# Patient Record
Sex: FEMALE | Race: WHITE | Employment: UNEMPLOYED | URBAN - METROPOLITAN AREA
[De-identification: names, ages, dates, MRNs, and addresses within clinical notes are randomized per-mention and may not be internally consistent; named-entity substitution may affect disease eponyms.]

---

## 2017-03-15 LAB
AVERAGE GLUCOSE: NORMAL
HBA1C MFR BLD: 17.5 %

## 2017-03-23 ENCOUNTER — HOSPITAL ENCOUNTER (OUTPATIENT)
Age: 62
Setting detail: SPECIMEN
Discharge: HOME | End: 2017-03-23

## 2017-03-23 LAB
CHOLESTEROL/HDL RATIO: 2.7
CHOLESTEROL: 137 MG/DL
HCT VFR BLD CALC: 34.4 % (ref 36–46)
HDLC SERPL-MCNC: 50 MG/DL
HEMOGLOBIN: 11.6 G/DL (ref 12–16)
LDL CHOLESTEROL: 57 MG/DL (ref 0–130)
MCH RBC QN AUTO: 26.3 PG (ref 26–34)
MCHC RBC AUTO-ENTMCNC: 33.6 G/DL (ref 31–37)
MCV RBC AUTO: 78.2 FL (ref 80–100)
PDW BLD-RTO: 15.6 % (ref 12.5–15.4)
PLATELET # BLD: 124 K/UL (ref 140–450)
PMV BLD AUTO: 8.3 FL (ref 6–12)
RBC # BLD: 4.39 M/UL (ref 4–5.2)
TRIGL SERPL-MCNC: 148 MG/DL
VITAMIN B-12: 475 PG/ML (ref 211–946)
VLDLC SERPL CALC-MCNC: NORMAL MG/DL (ref 1–30)
WBC # BLD: 4.5 K/UL (ref 3.5–11)

## 2017-03-23 PROCEDURE — 86403 PARTICLE AGGLUT ANTBDY SCRN: CPT

## 2017-03-23 PROCEDURE — 85027 COMPLETE CBC AUTOMATED: CPT

## 2017-03-23 PROCEDURE — 87186 SC STD MICRODIL/AGAR DIL: CPT

## 2017-03-23 PROCEDURE — 80061 LIPID PANEL: CPT

## 2017-03-23 PROCEDURE — P9603 ONE-WAY ALLOW PRORATED MILES: HCPCS

## 2017-03-23 PROCEDURE — 87070 CULTURE OTHR SPECIMN AEROBIC: CPT

## 2017-03-23 PROCEDURE — 82607 VITAMIN B-12: CPT

## 2017-03-23 PROCEDURE — 87205 SMEAR GRAM STAIN: CPT

## 2017-03-23 PROCEDURE — 36415 COLL VENOUS BLD VENIPUNCTURE: CPT

## 2017-03-25 LAB
CULTURE: ABNORMAL
CULTURE: ABNORMAL
DIRECT EXAM: ABNORMAL
DIRECT EXAM: ABNORMAL
Lab: ABNORMAL
Lab: ABNORMAL
ORGANISM: ABNORMAL
SPECIMEN DESCRIPTION: ABNORMAL
SPECIMEN DESCRIPTION: ABNORMAL
STATUS: ABNORMAL

## 2017-03-28 ENCOUNTER — HOSPITAL ENCOUNTER (OUTPATIENT)
Age: 62
Setting detail: SPECIMEN
Discharge: HOME | End: 2017-03-28

## 2017-03-28 LAB
ABSOLUTE EOS #: 0.1 K/UL (ref 0–0.4)
ABSOLUTE LYMPH #: 1.2 K/UL (ref 1–4.8)
ABSOLUTE MONO #: 0.3 K/UL (ref 0.2–0.8)
BASOPHILS # BLD: 0 % (ref 0–2)
BASOPHILS ABSOLUTE: 0 K/UL (ref 0–0.2)
DIFFERENTIAL TYPE: ABNORMAL
EOSINOPHILS RELATIVE PERCENT: 1 % (ref 1–4)
HCT VFR BLD CALC: 39.1 % (ref 36–46)
HEMOGLOBIN: 12.3 G/DL (ref 12–16)
LYMPHOCYTES # BLD: 22 % (ref 24–44)
MCH RBC QN AUTO: 25.7 PG (ref 26–34)
MCHC RBC AUTO-ENTMCNC: 31.5 G/DL (ref 31–37)
MCV RBC AUTO: 81.6 FL (ref 80–100)
MONOCYTES # BLD: 5 % (ref 1–7)
PDW BLD-RTO: 15.1 % (ref 11.5–14.5)
PLATELET # BLD: 147 K/UL (ref 130–400)
PLATELET ESTIMATE: ABNORMAL
PMV BLD AUTO: 8.4 FL (ref 6–12)
RBC # BLD: 4.8 M/UL (ref 4–5.2)
RBC # BLD: ABNORMAL 10*6/UL
SEG NEUTROPHILS: 72 % (ref 36–66)
SEGMENTED NEUTROPHILS ABSOLUTE COUNT: 4 K/UL (ref 1.8–7.7)
WBC # BLD: 5.6 K/UL (ref 3.5–11)
WBC # BLD: ABNORMAL 10*3/UL

## 2017-03-28 PROCEDURE — 87040 BLOOD CULTURE FOR BACTERIA: CPT

## 2017-03-28 PROCEDURE — 85025 COMPLETE CBC W/AUTO DIFF WBC: CPT

## 2017-03-28 PROCEDURE — 36415 COLL VENOUS BLD VENIPUNCTURE: CPT

## 2017-04-03 LAB
CULTURE: NORMAL
Lab: NORMAL
SPECIMEN DESCRIPTION: NORMAL
STATUS: NORMAL
STATUS: NORMAL

## 2017-05-10 ENCOUNTER — OFFICE VISIT (OUTPATIENT)
Dept: NEUROLOGY | Age: 62
End: 2017-05-10
Payer: COMMERCIAL

## 2017-05-10 VITALS
BODY MASS INDEX: 36.89 KG/M2 | WEIGHT: 195.4 LBS | DIASTOLIC BLOOD PRESSURE: 68 MMHG | SYSTOLIC BLOOD PRESSURE: 133 MMHG | HEIGHT: 61 IN | HEART RATE: 92 BPM

## 2017-05-10 DIAGNOSIS — G40.909 SEIZURE DISORDER (HCC): Primary | ICD-10-CM

## 2017-05-10 PROCEDURE — 99204 OFFICE O/P NEW MOD 45 MIN: CPT | Performed by: PSYCHIATRY & NEUROLOGY

## 2017-05-10 RX ORDER — PIOGLITAZONEHYDROCHLORIDE 15 MG/1
15 TABLET ORAL EVERY MORNING
COMMUNITY
End: 2018-11-30

## 2017-05-10 RX ORDER — OMEPRAZOLE 40 MG/1
40 CAPSULE, DELAYED RELEASE ORAL DAILY
COMMUNITY

## 2017-05-10 RX ORDER — LAMOTRIGINE 200 MG/1
200 TABLET ORAL 2 TIMES DAILY
COMMUNITY

## 2017-05-10 RX ORDER — LEVETIRACETAM 1000 MG/1
1000 TABLET ORAL 2 TIMES DAILY
Status: ON HOLD | COMMUNITY
End: 2017-10-23 | Stop reason: HOSPADM

## 2017-05-10 RX ORDER — BUSPIRONE HYDROCHLORIDE 10 MG/1
10 TABLET ORAL 3 TIMES DAILY
COMMUNITY

## 2017-05-10 RX ORDER — LORATADINE 10 MG/1
10 TABLET ORAL DAILY
COMMUNITY

## 2017-05-10 RX ORDER — PAROXETINE HYDROCHLORIDE 20 MG/1
20 TABLET, FILM COATED ORAL EVERY MORNING
COMMUNITY

## 2017-05-10 RX ORDER — INSULIN GLARGINE 100 [IU]/ML
60 INJECTION, SOLUTION SUBCUTANEOUS 2 TIMES DAILY
COMMUNITY
End: 2020-01-01 | Stop reason: DRUGHIGH

## 2017-05-10 RX ORDER — SIMVASTATIN 20 MG
20 TABLET ORAL NIGHTLY
Status: ON HOLD | COMMUNITY
End: 2020-01-01 | Stop reason: HOSPADM

## 2017-05-10 RX ORDER — PRAMIPEXOLE DIHYDROCHLORIDE 1 MG/1
1 TABLET ORAL NIGHTLY
COMMUNITY

## 2017-05-10 RX ORDER — LISINOPRIL AND HYDROCHLOROTHIAZIDE 12.5; 1 MG/1; MG/1
1 TABLET ORAL DAILY
Status: ON HOLD | COMMUNITY
End: 2020-01-01 | Stop reason: HOSPADM

## 2017-08-03 ENCOUNTER — OFFICE VISIT (OUTPATIENT)
Dept: ORTHOPEDIC SURGERY | Age: 62
End: 2017-08-03
Payer: MEDICARE

## 2017-08-03 VITALS — BODY MASS INDEX: 36.88 KG/M2 | WEIGHT: 195.33 LBS | HEIGHT: 61 IN

## 2017-08-03 DIAGNOSIS — S80.01XA CONTUSION OF RIGHT KNEE, INITIAL ENCOUNTER: Primary | ICD-10-CM

## 2017-08-03 DIAGNOSIS — S80.02XA CONTUSION OF LEFT KNEE, INITIAL ENCOUNTER: ICD-10-CM

## 2017-08-03 PROCEDURE — 3017F COLORECTAL CA SCREEN DOC REV: CPT | Performed by: ORTHOPAEDIC SURGERY

## 2017-08-03 PROCEDURE — G8427 DOCREV CUR MEDS BY ELIG CLIN: HCPCS | Performed by: ORTHOPAEDIC SURGERY

## 2017-08-03 PROCEDURE — G8417 CALC BMI ABV UP PARAM F/U: HCPCS | Performed by: ORTHOPAEDIC SURGERY

## 2017-08-03 PROCEDURE — 3014F SCREEN MAMMO DOC REV: CPT | Performed by: ORTHOPAEDIC SURGERY

## 2017-08-03 PROCEDURE — 1036F TOBACCO NON-USER: CPT | Performed by: ORTHOPAEDIC SURGERY

## 2017-08-03 PROCEDURE — 99203 OFFICE O/P NEW LOW 30 MIN: CPT | Performed by: ORTHOPAEDIC SURGERY

## 2017-10-21 ENCOUNTER — APPOINTMENT (OUTPATIENT)
Dept: CT IMAGING | Age: 62
DRG: 101 | End: 2017-10-21
Payer: MEDICARE

## 2017-10-21 ENCOUNTER — APPOINTMENT (OUTPATIENT)
Dept: GENERAL RADIOLOGY | Age: 62
DRG: 101 | End: 2017-10-21
Payer: MEDICARE

## 2017-10-21 ENCOUNTER — HOSPITAL ENCOUNTER (INPATIENT)
Age: 62
LOS: 2 days | Discharge: HOME | DRG: 101 | End: 2017-10-23
Attending: EMERGENCY MEDICINE | Admitting: INTERNAL MEDICINE
Payer: MEDICARE

## 2017-10-21 DIAGNOSIS — G40.919 BREAKTHROUGH SEIZURE (HCC): Primary | ICD-10-CM

## 2017-10-21 PROBLEM — R56.9 SEIZURE (HCC): Status: ACTIVE | Noted: 2017-10-21

## 2017-10-21 PROBLEM — E11.42 TYPE 2 DIABETES MELLITUS WITH DIABETIC POLYNEUROPATHY, WITHOUT LONG-TERM CURRENT USE OF INSULIN (HCC): Status: ACTIVE | Noted: 2017-10-21

## 2017-10-21 PROBLEM — Z79.4 TYPE 2 DIABETES MELLITUS WITH DIABETIC POLYNEUROPATHY, WITH LONG-TERM CURRENT USE OF INSULIN (HCC): Status: ACTIVE | Noted: 2017-10-21

## 2017-10-21 LAB
ABSOLUTE EOS #: 0 K/UL (ref 0–0.4)
ABSOLUTE IMMATURE GRANULOCYTE: ABNORMAL K/UL (ref 0–0.3)
ABSOLUTE LYMPH #: 1 K/UL (ref 1–4.8)
ABSOLUTE MONO #: 0.2 K/UL (ref 0.1–1.2)
ANION GAP SERPL CALCULATED.3IONS-SCNC: 15 MMOL/L (ref 9–17)
BASOPHILS # BLD: 0 %
BASOPHILS ABSOLUTE: 0 K/UL (ref 0–0.2)
BUN BLDV-MCNC: 12 MG/DL (ref 8–23)
BUN/CREAT BLD: ABNORMAL (ref 9–20)
CALCIUM SERPL-MCNC: 9.7 MG/DL (ref 8.6–10.4)
CHLORIDE BLD-SCNC: 95 MMOL/L (ref 98–107)
CO2: 26 MMOL/L (ref 20–31)
CREAT SERPL-MCNC: 0.6 MG/DL (ref 0.5–0.9)
DIFFERENTIAL TYPE: ABNORMAL
EOSINOPHILS RELATIVE PERCENT: 1 %
GFR AFRICAN AMERICAN: >60 ML/MIN
GFR NON-AFRICAN AMERICAN: >60 ML/MIN
GFR SERPL CREATININE-BSD FRML MDRD: ABNORMAL ML/MIN/{1.73_M2}
GFR SERPL CREATININE-BSD FRML MDRD: ABNORMAL ML/MIN/{1.73_M2}
GLUCOSE BLD-MCNC: 416 MG/DL (ref 65–105)
GLUCOSE BLD-MCNC: 453 MG/DL (ref 70–99)
HCT VFR BLD CALC: 41.4 % (ref 36–46)
HEMOGLOBIN: 13.8 G/DL (ref 12–16)
IMMATURE GRANULOCYTES: ABNORMAL %
INR BLD: 1
LYMPHOCYTES # BLD: 26 %
MCH RBC QN AUTO: 26.4 PG (ref 26–34)
MCHC RBC AUTO-ENTMCNC: 33.3 G/DL (ref 31–37)
MCV RBC AUTO: 79.5 FL (ref 80–100)
MONOCYTES # BLD: 5 %
PDW BLD-RTO: 15.1 % (ref 12.5–15.4)
PLATELET # BLD: 89 K/UL (ref 140–450)
PLATELET ESTIMATE: ABNORMAL
PMV BLD AUTO: 8.2 FL (ref 6–12)
POC TROPONIN I: 0 NG/ML (ref 0–0.1)
POC TROPONIN I: 0 NG/ML (ref 0–0.1)
POC TROPONIN INTERP: NORMAL
POC TROPONIN INTERP: NORMAL
POTASSIUM SERPL-SCNC: 4 MMOL/L (ref 3.7–5.3)
PROTHROMBIN TIME: 10.7 SEC (ref 9.4–12.6)
RBC # BLD: 5.21 M/UL (ref 4–5.2)
RBC # BLD: ABNORMAL 10*6/UL
SEG NEUTROPHILS: 68 %
SEGMENTED NEUTROPHILS ABSOLUTE COUNT: 2.5 K/UL (ref 1.8–7.7)
SODIUM BLD-SCNC: 136 MMOL/L (ref 135–144)
WBC # BLD: 3.8 K/UL (ref 3.5–11)
WBC # BLD: ABNORMAL 10*3/UL

## 2017-10-21 PROCEDURE — 96365 THER/PROPH/DIAG IV INF INIT: CPT

## 2017-10-21 PROCEDURE — 99285 EMERGENCY DEPT VISIT HI MDM: CPT

## 2017-10-21 PROCEDURE — 82805 BLOOD GASES W/O2 SATURATION: CPT

## 2017-10-21 PROCEDURE — 82550 ASSAY OF CK (CPK): CPT

## 2017-10-21 PROCEDURE — 84100 ASSAY OF PHOSPHORUS: CPT

## 2017-10-21 PROCEDURE — 86780 TREPONEMA PALLIDUM: CPT

## 2017-10-21 PROCEDURE — 80061 LIPID PANEL: CPT

## 2017-10-21 PROCEDURE — 85610 PROTHROMBIN TIME: CPT

## 2017-10-21 PROCEDURE — 83735 ASSAY OF MAGNESIUM: CPT

## 2017-10-21 PROCEDURE — 82607 VITAMIN B-12: CPT

## 2017-10-21 PROCEDURE — 87040 BLOOD CULTURE FOR BACTERIA: CPT

## 2017-10-21 PROCEDURE — 6360000002 HC RX W HCPCS: Performed by: EMERGENCY MEDICINE

## 2017-10-21 PROCEDURE — 80048 BASIC METABOLIC PNL TOTAL CA: CPT

## 2017-10-21 PROCEDURE — 6370000000 HC RX 637 (ALT 250 FOR IP): Performed by: STUDENT IN AN ORGANIZED HEALTH CARE EDUCATION/TRAINING PROGRAM

## 2017-10-21 PROCEDURE — 6360000002 HC RX W HCPCS

## 2017-10-21 PROCEDURE — 2060000000 HC ICU INTERMEDIATE R&B

## 2017-10-21 PROCEDURE — 82947 ASSAY GLUCOSE BLOOD QUANT: CPT

## 2017-10-21 PROCEDURE — 93005 ELECTROCARDIOGRAM TRACING: CPT

## 2017-10-21 PROCEDURE — 36415 COLL VENOUS BLD VENIPUNCTURE: CPT

## 2017-10-21 PROCEDURE — 70450 CT HEAD/BRAIN W/O DYE: CPT

## 2017-10-21 PROCEDURE — 85025 COMPLETE CBC W/AUTO DIFF WBC: CPT

## 2017-10-21 PROCEDURE — 84443 ASSAY THYROID STIM HORMONE: CPT

## 2017-10-21 PROCEDURE — 82010 KETONE BODYS QUAN: CPT

## 2017-10-21 PROCEDURE — 83605 ASSAY OF LACTIC ACID: CPT

## 2017-10-21 PROCEDURE — 84484 ASSAY OF TROPONIN QUANT: CPT

## 2017-10-21 PROCEDURE — 82140 ASSAY OF AMMONIA: CPT

## 2017-10-21 PROCEDURE — 96375 TX/PRO/DX INJ NEW DRUG ADDON: CPT

## 2017-10-21 PROCEDURE — 82746 ASSAY OF FOLIC ACID SERUM: CPT

## 2017-10-21 PROCEDURE — 71010 XR CHEST PORTABLE: CPT

## 2017-10-21 PROCEDURE — 83036 HEMOGLOBIN GLYCOSYLATED A1C: CPT

## 2017-10-21 RX ORDER — LEVETIRACETAM 10 MG/ML
1000 INJECTION INTRAVASCULAR ONCE
Status: COMPLETED | OUTPATIENT
Start: 2017-10-21 | End: 2017-10-21

## 2017-10-21 RX ORDER — SODIUM CHLORIDE 0.9 % (FLUSH) 0.9 %
10 SYRINGE (ML) INJECTION PRN
Status: DISCONTINUED | OUTPATIENT
Start: 2017-10-21 | End: 2017-10-23 | Stop reason: HOSPADM

## 2017-10-21 RX ORDER — BUSPIRONE HYDROCHLORIDE 10 MG/1
10 TABLET ORAL 3 TIMES DAILY
Status: DISCONTINUED | OUTPATIENT
Start: 2017-10-22 | End: 2017-10-23 | Stop reason: HOSPADM

## 2017-10-21 RX ORDER — SODIUM CHLORIDE 0.9 % (FLUSH) 0.9 %
10 SYRINGE (ML) INJECTION EVERY 12 HOURS SCHEDULED
Status: DISCONTINUED | OUTPATIENT
Start: 2017-10-22 | End: 2017-10-23 | Stop reason: HOSPADM

## 2017-10-21 RX ORDER — LAMOTRIGINE 100 MG/1
50 TABLET ORAL DAILY
Status: DISCONTINUED | OUTPATIENT
Start: 2017-10-22 | End: 2017-10-22

## 2017-10-21 RX ORDER — ACETAMINOPHEN 325 MG/1
650 TABLET ORAL EVERY 4 HOURS PRN
Status: DISCONTINUED | OUTPATIENT
Start: 2017-10-21 | End: 2017-10-23 | Stop reason: HOSPADM

## 2017-10-21 RX ORDER — POTASSIUM CHLORIDE 7.45 MG/ML
10 INJECTION INTRAVENOUS PRN
Status: DISCONTINUED | OUTPATIENT
Start: 2017-10-21 | End: 2017-10-23 | Stop reason: HOSPADM

## 2017-10-21 RX ORDER — LORAZEPAM 2 MG/ML
2 INJECTION INTRAMUSCULAR PRN
Status: DISCONTINUED | OUTPATIENT
Start: 2017-10-21 | End: 2017-10-22

## 2017-10-21 RX ORDER — SODIUM CHLORIDE 0.9 % (FLUSH) 0.9 %
10 SYRINGE (ML) INJECTION EVERY 12 HOURS SCHEDULED
Status: DISCONTINUED | OUTPATIENT
Start: 2017-10-22 | End: 2017-10-23

## 2017-10-21 RX ORDER — LORAZEPAM 2 MG/ML
INJECTION INTRAMUSCULAR
Status: COMPLETED
Start: 2017-10-21 | End: 2017-10-21

## 2017-10-21 RX ORDER — POTASSIUM CHLORIDE 20 MEQ/1
40 TABLET, EXTENDED RELEASE ORAL PRN
Status: DISCONTINUED | OUTPATIENT
Start: 2017-10-21 | End: 2017-10-23 | Stop reason: HOSPADM

## 2017-10-21 RX ORDER — ONDANSETRON 2 MG/ML
4 INJECTION INTRAMUSCULAR; INTRAVENOUS EVERY 6 HOURS PRN
Status: DISCONTINUED | OUTPATIENT
Start: 2017-10-21 | End: 2017-10-23 | Stop reason: HOSPADM

## 2017-10-21 RX ORDER — INSULIN GLARGINE 100 [IU]/ML
10 INJECTION, SOLUTION SUBCUTANEOUS NIGHTLY
Status: DISCONTINUED | OUTPATIENT
Start: 2017-10-22 | End: 2017-10-23

## 2017-10-21 RX ORDER — FAMOTIDINE 20 MG/1
20 TABLET, FILM COATED ORAL 2 TIMES DAILY
Status: DISCONTINUED | OUTPATIENT
Start: 2017-10-22 | End: 2017-10-23 | Stop reason: HOSPADM

## 2017-10-21 RX ORDER — POTASSIUM CHLORIDE 20MEQ/15ML
40 LIQUID (ML) ORAL PRN
Status: DISCONTINUED | OUTPATIENT
Start: 2017-10-21 | End: 2017-10-23 | Stop reason: HOSPADM

## 2017-10-21 RX ORDER — LEVETIRACETAM 500 MG/1
500 TABLET ORAL 2 TIMES DAILY
Status: DISCONTINUED | OUTPATIENT
Start: 2017-10-22 | End: 2017-10-22

## 2017-10-21 RX ORDER — ONDANSETRON 2 MG/ML
4 INJECTION INTRAMUSCULAR; INTRAVENOUS EVERY 8 HOURS PRN
Status: DISCONTINUED | OUTPATIENT
Start: 2017-10-21 | End: 2017-10-21 | Stop reason: ALTCHOICE

## 2017-10-21 RX ORDER — ACETAMINOPHEN 325 MG/1
650 TABLET ORAL EVERY 4 HOURS PRN
Status: DISCONTINUED | OUTPATIENT
Start: 2017-10-21 | End: 2017-10-21 | Stop reason: SDUPTHER

## 2017-10-21 RX ADMIN — INSULIN LISPRO 7 UNITS: 100 INJECTION, SOLUTION INTRAVENOUS; SUBCUTANEOUS at 23:51

## 2017-10-21 RX ADMIN — LEVETIRACETAM 1000 MG: 10 INJECTION INTRAVENOUS at 20:44

## 2017-10-21 RX ADMIN — LORAZEPAM 2 MG: 2 INJECTION INTRAMUSCULAR; INTRAVENOUS at 20:06

## 2017-10-21 NOTE — ED TRIAGE NOTES
Pt arrived to ED via EMS with reporting family called due to altered mental status. Pt reports she has a history of seizures and was started on a new medication but does not remember what medication it is. On arrival pt disoriented to date and stated \"I'm confused on what happened, I don't remember anything. \"

## 2017-10-22 LAB
-: NORMAL
ABSOLUTE EOS #: 0 K/UL (ref 0–0.4)
ABSOLUTE IMMATURE GRANULOCYTE: ABNORMAL K/UL (ref 0–0.3)
ABSOLUTE LYMPH #: 1.2 K/UL (ref 1–4.8)
ABSOLUTE MONO #: 0.3 K/UL (ref 0.1–1.2)
ALBUMIN SERPL-MCNC: 3.5 G/DL (ref 3.5–5.2)
ALBUMIN/GLOBULIN RATIO: 0.9 (ref 1–2.5)
ALLEN TEST: ABNORMAL
ALLEN TEST: POSITIVE
ALP BLD-CCNC: 82 U/L (ref 35–104)
ALT SERPL-CCNC: 17 U/L (ref 5–33)
AMMONIA: 44 UMOL/L (ref 11–51)
AMORPHOUS: NORMAL
AMPHETAMINE SCREEN URINE: NEGATIVE
ANION GAP SERPL CALCULATED.3IONS-SCNC: 11 MMOL/L (ref 9–17)
AST SERPL-CCNC: 23 U/L
BACTERIA: NORMAL
BARBITURATE SCREEN URINE: NEGATIVE
BASOPHILS # BLD: 0 %
BASOPHILS ABSOLUTE: 0 K/UL (ref 0–0.2)
BENZODIAZEPINE SCREEN, URINE: NEGATIVE
BETA-HYDROXYBUTYRATE: 0.36 MMOL/L (ref 0.02–0.27)
BILIRUB SERPL-MCNC: 0.42 MG/DL (ref 0.3–1.2)
BILIRUBIN DIRECT: 0.13 MG/DL
BILIRUBIN URINE: NEGATIVE
BILIRUBIN, INDIRECT: 0.29 MG/DL (ref 0–1)
BUN BLDV-MCNC: 13 MG/DL (ref 8–23)
BUN/CREAT BLD: ABNORMAL (ref 9–20)
BUPRENORPHINE URINE: NORMAL
CALCIUM SERPL-MCNC: 9.5 MG/DL (ref 8.6–10.4)
CANNABINOID SCREEN URINE: NEGATIVE
CARBOXYHEMOGLOBIN: 1.1 % (ref 0–5)
CASTS UA: NORMAL /LPF (ref 0–8)
CHLORIDE BLD-SCNC: 101 MMOL/L (ref 98–107)
CHOLESTEROL/HDL RATIO: 3
CHOLESTEROL: 148 MG/DL
CO2: 29 MMOL/L (ref 20–31)
COCAINE METABOLITE, URINE: NEGATIVE
COLOR: YELLOW
COMMENT UA: ABNORMAL
CREAT SERPL-MCNC: 0.59 MG/DL (ref 0.5–0.9)
CRYSTALS, UA: NORMAL /HPF
DIFFERENTIAL TYPE: ABNORMAL
EOSINOPHILS RELATIVE PERCENT: 1 %
EPITHELIAL CELLS UA: NORMAL /HPF (ref 0–5)
FIO2: 3
FIO2: ABNORMAL
FOLATE: 16.9 NG/ML
GFR AFRICAN AMERICAN: >60 ML/MIN
GFR NON-AFRICAN AMERICAN: >60 ML/MIN
GFR SERPL CREATININE-BSD FRML MDRD: ABNORMAL ML/MIN/{1.73_M2}
GFR SERPL CREATININE-BSD FRML MDRD: ABNORMAL ML/MIN/{1.73_M2}
GLOBULIN: ABNORMAL G/DL (ref 1.5–3.8)
GLUCOSE BLD-MCNC: 224 MG/DL (ref 65–105)
GLUCOSE BLD-MCNC: 260 MG/DL (ref 70–99)
GLUCOSE BLD-MCNC: 291 MG/DL (ref 65–105)
GLUCOSE BLD-MCNC: 322 MG/DL (ref 65–105)
GLUCOSE BLD-MCNC: 326 MG/DL (ref 65–105)
GLUCOSE BLD-MCNC: 328 MG/DL (ref 65–105)
GLUCOSE BLD-MCNC: 399 MG/DL (ref 65–105)
GLUCOSE URINE: ABNORMAL
HCO3 VENOUS: 29.6 MMOL/L (ref 24–30)
HCT VFR BLD CALC: 39.6 % (ref 36–46)
HDLC SERPL-MCNC: 49 MG/DL
HEMOGLOBIN: 13 G/DL (ref 12–16)
IMMATURE GRANULOCYTES: ABNORMAL %
INR BLD: 1
KEPPRA: 25 UG/ML
KETONES, URINE: NEGATIVE
LACTIC ACID, WHOLE BLOOD: 1.5 MMOL/L (ref 0.7–2.1)
LACTIC ACID, WHOLE BLOOD: 1.9 MMOL/L (ref 0.7–2.1)
LACTIC ACID: NORMAL MMOL/L
LACTIC ACID: NORMAL MMOL/L
LAMOTRIGINE LEVEL: 3 UG/ML (ref 3–15)
LDL CHOLESTEROL: 75 MG/DL (ref 0–130)
LEUKOCYTE ESTERASE, URINE: NEGATIVE
LYMPHOCYTES # BLD: 23 %
MAGNESIUM: 1.7 MG/DL (ref 1.6–2.6)
MAGNESIUM: 1.7 MG/DL (ref 1.6–2.6)
MCH RBC QN AUTO: 26.3 PG (ref 26–34)
MCHC RBC AUTO-ENTMCNC: 32.8 G/DL (ref 31–37)
MCV RBC AUTO: 80.2 FL (ref 80–100)
MDMA URINE: NORMAL
METHADONE SCREEN, URINE: NEGATIVE
METHAMPHETAMINE, URINE: NORMAL
METHEMOGLOBIN: ABNORMAL % (ref 0–1.5)
MODE: ABNORMAL
MODE: ABNORMAL
MONOCYTES # BLD: 7 %
MUCUS: NORMAL
NEGATIVE BASE EXCESS, ART: ABNORMAL (ref 0–2)
NEGATIVE BASE EXCESS, VEN: ABNORMAL MMOL/L (ref 0–2)
NITRITE, URINE: NEGATIVE
NOTIFICATION TIME: ABNORMAL
NOTIFICATION: ABNORMAL
O2 DEVICE/FLOW/%: ABNORMAL
O2 DEVICE/FLOW/%: ABNORMAL
O2 SAT, VEN: 86.7 % (ref 60–85)
OPIATES, URINE: NEGATIVE
OTHER OBSERVATIONS UA: NORMAL
OXYCODONE SCREEN URINE: NEGATIVE
OXYHEMOGLOBIN: ABNORMAL % (ref 95–98)
PATIENT TEMP: 37
PATIENT TEMP: ABNORMAL
PCO2, VEN, TEMP ADJ: ABNORMAL MMHG (ref 39–55)
PCO2, VEN: 56.7 (ref 39–55)
PDW BLD-RTO: 14.7 % (ref 12.5–15.4)
PEEP/CPAP: ABNORMAL
PH UA: 5 (ref 5–8)
PH VENOUS: 7.34 (ref 7.32–7.42)
PH, VEN, TEMP ADJ: ABNORMAL (ref 7.32–7.42)
PHENCYCLIDINE, URINE: NEGATIVE
PHOSPHORUS: 4 MG/DL (ref 2.6–4.5)
PLATELET # BLD: 96 K/UL (ref 140–450)
PLATELET ESTIMATE: ABNORMAL
PMV BLD AUTO: 7.9 FL (ref 6–12)
PO2, VEN, TEMP ADJ: ABNORMAL MMHG (ref 30–50)
PO2, VEN: 56.7 (ref 30–50)
POC HCO3: 31.1 MMOL/L (ref 21–28)
POC O2 SATURATION: 99 % (ref 94–98)
POC PCO2 TEMP: ABNORMAL MM HG
POC PCO2: 53.3 MM HG (ref 35–48)
POC PH TEMP: ABNORMAL
POC PH: 7.38 (ref 7.35–7.45)
POC PO2 TEMP: ABNORMAL MM HG
POC PO2: 129.9 MM HG (ref 83–108)
POSITIVE BASE EXCESS, ART: 4 (ref 0–3)
POSITIVE BASE EXCESS, VEN: 2.9 MMOL/L (ref 0–2)
POTASSIUM SERPL-SCNC: 4.1 MMOL/L (ref 3.7–5.3)
PROPOXYPHENE, URINE: NORMAL
PROTEIN UA: ABNORMAL
PROTHROMBIN TIME: 11 SEC (ref 9.4–12.6)
PSV: ABNORMAL
PT. POSITION: ABNORMAL
RBC # BLD: 4.94 M/UL (ref 4–5.2)
RBC # BLD: ABNORMAL 10*6/UL
RBC UA: NORMAL /HPF (ref 0–4)
RENAL EPITHELIAL, UA: NORMAL /HPF
RESPIRATORY RATE: ABNORMAL
SAMPLE SITE: ABNORMAL
SAMPLE SITE: ABNORMAL
SEG NEUTROPHILS: 69 %
SEGMENTED NEUTROPHILS ABSOLUTE COUNT: 3.7 K/UL (ref 1.8–7.7)
SET RATE: ABNORMAL
SODIUM BLD-SCNC: 141 MMOL/L (ref 135–144)
SPECIFIC GRAVITY UA: 1.04 (ref 1–1.03)
T. PALLIDUM, IGG: NONREACTIVE
TCO2 (CALC), ART: 33 MMOL/L (ref 22–29)
TEST INFORMATION: NORMAL
TEXT FOR RESPIRATORY: ABNORMAL
TOTAL CK: 51 U/L (ref 26–192)
TOTAL HB: ABNORMAL G/DL (ref 12–16)
TOTAL PROTEIN: 7.2 G/DL (ref 6.4–8.3)
TOTAL RATE: ABNORMAL
TRICHOMONAS: NORMAL
TRICYCLIC ANTIDEPRESSANTS, UR: NORMAL
TRIGL SERPL-MCNC: 118 MG/DL
TSH SERPL DL<=0.05 MIU/L-ACNC: 1.16 MIU/L (ref 0.3–5)
TURBIDITY: CLEAR
URINE HGB: ABNORMAL
UROBILINOGEN, URINE: NORMAL
VITAMIN B-12: 432 PG/ML (ref 211–946)
VLDLC SERPL CALC-MCNC: NORMAL MG/DL (ref 1–30)
VT: ABNORMAL
WBC # BLD: 5.3 K/UL (ref 3.5–11)
WBC # BLD: ABNORMAL 10*3/UL
WBC UA: NORMAL /HPF (ref 0–5)
YEAST: NORMAL

## 2017-10-22 PROCEDURE — 85610 PROTHROMBIN TIME: CPT

## 2017-10-22 PROCEDURE — 97530 THERAPEUTIC ACTIVITIES: CPT

## 2017-10-22 PROCEDURE — 6370000000 HC RX 637 (ALT 250 FOR IP): Performed by: STUDENT IN AN ORGANIZED HEALTH CARE EDUCATION/TRAINING PROGRAM

## 2017-10-22 PROCEDURE — 83605 ASSAY OF LACTIC ACID: CPT

## 2017-10-22 PROCEDURE — 99223 1ST HOSP IP/OBS HIGH 75: CPT | Performed by: INTERNAL MEDICINE

## 2017-10-22 PROCEDURE — 82803 BLOOD GASES ANY COMBINATION: CPT

## 2017-10-22 PROCEDURE — 81001 URINALYSIS AUTO W/SCOPE: CPT

## 2017-10-22 PROCEDURE — 99222 1ST HOSP IP/OBS MODERATE 55: CPT | Performed by: PSYCHIATRY & NEUROLOGY

## 2017-10-22 PROCEDURE — 80177 DRUG SCRN QUAN LEVETIRACETAM: CPT

## 2017-10-22 PROCEDURE — 6370000000 HC RX 637 (ALT 250 FOR IP): Performed by: PSYCHIATRY & NEUROLOGY

## 2017-10-22 PROCEDURE — G8978 MOBILITY CURRENT STATUS: HCPCS

## 2017-10-22 PROCEDURE — 80175 DRUG SCREEN QUAN LAMOTRIGINE: CPT

## 2017-10-22 PROCEDURE — 2580000003 HC RX 258: Performed by: INTERNAL MEDICINE

## 2017-10-22 PROCEDURE — 97162 PT EVAL MOD COMPLEX 30 MIN: CPT

## 2017-10-22 PROCEDURE — 80076 HEPATIC FUNCTION PANEL: CPT

## 2017-10-22 PROCEDURE — 36600 WITHDRAWAL OF ARTERIAL BLOOD: CPT

## 2017-10-22 PROCEDURE — 2580000003 HC RX 258: Performed by: STUDENT IN AN ORGANIZED HEALTH CARE EDUCATION/TRAINING PROGRAM

## 2017-10-22 PROCEDURE — 94762 N-INVAS EAR/PLS OXIMTRY CONT: CPT

## 2017-10-22 PROCEDURE — 36415 COLL VENOUS BLD VENIPUNCTURE: CPT

## 2017-10-22 PROCEDURE — 83735 ASSAY OF MAGNESIUM: CPT

## 2017-10-22 PROCEDURE — G8979 MOBILITY GOAL STATUS: HCPCS

## 2017-10-22 PROCEDURE — 80307 DRUG TEST PRSMV CHEM ANLYZR: CPT

## 2017-10-22 PROCEDURE — 80048 BASIC METABOLIC PNL TOTAL CA: CPT

## 2017-10-22 PROCEDURE — 6360000002 HC RX W HCPCS: Performed by: INTERNAL MEDICINE

## 2017-10-22 PROCEDURE — 82947 ASSAY GLUCOSE BLOOD QUANT: CPT

## 2017-10-22 PROCEDURE — 2060000000 HC ICU INTERMEDIATE R&B

## 2017-10-22 PROCEDURE — 85025 COMPLETE CBC W/AUTO DIFF WBC: CPT

## 2017-10-22 RX ORDER — LORAZEPAM 2 MG/ML
1 INJECTION INTRAMUSCULAR EVERY 30 MIN PRN
Status: DISCONTINUED | OUTPATIENT
Start: 2017-10-22 | End: 2017-10-23 | Stop reason: HOSPADM

## 2017-10-22 RX ORDER — LAMOTRIGINE 100 MG/1
200 TABLET ORAL 2 TIMES DAILY
Status: DISCONTINUED | OUTPATIENT
Start: 2017-10-22 | End: 2017-10-23 | Stop reason: HOSPADM

## 2017-10-22 RX ADMIN — BUSPIRONE HYDROCHLORIDE 10 MG: 10 TABLET ORAL at 09:03

## 2017-10-22 RX ADMIN — INSULIN GLARGINE 10 UNITS: 100 INJECTION, SOLUTION SUBCUTANEOUS at 21:00

## 2017-10-22 RX ADMIN — BUSPIRONE HYDROCHLORIDE 10 MG: 10 TABLET ORAL at 16:33

## 2017-10-22 RX ADMIN — LEVETIRACETAM 500 MG: 500 TABLET, FILM COATED ORAL at 09:03

## 2017-10-22 RX ADMIN — SODIUM CHLORIDE, PRESERVATIVE FREE 10 ML: 5 INJECTION INTRAVENOUS at 22:39

## 2017-10-22 RX ADMIN — FAMOTIDINE 20 MG: 20 TABLET, FILM COATED ORAL at 20:59

## 2017-10-22 RX ADMIN — LAMOTRIGINE 200 MG: 100 TABLET ORAL at 20:59

## 2017-10-22 RX ADMIN — INSULIN GLARGINE 10 UNITS: 100 INJECTION, SOLUTION SUBCUTANEOUS at 01:19

## 2017-10-22 RX ADMIN — LEVETIRACETAM 1250 MG: 500 TABLET, FILM COATED ORAL at 20:59

## 2017-10-22 RX ADMIN — FAMOTIDINE 20 MG: 20 TABLET, FILM COATED ORAL at 01:19

## 2017-10-22 RX ADMIN — BUSPIRONE HYDROCHLORIDE 10 MG: 10 TABLET ORAL at 20:59

## 2017-10-22 RX ADMIN — Medication 10 ML: at 09:14

## 2017-10-22 RX ADMIN — FAMOTIDINE 20 MG: 20 TABLET, FILM COATED ORAL at 09:03

## 2017-10-22 RX ADMIN — LAMOTRIGINE 50 MG: 100 TABLET ORAL at 09:03

## 2017-10-22 RX ADMIN — INSULIN LISPRO 6 UNITS: 100 INJECTION, SOLUTION INTRAVENOUS; SUBCUTANEOUS at 09:10

## 2017-10-22 RX ADMIN — INSULIN LISPRO 9 UNITS: 100 INJECTION, SOLUTION INTRAVENOUS; SUBCUTANEOUS at 18:38

## 2017-10-22 RX ADMIN — LEVETIRACETAM 500 MG: 500 TABLET, FILM COATED ORAL at 01:20

## 2017-10-22 RX ADMIN — BUSPIRONE HYDROCHLORIDE 10 MG: 10 TABLET ORAL at 01:19

## 2017-10-22 RX ADMIN — INSULIN LISPRO 6 UNITS: 100 INJECTION, SOLUTION INTRAVENOUS; SUBCUTANEOUS at 21:04

## 2017-10-22 RX ADMIN — INSULIN LISPRO 12 UNITS: 100 INJECTION, SOLUTION INTRAVENOUS; SUBCUTANEOUS at 12:45

## 2017-10-22 RX ADMIN — ENOXAPARIN SODIUM 40 MG: 40 INJECTION SUBCUTANEOUS at 09:03

## 2017-10-22 ASSESSMENT — PAIN SCALES - GENERAL
PAINLEVEL_OUTOF10: 8
PAINLEVEL_OUTOF10: 5
PAINLEVEL_OUTOF10: 8

## 2017-10-22 ASSESSMENT — PAIN DESCRIPTION - LOCATION
LOCATION: HEAD
LOCATION: LEG
LOCATION: HEAD;LEG

## 2017-10-22 ASSESSMENT — PAIN DESCRIPTION - ORIENTATION
ORIENTATION: RIGHT;LEFT;LOWER
ORIENTATION: RIGHT;LEFT;LOWER

## 2017-10-22 NOTE — PROGRESS NOTES
IM senior note    The patient is a 59year old female with a past medical history of seizure disorder, stroke, HLD and DM with microvascular and macrovascular complications that presented to the ED post ictal. She had an additional seizure in the ED requiring Ativan. The patient appeared to be hypoxic but resolved with benzo administration.  No family or witnesses at bedside    No tongue bite, urinary or bowel incontinence    A/P  Principal Problem:    Seizure ([address]  Active Problems:    Type 2 diabetes mellitus with diabetic polyneuropathy, with long-term current use of insulin ([address]        Keppra IV  Neurology consult  Restart Lamictal  Ativan 2 mg IV q 5 minutes for seizure  Hold oral hypoglycemia  Lantus 10 units  High dose sliding scale   Hemoglobin A1c  TSH/ Ammonia/ phos/ mag/ BMP/ Check LFT  B12/ folate/VDRL       Terry Amaya MD  PGY3  Department of Internal Medicine  [facility]

## 2017-10-22 NOTE — FLOWSHEET NOTE
10/21/17 4661   Swallow Screening   Is the patient able to remain alert for testing? Yes   Was the Patient Eating a Modified Diet Prior to being Admitted? No   Is there an Existing PEG or Abdominal Feeding Tube? No   Does the patient have a Head of Bed [facility]) restriction <30°? No   Is there a Tracheostomy Tube Present?  No   3 oz Water Swallow Screen Pass

## 2017-10-22 NOTE — CONSULTS
NEUROLOGY INPATIENT CONSULT NOTE    10/22/2017         Mick Varela is a  59 y.o. female admitted on 10/21/2017 with  Breakthrough seizure ([address] Kristopher Mckeon      History is obtained mostly from the patient and the medical record and from the caregivers. Chart is reviewed and patient is examined. Briefly, this is a  59 y.o. female admitted on 10/21/2017 with breakthrough seizure. Patient stated \"I'm confused on what happened; I don't remember anything\". Patient stated that last thing she remembered yesterday was going back to bed yesterday after feeling tired. The next thing she remembered was waking up in the hospital.  She could not recall any details and she does not know who called EMS. As per medical record; patient was brought in by EMS as family called due to altered mentation. Vitals on admit: 174/74, 99.1°F, 99/min. Patient had another seizure activity in ED. It was described as generalized and no report of tongue bite or bladder incontinence. She was loaded with Keppra 1gr and continued on 500 bid. As per medical record; there were medication changes in her seizure meds recently. Patient stated that she has not had any medication changes \"for a long time\". Patient also stated that she had a history of seizures since age 10 and initially she was on Tegretol. She was evaluated at [facility] a year ago and Tegretol was discontinued and started on Lamictal as her EEG was demonstrating generalized seizure activity. Later she was added on Keppra 1000 mg twice daily. She stated MRI brain in September 2016 was normal.    Patient also stated that she never had any intolerance/allergic reaction with her AEDs. She had history of diabetic neuropathy affecting bilateral lower extremities and intermittent left upper extremity intentional tremor. Denies family history of seizures. Denied history of stroke/meningitis/encephalitis.          No current facility-administered medications on file prior to encounter. Current Outpatient Prescriptions on File Prior to Encounter   Medication Sig Dispense Refill    lisinopril-hydrochlorothiazide (PRINZIDE;ZESTORETIC) 10-12.5 MG per tablet Take 1 tablet by mouth daily      PARoxetine (PAXIL) 20 MG tablet Take 20 mg by mouth every morning      omeprazole (PRILOSEC) 20 MG delayed release capsule Take 20 mg by mouth daily      loratadine (CLARITIN) 10 MG tablet Take 10 mg by mouth daily      pioglitazone (ACTOS) 15 MG tablet Take 15 mg by mouth every morning      simvastatin (ZOCOR) 40 MG tablet Take 40 mg by mouth nightly      pramipexole (MIRAPEX) 1 MG tablet Take 1 mg by mouth nightly      levETIRAcetam (KEPPRA) 1000 MG tablet Take 1,000 mg by mouth 2 times daily      lamoTRIgine (LAMICTAL) 200 MG tablet Take 200 mg by mouth 2 times daily      busPIRone (BUSPAR) 10 MG tablet Take 10 mg by mouth 3 times daily      insulin glargine (LANTUS) 100 UNIT/ML injection vial Inject into the skin 2 times daily      insulin lispro (HUMALOG) 100 UNIT/ML injection vial at meals four times a day plus sliding scale if needed       Allergies: Lloyd Guallpa is allergic to codeine. Past Medical History:   Diagnosis Date    Anemia     Cataract     GERD (gastroesophageal reflux disease)     Headache     Hyperlipidemia     Neuropathy (HCC)     Osteoarthritis     Seizures (HCC)     [facility]brum[facility])     Type 2 diabetes mellitus without complication ([address]        Past Surgical History:   Procedure Laterality Date    CATARACT REMOVAL      CHOLECYSTECTOMY      TONSILLECTOMY       Social History: Lloyd Guallpa  reports that she has never smoked. She has never used smokeless tobacco. She reports that she does not drink alcohol or use drugs. History reviewed. No pertinent family history.     Current Medications:     [START ON 10/23/2017] influenza virus vaccine  0.5 mL Intramuscular Once    sodium chloride flush  10 mL Intravenous 2 times per sensation  VII    -     Normal facial symmetry  VIII   -     Intact hearing  IX,X -     Symmetrical palate  XI    -     Symmetrical shoulder shrug  XII   -     Midline tongue, no atrophy    MOTOR FUNCTION:  Normal bulk, normal tone, normal power;  no involuntary movements, no tremor   SENSORY FUNCTION:  Normal touch, normal pin, normal vibration, normal proprioception   CEREBELLAR FUNCTION:  Intact fine motor control over upper limbs   REFLEX FUNCTION:  Symmetric, no perverted reflex, no Babinski sign   STATION and GAIT  Not tested         Data:    Lab Results:     Lab Results   Component Value Date    CHOL 148 10/22/2017    LDLCHOLESTEROL 75 10/22/2017    HDL 49 10/22/2017    TRIG 118 10/22/2017    ALT 17 10/22/2017    AST 23 10/22/2017    TSH 1.16 10/22/2017    INR 1.0 10/22/2017    [id number] 432 10/22/2017     Hematology:  Recent Labs      10/21/17   2012  10/22/17   0635   WBC  3.8  5.3   HGB  13.8  13.0   HCT  41.4  39.6   PLT  89*  96*   INR  1.0  1.0     Chemistry:  Recent Labs      10/21/17   2012  10/22/17   0111  10/22/17   0635   NA  136   --   141   K  4.0   --   4.1   CL  95*   --   101   CO2  26   --   29   GLUCOSE  453*   --   260*   BUN  12   --   13   CREATININE  0.60   --   0.59   MG   --   1.7  1.7   CALCIUM  9.7   --   9.5     Recent Labs      10/22/17   0111  10/22/17   0635   PROT   --   7.2   LABALBU   --   3.5   TSH  1.16   --    AST   --   23   ALT   --   17   AMMONIA  44   --    CHOL  148   --    HDL  49   --    LDLCHOLESTEROL  75   --    CHOLHDLRATIO  3.0   --    TRIG  118   --    VLDL  NOT REPORTED   --    CKTOTAL  51   --        No results found for: PHENYTOIN, PHENYTOIN, VALPROATE, CBMZ        Diagnostic data reviewed:  CT Head (10/21/17): no acute intracranial abnormality. EEG (at [address]): Reportedly showed findings suggesting generalized seizure disorder. MRI brain (September 2016) : Reportedly unremarkable.             Impression and Plan: Ms. Lopez Left is a 59 y.o. female with   Breakthrough seizure; increase the dose of Keppra from 1000 mg bid to 1250 mg bid; continue Lamictal 200 mg twice daily  Check Keppra and Lamictal levels today; continue seizure precautions; IV Lorazepam 1mg q 30 min prn prolonged seizure activity, max 4mg/ day    Long-standing history of seizure disorder since age 10  Seizure restrictions and activity restrictions discussed and she is very well aware of those because of long-standing history of seizure disorder    Comorbid conditions include diabetes and diabetic neuropathy. Discussed with patient and Nurse. Will follow with you. Thank you for consultation.

## 2017-10-22 NOTE — ED PROVIDER NOTES
[facility]  ED  eMERGENCY dEPARTMENT eNCOUnter  Resident    Pt Name: Cynthia Girard  MRN: [id number]  [place] 1955  Date of evaluation: 10/21/2017  PCP:  Shereen Lincoln       Chief Complaint   Patient presents with    Altered Mental Status         HISTORY OF PRESENT ILLNESS    Cynthia Girard is a 59 y.o. female who presents Via EMS with chief complaint of altered mental status. Patient has history of seizures and was allegedly just changed on her seizure medications but there has been no documentation that family was able to find. Family did not come with EMS to provide any additional history patient is altered and unable to provide any additional history either. REVIEW OF SYSTEMS       Review of Systems   Unable to perform ROS: Mental status change        PAST MEDICAL HISTORY    has a past medical history of Anemia; Cataract; GERD (gastroesophageal reflux disease); Headache; Hyperlipidemia; Neuropathy ([address] Osteoarthritis; Seizures ([address] Stroke (cerebrum) ([address] and Type 2 diabetes mellitus without complication ([address] SURGICAL HISTORY      has a past surgical history that includes Cholecystectomy;  Tonsillectomy; and Cataract removal.      CURRENT MEDICATIONS       Previous Medications    BUSPIRONE (BUSPAR) 10 MG TABLET    Take 10 mg by mouth 3 times daily    INSULIN GLARGINE (LANTUS) 100 UNIT/ML INJECTION VIAL    Inject into the skin 2 times daily    INSULIN LISPRO (HUMALOG) 100 UNIT/ML INJECTION VIAL    at meals four times a day plus sliding scale if needed    LAMOTRIGINE (LAMICTAL) 200 MG TABLET    Take 200 mg by mouth 2 times daily    LEVETIRACETAM (KEPPRA) 1000 MG TABLET    Take 1,000 mg by mouth 2 times daily    LISINOPRIL-HYDROCHLOROTHIAZIDE (PRINZIDE;ZESTORETIC) 10-12.5 MG PER TABLET    Take 1 tablet by mouth daily    LORATADINE (CLARITIN) 10 MG TABLET    Take 10 mg by mouth daily    OMEPRAZOLE (PRILOSEC) 20 MG DELAYED RELEASE CAPSULE    Take 20 mg by

## 2017-10-22 NOTE — CARE COORDINATION
Case Management Initial Discharge Plan  Vlad Barraza,         Readmission Risk              Readmission Risk:        23.75       Age 72 or Greater:  0    Admitted from SNF or Requires Paid or Family Care:  0    Currently has CHF,COPD,ARF,CRI,or is on dialysis:  4    Takes more than 5 Prescription Medications:  4    Takes Digoxin,Insulin,Anticoagulants,Narcotics or ASA/Plavix:  [facility] in Past 12 Months:  10    On Disability:  0    Patient Considers own Health:  3.75            Met with:patient to discuss discharge plans. Information verified: address, contacts, phone number, , insurance Yes  PCP: Dayana Olivares  Date of last visit:  [employer] Provider: Patient states she has Chasity Maldonado Medicare but no card. Discharge Planning  Current Residence:  Private Residence  Living Arrangements:  Friends, Family Members   Home has 3 stories/4 stairs to climb  Support Systems:  Family Members  Current Services PTA:  Oxygen Therapy Supplier: n/a  Patient able to perform ADL's:Independent  DME used to aid ambulation prior to admission: Thermon Cabot admission unknown    Potential Assistance Needed:  1 Frannie Benjamin, Outpatient PT/OT    Pharmacy: [facility] in [address] [place] [address] Medications:  No  Does patient want to participate in local refill/ meds to beds program?  Yes    Patient agreeable to home care: No  [place] of choice provided:  n/a      Type of Home Care Services:  None  Patient expects to be discharged to:   (home)    Prior SNF/Rehab Placement and Facility: Has been to [address] [place] [address] 59 to SNF/Rehab: No  [place] of choice provided: n/a   Evaluation: n/a    Expected Discharge date:  10/23/17  Follow Up Appointment: Best Day/ Time: Friday PM    Transportation provider: Patient takes the bus  Transportation arrangements needed for discharge: Yes    Discharge Plan: Home independently. Await Neuro/O2 needs.          Electronically signed by Brooks Solorio RN on 10/22/17 at 12:42 PM

## 2017-10-22 NOTE — H&P
Historical Provider, MD   pramipexole (MIRAPEX) 1 MG tablet Take 1 mg by mouth nightly    Historical Provider, MD   levETIRAcetam (KEPPRA) 1000 MG tablet Take 1,000 mg by mouth 2 times daily    Historical Provider, MD   lamoTRIgine (LAMICTAL) 200 MG tablet Take 200 mg by mouth 2 times daily    Historical Provider, MD   busPIRone (BUSPAR) 10 MG tablet Take 10 mg by mouth 3 times daily    Historical Provider, MD   insulin glargine (LANTUS) 100 UNIT/ML injection vial Inject into the skin 2 times daily    Historical Provider, MD   insulin lispro (HUMALOG) 100 UNIT/ML injection vial at meals four times a day plus sliding scale if needed    Historical Provider, MD       ALLERGIES      Codeine    SOCIAL HISTORY       reports that she has never smoked. She has never used smokeless tobacco. She reports that she does not drink alcohol or use drugs. FAMILY HISTORY      family history is not on file. REVIEW OF SYSTEMS      · None, due to AMS    PHYSICAL EXAM      BP (!) 163/69   Pulse 104   Temp 99.1 °F (37.3 °C) (Oral)   Resp 20   Ht 5' 4\" (1.626 m)   Wt 201 lb (91.2 kg)   SpO2 98%   BMI 34.50 kg/m²      · General appearance: Patient is no acute distress  · HEENT: Head: Normocephalic, no lesions, without obvious abnormality. · Lungs: Clear to auscultation bilaterally  · Heart: Regular rate and rhythm, S1, S2 normal, no murmur, click, rub or gallop  · Abdomen: Soft, non-tender; bowel sounds normal; no masses,  no organomegaly  · Extremities: Extremities normal, atraumatic, no cyanosis or edema  · Neurological: Gait normal. Reflexes normal and symmetric. Sensation grossly normal. Motor: strength 5/5 throughout. · Skin: No rash, no lump    · Eye: No icterus no redness  · Lymphatic system: No lymphadenopathy.   no splenomegaly     DIAGNOSTICS      Laboratory Testing:  CBC:   Recent Labs      10/21/17   2012   WBC  3.8   HGB  13.8   PLT  89*     BMP:    Recent Labs      10/21/17   2012   NA  136   K  4.0   CL  95* CO2  26   BUN  12   CREATININE  0.60   GLUCOSE  453*     S. Calcium:  Recent Labs      10/21/17   2012   CALCIUM  9.7     S. Ionized Calcium:No results for input(s): IONCA in the last 72 hours. S. Magnesium:No results for input(s): MG in the last 72 hours. S. Phosphorus:No results for input(s): PHOS in the last 72 hours. S. Glucose:  Recent Labs      10/21/17   1945   POCGLU  416*     Glycosylated hemoglobin A1C: No results for input(s): LABA1C in the last 72 hours. INR:   Recent Labs      10/21/17   2012   INR  1.0     Hepatic functions: No results for input(s): ALKPHOS, ALT, AST, PROT, BILITOT, BILIDIR, LABALBU in the last 72 hours. Pancreatic functions:No results for input(s): LACTA, AMYLASE in the last 72 hours. S. Lactic Acid: No results for input(s): LACTA in the last 72 hours. Cardiac enzymes:  Recent Labs      10/21/17   1948   TROPONINI  0.00     BNP:No results for input(s): BNP in the last 72 hours. Lipid profile: No results for input(s): CHOL, TRIG, HDL, LDLCALC in the last 72 hours. Invalid input(s): LDL  Blood Gases: No results found for: PH, PCO2, PO2, HCO3, O2SAT  Thyroid functions: No results found for: TSH     Imaging/Diagonstics:      CXR: No acute cardiopulmonary findings. ASSESSMENT     Principal Problem:    Seizure ([address]  Active Problems:    Type 2 diabetes mellitus with diabetic polyneuropathy, with long-term current use of insulin ([facility])      PLAN   Seizure  1. F/U lactic acid, CK levels  2. F/U CTH w/o contrast  3. F/U Blood Cx, sputum Cx, UA with reflex Cx  4. F/U LFT, ammonia. 5. F/U serum TSH, Magnesium, Phosphorus, BMP  6. Serum B12, Folate, VDRL. 7. Bedside swallow eval - if cleared, start on low carb diabetic diet. 8. F/U beta hydroxybutyrate levels  9. F/U Echo  10. Keprra and Lamictal started  11. Ativan 2mg IV for seizure  12. Fall/seizure precautions  13. Neurology consulted      DM2  1. F/U Hemoglobin A1C  2. F/U lipid panel  3.  Lantus 10 U with High dose ISS    GI/DVT prophylaxis      Azalea Deluna MD  PGY-1, Internal Medicine  [facility]

## 2017-10-22 NOTE — ED PROVIDER NOTES
[facility] [address] [facility]  Emergency Department  Emergency Medicine Resident Sign-out     Care of Nasir Diss was assumed from Dr. Kvng Reinoso and is being seen for Altered Mental Status  . The patient's initial evaluation and plan have been discussed with the prior provider who initially evaluated the patient. EMERGENCY DEPARTMENT COURSE / MEDICAL DECISION MAKING:       MEDICATIONS GIVEN:  Orders Placed This Encounter   Medications    LORazepam (ATIVAN) 2 MG/ML injection     TIMMY RICO: cabinet override    levetiracetam (KEPPRA) 1000 mg/100 mL IVPB       LABS / RADIOLOGY:     Labs Reviewed   BASIC METABOLIC PANEL - Abnormal; Notable for the following:        Result Value    Glucose 453 (*)     Chloride 95 (*)     All other components within normal limits   CBC WITH AUTO DIFFERENTIAL - Abnormal; Notable for the following:     RBC 5.21 (*)     MCV 79.5 (*)     Platelets 89 (*)     All other components within normal limits   POC GLUCOSE FINGERSTICK - Abnormal; Notable for the following:     POC Glucose 416 (*)     All other components within normal limits   PROTIME-INR   UA W/REFLEX CULTURE   POCT TROPONIN   POCT TROPONIN   POCT TROPONIN   POCT TROPONIN       Ct Head Wo Contrast    Result Date: 10/21/2017  EXAMINATION: CT OF THE HEAD WITHOUT CONTRAST - STROKE ALERT  10/21/2017 9:47 pm TECHNIQUE: CT of the head was performed without the administration of intravenous contrast. Dose modulation, iterative reconstruction, and/or weight based adjustment of the mA/kV was utilized to reduce the radiation dose to as low as reasonably achievable. COMPARISON: None. HISTORY: ORDERING SYSTEM PROVIDED HISTORY: seizure, AMS TECHNOLOGIST PROVIDED HISTORY: Has a \"code stroke\" or \"stroke alert\" been called? ->No Initial evaluation. FINDINGS: BRAIN/VENTRICLES: The ventricles and cisternal spaces are normal in size, shape, and configuration for the age of the patient.  There is atherosclerotic calcification of the cavernous New Prescriptions    No medications on file          Fortino Perales DO  Emergency Medicine Resident  [address], [place]  10/22/17 6639

## 2017-10-22 NOTE — PLAN OF CARE
Problem: Falls - Risk of  Goal: Safety behavior - fall prevention  Patient and caregivers take actions to minimize risk factors that  might lead to falls.      Outcome: Ongoing  Reinforce need to call nurse for assistance

## 2017-10-22 NOTE — PROGRESS NOTES
Physical Therapy    Facility/Department: [facility] NEURO  Initial Assessment    NAME: Jacob Burt  : 1955  MRN: [id number]    Date of Service: 10/22/2017  The patient is a 59 y.o.  female who is admitted to the hospital for stroke, DM2 with complications, seizures, now presented with c/o altered mental status and post-ictal phase. There is no family accompanying the patient and she herself is too altered to appropriately reply  In the ED, she was reported to have a seizure and received 2mg Ativan.     On P/E   There's no tongue bite marks, no change in urinary/bowel movements in post ictal phase. Patient Diagnosis(es): The encounter diagnosis was Breakthrough seizure ([address] has a past medical history of Anemia; Cataract; GERD (gastroesophageal reflux disease); Headache; Hyperlipidemia; Neuropathy ([address] Osteoarthritis; Seizures ([address] Stroke (cerebrum) ([address] and Type 2 diabetes mellitus without complication ([address] has a past surgical history that includes Cholecystectomy; Tonsillectomy; and Cataract removal.    Restrictions  Restrictions/Precautions  Restrictions/Precautions: Fall Risk, Seizure  Required Braces or Orthoses?: No  Vision/Hearing  Vision: Within Functional Limits  Hearing: Within functional limits     Subjective  General  Patient assessed for rehabilitation services?: Yes  Additional Pertinent Hx: Diabetic neuropathy, epilepsy. Pain Screening  Patient Currently in Pain: Yes    Orientation  Orientation  Overall Orientation Status: Within Functional Limits    Social/Functional History  Social/Functional History  Lives With: Alone  Type of Home: Apartment  Home Layout: One level  Home Access: Elevator  Home Equipment: 4 wheeled walker  Receives Help From: Friend(s). Pt has a service dog.   ADL Assistance: Independent  Homemaking Assistance: Independent  Ambulation Assistance: Independent  Transfer Assistance: Independent  Active : No  Mode of Transportation: Cab  Objective  AROM RLE (degrees)  RLE AROM: WFL  AROM LLE (degrees)  LLE AROM : WFL  AROM RUE (degrees)  RUE AROM : WFL  AROM LUE (degrees)  LUE AROM : WFL  Strength RLE  Strength RLE: WFL  Strength LLE  Strength LLE: WFL  Strength RUE  Strength RUE: WFL  Strength LUE  Strength LUE: WFL  Motor Control  Gross Motor?: WFL  Sensation  Overall Sensation Status: Impaired  Pt has peripheral neuropathy in Both hands and feet. Bed mobility  Supine to Sit: Supervision  Scooting: Supervision  Transfers  Sit to Stand: Contact guard assistance  Stand to sit: Contact guard assistance  Ambulation  Ambulation?: Yes  Ambulation 1  Surface: level tile  Device: Rolling Walker  Assistance: Contact guard assistance  Quality of Gait: Steady gait. Distance: 300 ft  Stairs/Curb  Stairs?: No     Balance  Sitting - Static: Good  Sitting - Dynamic: Good  Standing - Static: Good (-) with RW  Standing - Dynamic: Fair (+) with RW        Assessment   Body structures, Functions, Activity limitations: Decreased functional mobility   Prognosis: Good  Decision Making: Medium Complexity  Patient Education: PT POC  REQUIRES PT FOLLOW UP: Yes  Activity Tolerance  Activity Tolerance: Patient Tolerated treatment well     Discharge Recommendations:  Continue to assess pending progress, Home with assist PRN      Plan   Plan  Times per week: 5x/week  Current Treatment Recommendations: Functional Mobility Training, Transfer Training, Gait Training, Home Exercise Program, Endurance Training  Safety Devices  Type of devices: Left in chair, Gait belt, Nurse notified, Call light within reach    G-Code  PT G-Codes  Functional Assessment Tool Used: [place] tool  Score: 22  Functional Limitation: Mobility: Walking and moving around  Mobility: Walking and Moving Around Current Status ():  At least 20 percent but less than 40 percent impaired, limited or restricted  Mobility: Walking and Moving Around Goal Status (): 0 percent impaired, limited or restricted    Goals  Short term goals  Time Frame for Short term goals: 10 visits  Short term goal 1: Independent bed mobility  Short term goal 2: Independent transfers  Short term goal 3: Independent ambulation with rolling walker 300 ft  Short term goal 4: pt to tolerate 45 minutes of PT treament to improve endurance.   Patient Goals   Patient goals : Feel stronger       Therapy Time   Individual Concurrent Group Co-treatment   Time In 1052         Time Out 1118         Minutes 26         Timed Code Treatment Minutes: [facility],

## 2017-10-22 NOTE — ED PROVIDER NOTES
[address] [facility]     Emergency Department     Faculty Attestation    I performed a history and physical examination of the patient and discussed management with the resident. I reviewed the residents note and agree with the documented findings and plan of care. Any areas of disagreement are noted on the chart. I was personally present for the key portions of any procedures. I have documented in the chart those procedures where I was not present during the key portions. I have reviewed the emergency nurses triage note. I agree with the chief complaint, past medical history, past surgical history, allergies, medications, social and family history as documented unless otherwise noted below. For Physician Assistant/ Nurse Practitioner cases/documentation I have personally evaluated this patient and have completed at least one if not all key elements of the E/M (history, physical exam, and MDM). Additional findings are as noted. I have personally seen and evaluated the patient. I find the patient's history and physical exam are consistent with the NP/PA documentation. I agree with the care provided, treatment rendered, disposition and follow-up plan. Regular little is known other than this patient has a seizure disorder and has a seizure here in this time is in a postictal state. EKG Interpretation    Interpreted by emergency department physician    Rhythm: normal sinus   Rate: normal  Axis: normal  Conduction: normal  ST Segments: no acute change  T Waves: no acute change  Q Waves: no acute change    Clinical Impression:  nonspecific EKG. Critical Care     Nabila Samuel M.D.   Attending Emergency  Physician              Cl Umanzor MD  10/21/17 2021

## 2017-10-22 NOTE — PROGRESS NOTES
consciousness. · Respiratory: Negative for cough or wheezing, sputum production, hemoptysis, pleuritic pain. · Gastrointestinal: Negative for nausea/vomiting, change in bowel habits, abdominal pain, dysphagia/appetite loss, hematemesis, blood in stools. · Genitourinary:Negative for change in bladder habits, dysuria, trouble voiding, hematuria. · Musculoskeletal: Negative for gait disturbance, weakness, joint complaints. · Integumentary: Negative for rash, pruritis. · Neurological: Negative for headache, dizziness, change in muscle strength, numbness/tingling, change in gait, balance, coordination,   · Psychiatric: Negative for change in mood, affect, memory, mentation, behavior. · Endocrine: Negative for temperature intolerance, excessive thirst, fluid intake, or urination, tremor. [facility]  BP (!) 123/48   Pulse 88   Temp 97.3 °F (36.3 °C) (Oral)   Resp 20   Ht 5' 1\" (1.549 m)   Wt 193 lb 9.6 oz (87.8 kg)   SpO2 95%   BMI 36.58 kg/m²      · General appearance: Patient is no acute distress  · HEENT: Head: Normocephalic, no lesions, without obvious abnormality. · Lungs: Clear to auscultation bilaterally  · Heart: Regular rate and rhythm, S1, S2 normal, no murmur, click, rub or gallop  · Abdomen: Soft, non-tender; bowel sounds normal; no masses,  no organomegaly  · Extremities: Extremities normal, atraumatic, no cyanosis or edema  · Neurological: Gait normal. Reflexes normal and symmetric. Sensation grossly normal. Motor: strength 5/5 throughout. · Skin: No rash, no lump    · Eye: No icterus no redness  · Lymphatic system: No lymphadenopathy.   no splenomegaly           Laboratory Testing:  CBC:   Recent Labs      10/22/17   0635   WBC  5.3   HGB  13.0   PLT  96*     BMP:    Recent Labs      10/21/17   2012  10/22/17   0635   NA  136  141   K  4.0  4.1   CL  95*  101   CO2  26  29   BUN  12  13   CREATININE  0.60  0.59   GLUCOSE  453*  260*         ASSESSMENT:    Principal Problem:    Seizure [facility])  Active Problems:    Type 2 diabetes mellitus with diabetic polyneuropathy, with long-term current use of insulin (HCC)      PLAN:  Seizure  1. F/U lactic acid, CK levels  2. F/U CTH w/o contrast  3. F/U Blood Cx, sputum Cx, UA with reflex Cx  4. F/U LFT, ammonia. 5. F/U serum TSH, Magnesium, Phosphorus, BMP  6. Serum B12, Folate, VDRL. 7. Bedside swallow eval - if cleared, start on low carb diabetic diet. 8. F/U beta hydroxybutyrate levels  9. F/U Echo  10. Keprra and Lamictal started  11. Ativan 2mg IV for seizure  12. Fall/seizure precautions  13. Neurology consulted        DM2  11. F/U Hemoglobin A1C  12. F/U lipid panel  13. Lantus 10 U with High dose ISS     GI/DVT prophylaxis      Pt discharge if Neurology clears.       Judi Gruber MD  PGY-1, Internal Medicine  [facility]

## 2017-10-23 VITALS
SYSTOLIC BLOOD PRESSURE: 140 MMHG | HEIGHT: 61 IN | DIASTOLIC BLOOD PRESSURE: 51 MMHG | TEMPERATURE: 97.5 F | OXYGEN SATURATION: 95 % | RESPIRATION RATE: 18 BRPM | BODY MASS INDEX: 36.55 KG/M2 | WEIGHT: 193.6 LBS | HEART RATE: 82 BPM

## 2017-10-23 LAB
EKG ATRIAL RATE: 85 BPM
EKG ATRIAL RATE: 94 BPM
EKG P AXIS: 45 DEGREES
EKG P AXIS: 47 DEGREES
EKG P-R INTERVAL: 170 MS
EKG P-R INTERVAL: 178 MS
EKG Q-T INTERVAL: 352 MS
EKG Q-T INTERVAL: 366 MS
EKG QRS DURATION: 66 MS
EKG QRS DURATION: 70 MS
EKG QTC CALCULATION (BAZETT): 435 MS
EKG QTC CALCULATION (BAZETT): 440 MS
EKG R AXIS: 1 DEGREES
EKG R AXIS: 11 DEGREES
EKG T AXIS: 49 DEGREES
EKG T AXIS: 87 DEGREES
EKG VENTRICULAR RATE: 85 BPM
EKG VENTRICULAR RATE: 94 BPM
ESTIMATED AVERAGE GLUCOSE: 398 MG/DL
GLUCOSE BLD-MCNC: 288 MG/DL (ref 65–105)
GLUCOSE BLD-MCNC: 348 MG/DL (ref 65–105)
GLUCOSE BLD-MCNC: 376 MG/DL (ref 65–105)
HBA1C MFR BLD: 15.5 % (ref 4–6)

## 2017-10-23 PROCEDURE — G0008 ADMIN INFLUENZA VIRUS VAC: HCPCS | Performed by: INTERNAL MEDICINE

## 2017-10-23 PROCEDURE — 2580000003 HC RX 258: Performed by: STUDENT IN AN ORGANIZED HEALTH CARE EDUCATION/TRAINING PROGRAM

## 2017-10-23 PROCEDURE — 97110 THERAPEUTIC EXERCISES: CPT

## 2017-10-23 PROCEDURE — 97116 GAIT TRAINING THERAPY: CPT

## 2017-10-23 PROCEDURE — 99233 SBSQ HOSP IP/OBS HIGH 50: CPT | Performed by: PSYCHIATRY & NEUROLOGY

## 2017-10-23 PROCEDURE — 6370000000 HC RX 637 (ALT 250 FOR IP): Performed by: STUDENT IN AN ORGANIZED HEALTH CARE EDUCATION/TRAINING PROGRAM

## 2017-10-23 PROCEDURE — 82947 ASSAY GLUCOSE BLOOD QUANT: CPT

## 2017-10-23 PROCEDURE — 6360000002 HC RX W HCPCS: Performed by: INTERNAL MEDICINE

## 2017-10-23 PROCEDURE — 90686 IIV4 VACC NO PRSV 0.5 ML IM: CPT | Performed by: INTERNAL MEDICINE

## 2017-10-23 PROCEDURE — 94762 N-INVAS EAR/PLS OXIMTRY CONT: CPT

## 2017-10-23 PROCEDURE — 6370000000 HC RX 637 (ALT 250 FOR IP): Performed by: PSYCHIATRY & NEUROLOGY

## 2017-10-23 PROCEDURE — 99238 HOSP IP/OBS DSCHRG MGMT 30/<: CPT | Performed by: INTERNAL MEDICINE

## 2017-10-23 RX ORDER — DEXTROSE MONOHYDRATE 25 G/50ML
12.5 INJECTION, SOLUTION INTRAVENOUS PRN
Status: DISCONTINUED | OUTPATIENT
Start: 2017-10-23 | End: 2017-10-23 | Stop reason: HOSPADM

## 2017-10-23 RX ORDER — NICOTINE POLACRILEX 4 MG
15 LOZENGE BUCCAL PRN
Status: DISCONTINUED | OUTPATIENT
Start: 2017-10-23 | End: 2017-10-23 | Stop reason: HOSPADM

## 2017-10-23 RX ORDER — INSULIN GLARGINE 100 [IU]/ML
25 INJECTION, SOLUTION SUBCUTANEOUS NIGHTLY
Status: DISCONTINUED | OUTPATIENT
Start: 2017-10-23 | End: 2017-10-23 | Stop reason: HOSPADM

## 2017-10-23 RX ORDER — LEVETIRACETAM 1000 MG/1
1000 TABLET ORAL 2 TIMES DAILY
Qty: 60 TABLET | Refills: 0 | Status: SHIPPED | OUTPATIENT
Start: 2017-10-23 | End: 2018-11-30 | Stop reason: DRUGHIGH

## 2017-10-23 RX ORDER — DEXTROSE MONOHYDRATE 50 MG/ML
100 INJECTION, SOLUTION INTRAVENOUS PRN
Status: DISCONTINUED | OUTPATIENT
Start: 2017-10-23 | End: 2017-10-23 | Stop reason: HOSPADM

## 2017-10-23 RX ORDER — LEVETIRACETAM 500 MG/1
1250 TABLET ORAL 2 TIMES DAILY
Qty: 150 TABLET | Refills: 3 | Status: SHIPPED | OUTPATIENT
Start: 2017-10-23 | End: 2017-10-23 | Stop reason: HOSPADM

## 2017-10-23 RX ORDER — LEVETIRACETAM 250 MG/1
250 TABLET ORAL 2 TIMES DAILY
Qty: 60 TABLET | Refills: 3 | Status: SHIPPED | OUTPATIENT
Start: 2017-10-23 | End: 2018-11-30 | Stop reason: DRUGHIGH

## 2017-10-23 RX ADMIN — ENOXAPARIN SODIUM 40 MG: 40 INJECTION SUBCUTANEOUS at 08:42

## 2017-10-23 RX ADMIN — BUSPIRONE HYDROCHLORIDE 10 MG: 10 TABLET ORAL at 08:42

## 2017-10-23 RX ADMIN — Medication 10 ML: at 08:45

## 2017-10-23 RX ADMIN — INSULIN LISPRO 12 UNITS: 100 INJECTION, SOLUTION INTRAVENOUS; SUBCUTANEOUS at 08:46

## 2017-10-23 RX ADMIN — FAMOTIDINE 20 MG: 20 TABLET, FILM COATED ORAL at 08:42

## 2017-10-23 RX ADMIN — BUSPIRONE HYDROCHLORIDE 10 MG: 10 TABLET ORAL at 15:47

## 2017-10-23 RX ADMIN — LAMOTRIGINE 200 MG: 100 TABLET ORAL at 08:42

## 2017-10-23 RX ADMIN — INFLUENZA VIRUS VACCINE 0.5 ML: 15; 15; 15; 15 SUSPENSION INTRAMUSCULAR at 18:09

## 2017-10-23 RX ADMIN — LEVETIRACETAM 1250 MG: 500 TABLET, FILM COATED ORAL at 08:42

## 2017-10-23 RX ADMIN — INSULIN LISPRO 9 UNITS: 100 INJECTION, SOLUTION INTRAVENOUS; SUBCUTANEOUS at 18:11

## 2017-10-23 RX ADMIN — INSULIN LISPRO 15 UNITS: 100 INJECTION, SOLUTION INTRAVENOUS; SUBCUTANEOUS at 12:08

## 2017-10-23 NOTE — PROGRESS NOTES
NEUROLOGY INPATIENT PROGRESS NOTE    10/23/2017         Subjective: Lane Khan is a  59 y.o. female admitted on 10/21/2017 with Breakthrough seizure ([facility] [address] Sada Stallworth      Briefly, this is a  59 y.o. female admitted on 10/21/2017 with  breakthrough seizure. Patient stated \"I'm confused on what happened; I don't remember anything\". Patient stated that last thing she remembered yesterday was going back to bed yesterday after feeling tired. The next thing she remembered was waking up in the hospital.  She could not recall any details and she does not know who called EMS. As per medical record; patient was brought in by EMS as family called due to altered mentation. Vitals on admit: 174/74, 99.1°F, 99/min. Patient had another episode of seizure activity in ED. It was described as generalized and no report of tongue bite or bladder incontinence. She was loaded with Keppra 1gr and continued on 500 bid.      As per medical record; there were medication changes in her seizure meds recently. Patient stated that she has not had any medication changes \"for a long time\". Patient also stated that she had a history of seizures since age 10 and initially she was on Tegretol. She was evaluated at [address] [place] [address] a year ago and Tegretol was discontinued and started on Lamictal as her EEG was demonstrating generalized seizure activity. Later she was also put on Keppra 1000 mg twice daily. She stated MRI brain in September 2016 was normal.     Patient also stated that she never had any intolerance/allergic reaction with her AEDs.     She had history of diabetic neuropathy affecting bilateral lower extremities and intermittent left upper extremity intentional tremor. Denies family history of seizures. Denied history of stroke/meningitis/encephalitis. 10/22 [LTG] 3.0; [LEV] 25                  No current facility-administered medications on file prior to encounter.       Current Outpatient Prescriptions on File Prior to Encounter   Medication Sig Dispense Refill    lisinopril-hydrochlorothiazide (PRINZIDE;ZESTORETIC) 10-12.5 MG per tablet Take 1 tablet by mouth daily      PARoxetine (PAXIL) 20 MG tablet Take 20 mg by mouth every morning      omeprazole (PRILOSEC) 20 MG delayed release capsule Take 20 mg by mouth daily      loratadine (CLARITIN) 10 MG tablet Take 10 mg by mouth daily      pioglitazone (ACTOS) 15 MG tablet Take 15 mg by mouth every morning      simvastatin (ZOCOR) 40 MG tablet Take 40 mg by mouth nightly      pramipexole (MIRAPEX) 1 MG tablet Take 1 mg by mouth nightly      levETIRAcetam (KEPPRA) 1000 MG tablet Take 1,000 mg by mouth 2 times daily      lamoTRIgine (LAMICTAL) 200 MG tablet Take 200 mg by mouth 2 times daily      busPIRone (BUSPAR) 10 MG tablet Take 10 mg by mouth 3 times daily      insulin glargine (LANTUS) 100 UNIT/ML injection vial Inject into the skin 2 times daily      insulin lispro (HUMALOG) 100 UNIT/ML injection vial at meals four times a day plus sliding scale if needed         Allergies: Faiza Boone is allergic to codeine.     Past Medical History:   Diagnosis Date    Anemia     Cataract     GERD (gastroesophageal reflux disease)     Headache     Hyperlipidemia     Neuropathy (HCC)     Osteoarthritis     Seizures (HCC)     [facility]     Type 2 diabetes mellitus without complication ([facility])        Past Surgical History:   Procedure Laterality Date    CATARACT REMOVAL      CHOLECYSTECTOMY      TONSILLECTOMY             Medications:     influenza virus vaccine  0.5 mL Intramuscular Once    lamoTRIgine  200 mg Oral BID    levETIRAcetam  1,250 mg Oral BID    sodium chloride flush  10 mL Intravenous 2 times per day    enoxaparin  40 mg Subcutaneous Daily    busPIRone  10 mg Oral TID    sodium chloride flush  10 mL Intravenous 2 times per day    famotidine  20 mg Oral BID    insulin glargine  10 Units Subcutaneous Nightly    insulin 49 10/22/2017    TRIG 118 10/22/2017    ALT 17 10/22/2017    AST 23 10/22/2017    TSH 1.16 10/22/2017    INR 1.0 10/22/2017    [id number] 432 10/22/2017     Impression and Plan: Ms. Mer Spears is a 59 y.o. female with   Breakthrough seizure; increased the dose of Keppra from 1000 mg bid to 1250 mg bid; continue Lamictal 200 mg twice daily. Checked Keppra and Lamictal levels 10/22. Long-standing history of seizure disorder since age 10  Seizure restrictions and activity restrictions discussed and she is very well aware of those because of long-standing history of seizure disorder. Neurologically clear. The patient just had a change in her insurance plan so is unable to continue with the local neurologist that she has been seeing since she moved away from [place]. We will arrange for her to be seen with [facility] neurology in the next 2-4 weeks.     Comorbid conditions include diabetes and diabetic neuropathy.     Discussed with patient and Nurse. Will follow with you.  Thank you for consultation.

## 2017-10-23 NOTE — PROGRESS NOTES
[facility]    Date:   10/23/2017  Patient name:  Manjit Combs  Date of admission:  10/21/2017  7:30 PM  MRN:   [id number]  YOB: 1955    HPI  The patient is a 59 y.o.  female who is admitted to the hospital for stroke, DM2 with complications, seizures, now presented with c/o altered mental status and post-ictal phase. There is no family accompanying the patient and she herself is too altered to appropriately reply  In the ED, she was reported to have a seizure and received 2mg Ativan.     On P/E   There's no tongue bite marks, no change in urinary/bowel movements in post ictal phase. Subjective  Patient is seen and examined at bedside. No acute events overnight. Hemodynamically stable. Afebrile. BP slightly elevated. Labs WNL except beta hydroxybutyrate. Past Medical History:   Diagnosis Date    Anemia     Cataract     GERD (gastroesophageal reflux disease)     Headache     Hyperlipidemia     Neuropathy (HCC)     Osteoarthritis     Seizures (HCC)     [facility])     Type 2 diabetes mellitus without complication ([facility])        Social History     Social History    Marital status: Single     Spouse name: N/A    Number of children: N/A    Years of education: N/A     Occupational History    Not on file. Social History Main Topics    Smoking status: Never Smoker    Smokeless tobacco: Never Used    Alcohol use No    Drug use: No    Sexual activity: Not on file     Other Topics Concern    Not on file     Social History Narrative    No narrative on file       REVIEW OF SYSTEMS:      · Constitutional: Negative for weight loss  · Eyes: Negative for visual changes, diplopia, scleral icterus. · ENT: Negative for Headaches, hearing loss, vertigo, mouth sores, sore throat.   · Cardiovascular: Negative for lightheadedness/orthostatic symptoms ,chest pain, dyspnea on exertion, palpitations or loss of consciousness. · Respiratory: Negative for cough or wheezing, sputum production, hemoptysis, pleuritic pain. · Gastrointestinal: Negative for nausea/vomiting, change in bowel habits, abdominal pain, dysphagia/appetite loss, hematemesis, blood in stools. · Genitourinary:Negative for change in bladder habits, dysuria, trouble voiding, hematuria. · Musculoskeletal: Negative for gait disturbance, weakness, joint complaints. · Integumentary: Negative for rash, pruritis. · Neurological: Negative for headache, dizziness, change in muscle strength, numbness/tingling, change in gait, balance, coordination,   · Psychiatric: Negative for change in mood, affect, memory, mentation, behavior. · Endocrine: Negative for temperature intolerance, excessive thirst, fluid intake, or urination, tremor. [facility] Lemus EXAM-  BP (!) 140/51   Pulse 82   Temp 97.5 °F (36.4 °C) (Oral)   Resp 18   Ht 5' 1\" (1.549 m)   Wt 193 lb 9.6 oz (87.8 kg)   SpO2 95%   BMI 36.58 kg/m²      · General appearance: Patient is no acute distress  · HEENT: Head: Normocephalic, no lesions, without obvious abnormality. · Lungs: Clear to auscultation bilaterally  · Heart: Regular rate and rhythm, S1, S2 normal, no murmur, click, rub or gallop  · Abdomen: Soft, non-tender; bowel sounds normal; no masses,  no organomegaly  · Extremities: Extremities normal, atraumatic, no cyanosis or edema  · Neurological: Gait normal. Reflexes normal and symmetric. Sensation grossly normal. Motor: strength 5/5 throughout. · Skin: No rash, no lump    · Eye: No icterus no redness  · Lymphatic system: No lymphadenopathy.   no splenomegaly           Laboratory Testing:  CBC:   Recent Labs      10/22/17   0635   WBC  5.3   HGB  13.0   PLT  96*     BMP:    Recent Labs      10/21/17   2012  10/22/17   0635   NA  136  141   K  4.0  4.1   CL  95*  101   CO2  26  29   BUN  12  13   CREATININE  0.60  0.59   GLUCOSE  453*  260*         ASSESSMENT:    Principal Problem:

## 2017-10-23 NOTE — PROGRESS NOTES
Physical Therapy  Facility/Department: [facility]  Daily Treatment Note  NAME: Holly Smith  : 1955  MRN: [id number]    Date of Service: 10/23/2017    Patient Diagnosis(es):   Patient Active Problem List    Diagnosis Date Noted    Breakthrough seizure ([facility] [address]     Seizure ([facility] [address] 10/21/2017    Type 2 diabetes mellitus with diabetic polyneuropathy, with long-term current use of insulin ([facility] [address] 10/21/2017       Past Medical History:   Diagnosis Date    Anemia     Cataract     GERD (gastroesophageal reflux disease)     Headache     Hyperlipidemia     Neuropathy (HCC)     Osteoarthritis     Seizures (HCC)     Stroke (cerebrum) (HCC)     Type 2 diabetes mellitus without complication (HCC)      Past Surgical History:   Procedure Laterality Date    CATARACT REMOVAL      CHOLECYSTECTOMY      TONSILLECTOMY         Restrictions  Restrictions/Precautions  Restrictions/Precautions: Fall Risk, Seizure  Required Braces or Orthoses?: No  Subjective   General  Chart Reviewed: Yes  Response To Previous Treatment: Patient with no complaints from previous session. Family / Caregiver Present: No  Subjective  Subjective: Pt arrived to gym with no c/o pain.    Pain Screening  Patient Currently in Pain: Denies  Vital Signs  Patient Currently in Pain: Denies       Orientation  Orientation  Overall Orientation Status: Within Functional Limits  Objective      Transfers  Sit to Stand: Stand by assistance (completed several times during session, good safety awareness)  Stand to sit: Stand by assistance  Ambulation  Ambulation?: Yes  Ambulation 1  Surface: level tile  Device: Rolling Walker  Assistance: Stand by assistance  Quality of Gait: decreased judy, shortened step length  Distance: 300ft  Stairs/Curb  Stairs?: Yes  Stairs  # Steps : 10  Stairs Height: 6\"  Rails: Bilateral  Device: No Device  Assistance: Contact guard assistance  Comment: reciprocal pattern     Balance  Posture: Fair  Sitting - Static: Good  Sitting

## 2017-10-23 NOTE — PROGRESS NOTES
Smoking Cessation - topics covered   []  Health Risks  []  Benefits of Quitting   []  Smoking Cessation  [x]  Patient has no history of tobacco use  []  Patient is former smoker. Patient quit in   [x]  No need for tobacco cessation education. []  Booklet given  []  Patient verbalizes understanding. []  Patient denies need for tobacco cessation education.   Rigoberto Westbrook  9:38 AM

## 2017-10-28 LAB
CULTURE: NORMAL
Lab: NORMAL
Lab: NORMAL
SPECIMEN DESCRIPTION: NORMAL
SPECIMEN DESCRIPTION: NORMAL
STATUS: NORMAL
STATUS: NORMAL

## 2017-11-09 NOTE — DISCHARGE SUMMARY
[address] [place] [address]     Department of Internal Medicine - Staff Internal Medicine Service    INPATIENT DISCHARGE SUMMARY      PATIENT IDENTIFICATION:  NAME:  Apoorva Penn   :   1955  MRN:    [id number]     Acct:    [de-identified]   Admit Date:  10/21/2017  Discharge date:  10/23/2017  7:45 PM   Attending Provider: No att. providers found                                       DIAGNOSES:  Primary:   Breakthrough seizure ([address] [G40.919]    Secondary: Active Hospital Problems    Diagnosis Date Noted    Breakthrough seizure ([address] [G40.919]     Seizure ([address] [R56.9] 10/21/2017    Type 2 diabetes mellitus with diabetic polyneuropathy, with long-term current use of insulin ([address] [E11.42, Z79.4] 10/21/2017       Hospital Course:  Brief Inpatient Course:  Apoorva Penn is a 59 y.o. female who presented with c/o altered mental status and post-ictal phase. There is no family accompanying the patient and she herself is too altered to appropriately reply. In the ED, she was reported to have a seizure and received 2mg Ativan. Patient was started on Keppra IV, neurology was consulted, Lamictal started, Ativan 2 mg IV for breakthrough. We also managed patient's type 2 diabetes mellitus. 10/23 - vital signs stable. diabetes well controlled. No more seizure-like activity. Neurology increased Keppra to 1250 mg twice a day. Patient wishing to be discharged. patient stable in condition neurology recommended follow up with [place] neurology next 2-4 weeks. Patient discharged to home.     Consults:   neurology    Procedures:  none    Any Hospital Acquired Infections: none    PATIENT'S DISCHARGE CONDITION:      PATIENT/FAMILY INSTRUCTIONS:   Discharge Medication List as of 10/23/2017  4:17 PM      CONTINUE these medications which have CHANGED    Details   !! levETIRAcetam (KEPPRA) 250 MG tablet Take 1 tablet by mouth 2 times daily, Disp-60 tablet, R-3Print      !! levETIRAcetam (KEPPRA) 1000 MG tablet Take 1 tablet by mouth 2 times daily Your new dose is 1250 mg twice daily, Disp-60 tablet, R-0Print       !! - Potential duplicate medications found. Please discuss with provider.       CONTINUE these medications which have NOT CHANGED    Details   lisinopril-hydrochlorothiazide (PRINZIDE;ZESTORETIC) 10-12.5 MG per tablet Take 1 tablet by mouth dailyHistorical Med      PARoxetine (PAXIL) 20 MG tablet Take 20 mg by mouth every morningHistorical Med      omeprazole (PRILOSEC) 20 MG delayed release capsule Take 20 mg by mouth dailyHistorical Med      loratadine (CLARITIN) 10 MG tablet Take 10 mg by mouth dailyHistorical Med      pioglitazone (ACTOS) 15 MG tablet Take 15 mg by mouth every morningHistorical Med      simvastatin (ZOCOR) 40 MG tablet Take 40 mg by mouth nightlyHistorical Med      pramipexole (MIRAPEX) 1 MG tablet Take 1 mg by mouth nightlyHistorical Med      lamoTRIgine (LAMICTAL) 200 MG tablet Take 200 mg by mouth 2 times dailyHistorical Med      busPIRone (BUSPAR) 10 MG tablet Take 10 mg by mouth 3 times dailyHistorical Med      insulin glargine (LANTUS) 100 UNIT/ML injection vial Inject 50 Units into the skin 2 times daily Historical Med      insulin lispro (HUMALOG) 100 UNIT/ML injection vial at meals four times a day plus sliding scale if neededHistorical Med           Activity: activity as tolerated    Diet: regular diet    Disposition: home    Follow-up:  in the next few days with Aimee Winchester MD  PGY-1, Internal Medicine  [address] [facility]

## 2017-11-14 DIAGNOSIS — G40.909 SEIZURE DISORDER (HCC): ICD-10-CM

## 2017-11-21 ENCOUNTER — OFFICE VISIT (OUTPATIENT)
Dept: NEUROLOGY | Age: 62
End: 2017-11-21
Payer: MEDICARE

## 2017-11-21 VITALS
BODY MASS INDEX: 36.85 KG/M2 | DIASTOLIC BLOOD PRESSURE: 66 MMHG | HEART RATE: 101 BPM | SYSTOLIC BLOOD PRESSURE: 145 MMHG | HEIGHT: 61 IN | WEIGHT: 195.2 LBS

## 2017-11-21 DIAGNOSIS — G40.909 SEIZURE DISORDER (HCC): Primary | ICD-10-CM

## 2017-11-21 PROCEDURE — G8427 DOCREV CUR MEDS BY ELIG CLIN: HCPCS | Performed by: PSYCHIATRY & NEUROLOGY

## 2017-11-21 PROCEDURE — 1111F DSCHRG MED/CURRENT MED MERGE: CPT | Performed by: PSYCHIATRY & NEUROLOGY

## 2017-11-21 PROCEDURE — G8417 CALC BMI ABV UP PARAM F/U: HCPCS | Performed by: PSYCHIATRY & NEUROLOGY

## 2017-11-21 PROCEDURE — 3017F COLORECTAL CA SCREEN DOC REV: CPT | Performed by: PSYCHIATRY & NEUROLOGY

## 2017-11-21 PROCEDURE — 3014F SCREEN MAMMO DOC REV: CPT | Performed by: PSYCHIATRY & NEUROLOGY

## 2017-11-21 PROCEDURE — 99215 OFFICE O/P EST HI 40 MIN: CPT | Performed by: PSYCHIATRY & NEUROLOGY

## 2017-11-21 PROCEDURE — 1036F TOBACCO NON-USER: CPT | Performed by: PSYCHIATRY & NEUROLOGY

## 2017-11-21 PROCEDURE — G8484 FLU IMMUNIZE NO ADMIN: HCPCS | Performed by: PSYCHIATRY & NEUROLOGY

## 2017-11-21 NOTE — LETTER
[facility] Neurology Specialist  [place] [address]  Phone: [phone]  Fax: [phone]    Mirtha Rey MD        November 21, 2017     [place] [address] [address]    Patient: John Vásquez  MR Number: [id number]  YOB: 1955  Date of Visit: 11/21/2017    Dear Dr. Watson Congress:        HPI:        Your patient, John Vásquez returns for continuing neurologic care. As you well know, this patient has been previously evaluated and managed by my associate, Dr. Angelina Padilla. Fortunately, I have had the opportunity to review the medical record and the prior neurology notes. In summary, this patient carries a history of seizures since age 10. She was evaluated at the [address] [place] [address] in 2016. There is no family history of seizures. An EEG demonstrated generalized seizure activity. A MRI of the brain in September 2016 was normal.  There is other medical history of diabetic neuropathy affecting the lower extremities and intermittent left upper extremity intention tremor. She also is treated for depression since a stroke in 2010 which left her with imbalance, limited recall and left sided weakness. Her gait is improved with a wheeled walker but she still complains of being imbalanced and cautious when walking. There has been no further stroke or TIA symptoms. She suffered recurrent seizure activity in October 2017 and was seen at [facility] by my other associate, Dr. Jaya Bruce. A CT of the brain was normal and her medications were adjusted. Initially she was treated with Tegretol but switched to Lamictal 200 mg bid in 2016 and later Keppra 1000 mg bid was added. Keppra was increased to 1250 mg bid in October 2017. She has remained seizure free since her hospital care and the medications cause no ill effects but she describes some fatigue. diabetic neuropathy. Meanwhile, I will remain available for any questions or new neurologic problems and will plan on seeing her semiannually. If you have questions, please do not hesitate to call me. I look forward to following Cyn Suarez along with you.     Sincerely,        Mell Bautista MD

## 2017-11-21 NOTE — COMMUNICATION BODY
HPI:        Your patient, Faiza Boone returns for continuing neurologic care. As you well know, this patient has been previously evaluated and managed by my associate, Dr. Alberto Ballesteros. Fortunately, I have had the opportunity to review the medical record and the prior neurology notes. In summary, this patient carries a history of seizures since age 10. She was evaluated at the [address] [place] [address] in 2016. There is no family history of seizures. An EEG demonstrated generalized seizure activity. A MRI of the brain in September 2016 was normal.  There is other medical history of diabetic neuropathy affecting the lower extremities and intermittent left upper extremity intention tremor. She also is treated for depression since a stroke in 2010 which left her with imbalance, limited recall and left sided weakness. Her gait is improved with a wheeled walker but she still complains of being imbalanced and cautious when walking. There has been no further stroke or TIA symptoms. She suffered recurrent seizure activity in October 2017 and was seen at [facility] by my other associate, Dr. Nimo De La Torre. A CT of the brain was normal and her medications were adjusted. Initially she was treated with Tegretol but switched to Lamictal 200 mg bid in 2016 and later Keppra 1000 mg bid was added. Keppra was increased to 1250 mg bid in October 2017. She has remained seizure free since her hospital care and the medications cause no ill effects but she describes some fatigue. There have been no new medical or surgical issues nor any recent trauma, infection/fever or intoxication to complicate matters.                                      REVIEW OF SYSTEMS    CONSTITUTIONAL Weight: absent, Appetite: absent, Fatigue: present      HEENT Ears: normal, Eyes: glasses, Visual disturbance: absent   RESPIRATORY Shortness of breath: absent, Cough: absent   CARDIOVASCULAR Chest pain: absent, Leg swelling :present normal power including feet and legs;  no involuntary movements, no tremor in hands at rest   SENSORY FUNCTION: Normal touch, reduced pin and vibration to ankles, impaired great toe proprioception   CEREBELLAR FUNCTION: Minimally impaired fine motor control over upper limbs   REFLEX FUNCTION: Symmetric but absent at heels, no perverted reflex, no Babinski signs   STATION and GAIT Normal station, antalgic gait on left due to foot pain                   ASSESSMENT and  PLAN:      In summary, your patient, Lloyd Guallpa not only suffers with a long-standing seizure disorder that requires polypharmacy with Lamictal and Keppra but also has prior ischemic cerebrovascular disease and a diabetic neuropathy that produce a multifactorial or mixed gait disorder. Fortunately there have been no further TIA or stroke symptoms and no recurrence of the seizure activity since the boost in her Keppra therapy in October. There is no need to organize any additional neurologic testing or to make a change in therapy. For now, I will have her keep a seizure calendar or an event calendar should there be any seizure or seizure-like activity. She understands if there is any breakthrough then likely her Keppra will be escalated to 1500 mg bid. She will also be sure to use her wheeled walker whenever she is up on her feet to avoid any falls or injuries and follow through on the recommendation to see a podiatrist to monitor her diabetic neuropathy. Meanwhile, I will remain available for any questions or new neurologic problems and will plan on seeing her semiannually.

## 2017-11-21 NOTE — PROGRESS NOTES
GI Constipation: absent, Diarrhea: present, Swallowing change: present       Urinary frequency: absent, Urinary urgency: present, Urinary incontinence: absent   MUSCULOSKELETAL Neck pain: absent, Back pain: absent, Stiffness: absent, Muscle pain: present, Joint pain: absent Restless legs: present   DERMATOLOGIC Hair loss: absent, Skin changes: absent   NEUROLOGIC Memory loss: present, Confusion: present, Seizures: present Trouble walking or imbalance: present, Dizziness: present, Weakness: present, Numbness: present Tremor: present, Spasm: present, Speech difficulty: present, Headache: absent, Light sensitivity: present   PSYCHIATRIC Anxiety: present, Hallucination: absent, Mood disorder: present   HEMATOLOGIC Abnormal bleeding: absent, Anemia: absent, Clotting disorder: absent, Lymph gland changes: absent                     Outpatient Prescriptions Marked as Taking for the 11/21/17 encounter (Office Visit) with Maya Reeves MD   Medication Sig Dispense Refill    levETIRAcetam (KEPPRA) 250 MG tablet Take 1 tablet by mouth 2 times daily 60 tablet 3    levETIRAcetam (KEPPRA) 1000 MG tablet Take 1 tablet by mouth 2 times daily Your new dose is 1250 mg twice daily 60 tablet 0    lisinopril-hydrochlorothiazide (PRINZIDE;ZESTORETIC) 10-12.5 MG per tablet Take 1 tablet by mouth daily      PARoxetine (PAXIL) 20 MG tablet Take 20 mg by mouth every morning      omeprazole (PRILOSEC) 20 MG delayed release capsule Take 20 mg by mouth daily      loratadine (CLARITIN) 10 MG tablet Take 10 mg by mouth daily      pioglitazone (ACTOS) 15 MG tablet Take 15 mg by mouth every morning      simvastatin (ZOCOR) 40 MG tablet Take 40 mg by mouth nightly      pramipexole (MIRAPEX) 1 MG tablet Take 1 mg by mouth nightly      lamoTRIgine (LAMICTAL) 200 MG tablet Take 200 mg by mouth 2 times daily      busPIRone (BUSPAR) 10 MG tablet Take 10 mg by mouth 3 times daily      insulin glargine (LANTUS) 100 UNIT/ML injection vial normal power including feet and legs;  no involuntary movements, no tremor in hands at rest   SENSORY FUNCTION: Normal touch, reduced pin and vibration to ankles, impaired great toe proprioception   CEREBELLAR FUNCTION: Minimally impaired fine motor control over upper limbs   REFLEX FUNCTION: Symmetric but absent at heels, no perverted reflex, no Babinski signs   STATION and GAIT Normal station, antalgic gait on left due to foot pain                   ASSESSMENT and  PLAN:      In summary, your patient, Manjit Combs not only suffers with a long-standing seizure disorder that requires polypharmacy with Lamictal and Keppra but also has prior ischemic cerebrovascular disease and a diabetic neuropathy that produce a multifactorial or mixed gait disorder. Fortunately there have been no further TIA or stroke symptoms and no recurrence of the seizure activity since the boost in her Keppra therapy in October. There is no need to organize any additional neurologic testing or to make a change in therapy. For now, I will have her keep a seizure calendar or an event calendar should there be any seizure or seizure-like activity. She understands if there is any breakthrough then likely her Keppra will be escalated to 1500 mg bid. She will also be sure to use her wheeled walker whenever she is up on her feet to avoid any falls or injuries and follow through on the recommendation to see a podiatrist to monitor her diabetic neuropathy. Meanwhile, I will remain available for any questions or new neurologic problems and will plan on seeing her semiannually.

## 2017-11-30 ENCOUNTER — OFFICE VISIT (OUTPATIENT)
Dept: INTERNAL MEDICINE | Age: 62
End: 2017-11-30
Payer: MEDICARE

## 2017-11-30 ENCOUNTER — CARE COORDINATION (OUTPATIENT)
Dept: CARE COORDINATION | Age: 62
End: 2017-11-30

## 2017-11-30 ENCOUNTER — HOSPITAL ENCOUNTER (OUTPATIENT)
Age: 62
Setting detail: SPECIMEN
Discharge: HOME | End: 2017-11-30
Payer: MEDICARE

## 2017-11-30 VITALS
SYSTOLIC BLOOD PRESSURE: 127 MMHG | HEIGHT: 61 IN | DIASTOLIC BLOOD PRESSURE: 58 MMHG | WEIGHT: 191 LBS | BODY MASS INDEX: 36.06 KG/M2 | HEART RATE: 96 BPM

## 2017-11-30 DIAGNOSIS — Z23 NEED FOR PROPHYLACTIC VACCINATION AND INOCULATION AGAINST VARICELLA: ICD-10-CM

## 2017-11-30 DIAGNOSIS — Z11.4 SCREENING FOR HIV (HUMAN IMMUNODEFICIENCY VIRUS): ICD-10-CM

## 2017-11-30 DIAGNOSIS — Z11.59 NEED FOR HEPATITIS C SCREENING TEST: ICD-10-CM

## 2017-11-30 DIAGNOSIS — R32 URINARY INCONTINENCE, UNSPECIFIED TYPE: ICD-10-CM

## 2017-11-30 DIAGNOSIS — G40.909 SEIZURE DISORDER (HCC): ICD-10-CM

## 2017-11-30 DIAGNOSIS — Z23 NEED FOR PROPHYLACTIC VACCINATION AGAINST DIPHTHERIA-TETANUS-PERTUSSIS (DTP): ICD-10-CM

## 2017-11-30 DIAGNOSIS — I69.359 CVA, OLD, HEMIPARESIS (HCC): ICD-10-CM

## 2017-11-30 DIAGNOSIS — Z12.31 ENCOUNTER FOR SCREENING MAMMOGRAM FOR BREAST CANCER: ICD-10-CM

## 2017-11-30 DIAGNOSIS — E78.5 HYPERLIPIDEMIA, UNSPECIFIED HYPERLIPIDEMIA TYPE: ICD-10-CM

## 2017-11-30 LAB
-: ABNORMAL
AMORPHOUS: ABNORMAL
BACTERIA: ABNORMAL
BILIRUBIN URINE: NEGATIVE
CASTS UA: ABNORMAL /LPF (ref 0–2)
COLOR: YELLOW
CREATININE URINE: 63.1 MG/DL (ref 28–217)
CRYSTALS, UA: ABNORMAL /HPF
EPITHELIAL CELLS UA: ABNORMAL /HPF (ref 0–5)
GLUCOSE BLD-MCNC: 442 MG/DL
GLUCOSE URINE: ABNORMAL
HEPATITIS C ANTIBODY: NONREACTIVE
HIV AG/AB: NONREACTIVE
KETONES, URINE: NEGATIVE
LEUKOCYTE ESTERASE, URINE: NEGATIVE
MICROALBUMIN/CREAT 24H UR: 86 MG/L
MICROALBUMIN/CREAT UR-RTO: 136 MCG/MG CREAT
MUCUS: ABNORMAL
NITRITE, URINE: NEGATIVE
OTHER OBSERVATIONS UA: ABNORMAL
PH UA: 5 (ref 5–8)
PROTEIN UA: NEGATIVE
RBC UA: ABNORMAL /HPF (ref 0–2)
RENAL EPITHELIAL, UA: ABNORMAL /HPF
SPECIFIC GRAVITY UA: 1.03 (ref 1–1.03)
TRICHOMONAS: ABNORMAL
TURBIDITY: CLEAR
URINE HGB: NEGATIVE
UROBILINOGEN, URINE: NORMAL
WBC UA: ABNORMAL /HPF (ref 0–5)
YEAST: ABNORMAL

## 2017-11-30 PROCEDURE — 81001 URINALYSIS AUTO W/SCOPE: CPT

## 2017-11-30 PROCEDURE — 1036F TOBACCO NON-USER: CPT | Performed by: INTERNAL MEDICINE

## 2017-11-30 PROCEDURE — 86803 HEPATITIS C AB TEST: CPT

## 2017-11-30 PROCEDURE — 82570 ASSAY OF URINE CREATININE: CPT

## 2017-11-30 PROCEDURE — G8484 FLU IMMUNIZE NO ADMIN: HCPCS | Performed by: INTERNAL MEDICINE

## 2017-11-30 PROCEDURE — G8427 DOCREV CUR MEDS BY ELIG CLIN: HCPCS | Performed by: INTERNAL MEDICINE

## 2017-11-30 PROCEDURE — 99215 OFFICE O/P EST HI 40 MIN: CPT | Performed by: INTERNAL MEDICINE

## 2017-11-30 PROCEDURE — 3014F SCREEN MAMMO DOC REV: CPT | Performed by: INTERNAL MEDICINE

## 2017-11-30 PROCEDURE — 82962 GLUCOSE BLOOD TEST: CPT | Performed by: INTERNAL MEDICINE

## 2017-11-30 PROCEDURE — 82043 UR ALBUMIN QUANTITATIVE: CPT

## 2017-11-30 PROCEDURE — 3046F HEMOGLOBIN A1C LEVEL >9.0%: CPT | Performed by: INTERNAL MEDICINE

## 2017-11-30 PROCEDURE — 3017F COLORECTAL CA SCREEN DOC REV: CPT | Performed by: INTERNAL MEDICINE

## 2017-11-30 PROCEDURE — 87389 HIV-1 AG W/HIV-1&-2 AB AG IA: CPT

## 2017-11-30 PROCEDURE — G8417 CALC BMI ABV UP PARAM F/U: HCPCS | Performed by: INTERNAL MEDICINE

## 2017-11-30 PROCEDURE — 36415 COLL VENOUS BLD VENIPUNCTURE: CPT

## 2017-11-30 RX ORDER — LANCETS 30 GAUGE
EACH MISCELLANEOUS
Qty: 100 EACH | Refills: 11 | Status: SHIPPED | OUTPATIENT
Start: 2017-11-30 | End: 2020-01-01 | Stop reason: SDUPTHER

## 2017-11-30 RX ORDER — GLUCOSAMINE HCL/CHONDROITIN SU 500-400 MG
CAPSULE ORAL
Qty: 100 STRIP | Refills: 11 | Status: SHIPPED | OUTPATIENT
Start: 2017-11-30 | End: 2020-01-01 | Stop reason: SDUPTHER

## 2017-11-30 ASSESSMENT — PATIENT HEALTH QUESTIONNAIRE - PHQ9
1. LITTLE INTEREST OR PLEASURE IN DOING THINGS: 2
10. IF YOU CHECKED OFF ANY PROBLEMS, HOW DIFFICULT HAVE THESE PROBLEMS MADE IT FOR YOU TO DO YOUR WORK, TAKE CARE OF THINGS AT HOME, OR GET ALONG WITH OTHER PEOPLE: 2
4. FEELING TIRED OR HAVING LITTLE ENERGY: 2
2. FEELING DOWN, DEPRESSED OR HOPELESS: 3
6. FEELING BAD ABOUT YOURSELF - OR THAT YOU ARE A FAILURE OR HAVE LET YOURSELF OR YOUR FAMILY DOWN: 1
SUM OF ALL RESPONSES TO PHQ QUESTIONS 1-9: 12
5. POOR APPETITE OR OVEREATING: 1
9. THOUGHTS THAT YOU WOULD BE BETTER OFF DEAD, OR OF HURTING YOURSELF: 0
8. MOVING OR SPEAKING SO SLOWLY THAT OTHER PEOPLE COULD HAVE NOTICED. OR THE OPPOSITE, BEING SO FIGETY OR RESTLESS THAT YOU HAVE BEEN MOVING AROUND A LOT MORE THAN USUAL: 1
3. TROUBLE FALLING OR STAYING ASLEEP: 2
7. TROUBLE CONCENTRATING ON THINGS, SUCH AS READING THE NEWSPAPER OR WATCHING TELEVISION: 0
SUM OF ALL RESPONSES TO PHQ9 QUESTIONS 1 & 2: 5

## 2017-11-30 ASSESSMENT — ENCOUNTER SYMPTOMS
EYE REDNESS: 0
SHORTNESS OF BREATH: 0
BLURRED VISION: 0
SPUTUM PRODUCTION: 0

## 2017-11-30 NOTE — PROGRESS NOTES
[facility]    New Patient Note/History and Physical    Date of patient's visit: 11/30/2017    Name:  Mick Varela      YOB: 1955    Patient Care Team:  Grover French MD as PCP - General (Internal Medicine)  Ye Leone MD as PCP - S Attributed Provider    REASON FOR VISIT: First Visit, establish care     Chief Complaint   Patient presents with    Follow-Up from Hospital     Pt was seen at [facility] on 10/21/17 for Seizures     Establish Care    Incontinence     Pt staets that she has been having incontinece with both bowels and urine x 4-5 months     Seizures    Diabetes    Memory Loss     Pt states that she has been having a lot of memory loss and forgetfulness    Medication Refill     Pt would like 90 day supply    Forms     Pt has transportation forms that she would like completed     Depression     Depression screen positive        HISTORY OF PRESENTING ILLNESS:    History was obtained from the patient. Mick Varela is a 59 y.o. is here to establish care. She has a history of uncontrolled insulin-dependent diabetes for years. A1c has been above 15. She lives alone. She was seeing a nurse practitioner in the past.  She says she is supposed to be on Lantus and NovoLog and several oral medications. She says she's been having problems with her memory and forgets to take her insulin. She says she has not taken her insulin  for a few weeks now. She is denying polyuria or polydipsia. She says she has a glucometer and checks her blood sugar every 3 days. She has had problems with her writing wrong checks and having financial issues. She is going through [employer] services now and she wants forms completed so [employer] can help her be the Payee for her bills. She sees a neurologist.  She has a history of generalized tonic-clonic seizures since age 10.   She also had a CPE a few years ago which left her with mild depression and hallucinations. The patient is not nervous/anxious. PHYSICAL EXAM:      Vitals:    11/30/17 1035   BP: (!) 127/58   Site: Right Arm   Position: Sitting   Cuff Size: Small Adult   Pulse: 96   Weight: 191 lb (86.6 kg)   Height: 5' 1\" (1.549 m)     Wt Readings from Last 3 Encounters:   11/30/17 191 lb (86.6 kg)   11/21/17 195 lb 3.2 oz (88.5 kg)   10/21/17 193 lb 9.6 oz (87.8 kg)     Body mass index is 36.09 kg/m². Physical Exam   Constitutional: She is oriented to person, place, and time and well-developed, well-nourished, and in no distress. No distress. HENT:   Head: Normocephalic and atraumatic. Mouth/Throat: Oropharynx is clear and moist.   Eyes: Conjunctivae and EOM are normal. Pupils are equal, round, and reactive to light. No scleral icterus. Neck: Normal range of motion. Neck supple. Cardiovascular: Normal rate, regular rhythm and normal heart sounds. Pulmonary/Chest: Effort normal and breath sounds normal. She has no wheezes. Abdominal: Soft. Bowel sounds are normal.   Musculoskeletal: She exhibits no edema or tenderness. Neurological: She is alert and oriented to person, place, and time. She has normal strength and intact cranial nerves. She displays normal speech. Using walker to ambulate. Gait steady with walker   Skin: Skin is warm and dry. She is not diaphoretic. Nursing note and vitals reviewed.         LABORATORY FINDINGS:    CBC:   Lab Results   Component Value Date    WBC 5.3 10/22/2017    HGB 13.0 10/22/2017    PLT 96 10/22/2017     BMP:    Lab Results   Component Value Date     10/22/2017    K 4.1 10/22/2017     10/22/2017    CO2 29 10/22/2017    BUN 13 10/22/2017    CREATININE 0.59 10/22/2017    GLUCOSE 442 11/30/2017     Hemoglobin A1C:   Lab Results   Component Value Date    LABA1C 15.5 10/22/2017     Lipid profile:   Lab Results   Component Value Date    CHOL 148 10/22/2017    TRIG 118 10/22/2017    HDL 49 10/22/2017     Lab Results Component Value Date    LDLCHOLESTEROL 75 10/22/2017       Thyroid functions:   Lab Results   Component Value Date    TSH 1.16 10/22/2017      Hepatic functions:   Lab Results   Component Value Date    ALT 17 10/22/2017    AST 23 10/22/2017    PROT 7.2 10/22/2017    BILITOT 0.42 10/22/2017    BILIDIR 0.13 10/22/2017    LABALBU 3.5 10/22/2017     ASSESSMENT AND PLAN:   1. Uncontrolled type 2 diabetes mellitus with diabetic polyneuropathy, with long-term current use of insulin (HCC)  Continue same insulin  Arrange home care and reminders for meds and insulin  Bring blood sugars to visit  Following up with eye exams    - metFORMIN (GLUCOPHAGE) 1000 MG tablet; Take 1,000 mg by mouth 2 times daily (with meals)  - aspirin 81 MG tablet; Take 81 mg by mouth daily  - Microalbumin, Ur; Future  - POCT Glucose  - [address]  - Blood Glucose Monitoring Suppl FLAVIO; Check blood sugars 3/day  Dispense: 1 Device; Refill: 0  - Glucose Blood (BLOOD GLUCOSE TEST STRIPS) STRP; Test 3  times daily Insulin Dependent mellitus  Dispense: 100 strip; Refill: 11  - Lancets MISC; Check 3/day  Dispense: 100 each; Refill: 11  - Urinalysis with Microscopic; Future    2. Seizure disorder [facility])  Same meds  Follow up with neurology    - [address]    3. CVA, old, hemiparesis ([facility] [address]  Statins    - aspirin 81 MG tablet; Take 81 mg by mouth daily    4. Hyperlipidemia, unspecified hyperlipidemia type  statins    5. Urinary incontinence, unspecified type  No hydrocephalus or NPH on Ct scan    - Urinalysis with Microscopic; Future    6. Encounter for screening mammogram for breast cancer    - [facility] Digital Screen Bilateral [[id number]]; Future    7. Need for prophylactic vaccination against diphtheria-tetanus-pertussis (DTP)    - Tdap (ADACEL) 5-2-15.5 LF-MCG/0.5 injection; Inject 0.5 mLs into the muscle once for 1 dose  Dispense: 0.5 mL; Refill: 0    8.  Need for prophylactic vaccination and inoculation

## 2017-11-30 NOTE — PROGRESS NOTES
Visit Information    Have you changed or started any medications since your last visit including any over-the-counter medicines, vitamins, or herbal medicines? no   Are you having any side effects from any of your medications? -  no  Have you stopped taking any of your medications? Is so, why? -  no    Have you seen any other physician or provider since your last visit? Yes - Records Obtained  Have you had any other diagnostic tests since your last visit? Yes - Records Obtained  Have you been seen in the emergency room and/or had an admission to a hospital since we last saw you? Yes - Records Obtained  Have you had your routine dental cleaning in the past 6 months? no    Have you activated your [employer] account? If not, what are your barriers?  No:      Patient Care Team:  Kyler Hartman MD as PCP - General (Internal Medicine)  Lucía Kaur MD as PCP - [facility] Attributed Provider    Medical History Review  Past Medical, Family, and Social History reviewed and does contribute to the patient presenting condition    Health Maintenance   Topic Date Due    Hepatitis C screen  1955    Diabetic foot exam  12/31/1963    Diabetic retinal exam  12/31/1963    HIV screen  12/31/1968    Diabetic microalbuminuria test  12/31/1971    DTaP/Tdap/Td vaccine (1 - Tdap) 12/31/1972    Cervical cancer screen  12/31/1974    Breast cancer screen  12/31/2003    Colon cancer screen colonoscopy  12/31/2003    Zostavax vaccine  12/31/2013    Diabetic hemoglobin A1C test  01/22/2018    Lipid screen  10/22/2018    Flu vaccine  Completed    Pneumococcal med risk  Completed

## 2017-11-30 NOTE — CARE COORDINATION
CC was asked, by PCP, to follow patient for CC program. She is here today, as a new patient, to establish care at this office. She moved back to [place], from [place], almost 2 years ago. She has a hx of seizures, DM, and a CVA. She has had trouble with her memory and forgetfulness, forgetting to take her medications or to check her blood sugars. Her recent A1c was 15.5 and her blood sugar today in the office is 442. There is some concern about what medications she is taking at home. In reviewing the chart, she was at [facility], in March 2017, after an admission to [facility] for a blood sugar of 759. She stated that she had followed with an endocrinologist in [place], and at one point was on U500 insulin. She has a glucometer and supplies at home but stated that she doesn't always remember to check her blood sugars. She lives alone with her dog. She stated that she does not drive any longer- \"I gave that up myself\". She uses the [employer]. She has brought paperwork today for Transportation through [employer]; and an application for [employer]. She also stated that she is in the process of getting [employer] services. She does have hx of depression/anxiety. She has a brother, Michael Ramirez, who still lives in [place]. She said she has a friend here, that she has known for 50 years, and has kept in contact with, who helps her out. Referral made today for home care to assist with diabetes management, and medication compliance- skilled nursing, PT, , HHA. May need to look into blister packing medications. Will follow up in a couple of days to complete enrollment and check on patient's blood sugars.

## 2017-11-30 NOTE — PATIENT INSTRUCTIONS
Patient states that she will call with info from previous pcp    Your script for tdap, zostavax, lancets,glucometer and test strips has been printed and given to you, you can take it to the pharmacy of your choice. You have been given an order and instructions for a Mammogram. The order was faxed to the scheduling department and they will contact you with an appointment. Please bring order with you to that appointment. The scheduling number is [phone] if you have scheduling problems. Your forms for [facility] program and transportation form was signed, completed and faxed, a copy was scanned to your chart. You have been given a lab order no need to schedule no need to fast     Your referral for home health care was sent to [facility] you will be contacted. Avs was given and reviewed appt card given with next appt.  TAMAR

## 2017-12-07 ENCOUNTER — CARE COORDINATION (OUTPATIENT)
Dept: CARE COORDINATION | Age: 62
End: 2017-12-07

## 2017-12-07 NOTE — CARE COORDINATION
CC called to [address] [place] [address] to see if they had started her home care yet. Was informed that they have not been able to reach patient. Every time they call her number, they receive a recording- \"not accepting calls at this time\". They have tried several times over the last week. The nurse did a drive by and her address is an apartment complex, but we have no apartment number listed. CC attempted to reach patient and also received same message. Tried alternate number without success. Called emergency contact number, Sheri, and left a message for her to call CC back with contact information or to have Addis Varner call [phone]. CC received call back from Addis Varner. She had gotten the message from her friend. Phone numbers and address updated in [facility]. Ambulatory Care Coordination Note  12/7/2017  CM Risk Score: 2  Jean-Pierre Mortality Risk Score:      ACC: Tonny Crump, RN    Summary Note: Spoke with Addis Varner and completed CC enrollment. She apologized for not updating the phone number. She stated that she has an medical alarm system, through her phone, along with a watch and a necklace, for if she would fall or need immediate medical attention. She said she must have accidentally pushed the button on her phone and they couldn't get a hold of her. That's how she found out the phone wasn't working. Informed her that [address] [place] [address] would call the home care agency to update them also; and that they would be calling her to come out to her apartment. CC explained the reason for the home visit- to help with medications and diabetic education, PT, and possibly a HHA. She stated \"just so they know i'm still getting things arranged around here\". She said she is working on one room at a time, putting things away and organizing. She said she was waiting to hear about Passport services, she had filled out paperwork. She also explained why she was trying to get help with the Payee program at [facility].  She said because of her severe depression, she will go out and \"overspend\" and had also made double payments on a couple bills without realizing it. She had a negative balance of $700 because of it and was only able to pay certain bills this past month. CC asked about her blood sugars. She said that she doesn't write them down, because she won't remember, but she got her glucometer and read off the last two days- see below. She stated that her sugars have always been \"all over the place\". She has been on several different meds over the years- Byetta made her sick to her stomach; levemir didn't work for her. She said the one that worked the best was the U500. She said she is going to get a new machine after the first of the year- one that she has been wanting, that downloads her results to the computer. She said it will be covered by insurance. CC asked her to bring her glucometer with her at next visit on 12/12/17. She currently gets her medications bubble packed at the [facility] and will bring the top strip, with all the mediation names, along to the visit as well. She said she wanted to switch her pharmacy to [facility], the mail order for her insurance, because she can get a 3-month supply, instead of a 30-day supply,  for a couple of dollars. CC stated that she can have the doctor prescribe a 90-day supply, instead of a 30, and she should be able to keep at local pharmacy with same co-pay. Will have to check with pharmacy. She stated that she has no problem remembering to take her medications, except her insulin. \"I don't know, maybe because it's in the refrigerator. Maybe because I hate being on insulin after being off of it\". CC inquired as to what she meant. She said after her stroke in 2010, her sugars were low and they had taken her off her insulin for some time. Since she's been back on it, it has been a struggle to remember to take it.   She also wants to ask the doctor, at her visit, for a referral to a podiatrist, an ophthalmologist- \"because of the retinas\", and an outpatient PT referral. Will also discuss with Dr. Patito Melissa about possible referral for behavioral health due to depression and insulin compliance. Blood Glucose Tracker 12/7/2017 12/6/2017   Before breakfast blood glucose 59 82   After breakfast blood glucose 125 -   After lunch blood glucose 375 442   Before bed blood glucose - 255       Care Coordination Interventions    Program Enrollment:  Rising Risk  Referral from Primary Care Provider:  Yes  Suggested Interventions and [address]:  Not Started  Fall Risk Prevention: In Process  Home Care Waiver:  Not Started  Home Health Services: In Process  Meals on Wheels:  Not Started  Medi Set or Pill Pack:  Completed  Pharmacist:  Not Started  Senior Services:  Not Started  Transportation Support:  Not Started  Zone Management Tools:  Not Started         Goals Addressed      Medication Management                  I will take my medication as directed. I will notify my provider of any problems with medications, like adverse effects or side effects. I will notify my provider/Care Coordinator if I am unable to afford my medications. I will notify my provider for advice before I stop taking any of my medication. I will set alarm on phone for reminder to take insulin    Barriers: overwhelmed by complexity of regimen, stress and depression  Plan for overcoming my barriers: set reminders on phone; work with PCP, CC and home care  Confidence: 7/10  Anticipated Goal Completion Date: 3/2018            Prior to Admission medications    Medication Sig Start Date End Date Taking?  Authorizing Provider   metFORMIN (GLUCOPHAGE) 1000 MG tablet Take 1,000 mg by mouth 2 times daily (with meals)   Yes Historical Provider, MD   aspirin 81 MG tablet Take 81 mg by mouth daily   Yes Historical Provider, MD   Blood Glucose Monitoring Suppl FLAVIO Check blood sugars 3/day 11/30/17  Yes Aniya Fink MD   Glucose Blood (BLOOD GLUCOSE TEST STRIPS) STRP Test 3  times daily Insulin Dependent mellitus 11/30/17  Yes Donna Carranza MD   Lancets MISC Check 3/day 11/30/17  Yes Donna Carranza MD   levETIRAcetam (KEPPRA) 250 MG tablet Take 1 tablet by mouth 2 times daily  Patient taking differently: Take 250 mg by mouth 2 times daily Taking 250 mg tablet along with 2- 500 mg tablets for a total of 1250 mg. 10/23/17  Yes Etienne Zelaya MD   levETIRAcetam (KEPPRA) 1000 MG tablet Take 1 tablet by mouth 2 times daily Your new dose is 1250 mg twice daily  Patient taking differently: Take 1,000 mg by mouth 2 times daily Patient is taking 2- 500 mg tablets along with a 250 mg tablet for a total of 1250 mg. 10/23/17  Yes Etienne Zelaya MD   lisinopril-hydrochlorothiazide (PRINZIDE;ZESTORETIC) 10-12.5 MG per tablet Take 1 tablet by mouth daily   Yes Historical Provider, MD   PARoxetine (PAXIL) 20 MG tablet Take 20 mg by mouth every morning   Yes Historical Provider, MD   omeprazole (PRILOSEC) 20 MG delayed release capsule Take 20 mg by mouth daily   Yes Historical Provider, MD   loratadine (CLARITIN) 10 MG tablet Take 10 mg by mouth daily   Yes Historical Provider, MD   pioglitazone (ACTOS) 15 MG tablet Take 15 mg by mouth every morning   Yes Historical Provider, MD   simvastatin (ZOCOR) 40 MG tablet Take 40 mg by mouth nightly   Yes Historical Provider, MD   pramipexole (MIRAPEX) 1 MG tablet Take 1 mg by mouth nightly   Yes Historical Provider, MD   lamoTRIgine (LAMICTAL) 200 MG tablet Take 200 mg by mouth 2 times daily   Yes Historical Provider, MD   busPIRone (BUSPAR) 10 MG tablet Take 10 mg by mouth 3 times daily   Yes Historical Provider, MD   insulin glargine (LANTUS) 100 UNIT/ML injection vial Inject 50 Units into the skin 2 times daily    Yes Historical Provider, MD   insulin lispro (HUMALOG) 100 UNIT/ML injection vial at meals four times a day plus sliding scale if needed   Yes Historical Provider, MD       Future Appointments  Date Time

## 2017-12-10 ENCOUNTER — HOSPITAL ENCOUNTER (EMERGENCY)
Age: 62
Discharge: HOME | End: 2017-12-10
Attending: EMERGENCY MEDICINE
Payer: MEDICARE

## 2017-12-10 ENCOUNTER — APPOINTMENT (OUTPATIENT)
Dept: GENERAL RADIOLOGY | Age: 62
End: 2017-12-10
Payer: MEDICARE

## 2017-12-10 VITALS
TEMPERATURE: 97.2 F | RESPIRATION RATE: 18 BRPM | SYSTOLIC BLOOD PRESSURE: 110 MMHG | HEIGHT: 61 IN | BODY MASS INDEX: 35.68 KG/M2 | OXYGEN SATURATION: 96 % | WEIGHT: 189 LBS | DIASTOLIC BLOOD PRESSURE: 63 MMHG | HEART RATE: 90 BPM

## 2017-12-10 DIAGNOSIS — M25.531 RIGHT WRIST PAIN: ICD-10-CM

## 2017-12-10 DIAGNOSIS — M79.641 RIGHT HAND PAIN: ICD-10-CM

## 2017-12-10 DIAGNOSIS — S92.155A CLOSED NONDISPLACED AVULSION FRACTURE OF LEFT TALUS, INITIAL ENCOUNTER: Primary | ICD-10-CM

## 2017-12-10 PROCEDURE — 73130 X-RAY EXAM OF HAND: CPT

## 2017-12-10 PROCEDURE — 73100 X-RAY EXAM OF WRIST: CPT

## 2017-12-10 PROCEDURE — 73610 X-RAY EXAM OF ANKLE: CPT

## 2017-12-10 PROCEDURE — 96372 THER/PROPH/DIAG INJ SC/IM: CPT

## 2017-12-10 PROCEDURE — 99284 EMERGENCY DEPT VISIT MOD MDM: CPT

## 2017-12-10 PROCEDURE — 6360000002 HC RX W HCPCS: Performed by: EMERGENCY MEDICINE

## 2017-12-10 PROCEDURE — 73630 X-RAY EXAM OF FOOT: CPT

## 2017-12-10 RX ORDER — FENTANYL CITRATE 50 UG/ML
50 INJECTION, SOLUTION INTRAMUSCULAR; INTRAVENOUS ONCE
Status: COMPLETED | OUTPATIENT
Start: 2017-12-10 | End: 2017-12-10

## 2017-12-10 RX ORDER — OXYCODONE HYDROCHLORIDE AND ACETAMINOPHEN 5; 325 MG/1; MG/1
1-2 TABLET ORAL EVERY 6 HOURS PRN
Qty: 15 TABLET | Refills: 0 | Status: SHIPPED | OUTPATIENT
Start: 2017-12-10 | End: 2017-12-10

## 2017-12-10 RX ORDER — OXYCODONE HYDROCHLORIDE AND ACETAMINOPHEN 5; 325 MG/1; MG/1
1-2 TABLET ORAL EVERY 6 HOURS PRN
Qty: 15 TABLET | Refills: 0 | Status: ON HOLD | OUTPATIENT
Start: 2017-12-10 | End: 2019-05-15 | Stop reason: SDUPTHER

## 2017-12-10 RX ADMIN — FENTANYL CITRATE 50 MCG: 50 INJECTION INTRAMUSCULAR; INTRAVENOUS at 09:55

## 2017-12-10 ASSESSMENT — PAIN SCALES - GENERAL
PAINLEVEL_OUTOF10: 8
PAINLEVEL_OUTOF10: 8

## 2017-12-10 ASSESSMENT — ENCOUNTER SYMPTOMS
COUGH: 0
EYE PAIN: 0
SHORTNESS OF BREATH: 0
BACK PAIN: 0
VOMITING: 0
RHINORRHEA: 0
ABDOMINAL PAIN: 0
NAUSEA: 0

## 2017-12-10 ASSESSMENT — PAIN DESCRIPTION - DESCRIPTORS: DESCRIPTORS: DISCOMFORT;ACHING

## 2017-12-10 ASSESSMENT — PAIN DESCRIPTION - PAIN TYPE: TYPE: ACUTE PAIN

## 2017-12-10 ASSESSMENT — PAIN DESCRIPTION - ORIENTATION: ORIENTATION: RIGHT;LEFT

## 2017-12-10 ASSESSMENT — PAIN DESCRIPTION - LOCATION: LOCATION: ANKLE

## 2017-12-10 NOTE — ED PROVIDER NOTES
file.     Social History Main Topics    Smoking status: Never Smoker    Smokeless tobacco: Never Used    Alcohol use No    Drug use: No    Sexual activity: Not on file     Other Topics Concern    Not on file     Social History Narrative    No narrative on file       Family History   Problem Relation Age of Onset    Diabetes Brother        Allergies:  Codeine    Home Medications:  Prior to Admission medications    Medication Sig Start Date End Date Taking? Authorizing Provider   oxyCODONE-acetaminophen (PERCOCET) 5-325 MG per tablet Take 1-2 tablets by mouth every 6 hours as needed for Pain .  12/10/17  Yes Osito Farfan,    metFORMIN (GLUCOPHAGE) 1000 MG tablet Take 1,000 mg by mouth 2 times daily (with meals)    Historical Provider, MD   aspirin 81 MG tablet Take 81 mg by mouth daily    Historical Provider, MD   Blood Glucose Monitoring Suppl FLAVIO Check blood sugars 3/day 11/30/17   Romie Orozco MD   Glucose Blood (BLOOD GLUCOSE TEST STRIPS) STRP Test 3  times daily Insulin Dependent mellitus 11/30/17   Romie Orozco MD   Lancets MISC Check 3/day 11/30/17   Romie Orozco MD   levETIRAcetam (KEPPRA) 250 MG tablet Take 1 tablet by mouth 2 times daily  Patient taking differently: Take 250 mg by mouth 2 times daily Taking 250 mg tablet along with 2- 500 mg tablets for a total of 1250 mg. 10/23/17   Cayla Blackburn MD   levETIRAcetam (KEPPRA) 1000 MG tablet Take 1 tablet by mouth 2 times daily Your new dose is 1250 mg twice daily  Patient taking differently: Take 1,000 mg by mouth 2 times daily Patient is taking 2- 500 mg tablets along with a 250 mg tablet for a total of 1250 mg. 10/23/17   Cayla Blackburn MD   lisinopril-hydrochlorothiazide (PRINZIDE;ZESTORETIC) 10-12.5 MG per tablet Take 1 tablet by mouth daily    Historical Provider, MD   PARoxetine (PAXIL) 20 MG tablet Take 20 mg by mouth every morning    Historical Provider, MD   omeprazole (PRILOSEC) 20 MG delayed release capsule Take 20 mg by uniform. Talar dome and talar walls are intact. Moderate Damian's deformity and small plantar calcaneal spur are noted. Right hand, three-view: Three views of the right hand demonstrate osteopenia and mild degenerative changes involving many of the interphalangeal joints and radial aspect of the wrist.  Soft tissue swelling over the dorsum of the metacarpal heads and wrists is noted. No acute fracture or dislocation is noted. The navicular lunate space is well-preserved. No ulnar minus variance is noted. Right wrist:  Two views of the right wrist demonstrate osteopenia. Positioning is suboptimal to evaluate the navicular bone. Dorsal soft tissue swelling is noted. Mild arthritic changes are present on the radial aspect of the wrist.  No obvious acute fracture is noted. The navicular lunate space is well-preserved. No ulnar minus variance is noted. Right foot: Three views of the right foot demonstrate significant soft tissue swelling over the forefoot, osteopenia and mild degenerative changes in the interphalangeal joints. There is also significant soft tissue swelling over the ankle. Questionable faint lucency through the proximal portion of the proximal 2nd phalanx is noted of concern for a hairline fracture given severe soft tissue swelling. No dislocation is evident. Moderate Damian's deformity and small plantar calcaneal spur are noted. Significant soft tissue swelling over the ankle is noted. Left ankle:  Significant soft tissue swelling. Linear calcification along the dorsum of the talus suggestive of an avulsion fracture. Right hand:  Arthritic changes without evidence of acute fracture or dislocation. Soft tissue swelling over the dorsum of the wrist and metacarpals is noted. Right wrist: Suboptimal positioning for evaluation of the navicular bone is noted. Mild soft tissue swelling over the dorsum of the wrist.  No evidence of acute fracture.  Right foot:  Significant soft tissue swelling over the forefoot. Patient has calcification along the dorsum of the talus suggestive of an avulsion fracture and findings suggest nondisplaced fracture through the proximal 2nd phalanx. RECOMMENDATION: Correlation with clinical examination is advised. If there is tenderness over the navicular bone, I would advise specific navicular view of the wrist.     Xr Hand Right (min 3 Views)    Result Date: 12/10/2017  EXAMINATION: 3 VIEWS OF THE LEFT ANKLE; VIEWS OF THE RIGHT WRIST; 3 VIEWS OF THE RIGHT HAND; 3 VIEWS OF THE LEFT FOOT 12/10/2017 10:19 am COMPARISON: None. HISTORY: ORDERING SYSTEM PROVIDED HISTORY: left ankle pain s/p fall TECHNOLOGIST PROVIDED HISTORY: Reason for exam:->left ankle pain s/p fall FINDINGS: Left ankle:  Diffuse significant soft tissue swelling is noted with small calcification along the dorsum of the talus which may represent an avulsion fracture. No other fractures are evident. Ankle mortise is uniform. Talar dome and talar walls are intact. Moderate Damian's deformity and small plantar calcaneal spur are noted. Right hand, three-view: Three views of the right hand demonstrate osteopenia and mild degenerative changes involving many of the interphalangeal joints and radial aspect of the wrist.  Soft tissue swelling over the dorsum of the metacarpal heads and wrists is noted. No acute fracture or dislocation is noted. The navicular lunate space is well-preserved. No ulnar minus variance is noted. Right wrist:  Two views of the right wrist demonstrate osteopenia. Positioning is suboptimal to evaluate the navicular bone. Dorsal soft tissue swelling is noted. Mild arthritic changes are present on the radial aspect of the wrist.  No obvious acute fracture is noted. The navicular lunate space is well-preserved. No ulnar minus variance is noted. Right foot:   Three views of the right foot demonstrate significant soft tissue swelling over the forefoot, osteopenia and mild degenerative changes in the interphalangeal joints. There is also significant soft tissue swelling over the ankle. Questionable faint lucency through the proximal portion of the proximal 2nd phalanx is noted of concern for a hairline fracture given severe soft tissue swelling. No dislocation is evident. Moderate Damian's deformity and small plantar calcaneal spur are noted. Significant soft tissue swelling over the ankle is noted. Left ankle:  Significant soft tissue swelling. Linear calcification along the dorsum of the talus suggestive of an avulsion fracture. Right hand:  Arthritic changes without evidence of acute fracture or dislocation. Soft tissue swelling over the dorsum of the wrist and metacarpals is noted. Right wrist: Suboptimal positioning for evaluation of the navicular bone is noted. Mild soft tissue swelling over the dorsum of the wrist.  No evidence of acute fracture. Right foot:  Significant soft tissue swelling over the forefoot. Patient has calcification along the dorsum of the talus suggestive of an avulsion fracture and findings suggest nondisplaced fracture through the proximal 2nd phalanx. RECOMMENDATION: Correlation with clinical examination is advised. If there is tenderness over the navicular bone, I would advise specific navicular view of the wrist.     Xr Ankle Left (min 3 Views)    Result Date: 12/10/2017  EXAMINATION: 3 VIEWS OF THE LEFT ANKLE; VIEWS OF THE RIGHT WRIST; 3 VIEWS OF THE RIGHT HAND; 3 VIEWS OF THE LEFT FOOT 12/10/2017 10:19 am COMPARISON: None. HISTORY: ORDERING SYSTEM PROVIDED HISTORY: left ankle pain s/p fall TECHNOLOGIST PROVIDED HISTORY: Reason for exam:->left ankle pain s/p fall FINDINGS: Left ankle:  Diffuse significant soft tissue swelling is noted with small calcification along the dorsum of the talus which may represent an avulsion fracture. No other fractures are evident. Ankle mortise is uniform. Talar dome and talar walls are intact.   Moderate Damian's deformity and small plantar calcaneal spur are noted. Right hand, three-view: Three views of the right hand demonstrate osteopenia and mild degenerative changes involving many of the interphalangeal joints and radial aspect of the wrist.  Soft tissue swelling over the dorsum of the metacarpal heads and wrists is noted. No acute fracture or dislocation is noted. The navicular lunate space is well-preserved. No ulnar minus variance is noted. Right wrist:  Two views of the right wrist demonstrate osteopenia. Positioning is suboptimal to evaluate the navicular bone. Dorsal soft tissue swelling is noted. Mild arthritic changes are present on the radial aspect of the wrist.  No obvious acute fracture is noted. The navicular lunate space is well-preserved. No ulnar minus variance is noted. Right foot: Three views of the right foot demonstrate significant soft tissue swelling over the forefoot, osteopenia and mild degenerative changes in the interphalangeal joints. There is also significant soft tissue swelling over the ankle. Questionable faint lucency through the proximal portion of the proximal 2nd phalanx is noted of concern for a hairline fracture given severe soft tissue swelling. No dislocation is evident. Moderate Damian's deformity and small plantar calcaneal spur are noted. Significant soft tissue swelling over the ankle is noted. Left ankle:  Significant soft tissue swelling. Linear calcification along the dorsum of the talus suggestive of an avulsion fracture. Right hand:  Arthritic changes without evidence of acute fracture or dislocation. Soft tissue swelling over the dorsum of the wrist and metacarpals is noted. Right wrist: Suboptimal positioning for evaluation of the navicular bone is noted. Mild soft tissue swelling over the dorsum of the wrist.  No evidence of acute fracture. Right foot:  Significant soft tissue swelling over the forefoot.   Patient has calcification along the dorsum of the talus suggestive of an avulsion fracture and findings suggest nondisplaced fracture through the proximal 2nd phalanx. RECOMMENDATION: Correlation with clinical examination is advised. If there is tenderness over the navicular bone, I would advise specific navicular view of the wrist.     Xr Foot Left (min 3 Views)    Result Date: 12/10/2017  EXAMINATION: 3 VIEWS OF THE LEFT ANKLE; VIEWS OF THE RIGHT WRIST; 3 VIEWS OF THE RIGHT HAND; 3 VIEWS OF THE LEFT FOOT 12/10/2017 10:19 am COMPARISON: None. HISTORY: ORDERING SYSTEM PROVIDED HISTORY: left ankle pain s/p fall TECHNOLOGIST PROVIDED HISTORY: Reason for exam:->left ankle pain s/p fall FINDINGS: Left ankle:  Diffuse significant soft tissue swelling is noted with small calcification along the dorsum of the talus which may represent an avulsion fracture. No other fractures are evident. Ankle mortise is uniform. Talar dome and talar walls are intact. Moderate Damian's deformity and small plantar calcaneal spur are noted. Right hand, three-view: Three views of the right hand demonstrate osteopenia and mild degenerative changes involving many of the interphalangeal joints and radial aspect of the wrist.  Soft tissue swelling over the dorsum of the metacarpal heads and wrists is noted. No acute fracture or dislocation is noted. The navicular lunate space is well-preserved. No ulnar minus variance is noted. Right wrist:  Two views of the right wrist demonstrate osteopenia. Positioning is suboptimal to evaluate the navicular bone. Dorsal soft tissue swelling is noted. Mild arthritic changes are present on the radial aspect of the wrist.  No obvious acute fracture is noted. The navicular lunate space is well-preserved. No ulnar minus variance is noted. Right foot: Three views of the right foot demonstrate significant soft tissue swelling over the forefoot, osteopenia and mild degenerative changes in the interphalangeal joints.   There is also

## 2017-12-10 NOTE — ED PROVIDER NOTES
[facility]     Emergency Department     Faculty Attestation    I performed a history and physical examination of the patient and discussed management with the resident. I reviewed the residents note and agree with the documented findings and plan of care. Any areas of disagreement are noted on the chart. I was personally present for the key portions of any procedures. I have documented in the chart those procedures where I was not present during the key portions. I have reviewed the emergency nurses triage note. I agree with the chief complaint, past medical history, past surgical history, allergies, medications, social and family history as documented unless otherwise noted below. For Physician Assistant/ Nurse Practitioner cases/documentation I have personally evaluated this patient and have completed at least one if not all key elements of the E/M (history, physical exam, and MDM). Additional findings are as noted. I have personally seen and evaluated the patient. I find the patient's history and physical exam are consistent with the NP/PA documentation. I agree with the care provided, treatment rendered, disposition and follow-up plan. Left ankle and right wrist discomfort from recent fall no headache no injury otherwise no other complaints. Critical Care     Selvin Faustin M.D.   Attending Emergency  Physician              Clemente Morillo MD  12/10/17 [id number]

## 2017-12-12 ENCOUNTER — OFFICE VISIT (OUTPATIENT)
Dept: BEHAVIORAL/MENTAL HEALTH CLINIC | Age: 62
End: 2017-12-12
Payer: MEDICARE

## 2017-12-12 ENCOUNTER — CARE COORDINATION (OUTPATIENT)
Dept: CARE COORDINATION | Age: 62
End: 2017-12-12

## 2017-12-12 ENCOUNTER — OFFICE VISIT (OUTPATIENT)
Dept: INTERNAL MEDICINE | Age: 62
End: 2017-12-12
Payer: MEDICARE

## 2017-12-12 VITALS
HEART RATE: 94 BPM | BODY MASS INDEX: 37.95 KG/M2 | SYSTOLIC BLOOD PRESSURE: 130 MMHG | WEIGHT: 201 LBS | DIASTOLIC BLOOD PRESSURE: 63 MMHG | HEIGHT: 61 IN

## 2017-12-12 DIAGNOSIS — F32.9 MAJOR DEPRESSION, CHRONIC: ICD-10-CM

## 2017-12-12 DIAGNOSIS — E11.29 MICROALBUMINURIA DUE TO TYPE 2 DIABETES MELLITUS (HCC): ICD-10-CM

## 2017-12-12 DIAGNOSIS — F32.A DEPRESSION, UNSPECIFIED DEPRESSION TYPE: Primary | ICD-10-CM

## 2017-12-12 DIAGNOSIS — R41.3 MEMORY DEFICITS: ICD-10-CM

## 2017-12-12 DIAGNOSIS — E78.00 PURE HYPERCHOLESTEROLEMIA: ICD-10-CM

## 2017-12-12 DIAGNOSIS — R80.9 MICROALBUMINURIA DUE TO TYPE 2 DIABETES MELLITUS (HCC): ICD-10-CM

## 2017-12-12 DIAGNOSIS — G40.909 SEIZURE DISORDER (HCC): ICD-10-CM

## 2017-12-12 DIAGNOSIS — I10 ESSENTIAL HYPERTENSION: ICD-10-CM

## 2017-12-12 DIAGNOSIS — I69.359 CVA, OLD, HEMIPARESIS (HCC): ICD-10-CM

## 2017-12-12 DIAGNOSIS — S92.155D CLOSED NONDISPLACED AVULSION FRACTURE OF LEFT TALUS WITH ROUTINE HEALING, SUBSEQUENT ENCOUNTER: ICD-10-CM

## 2017-12-12 LAB — GLUCOSE BLD-MCNC: 137 MG/DL

## 2017-12-12 PROCEDURE — 90834 PSYTX W PT 45 MINUTES: CPT | Performed by: PSYCHOLOGIST

## 2017-12-12 PROCEDURE — 99214 OFFICE O/P EST MOD 30 MIN: CPT | Performed by: INTERNAL MEDICINE

## 2017-12-12 PROCEDURE — G8484 FLU IMMUNIZE NO ADMIN: HCPCS | Performed by: INTERNAL MEDICINE

## 2017-12-12 PROCEDURE — G8427 DOCREV CUR MEDS BY ELIG CLIN: HCPCS | Performed by: INTERNAL MEDICINE

## 2017-12-12 PROCEDURE — 1036F TOBACCO NON-USER: CPT | Performed by: INTERNAL MEDICINE

## 2017-12-12 PROCEDURE — 3046F HEMOGLOBIN A1C LEVEL >9.0%: CPT | Performed by: INTERNAL MEDICINE

## 2017-12-12 PROCEDURE — 82962 GLUCOSE BLOOD TEST: CPT | Performed by: INTERNAL MEDICINE

## 2017-12-12 PROCEDURE — 3017F COLORECTAL CA SCREEN DOC REV: CPT | Performed by: INTERNAL MEDICINE

## 2017-12-12 PROCEDURE — G8417 CALC BMI ABV UP PARAM F/U: HCPCS | Performed by: INTERNAL MEDICINE

## 2017-12-12 PROCEDURE — 3014F SCREEN MAMMO DOC REV: CPT | Performed by: INTERNAL MEDICINE

## 2017-12-12 NOTE — PROGRESS NOTES
Medical History:   Diagnosis Date    Anemia     Cataract     GERD (gastroesophageal reflux disease)     Headache     Hyperlipidemia     Neuropathy (HCC)     Osteoarthritis     Seizures (HCC) since age 10    Stroke (cerebrum) ([address] 2010    Type 2 diabetes mellitus without complication ([facility] 3587       Past Surgical History:   Procedure Laterality Date    CATARACT REMOVAL      CHOLECYSTECTOMY      EYE SURGERY      cataract    TONSILLECTOMY           ALLERGIES      Allergies   Allergen Reactions    Codeine        MEDICATIONS:      Current Outpatient Prescriptions on File Prior to Visit   Medication Sig Dispense Refill    metFORMIN (GLUCOPHAGE) 1000 MG tablet Take 1,000 mg by mouth 2 times daily (with meals)      aspirin 81 MG tablet Take 81 mg by mouth daily      Glucose Blood (BLOOD GLUCOSE TEST STRIPS) STRP Test 3  times daily Insulin Dependent mellitus 100 strip 11    Lancets MISC Check 3/day 100 each 11    levETIRAcetam (KEPPRA) 250 MG tablet Take 1 tablet by mouth 2 times daily (Patient taking differently: Take 250 mg by mouth 2 times daily Taking 250 mg tablet along with 2- 500 mg tablets for a total of 1250 mg.) 60 tablet 3    levETIRAcetam (KEPPRA) 1000 MG tablet Take 1 tablet by mouth 2 times daily Your new dose is 1250 mg twice daily (Patient taking differently: Take 1,000 mg by mouth 2 times daily Patient is taking 2- 500 mg tablets along with a 250 mg tablet for a total of 1250 mg.) 60 tablet 0    lisinopril-hydrochlorothiazide (PRINZIDE;ZESTORETIC) 10-12.5 MG per tablet Take 1 tablet by mouth daily      PARoxetine (PAXIL) 20 MG tablet Take 20 mg by mouth every morning      omeprazole (PRILOSEC) 20 MG delayed release capsule Take 20 mg by mouth daily      loratadine (CLARITIN) 10 MG tablet Take 10 mg by mouth daily      simvastatin (ZOCOR) 40 MG tablet Take 40 mg by mouth nightly      pramipexole (MIRAPEX) 1 MG tablet Take 1 mg by mouth nightly      lamoTRIgine (LAMICTAL) 200 MG Negative for dizziness, focal weakness, loss of consciousness and headaches. Psychiatric/Behavioral: Positive for depression. Negative for substance abuse and suicidal ideas. The patient is nervous/anxious. The patient does not have insomnia. PHYSICAL EXAM:      Vitals:    12/12/17 1248   BP: 130/63   Site: Left Arm   Position: Sitting   Cuff Size: Medium Adult   Pulse: 94   Weight: 201 lb (91.2 kg)   Height: 5' 1\" (1.549 m)     Body mass index is 37.98 kg/m². BP Readings from Last 3 Encounters:   12/12/17 130/63   12/10/17 110/63   11/30/17 (!) 127/58        Wt Readings from Last 3 Encounters:   12/12/17 201 lb (91.2 kg)   12/10/17 189 lb (85.7 kg)   11/30/17 191 lb (86.6 kg)       Physical Exam      HENT:  Normocephalic, Atraumatic, . Neck- No goitre  Eyes:  PERRL, EOMI, Conjunctiva normal, No discharge. Respiratory:  Normal breath sounds, No respiratory distress, No wheezing, No chest tenderness. Cardiovascular:  Normal heart rate, Normal rhythm, No murmurs, No rubs, No gallops. GI:  Bowel sounds normal, Soft, No tenderness, No CVA tenderness. Musculoskeletal:  Intact distal pulses, No edema, walking with walker. Left foot splint. Decreased ROM of right wrist. Mild swelling. Sensation intact. Strength normal.  Integument:  Warm, Dry, No erythema, No rash. Lymphatic:  No lymphadenopathy noted. Neurologic:  Alert & oriented x 3, Normal motor function, decreased sensation on feet b/l, No focal deficits noted.    Psychiatric:  Affect normal    LABORATORY FINDINGS:    CBC:  Lab Results   Component Value Date    WBC 5.3 10/22/2017    HGB 13.0 10/22/2017    PLT 96 10/22/2017     BMP:    Lab Results   Component Value Date     10/22/2017    K 4.1 10/22/2017     10/22/2017    CO2 29 10/22/2017    BUN 13 10/22/2017    CREATININE 0.59 10/22/2017    GLUCOSE 442 11/30/2017     HEMOGLOBIN A1C:   Lab Results   Component Value Date    LABA1C 15.5 10/22/2017     MICROALBUMIN URINE:   Lab Results Component Value Date    MICROALBUR 86 11/30/2017     FASTING LIPID PANEL:  Lab Results   Component Value Date    CHOL 148 10/22/2017    HDL 49 10/22/2017    TRIG 118 10/22/2017     Lab Results   Component Value Date    LDLCHOLESTEROL 75 10/22/2017       LIVER PROFILE:  Lab Results   Component Value Date    ALT 17 10/22/2017    AST 23 10/22/2017    PROT 7.2 10/22/2017    BILITOT 0.42 10/22/2017    BILIDIR 0.13 10/22/2017    LABALBU 3.5 10/22/2017      THYROID FUNCTION:   Lab Results   Component Value Date    TSH 1.16 10/22/2017      URINE ANALYSIS: No results found for: LABURIN  ASSESSMENT AND PLAN:    1. Uncontrolled type 2 diabetes mellitus with diabetic polyneuropathy, with long-term current use of insulin ([facility]  Call blood sugars next week  Home care to help with medication packing      - POCT Glucose    2. Closed nondisplaced avulsion fracture of left talus with routine healing, subsequent encounter  Follow up with orthopedics    3. Major depression, chronic  Follow up with psychiatry  Paxil    4. Seizure disorder (HCC)  On keppra and lamictal    5. Memory deficits  Follow up with neurology    6. Essential hypertension  On lisinopril/HCTZ  Check orthostatic BP      7. CVA, old, hemiparesis ([address]  Asa  statin    8. Pure hypercholesterolemia  statins    9. Microalbuminuria due to type 2 diabetes mellitus ([address]  Needs better sugar control  ACE inhibitors          FOLLOW UP AND INSTRUCTIONS:   Return in about 2 months (around 2/12/2018). 1. Khoa Childs received counseling on the following healthy behaviors: nutrition, exercise and medication adherence    2. Reviewed prior labs and health maintenance. 3. Discussed use, benefit, and side effects of prescribed medications. Barriers to medication compliance addressed. All patient questions answered. Pt voiced understanding.          Toni Rajput  Attending Physician, [facility] [place] [place]   Faculty, Internal Medicine Residency Program  [facility] Physician - Chris  12/12/2017, 12:52 PM

## 2017-12-12 NOTE — PROGRESS NOTES
hemoglobin A1C test  01/22/2018    Lipid screen  10/22/2018    Diabetic microalbuminuria test  11/30/2018    Flu vaccine  Completed    Pneumococcal med risk  Completed    Hepatitis C screen  Completed    HIV screen  Completed

## 2017-12-12 NOTE — PATIENT INSTRUCTIONS
Avs was given and reviewed appt card given with next appt. MM     Your medications for this visit were escribed to your preferred pharmacy.     Patient seen with [facility]

## 2017-12-12 NOTE — PROGRESS NOTES
Behavioral Health Consultation  Paul ONEILL  Licensed Clinical Psychologist #[id number]  2017  1:20 PM- 2:00 PM    Time spent with Patient: 50 minutes  This is patient's first  [facility] consultation. Reason for Consult:  depression and treatment plan compliance  Referring Provider: Denice Cotter MD    Pt provided informed consent for the behavioral health program. Discussed with patient model of service to include the limits of confidentiality (i.e. abuse reporting, suicide intervention, etc.) and short-term intervention focused approach. Pt indicated understanding. Feedback given to PCP. S:   Pt was previously Effexor first, and then Paxil. She reports forgetfulness for approximately the last four months, AEB, reportedly writting the same check twice with no awareness/recall. She then states that she has had problems with memory since her stroke in . Reports financial stress related to shift from making less than half her previous income. She reports her father  in , and her Mother  in 12. After these deaths,  pt reported episodes of depression. Pt describes caring for her parents, and, reciprocally,  her parents had cared for her due to epilepsy. She expresses multiple biopsychosocial needs. Wonders if she should take previous physicians recommendations to move to an assisted living level of care. She states that she has been resistant as [facility] physicians have advised this course of action. She reports exploring assisted living; however, states that the facility would receive all of her income and she would receive $50 per month. She is worried that $50.00 is not enough to buy food or pay her pills. O:  MSE:   Mood was Anxious and Depressed, with Congruent affect. Suicidal ideation was denied. Homicidal ideation was denied. Hygiene was Good.   Dress was Appropriate and Casual.   Behavior was noted for self-reported memory problems; however, she appears to remember signifcant details related to her situation and behavioral choices; overwhelmed with Yes observation or self-report of difficulties standing/ambulating. Attitude was Help-seeking. Eye-contact was Fair. Speech: rate- WNL, rhythm-  WNL, volume- WNL  Verbalizations were  verbose. Thought processes were intact and goal-oriented without evidence of delusions, hallucinations, obsessions, or josee; with significant cognitive distortions. Associations were characterized by racing thoughts and tangential cognitive processes. Pt was orientated oriented to person, place, time, and general circumstances;  recent:  self-reportedly impaired and remote:  self-reported to be impaired. Insight and judgment were estimated to be poor, AEB, a poor understanding of cyclical maladaptive patterns, and the ability to use insight to inform behavior change. A:   Introduced pt to [facility] model of care and began to clarify her treatment needs. Discussed potential testing for memory through neuropsychological testing; however, due to 20% coinsurance cost this is not a viable option for her at this time. Pt also presented basic needs such as food, likely requires daily calls to remind her to test and record glucose- informed Nurse Mitchel Pang of needs. Pt interventions:  Provided handout on  PHQ, Discussed potential barriers to change, Established rapport, Conducted functional assessment and [place]-setting to identify pt's primary goals for [facility] visit / overall health    Pt Behavioral Change Plan:   1. Please complete Patient Health Questionnaire and bring this with you to your next consultation. 2. Dr. Briana Lopez will consult with care coordinators Cisco Whitehead and Andrei) about your multiple needs. 3. Follow up in 1 week. Diagnosis:  The encounter diagnosis was Depression, unspecified depression type.       Diagnosis Date    Anemia     Cataract     GERD (gastroesophageal reflux disease)     Headache     Hyperlipidemia     Neuropathy (HCC)      insulin glargine (LANTUS) 100 UNIT/ML injection vial Inject 50 Units into the skin 2 times daily       insulin lispro (HUMALOG) 100 UNIT/ML injection vial at meals four times a day plus sliding scale if needed       No current facility-administered medications for this visit. Social History:   Social History     Social History    Marital status: Single     Spouse name: N/A    Number of children: N/A    Years of education: N/A     Occupational History    Not on file. Social History Main Topics    Smoking status: Never Smoker    Smokeless tobacco: Never Used    Alcohol use No    Drug use: No    Sexual activity: Not on file     Other Topics Concern    Not on file     Social History Narrative    No narrative on file       TOBACCO:   reports that she has never smoked. She has never used smokeless tobacco.  ETOH:   reports that she does not drink alcohol.     Family History:   Family History   Problem Relation Age of Onset    Diabetes Brother

## 2017-12-13 ENCOUNTER — APPOINTMENT (OUTPATIENT)
Dept: CT IMAGING | Age: 62
End: 2017-12-13
Payer: MEDICARE

## 2017-12-13 ENCOUNTER — APPOINTMENT (OUTPATIENT)
Dept: GENERAL RADIOLOGY | Age: 62
End: 2017-12-13
Payer: MEDICARE

## 2017-12-13 ENCOUNTER — HOSPITAL ENCOUNTER (OUTPATIENT)
Age: 62
Setting detail: OBSERVATION
Discharge: NURSING, REHAB OR LONG-TERM CARE | End: 2017-12-15
Attending: EMERGENCY MEDICINE | Admitting: EMERGENCY MEDICINE
Payer: MEDICARE

## 2017-12-13 ENCOUNTER — CARE COORDINATION (OUTPATIENT)
Dept: CARE COORDINATION | Age: 62
End: 2017-12-13

## 2017-12-13 DIAGNOSIS — R42 VERTIGO: ICD-10-CM
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
## 2017-12-13 DIAGNOSIS — S16.1XXA CERVICAL STRAIN, ACUTE, INITIAL ENCOUNTER: ICD-10-CM

## 2017-12-13 DIAGNOSIS — M54.50 ACUTE MIDLINE LOW BACK PAIN WITHOUT SCIATICA: ICD-10-CM

## 2017-12-13 LAB
ABSOLUTE EOS #: 0.05 K/UL (ref 0–0.44)
ABSOLUTE IMMATURE GRANULOCYTE: <0.03 K/UL (ref 0–0.3)
ABSOLUTE LYMPH #: 1.16 K/UL (ref 1.1–3.7)
ABSOLUTE MONO #: 0.25 K/UL (ref 0.1–1.2)
ANION GAP SERPL CALCULATED.3IONS-SCNC: 12 MMOL/L (ref 9–17)
BASOPHILS # BLD: 0 % (ref 0–2)
BASOPHILS ABSOLUTE: <0.03 K/UL (ref 0–0.2)
BILIRUBIN URINE: NEGATIVE
BUN BLDV-MCNC: 11 MG/DL (ref 8–23)
BUN/CREAT BLD: ABNORMAL (ref 9–20)
CALCIUM SERPL-MCNC: 8.7 MG/DL (ref 8.6–10.4)
CHLORIDE BLD-SCNC: 101 MMOL/L (ref 98–107)
CO2: 27 MMOL/L (ref 20–31)
COLOR: YELLOW
COMMENT UA: ABNORMAL
CREAT SERPL-MCNC: 0.58 MG/DL (ref 0.5–0.9)
DIFFERENTIAL TYPE: ABNORMAL
EKG ATRIAL RATE: 89 BPM
EKG P AXIS: 15 DEGREES
EKG P-R INTERVAL: 146 MS
EKG Q-T INTERVAL: 364 MS
EKG QRS DURATION: 72 MS
EKG QTC CALCULATION (BAZETT): 442 MS
EKG R AXIS: 21 DEGREES
EKG T AXIS: 42 DEGREES
EKG VENTRICULAR RATE: 89 BPM
EOSINOPHILS RELATIVE PERCENT: 1 % (ref 1–4)
GFR AFRICAN AMERICAN: >60 ML/MIN
GFR NON-AFRICAN AMERICAN: >60 ML/MIN
GFR SERPL CREATININE-BSD FRML MDRD: ABNORMAL ML/MIN/{1.73_M2}
GFR SERPL CREATININE-BSD FRML MDRD: ABNORMAL ML/MIN/{1.73_M2}
GLUCOSE BLD-MCNC: 116 MG/DL (ref 70–99)
GLUCOSE BLD-MCNC: 223 MG/DL (ref 65–105)
GLUCOSE BLD-MCNC: 243 MG/DL (ref 65–105)
GLUCOSE URINE: ABNORMAL
HCT VFR BLD CALC: 40.7 % (ref 36.3–47.1)
HEMOGLOBIN: 12.4 G/DL (ref 11.9–15.1)
IMMATURE GRANULOCYTES: 0 %
KETONES, URINE: NEGATIVE
LEUKOCYTE ESTERASE, URINE: NEGATIVE
LYMPHOCYTES # BLD: 31 % (ref 24–43)
MCH RBC QN AUTO: 26.3 PG (ref 25.2–33.5)
MCHC RBC AUTO-ENTMCNC: 30.5 G/DL (ref 28.4–34.8)
MCV RBC AUTO: 86.4 FL (ref 82.6–102.9)
MONOCYTES # BLD: 7 % (ref 3–12)
NITRITE, URINE: NEGATIVE
PDW BLD-RTO: 13.3 % (ref 11.8–14.4)
PH UA: 7.5 (ref 5–8)
PLATELET # BLD: 89 K/UL (ref 138–453)
PLATELET ESTIMATE: ABNORMAL
PMV BLD AUTO: 10 FL (ref 8.1–13.5)
POTASSIUM SERPL-SCNC: 3.6 MMOL/L (ref 3.7–5.3)
PROTEIN UA: NEGATIVE
RBC # BLD: 4.71 M/UL (ref 3.95–5.11)
RBC # BLD: ABNORMAL 10*6/UL
SEG NEUTROPHILS: 61 % (ref 36–65)
SEGMENTED NEUTROPHILS ABSOLUTE COUNT: 2.29 K/UL (ref 1.5–8.1)
SODIUM BLD-SCNC: 140 MMOL/L (ref 135–144)
SPECIFIC GRAVITY UA: 1.03 (ref 1–1.03)
TROPONIN INTERP: NORMAL
TROPONIN T: <0.03 NG/ML
TURBIDITY: CLEAR
URINE HGB: NEGATIVE
UROBILINOGEN, URINE: NORMAL
WBC # BLD: 3.8 K/UL (ref 3.5–11.3)
WBC # BLD: ABNORMAL 10*3/UL

## 2017-12-13 PROCEDURE — 85025 COMPLETE CBC W/AUTO DIFF WBC: CPT

## 2017-12-13 PROCEDURE — 6370000000 HC RX 637 (ALT 250 FOR IP): Performed by: EMERGENCY MEDICINE

## 2017-12-13 PROCEDURE — 81003 URINALYSIS AUTO W/O SCOPE: CPT

## 2017-12-13 PROCEDURE — 82947 ASSAY GLUCOSE BLOOD QUANT: CPT

## 2017-12-13 PROCEDURE — G0378 HOSPITAL OBSERVATION PER HR: HCPCS

## 2017-12-13 PROCEDURE — 2580000003 HC RX 258: Performed by: EMERGENCY MEDICINE

## 2017-12-13 PROCEDURE — 6360000002 HC RX W HCPCS: Performed by: EMERGENCY MEDICINE

## 2017-12-13 PROCEDURE — 72125 CT NECK SPINE W/O DYE: CPT

## 2017-12-13 PROCEDURE — 70498 CT ANGIOGRAPHY NECK: CPT

## 2017-12-13 PROCEDURE — 96372 THER/PROPH/DIAG INJ SC/IM: CPT

## 2017-12-13 PROCEDURE — 80048 BASIC METABOLIC PNL TOTAL CA: CPT

## 2017-12-13 PROCEDURE — 70496 CT ANGIOGRAPHY HEAD: CPT

## 2017-12-13 PROCEDURE — 73502 X-RAY EXAM HIP UNI 2-3 VIEWS: CPT

## 2017-12-13 PROCEDURE — 99285 EMERGENCY DEPT VISIT HI MDM: CPT

## 2017-12-13 PROCEDURE — 6360000004 HC RX CONTRAST MEDICATION: Performed by: EMERGENCY MEDICINE

## 2017-12-13 PROCEDURE — 93005 ELECTROCARDIOGRAM TRACING: CPT

## 2017-12-13 PROCEDURE — 72100 X-RAY EXAM L-S SPINE 2/3 VWS: CPT

## 2017-12-13 PROCEDURE — 72072 X-RAY EXAM THORAC SPINE 3VWS: CPT

## 2017-12-13 PROCEDURE — 71010 XR CHEST PORTABLE: CPT

## 2017-12-13 PROCEDURE — 72131 CT LUMBAR SPINE W/O DYE: CPT

## 2017-12-13 PROCEDURE — 70450 CT HEAD/BRAIN W/O DYE: CPT

## 2017-12-13 PROCEDURE — 84484 ASSAY OF TROPONIN QUANT: CPT

## 2017-12-13 RX ORDER — CETIRIZINE HYDROCHLORIDE 10 MG/1
5 TABLET ORAL DAILY
Status: DISCONTINUED | OUTPATIENT
Start: 2017-12-14 | End: 2017-12-15 | Stop reason: HOSPADM

## 2017-12-13 RX ORDER — SODIUM CHLORIDE 0.9 % (FLUSH) 0.9 %
10 SYRINGE (ML) INJECTION PRN
Status: DISCONTINUED | OUTPATIENT
Start: 2017-12-13 | End: 2017-12-15 | Stop reason: HOSPADM

## 2017-12-13 RX ORDER — LISINOPRIL AND HYDROCHLOROTHIAZIDE 12.5; 1 MG/1; MG/1
1 TABLET ORAL DAILY
Status: DISCONTINUED | OUTPATIENT
Start: 2017-12-14 | End: 2017-12-15 | Stop reason: HOSPADM

## 2017-12-13 RX ORDER — ASPIRIN 81 MG/1
81 TABLET ORAL DAILY
Status: DISCONTINUED | OUTPATIENT
Start: 2017-12-14 | End: 2017-12-15 | Stop reason: HOSPADM

## 2017-12-13 RX ORDER — INSULIN GLARGINE 100 [IU]/ML
50 INJECTION, SOLUTION SUBCUTANEOUS 2 TIMES DAILY
Status: DISCONTINUED | OUTPATIENT
Start: 2017-12-13 | End: 2017-12-15 | Stop reason: HOSPADM

## 2017-12-13 RX ORDER — SODIUM CHLORIDE 0.9 % (FLUSH) 0.9 %
10 SYRINGE (ML) INJECTION EVERY 12 HOURS SCHEDULED
Status: DISCONTINUED | OUTPATIENT
Start: 2017-12-13 | End: 2017-12-15 | Stop reason: HOSPADM

## 2017-12-13 RX ORDER — PAROXETINE HYDROCHLORIDE 20 MG/1
20 TABLET, FILM COATED ORAL EVERY MORNING
Status: DISCONTINUED | OUTPATIENT
Start: 2017-12-14 | End: 2017-12-15 | Stop reason: HOSPADM

## 2017-12-13 RX ORDER — ONDANSETRON 2 MG/ML
4 INJECTION INTRAMUSCULAR; INTRAVENOUS EVERY 8 HOURS PRN
Status: DISCONTINUED | OUTPATIENT
Start: 2017-12-13 | End: 2017-12-15 | Stop reason: HOSPADM

## 2017-12-13 RX ORDER — PIOGLITAZONEHYDROCHLORIDE 15 MG/1
15 TABLET ORAL EVERY MORNING
Status: DISCONTINUED | OUTPATIENT
Start: 2017-12-14 | End: 2017-12-15 | Stop reason: HOSPADM

## 2017-12-13 RX ORDER — DEXTROSE MONOHYDRATE 25 G/50ML
12.5 INJECTION, SOLUTION INTRAVENOUS PRN
Status: DISCONTINUED | OUTPATIENT
Start: 2017-12-13 | End: 2017-12-15 | Stop reason: HOSPADM

## 2017-12-13 RX ORDER — BUSPIRONE HYDROCHLORIDE 10 MG/1
10 TABLET ORAL 3 TIMES DAILY
Status: DISCONTINUED | OUTPATIENT
Start: 2017-12-13 | End: 2017-12-15 | Stop reason: HOSPADM

## 2017-12-13 RX ORDER — SIMVASTATIN 40 MG
40 TABLET ORAL NIGHTLY
Status: DISCONTINUED | OUTPATIENT
Start: 2017-12-13 | End: 2017-12-15 | Stop reason: HOSPADM

## 2017-12-13 RX ORDER — IBUPROFEN 400 MG/1
400 TABLET ORAL EVERY 6 HOURS PRN
Status: DISCONTINUED | OUTPATIENT
Start: 2017-12-13 | End: 2017-12-15 | Stop reason: HOSPADM

## 2017-12-13 RX ORDER — PRAMIPEXOLE DIHYDROCHLORIDE 0.25 MG/1
1 TABLET ORAL NIGHTLY
Status: DISCONTINUED | OUTPATIENT
Start: 2017-12-13 | End: 2017-12-15 | Stop reason: HOSPADM

## 2017-12-13 RX ORDER — NICOTINE POLACRILEX 4 MG
15 LOZENGE BUCCAL PRN
Status: DISCONTINUED | OUTPATIENT
Start: 2017-12-13 | End: 2017-12-15 | Stop reason: HOSPADM

## 2017-12-13 RX ORDER — DEXTROSE MONOHYDRATE 50 MG/ML
100 INJECTION, SOLUTION INTRAVENOUS PRN
Status: DISCONTINUED | OUTPATIENT
Start: 2017-12-13 | End: 2017-12-15 | Stop reason: HOSPADM

## 2017-12-13 RX ORDER — LAMOTRIGINE 100 MG/1
200 TABLET ORAL 2 TIMES DAILY
Status: DISCONTINUED | OUTPATIENT
Start: 2017-12-13 | End: 2017-12-15 | Stop reason: HOSPADM

## 2017-12-13 RX ORDER — PANTOPRAZOLE SODIUM 20 MG/1
20 TABLET, DELAYED RELEASE ORAL
Status: DISCONTINUED | OUTPATIENT
Start: 2017-12-14 | End: 2017-12-15 | Stop reason: HOSPADM

## 2017-12-13 RX ADMIN — LEVETIRACETAM 1250 MG: 500 TABLET, FILM COATED ORAL at 23:26

## 2017-12-13 RX ADMIN — ENOXAPARIN SODIUM 40 MG: 40 INJECTION SUBCUTANEOUS at 23:27

## 2017-12-13 RX ADMIN — IOPAMIDOL 90 ML: 755 INJECTION, SOLUTION INTRAVENOUS at 15:10

## 2017-12-13 RX ADMIN — SIMVASTATIN 40 MG: 40 TABLET, FILM COATED ORAL at 23:26

## 2017-12-13 RX ADMIN — INSULIN LISPRO 15 UNITS: 100 INJECTION, SOLUTION INTRAVENOUS; SUBCUTANEOUS at 23:28

## 2017-12-13 RX ADMIN — LAMOTRIGINE 200 MG: 100 TABLET ORAL at 23:26

## 2017-12-13 RX ADMIN — Medication 10 ML: at 23:27

## 2017-12-13 RX ADMIN — INSULIN GLARGINE 50 UNITS: 100 INJECTION, SOLUTION SUBCUTANEOUS at 23:27

## 2017-12-13 RX ADMIN — PRAMIPEXOLE DIHYDROCHLORIDE 1 MG: 0.25 TABLET ORAL at 23:26

## 2017-12-13 RX ADMIN — BUSPIRONE HYDROCHLORIDE 10 MG: 10 TABLET ORAL at 23:25

## 2017-12-13 RX ADMIN — IBUPROFEN 400 MG: 400 TABLET ORAL at 20:57

## 2017-12-13 ASSESSMENT — PAIN SCALES - GENERAL
PAINLEVEL_OUTOF10: 4
PAINLEVEL_OUTOF10: 5
PAINLEVEL_OUTOF10: 10
PAINLEVEL_OUTOF10: 5

## 2017-12-13 ASSESSMENT — ENCOUNTER SYMPTOMS
NAUSEA: 0
WHEEZING: 0
ABDOMINAL PAIN: 0
COLOR CHANGE: 0
SHORTNESS OF BREATH: 0
TROUBLE SWALLOWING: 0
VOMITING: 0
BACK PAIN: 1

## 2017-12-13 ASSESSMENT — PAIN DESCRIPTION - DESCRIPTORS
DESCRIPTORS: ACHING
DESCRIPTORS: ACHING

## 2017-12-13 ASSESSMENT — PAIN DESCRIPTION - PAIN TYPE
TYPE: ACUTE PAIN
TYPE: ACUTE PAIN

## 2017-12-13 ASSESSMENT — PAIN DESCRIPTION - PROGRESSION
CLINICAL_PROGRESSION: NOT CHANGED

## 2017-12-13 ASSESSMENT — PAIN DESCRIPTION - LOCATION
LOCATION: HEAD
LOCATION: ANKLE;HEAD

## 2017-12-13 ASSESSMENT — PAIN DESCRIPTION - FREQUENCY
FREQUENCY: CONTINUOUS
FREQUENCY: CONTINUOUS

## 2017-12-13 ASSESSMENT — PAIN DESCRIPTION - ORIENTATION: ORIENTATION: LEFT

## 2017-12-13 NOTE — ED NOTES
Pt sts she \"got dizzy and loss my balance and hit my head\", No LOC. Pt sts chronic condition \"I always get dizzy since my stroke\".      Glneis Ramírez RN  12/13/17 [id number]

## 2017-12-13 NOTE — CARE COORDINATION
CC received phone call from ED , Tulio Hogan, stating that the patient had fallen again and was in the ER. She became dizzy, at home, and fell back hitting her head. Too early to know plan yet. CC notified home care agency, [address], as they haven't been able to connect with patient yet.

## 2017-12-13 NOTE — ED NOTES
Spoke with April at podiatry clinic re: appointment scheduled for today's date. Rescheduled appointment for 12/27/17 at [phone] in [facility] [address]. Spoke with ortho scheduling and rescheduled pt appointment for [phone] on 12/27/2017 in [facility].        [place], [place]  12/13/17 4138

## 2017-12-13 NOTE — ED PROVIDER NOTES
[address] [facility]  ED  Emergency Department Encounter  Emergency Medicine Resident     Pt Name: Andre Horn  MRN: [id number]  Juan F 1955  Date of evaluation: 12/13/17  PCP:  Donna Carranza MD    CHIEF COMPLAINT       Chief Complaint   Patient presents with   Sandie Murillo     pt states \"the room started spinning and I and fell pta, pt denies any LOC but states she hit her head on a wooden bed       HISTORY OF PRESENT ILLNESS  (Location/Symptom, Timing/Onset, Context/Setting, Quality, Duration, Modifying Factors, Severity.)      59year old female with PMH of seizures, CVA (2010), and diabetes presents by ambulance after a fall. Patient states she was feeling dizzy and unbalanced then fell back and hit the back of her head on her bed then the frontal part on the floor. Patient remembers falling but is unable to state if she had any LOC during the event. She had a headache for the past few days, but is worse since the fall describing it as diffuse and feels like a \"chainsaw\" is going through the frontal portion. Pateint headache has been on going since her last fall 3 days ago when she ended up with a L fibular fracture. She c/o of midline and paraspinal neck pain, double vision, lower back pain, and vertigo. Vertigo has been on going since 3 days as well. No new weakness reported, patient does have a baseline LUE weakness from her previous stroke. No new urinary or bowel symtoms since the fall. No anticoagulation medications, but is on a daily baby aspirin. Patient denies N/V, CP, SOB, or new numbness/tingling different from her neuropathy. Patient was seen here 3 days ago for a fall where she was found to have a left fibular avulsion. PAST MEDICAL / SURGICAL / SOCIAL / FAMILY HISTORY      has a past medical history of Anemia; Cataract; GERD (gastroesophageal reflux disease); Headache; Hyperlipidemia; Neuropathy ([address] Osteoarthritis; Seizures ([address]  Stroke (cerebrum) ([address] and Type 2 diabetes mellitus without complication ([facility] has a past surgical history that includes Cholecystectomy; Tonsillectomy; Cataract removal; and eye surgery. Social History     Social History    Marital status: Single     Spouse name: N/A    Number of children: N/A    Years of education: N/A     Occupational History    Not on file. Social History Main Topics    Smoking status: Never Smoker    Smokeless tobacco: Never Used    Alcohol use No    Drug use: No    Sexual activity: Not on file     Other Topics Concern    Not on file     Social History Narrative    No narrative on file       Family History   Problem Relation Age of Onset    Diabetes Brother        Allergies:  Codeine    Home Medications:  Prior to Admission medications    Medication Sig Start Date End Date Taking? Authorizing Provider   oxyCODONE-acetaminophen (PERCOCET) 5-325 MG per tablet Take 1-2 tablets by mouth every 6 hours as needed for Pain . 12/10/17   Osito Farfan DO   metFORMIN (GLUCOPHAGE) 1000 MG tablet Take 1,000 mg by mouth 2 times daily (with meals)    Historical Provider, MD   aspirin 81 MG tablet Take 81 mg by mouth daily    Historical Provider, MD   Blood Glucose Monitoring Suppl FLAVIO Check blood sugars 3/day 11/30/17   Fatemeh Lopez MD   Glucose Blood (BLOOD GLUCOSE TEST STRIPS) STRP Test 3  times daily Insulin Dependent mellitus 11/30/17   aFtemeh Lopez MD   Lancets MISC Check 3/day 11/30/17   Fatemeh Lopez MD   levETIRAcetam (KEPPRA) 250 MG tablet Take 1 tablet by mouth 2 times daily  Patient taking differently: Take 250 mg by mouth 2 times daily Taking 250 mg tablet along with 2- 500 mg tablets for a total of 1250 mg. 10/23/17   Jamal Perla MD   levETIRAcetam (KEPPRA) 1000 MG tablet Take 1 tablet by mouth 2 times daily Your new dose is 1250 mg twice daily  Patient taking differently: Take 1,000 mg by mouth 2 times daily Patient is taking 2- 500 mg tablets along with a 250 mg tablet for a total of 1250 mg. 10/23/17   Doyle Berrios MD   lisinopril-hydrochlorothiazide (PRINZIDE;ZESTORETIC) 10-12.5 MG per tablet Take 1 tablet by mouth daily    Historical Provider, MD   PARoxetine (PAXIL) 20 MG tablet Take 20 mg by mouth every morning    Historical Provider, MD   omeprazole (PRILOSEC) 20 MG delayed release capsule Take 20 mg by mouth daily    Historical Provider, MD   loratadine (CLARITIN) 10 MG tablet Take 10 mg by mouth daily    Historical Provider, MD   pioglitazone (ACTOS) 15 MG tablet Take 15 mg by mouth every morning    Historical Provider, MD   simvastatin (ZOCOR) 40 MG tablet Take 40 mg by mouth nightly    Historical Provider, MD   pramipexole (MIRAPEX) 1 MG tablet Take 1 mg by mouth nightly    Historical Provider, MD   lamoTRIgine (LAMICTAL) 200 MG tablet Take 200 mg by mouth 2 times daily    Historical Provider, MD   busPIRone (BUSPAR) 10 MG tablet Take 10 mg by mouth 3 times daily    Historical Provider, MD   insulin glargine (LANTUS) 100 UNIT/ML injection vial Inject 50 Units into the skin 2 times daily Patient states she is using the lantus pen    Historical Provider, MD   insulin lispro (HUMALOG) 100 UNIT/ML injection vial 15 Units at meals four times a day plus sliding scale if needed   Patient states she is using the humalog pen    Historical Provider, MD       REVIEW OF SYSTEMS    (2-9 systems for level 4, 10 or more for level 5)      Review of Systems   Constitutional: Negative for chills and fever. HENT: Negative for congestion and trouble swallowing. Eyes: Positive for visual disturbance. Respiratory: Negative for shortness of breath and wheezing. Cardiovascular: Negative for chest pain. Gastrointestinal: Negative for abdominal pain, nausea and vomiting. Genitourinary: Negative for dysuria. Musculoskeletal: Positive for back pain and neck pain. Skin: Negative for color change. Neurological: Positive for dizziness and headaches.  Negative for facial asymmetry, speech difficulty, weakness and numbness. Psychiatric/Behavioral: Negative for agitation. PHYSICAL EXAM   (up to 7 for level 4, 8 or more for level 5)      INITIAL VITALS:   BP (!) 139/59   Pulse 88   Temp 98.5 °F (36.9 °C) (Oral)   Resp 17   Ht 5' 5\" (1.651 m)   Wt 202 lb (91.6 kg)   SpO2 96%   BMI 33.61 kg/m²     Physical Exam   Constitutional: She appears well-developed and well-nourished. No distress. HENT:   Head: Normocephalic and atraumatic. Eyes: EOM are normal.   Cardiovascular: Normal rate, regular rhythm and normal heart sounds. Exam reveals no gallop and no friction rub. No murmur heard. Pulmonary/Chest: Effort normal and breath sounds normal. No respiratory distress. She has no wheezes. She has no rales. Abdominal: Soft. She exhibits no distension. There is no tenderness. There is no rebound and no guarding. Musculoskeletal: She exhibits tenderness. Positive midline cervical and thoracic and lumbar tenderness on examination. Positive paraspinal and lateral cervical tenderness as well. Tenderness over the left hip, pelvis otherwise stable. Mild tenderness over the left shoulder however normal range of motion and good strength with shoulder flexion and extension as well as abduction, unlikely fracture. Neurological: She is alert. No cranial nerve deficit. AAOx3. Asymmetrical slight weakness RUE compared to left, patient says this is new and isn't sure if it's changed. No drift bilateral upper extremities. No drift bilateral lower extremities with hip flexion. 5/5 strength of plantar flexion and dorsiflexion on the L. Unable to assess plantar flexion and dorsiflexion on the right secondary to the splint in place. Patient's sensation intact bilateral upper and lower extremities. EOMI, no disconjugate gaze, PERRL; facial musculature, tone and sensation intact bilaterally; SCM strength intact bilaterally; no dysmetria; no dysdiadochokinesia     Skin: Skin is warm. No erythema. DIFFERENTIAL  DIAGNOSIS     PLAN (LABS / IMAGING / EKG):  Orders Placed This Encounter   Procedures    CT Head WO Contrast    CT Cervical Spine WO Contrast    XR Chest Portable    XR THORACIC SPINE (3 VIEWS)    XR HIP 2-3 VW W PELVIS LEFT    CTA HEAD W CONTRAST    CTA NECK W CONTRAST    XR LUMBAR SPINE (2-3 VIEWS)    CT LUMBAR SPINE WO CONTRAST    CBC WITH AUTO DIFFERENTIAL    BASIC METABOLIC PANEL    Troponin    UA W/REFLEX CULTURE    Inpatient consult to Neurology    EKG 12 Lead    PATIENT STATUS (FROM ED OR OR/PROCEDURAL) Observation       MEDICATIONS ORDERED:  Orders Placed This Encounter   Medications    iopamidol (ISOVUE-370) 76 % injection 90 mL       DDX: Subdural hemorrhage versus fracture versus vertibrobasilar insufficiency vs stroke vs syncope     DIAGNOSTIC RESULTS / EMERGENCY DEPARTMENT COURSE / MDM     LABS:  Results for orders placed or performed during the hospital encounter of 12/13/17   CBC WITH AUTO DIFFERENTIAL   Result Value Ref Range    WBC 3.8 3.5 - 11.3 k/uL    RBC 4.71 3.95 - 5.11 m/uL    Hemoglobin 12.4 11.9 - 15.1 g/dL    Hematocrit 40.7 36.3 - 47.1 %    MCV 86.4 82.6 - 102.9 fL    MCH 26.3 25.2 - 33.5 pg    MCHC 30.5 28.4 - 34.8 g/dL    RDW 13.3 11.8 - 14.4 %    Platelets 89 (L) 527 - 453 k/uL    MPV 10.0 8.1 - 13.5 fL    Differential Type NOT REPORTED     Seg Neutrophils 61 36 - 65 %    Lymphocytes 31 24 - 43 %    Monocytes 7 3 - 12 %    Eosinophils % 1 1 - 4 %    Basophils 0 0 - 2 %    Immature Granulocytes 0 0 %    Segs Absolute 2.29 1.50 - 8.10 k/uL    Absolute Lymph # 1.16 1.10 - 3.70 k/uL    Absolute Mono # 0.25 0.10 - 1.20 k/uL    Absolute Eos # 0.05 0.00 - 0.44 k/uL    Basophils # <0.03 0.00 - 0.20 k/uL    Absolute Immature Granulocyte <0.03 0.00 - 0.30 k/uL    WBC Morphology NOT REPORTED     RBC Morphology NOT REPORTED     Platelet Estimate NOT REPORTED    BASIC METABOLIC PANEL   Result Value Ref Range    Glucose 116 (H) 70 - 99 mg/dL    BUN 11 8 - 23 Correlation with clinical examination is advised. If there is tenderness over the navicular bone, I would advise specific navicular view of the wrist.     Xr Hand Right (min 3 Views)    Result Date: 12/10/2017  EXAMINATION: 3 VIEWS OF THE LEFT ANKLE; VIEWS OF THE RIGHT WRIST; 3 VIEWS OF THE RIGHT HAND; 3 VIEWS OF THE LEFT FOOT 12/10/2017 10:19 am COMPARISON: None. HISTORY: ORDERING SYSTEM PROVIDED HISTORY: left ankle pain s/p fall TECHNOLOGIST PROVIDED HISTORY: Reason for exam:->left ankle pain s/p fall FINDINGS: Left ankle:  Diffuse significant soft tissue swelling is noted with small calcification along the dorsum of the talus which may represent an avulsion fracture. No other fractures are evident. Ankle mortise is uniform. Talar dome and talar walls are intact. Moderate Damian's deformity and small plantar calcaneal spur are noted. Right hand, three-view: Three views of the right hand demonstrate osteopenia and mild degenerative changes involving many of the interphalangeal joints and radial aspect of the wrist.  Soft tissue swelling over the dorsum of the metacarpal heads and wrists is noted. No acute fracture or dislocation is noted. The navicular lunate space is well-preserved. No ulnar minus variance is noted. Right wrist:  Two views of the right wrist demonstrate osteopenia. Positioning is suboptimal to evaluate the navicular bone. Dorsal soft tissue swelling is noted. Mild arthritic changes are present on the radial aspect of the wrist.  No obvious acute fracture is noted. The navicular lunate space is well-preserved. No ulnar minus variance is noted. Right foot: Three views of the right foot demonstrate significant soft tissue swelling over the forefoot, osteopenia and mild degenerative changes in the interphalangeal joints. There is also significant soft tissue swelling over the ankle.   Questionable faint lucency through the proximal portion of the proximal 2nd phalanx is noted of concern tenderness over the navicular bone, I would advise specific navicular view of the wrist.     Xr Foot Left (min 3 Views)    Result Date: 12/10/2017  EXAMINATION: 3 VIEWS OF THE LEFT ANKLE; VIEWS OF THE RIGHT WRIST; 3 VIEWS OF THE RIGHT HAND; 3 VIEWS OF THE LEFT FOOT 12/10/2017 10:19 am COMPARISON: None. HISTORY: ORDERING SYSTEM PROVIDED HISTORY: left ankle pain s/p fall TECHNOLOGIST PROVIDED HISTORY: Reason for exam:->left ankle pain s/p fall FINDINGS: Left ankle:  Diffuse significant soft tissue swelling is noted with small calcification along the dorsum of the talus which may represent an avulsion fracture. No other fractures are evident. Ankle mortise is uniform. Talar dome and talar walls are intact. Moderate Damian's deformity and small plantar calcaneal spur are noted. Right hand, three-view: Three views of the right hand demonstrate osteopenia and mild degenerative changes involving many of the interphalangeal joints and radial aspect of the wrist.  Soft tissue swelling over the dorsum of the metacarpal heads and wrists is noted. No acute fracture or dislocation is noted. The navicular lunate space is well-preserved. No ulnar minus variance is noted. Right wrist:  Two views of the right wrist demonstrate osteopenia. Positioning is suboptimal to evaluate the navicular bone. Dorsal soft tissue swelling is noted. Mild arthritic changes are present on the radial aspect of the wrist.  No obvious acute fracture is noted. The navicular lunate space is well-preserved. No ulnar minus variance is noted. Right foot: Three views of the right foot demonstrate significant soft tissue swelling over the forefoot, osteopenia and mild degenerative changes in the interphalangeal joints. There is also significant soft tissue swelling over the ankle. Questionable faint lucency through the proximal portion of the proximal 2nd phalanx is noted of concern for a hairline fracture given severe soft tissue swelling.   No dislocation is evident. Moderate Damian's deformity and small plantar calcaneal spur are noted. Significant soft tissue swelling over the ankle is noted. Left ankle:  Significant soft tissue swelling. Linear calcification along the dorsum of the talus suggestive of an avulsion fracture. Right hand:  Arthritic changes without evidence of acute fracture or dislocation. Soft tissue swelling over the dorsum of the wrist and metacarpals is noted. Right wrist: Suboptimal positioning for evaluation of the navicular bone is noted. Mild soft tissue swelling over the dorsum of the wrist.  No evidence of acute fracture. Right foot:  Significant soft tissue swelling over the forefoot. Patient has calcification along the dorsum of the talus suggestive of an avulsion fracture and findings suggest nondisplaced fracture through the proximal 2nd phalanx. RECOMMENDATION: Correlation with clinical examination is advised. If there is tenderness over the navicular bone, I would advise specific navicular view of the wrist.       EKG  EKG Interpretation    Interpreted by me    Rhythm: normal sinus   Rate: normal  Axis: normal  Ectopy: none  Conduction: normal  ST Segments: no acute change  T Waves: no acute change  Q Waves: none    Clinical Impression: no acute changes and normal EKG    All EKG's are interpreted by the Emergency Department Physician who either signs or Co-signs this chart in the absence of a cardiologist.    EMERGENCY DEPARTMENT COURSE:    Patient workup was unremarkable. Negative CT head, C-spine, CTA head and neck. Patient has fallen multiple times in the last 1 week, 3 days ago she ended up with a fracture. Patient has had vertigo and multiple falls, patient lives by herself with no support, we'll go ahead and place the patient in the observation unit for an evaluation by physical therapy as well as social work.   We will also have neurology see the patient for the vertiginous symptoms that she is complaining of. PROCEDURES:  None    CONSULTS:  IP CONSULT TO NEUROLOGY  IP CONSULT TO NEUROLOGY  IP CONSULT TO SOCIAL WORK    CRITICAL CARE:  None    FINAL IMPRESSION      1. Fall, initial encounter    2. Cervical strain, acute, initial encounter    3. Acute midline low back pain without sciatica    4.  Vertigo          DISPOSITION / PLAN     DISPOSITION Admitted    PATIENT REFERRED TO:  [facility] ORTHOPEDIC SPECIALIST  [address] [place] [address] [id number] [facility] [id number]  Go on 12/27/2017  at [address] [facility] [address]  [address] [facility]  [id number]  [phone]  Go on 12/27/2017  at 215pm    Pastora Salvador MD  [facility] [address] [facility] [address] [facility] [address] [phone]            DISCHARGE MEDICATIONS:  Current Discharge Medication List          Ben Paredes MD  Emergency Medicine Resident    (Please note that portions of this note were completed with a voice recognition program.  Efforts were made to edit the dictations but occasionally words are mis-transcribed.)       Ben Paredes MD  12/13/17 [id number]

## 2017-12-13 NOTE — ED PROVIDER NOTES
HPI:   59year old female with PMH of seizures, CVA (2010), and diabetes presents by ambulance after a fall. Patient states she was feeling dizzy and unbalanced then fell back and hit the back of her head on her bed then the frontal part on the floor. Patient remembers falling but is unable to state if she had any LOC during the event. She had a headache for the past few days, but is worse since the fall describing it as diffuse and feels like a \"chainsaw\" is going through the frontal portion. She c/o of midline and paraspinal neck pain, double vision, lower back pain, and dizziness. She also c/o intermittent bowel and bladder incontinence that has been present for the past month. Patient denies N/V, CP, SOB, or new numbness/tingling different from her neuropathy. Patient was seen here 3 days ago for a fall where she was found to have a left fibular avulsion.

## 2017-12-13 NOTE — CARE COORDINATION
Called Jeana Heather this morning to check blood sugars and remind her to take her insulin. She had already taken her lantus 50 units and humalog 15 units. She gave me blood sugar readings for the last 24 hours- entered in flowsheet. She has appts this afternoon with ortho and podiatry for follow up after her fall. CC informed her that she would call tomorrow to check on sugars and see what the specialists had to say.    Blood Glucose Tracker 12/13/2017 12/12/2017 12/7/2017 12/6/2017   Before breakfast blood glucose 93 - 59 82   Before breakfast blood glucose 9:09 am - - -   Insulin dose - Breakfast 50 50 - -   Insulin dose - Breakfast lantus 50; humalog 15 50 units lantus; 15 units humalog - -   After breakfast blood glucose - 261 125 -   After breakfast blood glucose - 8:27 am - -   Insulin dose - Lunch - 15 - -   Insulin dose - Lunch - humalog - -   After lunch blood glucose - 78 375 442   After lunch blood glucose - 4:15 pm - -   Insulin dose - Dinner - 15 - -   Insulin dose - Dinner - humalog - -   After dinner blood glucose - 208 - -   After dinner blood glucose - 8:33 pm - -   Before bed blood glucose - - - 255   Evening insulin dose - 50 - -   Evening insulin dose - lantus 50; humalog 15 - -

## 2017-12-13 NOTE — ED PROVIDER NOTES
[facility]     Emergency Department     Faculty Attestation    I performed a history and physical examination of the patient and discussed management with the resident. I reviewed the residents note and agree with the documented findings and plan of care. Any areas of disagreement are noted on the chart. I was personally present for the key portions of any procedures. I have documented in the chart those procedures where I was not present during the key portions. I have reviewed the emergency nurses triage note. I agree with the chief complaint, past medical history, past surgical history, allergies, medications, social and family history as documented unless otherwise noted below. Documentation of the HPI, Physical Exam and Medical Decision Making performed by medical students or scribes is based on my personal performance of the HPI, PE and MDM. For Midlevel providers: I have personally seen and evaluated the patient. I find the patient's history and physical exam are consistent with the NP/PA documentation. I agree with the care provided, treatment rendered, disposition and follow-up plan      Concern for vertiginous symptoms. Patient had fall. Patient with ongoing bony tenderness. Imaging of areas consistent with trauma. We'll do workup for vertigo.       Critical Care     EKG Interpretation    Interpreted by me    Rhythm: normal sinus   Rate: normal  Axis: normal  Ectopy: none  Conduction: normal  ST Segments: no acute change  T Waves: no acute change  Q Waves: none    Clinical Impression: no acute changes     MD Anahy Gilliland MD  12/13/17 [id number]

## 2017-12-13 NOTE — CARE COORDINATION
Provider, MD   Blood Glucose Monitoring Suppl FLAVIO Check blood sugars 3/day 11/30/17   Lissy Cavanaugh MD   Glucose Blood (BLOOD GLUCOSE TEST STRIPS) STRP Test 3  times daily Insulin Dependent mellitus 11/30/17   Lissy Cavanaugh MD   Lancets MISC Check 3/day 11/30/17   Lissy Cavanaugh MD   levETIRAcetam (KEPPRA) 250 MG tablet Take 1 tablet by mouth 2 times daily  Patient taking differently: Take 250 mg by mouth 2 times daily Taking 250 mg tablet along with 2- 500 mg tablets for a total of 1250 mg. 10/23/17   Shan Sanchez MD   levETIRAcetam (KEPPRA) 1000 MG tablet Take 1 tablet by mouth 2 times daily Your new dose is 1250 mg twice daily  Patient taking differently: Take 1,000 mg by mouth 2 times daily Patient is taking 2- 500 mg tablets along with a 250 mg tablet for a total of 1250 mg. 10/23/17   Shan Sanchez MD   lisinopril-hydrochlorothiazide (PRINZIDE;ZESTORETIC) 10-12.5 MG per tablet Take 1 tablet by mouth daily    Historical Provider, MD   PARoxetine (PAXIL) 20 MG tablet Take 20 mg by mouth every morning    Historical Provider, MD   omeprazole (PRILOSEC) 20 MG delayed release capsule Take 20 mg by mouth daily    Historical Provider, MD   loratadine (CLARITIN) 10 MG tablet Take 10 mg by mouth daily    Historical Provider, MD   pioglitazone (ACTOS) 15 MG tablet Take 15 mg by mouth every morning    Historical Provider, MD   simvastatin (ZOCOR) 40 MG tablet Take 40 mg by mouth nightly    Historical Provider, MD   pramipexole (MIRAPEX) 1 MG tablet Take 1 mg by mouth nightly    Historical Provider, MD   lamoTRIgine (LAMICTAL) 200 MG tablet Take 200 mg by mouth 2 times daily    Historical Provider, MD   busPIRone (BUSPAR) 10 MG tablet Take 10 mg by mouth 3 times daily    Historical Provider, MD   insulin glargine (LANTUS) 100 UNIT/ML injection vial Inject 50 Units into the skin 2 times daily     Historical Provider, MD   insulin lispro (HUMALOG) 100 UNIT/ML injection vial at meals four times a day plus sliding scale if needed    Historical Provider, MD       Future Appointments  Date Time Provider Camilla Viky   12/13/2017 1:50 PM Ashley Fofana [facility] Lucita Blank Brice [facility]   12/13/2017 3:15 PM Joan Jones D[facility]   12/21/2017 11:00 AM Nicole Enamorado PSYD Psych [facility] Nones   2/12/2018 2:00 PM Betsy Brizuela MD [facility] CLIVE Garcia

## 2017-12-14 ENCOUNTER — APPOINTMENT (OUTPATIENT)
Dept: GENERAL RADIOLOGY | Age: 62
End: 2017-12-14
Payer: MEDICARE

## 2017-12-14 ENCOUNTER — APPOINTMENT (OUTPATIENT)
Dept: MRI IMAGING | Age: 62
End: 2017-12-14
Payer: MEDICARE

## 2017-12-14 PROBLEM — S06.0XAA CEREBRAL CONCUSSION: Status: ACTIVE | Noted: 2017-12-14

## 2017-12-14 PROBLEM — M48.02 CERVICAL SPINAL STENOSIS: Status: ACTIVE | Noted: 2017-12-14

## 2017-12-14 PROBLEM — E11.42 DIABETIC POLYNEUROPATHY ASSOCIATED WITH TYPE 2 DIABETES MELLITUS (HCC): Status: ACTIVE | Noted: 2017-12-14

## 2017-12-14 PROBLEM — R29.6 FALLING EPISODES: Status: ACTIVE | Noted: 2017-12-14

## 2017-12-14 PROBLEM — G40.909 SEIZURE DISORDER (HCC): Status: ACTIVE | Noted: 2017-12-14

## 2017-12-14 PROBLEM — Z86.73 HISTORY OF CEREBRAL INFARCTION: Status: ACTIVE | Noted: 2017-12-14

## 2017-12-14 LAB
GLUCOSE BLD-MCNC: 126 MG/DL (ref 65–105)
GLUCOSE BLD-MCNC: 142 MG/DL (ref 65–105)
GLUCOSE BLD-MCNC: 142 MG/DL (ref 65–105)
GLUCOSE BLD-MCNC: 184 MG/DL (ref 65–105)

## 2017-12-14 PROCEDURE — 70551 MRI BRAIN STEM W/O DYE: CPT

## 2017-12-14 PROCEDURE — G8987 SELF CARE CURRENT STATUS: HCPCS

## 2017-12-14 PROCEDURE — 97530 THERAPEUTIC ACTIVITIES: CPT

## 2017-12-14 PROCEDURE — G0378 HOSPITAL OBSERVATION PER HR: HCPCS

## 2017-12-14 PROCEDURE — 2580000003 HC RX 258: Performed by: EMERGENCY MEDICINE

## 2017-12-14 PROCEDURE — 99219 PR INITIAL OBSERVATION CARE/DAY 50 MINUTES: CPT | Performed by: PSYCHIATRY & NEUROLOGY

## 2017-12-14 PROCEDURE — 73630 X-RAY EXAM OF FOOT: CPT

## 2017-12-14 PROCEDURE — 96372 THER/PROPH/DIAG INJ SC/IM: CPT

## 2017-12-14 PROCEDURE — 97535 SELF CARE MNGMENT TRAINING: CPT

## 2017-12-14 PROCEDURE — 6370000000 HC RX 637 (ALT 250 FOR IP): Performed by: EMERGENCY MEDICINE

## 2017-12-14 PROCEDURE — 82947 ASSAY GLUCOSE BLOOD QUANT: CPT

## 2017-12-14 PROCEDURE — G8979 MOBILITY GOAL STATUS: HCPCS

## 2017-12-14 PROCEDURE — G8978 MOBILITY CURRENT STATUS: HCPCS

## 2017-12-14 PROCEDURE — G8988 SELF CARE GOAL STATUS: HCPCS

## 2017-12-14 PROCEDURE — 97162 PT EVAL MOD COMPLEX 30 MIN: CPT

## 2017-12-14 PROCEDURE — 72141 MRI NECK SPINE W/O DYE: CPT

## 2017-12-14 PROCEDURE — 6360000002 HC RX W HCPCS: Performed by: EMERGENCY MEDICINE

## 2017-12-14 PROCEDURE — 72148 MRI LUMBAR SPINE W/O DYE: CPT

## 2017-12-14 PROCEDURE — 97166 OT EVAL MOD COMPLEX 45 MIN: CPT

## 2017-12-14 RX ORDER — BUTALBITAL, ACETAMINOPHEN AND CAFFEINE 50; 325; 40 MG/1; MG/1; MG/1
1 TABLET ORAL EVERY 6 HOURS PRN
Status: DISCONTINUED | OUTPATIENT
Start: 2017-12-14 | End: 2017-12-15 | Stop reason: HOSPADM

## 2017-12-14 RX ADMIN — INSULIN GLARGINE 50 UNITS: 100 INJECTION, SOLUTION SUBCUTANEOUS at 21:17

## 2017-12-14 RX ADMIN — SIMVASTATIN 40 MG: 40 TABLET, FILM COATED ORAL at 21:16

## 2017-12-14 RX ADMIN — LEVETIRACETAM 1250 MG: 500 TABLET, FILM COATED ORAL at 21:15

## 2017-12-14 RX ADMIN — Medication 10 ML: at 09:03

## 2017-12-14 RX ADMIN — Medication 10 ML: at 22:10

## 2017-12-14 RX ADMIN — LAMOTRIGINE 200 MG: 100 TABLET ORAL at 08:57

## 2017-12-14 RX ADMIN — INSULIN LISPRO 15 UNITS: 100 INJECTION, SOLUTION INTRAVENOUS; SUBCUTANEOUS at 09:02

## 2017-12-14 RX ADMIN — BUSPIRONE HYDROCHLORIDE 10 MG: 10 TABLET ORAL at 08:57

## 2017-12-14 RX ADMIN — BUSPIRONE HYDROCHLORIDE 10 MG: 10 TABLET ORAL at 21:16

## 2017-12-14 RX ADMIN — CETIRIZINE HYDROCHLORIDE 5 MG: 10 TABLET ORAL at 08:57

## 2017-12-14 RX ADMIN — PRAMIPEXOLE DIHYDROCHLORIDE 1 MG: 0.25 TABLET ORAL at 21:16

## 2017-12-14 RX ADMIN — PAROXETINE HYDROCHLORIDE HEMIHYDRATE 20 MG: 20 TABLET, FILM COATED ORAL at 08:57

## 2017-12-14 RX ADMIN — ASPIRIN 81 MG: 81 TABLET, COATED ORAL at 08:57

## 2017-12-14 RX ADMIN — INSULIN GLARGINE 50 UNITS: 100 INJECTION, SOLUTION SUBCUTANEOUS at 09:02

## 2017-12-14 RX ADMIN — BUSPIRONE HYDROCHLORIDE 10 MG: 10 TABLET ORAL at 15:30

## 2017-12-14 RX ADMIN — INSULIN LISPRO 15 UNITS: 100 INJECTION, SOLUTION INTRAVENOUS; SUBCUTANEOUS at 15:31

## 2017-12-14 RX ADMIN — INSULIN LISPRO 15 UNITS: 100 INJECTION, SOLUTION INTRAVENOUS; SUBCUTANEOUS at 21:17

## 2017-12-14 RX ADMIN — LISINOPRIL AND HYDROCHLOROTHIAZIDE 1 TABLET: 12.5; 1 TABLET ORAL at 08:56

## 2017-12-14 RX ADMIN — PIOGLITAZONE 15 MG: 15 TABLET ORAL at 08:56

## 2017-12-14 RX ADMIN — LAMOTRIGINE 200 MG: 100 TABLET ORAL at 21:16

## 2017-12-14 RX ADMIN — PANTOPRAZOLE SODIUM 20 MG: 40 TABLET, DELAYED RELEASE ORAL at 08:56

## 2017-12-14 RX ADMIN — ENOXAPARIN SODIUM 40 MG: 40 INJECTION SUBCUTANEOUS at 08:57

## 2017-12-14 RX ADMIN — LEVETIRACETAM 1250 MG: 500 TABLET, FILM COATED ORAL at 08:56

## 2017-12-14 RX ADMIN — IBUPROFEN 400 MG: 400 TABLET ORAL at 21:16

## 2017-12-14 ASSESSMENT — PAIN DESCRIPTION - PAIN TYPE
TYPE: ACUTE PAIN
TYPE: ACUTE PAIN

## 2017-12-14 ASSESSMENT — PAIN DESCRIPTION - DESCRIPTORS: DESCRIPTORS: ACHING;DISCOMFORT

## 2017-12-14 ASSESSMENT — PAIN DESCRIPTION - PROGRESSION
CLINICAL_PROGRESSION: NOT CHANGED

## 2017-12-14 ASSESSMENT — PAIN SCALES - GENERAL
PAINLEVEL_OUTOF10: 0
PAINLEVEL_OUTOF10: 5
PAINLEVEL_OUTOF10: 0
PAINLEVEL_OUTOF10: 0
PAINLEVEL_OUTOF10: 3
PAINLEVEL_OUTOF10: 0
PAINLEVEL_OUTOF10: 6
PAINLEVEL_OUTOF10: 3
PAINLEVEL_OUTOF10: 0
PAINLEVEL_OUTOF10: 5

## 2017-12-14 ASSESSMENT — PAIN DESCRIPTION - ORIENTATION: ORIENTATION: LEFT

## 2017-12-14 ASSESSMENT — PAIN DESCRIPTION - LOCATION
LOCATION: HEAD;BACK
LOCATION: HEAD;LEG

## 2017-12-14 NOTE — ED PROVIDER NOTES
lungs are clear. 1. Multilevel degenerative disc disease 2. No acute thoracic spine abnormality     Xr Lumbar Spine (2-3 Views)    Result Date: 12/13/2017  EXAMINATION: 3 VIEWS OF THE LUMBAR SPINE 12/13/2017 2:08 pm COMPARISON: None. HISTORY: ORDERING SYSTEM PROVIDED HISTORY: Midline tenderness. TECHNOLOGIST PROVIDED HISTORY: Reason for exam:->Midline tenderness. Fall, acute back pain FINDINGS: Mild grade 1 anterolisthesis of L4 on L5 appears degenerative in etiology. Otherwise, anatomic alignment. Multilevel degenerative disc disease and facet joint arthropathy are noted. No fractures or destructive bony abnormalities are identified. 1. Multilevel degenerative disc disease and facet joint arthropathy 2. No acute lumbar spine abnormality 3. Mild grade 1 anterolisthesis of L4 on L5 appears degenerative in etiology     Xr Wrist Right (2 Views)    Result Date: 12/10/2017  EXAMINATION: 3 VIEWS OF THE LEFT ANKLE; VIEWS OF THE RIGHT WRIST; 3 VIEWS OF THE RIGHT HAND; 3 VIEWS OF THE LEFT FOOT 12/10/2017 10:19 am COMPARISON: None. HISTORY: ORDERING SYSTEM PROVIDED HISTORY: left ankle pain s/p fall TECHNOLOGIST PROVIDED HISTORY: Reason for exam:->left ankle pain s/p fall FINDINGS: Left ankle:  Diffuse significant soft tissue swelling is noted with small calcification along the dorsum of the talus which may represent an avulsion fracture. No other fractures are evident. Ankle mortise is uniform. Talar dome and talar walls are intact. Moderate Damian's deformity and small plantar calcaneal spur are noted. Right hand, three-view: Three views of the right hand demonstrate osteopenia and mild degenerative changes involving many of the interphalangeal joints and radial aspect of the wrist.  Soft tissue swelling over the dorsum of the metacarpal heads and wrists is noted. No acute fracture or dislocation is noted. The navicular lunate space is well-preserved. No ulnar minus variance is noted.  Right wrist:  Two FOOT 12/10/2017 10:19 am COMPARISON: None. HISTORY: ORDERING SYSTEM PROVIDED HISTORY: left ankle pain s/p fall TECHNOLOGIST PROVIDED HISTORY: Reason for exam:->left ankle pain s/p fall FINDINGS: Left ankle:  Diffuse significant soft tissue swelling is noted with small calcification along the dorsum of the talus which may represent an avulsion fracture. No other fractures are evident. Ankle mortise is uniform. Talar dome and talar walls are intact. Moderate Damian's deformity and small plantar calcaneal spur are noted. Right hand, three-view: Three views of the right hand demonstrate osteopenia and mild degenerative changes involving many of the interphalangeal joints and radial aspect of the wrist.  Soft tissue swelling over the dorsum of the metacarpal heads and wrists is noted. No acute fracture or dislocation is noted. The navicular lunate space is well-preserved. No ulnar minus variance is noted. Right wrist:  Two views of the right wrist demonstrate osteopenia. Positioning is suboptimal to evaluate the navicular bone. Dorsal soft tissue swelling is noted. Mild arthritic changes are present on the radial aspect of the wrist.  No obvious acute fracture is noted. The navicular lunate space is well-preserved. No ulnar minus variance is noted. Right foot: Three views of the right foot demonstrate significant soft tissue swelling over the forefoot, osteopenia and mild degenerative changes in the interphalangeal joints. There is also significant soft tissue swelling over the ankle. Questionable faint lucency through the proximal portion of the proximal 2nd phalanx is noted of concern for a hairline fracture given severe soft tissue swelling. No dislocation is evident. Moderate Damian's deformity and small plantar calcaneal spur are noted. Significant soft tissue swelling over the ankle is noted. Left ankle:  Significant soft tissue swelling.   Linear calcification along the dorsum of the talus PROVIDED HISTORY: left ankle pain s/p fall TECHNOLOGIST PROVIDED HISTORY: Reason for exam:->left ankle pain s/p fall FINDINGS: Left ankle:  Diffuse significant soft tissue swelling is noted with small calcification along the dorsum of the talus which may represent an avulsion fracture. No other fractures are evident. Ankle mortise is uniform. Talar dome and talar walls are intact. Moderate Damian's deformity and small plantar calcaneal spur are noted. Right hand, three-view: Three views of the right hand demonstrate osteopenia and mild degenerative changes involving many of the interphalangeal joints and radial aspect of the wrist.  Soft tissue swelling over the dorsum of the metacarpal heads and wrists is noted. No acute fracture or dislocation is noted. The navicular lunate space is well-preserved. No ulnar minus variance is noted. Right wrist:  Two views of the right wrist demonstrate osteopenia. Positioning is suboptimal to evaluate the navicular bone. Dorsal soft tissue swelling is noted. Mild arthritic changes are present on the radial aspect of the wrist.  No obvious acute fracture is noted. The navicular lunate space is well-preserved. No ulnar minus variance is noted. Right foot: Three views of the right foot demonstrate significant soft tissue swelling over the forefoot, osteopenia and mild degenerative changes in the interphalangeal joints. There is also significant soft tissue swelling over the ankle. Questionable faint lucency through the proximal portion of the proximal 2nd phalanx is noted of concern for a hairline fracture given severe soft tissue swelling. No dislocation is evident. Moderate Damian's deformity and small plantar calcaneal spur are noted. Significant soft tissue swelling over the ankle is noted. Left ankle:  Significant soft tissue swelling. Linear calcification along the dorsum of the talus suggestive of an avulsion fracture.  Right hand:  Arthritic changes without evidence of acute fracture or dislocation. Soft tissue swelling over the dorsum of the wrist and metacarpals is noted. Right wrist: Suboptimal positioning for evaluation of the navicular bone is noted. Mild soft tissue swelling over the dorsum of the wrist.  No evidence of acute fracture. Right foot:  Significant soft tissue swelling over the forefoot. Patient has calcification along the dorsum of the talus suggestive of an avulsion fracture and findings suggest nondisplaced fracture through the proximal 2nd phalanx. RECOMMENDATION: Correlation with clinical examination is advised. If there is tenderness over the navicular bone, I would advise specific navicular view of the wrist.     Ct Head Wo Contrast    Result Date: 12/13/2017  EXAMINATION: CT OF THE HEAD WITHOUT CONTRAST  12/13/2017 3:09 pm TECHNIQUE: CT of the head was performed without the administration of intravenous contrast. Dose modulation, iterative reconstruction, and/or weight based adjustment of the mA/kV was utilized to reduce the radiation dose to as low as reasonably achievable. COMPARISON: 10/21/2017 HISTORY: ORDERING SYSTEM PROVIDED HISTORY: Fall. FINDINGS: BRAIN/VENTRICLES: There is no acute intracranial hemorrhage, mass effect or midline shift. No abnormal extra-axial fluid collection. The gray-white differentiation is maintained without evidence of an acute infarct. There is no evidence of hydrocephalus. ORBITS: The visualized portion of the orbits demonstrate no acute abnormality. SINUSES: The visualized paranasal sinuses and mastoid air cells demonstrate no acute abnormality. Small polyp or mucous retention cyst noted in the left maxillary sinus. SOFT TISSUES/SKULL:  No acute abnormality of the visualized skull or soft tissues. No acute intracranial abnormality.      Ct Cervical Spine Wo Contrast    Result Date: 12/13/2017  EXAMINATION: CT OF THE CERVICAL SPINE WITHOUT CONTRAST 12/13/2017 3:09 pm TECHNIQUE: CT of the SPINE WITHOUT CONTRAST  12/13/2017 TECHNIQUE: CT of the lumbar spine was performed without the administration of intravenous contrast. Multiplanar reformatted images are provided for review. Dose modulation, iterative reconstruction, and/or weight based adjustment of the mA/kV was utilized to reduce the radiation dose to as low as reasonably achievable. COMPARISON: Radiographs dated 12/13/2017 HISTORY: ORDERING SYSTEM PROVIDED HISTORY: Fall. Anterolisthesis on XR. Midline tenderness. TECHNOLOGIST PROVIDED HISTORY: Reason for exam:->Fall. Anterolisthesis on XR. Midline tenderness. FINDINGS: BONES/ALIGNMENT:  There is minimal anterolisthesis at L4-L5, measuring 3 mm. No spondylolysis. There is otherwise normal alignment of the spine. The vertebral body heights are maintained. No osseous destructive lesion is seen. DEGENERATIVE CHANGES: There is mild multilevel disc space narrowing and endplate spurring. Moderate to severe multilevel facet arthropathy is noted as well, likely accounting for the anterolisthesis at L4-L5. At L2-L3, there is mild broad-based disc bulge, eccentric to the left, resulting in mild narrowing of the left neural foramen. At L3-L4, there is mild broad-based disc bulge and facet arthropathy, resulting in mild narrowing of the spinal canal and bilateral neural foramina. There also appears to be mild spinal canal narrowing and mild left neural foraminal narrowing at L4-L5. Mild left neural foraminal narrowing is also noted at L5-S1. SOFT TISSUES/RETROPERITONEUM: No paraspinal mass is seen. 1.  No acute fracture or malalignment in the lumbar spine. 2.  Minimal anterolisthesis at L4-L5, secondary to facet arthropathy. No spondylolysis. 3.  Mild multilevel degenerative disc disease and moderate to severe facet arthropathy. Degenerative changes result in mild spinal canal narrowing at L3-L4, and L4-L5. There is also mild multilevel neural foraminal narrowing, worse on the left.      Xr Chest Portable    Result Date: 12/13/2017  EXAMINATION: SINGLE VIEW OF THE CHEST 12/13/2017 2:08 pm COMPARISON: 10/21/2017 HISTORY: ORDERING SYSTEM PROVIDED HISTORY: Fall. TECHNOLOGIST PROVIDED HISTORY: Reason for exam:->Fall. FINDINGS: The lungs are without acute focal process. There is no effusion or pneumothorax. The cardiomediastinal silhouette is stable. The osseous structures are stable. No acute process. Cta Neck W Contrast    Result Date: 12/13/2017  EXAMINATION: CTA OF THE NECK; CTA OF THE HEAD WITH CONTRAST 12/13/2017 3:10 pm TECHNIQUE: CTA of the neck was performed with the administration of intravenous contrast. Multiplanar reformatted images are provided for review. MIP images are provided for review. Stenosis of the internal carotid arteries measured using NASCET criteria. Dose modulation, iterative reconstruction, and/or weight based adjustment of the mA/kV was utilized to reduce the radiation dose to as low as reasonably achievable.; CTA of the head/brain was performed with the administration of intravenous contrast. Multiplanar reformatted images are provided for review. MIP images are provided for review. Dose modulation, iterative reconstruction, and/or weight based adjustment of the mA/kV was utilized to reduce the radiation dose to as low as reasonably achievable. COMPARISON: None. HISTORY: ORDERING SYSTEM PROVIDED HISTORY: Concern for vertibrobasilar insufficiency FINDINGS: AORTIC ARCH/GREAT VESSELS: There is a normal branch pattern of the aortic arch. No significant stenosis is seen of the innominate artery or subclavian arteries. CAROTID ARTERIES: The common carotid arteries are normal in appearance without evidence of a flow limiting stenosis. Approximately 50% stenosis of the left internal carotid artery at its origin after the bifurcation of the common carotid artery. Slightly less degree of stenosis on the right side. . No dissection or arterial injury is seen.  VERTEBRAL ARTERIES: Right vertebral artery is slightly diminutive compared to the left, with some calcification visualized at the takeoff of the vessel. No significant ostial stenosis is suspected. SOFT TISSUES:  The lung apices are clear. No cervical or superior mediastinal lymphadenopathy. Thyroid gland is homogeneous in attenuation. BONES: Multifocal degenerative change involving the cervical spine. ANTERIOR CIRCULATION:  MILD ATHEROSCLEROTIC VASCULAR DISEASE INVOLVING THE CAVERNOUS segment of the bilateral internal carotid arteries. The operculum branches of the middle cerebral artery are symmetric and well opacified. No A-comm aneurysm. POSTERIOR CIRCULATION:  Takeoff of the posterior cerebral artery and superior cerebellar arteries appear within normal limits. The P1 branches appear symmetric. No aneurysm at the tip of the basilar artery. 1. Ostial calcification at the takeoff of the right vertebral artery without evidence of stenosis. 2. Heavy atherosclerosis, especially at the left carotid bifurcation however no flow-limiting stenosis. Less than 50% narrowing on the right side. 3. Slightly diminutive right vertebral artery compared to the left however no flow-limiting stenosis or occlusion. 4. Intracranial anterior and posterior circulation appear unremarkable. Cta Head W Contrast    Result Date: 12/13/2017  EXAMINATION: CTA OF THE NECK; CTA OF THE HEAD WITH CONTRAST 12/13/2017 3:10 pm TECHNIQUE: CTA of the neck was performed with the administration of intravenous contrast. Multiplanar reformatted images are provided for review. MIP images are provided for review. Stenosis of the internal carotid arteries measured using NASCET criteria.  Dose modulation, iterative reconstruction, and/or weight based adjustment of the mA/kV was utilized to reduce the radiation dose to as low as reasonably achievable.; CTA of the head/brain was performed with the administration of intravenous contrast. Multiplanar reformatted images are provided for review. MIP images are provided for review. Dose modulation, iterative reconstruction, and/or weight based adjustment of the mA/kV was utilized to reduce the radiation dose to as low as reasonably achievable. COMPARISON: None. HISTORY: ORDERING SYSTEM PROVIDED HISTORY: Concern for vertibrobasilar insufficiency FINDINGS: AORTIC ARCH/GREAT VESSELS: There is a normal branch pattern of the aortic arch. No significant stenosis is seen of the innominate artery or subclavian arteries. CAROTID ARTERIES: The common carotid arteries are normal in appearance without evidence of a flow limiting stenosis. Approximately 50% stenosis of the left internal carotid artery at its origin after the bifurcation of the common carotid artery. Slightly less degree of stenosis on the right side. . No dissection or arterial injury is seen. VERTEBRAL ARTERIES: Right vertebral artery is slightly diminutive compared to the left, with some calcification visualized at the takeoff of the vessel. No significant ostial stenosis is suspected. SOFT TISSUES:  The lung apices are clear. No cervical or superior mediastinal lymphadenopathy. Thyroid gland is homogeneous in attenuation. BONES: Multifocal degenerative change involving the cervical spine. ANTERIOR CIRCULATION:  MILD ATHEROSCLEROTIC VASCULAR DISEASE INVOLVING THE CAVERNOUS segment of the bilateral internal carotid arteries. The operculum branches of the middle cerebral artery are symmetric and well opacified. No A-comm aneurysm. POSTERIOR CIRCULATION:  Takeoff of the posterior cerebral artery and superior cerebellar arteries appear within normal limits. The P1 branches appear symmetric. No aneurysm at the tip of the basilar artery. 1. Ostial calcification at the takeoff of the right vertebral artery without evidence of stenosis. 2. Heavy atherosclerosis, especially at the left carotid bifurcation however no flow-limiting stenosis.   Less than 50% narrowing on

## 2017-12-14 NOTE — PLAN OF CARE
Problem: Pain:  Goal: Pain level will decrease  Pain level will decrease   Outcome: Ongoing    Goal: Control of acute pain  Control of acute pain   Outcome: Ongoing      Problem: Falls - Risk of  Goal: Absence of falls  Outcome: Met This Shift

## 2017-12-14 NOTE — CONSULTS
The condition is 58 yo wf with falling . Patient reports to have had stroke in 2010 with some mild residual left side weakness with secondary imbalance of her gait . She also has diabetes with diabetic neuropathy . Patient has been using walker for gait support although reports frequent falling that maybe at times weekly loosing balance . She also has seizure disorder grand mal in type on lamictal 200 mg po bid , keppra 1250 mg po bid with last seizure being in October . Patient reports on December 10 to have gotten up from sitting position on floor putting Vane lights loosing balance landing on left foot suffering talus ankle fracture along with sprained right wist . She reports that yesterday on getting up for bed she lost balance falling backwards hitting her head with no loss of consciousness having since then positional vertigo , diplopia along with trouble focusing and frontal headache . Head CT normal . CT cervical spine mild to moderate degenerative changes more notably C4-5 with narrowing spinal canal AP dimension 5 mm . CT lumbar spine multi level degenerative changes with mild stenosis L3-4 and L4-5 . CTA Head and neck with < 50 % stenosis right ICA , atherosclerosis left ICA  . She does report lightheadedness on standing with no new focal motor ,sensory , bulbar or visual complaints . Significant medications lamictal 200 mg po bid , keppra 1250 mg po bid, aspirin 81 mg po qd , zocor 40 mg po qd  . Testing Head CT normal . CT cervical spine mild to moderate degenerative changes more notably C4-5 with narrowing spinal canal AP dimension 5 mm . CT lumbar spine multi level degenerative changes with mild stenosis L3-4 and L4-5 .  CTA Head and neck with < 50 % stenosis right ICA , atherosclerosis left ICA      Past Medical History:   Diagnosis Date    Anemia     Cataract     GERD (gastroesophageal reflux disease)     Headache     Hyperlipidemia     Neuropathy (HCC)     Osteoarthritis     Seizures (KEPPRA) tablet 1,250 mg  1,250 mg Oral BID Jonathon Mckeon MD   1,250 mg at 12/14/17 0856    lisinopril-hydrochlorothiazide (PRINZIDE;ZESTORETIC) 10-12.5 MG per tablet 1 tablet  1 tablet Oral Daily Jonathon Mckeon MD   1 tablet at 12/14/17 0856    cetirizine (ZYRTEC) tablet 5 mg  5 mg Oral Daily Jonathon Mckeon MD   5 mg at 12/14/17 0857    pantoprazole (PROTONIX) tablet 20 mg  20 mg Oral QAM AC Jonathon Mckeon MD   20 mg at 12/14/17 9688    PARoxetine (PAXIL) tablet 20 mg  20 mg Oral QAM Jonathon Mckeon MD   20 mg at 12/14/17 0857    pioglitazone (ACTOS) tablet 15 mg  15 mg Oral QAM Jonathon Mckeon MD   15 mg at 12/14/17 0856    pramipexole (MIRAPEX) tablet 1 mg  1 mg Oral Nightly Jonathon Mckeon MD   1 mg at 12/13/17 2326    simvastatin (ZOCOR) tablet 40 mg  40 mg Oral Nightly Jonathon Mckeon MD   40 mg at 12/13/17 2326    glucose (GLUTOSE) 40 % oral gel 15 g  15 g Oral PRN Jonathon Mckeon MD        dextrose 50 % solution 12.5 g  12.5 g Intravenous PRN Jonathon Mckeon MD        glucagon (rDNA) injection 1 mg  1 mg Intramuscular PRN Jonathon Mckeon MD        dextrose 5 % solution  100 mL/hr Intravenous PRN Jonathon Mckeon MD           Allergies   Allergen Reactions    Codeine        ROS:   Constitutional                  Negative for fever and chills   HEENT                            Negative for ear discharge, ear pain, nosebleed  Eyes                                Negative for photophobia, pain and discharge  Respiratory                      Negative for hemoptysis and sputum  Cardiovascular                Negative for orthopnea, claudication and PND  Gastrointestinal               Negative for abdominal pain, diarrhea, blood in stool  Musculoskeletal               Postive for joint pain, neck and low back pain   Skin                                 Negative for rash or itching  Endo/heme/allergies       Negative for polydipsia, environmental allergy  Psychiatric Negative for suicidal ideation. Patient is not anxious    Vitals:    12/14/17 0842   BP: 122/72   Pulse: 89   Resp: 17   Temp: 98.8 °F (37.1 °C)   SpO2: 96%     Admission weight: 202 lb (91.6 kg)    Neurological Examination  Constitutional .General exam well groomed   Head/ Ears /Nose/Throat/external ear . Normal exam  Neck and thyroid . Normal size. No bruits  Cardiovascular: Auscultation of heart with regular rate and rhythm   Musculoskeletal. Muscle bulk and tone normal                                                           Muscle strength 5/5 strength throughout                                                                               No dysmetria or dysdiadokinesis  No tremor   Normal fine motor  Orientation Alert and oriented x 3   Attention and concentration normal  Short term memory normal  Language process and speech normal . No aphasia   Cranial nerve 2 normal acuety and visual fields  Cranial nerve 3, 4 and 6 . Extraocular muscles are intact . Pupils are equal and reactive   Cranial nerve 5 . Intact corneal reflex. Normal facial sensation  Cranial nerve 7 normal exam   Cranial nerve 8. Grossly intact hearing   Cranial nerve 9 and 10. Symmetric palate elevation   Cranial nerve 11 , 5 out of 5 strength   Cranial Nerve 12 midline tongue . No atrophy  Sensation . Decreased pinprick and light touch distal to ankle level bilaterally  Deep Tendon Reflexes absent ankle jerks   Plantar response flexor bilaterally    Assessment :    Falling episodes . Cerebral concussion . Cervical stenosis . Diabetic neuropathy . History cerebral infarction . Seizure disorder     Plan:    MRI of Head . MRI of cervical spine . MRI of lumbar spine . EEG . Cardiac 2D echo . EEG . PT/OT . Orthostatic blood pressures .  Fioricet 1 po q 6 hours PRN

## 2017-12-14 NOTE — H&P
pioglitazone (ACTOS) tablet 15 mg QAM   pramipexole (MIRAPEX) tablet 1 mg Nightly   simvastatin (ZOCOR) tablet 40 mg Nightly   glucose (GLUTOSE) 40 % oral gel 15 g PRN   dextrose 50 % solution 12.5 g PRN   glucagon (rDNA) injection 1 mg PRN   dextrose 5 % solution PRN       All medication charted and reviewed. ALLERGIES     is allergic to codeine. FAMILY HISTORY     indicated that her mother is . She indicated that her father is . She indicated that her brother is alive. family history includes Diabetes in her brother. The patient denies any pertinent family history. I have reviewed and agree with the family history entered. I have reviewed the Family History and it is not significant to the case    SOCIAL HISTORY      reports that she has never smoked. She has never used smokeless tobacco. She reports that she does not drink alcohol or use drugs. I have reviewed and agree with all Social.  There are no concerns for substance abuse/use. PHYSICAL EXAM     INITIAL VITALS:  height is 5' 1\" (1.549 m) and weight is 207 lb 0.2 oz (93.9 kg). Her oral temperature is 98.8 °F (37.1 °C). Her blood pressure is 122/72 and her pulse is 89. Her respiration is 17 and oxygen saturation is 96%.       CONSTITUTIONAL: AOx4, no apparent distress, appears stated age    HEAD: normocephalic, atraumatic, No Shook sign, no raccoon eyes, no CSF otorrhea or rhinorrhea    EYES: PERRLA, EOMI    ENT: moist mucous membranes, uvula midline   NECK: supple, symmetric   BACK: symmetric   LUNGS: clear to auscultation bilaterally   CARDIOVASCULAR: regular rate and rhythm, no murmurs, rubs or gallops   ABDOMEN: soft, non-tender, non-distended with normal active bowel sounds   NEUROLOGIC:  MAEx4, Cranial nerves II through XII grossly intact, no focal sensory or motor deficits   MUSCULOSKELETAL: no clubbing, cyanosis or edema   SKIN: no rash or wounds       DIFFERENTIAL DIAGNOSIS/MDM:   Stroke/ TIA/ Facial Droop:  DDX: Stroke/ TIA, Vascular, Infectious/inflammatory, Neoplastic/neurological, Drug induced, Iatrogenic, Cardiopulmonary, Autoimmune, Endocrine, Degenerative, Psychogenic/psychiatric, MSK  Syncope/ Pre-syncope:  DDX: cardiac arrhythmia/ valvular, low glucose, anemia, SAH, dissection, PE, AAA rupture, ectopic pregnancy rupture, seizure, vasovagal, orthostatic    DIAGNOSTIC RESULTS     EKG: All EKG's are interpreted by the Observation Physician who either signs or Co-signs this chart in the absence of a cardiologist.    EKG Interpretation    Interpreted by observation physician    Rhythm: normal sinus   Rate: normal  Axis: normal  Ectopy: none  Conduction: normal  ST Segments: no acute change  T Waves: no acute change  Q Waves: none    Clinical Impression: non-specific EKG    Isa Dawson MD        RADIOLOGY:   I directly visualized the following  images and reviewed the radiologist interpretations:    Xr Thoracic Spine (3 Views)    Result Date: 12/13/2017  EXAMINATION: 3 VIEWS OF THE THORACIC SPINE 12/13/2017 2:08 pm COMPARISON: None. HISTORY: ORDERING SYSTEM PROVIDED HISTORY: Fall. Midline tenderness. TECHNOLOGIST PROVIDED HISTORY: Reason for exam:->Fall. Midline tenderness. FINDINGS: Thoracic vertebral bodies are normal in height and alignment. Intervertebral disc spaces are maintained. Multilevel degenerative disc disease. No evidence of fracture. Pedicles are symmetric and intact. Visualized lungs are clear. 1. Multilevel degenerative disc disease 2. No acute thoracic spine abnormality     Xr Lumbar Spine (2-3 Views)    Result Date: 12/13/2017  EXAMINATION: 3 VIEWS OF THE LUMBAR SPINE 12/13/2017 2:08 pm COMPARISON: None. HISTORY: ORDERING SYSTEM PROVIDED HISTORY: Midline tenderness. TECHNOLOGIST PROVIDED HISTORY: Reason for exam:->Midline tenderness. Fall, acute back pain FINDINGS: Mild grade 1 anterolisthesis of L4 on L5 appears degenerative in etiology. Otherwise, anatomic alignment.   Multilevel degenerative disc disease and facet joint arthropathy are noted. No fractures or destructive bony abnormalities are identified. 1. Multilevel degenerative disc disease and facet joint arthropathy 2. No acute lumbar spine abnormality 3. Mild grade 1 anterolisthesis of L4 on L5 appears degenerative in etiology     Ct Head Wo Contrast    Result Date: 12/13/2017  EXAMINATION: CT OF THE HEAD WITHOUT CONTRAST  12/13/2017 3:09 pm TECHNIQUE: CT of the head was performed without the administration of intravenous contrast. Dose modulation, iterative reconstruction, and/or weight based adjustment of the mA/kV was utilized to reduce the radiation dose to as low as reasonably achievable. COMPARISON: 10/21/2017 HISTORY: ORDERING SYSTEM PROVIDED HISTORY: Fall. FINDINGS: BRAIN/VENTRICLES: There is no acute intracranial hemorrhage, mass effect or midline shift. No abnormal extra-axial fluid collection. The gray-white differentiation is maintained without evidence of an acute infarct. There is no evidence of hydrocephalus. ORBITS: The visualized portion of the orbits demonstrate no acute abnormality. SINUSES: The visualized paranasal sinuses and mastoid air cells demonstrate no acute abnormality. Small polyp or mucous retention cyst noted in the left maxillary sinus. SOFT TISSUES/SKULL:  No acute abnormality of the visualized skull or soft tissues. No acute intracranial abnormality. Ct Cervical Spine Wo Contrast    Result Date: 12/13/2017  EXAMINATION: CT OF THE CERVICAL SPINE WITHOUT CONTRAST 12/13/2017 3:09 pm TECHNIQUE: CT of the cervical spine was performed without the administration of intravenous contrast. Multiplanar reformatted images are provided for review. Dose modulation, iterative reconstruction, and/or weight based adjustment of the mA/kV was utilized to reduce the radiation dose to as low as reasonably achievable. COMPARISON: None HISTORY: ORDERING SYSTEM PROVIDED HISTORY: Midline tenderness. Fall. 12/13/2017 HISTORY: ORDERING SYSTEM PROVIDED HISTORY: Fall. Anterolisthesis on XR. Midline tenderness. TECHNOLOGIST PROVIDED HISTORY: Reason for exam:->Fall. Anterolisthesis on XR. Midline tenderness. FINDINGS: BONES/ALIGNMENT:  There is minimal anterolisthesis at L4-L5, measuring 3 mm. No spondylolysis. There is otherwise normal alignment of the spine. The vertebral body heights are maintained. No osseous destructive lesion is seen. DEGENERATIVE CHANGES: There is mild multilevel disc space narrowing and endplate spurring. Moderate to severe multilevel facet arthropathy is noted as well, likely accounting for the anterolisthesis at L4-L5. At L2-L3, there is mild broad-based disc bulge, eccentric to the left, resulting in mild narrowing of the left neural foramen. At L3-L4, there is mild broad-based disc bulge and facet arthropathy, resulting in mild narrowing of the spinal canal and bilateral neural foramina. There also appears to be mild spinal canal narrowing and mild left neural foraminal narrowing at L4-L5. Mild left neural foraminal narrowing is also noted at L5-S1. SOFT TISSUES/RETROPERITONEUM: No paraspinal mass is seen. 1.  No acute fracture or malalignment in the lumbar spine. 2.  Minimal anterolisthesis at L4-L5, secondary to facet arthropathy. No spondylolysis. 3.  Mild multilevel degenerative disc disease and moderate to severe facet arthropathy. Degenerative changes result in mild spinal canal narrowing at L3-L4, and L4-L5. There is also mild multilevel neural foraminal narrowing, worse on the left. Xr Chest Portable    Result Date: 12/13/2017  EXAMINATION: SINGLE VIEW OF THE CHEST 12/13/2017 2:08 pm COMPARISON: 10/21/2017 HISTORY: ORDERING SYSTEM PROVIDED HISTORY: Fall. TECHNOLOGIST PROVIDED HISTORY: Reason for exam:->Fall. FINDINGS: The lungs are without acute focal process. There is no effusion or pneumothorax. The cardiomediastinal silhouette is stable.  The osseous structures are stable. No acute process. Cta Neck W Contrast    Result Date: 12/13/2017  EXAMINATION: CTA OF THE NECK; CTA OF THE HEAD WITH CONTRAST 12/13/2017 3:10 pm TECHNIQUE: CTA of the neck was performed with the administration of intravenous contrast. Multiplanar reformatted images are provided for review. MIP images are provided for review. Stenosis of the internal carotid arteries measured using NASCET criteria. Dose modulation, iterative reconstruction, and/or weight based adjustment of the mA/kV was utilized to reduce the radiation dose to as low as reasonably achievable.; CTA of the head/brain was performed with the administration of intravenous contrast. Multiplanar reformatted images are provided for review. MIP images are provided for review. Dose modulation, iterative reconstruction, and/or weight based adjustment of the mA/kV was utilized to reduce the radiation dose to as low as reasonably achievable. COMPARISON: None. HISTORY: ORDERING SYSTEM PROVIDED HISTORY: Concern for vertibrobasilar insufficiency FINDINGS: AORTIC ARCH/GREAT VESSELS: There is a normal branch pattern of the aortic arch. No significant stenosis is seen of the innominate artery or subclavian arteries. CAROTID ARTERIES: The common carotid arteries are normal in appearance without evidence of a flow limiting stenosis. Approximately 50% stenosis of the left internal carotid artery at its origin after the bifurcation of the common carotid artery. Slightly less degree of stenosis on the right side. . No dissection or arterial injury is seen. VERTEBRAL ARTERIES: Right vertebral artery is slightly diminutive compared to the left, with some calcification visualized at the takeoff of the vessel. No significant ostial stenosis is suspected. SOFT TISSUES:  The lung apices are clear. No cervical or superior mediastinal lymphadenopathy. Thyroid gland is homogeneous in attenuation.  BONES: Multifocal degenerative change involving the cervical spine. ANTERIOR CIRCULATION:  MILD ATHEROSCLEROTIC VASCULAR DISEASE INVOLVING THE CAVERNOUS segment of the bilateral internal carotid arteries. The operculum branches of the middle cerebral artery are symmetric and well opacified. No A-comm aneurysm. POSTERIOR CIRCULATION:  Takeoff of the posterior cerebral artery and superior cerebellar arteries appear within normal limits. The P1 branches appear symmetric. No aneurysm at the tip of the basilar artery. 1. Ostial calcification at the takeoff of the right vertebral artery without evidence of stenosis. 2. Heavy atherosclerosis, especially at the left carotid bifurcation however no flow-limiting stenosis. Less than 50% narrowing on the right side. 3. Slightly diminutive right vertebral artery compared to the left however no flow-limiting stenosis or occlusion. 4. Intracranial anterior and posterior circulation appear unremarkable. Cta Head W Contrast    Result Date: 12/13/2017  EXAMINATION: CTA OF THE NECK; CTA OF THE HEAD WITH CONTRAST 12/13/2017 3:10 pm TECHNIQUE: CTA of the neck was performed with the administration of intravenous contrast. Multiplanar reformatted images are provided for review. MIP images are provided for review. Stenosis of the internal carotid arteries measured using NASCET criteria. Dose modulation, iterative reconstruction, and/or weight based adjustment of the mA/kV was utilized to reduce the radiation dose to as low as reasonably achievable.; CTA of the head/brain was performed with the administration of intravenous contrast. Multiplanar reformatted images are provided for review. MIP images are provided for review. Dose modulation, iterative reconstruction, and/or weight based adjustment of the mA/kV was utilized to reduce the radiation dose to as low as reasonably achievable. COMPARISON: None.  HISTORY: ORDERING SYSTEM PROVIDED HISTORY: Concern for vertibrobasilar insufficiency FINDINGS: AORTIC ARCH/GREAT VESSELS: There is a PROVIDED HISTORY: Tenderness over the L hip. TECHNOLOGIST PROVIDED HISTORY: Reason for exam:->Tenderness over the L hip. FINDINGS: Pelvic bones are intact without evidence of acute fracture or displacement. No focal osteolytic lesions are identified. There is no acute fracture or dislocation at the left hip. Mild marginal spurring is noted. There is also enthesopathic spurring at the left greater trochanter. Small ossification is noted superior to the greater trochanter, likely within the distal gluteal tendons. Mild vascular calcification is noted. 1.  No acute osseous abnormality in the pelvis or left hip. 2.  Mild degenerative changes in the left hip and enthesopathic changes at the greater trochanter/distal gluteal tendons. LABS:  I have reviewed and interpreted all available lab results. Labs Reviewed   CBC WITH AUTO DIFFERENTIAL - Abnormal; Notable for the following:        Result Value    Platelets 89 (*)     All other components within normal limits   BASIC METABOLIC PANEL - Abnormal; Notable for the following:     Glucose 116 (*)     Potassium 3.6 (*)     All other components within normal limits   UA W/REFLEX CULTURE - Abnormal; Notable for the following:     Glucose, Ur 1+ (*)     All other components within normal limits   POC GLUCOSE FINGERSTICK - Abnormal; Notable for the following:     POC Glucose 223 (*)     All other components within normal limits   POC GLUCOSE FINGERSTICK - Abnormal; Notable for the following:     POC Glucose 243 (*)     All other components within normal limits   POC GLUCOSE FINGERSTICK - Abnormal; Notable for the following:     POC Glucose 126 (*)     All other components within normal limits   TROPONIN   POCT GLUCOSE   POCT GLUCOSE       SCREENING TOOLS:    CDU IMPRESSION / PLAN      Radha Ferrell is a 59 y.o. female who presents with acute fall which occurred yesterday, in which she hit both frontal and occipital portions of her head, with possible LOC.   She also admits to and illusion of motion and blurred vision, which occurred while walking, and proceeded her fall, resolved spontaneously, not worsened by any intervention, due to unknown etiology. · Neurology consult-awaiting recommendation  · Social Work Consult- awaiting recommendation  · CT head-no acute intracranial abnormality  · CTA head/neck-atherosclerosis of left carotid, diminutive right vertebral artery  · CBC, BMP within normal limits  · Continue home medications and pain control  · Monitor vitals, labs, and imaging  · DISPO: pending consults and clinical improvement    CONSULTS:    IP CONSULT TO NEUROLOGY  IP CONSULT TO NEUROLOGY  IP CONSULT TO NEUROLOGY  IP CONSULT TO SOCIAL WORK  IP CONSULT TO ORTHOPEDIC SURGERY    PROCEDURES:  Not indicated       PATIENT REFERRED TO:    [facility] ORTHOPEDIC SPECIALIST  [address] [place] [address] 372 151 [facility] [id number]  Go on 12/27/2017  at [address]  [address] [place] [address] 10[address] [facility] [id number]  [phone]  Go on 12/27/2017  at 215pm    MD Melody Fletcher [facility] [address] [facility] 400 [facility] -  [address]  [phone]            --  Isa Dawson MD   Emergency Medicine Resident     This dictation was generated by voice recognition computer software. Although all attempts are made to edit the dictation for accuracy, there may be errors in the transcription that are not intended.

## 2017-12-14 NOTE — PROGRESS NOTES
Occupational Therapy   Occupational Therapy Initial Assessment  Date: 2017   Patient Name: Holly Smith  MRN: [id number]     : 1955    Patient Diagnosis(es): The primary encounter diagnosis was Fall, initial encounter. Diagnoses of Cervical strain, acute, initial encounter, Acute midline low back pain without sciatica, and Vertigo were also pertinent to this visit. has a past medical history of Anemia; Cataract; GERD (gastroesophageal reflux disease); Headache; Hyperlipidemia; Neuropathy ([address] Osteoarthritis; Seizures ([address] Stroke (cerebrum) ([address] and Type 2 diabetes mellitus without complication ([address] has a past surgical history that includes Cholecystectomy; Tonsillectomy; Cataract removal; and eye surgery. Restrictions  Restrictions/Precautions  Restrictions/Precautions: Fall Risk, Contact Precautions  Required Braces or Orthoses?: Yes  Required Braces or Orthoses  Left Upper Extremity Brace/Splint:  (thumb spica)  Position Activity Restriction  Other position/activity restrictions: Up with assistance. Pt with prior R LE splinted secondary to prior nondisplaced foot fracture and L UE with removable thumb spica splint secondary to prior injury. Subjective   General  Chart Reviewed: No  Patient assessed for rehabilitation services?: Yes  Family / Caregiver Present: No  General Comment  Comments: RN ok'd for therapy this PM. Pt agreeable to participate in session and pleasant/motivated throughout.   Pain Assessment  Patient Currently in Pain: Yes  Pain Assessment: 0-10  Pain Level: 5  Pain Type: Acute pain  Pain Location: Head, Back  Pain Orientation: Left  Pain Descriptors: Aching, Discomfort  Pain Frequency: Continuous  Pain Intervention(s): Repositioned, Ambulation/Increased activity  Response to Pain Intervention: Patient Satisfied     Social/Functional History  Social/Functional History  Lives With: Alone  Type of Home: Apartment (3rd floor apartment)  Home Layout: One Contact guard assistance  Stand to sit: Contact guard assistance  Transfer Comments: with use of RW. Pt required 2x VCs for safety techniques with use of RW throughout. Cognition  Overall Cognitive Status: WFL     Sensation  Overall Sensation Status: WFL      LUE AROM (degrees)  LUE AROM : WFL  RUE AROM (degrees)  RUE AROM : WFL  LUE Strength  Gross LUE Strength: WFL  RUE Strength  Gross RUE Strength: WFL     Assessment   Performance deficits / Impairments: Decreased functional mobility ; Decreased balance;Decreased endurance;Decreased high-level IADLs;Decreased ADL status  Prognosis: Good  Decision Making: Medium Complexity  Patient Education: OT Services/OT POC, Safety Awareness/Fall Prevention, Safety with Transfer/RW Mngt, ADL Techniques, Pursed Lip Breathing Techniques, good return  REQUIRES OT FOLLOW UP: Yes  Activity Tolerance  Activity Tolerance: Patient Tolerated treatment well         Plan   Plan  Times per week: 3-4x/wk  Times per day: Daily    G-Code  OT G-codes  Functional Assessment Tool Used: BARTHEL INDEX  Score: 13/20  Functional Limitation: Self care  Self Care Current Status (): At least 20 percent but less than 40 percent impaired, limited or restricted  Self Care Goal Status (): At least 1 percent but less than 20 percent impaired, limited or restricted    Goals  Short term goals  Time Frame for Short term goals: Pt will, by discharge:  Short term goal 1: perform functional transfers/functional mobility with SBA using RW  Short term goal 2: independently demo safety awareness with use of RW for increased participation in ADLs  Short term goal 3: perform ADLs with SBA  Short term goal 4: demo 15+ minutes standing tolerance with use of RW for increased participation in ADLs     Therapy Time   Individual Concurrent Group Co-treatment   Time In 6612         Time Out 1334         Minutes 29            Discharge recommendations discussed with patient during initial evaluation.     Denver Villa

## 2017-12-14 NOTE — CONSULTS
Armond Floyd      CC/Reason for consult:  Right wrist pain, left foot pain    HPI:      The patient is a 59 y.o. female who presented to the ED on 12/13 after sustaining a fall from standing height. She states she fell and is unsure of the events surrounding the injury. She was admitted for LOC and vertigo workup. She is also reporting left foot pain and right wrist pain. She was seen in ED on 12/10 after sustaining a fall where she was placed into a removable thumb spica splint on the right and short leg splint on the left side. She returned yesterday due to another fall. She states she fell on her outstretched right hand. She states her pain is in the radial aspect of her right wrist. She states the pain in her foot is over the dorsum of her midfoot. She has baseline neuropathy in her feet and hands and typically cannot feel the bottom of her feet and toes. Denies any other injuries. Denies prior history of left foot pain or right wrist pain. Past Medical History:    Past Medical History:   Diagnosis Date    Anemia     Cataract     GERD (gastroesophageal reflux disease)     Headache     Hyperlipidemia     Neuropathy (HCC)     Osteoarthritis     Seizures (HCC) since age 10    Stroke (cerebrum) ([facility] [address] 2010    Type 2 diabetes mellitus without complication ([facility] [address] 1549     Past Surgical History:    Past Surgical History:   Procedure Laterality Date    CATARACT REMOVAL      CHOLECYSTECTOMY      EYE SURGERY      cataract    TONSILLECTOMY       Medications Prior to Admission:   Prior to Admission medications    Medication Sig Start Date End Date Taking? Authorizing Provider   oxyCODONE-acetaminophen (PERCOCET) 5-325 MG per tablet Take 1-2 tablets by mouth every 6 hours as needed for Pain .  12/10/17  Yes Osito Farfan, DO   metFORMIN (GLUCOPHAGE) 1000 MG tablet Take 1,000 mg by mouth 2 times daily (with meals)   Yes Historical Provider, MD   aspirin 81 MG tablet Take 81 mg by mouth daily   Yes Historical Provider, MD   Blood Glucose Monitoring Suppl FLAVIO Check blood sugars 3/day 11/30/17  Yes Sherel Mcburney, MD   Glucose Blood (BLOOD GLUCOSE TEST STRIPS) STRP Test 3  times daily Insulin Dependent mellitus 11/30/17  Yes Sherel Mcburney, MD   Lancets MISC Check 3/day 11/30/17  Yes Sherel Mcburney, MD   levETIRAcetam (KEPPRA) 250 MG tablet Take 1 tablet by mouth 2 times daily  Patient taking differently: Take 250 mg by mouth 2 times daily Taking 250 mg tablet along with 2- 500 mg tablets for a total of 1250 mg. 10/23/17  Yes Etienne Zelaya MD   levETIRAcetam (KEPPRA) 1000 MG tablet Take 1 tablet by mouth 2 times daily Your new dose is 1250 mg twice daily  Patient taking differently: Take 1,000 mg by mouth 2 times daily Patient is taking 2- 500 mg tablets along with a 250 mg tablet for a total of 1250 mg. 10/23/17  Yes Etienne Zelaya MD   lisinopril-hydrochlorothiazide (PRINZIDE;ZESTORETIC) 10-12.5 MG per tablet Take 1 tablet by mouth daily   Yes Historical Provider, MD   PARoxetine (PAXIL) 20 MG tablet Take 20 mg by mouth every morning   Yes Historical Provider, MD   omeprazole (PRILOSEC) 20 MG delayed release capsule Take 20 mg by mouth daily   Yes Historical Provider, MD   loratadine (CLARITIN) 10 MG tablet Take 10 mg by mouth daily   Yes Historical Provider, MD   pioglitazone (ACTOS) 15 MG tablet Take 15 mg by mouth every morning   Yes Historical Provider, MD   simvastatin (ZOCOR) 40 MG tablet Take 40 mg by mouth nightly   Yes Historical Provider, MD   pramipexole (MIRAPEX) 1 MG tablet Take 1 mg by mouth nightly   Yes Historical Provider, MD   lamoTRIgine (LAMICTAL) 200 MG tablet Take 200 mg by mouth 2 times daily   Yes Historical Provider, MD   busPIRone (BUSPAR) 10 MG tablet Take 10 mg by mouth 3 times daily   Yes Historical Provider, MD   insulin glargine (LANTUS) 100 UNIT/ML injection vial Inject 50 Units into the skin 2 times daily Patient states she is using the lantus pen   Yes tenderness to palpation to plantar surface at midfoot region. Painless ROM of ankle and toes. Compartments soft. EHL/FHL/TA/GS complex motor intact. No sensation present to plantar surface of foot. Diminished sensation to entire dorsum of foot. Dorsalis pedis/posterior tibial pulses 2+ with BCR. LABS:  Recent Labs      12/13/17   1344   WBC  3.8   HGB  12.4   HCT  40.7   PLT  89*   NA  140   K  3.6*   BUN  11   CREATININE  0.58   GLUCOSE  116*        Radiology:   X-rays of right wrist demonstrates possible nondisplaced fracture of scaphoid waist. Otherwise no acute fractures or dislocation. No soft tissue abnormality. No lytic or blastic lesions. X-rays of left foot demonstrates no acute fractures or dislocations. Possible widening of Lisfranc joint but difficult to assess without contralateral comparison. No soft tissue abnormality. No lytic or blastic lesions.     A/P: 59 y.o. female being seen for possible right occult scaphoid fracture and possible left Lisfranc injury  -No acute orthopedic surgical intervention indicated at this time  -Thumb spica splint applied to RUE  -Will obtain bilateral weightbearing foot films to assess for Lisfranc widening  -No lifting, pushing or pulling RUE  -Will determine weight bearing status of left leg based on foot films  -Pain control/medical management per primary  -Ice (20 mins on, 1 hour off) and elevation for edema/pain control  -Will discuss case with Dr. Fadia Haywood  -Please page DO ortho for any orthopedic questions or concerns     Larissa Oropeza DO  4:27 PM 12/14/2017

## 2017-12-14 NOTE — PROGRESS NOTES
unit  Bathroom Toilet: Standard  Home Equipment: 4 wheeled walker  ADL Assistance: Independent  Homemaking Assistance: [address] [facility] Responsibilities: Yes  Ambulation Assistance: Independent  Transfer Assistance: Independent  Active : No  Mode of Transportation: Friends, Bus, Cab  Occupation: On disability  Leisure & Hobbies: Raza, time with her dog  Objective          AROM RLE (degrees)  RLE AROM: WFL  AROM LLE (degrees)  LLE AROM : WFL  AROM RUE (degrees)  RUE AROM : WFL  AROM LUE (degrees)  LUE AROM : WFL  Strength RLE  Strength RLE: WFL  Strength LLE  Strength LLE: WFL  Strength RUE  Strength RUE: WFL  Strength LUE  Strength LUE: WFL     Sensation  Overall Sensation Status: WFL  Bed mobility  Rolling to Left: Minimal assistance  Rolling to Right: Minimal assistance  Supine to Sit: Minimal assistance  Sit to Supine: Minimal assistance  Scooting: Minimal assistance  Transfers  Sit to Stand: Minimal Assistance  Stand to sit: Minimal Assistance  Ambulation  Ambulation?: Yes  Ambulation 1  Surface: level tile  Device: Rolling Walker  Assistance: Minimal assistance  Distance: amb 30 ft with a RW x min assist      Balance  Posture: Good  Sitting - Static: Good  Sitting - Dynamic: Fair  Standing - Static: Fair  Standing - Dynamic: Fair        Assessment   Body structures, Functions, Activity limitations: Decreased functional mobility ; Decreased endurance;Decreased strength  Assessment: Pt c/o instability during ambulation  Prognosis: Good  Decision Making: Medium Complexity  Patient Education: PT POC, Goals  REQUIRES PT FOLLOW UP: Yes  Activity Tolerance  Activity Tolerance: Patient limited by fatigue;Patient limited by pain     Discharge Recommendations:         Plan   Plan  Times per week: 5-6x wk  Current Treatment Recommendations: Strengthening, Transfer Training, Endurance Training, Gait Training, Functional Mobility Training, Stair training, Safety Education & Training  Safety Devices  Type of

## 2017-12-15 VITALS
OXYGEN SATURATION: 95 % | HEIGHT: 61 IN | TEMPERATURE: 98.2 F | DIASTOLIC BLOOD PRESSURE: 61 MMHG | BODY MASS INDEX: 39.08 KG/M2 | WEIGHT: 207.01 LBS | RESPIRATION RATE: 17 BRPM | SYSTOLIC BLOOD PRESSURE: 150 MMHG | HEART RATE: 85 BPM

## 2017-12-15 LAB
GLUCOSE BLD-MCNC: 133 MG/DL (ref 65–105)
GLUCOSE BLD-MCNC: 152 MG/DL (ref 65–105)
LV EF: 55 %
LVEF MODALITY: NORMAL

## 2017-12-15 PROCEDURE — 2580000003 HC RX 258: Performed by: EMERGENCY MEDICINE

## 2017-12-15 PROCEDURE — 99225 PR SBSQ OBSERVATION CARE/DAY 25 MINUTES: CPT | Performed by: PSYCHIATRY & NEUROLOGY

## 2017-12-15 PROCEDURE — 95819 EEG AWAKE AND ASLEEP: CPT | Performed by: PSYCHIATRY & NEUROLOGY

## 2017-12-15 PROCEDURE — 95819 EEG AWAKE AND ASLEEP: CPT

## 2017-12-15 PROCEDURE — G0378 HOSPITAL OBSERVATION PER HR: HCPCS

## 2017-12-15 PROCEDURE — 6370000000 HC RX 637 (ALT 250 FOR IP): Performed by: EMERGENCY MEDICINE

## 2017-12-15 PROCEDURE — 97535 SELF CARE MNGMENT TRAINING: CPT

## 2017-12-15 PROCEDURE — 93306 TTE W/DOPPLER COMPLETE: CPT

## 2017-12-15 PROCEDURE — 82947 ASSAY GLUCOSE BLOOD QUANT: CPT

## 2017-12-15 RX ADMIN — LAMOTRIGINE 200 MG: 100 TABLET ORAL at 09:44

## 2017-12-15 RX ADMIN — CETIRIZINE HYDROCHLORIDE 5 MG: 10 TABLET ORAL at 09:44

## 2017-12-15 RX ADMIN — PANTOPRAZOLE SODIUM 20 MG: 40 TABLET, DELAYED RELEASE ORAL at 09:43

## 2017-12-15 RX ADMIN — PIOGLITAZONE 15 MG: 15 TABLET ORAL at 09:44

## 2017-12-15 RX ADMIN — LEVETIRACETAM 1250 MG: 500 TABLET, FILM COATED ORAL at 09:43

## 2017-12-15 RX ADMIN — Medication 10 ML: at 09:54

## 2017-12-15 RX ADMIN — ASPIRIN 81 MG: 81 TABLET, COATED ORAL at 09:44

## 2017-12-15 RX ADMIN — INSULIN GLARGINE 50 UNITS: 100 INJECTION, SOLUTION SUBCUTANEOUS at 09:44

## 2017-12-15 RX ADMIN — PAROXETINE HYDROCHLORIDE HEMIHYDRATE 20 MG: 20 TABLET, FILM COATED ORAL at 09:44

## 2017-12-15 RX ADMIN — LISINOPRIL AND HYDROCHLOROTHIAZIDE 1 TABLET: 12.5; 1 TABLET ORAL at 09:44

## 2017-12-15 RX ADMIN — BUSPIRONE HYDROCHLORIDE 10 MG: 10 TABLET ORAL at 09:44

## 2017-12-15 RX ADMIN — INSULIN LISPRO 15 UNITS: 100 INJECTION, SOLUTION INTRAVENOUS; SUBCUTANEOUS at 07:07

## 2017-12-15 ASSESSMENT — PAIN SCALES - GENERAL: PAINLEVEL_OUTOF10: 0

## 2017-12-15 NOTE — PROGRESS NOTES
Co-treatment   Time In 0913         Time Out 1007         Minutes 47                 YEIMI DUMONT, OTR/L

## 2017-12-15 NOTE — PROGRESS NOTES
Spoke with Dr. Adelia Flannery Neuro. Patient clear for discharge from his standpoint, EEG shows no seizure activity. Patient updated. Patient is to follow up with Dr. Nic Barraza in clinic in 4 weeks.

## 2017-12-15 NOTE — PROGRESS NOTES
CDU Daily Progress Note  Attending Physician       Pt Name: Eduardo Hess  MRN: [id number]  Biancagfcorinne 1955  Date of evaluation: 12/15/17    I performed a history and physical examination of the patient and discussed management with the resident. I reviewed the residents note and agree with the documented findings and plan of care. Any areas of disagreement are noted on the chart. I was personally present for the key portions of any procedures. I have documented in the chart those procedures where I was not present during the key portions. I have reviewed the emergency nurses triage note. I agree with the chief complaint, past medical history, past surgical history, allergies, medications, social and family history as documented unless otherwise noted below. Documentation of the HPI, Physical Exam and Medical Decision Making performed by medical students or scribes is based on my personal performance of the HPI, PE and MDM. For Physician Assistant/ Nurse Practitioner cases/documentation I have personally evaluated this patient and have completed at least one if not all key elements of the E/M (history, physical exam, and MDM). Additional findings are as noted. The Family History, Social History and Review of Systems are unchanged from the previous day. No significant events overnight.     For ECF placement    Liss Chung MD  Attending Physician  Critical Decision Unit

## 2017-12-15 NOTE — PROGRESS NOTES
NEUROLOGY INPATIENT PROGRESS NOTE    12/15/2017         Subjective: Eduardo Hess is a  59 y.o. female admitted on 12/13/2017 with Fall [W19. XXXA]  Fall [W19. XXXA]   Chart reviewed. Discussed with caregivers. No acute issues overnight. Patient stated that she has been feeling well and she wants to go home. Briefly, this is a  59 y.o. female admitted on 12/13/2017 with diabetic neuropathy and falling episodes with cerebral concussion. She also had history of cerebral infarction and seizure disorder. She had EEG today and it did not reveal any ongoing seizure activity. No current facility-administered medications on file prior to encounter. Current Outpatient Prescriptions on File Prior to Encounter   Medication Sig Dispense Refill    oxyCODONE-acetaminophen (PERCOCET) 5-325 MG per tablet Take 1-2 tablets by mouth every 6 hours as needed for Pain .  15 tablet 0    metFORMIN (GLUCOPHAGE) 1000 MG tablet Take 1,000 mg by mouth 2 times daily (with meals)      aspirin 81 MG tablet Take 81 mg by mouth daily      Blood Glucose Monitoring Suppl FLAVIO Check blood sugars 3/day 1 Device 0    Glucose Blood (BLOOD GLUCOSE TEST STRIPS) STRP Test 3  times daily Insulin Dependent mellitus 100 strip 11    Lancets MISC Check 3/day 100 each 11    levETIRAcetam (KEPPRA) 250 MG tablet Take 1 tablet by mouth 2 times daily (Patient taking differently: Take 250 mg by mouth 2 times daily Taking 250 mg tablet along with 2- 500 mg tablets for a total of 1250 mg.) 60 tablet 3    levETIRAcetam (KEPPRA) 1000 MG tablet Take 1 tablet by mouth 2 times daily Your new dose is 1250 mg twice daily (Patient taking differently: Take 1,000 mg by mouth 2 times daily Patient is taking 2- 500 mg tablets along with a 250 mg tablet for a total of 1250 mg.) 60 tablet 0    lisinopril-hydrochlorothiazide (PRINZIDE;ZESTORETIC) 10-12.5 MG per tablet Take 1 tablet by mouth daily      PARoxetine (PAXIL) 20 MG tablet Take 20 mg by mouth every Results   Component Value Date    CHOL 148 10/22/2017    LDLCHOLESTEROL 75 10/22/2017    HDL 49 10/22/2017    TRIG 118 10/22/2017    ALT 17 10/22/2017    AST 23 10/22/2017    TSH 1.16 10/22/2017    INR 1.0 10/22/2017    LABA1C 15.5 (H) 10/22/2017    LABMICR 136 (H) 11/30/2017    PPCGZTSG21 432 10/22/2017       No results found for: PHENYTOIN, PHENYTOIN, VALPROATE, CBMZ    MRI brain (12/14/17): No acute intracranial abnormality. MRI cervical spine (12/14/17): Motion degraded exam.  No fracture or traumatic subluxation detected. Degenerative changes noted. Severe right-sided neural foraminal stenosis at C4-C5. MRI lumbar spine (12/14/17): No acute posttraumatic abnormality detected. 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 Impression and Plan: Ms. Mick Varela is a 59 y.o. female with   Diabetic neuropathy with gait difficulties; recurrent falls; continue PT/OT and fall precautions. Cervical spinal stenosis: Recommend outpatient neurosurgery consultation for ongoing neck pain and cervical spinal stenosis. Cerebral concussion: Continue Fioricet as needed for headache. History of cerebral infarction: Continue aspirin and Zocor for stroke prevention. Seizure disorder: Follow-up EEG negative for ongoing seizure activity. Continue Lamictal and Keppra at present dose. Ok to discharge home with follow up appt in neurology clinic with Dr. Efrain Virk in 4-6 weeks.

## 2017-12-15 NOTE — CARE COORDINATION
Echo results back and discussed with Dr Maurilio Hutchinson. OK'd for discharge in respect to echo- still awaiting EEG results.
seen her yet. Pt states she is independent at home. Plan is home with HC.          Electronically signed by Paula Glasgow RN on 12/13/17 at 5:03 PM

## 2017-12-15 NOTE — PROGRESS NOTES
Radiographs of the bilateral feet, while weight bearing, do not demonstrate any widening of the Lisfranc complex of the left foot. [address] [address] Tabares for patient to weight bear on the left foot without need for immobilization. Pain in foot likely due to exacerbation of osteoarthritis. Cont to apply ice and elevation to the affected extremity for pain and swelling relief. Cont pain control per primary team.    If there are any questions or concerns, please contact ortho at any time.     Rock Joi DO  Orthopedic Surgery Resident, PGY-1  [facility]

## 2017-12-15 NOTE — PROGRESS NOTES
OBS/CDU   RESIDENT NOTE      Patients PCP is:  Fatemeh Lopez MD        SUBJECTIVE    She has no new complaints at this time. No acute events overnight. Has been able to tolerate a full diet without nausea or vomiting. The patient is urinating on her own and is passing flatus. Denies fever, chills, nausea, vomiting, chest pain, shortness of breath, abdominal pain, focal weakness, numbness, tingling, urinary/bowel symptoms, vision changes, visual hallucinations, or headache. PHYSICAL EXAM      General: NAD, AO X 3  Heent: EMOI, PERRL  Neck: SUPPLE, NO JVD  Cardiovascular: RRR, S1S2  Pulmonary: CTAB, NO SOB  Abdomen: SOFT, NTTP, ND, +BS  Extremities: +2/4 PULSES DISTAL, NO SWELLING  Neuro / Psych: NO NUMBNESS OR TINGLING, MENTATION AT BASELINE    PERTINENT TEST /EXAMS      I have reviewed all available laboratory results.     MEDICATIONS CURRENT     butalbital-acetaminophen-caffeine (FIORICET, ESGIC) per tablet 1 tablet Q6H PRN   sodium chloride flush 0.9 % injection 10 mL 2 times per day   sodium chloride flush 0.9 % injection 10 mL PRN   enoxaparin (LOVENOX) injection 40 mg Daily   ibuprofen (ADVIL;MOTRIN) tablet 400 mg Q6H PRN   ondansetron (ZOFRAN) injection 4 mg Q8H PRN   aspirin EC tablet 81 mg Daily   busPIRone (BUSPAR) tablet 10 mg TID   insulin glargine (LANTUS) injection vial 50 Units BID   insulin lispro (HUMALOG) injection vial 15 Units 4x Daily AC & HS   lamoTRIgine (LAMICTAL) tablet 200 mg BID   levETIRAcetam (KEPPRA) tablet 1,250 mg BID   lisinopril-hydrochlorothiazide (PRINZIDE;ZESTORETIC) 10-12.5 MG per tablet 1 tablet Daily   cetirizine (ZYRTEC) tablet 5 mg Daily   pantoprazole (PROTONIX) tablet 20 mg QAM AC   PARoxetine (PAXIL) tablet 20 mg QAM   pioglitazone (ACTOS) tablet 15 mg QAM   pramipexole (MIRAPEX) tablet 1 mg Nightly   simvastatin (ZOCOR) tablet 40 mg Nightly   glucose (GLUTOSE) 40 % oral gel 15 g PRN   dextrose 50 % solution 12.5 g PRN   glucagon (rDNA) injection 1 mg PRN dextrose 5 % solution PRN       All medication charted and reviewed. CONSULTS      IP CONSULT TO NEUROLOGY  IP CONSULT TO NEUROLOGY  IP CONSULT TO NEUROLOGY  IP CONSULT TO SOCIAL WORK  IP CONSULT TO ORTHOPEDIC SURGERY    ASSESSMENT/PLAN       Holly Smith is a 59 y.o. female who presents with  acute fall which occurred yesterday, in which she hit both frontal and occipital portions of her head, with possible LOC. She also admits to and illusion of motion and blurred vision, which occurred while walking, and proceeded her fall, resolved spontaneously, not worsened by any intervention, due to unknown etiology    · Orthopedic surgery consult for possible left Lisfranc injury, and possible right occult scaphoid fracture. Weightbearing as tolerated on left foot, without need for mobilization, thumb spica applied to the right upper extremity, no lifting, pushing, pulling right upper extremity  · Neurology consult-MRI brain, cervical spine, lumbar spine, EEG, echocardiogram.  Awaiting further recommendations  · Continue home medications and pain control  · Monitor vitals, labs, and imaging  · DISPO: pending consults and clinical improvement    --  Haley Canas  Emergency Medicine Resident Physician     This dictation was generated by voice recognition computer software. Although all attempts are made to edit the dictation for accuracy, there may be errors in the transcription that are not intended.

## 2017-12-16 NOTE — PROCEDURES
[address] [address]                            ELECTROENCEPHALOGRAM REPORT    PATIENT NAME: Nestor Pruett                     :        1955  MED REC NO:   [id number]                             ROOM:       2914  ACCOUNT NO:   [de-identified]                           ADMIT DATE: 2017  PROVIDER:     Silver Ayers    DATE OF EE/15/2017    HISTORY:  This is a 49-year-old female with breakthrough seizure. MEDICATIONS:  Include Keppra. DESCRIPTION OF THE PROCEDURE:  Electrodes were applied using paste in  positions dictated by the International 10-20 System of placement. Reviewing montages included both referential and bipolar derivations. In  addition to EEG data, EKG and eye movements were recorded. This is a  routine recording. This test was performed on 12/15/2017. DESCRIPTION OF ACTIVITIES:  At the onset of the study, the patient is  awake, and during wakefulness, there are continuous runs of 8-9 Hz at 40-60  microvolts symmetric posterior alpha rhythm which attenuated symmetrically  with eye opening. High-frequency, low-voltage beta activity noted  occurring in bilateral anterior head regions. This study did not  demonstrate any ongoing electrographic seizure activity. No epileptiform  discharges noted. No electrographic seizures noted. Hyperventilation and  photic stimulation were not performed. ELECTRODIAGNOSTIC INTERPRETATION:  This EEG performed during drowsiness and  sleep is essentially unremarkable. No epileptiform discharges noted. No  electrographic seizures noted.         Tabitha Pena    D: 12/15/2017 18:07:07       T: 12/15/2017 19:34:46     [email or web address]  Job#: [id number]     Doc#: [id number]    CC:

## 2017-12-17 ASSESSMENT — ENCOUNTER SYMPTOMS
SPUTUM PRODUCTION: 0
BACK PAIN: 0
SHORTNESS OF BREATH: 0
SINUS PAIN: 0
COUGH: 0
BLURRED VISION: 0
EYE REDNESS: 0

## 2017-12-17 NOTE — DISCHARGE SUMMARY
CDU Discharge Summary        Patient:  Mer Spears  YOB: 1955    MRN: [id number]   Acct: [de-identified]    Primary Care Physician: Sami Robles MD    Admit date:  12/13/2017 12:56 PM  Discharge date: 12/15/2017  7:32 PM     Discharge Diagnoses:     1.) acute fall which occurred yesterday, in which she hit both frontal and occipital portions of her head, with possible LOC, She also admits to and illusion of motion and blurred vision, which occurred while walking, and proceeded her fall, resolved spontaneously, not worsened by any intervention, possibly due to transient ischemic attack or orthostatic hypotension, evaluated by neurology consult and treated with outpatient follow up  2.) Acute suspected occult right scaphoid fracture, evaluated by orthopedic surgery, treated with thumb spica splint and outpatient follow-up      Follow-up:  Call today/tomorrow for a follow up appointment with Sami Robles MD , or return to the Emergency Room with worsening symptoms    Stressed to patient the importance of following up with primary care doctor for further workup/management of symptoms. Pt verbalizes understanding and agrees with plan. Discharge Medication Changes:       Medication List      CHANGE how you take these medications    * levETIRAcetam 250 MG tablet  Commonly known as:  KEPPRA  Take 1 tablet by mouth 2 times daily  What changed:  additional instructions     * levETIRAcetam 1000 MG tablet  Commonly known as:  KEPPRA  Take 1 tablet by mouth 2 times daily Your new dose is 1250 mg twice daily  What changed:  additional instructions        * This list has 2 medication(s) that are the same as other medications prescribed for you. Read the directions carefully, and ask your doctor or other care provider to review them with you.             CONTINUE taking these medications    aspirin 81 MG tablet     Blood Glucose Monitoring Suppl Monica  Check blood sugars 3/day     BLOOD GLUCOSE TEST STRIPS Strp  Test 3  times daily Insulin Dependent mellitus     busPIRone 10 MG tablet  Commonly known as:  BUSPAR     HUMALOG 100 UNIT/ML injection vial  Generic drug:  insulin lispro     insulin glargine 100 UNIT/ML injection vial  Commonly known as:  LANTUS     lamoTRIgine 200 MG tablet  Commonly known as:  LAMICTAL     Lancets Misc  Check 3/day     lisinopril-hydrochlorothiazide 10-12.5 MG per tablet  Commonly known as:  PRINZIDE;ZESTORETIC     loratadine 10 MG tablet  Commonly known as:  CLARITIN     metFORMIN 1000 MG tablet  Commonly known as:  GLUCOPHAGE     omeprazole 20 MG delayed release capsule  Commonly known as:  PRILOSEC     oxyCODONE-acetaminophen 5-325 MG per tablet  Commonly known as:  PERCOCET  Take 1-2 tablets by mouth every 6 hours as needed for Pain .      PARoxetine 20 MG tablet  Commonly known as:  PAXIL     pioglitazone 15 MG tablet  Commonly known as:  ACTOS     pramipexole 1 MG tablet  Commonly known as:  MIRAPEX     simvastatin 40 MG tablet  Commonly known as:  ZOCOR            Diet:   , advance as tolerated     Activity:  As tolerated    Consultants: IP CONSULT TO NEUROLOGY  IP CONSULT TO NEUROLOGY  IP CONSULT TO NEUROLOGY  IP CONSULT TO SOCIAL WORK  IP CONSULT TO ORTHOPEDIC SURGERY    Procedures:  Not indicated    Diagnostic Test:   Results for orders placed or performed during the hospital encounter of 12/13/17   CBC WITH AUTO DIFFERENTIAL   Result Value Ref Range    WBC 3.8 3.5 - 11.3 k/uL    RBC 4.71 3.95 - 5.11 m/uL    Hemoglobin 12.4 11.9 - 15.1 g/dL    Hematocrit 40.7 36.3 - 47.1 %    MCV 86.4 82.6 - 102.9 fL    MCH 26.3 25.2 - 33.5 pg    MCHC 30.5 28.4 - 34.8 g/dL    RDW 13.3 11.8 - 14.4 %    Platelets 89 (L) 959 - 453 k/uL    MPV 10.0 8.1 - 13.5 fL    Differential Type NOT REPORTED     Seg Neutrophils 61 36 - 65 %    Lymphocytes 31 24 - 43 %    Monocytes 7 3 - 12 %    Eosinophils % 1 1 - 4 %    Basophils 0 0 - 2 %    Immature Granulocytes 0 0 %    Segs Absolute 2.29 1.50 - 8.10 k/uL 65 - 105 mg/dL   POC Glucose Fingerstick   Result Value Ref Range    POC Glucose 152 (H) 65 - 105 mg/dL   POC Glucose Fingerstick   Result Value Ref Range    POC Glucose 133 (H) 65 - 105 mg/dL   EKG 12 Lead   Result Value Ref Range    Ventricular Rate 89 BPM    Atrial Rate 89 BPM    P-R Interval 146 ms    QRS Duration 72 ms    Q-T Interval 364 ms    QTc Calculation (Bazett) 442 ms    P Axis 15 degrees    R Axis 21 degrees    T Axis 42 degrees     Xr Thoracic Spine (3 Views)    Result Date: 12/13/2017  EXAMINATION: 3 VIEWS OF THE THORACIC SPINE 12/13/2017 2:08 pm COMPARISON: None. HISTORY: ORDERING SYSTEM PROVIDED HISTORY: Fall. Midline tenderness. TECHNOLOGIST PROVIDED HISTORY: Reason for exam:->Fall. Midline tenderness. FINDINGS: Thoracic vertebral bodies are normal in height and alignment. Intervertebral disc spaces are maintained. Multilevel degenerative disc disease. No evidence of fracture. Pedicles are symmetric and intact. Visualized lungs are clear. 1. Multilevel degenerative disc disease 2. No acute thoracic spine abnormality     Xr Lumbar Spine (2-3 Views)    Result Date: 12/13/2017  EXAMINATION: 3 VIEWS OF THE LUMBAR SPINE 12/13/2017 2:08 pm COMPARISON: None. HISTORY: ORDERING SYSTEM PROVIDED HISTORY: Midline tenderness. TECHNOLOGIST PROVIDED HISTORY: Reason for exam:->Midline tenderness. Fall, acute back pain FINDINGS: Mild grade 1 anterolisthesis of L4 on L5 appears degenerative in etiology. Otherwise, anatomic alignment. Multilevel degenerative disc disease and facet joint arthropathy are noted. No fractures or destructive bony abnormalities are identified. 1. Multilevel degenerative disc disease and facet joint arthropathy 2. No acute lumbar spine abnormality 3.  Mild grade 1 anterolisthesis of L4 on L5 appears degenerative in etiology     Xr Foot Left (min 3 Views)    Result Date: 12/14/2017  EXAMINATION: 3 VIEWS OF THE LEFT FOOT 12/14/2017 7:48 pm COMPARISON: Right foot films of the same without the administration of intravenous contrast. Dose modulation, iterative reconstruction, and/or weight based adjustment of the mA/kV was utilized to reduce the radiation dose to as low as reasonably achievable. COMPARISON: 10/21/2017 HISTORY: ORDERING SYSTEM PROVIDED HISTORY: Fall. FINDINGS: BRAIN/VENTRICLES: There is no acute intracranial hemorrhage, mass effect or midline shift. No abnormal extra-axial fluid collection. The gray-white differentiation is maintained without evidence of an acute infarct. There is no evidence of hydrocephalus. ORBITS: The visualized portion of the orbits demonstrate no acute abnormality. SINUSES: The visualized paranasal sinuses and mastoid air cells demonstrate no acute abnormality. Small polyp or mucous retention cyst noted in the left maxillary sinus. SOFT TISSUES/SKULL:  No acute abnormality of the visualized skull or soft tissues. No acute intracranial abnormality. Ct Cervical Spine Wo Contrast    Result Date: 12/13/2017  EXAMINATION: CT OF THE CERVICAL SPINE WITHOUT CONTRAST 12/13/2017 3:09 pm TECHNIQUE: CT of the cervical spine was performed without the administration of intravenous contrast. Multiplanar reformatted images are provided for review. Dose modulation, iterative reconstruction, and/or weight based adjustment of the mA/kV was utilized to reduce the radiation dose to as low as reasonably achievable. COMPARISON: None HISTORY: ORDERING SYSTEM PROVIDED HISTORY: Midline tenderness. Fall. 66-year-old female with midline neck tenderness after a fall FINDINGS: BONES/ALIGNMENT: Cervical spine is imaged from the skull base to the mid T3 vertebral body level. Gross preservation of the vertebral body heights. Alignment well maintained. Odontoid appears grossly intact. Lateral masses symmetric in appearance. Occipital condyles articulate properly with the lateral masses.   Axial images demonstrate no clear evidence for acute fracture within the cervical mild multilevel disc space narrowing and endplate spurring. Moderate to severe multilevel facet arthropathy is noted as well, likely accounting for the anterolisthesis at L4-L5. At L2-L3, there is mild broad-based disc bulge, eccentric to the left, resulting in mild narrowing of the left neural foramen. At L3-L4, there is mild broad-based disc bulge and facet arthropathy, resulting in mild narrowing of the spinal canal and bilateral neural foramina. There also appears to be mild spinal canal narrowing and mild left neural foraminal narrowing at L4-L5. Mild left neural foraminal narrowing is also noted at L5-S1. SOFT TISSUES/RETROPERITONEUM: No paraspinal mass is seen. 1.  No acute fracture or malalignment in the lumbar spine. 2.  Minimal anterolisthesis at L4-L5, secondary to facet arthropathy. No spondylolysis. 3.  Mild multilevel degenerative disc disease and moderate to severe facet arthropathy. Degenerative changes result in mild spinal canal narrowing at L3-L4, and L4-L5. There is also mild multilevel neural foraminal narrowing, worse on the left. Mri Cervical Spine Wo Contrast    Result Date: 12/14/2017  EXAMINATION: MRI OF THE CERVICAL SPINE WITHOUT CONTRAST 12/14/2017 5:06 pm TECHNIQUE: Multiplanar multisequence MRI of the cervical spine was performed without the administration of intravenous contrast. COMPARISON: None. HISTORY: ORDERING SYSTEM PROVIDED HISTORY: fall, neck pain FINDINGS: The exam is significantly degraded by motion artifact. No acute fracture or traumatic subluxation is identified. There is disc desiccation throughout the cervical spine. Normal expected signal voids are present within the vertebral arteries. The cervical cord appears grossly normal in size and signal intensity. Disc osteophyte complexes in combination with ligamentum flavum thickening result in moderate narrowing of the thecal sac from C2-C3 to C4-C5.  There also appears to be moderate right-sided neural defect within the right L3-4 neural foramen resulting in mild impingement of the exiting L3 nerve root. This may reflect redundant synovium from the ipsilateral facet versus, less likely, a focal disc protrusion. Xr Chest Portable    Result Date: 12/13/2017  EXAMINATION: SINGLE VIEW OF THE CHEST 12/13/2017 2:08 pm COMPARISON: 10/21/2017 HISTORY: ORDERING SYSTEM PROVIDED HISTORY: Fall. TECHNOLOGIST PROVIDED HISTORY: Reason for exam:->Fall. FINDINGS: The lungs are without acute focal process. There is no effusion or pneumothorax. The cardiomediastinal silhouette is stable. The osseous structures are stable. No acute process. Cta Neck W Contrast    Result Date: 12/13/2017  EXAMINATION: CTA OF THE NECK; CTA OF THE HEAD WITH CONTRAST 12/13/2017 3:10 pm TECHNIQUE: CTA of the neck was performed with the administration of intravenous contrast. Multiplanar reformatted images are provided for review. MIP images are provided for review. Stenosis of the internal carotid arteries measured using NASCET criteria. Dose modulation, iterative reconstruction, and/or weight based adjustment of the mA/kV was utilized to reduce the radiation dose to as low as reasonably achievable.; CTA of the head/brain was performed with the administration of intravenous contrast. Multiplanar reformatted images are provided for review. MIP images are provided for review. Dose modulation, iterative reconstruction, and/or weight based adjustment of the mA/kV was utilized to reduce the radiation dose to as low as reasonably achievable. COMPARISON: None. HISTORY: ORDERING SYSTEM PROVIDED HISTORY: Concern for vertibrobasilar insufficiency FINDINGS: AORTIC ARCH/GREAT VESSELS: There is a normal branch pattern of the aortic arch. No significant stenosis is seen of the innominate artery or subclavian arteries. CAROTID ARTERIES: The common carotid arteries are normal in appearance without evidence of a flow limiting stenosis.   Approximately 50% Ostial calcification at the takeoff of the right vertebral artery without evidence of stenosis. 2. Heavy atherosclerosis, especially at the left carotid bifurcation however no flow-limiting stenosis. Less than 50% narrowing on the right side. 3. Slightly diminutive right vertebral artery compared to the left however no flow-limiting stenosis or occlusion. 4. Intracranial anterior and posterior circulation appear unremarkable. Mri Brain Wo Contrast    Result Date: 12/14/2017  EXAMINATION: MRI OF THE BRAIN WITHOUT CONTRAST  12/14/2017 5:06 pm TECHNIQUE: Multiplanar multisequence MRI of the brain was performed without the administration of intravenous contrast. COMPARISON: None. HISTORY: ORDERING SYSTEM PROVIDED HISTORY: fall FINDINGS: INTRACRANIAL STRUCTURES/VENTRICLES: There is no acute infarct. There are minimal chronic white matter microvascular ischemic changes characterized by foci of subcortical and periventricular white matter signal abnormality. No mass, shift, or bleed is identified. ORBITS: The visualized portion of the orbits demonstrate no acute abnormality. SINUSES: The visualized paranasal sinuses and mastoid air cells are well aerated. BONES/SOFT TISSUES: The bone marrow signal intensity appears normal. The craniocervical junction is normal in appearance. No acute intracranial abnormality detected. Xr Hip 2-3 Vw W Pelvis Left    Result Date: 12/13/2017  EXAMINATION: SINGLE VIEW OF THE PELVIS AND 2 VIEWS LEFT HIP 12/13/2017 2:08 pm COMPARISON: None HISTORY: ORDERING SYSTEM PROVIDED HISTORY: Tenderness over the L hip. TECHNOLOGIST PROVIDED HISTORY: Reason for exam:->Tenderness over the L hip. FINDINGS: Pelvic bones are intact without evidence of acute fracture or displacement. No focal osteolytic lesions are identified. There is no acute fracture or dislocation at the left hip. Mild marginal spurring is noted. There is also enthesopathic spurring at the left greater trochanter.   Small

## 2017-12-20 ENCOUNTER — TELEPHONE (OUTPATIENT)
Dept: CARE COORDINATION | Age: 62
End: 2017-12-20

## 2017-12-20 NOTE — TELEPHONE ENCOUNTER
Oh okay, I knew that she had been in the hospital, probably didn't read far enough into her chart, about SNF, thanks.

## 2017-12-22 ENCOUNTER — CARE COORDINATION (OUTPATIENT)
Dept: CASE MANAGEMENT | Age: 62
End: 2017-12-22

## 2017-12-22 NOTE — CARE COORDINATION
[facility] Update Call    2017    Patient: Jam Quintana Patient : 1955   MRN: [id number]  Reason for Admission: There are no discharge diagnoses documented for the most recent discharge. Discharge Date: 12/15/17 RARS: [facility] Risk Score: 19.75       Care Transitions Post Acute Facility Update    Care Transitions Interventions  Post Acute Facility:  [address] Update     Secure e-mail sent to the Case Management Team @ SNF. Discharge Planning, Clinical and Functional status report requested.

## 2017-12-26 ENCOUNTER — CARE COORDINATION (OUTPATIENT)
Dept: CARE COORDINATION | Age: 62
End: 2017-12-26

## 2017-12-28 ENCOUNTER — CARE COORDINATION (OUTPATIENT)
Dept: CASE MANAGEMENT | Age: 62
End: 2017-12-28

## 2018-01-03 ENCOUNTER — OFFICE VISIT (OUTPATIENT)
Dept: PODIATRY | Age: 63
End: 2018-01-03
Payer: MEDICARE

## 2018-01-03 ENCOUNTER — OFFICE VISIT (OUTPATIENT)
Dept: ORTHOPEDIC SURGERY | Age: 63
End: 2018-01-03
Payer: MEDICARE

## 2018-01-03 VITALS
WEIGHT: 207.01 LBS | HEIGHT: 61 IN | HEART RATE: 89 BPM | BODY MASS INDEX: 39.08 KG/M2 | SYSTOLIC BLOOD PRESSURE: 130 MMHG | DIASTOLIC BLOOD PRESSURE: 60 MMHG

## 2018-01-03 VITALS — HEIGHT: 59 IN | WEIGHT: 206.35 LBS | BODY MASS INDEX: 41.6 KG/M2

## 2018-01-03 DIAGNOSIS — Z91.81 AT HIGH RISK FOR FALLS: ICD-10-CM

## 2018-01-03 DIAGNOSIS — S93.622A SPRAIN OF TARSOMETATARSAL LIGAMENT OF LEFT FOOT, INITIAL ENCOUNTER: Primary | ICD-10-CM

## 2018-01-03 DIAGNOSIS — M25.531 RIGHT WRIST PAIN: Primary | ICD-10-CM

## 2018-01-03 PROBLEM — G25.81 RLS (RESTLESS LEGS SYNDROME): Status: ACTIVE | Noted: 2017-01-09

## 2018-01-03 PROBLEM — E87.1 HYPONATREMIA: Status: ACTIVE | Noted: 2017-03-15

## 2018-01-03 PROBLEM — F41.8 DEPRESSION WITH ANXIETY: Status: ACTIVE | Noted: 2017-01-09

## 2018-01-03 PROBLEM — R53.1 GENERALIZED WEAKNESS: Status: ACTIVE | Noted: 2017-03-15

## 2018-01-03 PROBLEM — R73.9 HYPERGLYCEMIA: Status: ACTIVE | Noted: 2017-03-15

## 2018-01-03 PROBLEM — E78.00 PURE HYPERCHOLESTEROLEMIA: Status: ACTIVE | Noted: 2017-01-09

## 2018-01-03 PROBLEM — R42 DIZZINESS: Status: ACTIVE | Noted: 2017-04-20

## 2018-01-03 PROBLEM — E11.9 TYPE 2 DIABETES MELLITUS (HCC): Status: ACTIVE | Noted: 2017-10-21

## 2018-01-03 PROCEDURE — 3017F COLORECTAL CA SCREEN DOC REV: CPT | Performed by: STUDENT IN AN ORGANIZED HEALTH CARE EDUCATION/TRAINING PROGRAM

## 2018-01-03 PROCEDURE — G8484 FLU IMMUNIZE NO ADMIN: HCPCS | Performed by: STUDENT IN AN ORGANIZED HEALTH CARE EDUCATION/TRAINING PROGRAM

## 2018-01-03 PROCEDURE — 1036F TOBACCO NON-USER: CPT | Performed by: STUDENT IN AN ORGANIZED HEALTH CARE EDUCATION/TRAINING PROGRAM

## 2018-01-03 PROCEDURE — G8417 CALC BMI ABV UP PARAM F/U: HCPCS | Performed by: STUDENT IN AN ORGANIZED HEALTH CARE EDUCATION/TRAINING PROGRAM

## 2018-01-03 PROCEDURE — 3014F SCREEN MAMMO DOC REV: CPT | Performed by: STUDENT IN AN ORGANIZED HEALTH CARE EDUCATION/TRAINING PROGRAM

## 2018-01-03 PROCEDURE — G8427 DOCREV CUR MEDS BY ELIG CLIN: HCPCS | Performed by: STUDENT IN AN ORGANIZED HEALTH CARE EDUCATION/TRAINING PROGRAM

## 2018-01-03 PROCEDURE — 99203 OFFICE O/P NEW LOW 30 MIN: CPT | Performed by: PODIATRIST

## 2018-01-03 PROCEDURE — 99213 OFFICE O/P EST LOW 20 MIN: CPT | Performed by: STUDENT IN AN ORGANIZED HEALTH CARE EDUCATION/TRAINING PROGRAM

## 2018-01-03 NOTE — PROGRESS NOTES
reviewed the CC, HPI, ROS, PMH, FHX, Social History. I agree with the documentation provided by other staff, residents, and/or medical students and have reviewed their documentation prior to providing my signature indicating agreement. Objective :   General: AAOx3, NAD, appears stated age  Ortho Exam  MS: Right wrist: Range of motion limited due to pain. Pain over radial aspect of distal radius. Dorsal aspects of radiocarpal junction, positive snuffbox tenderness. Unable to perform Rodrick Gross shift test due to tolerance patient. CV: no obvious JVD, no dependent edema, distal pulses 2+  Respiratory: chest rise symmetric, unlabored respirations, no audible wheezing  Skin: warm, well perfused, no obvious rashes or lesions  Psych: Patient displays understanding of exam, diagnosis, and plan. Radiology:   History:   Right wrist pain after fall from standing height    Comparison:   Right wrist films performed on 12/10. Findings:   AP, lateral, oblique, scaphoid view, clenched fist view of the right wrist demonstrate no gross osseous abnormalities. No ligamentous instability noted. Proper joint alignment. Mild osteoarthritis of radiocarpal joint. Impression:  No gross osseous abnormality or ligamentous instability noted on imaging. Essentially normal wrist films with mild osteoarthritis. Assessment:      1. Right wrist pain         Plan:   Spica splint was replaced in clinic today. Patient is to have a removable right wrist thumb spica brace placed at her outside facility today. Order has been placed in patient's chart. We will have the patient's begin performing range of motion exercises of her wrist and fingers. No active pushing, pulling, lifting. Follow up in clinic in 4-6 weeks. For her left foot pain, we will have her continue to follow up with podiatry clinic for management. Follow up:No Follow-up on file. No orders of the defined types were placed in this encounter.          Orders Placed This

## 2018-01-03 NOTE — PROGRESS NOTES
Pressure  BP Readings from Last 3 Encounters:   01/03/18 130/60   12/15/17 (!) 150/61   12/12/17 130/63      []  If SBP >140 mmhg - refer to PCP for follow up   []  If DBP > 90 mmhg - refer to PCP for follow up   [] BP is controlled <140/90     Order labs as PCP ordered.   (ie: Lipids, A1C, CMP)

## 2018-01-05 ENCOUNTER — CARE COORDINATION (OUTPATIENT)
Dept: CASE MANAGEMENT | Age: 63
End: 2018-01-05

## 2018-01-15 ENCOUNTER — CARE COORDINATION (OUTPATIENT)
Dept: CARE COORDINATION | Age: 63
End: 2018-01-15

## 2018-01-16 ENCOUNTER — TELEPHONE (OUTPATIENT)
Dept: INTERNAL MEDICINE | Age: 63
End: 2018-01-16

## 2018-01-16 NOTE — TELEPHONE ENCOUNTER
To Whom it may concern:  Ogla Guadarrama has been under my care since 11/30/2017. She has a history of CVA, with mild residual left sided weakness. She also has a history of diabetes, with diabetic neuropathy causing gait imbalance and difficulties with ambulation. She has had recurrent falls recently- 3 falls within a week which ended up in hospitalization. She sustained a concussion with most recent fall; and had fractured left foot and a right wrist sprain from previous falls. In reviewing the podiatry and orthopedic notes from 1/3/2018, she is not able to push, pull, or lift any weight with her right hand, which is her dominant hand. She is also wearing a CAM boot for ambulation. The patient has been using a wheelchair at the facility to get around. It is my opinion that discharging Neisha Mortensen to her home environment, where she lives alone, would not be safe at this time. She is very unsteady and not able to manage a walker with her left hand weakness and limited use of right hand. She is at a very high risk for falls. I feel she would benefit from further PT and OT until she is able to ambulate safely with a walker and manage at home.

## 2018-01-17 ENCOUNTER — CARE COORDINATION (OUTPATIENT)
Dept: CARE COORDINATION | Age: 63
End: 2018-01-17

## 2018-01-17 ENCOUNTER — CARE COORDINATION (OUTPATIENT)
Dept: CASE MANAGEMENT | Age: 63
End: 2018-01-17

## 2018-01-18 ENCOUNTER — CARE COORDINATION (OUTPATIENT)
Dept: CASE MANAGEMENT | Age: 63
End: 2018-01-18

## 2018-01-22 ENCOUNTER — CARE COORDINATION (OUTPATIENT)
Dept: CARE COORDINATION | Age: 63
End: 2018-01-22

## 2018-01-31 ENCOUNTER — OFFICE VISIT (OUTPATIENT)
Dept: PODIATRY | Age: 63
End: 2018-01-31
Payer: MEDICARE

## 2018-01-31 ENCOUNTER — OFFICE VISIT (OUTPATIENT)
Dept: ORTHOPEDIC SURGERY | Age: 63
End: 2018-01-31
Payer: MEDICARE

## 2018-01-31 VITALS
WEIGHT: 207 LBS | TEMPERATURE: 97.9 F | DIASTOLIC BLOOD PRESSURE: 65 MMHG | SYSTOLIC BLOOD PRESSURE: 128 MMHG | HEART RATE: 93 BPM | HEIGHT: 61 IN | BODY MASS INDEX: 39.08 KG/M2

## 2018-01-31 VITALS — WEIGHT: 207 LBS | HEIGHT: 61 IN | BODY MASS INDEX: 39.08 KG/M2

## 2018-01-31 DIAGNOSIS — R60.0 EDEMA OF LOWER EXTREMITY: ICD-10-CM

## 2018-01-31 DIAGNOSIS — S93.602A SPRAIN OF LEFT FOOT, INITIAL ENCOUNTER: ICD-10-CM

## 2018-01-31 DIAGNOSIS — S93.601A SPRAIN OF RIGHT FOOT, INITIAL ENCOUNTER: ICD-10-CM

## 2018-01-31 DIAGNOSIS — M65.4 DE QUERVAIN'S TENOSYNOVITIS, RIGHT: Primary | ICD-10-CM

## 2018-01-31 DIAGNOSIS — E08.42 DIABETIC POLYNEUROPATHY ASSOCIATED WITH DIABETES MELLITUS DUE TO UNDERLYING CONDITION (HCC): ICD-10-CM

## 2018-01-31 DIAGNOSIS — M79.671 RIGHT FOOT PAIN: ICD-10-CM

## 2018-01-31 DIAGNOSIS — M20.41 HAMMER TOES OF BOTH FEET: ICD-10-CM

## 2018-01-31 DIAGNOSIS — M79.672 LEFT FOOT PAIN: Primary | ICD-10-CM

## 2018-01-31 DIAGNOSIS — M20.42 HAMMER TOES OF BOTH FEET: ICD-10-CM

## 2018-01-31 PROCEDURE — 3017F COLORECTAL CA SCREEN DOC REV: CPT | Performed by: ORTHOPAEDIC SURGERY

## 2018-01-31 PROCEDURE — G8427 DOCREV CUR MEDS BY ELIG CLIN: HCPCS | Performed by: PODIATRIST

## 2018-01-31 PROCEDURE — G8484 FLU IMMUNIZE NO ADMIN: HCPCS | Performed by: PODIATRIST

## 2018-01-31 PROCEDURE — 1036F TOBACCO NON-USER: CPT | Performed by: ORTHOPAEDIC SURGERY

## 2018-01-31 PROCEDURE — G8427 DOCREV CUR MEDS BY ELIG CLIN: HCPCS | Performed by: ORTHOPAEDIC SURGERY

## 2018-01-31 PROCEDURE — 99213 OFFICE O/P EST LOW 20 MIN: CPT | Performed by: ORTHOPAEDIC SURGERY

## 2018-01-31 PROCEDURE — 1036F TOBACCO NON-USER: CPT | Performed by: PODIATRIST

## 2018-01-31 PROCEDURE — G8484 FLU IMMUNIZE NO ADMIN: HCPCS | Performed by: ORTHOPAEDIC SURGERY

## 2018-01-31 PROCEDURE — 3014F SCREEN MAMMO DOC REV: CPT | Performed by: PODIATRIST

## 2018-01-31 PROCEDURE — 99213 OFFICE O/P EST LOW 20 MIN: CPT | Performed by: PODIATRIST

## 2018-01-31 PROCEDURE — 3017F COLORECTAL CA SCREEN DOC REV: CPT | Performed by: PODIATRIST

## 2018-01-31 PROCEDURE — 20550 NJX 1 TENDON SHEATH/LIGAMENT: CPT | Performed by: ORTHOPAEDIC SURGERY

## 2018-01-31 PROCEDURE — G8417 CALC BMI ABV UP PARAM F/U: HCPCS | Performed by: PODIATRIST

## 2018-01-31 PROCEDURE — 3014F SCREEN MAMMO DOC REV: CPT | Performed by: ORTHOPAEDIC SURGERY

## 2018-01-31 PROCEDURE — G8417 CALC BMI ABV UP PARAM F/U: HCPCS | Performed by: ORTHOPAEDIC SURGERY

## 2018-01-31 NOTE — PROGRESS NOTES
mild pain in the first dorsal compartment. Soft, compressible compartments. 2+ radial pulse. AIN/PIN/rad/u/med motor intact. C5-T1 SILT. Radiology:   None today    Assessment:      1. De Quervain's tenosynovitis, right       Plan:      Cortisone injection was performed in the [facility] first dorsal compartment of the right wrist.  Patient is to discontinue use of her thumb spica splint. We want her to continue working with occupational therapy for range of motion and strengthening. She is to follow up with podiatry today with her left foot. If she continues to have wrist pain we'll see her back in about 4 weeks. Otherwise she may follow-up when necessary. Procedure: With the patient's permission, and under sterile condition, Right 1st dorsal compartment was prepared with betadine and was injected with a mixture of 1 ml DepoMedrol 40mg and 1 ml of 1% lidocaine. The patient tolerated the procedure well. A band-aid was applied. Reported improvement immediatly after the injection. Follow up:Return in about 4 weeks (around 2/28/2018). No orders of the defined types were placed in this encounter. No orders of the defined types were placed in this encounter. Electronically signed by Gilles Mendez DO on 1/31/2018 at 3:09 PM     Attending Physician Statement  I have discussed the case, including pertinent history and exam findings with the resident. I have seen and examined the patient on 1.31.18 and the key elements of the encounter have been performed by me. I agree with the assessment, plan and orders as documented by the resident.

## 2018-01-31 NOTE — PROGRESS NOTES
[facility]    Subjective     HPI:  Nan Ray is a 57y.o. year old female who presents to clinic today to follow up Left foot pain related to fall from standing at home mid-December at which point she sprained her Right wrist and her Left foot. XR have not demonstrated acute fractures however there is concern for subtle Lisfranc injury. She has been wearing a CAM boot to LLE and has been in a wheelchair moving herself with RLE and LUE. She states she does not feel her Left foot pain has improved since being immobilized in the boot. She also states she has noticed about 10lb of \"water weight\" accumulated over past month. Relates edema RLE > LLE and states also that her Right foot hurts. Currently on lisinopril-HCTZ and says she needs to follow up with her PCP next few weeks. Patient is diabetic and relates peripheral polyneuropathy. She is requesting diabetic shoes. H/o frequent falls. S/p stroke circa 2010 which increased LE weakness beginning circa 2015. She is participating with PT which she states is helpful to her. Primary care physician is Jose Antonio rBiscoe MD .    ROS: Negative except as noted in the HPI. Denies N/V/F/C/SOB. PMH:  Past Medical History:   Diagnosis Date    Anemia     Cataract     GERD (gastroesophageal reflux disease)     Headache     Hyperlipidemia     Neuropathy (HCC)     Osteoarthritis     Seizures (HCC) since age 10    Stroke (cerebrum) ([facility] 2010    Type 2 diabetes mellitus without complication ([facility] [address] 5404       Surgical History:   Past Surgical History:   Procedure Laterality Date    CATARACT REMOVAL      CHOLECYSTECTOMY      EYE SURGERY      cataract    TONSILLECTOMY         Social History:  Social History   Substance Use Topics    Smoking status: Never Smoker    Smokeless tobacco: Never Used    Alcohol use No       Medications:  Prior to Admission medications    Medication Sig Start Date End Date Taking?  Authorizing Provider oxyCODONE-acetaminophen (PERCOCET) 5-325 MG per tablet Take 1-2 tablets by mouth every 6 hours as needed for Pain .  12/10/17  Yes Osito Farfan DO   metFORMIN (GLUCOPHAGE) 1000 MG tablet Take 1,000 mg by mouth 2 times daily (with meals)   Yes Historical Provider, MD   aspirin 81 MG tablet Take 81 mg by mouth daily   Yes Historical Provider, MD   Blood Glucose Monitoring Suppl FLAVIO Check blood sugars 3/day 11/30/17  Yes Ashvin Tran MD   Glucose Blood (BLOOD GLUCOSE TEST STRIPS) STRP Test 3  times daily Insulin Dependent mellitus 11/30/17  Yes Ashvin Tran MD   Lancets MISC Check 3/day 11/30/17  Yes Ashvin Tran MD   levETIRAcetam (KEPPRA) 250 MG tablet Take 1 tablet by mouth 2 times daily  Patient taking differently: Take 250 mg by mouth 2 times daily Taking 250 mg tablet along with 2- 500 mg tablets for a total of 1250 mg. 10/23/17  Yes Etienne Zelaya MD   levETIRAcetam (KEPPRA) 1000 MG tablet Take 1 tablet by mouth 2 times daily Your new dose is 1250 mg twice daily  Patient taking differently: Take 1,000 mg by mouth 2 times daily Patient is taking 2- 500 mg tablets along with a 250 mg tablet for a total of 1250 mg. 10/23/17  Yes Etienne Zelaya MD   lisinopril-hydrochlorothiazide (PRINZIDE;ZESTORETIC) 10-12.5 MG per tablet Take 1 tablet by mouth daily   Yes Historical Provider, MD   PARoxetine (PAXIL) 20 MG tablet Take 20 mg by mouth every morning   Yes Historical Provider, MD   omeprazole (PRILOSEC) 20 MG delayed release capsule Take 20 mg by mouth daily   Yes Historical Provider, MD   loratadine (CLARITIN) 10 MG tablet Take 10 mg by mouth daily   Yes Historical Provider, MD   pioglitazone (ACTOS) 15 MG tablet Take 15 mg by mouth every morning   Yes Historical Provider, MD   simvastatin (ZOCOR) 40 MG tablet Take 40 mg by mouth nightly   Yes Historical Provider, MD   pramipexole (MIRAPEX) 1 MG tablet Take 1 mg by mouth nightly   Yes Historical Provider, MD   lamoTRIgine (LAMICTAL) 200 MG tablet Take initial encounter    6. Hammer toes of both feet    7. Diabetic polyneuropathy associated with diabetes mellitus due to underlying condition ([facility] [address]         Plan      - Patient examined and evaluated  - Diagnosis and treatment options discussed in detail  - XR reviewed of bilateral foot - discussed absence of fractures visualized  - Discussed CT scan as affordable imaging to confirm no fractures not seen on XR - patient agreeable and requested scan on Right foot in addition to Left as she also has pain on Right.  She states her Right foot pain did not require another CAM boot  - Continue WBAT in CAM boot, wheelchair PRN  - Cont PT as tolerated  - Ordered CT bilateral  - Rx diabetic shoes provided  - Patient to RTC in 2 week  - Please, call the office with any questions or concerns   - Discussed with Dr. Janki Aguirre    Electronically signed by Zenon Drew DPM on 1/31/2018 at 3:57 PM

## 2018-02-01 RX ORDER — LIDOCAINE HYDROCHLORIDE 10 MG/ML
1 INJECTION, SOLUTION INFILTRATION; PERINEURAL ONCE
Status: COMPLETED | OUTPATIENT
Start: 2018-02-01 | End: 2018-02-01

## 2018-02-01 RX ORDER — METHYLPREDNISOLONE ACETATE 40 MG/ML
40 INJECTION, SUSPENSION INTRA-ARTICULAR; INTRALESIONAL; INTRAMUSCULAR; SOFT TISSUE ONCE
Status: COMPLETED | OUTPATIENT
Start: 2018-02-01 | End: 2018-02-01

## 2018-02-01 RX ADMIN — METHYLPREDNISOLONE ACETATE 40 MG: 40 INJECTION, SUSPENSION INTRA-ARTICULAR; INTRALESIONAL; INTRAMUSCULAR; SOFT TISSUE at 17:10

## 2018-02-01 RX ADMIN — LIDOCAINE HYDROCHLORIDE 1 ML: 10 INJECTION, SOLUTION INFILTRATION; PERINEURAL at 17:11

## 2018-02-13 ENCOUNTER — HOSPITAL ENCOUNTER (OUTPATIENT)
Dept: CT IMAGING | Facility: CLINIC | Age: 63
Discharge: HOME | End: 2018-02-15
Payer: MEDICARE

## 2018-02-13 DIAGNOSIS — R60.0 EDEMA OF LOWER EXTREMITY: ICD-10-CM

## 2018-02-13 DIAGNOSIS — S93.601A SPRAIN OF RIGHT FOOT, INITIAL ENCOUNTER: ICD-10-CM

## 2018-02-13 DIAGNOSIS — M79.671 RIGHT FOOT PAIN: ICD-10-CM

## 2018-02-13 DIAGNOSIS — M79.672 LEFT FOOT PAIN: ICD-10-CM

## 2018-02-13 DIAGNOSIS — S93.602A SPRAIN OF LEFT FOOT, INITIAL ENCOUNTER: ICD-10-CM

## 2018-02-13 PROCEDURE — 73700 CT LOWER EXTREMITY W/O DYE: CPT

## 2018-02-14 ENCOUNTER — CARE COORDINATION (OUTPATIENT)
Dept: CARE COORDINATION | Age: 63
End: 2018-02-14

## 2018-02-14 NOTE — CARE COORDINATION
Called to [facility] to follow up on patient. Inquired on patient's progress. Was informed that the patient was discharged on 2/12/18. Called back to [facility] to request discharge summary and medication list. Spoke with Camelia Dior, discharge planner. She stated that the patient had been [facility] [place] She will fax med list over. She said the patient was going to call and make a follow up appt once she was settled in. She has a follow up here at the office next week, 2/21/18. Will call in a couple of days for follow up prior to appt.

## 2018-02-21 ENCOUNTER — CARE COORDINATION (OUTPATIENT)
Dept: CARE COORDINATION | Age: 63
End: 2018-02-21

## 2018-02-21 ENCOUNTER — OFFICE VISIT (OUTPATIENT)
Dept: ORTHOPEDIC SURGERY | Age: 63
End: 2018-02-21
Payer: MEDICARE

## 2018-02-21 VITALS
BODY MASS INDEX: 39.08 KG/M2 | WEIGHT: 207.01 LBS | DIASTOLIC BLOOD PRESSURE: 63 MMHG | HEIGHT: 61 IN | SYSTOLIC BLOOD PRESSURE: 137 MMHG

## 2018-02-21 DIAGNOSIS — M25.531 WRIST PAIN, RIGHT: Primary | ICD-10-CM

## 2018-02-21 PROCEDURE — 3014F SCREEN MAMMO DOC REV: CPT | Performed by: ORTHOPAEDIC SURGERY

## 2018-02-21 PROCEDURE — 3017F COLORECTAL CA SCREEN DOC REV: CPT | Performed by: ORTHOPAEDIC SURGERY

## 2018-02-21 PROCEDURE — G8417 CALC BMI ABV UP PARAM F/U: HCPCS | Performed by: ORTHOPAEDIC SURGERY

## 2018-02-21 PROCEDURE — G8484 FLU IMMUNIZE NO ADMIN: HCPCS | Performed by: ORTHOPAEDIC SURGERY

## 2018-02-21 PROCEDURE — 99213 OFFICE O/P EST LOW 20 MIN: CPT | Performed by: ORTHOPAEDIC SURGERY

## 2018-02-21 PROCEDURE — G8427 DOCREV CUR MEDS BY ELIG CLIN: HCPCS | Performed by: ORTHOPAEDIC SURGERY

## 2018-02-21 PROCEDURE — 1036F TOBACCO NON-USER: CPT | Performed by: ORTHOPAEDIC SURGERY

## 2018-02-23 ENCOUNTER — TELEPHONE (OUTPATIENT)
Dept: PODIATRY | Age: 63
End: 2018-02-23

## 2018-02-23 NOTE — TELEPHONE ENCOUNTER
Called patient, let her know I rescheduled appt, she told me she couldn't keep doing this and needed to see someone else, but did not explicitly say she wanted to cancel the appt

## 2018-02-26 ENCOUNTER — CARE COORDINATION (OUTPATIENT)
Dept: CARE COORDINATION | Age: 63
End: 2018-02-26

## 2018-02-28 ENCOUNTER — HOSPITAL ENCOUNTER (OUTPATIENT)
Dept: MRI IMAGING | Facility: CLINIC | Age: 63
Discharge: HOME | End: 2018-03-02
Payer: MEDICARE

## 2018-02-28 ENCOUNTER — OFFICE VISIT (OUTPATIENT)
Dept: ORTHOPEDIC SURGERY | Age: 63
End: 2018-02-28
Payer: MEDICARE

## 2018-02-28 VITALS — HEIGHT: 61 IN | WEIGHT: 205.03 LBS | BODY MASS INDEX: 38.71 KG/M2

## 2018-02-28 DIAGNOSIS — M77.8 TENDINITIS OF RIGHT HAND: Primary | ICD-10-CM

## 2018-02-28 DIAGNOSIS — M25.531 WRIST PAIN, RIGHT: ICD-10-CM

## 2018-02-28 PROCEDURE — 73221 MRI JOINT UPR EXTREM W/O DYE: CPT

## 2018-02-28 PROCEDURE — G8484 FLU IMMUNIZE NO ADMIN: HCPCS | Performed by: STUDENT IN AN ORGANIZED HEALTH CARE EDUCATION/TRAINING PROGRAM

## 2018-02-28 PROCEDURE — 3017F COLORECTAL CA SCREEN DOC REV: CPT | Performed by: STUDENT IN AN ORGANIZED HEALTH CARE EDUCATION/TRAINING PROGRAM

## 2018-02-28 PROCEDURE — G8427 DOCREV CUR MEDS BY ELIG CLIN: HCPCS | Performed by: STUDENT IN AN ORGANIZED HEALTH CARE EDUCATION/TRAINING PROGRAM

## 2018-02-28 PROCEDURE — 3014F SCREEN MAMMO DOC REV: CPT | Performed by: STUDENT IN AN ORGANIZED HEALTH CARE EDUCATION/TRAINING PROGRAM

## 2018-02-28 PROCEDURE — G8417 CALC BMI ABV UP PARAM F/U: HCPCS | Performed by: STUDENT IN AN ORGANIZED HEALTH CARE EDUCATION/TRAINING PROGRAM

## 2018-02-28 PROCEDURE — 1036F TOBACCO NON-USER: CPT | Performed by: STUDENT IN AN ORGANIZED HEALTH CARE EDUCATION/TRAINING PROGRAM

## 2018-02-28 PROCEDURE — 99213 OFFICE O/P EST LOW 20 MIN: CPT | Performed by: STUDENT IN AN ORGANIZED HEALTH CARE EDUCATION/TRAINING PROGRAM

## 2018-03-05 ENCOUNTER — TELEPHONE (OUTPATIENT)
Dept: ORTHOPEDIC SURGERY | Age: 63
End: 2018-03-05

## 2018-03-05 NOTE — TELEPHONE ENCOUNTER
OK for weight as patient can tolerate.      Yolanda Addison, DO  PGY-1 Orthopedic Surgery  10:11 AM 03/05/18

## 2018-03-12 ENCOUNTER — CARE COORDINATION (OUTPATIENT)
Dept: CARE COORDINATION | Age: 63
End: 2018-03-12

## 2018-03-14 ENCOUNTER — OFFICE VISIT (OUTPATIENT)
Dept: ORTHOPEDIC SURGERY | Age: 63
End: 2018-03-14
Payer: COMMERCIAL

## 2018-03-14 ENCOUNTER — OFFICE VISIT (OUTPATIENT)
Dept: PODIATRY | Age: 63
End: 2018-03-14
Payer: COMMERCIAL

## 2018-03-14 VITALS
BODY MASS INDEX: 44.29 KG/M2 | HEIGHT: 61 IN | HEART RATE: 91 BPM | WEIGHT: 234.6 LBS | DIASTOLIC BLOOD PRESSURE: 65 MMHG | SYSTOLIC BLOOD PRESSURE: 133 MMHG

## 2018-03-14 VITALS — WEIGHT: 205.03 LBS | BODY MASS INDEX: 38.71 KG/M2 | HEIGHT: 61 IN

## 2018-03-14 DIAGNOSIS — M79.672 LEFT FOOT PAIN: Primary | ICD-10-CM

## 2018-03-14 DIAGNOSIS — S93.602A SPRAIN OF LEFT FOOT, INITIAL ENCOUNTER: ICD-10-CM

## 2018-03-14 DIAGNOSIS — M77.8 TENDINITIS OF RIGHT HAND: Primary | ICD-10-CM

## 2018-03-14 DIAGNOSIS — R60.0 EDEMA OF LOWER EXTREMITY: ICD-10-CM

## 2018-03-14 PROCEDURE — G8482 FLU IMMUNIZE ORDER/ADMIN: HCPCS | Performed by: STUDENT IN AN ORGANIZED HEALTH CARE EDUCATION/TRAINING PROGRAM

## 2018-03-14 PROCEDURE — 3014F SCREEN MAMMO DOC REV: CPT | Performed by: STUDENT IN AN ORGANIZED HEALTH CARE EDUCATION/TRAINING PROGRAM

## 2018-03-14 PROCEDURE — G8417 CALC BMI ABV UP PARAM F/U: HCPCS | Performed by: STUDENT IN AN ORGANIZED HEALTH CARE EDUCATION/TRAINING PROGRAM

## 2018-03-14 PROCEDURE — 99213 OFFICE O/P EST LOW 20 MIN: CPT | Performed by: ORTHOPAEDIC SURGERY

## 2018-03-14 PROCEDURE — G8428 CUR MEDS NOT DOCUMENT: HCPCS | Performed by: STUDENT IN AN ORGANIZED HEALTH CARE EDUCATION/TRAINING PROGRAM

## 2018-03-14 PROCEDURE — 99213 OFFICE O/P EST LOW 20 MIN: CPT | Performed by: STUDENT IN AN ORGANIZED HEALTH CARE EDUCATION/TRAINING PROGRAM

## 2018-03-14 PROCEDURE — 1036F TOBACCO NON-USER: CPT | Performed by: STUDENT IN AN ORGANIZED HEALTH CARE EDUCATION/TRAINING PROGRAM

## 2018-03-14 PROCEDURE — 3017F COLORECTAL CA SCREEN DOC REV: CPT | Performed by: STUDENT IN AN ORGANIZED HEALTH CARE EDUCATION/TRAINING PROGRAM

## 2018-03-15 ENCOUNTER — CARE COORDINATION (OUTPATIENT)
Dept: CARE COORDINATION | Age: 63
End: 2018-03-15

## 2018-03-15 NOTE — CARE COORDINATION
Called to [facility] and spoke to Nikita Warren regarding paperwork that had been faxed to office for patient. It was a request for the physician to discontinue daily blood pressure and pulses. CC stated that the physician never gave an order to check BP and pulse daily; that maybe it was part of their protocol. Also asked if patient was going to schedule an appt to be seen here as she hasn't been seen in over 3 months. Was informed that the patient was not going to make and appt and was establishing care with their physician. Will remove from Care Coordination.

## 2018-04-10 ENCOUNTER — CARE COORDINATION (OUTPATIENT)
Dept: CARE COORDINATION | Age: 63
End: 2018-04-10
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
## 2018-04-18 ENCOUNTER — OFFICE VISIT (OUTPATIENT)
Dept: PODIATRY | Age: 63
End: 2018-04-18
Payer: COMMERCIAL

## 2018-04-18 VITALS — SYSTOLIC BLOOD PRESSURE: 133 MMHG | DIASTOLIC BLOOD PRESSURE: 69 MMHG | HEART RATE: 88 BPM

## 2018-04-18 DIAGNOSIS — R60.0 EDEMA OF LOWER EXTREMITY: ICD-10-CM

## 2018-04-18 DIAGNOSIS — M79.672 LEFT FOOT PAIN: ICD-10-CM

## 2018-04-18 DIAGNOSIS — Z79.4 DIABETES MELLITUS TYPE 2, INSULIN DEPENDENT (HCC): ICD-10-CM

## 2018-04-18 DIAGNOSIS — S93.622D SPRAIN OF TARSOMETATARSAL LIGAMENT OF LEFT FOOT, SUBSEQUENT ENCOUNTER: Primary | ICD-10-CM

## 2018-04-18 DIAGNOSIS — E11.9 DIABETES MELLITUS TYPE 2, INSULIN DEPENDENT (HCC): ICD-10-CM

## 2018-04-18 PROCEDURE — 99213 OFFICE O/P EST LOW 20 MIN: CPT

## 2018-04-18 PROCEDURE — 99213 OFFICE O/P EST LOW 20 MIN: CPT | Performed by: STUDENT IN AN ORGANIZED HEALTH CARE EDUCATION/TRAINING PROGRAM

## 2018-07-02 ENCOUNTER — TELEPHONE (OUTPATIENT)
Dept: INTERNAL MEDICINE | Age: 63
End: 2018-07-02

## 2018-11-25 ENCOUNTER — APPOINTMENT (OUTPATIENT)
Dept: GENERAL RADIOLOGY | Age: 63
End: 2018-11-25
Payer: COMMERCIAL

## 2018-11-25 ENCOUNTER — HOSPITAL ENCOUNTER (EMERGENCY)
Age: 63
Discharge: HOME | End: 2018-11-26
Attending: EMERGENCY MEDICINE
Payer: COMMERCIAL

## 2018-11-25 DIAGNOSIS — M25.562 CHRONIC PAIN OF BOTH KNEES: Primary | ICD-10-CM

## 2018-11-25 DIAGNOSIS — M25.561 CHRONIC PAIN OF BOTH KNEES: Primary | ICD-10-CM

## 2018-11-25 DIAGNOSIS — G89.29 CHRONIC PAIN OF BOTH KNEES: Primary | ICD-10-CM

## 2018-11-25 PROCEDURE — 73562 X-RAY EXAM OF KNEE 3: CPT

## 2018-11-25 PROCEDURE — 6370000000 HC RX 637 (ALT 250 FOR IP): Performed by: EMERGENCY MEDICINE

## 2018-11-25 PROCEDURE — 99284 EMERGENCY DEPT VISIT MOD MDM: CPT

## 2018-11-25 RX ORDER — ACETAMINOPHEN 500 MG
1000 TABLET ORAL ONCE
Status: COMPLETED | OUTPATIENT
Start: 2018-11-25 | End: 2018-11-25

## 2018-11-25 RX ADMIN — ACETAMINOPHEN 1000 MG: 500 TABLET, FILM COATED ORAL at 23:41

## 2018-11-25 ASSESSMENT — PAIN SCALES - GENERAL
PAINLEVEL_OUTOF10: 8
PAINLEVEL_OUTOF10: 8

## 2018-11-25 ASSESSMENT — PAIN DESCRIPTION - ORIENTATION: ORIENTATION: RIGHT;LEFT

## 2018-11-25 ASSESSMENT — PAIN DESCRIPTION - PAIN TYPE: TYPE: ACUTE PAIN

## 2018-11-25 ASSESSMENT — PAIN DESCRIPTION - LOCATION: LOCATION: KNEE

## 2018-11-26 VITALS
HEART RATE: 95 BPM | RESPIRATION RATE: 18 BRPM | SYSTOLIC BLOOD PRESSURE: 145 MMHG | BODY MASS INDEX: 47.2 KG/M2 | HEIGHT: 61 IN | TEMPERATURE: 97.8 F | OXYGEN SATURATION: 92 % | DIASTOLIC BLOOD PRESSURE: 52 MMHG | WEIGHT: 250 LBS

## 2018-11-26 ASSESSMENT — ENCOUNTER SYMPTOMS
BACK PAIN: 0
BLOOD IN STOOL: 0
ABDOMINAL PAIN: 0
NAUSEA: 0
COLOR CHANGE: 0
DIARRHEA: 0
COUGH: 0
CONSTIPATION: 0
VOMITING: 0
SHORTNESS OF BREATH: 0
TROUBLE SWALLOWING: 0
SORE THROAT: 0

## 2018-11-26 NOTE — ED PROVIDER NOTES
Neuropathy     Osteoarthritis     Seizures ([facility] [address] since age 10    Stroke (cerebrum) ([facility] [address] 2010    Type 2 diabetes mellitus without complication ([facility] 9408         SURGICAL HISTORY      has a past surgical history that includes Cholecystectomy; Tonsillectomy; Cataract removal; and eye surgery.       CURRENT MEDICATIONS       Discharge Medication List as of 11/26/2018 12:26 AM      CONTINUE these medications which have NOT CHANGED    Details   !! insulin lispro (HUMALOG) 100 UNIT/ML injection vial Inject into the skin 4 times daily (before meals and nightly) Inject as per sliding scale:  = 0 units; call physician if less than 70                                           151-200 = 2 units                                           201-250 = 4  units                                           251-300 = 6 units                                          301-350 = 8 units                                          351-400 = 10 units; call physician if greater than 400Historical Med      oxyCODONE-acetaminophen (PERCOCET) 5-325 MG per tablet Take 1-2 tablets by mouth every 6 hours as needed for Pain ., Disp-15 tablet, R-0Print      metFORMIN (GLUCOPHAGE) 1000 MG tablet Take 1,000 mg by mouth 2 times daily (with meals)Historical Med      aspirin 81 MG tablet Take 81 mg by mouth dailyHistorical Med      Blood Glucose Monitoring Suppl FLAVIO Disp-1 Device, R-0, PrintCheck blood sugars 3/day      Glucose Blood (BLOOD GLUCOSE TEST STRIPS) STRP Test 3  times daily Insulin Dependent mellitus, Disp-100 strip, R-11Print      Lancets MISC Disp-100 each, R-11, PrintCheck 3/day      !! levETIRAcetam (KEPPRA) 250 MG tablet Take 1 tablet by mouth 2 times daily, Disp-60 tablet, R-3Print      !! levETIRAcetam (KEPPRA) 1000 MG tablet Take 1 tablet by mouth 2 times daily Your new dose is 1250 mg twice daily, Disp-60 tablet, R-0Print      lisinopril-hydrochlorothiazide (PRINZIDE;ZESTORETIC) 10-12.5 MG per tablet Take 1 tablet by mouth dailyHistorical Med      PARoxetine (PAXIL) 20 MG tablet Take 20 mg by mouth every morningHistorical Med      omeprazole (PRILOSEC) 20 MG delayed release capsule Take 20 mg by mouth dailyHistorical Med      loratadine (CLARITIN) 10 MG tablet Take 10 mg by mouth dailyHistorical Med      pioglitazone (ACTOS) 15 MG tablet Take 15 mg by mouth every morningHistorical Med      simvastatin (ZOCOR) 40 MG tablet Take 40 mg by mouth nightlyHistorical Med      pramipexole (MIRAPEX) 1 MG tablet Take 1 mg by mouth nightlyHistorical Med      lamoTRIgine (LAMICTAL) 200 MG tablet Take 200 mg by mouth 2 times dailyHistorical Med      busPIRone (BUSPAR) 10 MG tablet Take 10 mg by mouth 3 times dailyHistorical Med      insulin glargine (LANTUS) 100 UNIT/ML injection vial Inject 50 Units into the skin 2 times daily Patient states she is using the lantus penHistorical Med      !! insulin lispro (HUMALOG) 100 UNIT/ML injection vial 15 Units at meals four times a day plus sliding scale if needed   Patient states she is using the humalog penHistorical Med       !! - Potential duplicate medications found. Please discuss with provider. ALLERGIES     is allergic to codeine. FAMILY HISTORY     indicated that her mother is . She indicated that her father is . She indicated that her brother is alive. family history includes Diabetes in her brother. SOCIAL HISTORY      reports that she has never smoked. She has never used smokeless tobacco. She reports that she does not drink alcohol or use drugs. PHYSICAL EXAM     INITIAL VITALS:  height is 5' 1\" (1.549 m) and weight is 250 lb (113.4 kg). Her oral temperature is 97.8 °F (36.6 °C). Her blood pressure is 145/52 (abnormal) and her pulse is 95. Her respiration is 18 and oxygen saturation is 92%. Physical Exam   Constitutional: She is oriented to person, place, and time. No distress. HENT:   Head: Normocephalic and atraumatic.    Eyes: Pupils are equal,

## 2018-11-30 ENCOUNTER — HOSPITAL ENCOUNTER (OUTPATIENT)
Age: 63
Setting detail: OBSERVATION
Discharge: HOME | End: 2018-12-02
Attending: EMERGENCY MEDICINE | Admitting: INTERNAL MEDICINE
Payer: COMMERCIAL

## 2018-11-30 ENCOUNTER — APPOINTMENT (OUTPATIENT)
Dept: CT IMAGING | Age: 63
End: 2018-11-30
Payer: COMMERCIAL

## 2018-11-30 ENCOUNTER — APPOINTMENT (OUTPATIENT)
Dept: GENERAL RADIOLOGY | Age: 63
End: 2018-11-30
Payer: COMMERCIAL

## 2018-11-30 DIAGNOSIS — R41.82 ALTERED MENTAL STATUS, UNSPECIFIED ALTERED MENTAL STATUS TYPE: Primary | ICD-10-CM

## 2018-11-30 LAB
-: ABNORMAL
ABSOLUTE EOS #: 0.11 K/UL (ref 0–0.4)
ABSOLUTE IMMATURE GRANULOCYTE: ABNORMAL K/UL (ref 0–0.3)
ABSOLUTE LYMPH #: 0.8 K/UL (ref 1–4.8)
ABSOLUTE MONO #: 0.27 K/UL (ref 0.1–1.3)
ALBUMIN SERPL-MCNC: 3.3 G/DL (ref 3.5–5.2)
ALBUMIN/GLOBULIN RATIO: ABNORMAL (ref 1–2.5)
ALP BLD-CCNC: 91 U/L (ref 35–104)
ALT SERPL-CCNC: 27 U/L (ref 5–33)
AMORPHOUS: ABNORMAL
ANION GAP SERPL CALCULATED.3IONS-SCNC: 12 MMOL/L (ref 9–17)
AST SERPL-CCNC: 47 U/L
BACTERIA: ABNORMAL
BASOPHILS # BLD: 0 % (ref 0–2)
BASOPHILS ABSOLUTE: 0 K/UL (ref 0–0.2)
BILIRUB SERPL-MCNC: 0.27 MG/DL (ref 0.3–1.2)
BILIRUBIN URINE: NEGATIVE
BUN BLDV-MCNC: 16 MG/DL (ref 8–23)
BUN/CREAT BLD: ABNORMAL (ref 9–20)
CALCIUM SERPL-MCNC: 9.2 MG/DL (ref 8.6–10.4)
CASTS UA: ABNORMAL /LPF
CHLORIDE BLD-SCNC: 101 MMOL/L (ref 98–107)
CO2: 27 MMOL/L (ref 20–31)
COLOR: YELLOW
COMMENT UA: ABNORMAL
CREAT SERPL-MCNC: 0.86 MG/DL (ref 0.5–0.9)
CRYSTALS, UA: ABNORMAL /HPF
DIFFERENTIAL TYPE: ABNORMAL
EOSINOPHILS RELATIVE PERCENT: 3 % (ref 0–4)
EPITHELIAL CELLS UA: ABNORMAL /HPF
GFR AFRICAN AMERICAN: >60 ML/MIN
GFR NON-AFRICAN AMERICAN: >60 ML/MIN
GFR SERPL CREATININE-BSD FRML MDRD: ABNORMAL ML/MIN/{1.73_M2}
GFR SERPL CREATININE-BSD FRML MDRD: ABNORMAL ML/MIN/{1.73_M2}
GLUCOSE BLD-MCNC: 189 MG/DL (ref 65–105)
GLUCOSE BLD-MCNC: 223 MG/DL (ref 65–105)
GLUCOSE BLD-MCNC: 234 MG/DL (ref 70–99)
GLUCOSE URINE: ABNORMAL
HCT VFR BLD CALC: 33.3 % (ref 36–46)
HEMOGLOBIN: 10.5 G/DL (ref 12–16)
IMMATURE GRANULOCYTES: ABNORMAL %
KEPPRA: 41 UG/ML
KETONES, URINE: NEGATIVE
LEUKOCYTE ESTERASE, URINE: NEGATIVE
LYMPHOCYTES # BLD: 21 % (ref 24–44)
MCH RBC QN AUTO: 24.9 PG (ref 26–34)
MCHC RBC AUTO-ENTMCNC: 31.7 G/DL (ref 31–37)
MCV RBC AUTO: 78.6 FL (ref 80–100)
MONOCYTES # BLD: 7 % (ref 1–7)
MORPHOLOGY: ABNORMAL
MUCUS: ABNORMAL
NITRITE, URINE: NEGATIVE
NRBC AUTOMATED: ABNORMAL PER 100 WBC
OTHER OBSERVATIONS UA: ABNORMAL
PDW BLD-RTO: 16.4 % (ref 11.5–14.9)
PH UA: 6 (ref 5–8)
PLATELET # BLD: 91 K/UL (ref 150–450)
PLATELET ESTIMATE: ABNORMAL
PMV BLD AUTO: 7.7 FL (ref 6–12)
POTASSIUM SERPL-SCNC: 4.3 MMOL/L (ref 3.7–5.3)
PROTEIN UA: ABNORMAL
RBC # BLD: 4.23 M/UL (ref 4–5.2)
RBC # BLD: ABNORMAL 10*6/UL
RBC UA: ABNORMAL /HPF
RENAL EPITHELIAL, UA: ABNORMAL /HPF
SEG NEUTROPHILS: 69 % (ref 36–66)
SEGMENTED NEUTROPHILS ABSOLUTE COUNT: 2.62 K/UL (ref 1.3–9.1)
SODIUM BLD-SCNC: 140 MMOL/L (ref 135–144)
SPECIFIC GRAVITY UA: 1.01 (ref 1–1.03)
TOTAL PROTEIN: 7.7 G/DL (ref 6.4–8.3)
TRICHOMONAS: ABNORMAL
TURBIDITY: CLEAR
URINE HGB: ABNORMAL
UROBILINOGEN, URINE: NORMAL
WBC # BLD: 3.8 K/UL (ref 3.5–11)
WBC # BLD: ABNORMAL 10*3/UL
WBC UA: ABNORMAL /HPF
YEAST: ABNORMAL

## 2018-11-30 PROCEDURE — G0378 HOSPITAL OBSERVATION PER HR: HCPCS

## 2018-11-30 PROCEDURE — P9612 CATHETERIZE FOR URINE SPEC: HCPCS

## 2018-11-30 PROCEDURE — 80177 DRUG SCRN QUAN LEVETIRACETAM: CPT

## 2018-11-30 PROCEDURE — 81001 URINALYSIS AUTO W/SCOPE: CPT

## 2018-11-30 PROCEDURE — 99285 EMERGENCY DEPT VISIT HI MDM: CPT

## 2018-11-30 PROCEDURE — 70450 CT HEAD/BRAIN W/O DYE: CPT

## 2018-11-30 PROCEDURE — 71046 X-RAY EXAM CHEST 2 VIEWS: CPT

## 2018-11-30 PROCEDURE — 85025 COMPLETE CBC W/AUTO DIFF WBC: CPT

## 2018-11-30 PROCEDURE — 80053 COMPREHEN METABOLIC PANEL: CPT

## 2018-11-30 PROCEDURE — 87086 URINE CULTURE/COLONY COUNT: CPT

## 2018-11-30 PROCEDURE — 36415 COLL VENOUS BLD VENIPUNCTURE: CPT

## 2018-11-30 PROCEDURE — 82947 ASSAY GLUCOSE BLOOD QUANT: CPT

## 2018-11-30 PROCEDURE — 2580000003 HC RX 258: Performed by: INTERNAL MEDICINE

## 2018-11-30 RX ORDER — FUROSEMIDE 20 MG/1
20 TABLET ORAL DAILY
COMMUNITY
End: 2020-01-01

## 2018-11-30 RX ORDER — SODIUM CHLORIDE 0.9 % (FLUSH) 0.9 %
10 SYRINGE (ML) INJECTION EVERY 12 HOURS SCHEDULED
Status: DISCONTINUED | OUTPATIENT
Start: 2018-11-30 | End: 2018-12-02 | Stop reason: HOSPADM

## 2018-11-30 RX ORDER — POTASSIUM CHLORIDE 750 MG/1
30 TABLET, EXTENDED RELEASE ORAL DAILY
COMMUNITY

## 2018-11-30 RX ORDER — ACETAMINOPHEN 325 MG/1
650 TABLET ORAL EVERY 4 HOURS PRN
Status: DISCONTINUED | OUTPATIENT
Start: 2018-11-30 | End: 2018-12-02 | Stop reason: HOSPADM

## 2018-11-30 RX ORDER — GABAPENTIN 100 MG/1
100 CAPSULE ORAL 2 TIMES DAILY
COMMUNITY

## 2018-11-30 RX ORDER — CHOLECALCIFEROL (VITAMIN D3) 125 MCG
500 CAPSULE ORAL DAILY
COMMUNITY

## 2018-11-30 RX ORDER — SODIUM CHLORIDE 0.9 % (FLUSH) 0.9 %
10 SYRINGE (ML) INJECTION PRN
Status: DISCONTINUED | OUTPATIENT
Start: 2018-11-30 | End: 2018-12-02 | Stop reason: HOSPADM

## 2018-11-30 RX ORDER — ACETAMINOPHEN 325 MG/1
650 TABLET ORAL 3 TIMES DAILY PRN
COMMUNITY

## 2018-11-30 RX ORDER — BENZONATATE 200 MG/1
200 CAPSULE ORAL 2 TIMES DAILY PRN
Status: ON HOLD | COMMUNITY
End: 2020-01-01

## 2018-11-30 RX ORDER — LOPERAMIDE HYDROCHLORIDE 2 MG/1
4 CAPSULE ORAL DAILY PRN
COMMUNITY
End: 2020-01-01 | Stop reason: SDUPTHER

## 2018-11-30 RX ORDER — LOPERAMIDE HYDROCHLORIDE 2 MG/1
2 CAPSULE ORAL 4 TIMES DAILY PRN
COMMUNITY
End: 2020-01-01 | Stop reason: SDUPTHER

## 2018-11-30 RX ORDER — LEVETIRACETAM 500 MG/1
500 TABLET ORAL 2 TIMES DAILY
Status: ON HOLD | COMMUNITY
End: 2020-01-01 | Stop reason: HOSPADM

## 2018-11-30 RX ADMIN — Medication 10 ML: at 22:42

## 2018-11-30 ASSESSMENT — ENCOUNTER SYMPTOMS
SHORTNESS OF BREATH: 0
BLOOD IN STOOL: 0
VOMITING: 0
CONSTIPATION: 0
TROUBLE SWALLOWING: 0
DIARRHEA: 0
BACK PAIN: 0
COUGH: 0
WHEEZING: 0
EYE PAIN: 0
VOICE CHANGE: 0
ABDOMINAL DISTENTION: 0
ABDOMINAL PAIN: 0
SORE THROAT: 0
NAUSEA: 0
STRIDOR: 0

## 2018-11-30 NOTE — Clinical Note
Patient Class: Observation [104]   REQUIRED: Diagnosis: Altered mental status [780.97. ICD-9-CM]   Estimated Length of Stay: Estimated stay of less than 2 midnights   Future Attending Provider: Ry Rene [[id number]]   Admitting Provider: Ry Rene [[id number]]   Telemetry Bed Required?: Yes

## 2018-11-30 NOTE — ED PROVIDER NOTES
Brother        Allergies:  Codeine    Home Medications:  Prior to Admission medications    Medication Sig Start Date End Date Taking? Authorizing Provider   furosemide (LASIX) 20 MG tablet Take 20 mg by mouth daily   Yes Historical Provider, MD   gabapentin (NEURONTIN) 100 MG capsule Take 100 mg by mouth 2 times daily. .   Yes Historical Provider, MD   levETIRAcetam (KEPPRA) 500 MG tablet Take 1,250 mg by mouth 2 times daily 2.5 tablets = 1250 mg   Yes Historical Provider, MD   insulin aspart (NOVOLOG) 100 UNIT/ML injection vial Inject into the skin Inject as per sliding scale before meals and at bedtime:    = 0 units; call physician if less than 70  151-200 = 2 units  201-250 = 4 units  251-300 = 6 units  301-350 = 8 units  351-400 = 10 units; call physician if greater than 400   Yes Historical Provider, MD   insulin aspart (NOVOLOG) 100 UNIT/ML injection vial Inject 15 Units into the skin 4 times daily (before meals and nightly)   Yes Historical Provider, MD   potassium chloride (KLOR-CON M) 10 MEQ extended release tablet Take 10 mEq by mouth daily   Yes Historical Provider, MD   vitamin B-12 (CYANOCOBALAMIN) 500 MCG tablet Take 500 mcg by mouth daily   Yes Historical Provider, MD   benzonatate (TESSALON) 200 MG capsule Take 200 mg by mouth 2 times daily as needed for Cough   Yes Historical Provider, MD   loperamide (IMODIUM) 2 MG capsule Take 4 mg by mouth daily as needed for Diarrhea (after first loose stool)   Yes Historical Provider, MD   loperamide (IMODIUM) 2 MG capsule Take 2 mg by mouth 4 times daily as needed for Diarrhea (after initial 4 mg dose)   Yes Historical Provider, MD   acetaminophen (TYLENOL) 325 MG tablet Take 650 mg by mouth 3 times daily as needed for Pain (mild)   Yes Historical Provider, MD   oxyCODONE-acetaminophen (PERCOCET) 5-325 MG per tablet Take 1-2 tablets by mouth every 6 hours as needed for Pain .  12/10/17  Yes Osito Farfan,    metFORMIN (GLUCOPHAGE) 1000 MG tablet Total Protein 7.7 6.4 - 8.3 g/dL    Alb 3.3 (L) 3.5 - 5.2 g/dL    Albumin/Globulin Ratio NOT REPORTED 1.0 - 2.5    GFR Non-African American >60 >60 mL/min    GFR African American >60 >60 mL/min    GFR Comment          GFR Staging NOT REPORTED    Urinalysis   Result Value Ref Range    Color, UA YELLOW YEL    Turbidity UA CLEAR CLEAR    Glucose, Ur TRACE (A) NEG    Bilirubin Urine NEGATIVE NEG    Ketones, Urine NEGATIVE NEG    Specific [place], UA 1.015 1.000 - 1.030    Urine Hgb MOD (A) NEG    pH, UA 6.0 5.0 - 8.0    Protein, UA 4+ (A) NEG    Urobilinogen, Urine Normal NORM    Nitrite, Urine NEGATIVE NEG    Leukocyte Esterase, Urine NEGATIVE NEG    Urinalysis Comments NOT REPORTED    Microscopic Urinalysis   Result Value Ref Range    -          WBC, UA 0 TO 2 /HPF    RBC, UA 2 TO 5 /HPF    Casts UA NOT REPORTED /LPF    Crystals UA NOT REPORTED NONE /HPF    Epithelial Cells UA 0 TO 2 /HPF    Renal Epithelial, Urine NOT REPORTED 0 /HPF    Bacteria, UA MODERATE (A) NONE    Mucus, UA 1+ (A) NONE    Trichomonas, UA NOT REPORTED NONE    Amorphous, UA 3+ (A) NONE    Other Observations UA NOT REPORTED NREQ    Yeast, UA NOT REPORTED NONE   POC Glucose Fingerstick   Result Value Ref Range    POC Glucose 223 (H) 65 - 105 mg/dL       IMPRESSION: Patient is a 49-year-old female presents from nursing home for altered mental status last 5 days. The patient answers all questions appropriately and has unremarkable exam as noted above. We'll obtain CBC CMP urinalysis, CT head this patient and reported fall 5 days ago and reevaluate. RADIOLOGY:  Xr Chest Standard (2 Vw)    Result Date: 11/30/2018  EXAMINATION: TWO VIEWS OF THE CHEST 11/30/2018 4:22 pm COMPARISON: 12/13/2017 HISTORY: ORDERING SYSTEM PROVIDED HISTORY: ams TECHNOLOGIST PROVIDED HISTORY: ams FINDINGS: Lungs: Clear Mediastinum: Unremarkable Pleura: No pleural effusion or pneumothorax Other: Unremarkable. No acute pulmonary process.      Ct Head Wo Contrast    Result Date: 11/30/2018  EXAMINATION: CT OF THE HEAD WITHOUT CONTRAST,  11/30/2018 5:36 pm TECHNIQUE: CT of the head was performed without the administration of intravenous contrast. Dose modulation, iterative reconstruction, and/or weight based adjustment of the mA/kV was utilized to reduce the radiation dose to as low as reasonably achievable. COMPARISON: December 13, 2017 HISTORY: ORDERING SYSTEM PROVIDED HISTORY: Altered mental status TECHNOLOGIST PROVIDED HISTORY: Ordering Physician Provided Reason for Exam: AMS FINDINGS: BRAIN/VENTRICLES: No acute intracranial hemorrhage. No mass effect. No midline shift. Mild prominence of the ventricles and sulci is consistent with atrophy. Small remote lacunar infarct within the left frontal lobe is unchanged. ORBITS: The visualized portion of the orbits demonstrate no acute abnormality. SINUSES: The visualized paranasal sinuses and mastoid air cells demonstrate no acute abnormality. SOFT TISSUES/SKULL:  No acute abnormality of the visualized skull or soft tissues. No acute intracranial abnormality. EKG  None    All EKG's are interpreted by the Emergency Department Physician who either signs or Co-signsthis chart in the absence of a cardiologist.    EMERGENCY DEPARTMENT COURSE:  ED Course as of Nov 30 2015 Fri Nov 30, 2018   1825 Patient reevaluated resting in bed in no acute distress. Patient stated she thinks she had a seizure which was not previously reported will add on keppra level     [BL]   2014 Patient admitted to Dr. Hakeem Quintero call placed to neurology and his recommendation. Neurology unable to be reached prior to patient going to the floor.  [BL]      ED Course User Index  [BL] Lucho May DO        PROCEDURES:  None    CONSULTS:  IP CONSULT TO INTERNAL MEDICINE  IP CONSULT TO NEUROLOGY      FINAL IMPRESSION      1.  Altered mental status, unspecified altered mental status type          DISPOSITION / PLAN     DISPOSITION Admitted    PATIENT REFERRED

## 2018-12-01 PROBLEM — R26.9 GAIT DISTURBANCE: Status: ACTIVE | Noted: 2018-12-01

## 2018-12-01 PROBLEM — F44.89 CONFUSION STATE: Status: ACTIVE | Noted: 2018-12-01

## 2018-12-01 LAB
ABSOLUTE EOS #: 0.08 K/UL (ref 0–0.4)
ABSOLUTE IMMATURE GRANULOCYTE: ABNORMAL K/UL (ref 0–0.3)
ABSOLUTE LYMPH #: 1.11 K/UL (ref 1–4.8)
ABSOLUTE MONO #: 0.33 K/UL (ref 0.1–1.3)
ANION GAP SERPL CALCULATED.3IONS-SCNC: 8 MMOL/L (ref 9–17)
BASOPHILS # BLD: 1 % (ref 0–2)
BASOPHILS ABSOLUTE: 0.04 K/UL (ref 0–0.2)
BUN BLDV-MCNC: 16 MG/DL (ref 8–23)
BUN/CREAT BLD: ABNORMAL (ref 9–20)
CALCIUM SERPL-MCNC: 8.9 MG/DL (ref 8.6–10.4)
CHLORIDE BLD-SCNC: 103 MMOL/L (ref 98–107)
CO2: 31 MMOL/L (ref 20–31)
CREAT SERPL-MCNC: 0.76 MG/DL (ref 0.5–0.9)
CULTURE: NO GROWTH
DIFFERENTIAL TYPE: ABNORMAL
EOSINOPHILS RELATIVE PERCENT: 2 % (ref 0–4)
GFR AFRICAN AMERICAN: >60 ML/MIN
GFR NON-AFRICAN AMERICAN: >60 ML/MIN
GFR SERPL CREATININE-BSD FRML MDRD: ABNORMAL ML/MIN/{1.73_M2}
GFR SERPL CREATININE-BSD FRML MDRD: ABNORMAL ML/MIN/{1.73_M2}
GLUCOSE BLD-MCNC: 137 MG/DL (ref 65–105)
GLUCOSE BLD-MCNC: 157 MG/DL (ref 70–99)
GLUCOSE BLD-MCNC: 212 MG/DL (ref 65–105)
GLUCOSE BLD-MCNC: 234 MG/DL (ref 65–105)
GLUCOSE BLD-MCNC: 268 MG/DL (ref 65–105)
HCT VFR BLD CALC: 31.6 % (ref 36–46)
HEMOGLOBIN: 10 G/DL (ref 12–16)
IMMATURE GRANULOCYTES: ABNORMAL %
LYMPHOCYTES # BLD: 27 % (ref 24–44)
Lab: NORMAL
MCH RBC QN AUTO: 25.1 PG (ref 26–34)
MCHC RBC AUTO-ENTMCNC: 31.6 G/DL (ref 31–37)
MCV RBC AUTO: 79.3 FL (ref 80–100)
MONOCYTES # BLD: 8 % (ref 1–7)
MORPHOLOGY: ABNORMAL
NRBC AUTOMATED: ABNORMAL PER 100 WBC
PDW BLD-RTO: 16.3 % (ref 11.5–14.9)
PLATELET # BLD: 95 K/UL (ref 150–450)
PLATELET ESTIMATE: ABNORMAL
PMV BLD AUTO: 7.4 FL (ref 6–12)
POTASSIUM SERPL-SCNC: 4.1 MMOL/L (ref 3.7–5.3)
RBC # BLD: 3.99 M/UL (ref 4–5.2)
RBC # BLD: ABNORMAL 10*6/UL
SEG NEUTROPHILS: 62 % (ref 36–66)
SEGMENTED NEUTROPHILS ABSOLUTE COUNT: 2.54 K/UL (ref 1.3–9.1)
SODIUM BLD-SCNC: 142 MMOL/L (ref 135–144)
SPECIMEN DESCRIPTION: NORMAL
STATUS: NORMAL
WBC # BLD: 4.1 K/UL (ref 3.5–11)
WBC # BLD: ABNORMAL 10*3/UL

## 2018-12-01 PROCEDURE — 6370000000 HC RX 637 (ALT 250 FOR IP): Performed by: INTERNAL MEDICINE

## 2018-12-01 PROCEDURE — 2580000003 HC RX 258: Performed by: INTERNAL MEDICINE

## 2018-12-01 PROCEDURE — 6370000000 HC RX 637 (ALT 250 FOR IP): Performed by: PSYCHIATRY & NEUROLOGY

## 2018-12-01 PROCEDURE — G0378 HOSPITAL OBSERVATION PER HR: HCPCS

## 2018-12-01 PROCEDURE — 80048 BASIC METABOLIC PNL TOTAL CA: CPT

## 2018-12-01 PROCEDURE — 99223 1ST HOSP IP/OBS HIGH 75: CPT | Performed by: INTERNAL MEDICINE

## 2018-12-01 PROCEDURE — 36415 COLL VENOUS BLD VENIPUNCTURE: CPT

## 2018-12-01 PROCEDURE — 85025 COMPLETE CBC W/AUTO DIFF WBC: CPT

## 2018-12-01 PROCEDURE — 82947 ASSAY GLUCOSE BLOOD QUANT: CPT

## 2018-12-01 PROCEDURE — 99219 PR INITIAL OBSERVATION CARE/DAY 50 MINUTES: CPT | Performed by: PSYCHIATRY & NEUROLOGY

## 2018-12-01 RX ORDER — GABAPENTIN 100 MG/1
100 CAPSULE ORAL 2 TIMES DAILY
Status: DISCONTINUED | OUTPATIENT
Start: 2018-12-01 | End: 2018-12-02 | Stop reason: HOSPADM

## 2018-12-01 RX ORDER — PANTOPRAZOLE SODIUM 40 MG/1
40 TABLET, DELAYED RELEASE ORAL
Status: DISCONTINUED | OUTPATIENT
Start: 2018-12-01 | End: 2018-12-02 | Stop reason: HOSPADM

## 2018-12-01 RX ORDER — LEVETIRACETAM 750 MG/1
1500 TABLET ORAL 2 TIMES DAILY
Status: DISCONTINUED | OUTPATIENT
Start: 2018-12-01 | End: 2018-12-02 | Stop reason: HOSPADM

## 2018-12-01 RX ORDER — SODIUM CHLORIDE 9 MG/ML
INJECTION, SOLUTION INTRAVENOUS CONTINUOUS
Status: DISCONTINUED | OUTPATIENT
Start: 2018-12-01 | End: 2018-12-02 | Stop reason: ALTCHOICE

## 2018-12-01 RX ORDER — INSULIN GLARGINE 100 [IU]/ML
60 INJECTION, SOLUTION SUBCUTANEOUS 2 TIMES DAILY
Status: DISCONTINUED | OUTPATIENT
Start: 2018-12-01 | End: 2018-12-02 | Stop reason: HOSPADM

## 2018-12-01 RX ORDER — PAROXETINE HYDROCHLORIDE 20 MG/1
20 TABLET, FILM COATED ORAL EVERY MORNING
Status: DISCONTINUED | OUTPATIENT
Start: 2018-12-01 | End: 2018-12-02 | Stop reason: HOSPADM

## 2018-12-01 RX ORDER — BUSPIRONE HYDROCHLORIDE 5 MG/1
10 TABLET ORAL 3 TIMES DAILY
Status: DISCONTINUED | OUTPATIENT
Start: 2018-12-01 | End: 2018-12-02 | Stop reason: HOSPADM

## 2018-12-01 RX ORDER — HYDROCHLOROTHIAZIDE 12.5 MG/1
12.5 CAPSULE, GELATIN COATED ORAL DAILY
Status: DISCONTINUED | OUTPATIENT
Start: 2018-12-01 | End: 2018-12-01

## 2018-12-01 RX ORDER — LAMOTRIGINE 100 MG/1
200 TABLET ORAL ONCE
Status: COMPLETED | OUTPATIENT
Start: 2018-12-01 | End: 2018-12-01

## 2018-12-01 RX ORDER — CHOLECALCIFEROL (VITAMIN D3) 125 MCG
500 CAPSULE ORAL DAILY
Status: DISCONTINUED | OUTPATIENT
Start: 2018-12-01 | End: 2018-12-02 | Stop reason: HOSPADM

## 2018-12-01 RX ORDER — LISINOPRIL 10 MG/1
10 TABLET ORAL DAILY
Status: DISCONTINUED | OUTPATIENT
Start: 2018-12-01 | End: 2018-12-01

## 2018-12-01 RX ORDER — SIMVASTATIN 40 MG
40 TABLET ORAL NIGHTLY
Status: DISCONTINUED | OUTPATIENT
Start: 2018-12-01 | End: 2018-12-02 | Stop reason: HOSPADM

## 2018-12-01 RX ORDER — LAMOTRIGINE 100 MG/1
200 TABLET ORAL 2 TIMES DAILY
Status: DISCONTINUED | OUTPATIENT
Start: 2018-12-01 | End: 2018-12-02 | Stop reason: HOSPADM

## 2018-12-01 RX ORDER — OXYCODONE HYDROCHLORIDE AND ACETAMINOPHEN 5; 325 MG/1; MG/1
1 TABLET ORAL ONCE
Status: COMPLETED | OUTPATIENT
Start: 2018-12-01 | End: 2018-12-01

## 2018-12-01 RX ORDER — METRONIDAZOLE 500 MG/1
500 TABLET ORAL EVERY 8 HOURS SCHEDULED
Status: DISCONTINUED | OUTPATIENT
Start: 2018-12-01 | End: 2018-12-02 | Stop reason: HOSPADM

## 2018-12-01 RX ORDER — OXYCODONE HYDROCHLORIDE AND ACETAMINOPHEN 5; 325 MG/1; MG/1
1 TABLET ORAL EVERY 6 HOURS PRN
Status: DISCONTINUED | OUTPATIENT
Start: 2018-12-01 | End: 2018-12-02 | Stop reason: HOSPADM

## 2018-12-01 RX ORDER — CETIRIZINE HYDROCHLORIDE 10 MG/1
10 TABLET ORAL DAILY
Status: DISCONTINUED | OUTPATIENT
Start: 2018-12-01 | End: 2018-12-02 | Stop reason: HOSPADM

## 2018-12-01 RX ORDER — DEXTROSE MONOHYDRATE 25 G/50ML
12.5 INJECTION, SOLUTION INTRAVENOUS PRN
Status: DISCONTINUED | OUTPATIENT
Start: 2018-12-01 | End: 2018-12-02 | Stop reason: HOSPADM

## 2018-12-01 RX ORDER — LISINOPRIL AND HYDROCHLOROTHIAZIDE 12.5; 1 MG/1; MG/1
1 TABLET ORAL DAILY
Status: DISCONTINUED | OUTPATIENT
Start: 2018-12-01 | End: 2018-12-01

## 2018-12-01 RX ORDER — NICOTINE POLACRILEX 4 MG
15 LOZENGE BUCCAL PRN
Status: DISCONTINUED | OUTPATIENT
Start: 2018-12-01 | End: 2018-12-02 | Stop reason: HOSPADM

## 2018-12-01 RX ORDER — PRAMIPEXOLE DIHYDROCHLORIDE 1 MG/1
1 TABLET ORAL NIGHTLY
Status: DISCONTINUED | OUTPATIENT
Start: 2018-12-01 | End: 2018-12-02 | Stop reason: HOSPADM

## 2018-12-01 RX ORDER — BUSPIRONE HYDROCHLORIDE 5 MG/1
10 TABLET ORAL ONCE
Status: COMPLETED | OUTPATIENT
Start: 2018-12-01 | End: 2018-12-01

## 2018-12-01 RX ORDER — GABAPENTIN 100 MG/1
100 CAPSULE ORAL ONCE
Status: COMPLETED | OUTPATIENT
Start: 2018-12-01 | End: 2018-12-01

## 2018-12-01 RX ORDER — DEXTROSE MONOHYDRATE 50 MG/ML
100 INJECTION, SOLUTION INTRAVENOUS PRN
Status: DISCONTINUED | OUTPATIENT
Start: 2018-12-01 | End: 2018-12-02 | Stop reason: HOSPADM

## 2018-12-01 RX ADMIN — GABAPENTIN 100 MG: 100 CAPSULE ORAL at 02:09

## 2018-12-01 RX ADMIN — METRONIDAZOLE 500 MG: 500 TABLET ORAL at 22:00

## 2018-12-01 RX ADMIN — LAMOTRIGINE 200 MG: 100 TABLET ORAL at 02:10

## 2018-12-01 RX ADMIN — CETIRIZINE HYDROCHLORIDE 10 MG: 10 TABLET, FILM COATED ORAL at 12:53

## 2018-12-01 RX ADMIN — LEVETIRACETAM 1500 MG: 750 TABLET ORAL at 22:00

## 2018-12-01 RX ADMIN — LEVETIRACETAM 1250 MG: 750 TABLET ORAL at 02:09

## 2018-12-01 RX ADMIN — BUSPIRONE HYDROCHLORIDE 10 MG: 5 TABLET ORAL at 22:00

## 2018-12-01 RX ADMIN — BUSPIRONE HYDROCHLORIDE 10 MG: 5 TABLET ORAL at 12:41

## 2018-12-01 RX ADMIN — OXYCODONE HYDROCHLORIDE AND ACETAMINOPHEN 1 TABLET: 5; 325 TABLET ORAL at 02:09

## 2018-12-01 RX ADMIN — CYANOCOBALAMIN TAB 500 MCG 500 MCG: 500 TAB at 12:52

## 2018-12-01 RX ADMIN — INSULIN GLARGINE 60 UNITS: 100 INJECTION, SOLUTION SUBCUTANEOUS at 12:52

## 2018-12-01 RX ADMIN — BUSPIRONE HYDROCHLORIDE 10 MG: 5 TABLET ORAL at 02:09

## 2018-12-01 RX ADMIN — INSULIN LISPRO 2 UNITS: 100 INJECTION, SOLUTION INTRAVENOUS; SUBCUTANEOUS at 17:41

## 2018-12-01 RX ADMIN — INSULIN LISPRO 2 UNITS: 100 INJECTION, SOLUTION INTRAVENOUS; SUBCUTANEOUS at 22:00

## 2018-12-01 RX ADMIN — METRONIDAZOLE 500 MG: 500 TABLET ORAL at 12:40

## 2018-12-01 RX ADMIN — PAROXETINE HYDROCHLORIDE 20 MG: 20 TABLET, FILM COATED ORAL at 12:53

## 2018-12-01 RX ADMIN — PANTOPRAZOLE SODIUM 40 MG: 40 TABLET, DELAYED RELEASE ORAL at 12:53

## 2018-12-01 RX ADMIN — SODIUM CHLORIDE: 9 INJECTION, SOLUTION INTRAVENOUS at 12:40

## 2018-12-01 RX ADMIN — SIMVASTATIN 40 MG: 40 TABLET, FILM COATED ORAL at 22:00

## 2018-12-01 RX ADMIN — OXYCODONE HYDROCHLORIDE AND ACETAMINOPHEN 1 TABLET: 5; 325 TABLET ORAL at 12:41

## 2018-12-01 RX ADMIN — SODIUM CHLORIDE: 9 INJECTION, SOLUTION INTRAVENOUS at 22:20

## 2018-12-01 RX ADMIN — PRAMIPEXOLE DIHYDROCHLORIDE 1 MG: 1 TABLET ORAL at 22:00

## 2018-12-01 RX ADMIN — LAMOTRIGINE 200 MG: 100 TABLET ORAL at 12:41

## 2018-12-01 RX ADMIN — INSULIN LISPRO 2 UNITS: 100 INJECTION, SOLUTION INTRAVENOUS; SUBCUTANEOUS at 12:52

## 2018-12-01 RX ADMIN — LAMOTRIGINE 200 MG: 100 TABLET ORAL at 22:00

## 2018-12-01 RX ADMIN — GABAPENTIN 100 MG: 100 CAPSULE ORAL at 21:59

## 2018-12-01 RX ADMIN — GABAPENTIN 100 MG: 100 CAPSULE ORAL at 12:53

## 2018-12-01 RX ADMIN — INSULIN GLARGINE 60 UNITS: 100 INJECTION, SOLUTION SUBCUTANEOUS at 22:01

## 2018-12-01 ASSESSMENT — PAIN SCALES - GENERAL
PAINLEVEL_OUTOF10: 8
PAINLEVEL_OUTOF10: 0
PAINLEVEL_OUTOF10: 6

## 2018-12-01 NOTE — H&P
REPORTED 0.00 - 0.30 k/uL    WBC Morphology NOT REPORTED     RBC Morphology NOT REPORTED     Platelet Estimate NOT REPORTED     Seg Neutrophils 62 36 - 66 %    Lymphocytes 27 24 - 44 %    Monocytes 8 (H) 1 - 7 %    Eosinophils % 2 0 - 4 %    Basophils 1 0 - 2 %    Segs Absolute 2.54 1.3 - 9.1 k/uL    Absolute Lymph # 1.11 1.0 - 4.8 k/uL    Absolute Mono # 0.33 0.1 - 1.3 k/uL    Absolute Eos # 0.08 0.0 - 0.4 k/uL    Basophils # 0.04 0.0 - 0.2 k/uL    Morphology MICROCYTOSIS PRESENT    Basic Metabolic Prof    Collection Time: 12/01/18  6:32 AM   Result Value Ref Range    Glucose 157 (H) 70 - 99 mg/dL    BUN 16 8 - 23 mg/dL    CREATININE 0.76 0.50 - 0.90 mg/dL    Bun/Cre Ratio NOT REPORTED 9 - 20    Calcium 8.9 8.6 - 10.4 mg/dL    Sodium 142 135 - 144 mmol/L    Potassium 4.1 3.7 - 5.3 mmol/L    Chloride 103 98 - 107 mmol/L    CO2 31 20 - 31 mmol/L    Anion Gap 8 (L) 9 - 17 mmol/L    GFR Non-African American >60 >60 mL/min    GFR African American >60 >60 mL/min    GFR Comment          GFR Staging NOT REPORTED    POC Glucose Fingerstick    Collection Time: 12/01/18 10:48 AM   Result Value Ref Range    POC Glucose 212 (H) 65 - 105 mg/dL       Recent Labs      12/01/18   0632  11/30/18   1625   HGB  10.0*  10.5*   HCT  31.6*  33.3*   WBC  4.1  3.8   MCV  79.3*  78.6*   NA  142  140   K  4.1  4.3   CL  103  101   CO2  31  27   BUN  16  16   CREATININE  0.76  0.86   GLUCOSE  157*  234*   AST   --   47*   ALT   --   27   LABALBU   --   3.3*       Hematology:  Recent Labs      11/30/18   1625  12/01/18   0632   WBC  3.8  4.1   RBC  4.23  3.99*   HGB  10.5*  10.0*   HCT  33.3*  31.6*   MCV  78.6*  79.3*   MCH  24.9*  25.1*   MCHC  31.7  31.6   RDW  16.4*  16.3*   PLT  91*  95*   MPV  7.7  7.4     Chemistry:  Recent Labs      11/30/18   1625  12/01/18   0632   NA  140  142   K  4.3  4.1   CL  101  103   CO2  27  31   GLUCOSE  234*  157*   BUN  16  16   CREATININE  0.86  0.76   ANIONGAP  12  8*   LABGLOM  >60  >60   GFRAA  >60 >60   CALCIUM  9.2  8.9     Recent Labs      11/30/18   1601  11/30/18   1625  11/30/18   2027  12/01/18   0155  12/01/18   1048   PROT   --   7.7   --    --    --    LABALBU   --   3.3*   --    --    --    AST   --   47*   --    --    --    ALT   --   27   --    --    --    ALKPHOS   --   91   --    --    --    BILITOT   --   0.27*   --    --    --    POCGLU  223*   --   189*  137*  212*       Imaging/Diagnostics:       Xr Chest Standard (2 Vw)    Result Date: 11/30/2018  EXAMINATION: TWO VIEWS OF THE CHEST 11/30/2018 4:22 pm COMPARISON: 12/13/2017 HISTORY: ORDERING SYSTEM PROVIDED HISTORY: ams TECHNOLOGIST PROVIDED HISTORY: ams FINDINGS: Lungs: Clear Mediastinum: Unremarkable Pleura: No pleural effusion or pneumothorax Other: Unremarkable. No acute pulmonary process. Xr Knee Left (3 Views)    Result Date: 11/26/2018  EXAMINATION: 3 XRAY VIEWS OF THE LEFT KNEE 11/25/2018 11:14 pm COMPARISON: None. HISTORY: ORDERING SYSTEM PROVIDED HISTORY: fall TECHNOLOGIST PROVIDED HISTORY: fall FINDINGS: There is no evidence of acute fracture. There is normal alignment. No acute joint abnormality. No focal osseous lesion. No focal soft tissue abnormality. No acute osseous abnormality. Xr Knee Right (3 Views)    Result Date: 11/26/2018  EXAMINATION: 3 XRAY VIEWS OF THE RIGHT KNEE 11/25/2018 11:14 pm COMPARISON: None. HISTORY: ORDERING SYSTEM PROVIDED HISTORY: fall TECHNOLOGIST PROVIDED HISTORY: fall FINDINGS: There is no evidence of acute fracture. There is normal alignment. No acute joint abnormality. No focal osseous lesion. No focal soft tissue abnormality. No acute osseous abnormality.      Ct Head Wo Contrast    Result Date: 11/30/2018  EXAMINATION: CT OF THE HEAD WITHOUT CONTRAST,  11/30/2018 5:36 pm TECHNIQUE: CT of the head was performed without the administration of intravenous contrast. Dose modulation, iterative reconstruction, and/or weight based adjustment of the mA/kV was utilized to reduce glucagon (rDNA) injection 1 mg  1 mg Intramuscular PRN Severo Horton MD        dextrose 5 % solution  100 mL/hr Intravenous PRN Severo Horton MD        gabapentin (NEURONTIN) capsule 100 mg  100 mg Oral BID Amadeo Wylie MD        lamoTRIgine (LAMICTAL) tablet 200 mg  200 mg Oral BID Amadeo Wylie MD        levETIRAcetam (KEPPRA) tablet 1,250 mg  1,250 mg Oral BID Amadeo Wylie MD        cetirizine (ZYRTEC) tablet 10 mg  10 mg Oral Daily Amadeo Wylie MD        PARoxetine (PAXIL) tablet 20 mg  20 mg Oral QAM Amadeo Wylie MD        pramipexole (MIRAPEX) tablet 1 mg  1 mg Oral Nightly Amadeo Wylie MD        simvastatin (ZOCOR) tablet 40 mg  40 mg Oral Nightly Amadeo Wylie MD        vitamin B-12 (CYANOCOBALAMIN) tablet 500 mcg  500 mcg Oral Daily Amadeo Wylie MD        pantoprazole (PROTONIX) tablet 40 mg  40 mg Oral QAM AC Amadeo Wylie MD        oxyCODONE-acetaminophen (PERCOCET) 5-325 MG per tablet 1 tablet  1 tablet Oral Q6H PRN Amadeo Wylie MD        insulin glargine (LANTUS) injection vial 60 Units  60 Units Subcutaneous BID Amadeo Wylie MD        lisinopril (PRINIVIL;ZESTRIL) tablet 10 mg  10 mg Oral Daily Amadeo Wylie MD        And    hydrochlorothiazide (MICROZIDE) capsule 12.5 mg  12.5 mg Oral Daily Amadeo Wylie MD        sodium chloride flush 0.9 % injection 10 mL  10 mL Intravenous 2 times per day Severo Horton MD   10 mL at 11/30/18 5296    sodium chloride flush 0.9 % injection 10 mL  10 mL Intravenous PRN Severo Horton MD        acetaminophen (TYLENOL) tablet 650 mg  650 mg Oral Q4H PRN Severo Horton MD        enoxaparin (LOVENOX) injection 30 mg  30 mg Subcutaneous BID MD Amadeo Chaves MD  12/1/2018  11:23 AM

## 2018-12-01 NOTE — CONSULTS
The condition is 58 yo wf with confusion . She has history of stroke in 2010 with some mild residual left side weakness with secondary imbalance of her gait with multiple falling episodes resident of assisted living facility ambulating with walker . She has arthritic knees having been in hospital for knee pain one week ago . She reports on average to fall once per week do to giveway and dragging of left leg  . She also has diabetes with diabetic neuropathy . She has seizure disorder grand mal and confusional episodes on lamictal 200 mg po bid and keppra 1250 mg po bid with last seizure being  2 years ago . She has chronic diarrhea reporting to have had 5 loose stools yesterday . She was reported to be confused and incoherent brought to [facility] . Head CT with mild left frontal small vessel ischemia . Today she is alert and oriented conversive with her suspecting whether she may have had seizure . She had two lose stools today . UA and chest xary are negative . Significant medications lamictal 200 mg po bid , keppra 1250 mg po bid , zocor 40 mg po qd , neurontin 100 mg po bid , mirapex 1 mg po qhs  . Testing MRI of Head with mild chronic small vessel ischemia, December 2017 . MRI cervical spine C2-3, C4-5 disc osteophyte complex with moderate narrowing of thecal sac , December 2017 . MRI lumbar spine congenital canal stenosis, December 2017  . CTA Head and neck with < 50 % stenosis right ICA , atherosclerosis left ICA. , December 2017 . EEG normal , December 2017 .  Head CT with mild left frontal small vessel ischemia      Past Medical History:   Diagnosis Date    Anemia     Cataract     GERD (gastroesophageal reflux disease)     Headache     Hyperlipidemia     Neuropathy     Osteoarthritis     Seizures ([address] since age 10    Stroke (cerebrum) ([address] 2010    Type 2 diabetes mellitus without complication ([address] 5143       Past Surgical History:   Procedure Laterality Date    CATARACT REMOVAL      CHOLECYSTECTOMY

## 2018-12-02 VITALS
BODY MASS INDEX: 47.39 KG/M2 | RESPIRATION RATE: 18 BRPM | OXYGEN SATURATION: 95 % | DIASTOLIC BLOOD PRESSURE: 38 MMHG | HEIGHT: 61 IN | WEIGHT: 251 LBS | TEMPERATURE: 98.1 F | SYSTOLIC BLOOD PRESSURE: 140 MMHG | HEART RATE: 81 BPM

## 2018-12-02 LAB
ANION GAP SERPL CALCULATED.3IONS-SCNC: 9 MMOL/L (ref 9–17)
BUN BLDV-MCNC: 18 MG/DL (ref 8–23)
BUN/CREAT BLD: ABNORMAL (ref 9–20)
CALCIUM SERPL-MCNC: 8.8 MG/DL (ref 8.6–10.4)
CHLORIDE BLD-SCNC: 104 MMOL/L (ref 98–107)
CO2: 28 MMOL/L (ref 20–31)
CREAT SERPL-MCNC: 0.8 MG/DL (ref 0.5–0.9)
GFR AFRICAN AMERICAN: >60 ML/MIN
GFR NON-AFRICAN AMERICAN: >60 ML/MIN
GFR SERPL CREATININE-BSD FRML MDRD: ABNORMAL ML/MIN/{1.73_M2}
GFR SERPL CREATININE-BSD FRML MDRD: ABNORMAL ML/MIN/{1.73_M2}
GLUCOSE BLD-MCNC: 173 MG/DL (ref 65–105)
GLUCOSE BLD-MCNC: 193 MG/DL (ref 70–99)
GLUCOSE BLD-MCNC: 290 MG/DL (ref 65–105)
HCT VFR BLD CALC: 31.5 % (ref 36–46)
HEMOGLOBIN: 9.7 G/DL (ref 12–16)
MCH RBC QN AUTO: 24.4 PG (ref 26–34)
MCHC RBC AUTO-ENTMCNC: 30.8 G/DL (ref 31–37)
MCV RBC AUTO: 79.1 FL (ref 80–100)
NRBC AUTOMATED: ABNORMAL PER 100 WBC
PDW BLD-RTO: 16.4 % (ref 11.5–14.9)
PLATELET # BLD: 94 K/UL (ref 150–450)
PMV BLD AUTO: 7.5 FL (ref 6–12)
POTASSIUM SERPL-SCNC: 4.1 MMOL/L (ref 3.7–5.3)
RBC # BLD: 3.98 M/UL (ref 4–5.2)
SODIUM BLD-SCNC: 141 MMOL/L (ref 135–144)
WBC # BLD: 3.3 K/UL (ref 3.5–11)

## 2018-12-02 PROCEDURE — 99225 PR SBSQ OBSERVATION CARE/DAY 25 MINUTES: CPT | Performed by: PSYCHIATRY & NEUROLOGY

## 2018-12-02 PROCEDURE — 85027 COMPLETE CBC AUTOMATED: CPT

## 2018-12-02 PROCEDURE — 6370000000 HC RX 637 (ALT 250 FOR IP): Performed by: INTERNAL MEDICINE

## 2018-12-02 PROCEDURE — 80048 BASIC METABOLIC PNL TOTAL CA: CPT

## 2018-12-02 PROCEDURE — 99239 HOSP IP/OBS DSCHRG MGMT >30: CPT | Performed by: INTERNAL MEDICINE

## 2018-12-02 PROCEDURE — G0378 HOSPITAL OBSERVATION PER HR: HCPCS

## 2018-12-02 PROCEDURE — 6370000000 HC RX 637 (ALT 250 FOR IP): Performed by: PSYCHIATRY & NEUROLOGY

## 2018-12-02 PROCEDURE — 36415 COLL VENOUS BLD VENIPUNCTURE: CPT

## 2018-12-02 PROCEDURE — 82947 ASSAY GLUCOSE BLOOD QUANT: CPT

## 2018-12-02 RX ADMIN — INSULIN LISPRO 3 UNITS: 100 INJECTION, SOLUTION INTRAVENOUS; SUBCUTANEOUS at 11:40

## 2018-12-02 RX ADMIN — LEVETIRACETAM 1500 MG: 750 TABLET ORAL at 08:29

## 2018-12-02 RX ADMIN — PAROXETINE HYDROCHLORIDE 20 MG: 20 TABLET, FILM COATED ORAL at 08:29

## 2018-12-02 RX ADMIN — CETIRIZINE HYDROCHLORIDE 10 MG: 10 TABLET, FILM COATED ORAL at 08:29

## 2018-12-02 RX ADMIN — INSULIN LISPRO 1 UNITS: 100 INJECTION, SOLUTION INTRAVENOUS; SUBCUTANEOUS at 08:28

## 2018-12-02 RX ADMIN — OXYCODONE HYDROCHLORIDE AND ACETAMINOPHEN 1 TABLET: 5; 325 TABLET ORAL at 11:40

## 2018-12-02 RX ADMIN — BUSPIRONE HYDROCHLORIDE 10 MG: 5 TABLET ORAL at 08:28

## 2018-12-02 RX ADMIN — INSULIN GLARGINE 60 UNITS: 100 INJECTION, SOLUTION SUBCUTANEOUS at 08:28

## 2018-12-02 RX ADMIN — LAMOTRIGINE 200 MG: 100 TABLET ORAL at 08:20

## 2018-12-02 RX ADMIN — BUSPIRONE HYDROCHLORIDE 10 MG: 5 TABLET ORAL at 14:35

## 2018-12-02 RX ADMIN — PANTOPRAZOLE SODIUM 40 MG: 40 TABLET, DELAYED RELEASE ORAL at 05:10

## 2018-12-02 RX ADMIN — OXYCODONE HYDROCHLORIDE AND ACETAMINOPHEN 1 TABLET: 5; 325 TABLET ORAL at 05:10

## 2018-12-02 RX ADMIN — GABAPENTIN 100 MG: 100 CAPSULE ORAL at 08:28

## 2018-12-02 RX ADMIN — METRONIDAZOLE 500 MG: 500 TABLET ORAL at 14:35

## 2018-12-02 RX ADMIN — CYANOCOBALAMIN TAB 500 MCG 500 MCG: 500 TAB at 08:29

## 2018-12-02 RX ADMIN — METRONIDAZOLE 500 MG: 500 TABLET ORAL at 05:10

## 2018-12-02 ASSESSMENT — PAIN SCALES - GENERAL
PAINLEVEL_OUTOF10: 3
PAINLEVEL_OUTOF10: 7
PAINLEVEL_OUTOF10: 4

## 2018-12-02 NOTE — PLAN OF CARE
Report called to Shalonda Holbrook to Juancarlos Agosto at [phone]. Updated on ETA according to  from [facility]. Patient's discharge instructions reviewed with patient. Questions answered. Patient up in chair awaiting transportation.

## 2018-12-02 NOTE — CARE COORDINATION
Writer advised patient can discharge to [address] [address] assisted living [address]  . Certification Of necessity and transportation form  [phone]. Waiting on a call back for  time. Writer called Estella at [address] [address] at [phone] and advised patient was being discharged back to assisted living. Patient can come back anytime. Transferred call to jazlyn Choudhury to give report on patient.

## 2019-01-01 ENCOUNTER — OFFICE VISIT (OUTPATIENT)
Dept: GASTROENTEROLOGY | Age: 64
End: 2019-01-01
Payer: COMMERCIAL

## 2019-01-01 ENCOUNTER — HOSPITAL ENCOUNTER (EMERGENCY)
Age: 64
Discharge: HOME | End: 2019-12-06
Attending: EMERGENCY MEDICINE
Payer: COMMERCIAL

## 2019-01-01 ENCOUNTER — TELEPHONE (OUTPATIENT)
Dept: GASTROENTEROLOGY | Age: 64
End: 2019-01-01

## 2019-01-01 VITALS
DIASTOLIC BLOOD PRESSURE: 69 MMHG | HEIGHT: 60 IN | RESPIRATION RATE: 18 BRPM | OXYGEN SATURATION: 94 % | TEMPERATURE: 98.2 F | HEART RATE: 85 BPM | BODY MASS INDEX: 51.04 KG/M2 | WEIGHT: 260 LBS | SYSTOLIC BLOOD PRESSURE: 162 MMHG

## 2019-01-01 VITALS
WEIGHT: 262.2 LBS | BODY MASS INDEX: 49.54 KG/M2 | HEART RATE: 96 BPM | DIASTOLIC BLOOD PRESSURE: 69 MMHG | SYSTOLIC BLOOD PRESSURE: 128 MMHG

## 2019-01-01 DIAGNOSIS — D50.8 OTHER IRON DEFICIENCY ANEMIA: Primary | ICD-10-CM

## 2019-01-01 DIAGNOSIS — K21.9 GASTROESOPHAGEAL REFLUX DISEASE, ESOPHAGITIS PRESENCE NOT SPECIFIED: ICD-10-CM

## 2019-01-01 DIAGNOSIS — S91.312A FOOT LACERATION, LEFT, INITIAL ENCOUNTER: Primary | ICD-10-CM

## 2019-01-01 DIAGNOSIS — D50.8 OTHER IRON DEFICIENCY ANEMIA: ICD-10-CM

## 2019-01-01 DIAGNOSIS — R19.5 OCCULT BLOOD IN STOOLS: ICD-10-CM

## 2019-01-01 DIAGNOSIS — K29.71 GASTROINTESTINAL HEMORRHAGE ASSOCIATED WITH GASTRITIS, UNSPECIFIED GASTRITIS TYPE: ICD-10-CM

## 2019-01-01 DIAGNOSIS — K63.5 POLYP OF COLON, UNSPECIFIED PART OF COLON, UNSPECIFIED TYPE: ICD-10-CM

## 2019-01-01 PROCEDURE — 99283 EMERGENCY DEPT VISIT LOW MDM: CPT

## 2019-01-01 PROCEDURE — 12002 RPR S/N/AX/GEN/TRNK2.6-7.5CM: CPT

## 2019-01-01 PROCEDURE — 2500000003 HC RX 250 WO HCPCS: Performed by: EMERGENCY MEDICINE

## 2019-01-01 PROCEDURE — 99214 OFFICE O/P EST MOD 30 MIN: CPT | Performed by: INTERNAL MEDICINE

## 2019-01-01 RX ORDER — LIDOCAINE HYDROCHLORIDE AND EPINEPHRINE 10; 10 MG/ML; UG/ML
10 INJECTION, SOLUTION INFILTRATION; PERINEURAL ONCE
Status: COMPLETED | OUTPATIENT
Start: 2019-01-01 | End: 2019-01-01

## 2019-01-01 RX ADMIN — LIDOCAINE HYDROCHLORIDE,EPINEPHRINE BITARTRATE 10 ML: 10; .01 INJECTION, SOLUTION INFILTRATION; PERINEURAL at 09:48

## 2019-01-01 ASSESSMENT — ENCOUNTER SYMPTOMS
WHEEZING: 0
SHORTNESS OF BREATH: 0
SORE THROAT: 0
BACK PAIN: 1
ABDOMINAL PAIN: 1
DIARRHEA: 1
DIARRHEA: 0
SINUS PRESSURE: 0
ABDOMINAL DISTENTION: 1
VOMITING: 0
NAUSEA: 1
RECTAL PAIN: 0
BACK PAIN: 0
VOICE CHANGE: 0
VOMITING: 1
COUGH: 0
COUGH: 1
ANAL BLEEDING: 0
CONSTIPATION: 0
BLOOD IN STOOL: 0
TROUBLE SWALLOWING: 0
ABDOMINAL PAIN: 0
CHOKING: 0

## 2019-01-01 ASSESSMENT — PAIN SCALES - GENERAL
PAINLEVEL_OUTOF10: 9
PAINLEVEL_OUTOF10: 9

## 2019-01-01 ASSESSMENT — PAIN DESCRIPTION - LOCATION: LOCATION: FOOT

## 2019-01-01 ASSESSMENT — PAIN DESCRIPTION - PAIN TYPE: TYPE: ACUTE PAIN

## 2019-01-01 ASSESSMENT — PAIN DESCRIPTION - ORIENTATION: ORIENTATION: LEFT

## 2019-04-29 ENCOUNTER — TELEPHONE (OUTPATIENT)
Dept: GASTROENTEROLOGY | Age: 64
End: 2019-04-29

## 2019-04-29 NOTE — TELEPHONE ENCOUNTER
Patient called to schedule colonoscopy informed that schedulers were in clinic and that I could transfer her to leave a message and they would call her back to do this. Patient then thanked writer and call ended.

## 2019-05-07 ENCOUNTER — APPOINTMENT (OUTPATIENT)
Dept: GENERAL RADIOLOGY | Age: 64
End: 2019-05-07
Payer: COMMERCIAL

## 2019-05-07 ENCOUNTER — APPOINTMENT (OUTPATIENT)
Dept: CT IMAGING | Age: 64
End: 2019-05-07
Payer: COMMERCIAL

## 2019-05-07 ENCOUNTER — HOSPITAL ENCOUNTER (EMERGENCY)
Age: 64
Discharge: HOME | End: 2019-05-07
Attending: EMERGENCY MEDICINE
Payer: COMMERCIAL

## 2019-05-07 VITALS
TEMPERATURE: 98.4 F | BODY MASS INDEX: 49.09 KG/M2 | HEART RATE: 89 BPM | HEIGHT: 61 IN | DIASTOLIC BLOOD PRESSURE: 66 MMHG | OXYGEN SATURATION: 94 % | WEIGHT: 260 LBS | SYSTOLIC BLOOD PRESSURE: 125 MMHG | RESPIRATION RATE: 14 BRPM
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
## 2019-05-07 DIAGNOSIS — S30.0XXA CONTUSION OF LOWER BACK, INITIAL ENCOUNTER: Primary | ICD-10-CM

## 2019-05-07 PROCEDURE — 6360000002 HC RX W HCPCS: Performed by: EMERGENCY MEDICINE

## 2019-05-07 PROCEDURE — 72128 CT CHEST SPINE W/O DYE: CPT

## 2019-05-07 PROCEDURE — 73552 X-RAY EXAM OF FEMUR 2/>: CPT

## 2019-05-07 PROCEDURE — 96372 THER/PROPH/DIAG INJ SC/IM: CPT

## 2019-05-07 PROCEDURE — 72131 CT LUMBAR SPINE W/O DYE: CPT

## 2019-05-07 PROCEDURE — 99284 EMERGENCY DEPT VISIT MOD MDM: CPT

## 2019-05-07 RX ORDER — MORPHINE SULFATE 4 MG/ML
4 INJECTION, SOLUTION INTRAMUSCULAR; INTRAVENOUS ONCE
Status: COMPLETED | OUTPATIENT
Start: 2019-05-07 | End: 2019-05-07

## 2019-05-07 RX ORDER — ORPHENADRINE CITRATE 30 MG/ML
60 INJECTION INTRAMUSCULAR; INTRAVENOUS ONCE
Status: COMPLETED | OUTPATIENT
Start: 2019-05-07 | End: 2019-05-07

## 2019-05-07 RX ORDER — CEPHALEXIN 500 MG/1
500 CAPSULE ORAL 2 TIMES DAILY
COMMUNITY
End: 2020-01-01

## 2019-05-07 RX ADMIN — ORPHENADRINE CITRATE 60 MG: 30 INJECTION INTRAMUSCULAR; INTRAVENOUS at 12:33

## 2019-05-07 RX ADMIN — MORPHINE SULFATE 4 MG: 4 INJECTION INTRAVENOUS at 12:35

## 2019-05-07 ASSESSMENT — PAIN DESCRIPTION - ORIENTATION: ORIENTATION: LOWER;UPPER

## 2019-05-07 ASSESSMENT — PAIN SCALES - GENERAL
PAINLEVEL_OUTOF10: 10
PAINLEVEL_OUTOF10: 3

## 2019-05-07 ASSESSMENT — ENCOUNTER SYMPTOMS
BACK PAIN: 1
DIARRHEA: 0
NAUSEA: 0
COUGH: 0
SHORTNESS OF BREATH: 0
COLOR CHANGE: 0
TROUBLE SWALLOWING: 0
CONSTIPATION: 0
SORE THROAT: 0
ABDOMINAL PAIN: 0
VOMITING: 0
BLOOD IN STOOL: 0

## 2019-05-07 ASSESSMENT — PAIN DESCRIPTION - LOCATION: LOCATION: BACK

## 2019-05-07 NOTE — ED PROVIDER NOTES
(gastroesophageal reflux disease)     Headache     Hyperlipidemia     Neuropathy     Osteoarthritis     Restless leg syndrome     Seizures ([facility] since age 10   [place] Short-term memory loss     Stroke (cerebrum) ([facility] 2010    Type 2 diabetes mellitus without complication ([facility] 5212         SURGICAL HISTORY      has a past surgical history that includes Cholecystectomy; Tonsillectomy; Cataract removal; eye surgery; and Cardiac catheterization. CURRENT MEDICATIONS       Current Discharge Medication List      CONTINUE these medications which have NOT CHANGED    Details   cephALEXin (KEFLEX) 500 MG capsule Take 500 mg by mouth 2 times daily Indications: started 1st dose 5-7-19 in am, taking for 7 days      vitamin B-12 (CYANOCOBALAMIN) 500 MCG tablet Take 500 mcg by mouth daily      furosemide (LASIX) 20 MG tablet Take 20 mg by mouth daily      gabapentin (NEURONTIN) 100 MG capsule Take 100 mg by mouth 2 times daily. [employer] levETIRAcetam (KEPPRA) 500 MG tablet Take 1,250 mg by mouth 2 times daily 2.5 tablets = 1250 mg      !! insulin aspart (NOVOLOG) 100 UNIT/ML injection vial Inject into the skin Inject as per sliding scale before meals and at bedtime:    = 0 units; call physician if less than 70  151-200 = 2 units  201-250 = 4 units  251-300 = 6 units  301-350 = 8 units  351-400 = 10 units; call physician if greater than 400      !! insulin aspart (NOVOLOG) 100 UNIT/ML injection vial Inject 15 Units into the skin 4 times daily (before meals and nightly)      potassium chloride (KLOR-CON M) 10 MEQ extended release tablet Take 10 mEq by mouth daily      benzonatate (TESSALON) 200 MG capsule Take 200 mg by mouth 2 times daily as needed for Cough      !! loperamide (IMODIUM) 2 MG capsule Take 4 mg by mouth daily as needed for Diarrhea (after first loose stool)      ! ! loperamide (IMODIUM) 2 MG capsule Take 2 mg by mouth 4 times daily as needed for Diarrhea (after initial 4 mg dose)      acetaminophen (TYLENOL) 325 MG tablet Take 650 mg by mouth 3 times daily as needed for Pain (mild)      oxyCODONE-acetaminophen (PERCOCET) 5-325 MG per tablet Take 1-2 tablets by mouth every 6 hours as needed for Pain .   Qty: 15 tablet, Refills: 0      metFORMIN (GLUCOPHAGE) 1000 MG tablet Take 1,000 mg by mouth 2 times daily (with meals)    Associated Diagnoses: Uncontrolled type 2 diabetes mellitus with diabetic polyneuropathy, with long-term current use of insulin ([facility])      Blood Glucose Monitoring Suppl FLAVIO Check blood sugars 3/day  Qty: 1 Device, Refills: 0    Associated Diagnoses: Uncontrolled type 2 diabetes mellitus with diabetic polyneuropathy, with long-term current use of insulin ([facility])      Glucose Blood (BLOOD GLUCOSE TEST STRIPS) STRP Test 3  times daily Insulin Dependent mellitus  Qty: 100 strip, Refills: 11    Associated Diagnoses: Uncontrolled type 2 diabetes mellitus with diabetic polyneuropathy, with long-term current use of insulin ([facility])      Lancets MISC Check 3/day  Qty: 100 each, Refills: 11    Associated Diagnoses: Uncontrolled type 2 diabetes mellitus with diabetic polyneuropathy, with long-term current use of insulin ([facility])      lisinopril-hydrochlorothiazide (PRINZIDE;ZESTORETIC) 10-12.5 MG per tablet Take 1 tablet by mouth daily      PARoxetine (PAXIL) 20 MG tablet Take 20 mg by mouth every morning      omeprazole (PRILOSEC) 40 MG delayed release capsule Take 40 mg by mouth Daily       loratadine (CLARITIN) 10 MG tablet Take 10 mg by mouth daily      simvastatin (ZOCOR) 40 MG tablet Take 40 mg by mouth nightly      pramipexole (MIRAPEX) 1 MG tablet Take 1 mg by mouth nightly      lamoTRIgine (LAMICTAL) 200 MG tablet Take 200 mg by mouth 2 times daily      busPIRone (BUSPAR) 5 MG tablet Take 10 mg by mouth 3 times daily      insulin glargine (LANTUS) 100 UNIT/ML injection vial Inject 60 Units into the skin 2 times daily Patient states she is using the lantus pen       !! - Potential duplicate medications found. Please discuss with provider. ALLERGIES     is allergic to codeine. FAMILY HISTORY     indicated that her mother is . She indicated that her father is . She indicated that her brother is alive. family history includes Diabetes in her brother. SOCIAL HISTORY      reports that she has never smoked. She has never used smokeless tobacco. She reports that she does not drink alcohol or use drugs. PHYSICAL EXAM     INITIAL VITALS:  height is 5' 1\" (1.549 m) and weight is 260 lb (117.9 kg). Her temperature is 98.4 °F (36.9 °C). Her blood pressure is 125/66 and her pulse is 89. Her respiration is 14 and oxygen saturation is 93%. Physical Exam   Constitutional: She is oriented to person, place, and time. No distress. HENT:   Head: Normocephalic and atraumatic. Eyes: Pupils are equal, round, and reactive to light. Conjunctivae are normal.   Neck: Neck supple. Cardiovascular: Normal rate, regular rhythm, normal heart sounds and intact distal pulses. No murmur heard. Pulmonary/Chest: Effort normal and breath sounds normal. No respiratory distress. Abdominal: Soft. Bowel sounds are normal. She exhibits no distension. There is no tenderness. Musculoskeletal: She exhibits edema (Right lower extremity). She exhibits no tenderness. Cervical back: She exhibits no bony tenderness. Thoracic back: She exhibits bony tenderness. Lumbar back: She exhibits bony tenderness. Lymphadenopathy:     She has no cervical adenopathy. Neurological: She is alert and oriented to person, place, and time. Skin: Skin is warm and dry. No rash noted. There is erythema (Right lower extremity). Psychiatric: Judgment normal.   Nursing note and vitals reviewed. DIFFERENTIAL DIAGNOSIS/MDM:   24-year-old female presents after mechanical fall. She is afebrile and nontoxic. Vital signs are normal.  Not in acute distress.   She does have midline thoracic and lumbar spine tenderness. No obvious step-offs or deformities. No cervical spine tenderness. No neurologic deficits on my exam.    She does have some swelling and redness to her right foot and ankle. States that this is been present for several months. She initially just had a DVT scan of the leg yesterday which was normal.    We'll treat pain. We'll get CT scan lumbar and thoracic. DIAGNOSTIC RESULTS     EKG: All EKG's are interpreted by the Emergency Department Physician who either signs or Co-signs this chart in the absence of a cardiologist.        RADIOLOGY:   I directly visualized the following  images and reviewed the radiologist interpretations:  XR FEMUR LEFT (MIN 2 VIEWS)   Final Result   No radiographic abnormality of the left femur. CT THORACIC SPINE WO CONTRAST   Final Result   No acute osseous abnormality identified. CT Lumbar Spine WO Contrast   Final Result   No acute fracture or malalignment of the lumbar spine. Multilevel degenerative disc disease and degenerative facet hypertrophy with   bony foraminal narrowing bilaterally as described above. ED BEDSIDE ULTRASOUND:      LABS:  Labs Reviewed   URINE RT REFLEX TO CULTURE         EMERGENCY DEPARTMENT COURSE:   Vitals:    Vitals:    05/07/19 1203   BP: 125/66   Pulse: 89   Resp: 14   Temp: 98.4 °F (36.9 °C)   SpO2: 93%   Weight: 260 lb (117.9 kg)   Height: 5' 1\" (1.549 m)     3:24 PM  Imaging is unremarkable. Patient feels a lot better after pain medications. She was able to stand under her own power and without pain. She does usually use a walker at her nursing home. We'll discharge back to care of nursing home. Transfer has been arranged. CRITICALCARE:      CONSULTS:  None      PROCEDURES:      FINAL IMPRESSION      1. Contusion of lower back, initial encounter    2.  Fall from standing, initial encounter            DISPOSITION/PLAN   DISPOSITION Decision To Discharge 05/07/2019 03:06:44 PM          PATIENT REFERRED TO:  Martin Freeman MD  [address] [place] [id number]    Schedule an appointment as soon as possible for a visit       [facility] ED  [address]  [address] [facility] [address]  [phone]    As needed, If symptoms worsen      DISCHARGE MEDICATIONS:  Current Discharge Medication List          (Please note that portions ofthis note were completed with a voice recognition program.  Efforts were made to edit the dictations but occasionally words are mis-transcribed.)    Fernanda Prajapati DO  Attending Emergency Physician         Fernanda Prajapati DO  05/07/19 1522

## 2019-05-07 NOTE — ED NOTES
Bed: 03  Expected date:   Expected time:   Means of arrival:   Comments:   [address] [facility]  05/07/19 0914

## 2019-05-07 NOTE — ED NOTES
Pt assisted to stand at bedside, pt tolerated well but was unable to ambulate as she has no walker and pt is \"woozy\" from medications she was given.      Particgavin Zaragoza RN  05/07/19 9492

## 2019-05-10 ENCOUNTER — APPOINTMENT (OUTPATIENT)
Dept: GENERAL RADIOLOGY | Age: 64
DRG: 378 | End: 2019-05-10
Payer: COMMERCIAL

## 2019-05-10 ENCOUNTER — APPOINTMENT (OUTPATIENT)
Dept: CT IMAGING | Age: 64
DRG: 378 | End: 2019-05-10
Payer: COMMERCIAL

## 2019-05-10 ENCOUNTER — HOSPITAL ENCOUNTER (INPATIENT)
Age: 64
LOS: 5 days | Discharge: NURSING, REHAB OR LONG-TERM CARE | DRG: 378 | End: 2019-05-15
Attending: EMERGENCY MEDICINE | Admitting: INTERNAL MEDICINE
Payer: COMMERCIAL

## 2019-05-10 PROBLEM — K92.2 GI BLEED: Status: ACTIVE | Noted: 2019-05-10

## 2019-05-10 LAB
ABSOLUTE EOS #: 0.1 K/UL (ref 0–0.4)
ABSOLUTE IMMATURE GRANULOCYTE: ABNORMAL K/UL (ref 0–0.3)
ABSOLUTE LYMPH #: 0.94 K/UL (ref 1–4.8)
ABSOLUTE MONO #: 0.36 K/UL (ref 0.1–1.3)
ALBUMIN SERPL-MCNC: 3 G/DL (ref 3.5–5.2)
ALBUMIN/GLOBULIN RATIO: ABNORMAL (ref 1–2.5)
ALP BLD-CCNC: 109 U/L (ref 35–104)
ALT SERPL-CCNC: 22 U/L (ref 5–33)
ANION GAP SERPL CALCULATED.3IONS-SCNC: 11 MMOL/L (ref 9–17)
AST SERPL-CCNC: 35 U/L
BASOPHILS # BLD: 1 % (ref 0–2)
BASOPHILS ABSOLUTE: 0.05 K/UL (ref 0–0.2)
BILIRUB SERPL-MCNC: 0.19 MG/DL (ref 0.3–1.2)
BUN BLDV-MCNC: 42 MG/DL (ref 8–23)
BUN/CREAT BLD: ABNORMAL (ref 9–20)
CALCIUM SERPL-MCNC: 9.1 MG/DL (ref 8.6–10.4)
CHLORIDE BLD-SCNC: 103 MMOL/L (ref 98–107)
CO2: 26 MMOL/L (ref 20–31)
CREAT SERPL-MCNC: 1.32 MG/DL (ref 0.5–0.9)
DIFFERENTIAL TYPE: ABNORMAL
EOSINOPHILS RELATIVE PERCENT: 2 % (ref 0–4)
GFR AFRICAN AMERICAN: 49 ML/MIN
GFR NON-AFRICAN AMERICAN: 40 ML/MIN
GFR SERPL CREATININE-BSD FRML MDRD: ABNORMAL ML/MIN/{1.73_M2}
GFR SERPL CREATININE-BSD FRML MDRD: ABNORMAL ML/MIN/{1.73_M2}
GLUCOSE BLD-MCNC: 222 MG/DL (ref 65–105)
GLUCOSE BLD-MCNC: 242 MG/DL (ref 70–99)
HCT VFR BLD CALC: 23.4 % (ref 36–46)
HEMOGLOBIN: 7.1 G/DL (ref 12–16)
IMMATURE GRANULOCYTES: ABNORMAL %
INR BLD: 1.1
LYMPHOCYTES # BLD: 18 % (ref 24–44)
MAGNESIUM: 1.9 MG/DL (ref 1.6–2.6)
MCH RBC QN AUTO: 21.7 PG (ref 26–34)
MCHC RBC AUTO-ENTMCNC: 30.1 G/DL (ref 31–37)
MCV RBC AUTO: 72 FL (ref 80–100)
MONOCYTES # BLD: 7 % (ref 1–7)
MORPHOLOGY: ABNORMAL
NRBC AUTOMATED: ABNORMAL PER 100 WBC
PDW BLD-RTO: 18.2 % (ref 11.5–14.9)
PLATELET # BLD: 129 K/UL (ref 150–450)
PLATELET ESTIMATE: ABNORMAL
PMV BLD AUTO: 6.8 FL (ref 6–12)
POTASSIUM SERPL-SCNC: 5.3 MMOL/L (ref 3.7–5.3)
PROTHROMBIN TIME: 14.6 SEC (ref 11.8–14.6)
RBC # BLD: 3.25 M/UL (ref 4–5.2)
RBC # BLD: ABNORMAL 10*6/UL
SEG NEUTROPHILS: 72 % (ref 36–66)
SEGMENTED NEUTROPHILS ABSOLUTE COUNT: 3.75 K/UL (ref 1.3–9.1)
SODIUM BLD-SCNC: 140 MMOL/L (ref 135–144)
TOTAL PROTEIN: 7.3 G/DL (ref 6.4–8.3)
TROPONIN INTERP: ABNORMAL
TROPONIN INTERP: ABNORMAL
TROPONIN T: ABNORMAL NG/ML
TROPONIN T: ABNORMAL NG/ML
TROPONIN, HIGH SENSITIVITY: 32 NG/L (ref 0–14)
TROPONIN, HIGH SENSITIVITY: 32 NG/L (ref 0–14)
WBC # BLD: 5.2 K/UL (ref 3.5–11)
WBC # BLD: ABNORMAL 10*3/UL

## 2019-05-10 PROCEDURE — 86901 BLOOD TYPING SEROLOGIC RH(D): CPT

## 2019-05-10 PROCEDURE — 99285 EMERGENCY DEPT VISIT HI MDM: CPT

## 2019-05-10 PROCEDURE — 72131 CT LUMBAR SPINE W/O DYE: CPT

## 2019-05-10 PROCEDURE — 72125 CT NECK SPINE W/O DYE: CPT

## 2019-05-10 PROCEDURE — 96375 TX/PRO/DX INJ NEW DRUG ADDON: CPT

## 2019-05-10 PROCEDURE — 86850 RBC ANTIBODY SCREEN: CPT

## 2019-05-10 PROCEDURE — 82947 ASSAY GLUCOSE BLOOD QUANT: CPT

## 2019-05-10 PROCEDURE — 6360000002 HC RX W HCPCS: Performed by: EMERGENCY MEDICINE

## 2019-05-10 PROCEDURE — 71045 X-RAY EXAM CHEST 1 VIEW: CPT

## 2019-05-10 PROCEDURE — 85610 PROTHROMBIN TIME: CPT

## 2019-05-10 PROCEDURE — 70450 CT HEAD/BRAIN W/O DYE: CPT

## 2019-05-10 PROCEDURE — 86900 BLOOD TYPING SEROLOGIC ABO: CPT

## 2019-05-10 PROCEDURE — 80053 COMPREHEN METABOLIC PANEL: CPT

## 2019-05-10 PROCEDURE — 85025 COMPLETE CBC W/AUTO DIFF WBC: CPT

## 2019-05-10 PROCEDURE — 72128 CT CHEST SPINE W/O DYE: CPT

## 2019-05-10 PROCEDURE — 2580000003 HC RX 258: Performed by: EMERGENCY MEDICINE

## 2019-05-10 PROCEDURE — 86920 COMPATIBILITY TEST SPIN: CPT

## 2019-05-10 PROCEDURE — 36415 COLL VENOUS BLD VENIPUNCTURE: CPT

## 2019-05-10 PROCEDURE — 96367 TX/PROPH/DG ADDL SEQ IV INF: CPT

## 2019-05-10 PROCEDURE — 96365 THER/PROPH/DIAG IV INF INIT: CPT

## 2019-05-10 PROCEDURE — 83735 ASSAY OF MAGNESIUM: CPT

## 2019-05-10 PROCEDURE — C9113 INJ PANTOPRAZOLE SODIUM, VIA: HCPCS | Performed by: EMERGENCY MEDICINE

## 2019-05-10 PROCEDURE — 2060000000 HC ICU INTERMEDIATE R&B

## 2019-05-10 PROCEDURE — 72170 X-RAY EXAM OF PELVIS: CPT

## 2019-05-10 PROCEDURE — 84484 ASSAY OF TROPONIN QUANT: CPT

## 2019-05-10 PROCEDURE — 93005 ELECTROCARDIOGRAM TRACING: CPT

## 2019-05-10 RX ORDER — DEXTROSE, SODIUM CHLORIDE, AND POTASSIUM CHLORIDE 5; .45; .15 G/100ML; G/100ML; G/100ML
INJECTION INTRAVENOUS CONTINUOUS
Status: CANCELLED | OUTPATIENT
Start: 2019-05-10

## 2019-05-10 RX ORDER — DEXTROSE AND SODIUM CHLORIDE 5; .45 G/100ML; G/100ML
INJECTION, SOLUTION INTRAVENOUS CONTINUOUS
Status: DISCONTINUED | OUTPATIENT
Start: 2019-05-10 | End: 2019-05-11

## 2019-05-10 RX ADMIN — DEXTROSE AND SODIUM CHLORIDE: 5; 450 INJECTION, SOLUTION INTRAVENOUS at 23:49

## 2019-05-10 RX ADMIN — SODIUM CHLORIDE 80 MG: 9 INJECTION, SOLUTION INTRAVENOUS at 23:13

## 2019-05-10 ASSESSMENT — PAIN DESCRIPTION - ORIENTATION: ORIENTATION: MID

## 2019-05-10 ASSESSMENT — ENCOUNTER SYMPTOMS
SHORTNESS OF BREATH: 0
BACK PAIN: 1
COUGH: 0

## 2019-05-10 ASSESSMENT — PAIN DESCRIPTION - PAIN TYPE: TYPE: CHRONIC PAIN

## 2019-05-10 ASSESSMENT — PAIN SCALES - GENERAL: PAINLEVEL_OUTOF10: 10

## 2019-05-10 NOTE — ED PROVIDER NOTES
thoracic or lumbar spine. Moderate diffuse degenerative changes in the spine manifested mainly by   multilevel bridging vertebral body osteophytes and scattered areas of   degenerative disc disease. CT Cervical Spine WO Contrast   Final Result   No acute intracranial abnormality. No acute traumatic injury of the cervical, thoracic or lumbar spine. Moderate diffuse degenerative changes in the spine manifested mainly by   multilevel bridging vertebral body osteophytes and scattered areas of   degenerative disc disease. CT Head WO Contrast   Final Result   No acute intracranial abnormality. No acute traumatic injury of the cervical, thoracic or lumbar spine. Moderate diffuse degenerative changes in the spine manifested mainly by   multilevel bridging vertebral body osteophytes and scattered areas of   degenerative disc disease. LABS: All lab results were reviewed by myself, and all abnormals are listed below.   Labs Reviewed   CBC WITH AUTO DIFFERENTIAL - Abnormal; Notable for the following components:       Result Value    RBC 3.25 (*)     Hemoglobin 7.1 (*)     Hematocrit 23.4 (*)     MCV 72.0 (*)     MCH 21.7 (*)     MCHC 30.1 (*)     RDW 18.2 (*)     Platelets 695 (*)     Seg Neutrophils 72 (*)     Lymphocytes 18 (*)     Absolute Lymph # 0.94 (*)     All other components within normal limits   COMPREHENSIVE METABOLIC PANEL - Abnormal; Notable for the following components:    Glucose 242 (*)     BUN 42 (*)     CREATININE 1.32 (*)     Alkaline Phosphatase 109 (*)     AST 35 (*)     Total Bilirubin 0.19 (*)     Alb 3.0 (*)     GFR Non- 40 (*)     GFR  49 (*)     All other components within normal limits   TROPONIN - Abnormal; Notable for the following components:    Troponin, High Sensitivity 32 (*)     All other components within normal limits   POC GLUCOSE FINGERSTICK - Abnormal; Notable for the following components:    POC Glucose 222

## 2019-05-11 LAB
-: NORMAL
GLUCOSE BLD-MCNC: 115 MG/DL (ref 65–105)
GLUCOSE BLD-MCNC: 143 MG/DL (ref 65–105)
GLUCOSE BLD-MCNC: 195 MG/DL (ref 65–105)
GLUCOSE BLD-MCNC: 224 MG/DL (ref 65–105)
HCT VFR BLD CALC: 23 % (ref 36–46)
HCT VFR BLD CALC: 26.3 % (ref 36–46)
HEMOGLOBIN: 7 G/DL (ref 12–16)
HEMOGLOBIN: 8 G/DL (ref 12–16)
MCH RBC QN AUTO: 21.8 PG (ref 26–34)
MCHC RBC AUTO-ENTMCNC: 30.5 G/DL (ref 31–37)
MCV RBC AUTO: 71.6 FL (ref 80–100)
NRBC AUTOMATED: ABNORMAL PER 100 WBC
PDW BLD-RTO: 17.8 % (ref 11.5–14.9)
PLATELET # BLD: 127 K/UL (ref 150–450)
PMV BLD AUTO: 6.5 FL (ref 6–12)
RBC # BLD: 3.21 M/UL (ref 4–5.2)
REASON FOR REJECTION: NORMAL
WBC # BLD: 5.2 K/UL (ref 3.5–11)
ZZ NTE CLEAN UP: ORDERED TEST: NORMAL
ZZ NTE WITH NAME CLEAN UP: SPECIMEN SOURCE: NORMAL

## 2019-05-11 PROCEDURE — 36430 TRANSFUSION BLD/BLD COMPNT: CPT

## 2019-05-11 PROCEDURE — 2060000000 HC ICU INTERMEDIATE R&B

## 2019-05-11 PROCEDURE — 99223 1ST HOSP IP/OBS HIGH 75: CPT | Performed by: INTERNAL MEDICINE

## 2019-05-11 PROCEDURE — P9016 RBC LEUKOCYTES REDUCED: HCPCS

## 2019-05-11 PROCEDURE — 86900 BLOOD TYPING SEROLOGIC ABO: CPT

## 2019-05-11 PROCEDURE — 96361 HYDRATE IV INFUSION ADD-ON: CPT

## 2019-05-11 PROCEDURE — 85018 HEMOGLOBIN: CPT

## 2019-05-11 PROCEDURE — 2580000003 HC RX 258: Performed by: INTERNAL MEDICINE

## 2019-05-11 PROCEDURE — 6370000000 HC RX 637 (ALT 250 FOR IP): Performed by: INTERNAL MEDICINE

## 2019-05-11 PROCEDURE — 36415 COLL VENOUS BLD VENIPUNCTURE: CPT

## 2019-05-11 PROCEDURE — 85027 COMPLETE CBC AUTOMATED: CPT

## 2019-05-11 PROCEDURE — 82947 ASSAY GLUCOSE BLOOD QUANT: CPT

## 2019-05-11 PROCEDURE — 85014 HEMATOCRIT: CPT

## 2019-05-11 RX ORDER — BUSPIRONE HYDROCHLORIDE 10 MG/1
10 TABLET ORAL 3 TIMES DAILY
Status: DISCONTINUED | OUTPATIENT
Start: 2019-05-11 | End: 2019-05-15 | Stop reason: HOSPADM

## 2019-05-11 RX ORDER — LISINOPRIL AND HYDROCHLOROTHIAZIDE 12.5; 1 MG/1; MG/1
1 TABLET ORAL DAILY
Status: DISCONTINUED | OUTPATIENT
Start: 2019-05-11 | End: 2019-05-11 | Stop reason: CLARIF

## 2019-05-11 RX ORDER — LISINOPRIL 10 MG/1
10 TABLET ORAL DAILY
Status: DISCONTINUED | OUTPATIENT
Start: 2019-05-11 | End: 2019-05-15 | Stop reason: HOSPADM

## 2019-05-11 RX ORDER — 0.9 % SODIUM CHLORIDE 0.9 %
250 INTRAVENOUS SOLUTION INTRAVENOUS ONCE
Status: DISCONTINUED | OUTPATIENT
Start: 2019-05-11 | End: 2019-05-14

## 2019-05-11 RX ORDER — OXYCODONE HYDROCHLORIDE AND ACETAMINOPHEN 5; 325 MG/1; MG/1
2 TABLET ORAL EVERY 6 HOURS PRN
Status: DISCONTINUED | OUTPATIENT
Start: 2019-05-11 | End: 2019-05-15 | Stop reason: HOSPADM

## 2019-05-11 RX ORDER — INSULIN GLARGINE 100 [IU]/ML
30 INJECTION, SOLUTION SUBCUTANEOUS 2 TIMES DAILY
Status: DISCONTINUED | OUTPATIENT
Start: 2019-05-11 | End: 2019-05-15 | Stop reason: HOSPADM

## 2019-05-11 RX ORDER — SIMVASTATIN 40 MG
40 TABLET ORAL NIGHTLY
Status: DISCONTINUED | OUTPATIENT
Start: 2019-05-11 | End: 2019-05-15 | Stop reason: HOSPADM

## 2019-05-11 RX ORDER — HYDROCHLOROTHIAZIDE 12.5 MG/1
12.5 CAPSULE, GELATIN COATED ORAL DAILY
Status: DISCONTINUED | OUTPATIENT
Start: 2019-05-11 | End: 2019-05-15 | Stop reason: HOSPADM

## 2019-05-11 RX ORDER — CHOLECALCIFEROL (VITAMIN D3) 125 MCG
500 CAPSULE ORAL DAILY
Status: DISCONTINUED | OUTPATIENT
Start: 2019-05-11 | End: 2019-05-15 | Stop reason: HOSPADM

## 2019-05-11 RX ORDER — FUROSEMIDE 20 MG/1
20 TABLET ORAL DAILY
Status: DISCONTINUED | OUTPATIENT
Start: 2019-05-11 | End: 2019-05-15 | Stop reason: HOSPADM

## 2019-05-11 RX ORDER — GABAPENTIN 100 MG/1
100 CAPSULE ORAL 2 TIMES DAILY
Status: DISCONTINUED | OUTPATIENT
Start: 2019-05-11 | End: 2019-05-15 | Stop reason: HOSPADM

## 2019-05-11 RX ORDER — ACETAMINOPHEN 325 MG/1
650 TABLET ORAL EVERY 4 HOURS PRN
Status: DISCONTINUED | OUTPATIENT
Start: 2019-05-11 | End: 2019-05-15 | Stop reason: HOSPADM

## 2019-05-11 RX ORDER — LOPERAMIDE HYDROCHLORIDE 2 MG/1
4 CAPSULE ORAL DAILY PRN
Status: DISCONTINUED | OUTPATIENT
Start: 2019-05-11 | End: 2019-05-15 | Stop reason: HOSPADM

## 2019-05-11 RX ORDER — DEXTROSE MONOHYDRATE 50 MG/ML
100 INJECTION, SOLUTION INTRAVENOUS PRN
Status: DISCONTINUED | OUTPATIENT
Start: 2019-05-11 | End: 2019-05-15 | Stop reason: HOSPADM

## 2019-05-11 RX ORDER — SODIUM CHLORIDE 0.9 % (FLUSH) 0.9 %
10 SYRINGE (ML) INJECTION EVERY 12 HOURS SCHEDULED
Status: DISCONTINUED | OUTPATIENT
Start: 2019-05-11 | End: 2019-05-15 | Stop reason: HOSPADM

## 2019-05-11 RX ORDER — SODIUM CHLORIDE 9 MG/ML
INJECTION, SOLUTION INTRAVENOUS CONTINUOUS
Status: DISCONTINUED | OUTPATIENT
Start: 2019-05-11 | End: 2019-05-14

## 2019-05-11 RX ORDER — LAMOTRIGINE 100 MG/1
200 TABLET ORAL 2 TIMES DAILY
Status: DISCONTINUED | OUTPATIENT
Start: 2019-05-11 | End: 2019-05-15 | Stop reason: HOSPADM

## 2019-05-11 RX ORDER — LOPERAMIDE HYDROCHLORIDE 2 MG/1
2 CAPSULE ORAL 4 TIMES DAILY PRN
Status: DISCONTINUED | OUTPATIENT
Start: 2019-05-11 | End: 2019-05-15 | Stop reason: HOSPADM

## 2019-05-11 RX ORDER — NICOTINE POLACRILEX 4 MG
15 LOZENGE BUCCAL PRN
Status: DISCONTINUED | OUTPATIENT
Start: 2019-05-11 | End: 2019-05-15 | Stop reason: HOSPADM

## 2019-05-11 RX ORDER — SODIUM CHLORIDE 0.9 % (FLUSH) 0.9 %
10 SYRINGE (ML) INJECTION PRN
Status: DISCONTINUED | OUTPATIENT
Start: 2019-05-11 | End: 2019-05-15 | Stop reason: HOSPADM

## 2019-05-11 RX ORDER — PRAMIPEXOLE DIHYDROCHLORIDE 0.25 MG/1
1 TABLET ORAL NIGHTLY
Status: DISCONTINUED | OUTPATIENT
Start: 2019-05-11 | End: 2019-05-15 | Stop reason: HOSPADM

## 2019-05-11 RX ORDER — ACETAMINOPHEN 325 MG/1
650 TABLET ORAL 3 TIMES DAILY PRN
Status: DISCONTINUED | OUTPATIENT
Start: 2019-05-11 | End: 2019-05-15 | Stop reason: HOSPADM

## 2019-05-11 RX ORDER — PAROXETINE HYDROCHLORIDE 20 MG/1
20 TABLET, FILM COATED ORAL EVERY MORNING
Status: DISCONTINUED | OUTPATIENT
Start: 2019-05-11 | End: 2019-05-15 | Stop reason: HOSPADM

## 2019-05-11 RX ORDER — DEXTROSE MONOHYDRATE 25 G/50ML
12.5 INJECTION, SOLUTION INTRAVENOUS PRN
Status: DISCONTINUED | OUTPATIENT
Start: 2019-05-11 | End: 2019-05-15 | Stop reason: HOSPADM

## 2019-05-11 RX ADMIN — BUSPIRONE HYDROCHLORIDE 10 MG: 10 TABLET ORAL at 13:41

## 2019-05-11 RX ADMIN — PRAMIPEXOLE DIHYDROCHLORIDE 1 MG: 1 TABLET ORAL at 20:58

## 2019-05-11 RX ADMIN — SODIUM CHLORIDE: 9 INJECTION, SOLUTION INTRAVENOUS at 08:35

## 2019-05-11 RX ADMIN — LEVETIRACETAM 1250 MG: 750 TABLET ORAL at 20:56

## 2019-05-11 RX ADMIN — FUROSEMIDE 20 MG: 20 TABLET ORAL at 11:40

## 2019-05-11 RX ADMIN — OXYCODONE HYDROCHLORIDE AND ACETAMINOPHEN 1 TABLET: 5; 325 TABLET ORAL at 19:24

## 2019-05-11 RX ADMIN — LEVETIRACETAM 1250 MG: 750 TABLET ORAL at 11:39

## 2019-05-11 RX ADMIN — BUSPIRONE HYDROCHLORIDE 10 MG: 10 TABLET ORAL at 20:57

## 2019-05-11 RX ADMIN — PAROXETINE HYDROCHLORIDE 20 MG: 20 TABLET, FILM COATED ORAL at 11:40

## 2019-05-11 RX ADMIN — LISINOPRIL 10 MG: 10 TABLET ORAL at 11:40

## 2019-05-11 RX ADMIN — SIMVASTATIN 40 MG: 40 TABLET, FILM COATED ORAL at 20:57

## 2019-05-11 RX ADMIN — LAMOTRIGINE 200 MG: 100 TABLET ORAL at 20:58

## 2019-05-11 RX ADMIN — METFORMIN HYDROCHLORIDE 1000 MG: 1000 TABLET ORAL at 11:40

## 2019-05-11 RX ADMIN — CYANOCOBALAMIN TAB 500 MCG 500 MCG: 500 TAB at 11:40

## 2019-05-11 RX ADMIN — GABAPENTIN 100 MG: 100 CAPSULE ORAL at 20:56

## 2019-05-11 RX ADMIN — OXYCODONE HYDROCHLORIDE AND ACETAMINOPHEN 2 TABLET: 5; 325 TABLET ORAL at 11:39

## 2019-05-11 RX ADMIN — HYDROCHLOROTHIAZIDE 12.5 MG: 12.5 CAPSULE ORAL at 11:39

## 2019-05-11 RX ADMIN — GABAPENTIN 100 MG: 100 CAPSULE ORAL at 11:40

## 2019-05-11 RX ADMIN — SODIUM CHLORIDE: 9 INJECTION, SOLUTION INTRAVENOUS at 17:49

## 2019-05-11 RX ADMIN — LAMOTRIGINE 200 MG: 100 TABLET ORAL at 11:52

## 2019-05-11 ASSESSMENT — PAIN SCALES - GENERAL
PAINLEVEL_OUTOF10: 7
PAINLEVEL_OUTOF10: 4
PAINLEVEL_OUTOF10: 8

## 2019-05-11 ASSESSMENT — PAIN DESCRIPTION - LOCATION
LOCATION: BACK

## 2019-05-11 ASSESSMENT — PAIN DESCRIPTION - PAIN TYPE
TYPE: CHRONIC PAIN
TYPE: CHRONIC PAIN;ACUTE PAIN
TYPE: CHRONIC PAIN

## 2019-05-11 ASSESSMENT — PAIN DESCRIPTION - ORIENTATION: ORIENTATION: LOWER;MID

## 2019-05-11 ASSESSMENT — PAIN DESCRIPTION - FREQUENCY: FREQUENCY: CONTINUOUS

## 2019-05-11 NOTE — FLOWSHEET NOTE
Writer explained the advance directive packet. Patient indicated she definitely does not want to be kept alive indefinitely \"hooked up to machines. \" She will have her brother as HPOA and his sons as secondary. She spoke of being able to take care of both her mom and dad at the end of their lives, which helped her make decisions for herself when that time comes. Patient appreciated listening presence and the information. 05/11/19 1234   Encounter Summary   Services provided to: Patient   Referral/Consult From: Nurse   Support System Family members   Continue Visiting   (5-11-19)   Complexity of Encounter Moderate   Length of Encounter 30 minutes   Spiritual Assessment Completed Yes   Routine   Type Initial   Assessment Calm; Approachable   Intervention Active listening;Explored feelings, thoughts, concerns;Explored coping resources;Prayer;Provided reading materials/devotional materials;Sustaining presence/ Ministry of presence; Discussed illness/injury and it's impact   Outcome Expressed gratitude;Engaged in conversation;Expressed feelings/needs/concerns;Coping   Advance Directives (For Healthcare)   Healthcare Directive No, patient does not have an advance directive for healthcare treatment   Advance Directives Documents given;Documents explained

## 2019-05-11 NOTE — H&P
times daily Indications: started 1st dose 5-7-19 in am, taking for 7 days    Historical Provider, MD   furosemide (LASIX) 20 MG tablet Take 20 mg by mouth daily    Historical Provider, MD   gabapentin (NEURONTIN) 100 MG capsule Take 100 mg by mouth 2 times daily. Gaye Margarito Historical Provider, MD   levETIRAcetam (KEPPRA) 500 MG tablet Take 1,250 mg by mouth 2 times daily 2.5 tablets = 1250 mg    Historical Provider, MD   insulin aspart (NOVOLOG) 100 UNIT/ML injection vial Inject into the skin Inject as per sliding scale before meals and at bedtime:    = 0 units; call physician if less than 70  151-200 = 2 units  201-250 = 4 units  251-300 = 6 units  301-350 = 8 units  351-400 = 10 units; call physician if greater than 400    Historical Provider, MD   insulin aspart (NOVOLOG) 100 UNIT/ML injection vial Inject 15 Units into the skin 4 times daily (before meals and nightly)    Historical Provider, MD   potassium chloride (KLOR-CON M) 10 MEQ extended release tablet Take 10 mEq by mouth daily    Historical Provider, MD   vitamin B-12 (CYANOCOBALAMIN) 500 MCG tablet Take 500 mcg by mouth daily    Historical Provider, MD   benzonatate (TESSALON) 200 MG capsule Take 200 mg by mouth 2 times daily as needed for Cough    Historical Provider, MD   loperamide (IMODIUM) 2 MG capsule Take 4 mg by mouth daily as needed for Diarrhea (after first loose stool)    Historical Provider, MD   loperamide (IMODIUM) 2 MG capsule Take 2 mg by mouth 4 times daily as needed for Diarrhea (after initial 4 mg dose)    Historical Provider, MD   acetaminophen (TYLENOL) 325 MG tablet Take 650 mg by mouth 3 times daily as needed for Pain (mild)    Historical Provider, MD   oxyCODONE-acetaminophen (PERCOCET) 5-325 MG per tablet Take 1-2 tablets by mouth every 6 hours as needed for Pain .  12/10/17   Osito Farfan DO   metFORMIN (GLUCOPHAGE) 1000 MG tablet Take 1,000 mg by mouth 2 times daily (with meals)    Historical Provider, MD   Blood Glucose reformatted images are provided for review. Dose modulation, iterative reconstruction, and/or weight based adjustment of the mA/kV was utilized to reduce the radiation dose to as low as reasonably achievable. COMPARISON: CT head 11/30/2018, CT thoracic and lumbar spine 05/07/2019 HISTORY: ORDERING SYSTEM PROVIDED HISTORY: trauma TECHNOLOGIST PROVIDED HISTORY: Ordering Physician Provided Reason for Exam: fall, pt states she thinks she hit the back of her head, low and upper back pain Acuity: Acute Type of Exam: Initial; ORDERING SYSTEM PROVIDED HISTORY: C-SPINE TRAUMA, NEXUS/CCR POSITIVE TECHNOLOGIST PROVIDED HISTORY: Ordering Physician Provided Reason for Exam: fall, pt states she thinks she hit the back of her head, low and upper back pain Acuity: Acute Type of Exam: Initial; ORDERING SYSTEM PROVIDED HISTORY: pain TECHNOLOGIST PROVIDED HISTORY: Ordering Physician Provided Reason for Exam: fall, pt states she thinks she hit the back of her head, low and upper back pain Acuity: Acute Type of Exam: Initial; ORDERING SYSTEM PROVIDED HISTORY: pain TECHNOLOGIST PROVIDED HISTORY: pain Ordering Physician Provided Reason for Exam: fall, pt states she thinks she hit the back of her head, low and upper back pain Acuity: Acute Type of Exam: Initial FINDINGS: CT HEAD: BRAIN/VENTRICLES: There is no acute intracranial hemorrhage, mass effect or midline shift. No abnormal extra-axial fluid collection. The gray-white differentiation is maintained without evidence of an acute infarct. There is no evidence of hydrocephalus. ORBITS: The visualized portion of the orbits demonstrate no acute abnormality. SINUSES: The visualized paranasal sinuses and mastoid air cells demonstrate no acute abnormality. SOFT TISSUES/SKULL:  No acute abnormality of the visualized skull or soft tissues. CT CERVICAL SPINE: BONES/ALIGNMENT: There is no evidence of an acute cervical spine fracture. There is normal alignment of the cervical spine.  DEGENERATIVE was performed without the administration of intravenous contrast. Multiplanar reformatted images are provided for review. Dose modulation, iterative reconstruction, and/or weight based adjustment of the mA/kV was utilized to reduce the radiation dose to as low as reasonably achievable.; CT of the lumbar spine was performed without the administration of intravenous contrast. Multiplanar reformatted images are provided for review. Dose modulation, iterative reconstruction, and/or weight based adjustment of the mA/kV was utilized to reduce the radiation dose to as low as reasonably achievable. COMPARISON: CT head 11/30/2018, CT thoracic and lumbar spine 05/07/2019 HISTORY: ORDERING SYSTEM PROVIDED HISTORY: trauma TECHNOLOGIST PROVIDED HISTORY: Ordering Physician Provided Reason for Exam: fall, pt states she thinks she hit the back of her head, low and upper back pain Acuity: Acute Type of Exam: Initial; ORDERING SYSTEM PROVIDED HISTORY: C-SPINE TRAUMA, NEXUS/CCR POSITIVE TECHNOLOGIST PROVIDED HISTORY: Ordering Physician Provided Reason for Exam: fall, pt states she thinks she hit the back of her head, low and upper back pain Acuity: Acute Type of Exam: Initial; ORDERING SYSTEM PROVIDED HISTORY: pain TECHNOLOGIST PROVIDED HISTORY: Ordering Physician Provided Reason for Exam: fall, pt states she thinks she hit the back of her head, low and upper back pain Acuity: Acute Type of Exam: Initial; ORDERING SYSTEM PROVIDED HISTORY: pain TECHNOLOGIST PROVIDED HISTORY: pain Ordering Physician Provided Reason for Exam: fall, pt states she thinks she hit the back of her head, low and upper back pain Acuity: Acute Type of Exam: Initial FINDINGS: CT HEAD: BRAIN/VENTRICLES: There is no acute intracranial hemorrhage, mass effect or midline shift. No abnormal extra-axial fluid collection. The gray-white differentiation is maintained without evidence of an acute infarct. There is no evidence of hydrocephalus.  ORBITS: The visualized achievable.; CT of the lumbar spine was performed without the administration of intravenous contrast. Multiplanar reformatted images are provided for review. Dose modulation, iterative reconstruction, and/or weight based adjustment of the mA/kV was utilized to reduce the radiation dose to as low as reasonably achievable. COMPARISON: CT head 11/30/2018, CT thoracic and lumbar spine 05/07/2019 HISTORY: ORDERING SYSTEM PROVIDED HISTORY: trauma TECHNOLOGIST PROVIDED HISTORY: Ordering Physician Provided Reason for Exam: fall, pt states she thinks she hit the back of her head, low and upper back pain Acuity: Acute Type of Exam: Initial; ORDERING SYSTEM PROVIDED HISTORY: C-SPINE TRAUMA, NEXUS/CCR POSITIVE TECHNOLOGIST PROVIDED HISTORY: Ordering Physician Provided Reason for Exam: fall, pt states she thinks she hit the back of her head, low and upper back pain Acuity: Acute Type of Exam: Initial; ORDERING SYSTEM PROVIDED HISTORY: pain TECHNOLOGIST PROVIDED HISTORY: Ordering Physician Provided Reason for Exam: fall, pt states she thinks she hit the back of her head, low and upper back pain Acuity: Acute Type of Exam: Initial; ORDERING SYSTEM PROVIDED HISTORY: pain TECHNOLOGIST PROVIDED HISTORY: pain Ordering Physician Provided Reason for Exam: fall, pt states she thinks she hit the back of her head, low and upper back pain Acuity: Acute Type of Exam: Initial FINDINGS: CT HEAD: BRAIN/VENTRICLES: There is no acute intracranial hemorrhage, mass effect or midline shift. No abnormal extra-axial fluid collection. The gray-white differentiation is maintained without evidence of an acute infarct. There is no evidence of hydrocephalus. ORBITS: The visualized portion of the orbits demonstrate no acute abnormality. SINUSES: The visualized paranasal sinuses and mastoid air cells demonstrate no acute abnormality. SOFT TISSUES/SKULL:  No acute abnormality of the visualized skull or soft tissues.  CT CERVICAL SPINE: BONES/ALIGNMENT: There

## 2019-05-12 ENCOUNTER — ANESTHESIA (OUTPATIENT)
Dept: OPERATING ROOM | Age: 64
DRG: 378 | End: 2019-05-12
Payer: COMMERCIAL

## 2019-05-12 ENCOUNTER — ANESTHESIA EVENT (OUTPATIENT)
Dept: OPERATING ROOM | Age: 64
DRG: 378 | End: 2019-05-12
Payer: COMMERCIAL

## 2019-05-12 VITALS
OXYGEN SATURATION: 93 % | SYSTOLIC BLOOD PRESSURE: 139 MMHG | RESPIRATION RATE: 19 BRPM | DIASTOLIC BLOOD PRESSURE: 64 MMHG

## 2019-05-12 LAB
ABO/RH: NORMAL
ABSOLUTE RETIC #: 0.1 M/UL (ref 0.02–0.1)
ANION GAP SERPL CALCULATED.3IONS-SCNC: 10 MMOL/L (ref 9–17)
ANTIBODY SCREEN: NEGATIVE
ARM BAND NUMBER: NORMAL
BLD PROD TYP BPU: NORMAL
BLOOD BANK COMMENT: NORMAL
BUN BLDV-MCNC: 28 MG/DL (ref 8–23)
BUN/CREAT BLD: ABNORMAL (ref 9–20)
CALCIUM SERPL-MCNC: 8.4 MG/DL (ref 8.6–10.4)
CHLORIDE BLD-SCNC: 106 MMOL/L (ref 98–107)
CO2: 26 MMOL/L (ref 20–31)
CREAT SERPL-MCNC: 0.95 MG/DL (ref 0.5–0.9)
CROSSMATCH RESULT: NORMAL
DISPENSE STATUS BLOOD BANK: NORMAL
EXPIRATION DATE: NORMAL
FOLATE: >20 NG/ML
GFR AFRICAN AMERICAN: >60 ML/MIN
GFR NON-AFRICAN AMERICAN: 59 ML/MIN
GFR SERPL CREATININE-BSD FRML MDRD: ABNORMAL ML/MIN/{1.73_M2}
GFR SERPL CREATININE-BSD FRML MDRD: ABNORMAL ML/MIN/{1.73_M2}
GLUCOSE BLD-MCNC: 124 MG/DL (ref 70–99)
GLUCOSE BLD-MCNC: 125 MG/DL (ref 65–105)
GLUCOSE BLD-MCNC: 132 MG/DL (ref 65–105)
GLUCOSE BLD-MCNC: 135 MG/DL (ref 65–105)
GLUCOSE BLD-MCNC: 230 MG/DL (ref 65–105)
GLUCOSE BLD-MCNC: 231 MG/DL (ref 65–105)
HCT VFR BLD CALC: 28.5 % (ref 36–46)
HEMOGLOBIN: 8.8 G/DL (ref 12–16)
IMMATURE RETIC FRACT: ABNORMAL %
IRON SATURATION: 13 % (ref 20–55)
IRON: 31 UG/DL (ref 37–145)
MCH RBC QN AUTO: 22.5 PG (ref 26–34)
MCHC RBC AUTO-ENTMCNC: 30.8 G/DL (ref 31–37)
MCV RBC AUTO: 73 FL (ref 80–100)
NRBC AUTOMATED: ABNORMAL PER 100 WBC
PDW BLD-RTO: 19.2 % (ref 11.5–14.9)
PLATELET # BLD: 160 K/UL (ref 150–450)
PMV BLD AUTO: 7.2 FL (ref 6–12)
POTASSIUM SERPL-SCNC: 4.1 MMOL/L (ref 3.7–5.3)
RBC # BLD: 3.9 M/UL (ref 4–5.2)
RETIC %: 2.4 % (ref 0.5–2)
RETIC HEMOGLOBIN: ABNORMAL PG (ref 28.2–35.7)
SODIUM BLD-SCNC: 142 MMOL/L (ref 135–144)
TOTAL IRON BINDING CAPACITY: 236 UG/DL (ref 250–450)
TRANSFUSION STATUS: NORMAL
UNIT DIVISION: 0
UNIT NUMBER: NORMAL
UNSATURATED IRON BINDING CAPACITY: 205 UG/DL (ref 112–347)
VITAMIN B-12: 1189 PG/ML (ref 232–1245)
WBC # BLD: 5.6 K/UL (ref 3.5–11)

## 2019-05-12 PROCEDURE — 82607 VITAMIN B-12: CPT

## 2019-05-12 PROCEDURE — 6370000000 HC RX 637 (ALT 250 FOR IP): Performed by: INTERNAL MEDICINE

## 2019-05-12 PROCEDURE — 99232 SBSQ HOSP IP/OBS MODERATE 35: CPT | Performed by: INTERNAL MEDICINE

## 2019-05-12 PROCEDURE — 83540 ASSAY OF IRON: CPT

## 2019-05-12 PROCEDURE — 36415 COLL VENOUS BLD VENIPUNCTURE: CPT

## 2019-05-12 PROCEDURE — 83516 IMMUNOASSAY NONANTIBODY: CPT

## 2019-05-12 PROCEDURE — 7100000001 HC PACU RECOVERY - ADDTL 15 MIN: Performed by: INTERNAL MEDICINE

## 2019-05-12 PROCEDURE — 1200000000 HC SEMI PRIVATE

## 2019-05-12 PROCEDURE — 82947 ASSAY GLUCOSE BLOOD QUANT: CPT

## 2019-05-12 PROCEDURE — 3700000000 HC ANESTHESIA ATTENDED CARE: Performed by: INTERNAL MEDICINE

## 2019-05-12 PROCEDURE — 85045 AUTOMATED RETICULOCYTE COUNT: CPT

## 2019-05-12 PROCEDURE — 82746 ASSAY OF FOLIC ACID SERUM: CPT

## 2019-05-12 PROCEDURE — 2709999900 HC NON-CHARGEABLE SUPPLY: Performed by: INTERNAL MEDICINE

## 2019-05-12 PROCEDURE — 0DJ08ZZ INSPECTION OF UPPER INTESTINAL TRACT, VIA NATURAL OR ARTIFICIAL OPENING ENDOSCOPIC: ICD-10-PCS | Performed by: INTERNAL MEDICINE

## 2019-05-12 PROCEDURE — 43235 EGD DIAGNOSTIC BRUSH WASH: CPT | Performed by: INTERNAL MEDICINE

## 2019-05-12 PROCEDURE — 85027 COMPLETE CBC AUTOMATED: CPT

## 2019-05-12 PROCEDURE — 3609017100 HC EGD: Performed by: INTERNAL MEDICINE

## 2019-05-12 PROCEDURE — 83550 IRON BINDING TEST: CPT

## 2019-05-12 PROCEDURE — 99222 1ST HOSP IP/OBS MODERATE 55: CPT | Performed by: INTERNAL MEDICINE

## 2019-05-12 PROCEDURE — 80048 BASIC METABOLIC PNL TOTAL CA: CPT

## 2019-05-12 PROCEDURE — 6360000002 HC RX W HCPCS: Performed by: ANESTHESIOLOGY

## 2019-05-12 PROCEDURE — 7100000000 HC PACU RECOVERY - FIRST 15 MIN: Performed by: INTERNAL MEDICINE

## 2019-05-12 PROCEDURE — 2580000003 HC RX 258: Performed by: INTERNAL MEDICINE

## 2019-05-12 PROCEDURE — 2580000003 HC RX 258: Performed by: ANESTHESIOLOGY

## 2019-05-12 PROCEDURE — 2500000003 HC RX 250 WO HCPCS: Performed by: ANESTHESIOLOGY

## 2019-05-12 RX ORDER — PROPOFOL 10 MG/ML
INJECTION, EMULSION INTRAVENOUS PRN
Status: DISCONTINUED | OUTPATIENT
Start: 2019-05-12 | End: 2019-05-12 | Stop reason: SDUPTHER

## 2019-05-12 RX ORDER — SODIUM CHLORIDE 9 MG/ML
INJECTION, SOLUTION INTRAVENOUS CONTINUOUS PRN
Status: DISCONTINUED | OUTPATIENT
Start: 2019-05-12 | End: 2019-05-12 | Stop reason: SDUPTHER

## 2019-05-12 RX ORDER — LIDOCAINE HYDROCHLORIDE 10 MG/ML
INJECTION, SOLUTION EPIDURAL; INFILTRATION; INTRACAUDAL; PERINEURAL PRN
Status: DISCONTINUED | OUTPATIENT
Start: 2019-05-12 | End: 2019-05-12 | Stop reason: SDUPTHER

## 2019-05-12 RX ADMIN — BUSPIRONE HYDROCHLORIDE 10 MG: 10 TABLET ORAL at 20:37

## 2019-05-12 RX ADMIN — LEVETIRACETAM 1250 MG: 750 TABLET ORAL at 09:27

## 2019-05-12 RX ADMIN — INSULIN LISPRO 1 UNITS: 100 INJECTION, SOLUTION INTRAVENOUS; SUBCUTANEOUS at 21:31

## 2019-05-12 RX ADMIN — INSULIN GLARGINE 30 UNITS: 100 INJECTION, SOLUTION SUBCUTANEOUS at 21:31

## 2019-05-12 RX ADMIN — OXYCODONE HYDROCHLORIDE AND ACETAMINOPHEN 2 TABLET: 5; 325 TABLET ORAL at 09:28

## 2019-05-12 RX ADMIN — SODIUM CHLORIDE: 9 INJECTION, SOLUTION INTRAVENOUS at 07:01

## 2019-05-12 RX ADMIN — SODIUM CHLORIDE: 9 INJECTION, SOLUTION INTRAVENOUS at 00:26

## 2019-05-12 RX ADMIN — INSULIN LISPRO 1 UNITS: 100 INJECTION, SOLUTION INTRAVENOUS; SUBCUTANEOUS at 16:25

## 2019-05-12 RX ADMIN — GABAPENTIN 100 MG: 100 CAPSULE ORAL at 21:31

## 2019-05-12 RX ADMIN — CYANOCOBALAMIN TAB 500 MCG 500 MCG: 500 TAB at 09:27

## 2019-05-12 RX ADMIN — BUSPIRONE HYDROCHLORIDE 10 MG: 10 TABLET ORAL at 15:15

## 2019-05-12 RX ADMIN — METFORMIN HYDROCHLORIDE 1000 MG: 1000 TABLET ORAL at 16:25

## 2019-05-12 RX ADMIN — SIMVASTATIN 40 MG: 40 TABLET, FILM COATED ORAL at 20:37

## 2019-05-12 RX ADMIN — PAROXETINE HYDROCHLORIDE 20 MG: 20 TABLET, FILM COATED ORAL at 09:28

## 2019-05-12 RX ADMIN — SODIUM CHLORIDE: 9 INJECTION, SOLUTION INTRAVENOUS at 21:15

## 2019-05-12 RX ADMIN — LEVETIRACETAM 1250 MG: 750 TABLET ORAL at 20:36

## 2019-05-12 RX ADMIN — LISINOPRIL 10 MG: 10 TABLET ORAL at 09:27

## 2019-05-12 RX ADMIN — BUSPIRONE HYDROCHLORIDE 10 MG: 10 TABLET ORAL at 09:30

## 2019-05-12 RX ADMIN — LIDOCAINE HYDROCHLORIDE 25 MG: 10 INJECTION, SOLUTION EPIDURAL; INFILTRATION; INTRACAUDAL; PERINEURAL at 10:30

## 2019-05-12 RX ADMIN — OXYCODONE HYDROCHLORIDE AND ACETAMINOPHEN 2 TABLET: 5; 325 TABLET ORAL at 23:49

## 2019-05-12 RX ADMIN — FUROSEMIDE 20 MG: 20 TABLET ORAL at 09:28

## 2019-05-12 RX ADMIN — PROPOFOL 25 MG: 10 INJECTION, EMULSION INTRAVENOUS at 10:34

## 2019-05-12 RX ADMIN — LAMOTRIGINE 200 MG: 100 TABLET ORAL at 09:30

## 2019-05-12 RX ADMIN — PRAMIPEXOLE DIHYDROCHLORIDE 1 MG: 1 TABLET ORAL at 20:38

## 2019-05-12 RX ADMIN — POLYETHYLENE GLYCOL-3350 AND ELECTROLYTES 2000 ML: 236; 6.74; 5.86; 2.97; 22.74 POWDER, FOR SOLUTION ORAL at 15:15

## 2019-05-12 RX ADMIN — HYDROCHLOROTHIAZIDE 12.5 MG: 12.5 CAPSULE ORAL at 09:27

## 2019-05-12 RX ADMIN — GABAPENTIN 100 MG: 100 CAPSULE ORAL at 09:28

## 2019-05-12 RX ADMIN — PROPOFOL 50 MG: 10 INJECTION, EMULSION INTRAVENOUS at 10:31

## 2019-05-12 RX ADMIN — SODIUM CHLORIDE: 9 INJECTION, SOLUTION INTRAVENOUS at 10:26

## 2019-05-12 RX ADMIN — LAMOTRIGINE 200 MG: 100 TABLET ORAL at 20:38

## 2019-05-12 ASSESSMENT — PAIN DESCRIPTION - LOCATION
LOCATION: BACK
LOCATION: BACK

## 2019-05-12 ASSESSMENT — ENCOUNTER SYMPTOMS
VOICE CHANGE: 0
COLOR CHANGE: 0
SORE THROAT: 0
CONSTIPATION: 1
DIARRHEA: 0
COUGH: 0
WHEEZING: 0
BLOOD IN STOOL: 0
SHORTNESS OF BREATH: 0
BACK PAIN: 1
CHOKING: 0
EYES NEGATIVE: 1
NAUSEA: 0
ABDOMINAL DISTENTION: 1
TROUBLE SWALLOWING: 0
RECTAL PAIN: 0
VOMITING: 0

## 2019-05-12 ASSESSMENT — PAIN DESCRIPTION - FREQUENCY
FREQUENCY: CONTINUOUS
FREQUENCY: CONTINUOUS

## 2019-05-12 ASSESSMENT — PAIN DESCRIPTION - PAIN TYPE
TYPE: CHRONIC PAIN
TYPE: CHRONIC PAIN

## 2019-05-12 ASSESSMENT — PULMONARY FUNCTION TESTS
PIF_VALUE: 0

## 2019-05-12 ASSESSMENT — PAIN SCALES - GENERAL
PAINLEVEL_OUTOF10: 5
PAINLEVEL_OUTOF10: 8
PAINLEVEL_OUTOF10: 0
PAINLEVEL_OUTOF10: 8

## 2019-05-12 NOTE — PROGRESS NOTES
by mouth 2 times daily Indications: started 1st dose 5-7-19 in am, taking for 7 days    Historical Provider, MD   furosemide (LASIX) 20 MG tablet Take 20 mg by mouth daily    Historical Provider, MD   gabapentin (NEURONTIN) 100 MG capsule Take 100 mg by mouth 2 times daily. Julio C Benton Historical Provider, MD   levETIRAcetam (KEPPRA) 500 MG tablet Take 1,250 mg by mouth 2 times daily 2.5 tablets = 1250 mg    Historical Provider, MD   insulin aspart (NOVOLOG) 100 UNIT/ML injection vial Inject into the skin Inject as per sliding scale before meals and at bedtime:    = 0 units; call physician if less than 70  151-200 = 2 units  201-250 = 4 units  251-300 = 6 units  301-350 = 8 units  351-400 = 10 units; call physician if greater than 400    Historical Provider, MD   insulin aspart (NOVOLOG) 100 UNIT/ML injection vial Inject 15 Units into the skin 4 times daily (before meals and nightly)    Historical Provider, MD   potassium chloride (KLOR-CON M) 10 MEQ extended release tablet Take 10 mEq by mouth daily    Historical Provider, MD   vitamin B-12 (CYANOCOBALAMIN) 500 MCG tablet Take 500 mcg by mouth daily    Historical Provider, MD   benzonatate (TESSALON) 200 MG capsule Take 200 mg by mouth 2 times daily as needed for Cough    Historical Provider, MD   loperamide (IMODIUM) 2 MG capsule Take 4 mg by mouth daily as needed for Diarrhea (after first loose stool)    Historical Provider, MD   loperamide (IMODIUM) 2 MG capsule Take 2 mg by mouth 4 times daily as needed for Diarrhea (after initial 4 mg dose)    Historical Provider, MD   acetaminophen (TYLENOL) 325 MG tablet Take 650 mg by mouth 3 times daily as needed for Pain (mild)    Historical Provider, MD   oxyCODONE-acetaminophen (PERCOCET) 5-325 MG per tablet Take 1-2 tablets by mouth every 6 hours as needed for Pain .  12/10/17   Osito Farfan DO   metFORMIN (GLUCOPHAGE) 1000 MG tablet Take 1,000 mg by mouth 2 times daily (with meals)    Historical Provider, MD palpitations. GASTROINTESTINAL:  negative for nausea, vomiting, diarrhea, constipation, change in bowel habits, abdominal pain   GENITOURINARY:  negative for difficulty of urination, burning with urination, frequency   INTEGUMENT:  negative for rash, skin lesions, easy bruising   HEMATOLOGIC/LYMPHATIC:  negative for swelling/edema   ALLERGIC/IMMUNOLOGIC:  negative for urticaria , itching  ENDOCRINE:  negative increase in drinking, increase in urination, hot or cold intolerance  MUSCULOSKELETAL:  negative joint pains, muscle aches, swelling of joints pos back pain  NEUROLOGICAL:  negative for headaches,pos   dizziness, lightheadedness, numbness, pain, tingling extremities  BEHAVIOR/PSYCH:  negative for depression, anxiety    Physical Exam:   BP (!) 145/50   Pulse 80   Temp 98.3 °F (36.8 °C) (Oral)   Resp 19   Ht 5' 1\" (1.549 m)   Wt 274 lb 11.1 oz (124.6 kg)   SpO2 96%   BMI 51.90 kg/m²   Recent Labs     05/11/19 2010 05/12/19  0758 05/12/19  1057 05/12/19  1119   POCGLU 115* 125* 132* 135*       General Appearance:  alert, well appearing, and in no acute distress, pos pallor  Mental status: oriented to person, place, and time with normal affect  Head:  normocephalic, atraumatic. Eye: no icterus, redness, pupils equal and reactive, extraocular eye movements intact, conjunctiva clear  Mouth: mucous membranes moist  Neck: supple, no carotid bruits, thyroid not palpable  Lungs: Bilateral equal air entry, clear to ausculation, no wheezing, rales or rhonchi, normal effort  Cardiovascular: normal rate, regular rhythm, no murmur, gallop, rub.   Abdomen: Soft,epigastric tendernessnondistended, normal bowel sounds, no hepatomegaly or splenomegaly  Neurologic: There are no new focal motor or sensory deficits, normal muscle tone and bulk, no abnormal sensation, normal speech, cranial nerves II through XII grossly intact tender back      Skin: No gross lesions, rashes, bruising or bleeding on exposed skin Fingerstick    Collection Time: 05/12/19 10:57 AM   Result Value Ref Range    POC Glucose 132 (H) 65 - 105 mg/dL   POC Glucose Fingerstick    Collection Time: 05/12/19 11:19 AM   Result Value Ref Range    POC Glucose 135 (H) 65 - 105 mg/dL       Recent Labs     05/12/19  0440  05/10/19  1955   HGB 8.8*   < > 7.1*   HCT 28.5*   < > 23.4*   WBC 5.6   < > 5.2   MCV 73.0*   < > 72.0*     --  140   K 4.1  --  5.3     --  103   CO2 26  --  26   BUN 28*  --  42*   CREATININE 0.95*  --  1.32*   GLUCOSE 124*  --  242*   INR  --   --  1.1   PROTIME  --   --  14.6   AST  --   --  35*   ALT  --   --  22   LABALBU  --   --  3.0*    < > = values in this interval not displayed.        Hematology:  Recent Labs     05/10/19  1955 05/11/19  1008 05/11/19 1656 05/12/19 0440   WBC 5.2 5.2  --  5.6   RBC 3.25* 3.21*  --  3.90*   HGB 7.1* 7.0* 8.0* 8.8*   HCT 23.4* 23.0* 26.3* 28.5*   MCV 72.0* 71.6*  --  73.0*   MCH 21.7* 21.8*  --  22.5*   MCHC 30.1* 30.5*  --  30.8*   RDW 18.2* 17.8*  --  19.2*   * 127*  --  160   MPV 6.8 6.5  --  7.2   INR 1.1  --   --   --      Chemistry:  Recent Labs     05/10/19  1955 05/10/19  2200 05/12/19  0440     --  142   K 5.3  --  4.1     --  106   CO2 26  --  26   GLUCOSE 242*  --  124*   BUN 42*  --  28*   CREATININE 1.32*  --  0.95*   MG 1.9  --   --    ANIONGAP 11  --  10   LABGLOM 40*  --  59*   GFRAA 49*  --  >60   CALCIUM 9.1  --  8.4*   TROPONINT NOT REPORTED NOT REPORTED  --      Recent Labs     05/10/19  1955  05/11/19  1120 05/11/19  1612 05/11/19 2010 05/12/19  0758 05/12/19  1057 05/12/19  1119   PROT 7.3  --   --   --   --   --   --   --    LABALBU 3.0*  --   --   --   --   --   --   --    AST 35*  --   --   --   --   --   --   --    ALT 22  --   --   --   --   --   --   --    ALKPHOS 109*  --   --   --   --   --   --   --    BILITOT 0.19*  --   --   --   --   --   --   --    POCGLU  --    < > 195* 143* 115* 125* 132* 135*    < > = values in this interval not low as reasonably achievable.; CT of the thoracic spine was performed without the administration of intravenous contrast. Multiplanar reformatted images are provided for review. Dose modulation, iterative reconstruction, and/or weight based adjustment of the mA/kV was utilized to reduce the radiation dose to as low as reasonably achievable.; CT of the lumbar spine was performed without the administration of intravenous contrast. Multiplanar reformatted images are provided for review. Dose modulation, iterative reconstruction, and/or weight based adjustment of the mA/kV was utilized to reduce the radiation dose to as low as reasonably achievable. COMPARISON: CT head 11/30/2018, CT thoracic and lumbar spine 05/07/2019 HISTORY: ORDERING SYSTEM PROVIDED HISTORY: trauma TECHNOLOGIST PROVIDED HISTORY: Ordering Physician Provided Reason for Exam: fall, pt states she thinks she hit the back of her head, low and upper back pain Acuity: Acute Type of Exam: Initial; ORDERING SYSTEM PROVIDED HISTORY: C-SPINE TRAUMA, NEXUS/CCR POSITIVE TECHNOLOGIST PROVIDED HISTORY: Ordering Physician Provided Reason for Exam: fall, pt states she thinks she hit the back of her head, low and upper back pain Acuity: Acute Type of Exam: Initial; ORDERING SYSTEM PROVIDED HISTORY: pain TECHNOLOGIST PROVIDED HISTORY: Ordering Physician Provided Reason for Exam: fall, pt states she thinks she hit the back of her head, low and upper back pain Acuity: Acute Type of Exam: Initial; ORDERING SYSTEM PROVIDED HISTORY: pain TECHNOLOGIST PROVIDED HISTORY: pain Ordering Physician Provided Reason for Exam: fall, pt states she thinks she hit the back of her head, low and upper back pain Acuity: Acute Type of Exam: Initial FINDINGS: CT HEAD: BRAIN/VENTRICLES: There is no acute intracranial hemorrhage, mass effect or midline shift. No abnormal extra-axial fluid collection. The gray-white differentiation is maintained without evidence of an acute infarct.   There is Physician Provided Reason for Exam: fall, pt states she thinks she hit the back of her head, low and upper back pain Acuity: Acute Type of Exam: Initial FINDINGS: CT HEAD: BRAIN/VENTRICLES: There is no acute intracranial hemorrhage, mass effect or midline shift. No abnormal extra-axial fluid collection. The gray-white differentiation is maintained without evidence of an acute infarct. There is no evidence of hydrocephalus. ORBITS: The visualized portion of the orbits demonstrate no acute abnormality. SINUSES: The visualized paranasal sinuses and mastoid air cells demonstrate no acute abnormality. SOFT TISSUES/SKULL:  No acute abnormality of the visualized skull or soft tissues. CT CERVICAL SPINE: BONES/ALIGNMENT: There is no evidence of an acute cervical spine fracture. There is normal alignment of the cervical spine. DEGENERATIVE CHANGES: Multilevel vertebral body osteophytes. SOFT TISSUES: There is no prevertebral soft tissue swelling. CT THORACIC SPINE: BONES/ALIGNMENT: There is no evidence of an acute thoracic spine fracture. There is normal alignment of the thoracic spine. DEGENERATIVE CHANGES: Diffuse bridging thoracic osteophytosis. Moderate diffuse disc spaces narrowing with vacuum disc degenerative change in the midthoracic spine. SOFT TISSUES: There is no prevertebral soft tissue swelling. CT LUMBAR SPINE: BONES/ALIGNMENT: There is no evidence of an acute lumbar spine fracture. There is normal alignment of the lumbar spine. DEGENERATIVE CHANGES: Multilevel facet arthropathy. L2-L3 and L3-L4 vacuum disc degenerative change. Multilevel small vertebral body osteophytes. SOFT TISSUES: There is no prevertebral soft tissue swelling. No acute intracranial abnormality. No acute traumatic injury of the cervical, thoracic or lumbar spine. Moderate diffuse degenerative changes in the spine manifested mainly by multilevel bridging vertebral body osteophytes and scattered areas of degenerative disc disease. degenerative change. Multilevel small vertebral body osteophytes. SOFT TISSUES: There is no prevertebral soft tissue swelling. No acute intracranial abnormality. No acute traumatic injury of the cervical, thoracic or lumbar spine. Moderate diffuse degenerative changes in the spine manifested mainly by multilevel bridging vertebral body osteophytes and scattered areas of degenerative disc disease. Ct Thoracic Spine Wo Contrast    Result Date: 5/7/2019  EXAMINATION: CT OF THE THORACIC SPINE WITHOUT CONTRAST  5/7/2019 12:47 pm: TECHNIQUE: CT of the thoracic spine was performed without the administration of intravenous contrast. Multiplanar reformatted images are provided for review. Dose modulation, iterative reconstruction, and/or weight based adjustment of the mA/kV was utilized to reduce the radiation dose to as low as reasonably achievable. COMPARISON: Thoracic radiographs December 13, 2017 HISTORY: ORDERING SYSTEM PROVIDED HISTORY: fall TECHNOLOGIST PROVIDED HISTORY: Ordering Physician Provided Reason for Exam: FALL Acuity: Acute Type of Exam: Initial Mechanism of Injury: PT STATES SHE FELL THIS MORNING, C/O BACK PAIN FINDINGS: BONES/ALIGNMENT: The vertebral body heights are maintained. No discrete fracture identified. Slight thoracic curvature to the right is again noted. DEGENERATIVE CHANGES: Multilevel anterior hypertrophic changes are noted in the cervical and thoracic spine. No osseous central canal encroachment. SOFT TISSUES: No soft tissue hematoma. The visualized lung fields reveal no acute findings. The visualized upper abdominal soft tissue reveals no discrete abnormality. No acute osseous abnormality identified.      Ct Lumbar Spine Wo Contrast    Result Date: 5/10/2019  EXAMINATION: CT OF THE HEAD WITHOUT CONTRAST; CT OF THE CERVICAL SPINE WITHOUT CONTRAST; CT OF THE THORACIC SPINE WITHOUT CONTRAST; CT OF THE LUMBAR SPINE WITHOUT CONTRAST  5/10/2019 8:09 pm TECHNIQUE: CT of the head manifested mainly by multilevel bridging vertebral body osteophytes and scattered areas of degenerative disc disease. Ct Lumbar Spine Wo Contrast    Result Date: 5/7/2019  EXAMINATION: CT OF THE LUMBAR SPINE WITHOUT CONTRAST  5/7/2019 TECHNIQUE: CT of the lumbar spine was performed without the administration of intravenous contrast. Multiplanar reformatted images are provided for review. Dose modulation, iterative reconstruction, and/or weight based adjustment of the mA/kV was utilized to reduce the radiation dose to as low as reasonably achievable. COMPARISON: Lumbar spine MRI performed 12/14/2017. HISTORY: ORDERING SYSTEM PROVIDED HISTORY: fall TECHNOLOGIST PROVIDED HISTORY: fall Ordering Physician Provided Reason for Exam: fall; fall Acuity: Acute Type of Exam: Initial Mechanism of Injury: PT STATES SHE FELL THIS MORNING, C/O BACK PAIN FINDINGS: BONES/ALIGNMENT: The vertebral body heights are maintained. There is a hemangioma in the L5 vertebral body. The facet joints are aligned. There is no spondylolisthesis. There is no acute fracture. DEGENERATIVE CHANGES: There is mild multilevel degenerative disc disease. There is multilevel degenerative facet hypertrophy. There is no significant bony canal stenosis. There is right-sided foraminal narrowing at the L3-4 level. There is left-sided foraminal narrowing at L2-3, L3-4, L4-5, and L5-S1. SOFT TISSUES/RETROPERITONEUM: No paraspinal mass is seen. No acute fracture or malalignment of the lumbar spine. Multilevel degenerative disc disease and degenerative facet hypertrophy with bony foraminal narrowing bilaterally as described above. Xr Chest Portable    Result Date: 5/10/2019  EXAMINATION: ONE XRAY VIEW OF THE CHEST 5/10/2019 8:50 pm COMPARISON: November 2018 HISTORY: ORDERING SYSTEM PROVIDED HISTORY: fall TECHNOLOGIST PROVIDED HISTORY: fall Acuity: Acute Type of Exam: Initial Mechanism of Injury: Fall FINDINGS: Heart size is enlarged.   There is no

## 2019-05-12 NOTE — PLAN OF CARE
Problem: Falls - Risk of:  Goal: Will remain free from falls  Description  Will remain free from falls  5/12/2019 0320 by Leela Tomas RN  Outcome: Ongoing  Note:   No falls noted this shift. Patient ambulates with x1 staff assistance without difficulty. Bed kept in low position. Safe environment maintained. Bedside table & call light in reach. Uses call light appropriately when needing assistance. Problem: Pain:  Goal: Pain level will decrease  Description  Pain level will decrease  5/12/2019 0320 by Leela Tomas RN  Outcome: Ongoing  Note:   Pt medicated with pain medication prn. Assessed all pain characteristics including level, type, location, frequency, and onset. Non-pharmacologic interventions offered to pt as well. Pt states pain is tolerable at this time. Will continue to monitor. Problem: Risk for Impaired Skin Integrity  Goal: Tissue integrity - skin and mucous membranes  Description  Structural intactness and normal physiological function of skin and  mucous membranes. 5/12/2019 0320 by Leela Tomas RN  Outcome: Ongoing  Note:   Skin assessment complete. Turned and repositioned every two hours per self. Area kept free from moisture. Proper nourishment and fluids encouraged, as appropriate. Will continue to monitor for additional needs and changes in skin breakdown. Problem: Nausea/Vomiting:  Goal: Absence of nausea/vomiting  Description  Absence of nausea/vomiting  5/12/2019 0320 by Leela Tomas RN  Outcome: Ongoing  Note:   Pt denies nausea and no vomiting during this shift; will continue to monitor nausea/vomiting.

## 2019-05-12 NOTE — PROGRESS NOTES
Rn spoke with Dr. Nilo Agarwal in regards to patient not bring discharged. At this time  received order [facility] colonoscopy tomorrow.

## 2019-05-12 NOTE — ANESTHESIA PRE PROCEDURE
Department of Anesthesiology  Preprocedure Note       Name:  Santos Crawford   Age:  70 y.o.  :  1955                                          MRN:  [id number]         Date:  2019      Surgeon: Rakan Wise):  Michelle Martinez MD    Procedure: EGD ESOPHAGOGASTRODUODENOSCOPY (N/A Esophagus)    Medications prior to admission:   Prior to Admission medications    Medication Sig Start Date End Date Taking? Authorizing Provider   cephALEXin (KEFLEX) 500 MG capsule Take 500 mg by mouth 2 times daily Indications: started 1st dose 19 in am, taking for 7 days    Historical Provider, MD   furosemide (LASIX) 20 MG tablet Take 20 mg by mouth daily    Historical Provider, MD   gabapentin (NEURONTIN) 100 MG capsule Take 100 mg by mouth 2 times daily. Taz Evans     Historical Provider, MD   levETIRAcetam (KEPPRA) 500 MG tablet Take 1,250 mg by mouth 2 times daily 2.5 tablets = 1250 mg    Historical Provider, MD   insulin aspart (NOVOLOG) 100 UNIT/ML injection vial Inject into the skin Inject as per sliding scale before meals and at bedtime:    = 0 units; call physician if less than 70  151-200 = 2 units  201-250 = 4 units  251-300 = 6 units  301-350 = 8 units  351-400 = 10 units; call physician if greater than 400    Historical Provider, MD   insulin aspart (NOVOLOG) 100 UNIT/ML injection vial Inject 15 Units into the skin 4 times daily (before meals and nightly)    Historical Provider, MD   potassium chloride (KLOR-CON M) 10 MEQ extended release tablet Take 10 mEq by mouth daily    Historical Provider, MD   vitamin B-12 (CYANOCOBALAMIN) 500 MCG tablet Take 500 mcg by mouth daily    Historical Provider, MD   benzonatate (TESSALON) 200 MG capsule Take 200 mg by mouth 2 times daily as needed for Cough    Historical Provider, MD   loperamide (IMODIUM) 2 MG capsule Take 4 mg by mouth daily as needed for Diarrhea (after first loose stool)    Historical Provider, MD   loperamide (IMODIUM) 2 MG capsule Take 2 mg by mouth 4 times daily as needed for Diarrhea (after initial 4 mg dose)    Historical Provider, MD   acetaminophen (TYLENOL) 325 MG tablet Take 650 mg by mouth 3 times daily as needed for Pain (mild)    Historical Provider, MD   oxyCODONE-acetaminophen (PERCOCET) 5-325 MG per tablet Take 1-2 tablets by mouth every 6 hours as needed for Pain .  12/10/17   Osito Farfan DO   metFORMIN (GLUCOPHAGE) 1000 MG tablet Take 1,000 mg by mouth 2 times daily (with meals)    Historical Provider, MD   Blood Glucose Monitoring Suppl FLAVIO Check blood sugars 3/day 11/30/17   Amy Richard MD   Glucose Blood (BLOOD GLUCOSE TEST STRIPS) STRP Test 3  times daily Insulin Dependent mellitus 11/30/17   Amy Richard MD   Lancets MISC Check 3/day 11/30/17   Amy Richard MD   lisinopril-hydrochlorothiazide (PRINZIDE;ZESTORETIC) 10-12.5 MG per tablet Take 1 tablet by mouth daily    Historical Provider, MD   PARoxetine (PAXIL) 20 MG tablet Take 20 mg by mouth every morning    Historical Provider, MD   omeprazole (PRILOSEC) 40 MG delayed release capsule Take 40 mg by mouth Daily     Historical Provider, MD   loratadine (CLARITIN) 10 MG tablet Take 10 mg by mouth daily    Historical Provider, MD   simvastatin (ZOCOR) 40 MG tablet Take 40 mg by mouth nightly    Historical Provider, MD   pramipexole (MIRAPEX) 1 MG tablet Take 1 mg by mouth nightly    Historical Provider, MD   lamoTRIgine (LAMICTAL) 200 MG tablet Take 200 mg by mouth 2 times daily    Historical Provider, MD   busPIRone (BUSPAR) 5 MG tablet Take 10 mg by mouth 3 times daily    Historical Provider, MD   insulin glargine (LANTUS) 100 UNIT/ML injection vial Inject 60 Units into the skin 2 times daily Patient states she is using the lantus pen    Historical Provider, MD       Current medications:    Current Facility-Administered Medications   Medication Dose Route Frequency Provider Last Rate Last Dose    sodium chloride flush 0.9 % injection 10 mL  10 mL Intravenous 2 times per day 40 mg  40 mg Oral Nightly Valente Lopez MD   40 mg at 05/11/19 2057    vitamin B-12 (CYANOCOBALAMIN) tablet 500 mcg  500 mcg Oral Daily Valente Lopez MD   500 mcg at 05/12/19 0927    lisinopril (PRINIVIL;ZESTRIL) tablet 10 mg  10 mg Oral Daily Valente Lopez MD   10 mg at 05/12/19 8472    hydrochlorothiazide (MICROZIDE) capsule 12.5 mg  12.5 mg Oral Daily Shruthi Mcgarry MD   12.5 mg at 05/12/19 0927    glucose (GLUTOSE) 40 % oral gel 15 g  15 g Oral PRN Valente Lopez MD        dextrose 50 % solution 12.5 g  12.5 g Intravenous PRN Valente Lopez MD        glucagon (rDNA) injection 1 mg  1 mg Intramuscular PRN Valente Lopez MD        dextrose 5 % solution  100 mL/hr Intravenous PRN Valente Lopez MD           Allergies: Allergies   Allergen Reactions    Codeine        Problem List:    Patient Active Problem List   Diagnosis Code    Seizure disorder ([facility] [address] G40.909    Type 2 diabetes mellitus ([facility] [address] E11.9    Breakthrough seizure ([facility] [address] G40.919    CVA, old, hemiparesis ([facility] [address] I69.359    Falling episodes R29.6    Cerebral concussion S06. 4D0H    Cervical spinal stenosis M48.02    Diabetic polyneuropathy associated with type 2 diabetes mellitus ([facility]) E11.42    History of cerebral infarction Z86.73    Seizure disorder ([facility]) G40.909    Depression with anxiety F41.8    Dizziness R42    Generalized weakness R53.1    GERD (gastroesophageal reflux disease) K21.9    H/O falling Z91.81    Hyperglycemia R73.9    Hyponatremia E87.1    Morbid obesity with BMI of 40.0-44.9, adult ([facility]) E66.01, Z68.41    Pure hypercholesterolemia E78.00    RLS (restless legs syndrome) G25.81    Thrombocytopenia ([facility]) D69.6    Altered mental status R41.82    Confusion state F44.89    Gait disturbance R26.9    GI bleed K92.2       Past Medical History:        Diagnosis Date    Anemia     Cataract     GERD (gastroesophageal reflux disease)     Headache     Hyperlipidemia     Neuropathy     Osteoarthritis 05/10/2019       POC Tests:   Recent Labs     05/12/19  0758   POCGLU 125*       Coags:   Lab Results   Component Value Date    PROTIME 14.6 05/10/2019    INR 1.1 05/10/2019       HCG (If Applicable): No results found for: PREGTESTUR, PREGSERUM, HCG, HCGQUANT     ABGs: No results found for: PHART, PO2ART, UIV9CJR, WEV0REU, BEART, U5DYPRNU     Type & Screen (If Applicable):  No results found for: LABABO, [address]    Anesthesia Evaluation  Patient summary reviewed and Nursing notes reviewed  Airway: Mallampati: II  TM distance: >3 FB   Neck ROM: full  Mouth opening: > = 3 FB Dental: normal exam         Pulmonary:normal exam                               Cardiovascular:            Rhythm: regular  Rate: normal                    Neuro/Psych:   (+) CVA:, neuromuscular disease:, headaches:, psychiatric history:            GI/Hepatic/Renal:   (+) GERD:,           Endo/Other:    (+) DiabetesType II DM, , .                 Abdominal:   (+) obese,         Vascular:                                        Anesthesia Plan      MAC     ASA 3       Induction: intravenous. Anesthetic plan and risks discussed with patient.                       Helena Jim MD   5/12/2019

## 2019-05-12 NOTE — CARE COORDINATION
ONGOING DISCHARGE PLAN:    Discharge plan for the patient is home to [facility] where she has stated she is provided all the assistance that she needs. Active orders for NPO, IVF and GI consulted. H&H this morning was 8.8 and 28.5. Will continue to follow for additional discharge needs.     Electronically signed by Terence Tom RN on 5/12/2019 at 9:27 AM

## 2019-05-12 NOTE — ANESTHESIA POSTPROCEDURE EVALUATION
Department of Anesthesiology  Postprocedure Note    Patient: Yesi Raya  MRN: [id number]  YOB: 1955  Date of evaluation: 5/12/2019  Time:  11:09 AM     Procedure Summary     Date:  05/12/19 Room / Location:  [address] / [address]    Anesthesia Start:  1026 Anesthesia Stop:  1700    Procedure:  EGD ESOPHAGOGASTRODUODENOSCOPY (N/A Esophagus) Diagnosis:  (GI bleed)    Surgeon:  Blaze Tai MD Responsible Provider:  Della Baltazar MD    Anesthesia Type:  MAC ASA Status:  3          Anesthesia Type: MAC    Jean Phase I: Jean Score: 10    Jean Phase II:      Last vitals: Reviewed and per EMR flowsheets.        Anesthesia Post Evaluation    Comments: POST- ANESTHESIA EVALUATION       Pt Name: Yesi Raya  MRN: [id number]  YOB: 1955  Date of evaluation: 5/12/2019  Time:  11:09 AM      BP (!) 128/44   Pulse 78   Temp 97.9 °F (36.6 °C) (Infrared)   Resp 18   Ht 5' 1\" (1.549 m)   Wt 274 lb 11.1 oz (124.6 kg)   SpO2 96%   BMI 51.90 kg/m²      Consciousness Level  Awake  Cardiopulmonary Status  Stable  Pain Adequately Treated YES  Nausea / Vomiting  NO  Adequate Hydration  YES  Anesthesia Related Complications NONE      Electronically signed by Della Baltazar MD on 5/12/2019 at 11:09 AM

## 2019-05-12 NOTE — CONSULTS
109 05/10/2019    AST 35 05/10/2019    ALT 22 05/10/2019     BMP:  Lab Results   Component Value Date     05/12/2019    K 4.1 05/12/2019     05/12/2019    CO2 26 05/12/2019    BUN 28 05/12/2019    LABALBU 3.0 05/10/2019    CREATININE 0.95 05/12/2019    CALCIUM 8.4 05/12/2019    GFRAA >60 05/12/2019    LABGLOM 59 05/12/2019    GLUCOSE 124 05/12/2019     PT/INR:    Lab Results   Component Value Date    PROTIME 14.6 05/10/2019    INR 1.1 05/10/2019     PTT:  No results found for: APTT, PTT[APTT}    Assesment:     Primary Problem  <principal problem not specified>    Active Hospital Problems    Diagnosis Date Noted    GI bleed [K92.2] 05/10/2019       Plan:     1. Patient has anemia. 2. Given her symptoms may need EGD to evaluate upper GI issues. 3. Also need anemia workup. 4. If EGD is negative she may need colonoscopy. 5. After discussion, patient understood and verbalized consent. Thank you for allowing me to participate in the care of your patient. Please feel free to contact me with anyquestions or concerns. Electronically signed by Vashti Rivera MD on 5/12/2019 at 11:07 AM     Copy sent to Dr. Ghanshyam Solano MD    Note is dictated utilizing voice recognition software. Unfortunately this leads to occasional typographical errors. Please contact our office if you have any questions.

## 2019-05-12 NOTE — OP NOTE
ESOPHAGOGASTRODUODENOSCOPY   ( EGD )  DATE OF PROCEDURE: 5/12/2019     SURGEON: Konrad Soriano MD    ASSISTANT: None    PREOPERATIVE DIAGNOSIS: GI bleeding. Nausea and vomiting. POSTOPERATIVE DIAGNOSIS: Mild gastritis. OPERATION: Upper GI endoscopy     ANESTHESIA: MAC    ESTIMATED BLOOD LOSS: None    COMPLICATIONS: None. SPECIMENS:  Was Not Obtained    HISTORY: The patient is a 70y.o. year old female with history of above preop diagnosis. I recommended esophagogastroduodenoscopy with possible biopsy and I explained the risk, benefits, expected outcome, and alternatives to the procedure. Risks included but are not limited to bleeding, infection, respiratory distress, hypotension, and perforation of the esophagus, stomach, or duodenum. Patient understands and is in agreement. PROCEDURE: The patient was given IV conscious sedation. The patient's SPO2 remained above 90% throughout the procedure. Cetacaine spray given. Patient placed in left lateral position. Olympus  videogastroscope was inserted orally under vision into the esophagus without difficulty and advanced into the stomach then through the pylorus up to the second part of duodenum. Findings:    Retropharyngeal area was grossly normal appearing    Esophagus: normal    Stomach:    Fundus and Cardia Examined in Retroflexed View: normal    Body: normal, patient has minimal inflammation in the stomach. No erosions, ulcer crater seen. Antrum: normal    Duodenum:     Descending: normal    Bulb: normal    While withdrawing the scope the above findings were verified and the scope was removed. The patient has tolerated the procedure and conscious sedation without unusual events. Recommendations/Plan:   1.   2. F/U In Office as instructed  3.  Discussed with the family                   Electronically signed by Konrad Soriano MD  on 5/12/2019 at 10:47 AM

## 2019-05-13 ENCOUNTER — ANESTHESIA (OUTPATIENT)
Dept: OPERATING ROOM | Age: 64
DRG: 378 | End: 2019-05-13
Payer: COMMERCIAL

## 2019-05-13 ENCOUNTER — ANESTHESIA EVENT (OUTPATIENT)
Dept: OPERATING ROOM | Age: 64
DRG: 378 | End: 2019-05-13
Payer: COMMERCIAL

## 2019-05-13 VITALS
DIASTOLIC BLOOD PRESSURE: 42 MMHG | OXYGEN SATURATION: 100 % | SYSTOLIC BLOOD PRESSURE: 104 MMHG | RESPIRATION RATE: 1 BRPM

## 2019-05-13 LAB
ABSOLUTE EOS #: 0.11 K/UL (ref 0–0.4)
ABSOLUTE IMMATURE GRANULOCYTE: ABNORMAL K/UL (ref 0–0.3)
ABSOLUTE LYMPH #: 0.8 K/UL (ref 1–4.8)
ABSOLUTE MONO #: 0.32 K/UL (ref 0.1–1.3)
ANION GAP SERPL CALCULATED.3IONS-SCNC: 14 MMOL/L (ref 9–17)
BASOPHILS # BLD: 0 % (ref 0–2)
BASOPHILS ABSOLUTE: 0 K/UL (ref 0–0.2)
BUN BLDV-MCNC: 19 MG/DL (ref 8–23)
BUN/CREAT BLD: ABNORMAL (ref 9–20)
CALCIUM SERPL-MCNC: 8.6 MG/DL (ref 8.6–10.4)
CHLORIDE BLD-SCNC: 104 MMOL/L (ref 98–107)
CO2: 24 MMOL/L (ref 20–31)
CREAT SERPL-MCNC: 0.98 MG/DL (ref 0.5–0.9)
DIFFERENTIAL TYPE: ABNORMAL
EOSINOPHILS RELATIVE PERCENT: 2 % (ref 0–4)
GFR AFRICAN AMERICAN: >60 ML/MIN
GFR NON-AFRICAN AMERICAN: 57 ML/MIN
GFR SERPL CREATININE-BSD FRML MDRD: ABNORMAL ML/MIN/{1.73_M2}
GFR SERPL CREATININE-BSD FRML MDRD: ABNORMAL ML/MIN/{1.73_M2}
GLUCOSE BLD-MCNC: 129 MG/DL (ref 65–105)
GLUCOSE BLD-MCNC: 145 MG/DL (ref 70–99)
GLUCOSE BLD-MCNC: 156 MG/DL (ref 65–105)
GLUCOSE BLD-MCNC: 233 MG/DL (ref 65–105)
HCT VFR BLD CALC: 28 % (ref 36–46)
HEMOGLOBIN: 8.4 G/DL (ref 12–16)
IMMATURE GRANULOCYTES: ABNORMAL %
LYMPHOCYTES # BLD: 15 % (ref 24–44)
MCH RBC QN AUTO: 22 PG (ref 26–34)
MCHC RBC AUTO-ENTMCNC: 30.1 G/DL (ref 31–37)
MCV RBC AUTO: 73.1 FL (ref 80–100)
MONOCYTES # BLD: 6 % (ref 1–7)
MORPHOLOGY: ABNORMAL
NRBC AUTOMATED: ABNORMAL PER 100 WBC
PDW BLD-RTO: 19.5 % (ref 11.5–14.9)
PLATELET # BLD: 162 K/UL (ref 150–450)
PLATELET ESTIMATE: ABNORMAL
PMV BLD AUTO: 6.9 FL (ref 6–12)
POTASSIUM SERPL-SCNC: 3.8 MMOL/L (ref 3.7–5.3)
RBC # BLD: 3.83 M/UL (ref 4–5.2)
RBC # BLD: ABNORMAL 10*6/UL
SEG NEUTROPHILS: 77 % (ref 36–66)
SEGMENTED NEUTROPHILS ABSOLUTE COUNT: 4.07 K/UL (ref 1.3–9.1)
SODIUM BLD-SCNC: 142 MMOL/L (ref 135–144)
TISSUE TRANSGLUTAMINASE IGA: 3.4 U/ML
WBC # BLD: 5.3 K/UL (ref 3.5–11)
WBC # BLD: ABNORMAL 10*3/UL

## 2019-05-13 PROCEDURE — 1200000000 HC SEMI PRIVATE

## 2019-05-13 PROCEDURE — 2709999900 HC NON-CHARGEABLE SUPPLY: Performed by: INTERNAL MEDICINE

## 2019-05-13 PROCEDURE — 7100000001 HC PACU RECOVERY - ADDTL 15 MIN: Performed by: INTERNAL MEDICINE

## 2019-05-13 PROCEDURE — 82947 ASSAY GLUCOSE BLOOD QUANT: CPT

## 2019-05-13 PROCEDURE — 80048 BASIC METABOLIC PNL TOTAL CA: CPT

## 2019-05-13 PROCEDURE — 6360000002 HC RX W HCPCS: Performed by: NURSE ANESTHETIST, CERTIFIED REGISTERED

## 2019-05-13 PROCEDURE — 6370000000 HC RX 637 (ALT 250 FOR IP): Performed by: INTERNAL MEDICINE

## 2019-05-13 PROCEDURE — 36415 COLL VENOUS BLD VENIPUNCTURE: CPT

## 2019-05-13 PROCEDURE — 2580000003 HC RX 258: Performed by: INTERNAL MEDICINE

## 2019-05-13 PROCEDURE — 45385 COLONOSCOPY W/LESION REMOVAL: CPT | Performed by: INTERNAL MEDICINE

## 2019-05-13 PROCEDURE — 99231 SBSQ HOSP IP/OBS SF/LOW 25: CPT | Performed by: INTERNAL MEDICINE

## 2019-05-13 PROCEDURE — 0DBN8ZX EXCISION OF SIGMOID COLON, VIA NATURAL OR ARTIFICIAL OPENING ENDOSCOPIC, DIAGNOSTIC: ICD-10-PCS | Performed by: INTERNAL MEDICINE

## 2019-05-13 PROCEDURE — 88305 TISSUE EXAM BY PATHOLOGIST: CPT

## 2019-05-13 PROCEDURE — 7100000000 HC PACU RECOVERY - FIRST 15 MIN: Performed by: INTERNAL MEDICINE

## 2019-05-13 PROCEDURE — 3700000000 HC ANESTHESIA ATTENDED CARE: Performed by: INTERNAL MEDICINE

## 2019-05-13 PROCEDURE — 85025 COMPLETE CBC W/AUTO DIFF WBC: CPT

## 2019-05-13 PROCEDURE — 0DBL8ZZ EXCISION OF TRANSVERSE COLON, VIA NATURAL OR ARTIFICIAL OPENING ENDOSCOPIC: ICD-10-PCS | Performed by: INTERNAL MEDICINE

## 2019-05-13 PROCEDURE — 0DBM8ZX EXCISION OF DESCENDING COLON, VIA NATURAL OR ARTIFICIAL OPENING ENDOSCOPIC, DIAGNOSTIC: ICD-10-PCS | Performed by: INTERNAL MEDICINE

## 2019-05-13 PROCEDURE — 3609009500 HC COLONOSCOPY DIAGNOSTIC OR SCREENING: Performed by: INTERNAL MEDICINE

## 2019-05-13 PROCEDURE — 3700000001 HC ADD 15 MINUTES (ANESTHESIA): Performed by: INTERNAL MEDICINE

## 2019-05-13 RX ORDER — LABETALOL 20 MG/4 ML (5 MG/ML) INTRAVENOUS SYRINGE
5 EVERY 10 MIN PRN
Status: DISCONTINUED | OUTPATIENT
Start: 2019-05-13 | End: 2019-05-13

## 2019-05-13 RX ORDER — MORPHINE SULFATE 2 MG/ML
2 INJECTION, SOLUTION INTRAMUSCULAR; INTRAVENOUS EVERY 5 MIN PRN
Status: DISCONTINUED | OUTPATIENT
Start: 2019-05-13 | End: 2019-05-13

## 2019-05-13 RX ORDER — PROPOFOL 10 MG/ML
INJECTION, EMULSION INTRAVENOUS PRN
Status: DISCONTINUED | OUTPATIENT
Start: 2019-05-13 | End: 2019-05-13 | Stop reason: SDUPTHER

## 2019-05-13 RX ORDER — ONDANSETRON 2 MG/ML
4 INJECTION INTRAMUSCULAR; INTRAVENOUS
Status: DISCONTINUED | OUTPATIENT
Start: 2019-05-13 | End: 2019-05-13

## 2019-05-13 RX ORDER — MEPERIDINE HYDROCHLORIDE 50 MG/ML
12.5 INJECTION INTRAMUSCULAR; INTRAVENOUS; SUBCUTANEOUS EVERY 5 MIN PRN
Status: DISCONTINUED | OUTPATIENT
Start: 2019-05-13 | End: 2019-05-13

## 2019-05-13 RX ORDER — DIPHENHYDRAMINE HYDROCHLORIDE 50 MG/ML
12.5 INJECTION INTRAMUSCULAR; INTRAVENOUS
Status: DISCONTINUED | OUTPATIENT
Start: 2019-05-13 | End: 2019-05-13

## 2019-05-13 RX ADMIN — INSULIN LISPRO 1 UNITS: 100 INJECTION, SOLUTION INTRAVENOUS; SUBCUTANEOUS at 23:36

## 2019-05-13 RX ADMIN — BUSPIRONE HYDROCHLORIDE 10 MG: 10 TABLET ORAL at 16:05

## 2019-05-13 RX ADMIN — PROPOFOL 20 MG: 10 INJECTION, EMULSION INTRAVENOUS at 13:34

## 2019-05-13 RX ADMIN — PROPOFOL 20 MG: 10 INJECTION, EMULSION INTRAVENOUS at 13:30

## 2019-05-13 RX ADMIN — LISINOPRIL 10 MG: 10 TABLET ORAL at 16:05

## 2019-05-13 RX ADMIN — FUROSEMIDE 20 MG: 20 TABLET ORAL at 16:05

## 2019-05-13 RX ADMIN — PROPOFOL 20 MG: 10 INJECTION, EMULSION INTRAVENOUS at 13:32

## 2019-05-13 RX ADMIN — PROPOFOL 20 MG: 10 INJECTION, EMULSION INTRAVENOUS at 13:37

## 2019-05-13 RX ADMIN — PROPOFOL 80 MG: 10 INJECTION, EMULSION INTRAVENOUS at 13:29

## 2019-05-13 RX ADMIN — LEVETIRACETAM 1250 MG: 750 TABLET ORAL at 23:33

## 2019-05-13 RX ADMIN — SIMVASTATIN 40 MG: 40 TABLET, FILM COATED ORAL at 23:33

## 2019-05-13 RX ADMIN — PROPOFOL 20 MG: 10 INJECTION, EMULSION INTRAVENOUS at 13:33

## 2019-05-13 RX ADMIN — INSULIN GLARGINE 30 UNITS: 100 INJECTION, SOLUTION SUBCUTANEOUS at 23:36

## 2019-05-13 RX ADMIN — SODIUM CHLORIDE: 9 INJECTION, SOLUTION INTRAVENOUS at 04:11

## 2019-05-13 RX ADMIN — BUSPIRONE HYDROCHLORIDE 10 MG: 10 TABLET ORAL at 23:34

## 2019-05-13 RX ADMIN — SODIUM CHLORIDE: 9 INJECTION, SOLUTION INTRAVENOUS at 15:53

## 2019-05-13 RX ADMIN — PROPOFOL 20 MG: 10 INJECTION, EMULSION INTRAVENOUS at 13:44

## 2019-05-13 RX ADMIN — METFORMIN HYDROCHLORIDE 1000 MG: 1000 TABLET ORAL at 17:15

## 2019-05-13 RX ADMIN — PROPOFOL 20 MG: 10 INJECTION, EMULSION INTRAVENOUS at 13:36

## 2019-05-13 RX ADMIN — CYANOCOBALAMIN TAB 500 MCG 500 MCG: 500 TAB at 16:06

## 2019-05-13 RX ADMIN — GABAPENTIN 100 MG: 100 CAPSULE ORAL at 23:33

## 2019-05-13 RX ADMIN — SODIUM CHLORIDE: 9 INJECTION, SOLUTION INTRAVENOUS at 10:54

## 2019-05-13 RX ADMIN — HYDROCHLOROTHIAZIDE 12.5 MG: 12.5 CAPSULE ORAL at 16:05

## 2019-05-13 RX ADMIN — PRAMIPEXOLE DIHYDROCHLORIDE 1 MG: 1 TABLET ORAL at 23:35

## 2019-05-13 RX ADMIN — SODIUM CHLORIDE: 9 INJECTION, SOLUTION INTRAVENOUS at 22:33

## 2019-05-13 RX ADMIN — OXYCODONE HYDROCHLORIDE AND ACETAMINOPHEN 2 TABLET: 5; 325 TABLET ORAL at 06:18

## 2019-05-13 RX ADMIN — PROPOFOL 20 MG: 10 INJECTION, EMULSION INTRAVENOUS at 13:31

## 2019-05-13 RX ADMIN — PROPOFOL 20 MG: 10 INJECTION, EMULSION INTRAVENOUS at 13:41

## 2019-05-13 RX ADMIN — LAMOTRIGINE 200 MG: 100 TABLET ORAL at 23:35

## 2019-05-13 RX ADMIN — OXYCODONE HYDROCHLORIDE AND ACETAMINOPHEN 2 TABLET: 5; 325 TABLET ORAL at 17:14

## 2019-05-13 RX ADMIN — PROPOFOL 20 MG: 10 INJECTION, EMULSION INTRAVENOUS at 13:39

## 2019-05-13 RX ADMIN — PROPOFOL 20 MG: 10 INJECTION, EMULSION INTRAVENOUS at 13:35

## 2019-05-13 ASSESSMENT — PULMONARY FUNCTION TESTS
PIF_VALUE: 0
PIF_VALUE: 1
PIF_VALUE: 0
PIF_VALUE: 1
PIF_VALUE: 0
PIF_VALUE: 1
PIF_VALUE: 0
PIF_VALUE: 1
PIF_VALUE: 0

## 2019-05-13 ASSESSMENT — PAIN SCALES - GENERAL
PAINLEVEL_OUTOF10: 0
PAINLEVEL_OUTOF10: 8
PAINLEVEL_OUTOF10: 0
PAINLEVEL_OUTOF10: 8
PAINLEVEL_OUTOF10: 0
PAINLEVEL_OUTOF10: 5

## 2019-05-13 ASSESSMENT — ENCOUNTER SYMPTOMS
SHORTNESS OF BREATH: 0
STRIDOR: 0

## 2019-05-13 ASSESSMENT — LIFESTYLE VARIABLES: SMOKING_STATUS: 0

## 2019-05-13 NOTE — ANESTHESIA PRE PROCEDURE
Department of Anesthesiology  Preprocedure Note       Name:  Chelsie Pichardo   Age:  70 y.o.  :  1955                                          MRN:  [id number]         Date:  2019      Surgeon: Keily Jason):  Ayana Jones MD    Procedure: COLONOSCOPY DIAGNOSTIC (N/A )    Medications prior to admission:   Prior to Admission medications    Medication Sig Start Date End Date Taking? Authorizing Provider   cephALEXin (KEFLEX) 500 MG capsule Take 500 mg by mouth 2 times daily Indications: started 1st dose 19 in am, taking for 7 days    Historical Provider, MD   furosemide (LASIX) 20 MG tablet Take 20 mg by mouth daily    Historical Provider, MD   gabapentin (NEURONTIN) 100 MG capsule Take 100 mg by mouth 2 times daily. Brandie Wells     Historical Provider, MD   levETIRAcetam (KEPPRA) 500 MG tablet Take 1,250 mg by mouth 2 times daily 2.5 tablets = 1250 mg    Historical Provider, MD   insulin aspart (NOVOLOG) 100 UNIT/ML injection vial Inject into the skin Inject as per sliding scale before meals and at bedtime:    = 0 units; call physician if less than 70  151-200 = 2 units  201-250 = 4 units  251-300 = 6 units  301-350 = 8 units  351-400 = 10 units; call physician if greater than 400    Historical Provider, MD   insulin aspart (NOVOLOG) 100 UNIT/ML injection vial Inject 15 Units into the skin 4 times daily (before meals and nightly)    Historical Provider, MD   potassium chloride (KLOR-CON M) 10 MEQ extended release tablet Take 10 mEq by mouth daily    Historical Provider, MD   vitamin B-12 (CYANOCOBALAMIN) 500 MCG tablet Take 500 mcg by mouth daily    Historical Provider, MD   benzonatate (TESSALON) 200 MG capsule Take 200 mg by mouth 2 times daily as needed for Cough    Historical Provider, MD   loperamide (IMODIUM) 2 MG capsule Take 4 mg by mouth daily as needed for Diarrhea (after first loose stool)    Historical Provider, MD   loperamide (IMODIUM) 2 MG capsule Take 2 mg by mouth 4 times daily as needed for Diarrhea (after initial 4 mg dose)    Historical Provider, MD   acetaminophen (TYLENOL) 325 MG tablet Take 650 mg by mouth 3 times daily as needed for Pain (mild)    Historical Provider, MD   oxyCODONE-acetaminophen (PERCOCET) 5-325 MG per tablet Take 1-2 tablets by mouth every 6 hours as needed for Pain .  12/10/17   Osito Farfan DO   metFORMIN (GLUCOPHAGE) 1000 MG tablet Take 1,000 mg by mouth 2 times daily (with meals)    Historical Provider, MD   Blood Glucose Monitoring Suppl FLAVIO Check blood sugars 3/day 11/30/17   Katie Liz MD   Glucose Blood (BLOOD GLUCOSE TEST STRIPS) STRP Test 3  times daily Insulin Dependent mellitus 11/30/17   Katie Liz MD   Lancets MISC Check 3/day 11/30/17   Katie Liz MD   lisinopril-hydrochlorothiazide (PRINZIDE;ZESTORETIC) 10-12.5 MG per tablet Take 1 tablet by mouth daily    Historical Provider, MD   PARoxetine (PAXIL) 20 MG tablet Take 20 mg by mouth every morning    Historical Provider, MD   omeprazole (PRILOSEC) 40 MG delayed release capsule Take 40 mg by mouth Daily     Historical Provider, MD   loratadine (CLARITIN) 10 MG tablet Take 10 mg by mouth daily    Historical Provider, MD   simvastatin (ZOCOR) 40 MG tablet Take 40 mg by mouth nightly    Historical Provider, MD   pramipexole (MIRAPEX) 1 MG tablet Take 1 mg by mouth nightly    Historical Provider, MD   lamoTRIgine (LAMICTAL) 200 MG tablet Take 200 mg by mouth 2 times daily    Historical Provider, MD   busPIRone (BUSPAR) 5 MG tablet Take 10 mg by mouth 3 times daily    Historical Provider, MD   insulin glargine (LANTUS) 100 UNIT/ML injection vial Inject 60 Units into the skin 2 times daily Patient states she is using the lantus pen    Historical Provider, MD       Current medications:    Current Facility-Administered Medications   Medication Dose Route Frequency Provider Last Rate Last Dose    [MAR Hold] sodium chloride flush 0.9 % injection 10 mL  10 mL Intravenous 2 times per day Yuri Leanne Gauthier MD        [facility] Hold] sodium chloride flush 0.9 % injection 10 mL  10 mL Intravenous PRN Andra Penn MD        [facility] Hold] acetaminophen (TYLENOL) tablet 650 mg  650 mg Oral Q4H PRN Yuri Gauthier MD        [facility] Hold] 0.9 % sodium chloride infusion   Intravenous Continuous Yuri Gauthier  mL/hr at 05/13/19 1054      [MAR Hold] 0.9 % sodium chloride bolus  250 mL Intravenous Once Andra Penn MD        [facility] Hold] insulin lispro (HUMALOG) injection vial 0-6 Units  0-6 Units Subcutaneous TID WC Andra Penn MD   1 Units at 05/12/19 1625    [MAR Hold] insulin lispro (HUMALOG) injection vial 0-3 Units  0-3 Units Subcutaneous Nightly Andra Penn MD   1 Units at 05/12/19 2131    [MAR Hold] acetaminophen (TYLENOL) tablet 650 mg  650 mg Oral TID PRN Andra Penn MD        [facility] Hold] busPIRone (BUSPAR) tablet 10 mg  10 mg Oral TID Andra Penn MD   10 mg at 05/12/19 2037    [MAR Hold] furosemide (LASIX) tablet 20 mg  20 mg Oral Daily Yuri Nieves MD   20 mg at 05/12/19 0928    [MAR Hold] gabapentin (NEURONTIN) capsule 100 mg  100 mg Oral BID Andra Penn MD   100 mg at 05/12/19 2131    [MAR Hold] lamoTRIgine (LAMICTAL) tablet 200 mg  200 mg Oral BID Andra Penn MD   200 mg at 05/12/19 2038    [MAR Hold] insulin glargine (LANTUS) injection vial 30 Units  30 Units Subcutaneous BID Andra Penn MD   30 Units at 05/12/19 2131    [MAR Hold] levETIRAcetam (KEPPRA) tablet 1,250 mg  1,250 mg Oral BID Andra Penn MD   1,250 mg at 05/12/19 2036    [MAR Hold] loperamide (IMODIUM) capsule 4 mg  4 mg Oral Daily PRN Andra Penn MD        [facility] Hold] loperamide (IMODIUM) capsule 2 mg  2 mg Oral 4x Daily PRN Andra Penn MD        [facility] Hold] metFORMIN (GLUCOPHAGE) tablet 1,000 mg  1,000 mg Oral BID LEANNE Rinaldi MD   1,000 mg at 05/12/19 1625    [MAR Hold] oxyCODONE-acetaminophen (PERCOCET) 5-325 MG per tablet 2 tablet  2 tablet Oral Q6H PRN Jonatan Tran MD   2 tablet at 05/13/19 0618    [MAR Hold] PARoxetine (PAXIL) tablet 20 mg  20 mg Oral QAM Yuri Nieves MD   20 mg at 05/12/19 0928    [MAR Hold] pramipexole (MIRAPEX) tablet 1 mg  1 mg Oral Nightly Yuri Nieves MD   1 mg at 05/12/19 2038    [MAR Hold] simvastatin (ZOCOR) tablet 40 mg  40 mg Oral Nightly Jonatan Tran MD   40 mg at 05/12/19 2037    [MAR Hold] vitamin B-12 (CYANOCOBALAMIN) tablet 500 mcg  500 mcg Oral Daily Jonatan Tran MD   500 mcg at 05/12/19 0927    [MAR Hold] lisinopril (PRINIVIL;ZESTRIL) tablet 10 mg  10 mg Oral Daily Jonatan Tran MD   10 mg at 05/12/19 0927    [MAR Hold] hydrochlorothiazide (MICROZIDE) capsule 12.5 mg  12.5 mg Oral Daily Jonatan Tran MD   12.5 mg at 05/12/19 0927    [MAR Hold] glucose (GLUTOSE) 40 % oral gel 15 g  15 g Oral PRN Jonatan Tran MD        [facility] Hold] dextrose 50 % solution 12.5 g  12.5 g Intravenous PRN Jonatan Tran MD        [MAR Hold] glucagon (rDNA) injection 1 mg  1 mg Intramuscular PRN Jonatan Tran MD        [facility] Hold] dextrose 5 % solution  100 mL/hr Intravenous PRANA Tran MD           Allergies: Allergies   Allergen Reactions    Codeine        Problem List:    Patient Active Problem List   Diagnosis Code    Seizure disorder ([facility] [address] G40.909    Type 2 diabetes mellitus ([facility] [address] E11.9    Breakthrough seizure ([facility] [address] G40.919    CVA, old, hemiparesis ([facility] [address] I69.359    Falling episodes R29.6    Cerebral concussion S06. 5B6I    Cervical spinal stenosis M48.02    Diabetic polyneuropathy associated with type 2 diabetes mellitus (HCC) E11.42    History of cerebral infarction Z86.73    Seizure disorder (HCC) G40.909    Depression with anxiety F41.8    Dizziness R42    Generalized weakness R53.1    GERD (gastroesophageal reflux disease) K21.9    H/O falling Z91.81    Hyperglycemia consumption: 05/10/19    BMI:   Wt Readings from Last 3 Encounters:   05/12/19 274 lb 11.1 oz (124.6 kg)   05/07/19 260 lb (117.9 kg)   11/30/18 251 lb (113.9 kg)     Body mass index is 51.9 kg/m².     CBC:   Lab Results   Component Value Date    WBC 5.3 05/13/2019    RBC 3.83 05/13/2019    HGB 8.4 05/13/2019    HCT 28.0 05/13/2019    MCV 73.1 05/13/2019    RDW 19.5 05/13/2019     05/13/2019     HB 8.4    CMP:   Lab Results   Component Value Date     05/13/2019    K 3.8 05/13/2019     05/13/2019    CO2 24 05/13/2019    BUN 19 05/13/2019    CREATININE 0.98 05/13/2019    GFRAA >60 05/13/2019    LABGLOM 57 05/13/2019    GLUCOSE 145 05/13/2019    PROT 7.3 05/10/2019    CALCIUM 8.6 05/13/2019    BILITOT 0.19 05/10/2019    ALKPHOS 109 05/10/2019    AST 35 05/10/2019    ALT 22 05/10/2019       POC Tests:   Recent Labs     05/13/19  1136   POCGLU 129*       Coags:   Lab Results   Component Value Date    PROTIME 14.6 05/10/2019    INR 1.1 05/10/2019       HCG (If Applicable): No results found for: PREGTESTUR, PREGSERUM, HCG, HCGQUANT     ABGs: No results found for: PHART, PO2ART, WWC3NMP, PKY2FSK, BEART, N9XHOGMY     Type & Screen (If Applicable):  No results found for: LABABO, [address]    Anesthesia Evaluation  Patient summary reviewed no history of anesthetic complications:   Airway: Mallampati: II  TM distance: >3 FB   Neck ROM: full  Mouth opening: > = 3 FB Dental: normal exam         Pulmonary:Negative Pulmonary ROS and normal exam  breath sounds clear to auscultation      (-) pneumonia, COPD, asthma, shortness of breath, recent URI, sleep apnea, rhonchi, wheezes, rales, stridor and not a current smoker                           Cardiovascular:Negative CV ROS  Exercise tolerance: good (>4 METS),       (-) pacemaker, hypertension, valvular problems/murmurs, past MI, CAD, CABG/stent, dysrhythmias,  angina,  CHF, orthopnea, PND,  JIMENEZ, murmur, weak pulses,  friction rub, systolic click, carotid bruit,  JVD

## 2019-05-13 NOTE — ANESTHESIA POSTPROCEDURE EVALUATION
POST- ANESTHESIA EVALUATION       Pt Name: Yesi Raya  MRN: [id number]  YOB: 1955  Date of evaluation: 5/13/2019  Time:  4:21 PM      BP (!) 164/61   Pulse 65   Temp 98.3 °F (36.8 °C) (Oral)   Resp 17   Ht 5' 1\" (1.549 m)   Wt 274 lb 11.1 oz (124.6 kg)   SpO2 91%   BMI 51.90 kg/m²      Consciousness Level  Awake  Cardiopulmonary Status  Stable  Pain Adequately Treated YES  Nausea / Vomiting  NO  Adequate Hydration  YES  Anesthesia Related Complications NONE      Electronically signed by Eliz Li MD on 5/13/2019 at 4:21 PM       POST- ANESTHESIA EVALUATION       Pt Name: Yesi Raya  MRN: [id number]  YOB: 1955  Date of evaluation: 5/13/2019  Time:  4:21 PM      BP (!) 164/61   Pulse 65   Temp 98.3 °F (36.8 °C) (Oral)   Resp 17   Ht 5' 1\" (1.549 m)   Wt 274 lb 11.1 oz (124.6 kg)   SpO2 91%   BMI 51.90 kg/m²      Consciousness Level  Awake  Cardiopulmonary Status  Stable  Pain Adequately Treated YES  Nausea / Vomiting  NO  Adequate Hydration  YES  Anesthesia Related Complications NONE      Electronically signed by Eliz Li MD on 5/13/2019 at 4:21 PM       Department of Anesthesiology  Postprocedure Note    Patient: Yesi Raya  MRN: [id number]  YOB: 1955  Date of evaluation: 5/13/2019  Time:  4:20 PM     Procedure Summary     Date:  05/13/19 Room / Location:  [address] [place] [address] [address] / [address]    Anesthesia Start:  1320 Anesthesia Stop:  0436    Procedure:  COLONOSCOPY DIAGNOSTIC, POLYPECTOMIES (N/A ) Diagnosis:  (ANEMIA GI BLEEDING)    Surgeon:  Blaze Tai MD Responsible Provider:  Eliz Li MD    Anesthesia Type:  MAC, general ASA Status:  3          Anesthesia Type: MAC, general    Jean Phase I: Jean Score: 10    Jean Phase II:      Last vitals: Reviewed and per EMR flowsheets.        Anesthesia Post Evaluation

## 2019-05-13 NOTE — PROGRESS NOTES
Antrum: normal     Duodenum:     Descending: normal    Bulb: normal     While withdrawing the scope the above findings were verified and the scope was removed. The patient has tolerated the procedure and conscious sedation without unusual events.            Recommendations/Plan:   1.    2. F/U In Office as instructed  3. Discussed with the family            The patient is a 70 y.o. Non-/non  female who presents    Quality:  Lightheadedness (has this frequently, every other day or so, [facility] walker at [facility] living center)  Severity:  Moderate  Onset quality:  Gradual  Timing:  Constant  Progression:  Improving  Chronicity:  Recurrent  Context: not with loss of consciousness    Context comment:  While walking, felt off balance, it comes and goes for her  Relieved by:  Nothing  Worsened by:  Nothing  Ineffective treatments:  None tried  Associated symptoms: headaches    Associated symptoms: no chest pain, no palpitations and no shortness of breath    fell today trying to sit in chair, hit her head and back       5/12   egd gastritis   hb 8.4   no abd pain        Past Medical History:     Past Medical History:   Diagnosis Date    Anemia     Cataract     GERD (gastroesophageal reflux disease)     Headache     Hyperlipidemia     Neuropathy     Osteoarthritis     Restless leg syndrome     Seizures ([address] since age 10   [facility] Short-term memory loss     Stroke (cerebrum) ([facility] [address] 2010    Type 2 diabetes mellitus without complication ([facility] [address] 1983        Past Surgical History:     Past Surgical History:   Procedure Laterality Date    CARDIAC CATHETERIZATION      CATARACT REMOVAL      CHOLECYSTECTOMY      EYE SURGERY      cataract    TONSILLECTOMY      UPPER GASTROINTESTINAL ENDOSCOPY N/A 5/12/2019    EGD ESOPHAGOGASTRODUODENOSCOPY performed by Tamara Johsnon MD at [address]        Medications Prior to Admission:     Prior to Admission medications    Medication Sig Start Date End Date Taking? 05/13/19  0803     --  142 142   K 5.3  --  4.1 3.8     --  106 104   CO2 26  --  26 24   GLUCOSE 242*  --  124* 145*   BUN 42*  --  28* 19   CREATININE 1.32*  --  0.95* 0.98*   MG 1.9  --   --   --    ANIONGAP 11  --  10 14   LABGLOM 40*  --  59* 57*   GFRAA 49*  --  >60 >60   CALCIUM 9.1  --  8.4* 8.6   TROPONINT NOT REPORTED NOT REPORTED  --   --      Recent Labs     05/10/19  1955  05/11/19  2010 05/12/19  0758 05/12/19  1057 05/12/19  1119 05/12/19  1614 05/12/19  2046   PROT 7.3  --   --   --   --   --   --   --    LABALBU 3.0*  --   --   --   --   --   --   --    AST 35*  --   --   --   --   --   --   --    ALT 22  --   --   --   --   --   --   --    ALKPHOS 109*  --   --   --   --   --   --   --    BILITOT 0.19*  --   --   --   --   --   --   --    POCGLU  --    < > 115* 125* 132* 135* 230* 231*    < > = values in this interval not displayed. Imaging/Diagnostics:       Xr Pelvis (1-2 Views)    Result Date: 5/10/2019  EXAMINATION: ONE XRAY VIEW OF THE PELVIS 5/10/2019 8:50 pm COMPARISON: None. HISTORY: ORDERING SYSTEM PROVIDED HISTORY: fall TECHNOLOGIST PROVIDED HISTORY: fall Ordering Physician Provided Reason for Exam: Hip pain Acuity: Acute Type of Exam: Initial Mechanism of Injury: Fall FINDINGS: Single view of the pelvis demonstrates limited detail due to patient's body habitus. There is no gross malalignment. Hip degenerative changes are noted bilaterally. There is no acute fracture     No acute abnormality. Xr Femur Left (min 2 Views)    Result Date: 5/7/2019  EXAMINATION: 2 XRAY VIEWS OF THE LEFT FEMUR 5/7/2019 12:58 pm COMPARISON: None. HISTORY: ORDERING SYSTEM PROVIDED HISTORY: fall TECHNOLOGIST PROVIDED HISTORY: fall FINDINGS: Two views of the left femur are submitted for review. No acute fracture or dislocation identified. Overlying soft tissues are unremarkable. Atherosclerotic calcifications are present within the overlying soft tissues.  Multiple small calcifications are present overlying the pelvis. No radiographic abnormality of the left femur. Ct Head Wo Contrast    Result Date: 5/10/2019  EXAMINATION: CT OF THE HEAD WITHOUT CONTRAST; CT OF THE CERVICAL SPINE WITHOUT CONTRAST; CT OF THE THORACIC SPINE WITHOUT CONTRAST; CT OF THE LUMBAR SPINE WITHOUT CONTRAST  5/10/2019 8:09 pm TECHNIQUE: CT of the head was performed without the administration of intravenous contrast. Dose modulation, iterative reconstruction, and/or weight based adjustment of the mA/kV was utilized to reduce the radiation dose to as low as reasonably achievable.; CT of the cervical spine was performed without the administration of intravenous contrast. Multiplanar reformatted images are provided for review. Dose modulation, iterative reconstruction, and/or weight based adjustment of the mA/kV was utilized to reduce the radiation dose to as low as reasonably achievable.; CT of the thoracic spine was performed without the administration of intravenous contrast. Multiplanar reformatted images are provided for review. Dose modulation, iterative reconstruction, and/or weight based adjustment of the mA/kV was utilized to reduce the radiation dose to as low as reasonably achievable.; CT of the lumbar spine was performed without the administration of intravenous contrast. Multiplanar reformatted images are provided for review. Dose modulation, iterative reconstruction, and/or weight based adjustment of the mA/kV was utilized to reduce the radiation dose to as low as reasonably achievable.  COMPARISON: CT head 11/30/2018, CT thoracic and lumbar spine 05/07/2019 HISTORY: ORDERING SYSTEM PROVIDED HISTORY: trauma TECHNOLOGIST PROVIDED HISTORY: Ordering Physician Provided Reason for Exam: fall, pt states she thinks she hit the back of her head, low and upper back pain Acuity: Acute Type of Exam: Initial; ORDERING SYSTEM PROVIDED HISTORY: C-SPINE TRAUMA, NEXUS/CCR POSITIVE TECHNOLOGIST PROVIDED HISTORY: Ordering MD Lizbeth   2 tablet at 05/13/19 0618    PARoxetine (PAXIL) tablet 20 mg  20 mg Oral QAANNABELLA Arthur MD   20 mg at 05/12/19 0928    pramipexole (MIRAPEX) tablet 1 mg  1 mg Oral Nightly Yuri Nieves MD   1 mg at 05/12/19 2038    simvastatin (ZOCOR) tablet 40 mg  40 mg Oral Nightly Moshe Arthur MD   40 mg at 05/12/19 2037    vitamin B-12 (CYANOCOBALAMIN) tablet 500 mcg  500 mcg Oral Daily Moshe Arthur MD   500 mcg at 05/12/19 0927    lisinopril (PRINIVIL;ZESTRIL) tablet 10 mg  10 mg Oral Daily Moshe Arthur MD   10 mg at 05/12/19 0927    hydrochlorothiazide (MICROZIDE) capsule 12.5 mg  12.5 mg Oral Daily Yuri Nieves MD   12.5 mg at 05/12/19 0927    glucose (GLUTOSE) 40 % oral gel 15 g  15 g Oral AVERY Arthur MD        dextrose 50 % solution 12.5 g  12.5 g Intravenous AVERY Arthur MD        glucagon (rDNA) injection 1 mg  1 mg Intramuscular AVERY Arthur MD        dextrose 5 % solution  100 mL/hr Intravenous MD Brayden Mancini MD  5/13/2019  10:57 AM

## 2019-05-13 NOTE — PROGRESS NOTES
Patient found on sitting on floor. Patient states she got up to go to bathroom and fell. Denies hitting head and states no new pain. VSS. No signs of distress noted. Patient helped back to bed with assistance of staff.

## 2019-05-14 LAB
GLUCOSE BLD-MCNC: 163 MG/DL (ref 65–105)
GLUCOSE BLD-MCNC: 213 MG/DL (ref 65–105)
GLUCOSE BLD-MCNC: 216 MG/DL (ref 65–105)
GLUCOSE BLD-MCNC: 252 MG/DL (ref 65–105)
HCT VFR BLD CALC: 24.8 % (ref 36–46)
HEMOGLOBIN: 7.5 G/DL (ref 12–16)
MCH RBC QN AUTO: 22.2 PG (ref 26–34)
MCHC RBC AUTO-ENTMCNC: 30.3 G/DL (ref 31–37)
MCV RBC AUTO: 73.3 FL (ref 80–100)
NRBC AUTOMATED: ABNORMAL PER 100 WBC
PDW BLD-RTO: 19 % (ref 11.5–14.9)
PLATELET # BLD: 135 K/UL (ref 150–450)
PMV BLD AUTO: 6.9 FL (ref 6–12)
RBC # BLD: 3.38 M/UL (ref 4–5.2)
SURGICAL PATHOLOGY REPORT: NORMAL
WBC # BLD: 4.1 K/UL (ref 3.5–11)

## 2019-05-14 PROCEDURE — 99232 SBSQ HOSP IP/OBS MODERATE 35: CPT | Performed by: INTERNAL MEDICINE

## 2019-05-14 PROCEDURE — 2580000003 HC RX 258: Performed by: INTERNAL MEDICINE

## 2019-05-14 PROCEDURE — 97162 PT EVAL MOD COMPLEX 30 MIN: CPT

## 2019-05-14 PROCEDURE — 6370000000 HC RX 637 (ALT 250 FOR IP): Performed by: INTERNAL MEDICINE

## 2019-05-14 PROCEDURE — 99239 HOSP IP/OBS DSCHRG MGMT >30: CPT | Performed by: INTERNAL MEDICINE

## 2019-05-14 PROCEDURE — 36415 COLL VENOUS BLD VENIPUNCTURE: CPT

## 2019-05-14 PROCEDURE — 97166 OT EVAL MOD COMPLEX 45 MIN: CPT

## 2019-05-14 PROCEDURE — 85027 COMPLETE CBC AUTOMATED: CPT

## 2019-05-14 PROCEDURE — 1200000000 HC SEMI PRIVATE

## 2019-05-14 PROCEDURE — 6360000002 HC RX W HCPCS: Performed by: INTERNAL MEDICINE

## 2019-05-14 PROCEDURE — 82947 ASSAY GLUCOSE BLOOD QUANT: CPT

## 2019-05-14 RX ADMIN — HYDROCHLOROTHIAZIDE 12.5 MG: 12.5 CAPSULE ORAL at 08:42

## 2019-05-14 RX ADMIN — METFORMIN HYDROCHLORIDE 1000 MG: 1000 TABLET ORAL at 17:30

## 2019-05-14 RX ADMIN — PAROXETINE HYDROCHLORIDE 20 MG: 20 TABLET, FILM COATED ORAL at 08:36

## 2019-05-14 RX ADMIN — LEVETIRACETAM 1250 MG: 750 TABLET ORAL at 08:33

## 2019-05-14 RX ADMIN — GABAPENTIN 100 MG: 100 CAPSULE ORAL at 22:09

## 2019-05-14 RX ADMIN — INSULIN LISPRO 1 UNITS: 100 INJECTION, SOLUTION INTRAVENOUS; SUBCUTANEOUS at 17:30

## 2019-05-14 RX ADMIN — BUSPIRONE HYDROCHLORIDE 10 MG: 10 TABLET ORAL at 08:34

## 2019-05-14 RX ADMIN — SODIUM CHLORIDE: 9 INJECTION, SOLUTION INTRAVENOUS at 11:55

## 2019-05-14 RX ADMIN — SIMVASTATIN 40 MG: 40 TABLET, FILM COATED ORAL at 22:09

## 2019-05-14 RX ADMIN — FUROSEMIDE 20 MG: 20 TABLET ORAL at 08:34

## 2019-05-14 RX ADMIN — INSULIN LISPRO 1 UNITS: 100 INJECTION, SOLUTION INTRAVENOUS; SUBCUTANEOUS at 22:08

## 2019-05-14 RX ADMIN — IRON SUCROSE 200 MG: 20 INJECTION, SOLUTION INTRAVENOUS at 13:09

## 2019-05-14 RX ADMIN — LAMOTRIGINE 200 MG: 100 TABLET ORAL at 08:36

## 2019-05-14 RX ADMIN — INSULIN GLARGINE 30 UNITS: 100 INJECTION, SOLUTION SUBCUTANEOUS at 22:08

## 2019-05-14 RX ADMIN — INSULIN GLARGINE 30 UNITS: 100 INJECTION, SOLUTION SUBCUTANEOUS at 08:30

## 2019-05-14 RX ADMIN — LEVETIRACETAM 1250 MG: 750 TABLET ORAL at 22:09

## 2019-05-14 RX ADMIN — GABAPENTIN 100 MG: 100 CAPSULE ORAL at 08:33

## 2019-05-14 RX ADMIN — LISINOPRIL 10 MG: 10 TABLET ORAL at 08:34

## 2019-05-14 RX ADMIN — BUSPIRONE HYDROCHLORIDE 10 MG: 10 TABLET ORAL at 22:09

## 2019-05-14 RX ADMIN — OXYCODONE HYDROCHLORIDE AND ACETAMINOPHEN 2 TABLET: 5; 325 TABLET ORAL at 05:31

## 2019-05-14 RX ADMIN — LAMOTRIGINE 200 MG: 100 TABLET ORAL at 22:09

## 2019-05-14 RX ADMIN — CYANOCOBALAMIN TAB 500 MCG 500 MCG: 500 TAB at 08:34

## 2019-05-14 RX ADMIN — METFORMIN HYDROCHLORIDE 1000 MG: 1000 TABLET ORAL at 08:34

## 2019-05-14 RX ADMIN — SODIUM CHLORIDE: 9 INJECTION, SOLUTION INTRAVENOUS at 05:32

## 2019-05-14 RX ADMIN — INSULIN LISPRO 2 UNITS: 100 INJECTION, SOLUTION INTRAVENOUS; SUBCUTANEOUS at 11:55

## 2019-05-14 RX ADMIN — BUSPIRONE HYDROCHLORIDE 10 MG: 10 TABLET ORAL at 14:26

## 2019-05-14 ASSESSMENT — PAIN SCALES - GENERAL
PAINLEVEL_OUTOF10: 0
PAINLEVEL_OUTOF10: 5
PAINLEVEL_OUTOF10: 9

## 2019-05-14 ASSESSMENT — PAIN - FUNCTIONAL ASSESSMENT: PAIN_FUNCTIONAL_ASSESSMENT: ACTIVITIES ARE NOT PREVENTED

## 2019-05-14 NOTE — DISCHARGE INSTR - COC
Continuity of Care Form    Patient Name: Candelaria iHnes   :  1955  MRN:  [id number]    Admit date:  5/10/2019  Discharge date:  05/15/2019    Code Status Order: Full Code   Advance Directives:   Advance Care Flowsheet Documentation     Date/Time Healthcare Directive Type of Healthcare Directive Copy in [address] Agent's Name Healthcare Agent's Phone Number    19 1234  No, patient does not have an advance directive for healthcare treatment -- -- -- -- --    19 0924  No, patient does not have an advance directive for healthcare treatment -- -- -- -- --          Admitting Physician:  Dora Monteiro MD  PCP: Aníbal Barillsa MD    Discharging Nurse: Leopoldo Reichert.   [address] Unit/Room#: 2046/2046-01  Discharging Unit Phone Number: [phone]    Emergency Contact:   Extended Emergency Contact Information  Primary Emergency Contact: [address] Phone: [phone]  Mobile Phone: [phone]  Relation: Other    Past Surgical History:  Past Surgical History:   Procedure Laterality Date    CARDIAC CATHETERIZATION      CATARACT REMOVAL      CHOLECYSTECTOMY      COLONOSCOPY N/A 2019    COLONOSCOPY DIAGNOSTIC, POLYPECTOMIES performed by Sylvie Weber MD at [address]      cataract    TONSILLECTOMY      UPPER GASTROINTESTINAL ENDOSCOPY N/A 2019    EGD ESOPHAGOGASTRODUODENOSCOPY performed by Sylvie Weber MD at [address]       Immunization History:   Immunization History   Administered Date(s) Administered    Influenza Virus Vaccine 2016    Influenza, Cherylynn Justice, 3 yrs and older, IM, PF (Fluzone 3 yrs and older or Afluria 5 yrs and older) 10/23/2017    Pneumococcal Polysaccharide ([id number]) 2015       Active Problems:  Patient Active Problem List   Diagnosis Code    Seizure disorder ([facility] [address] G40.909    Type 2 diabetes mellitus ([address] E11.9    Breakthrough seizure ([facility] [address]

## 2019-05-14 NOTE — PROGRESS NOTES
times  Hearing  Hearing: Within functional limits  Social/Functional History  Lives With: Alone  Type of Home: Assisted living  Home Layout: One level  Home Access: Level entry  Bathroom Shower/Tub: Walk-in shower, Curtain, Shower chair with back  [employer]: Handicap height  Bathroom Equipment: Grab bars in shower, Built-in shower seat, Hand-held shower, Shower chair  Bathroom Accessibility: Walker accessible  Home Equipment: 4 wheeled walker, Lift chair, Alert [employer] Help From: Personal care attendant  ADL Assistance: Needs assistance  Homemaking Assistance: Needs assistance(Assist for cleaning and meal prep )  Homemaking Responsibilities: No  Ambulation Assistance: Independent  Transfer Assistance: Independent(Using a lift [place] )  Active : No  Patient's  Info: facility provides transportation  Mode of Transportation: [facility]  Occupation: Retired  Leisure & Hobbies: wii Xt[employer]  Additional Comments: Pt has STM loss and struggles to anwer some questions       Objective      Cognition  Overall Cognitive Status: Exceptions  Following Commands: Follows one step commands with increased time, Follows one step commands with repetition  Attention Span: Difficulty dividing attention  Memory: Decreased recall of precautions  Safety Judgement: Decreased awareness of need for assistance, Decreased awareness of need for safety  Problem Solving: Assistance required to generate solutions, Assistance required to implement solutions  Insights: Decreased awareness of deficits  Initiation: Requires cues for some   Sensation  Overall Sensation Status: Impaired  Light Touch: Partial deficits in the RUE, Partial deficits in the LUE, Partial deficits in the RLE, Partial deficits in the LLE(Glove & Stocking numbness )   ADL  Grooming: Minimal assistance  UE Bathing: Minimal assistance  LE Bathing: Maximum assistance  UE Dressing:  Moderate assistance, Minimal assistance  LE Dressing: Maximum assistance,

## 2019-05-14 NOTE — DISCHARGE SUMMARY
[facility]    Discharge Summary     Patient ID: Pietro Mcgee  :  1955   MRN: [id number]     ACCOUNT:  [de-identified]   Patient's PCP: Андрей Collins MD  Admit Date: 5/10/2019   Discharge Date:  19    Length of Stay: 4  Code Status:  Full Code  Admitting Physician: Chrissy Rucker MD  Discharge Physician: Rosalie Gaona MD     Active Discharge Diagnoses:     Primary Problem  <principal problem not specified>      [place] Problems    Diagnosis Date Noted    GI bleed [K92.2] 05/10/2019       Admission Condition:  fair     Discharged Condition: good    Hospital Stay:     Hospital Course:  Pietro Mcgee is a 70 y.o. female who was admitted for the management of   .     , presented with Back Pain and Fall      ,                         <principal problem not specified>;                               Active Problems:    GI bleed  Resolved Problems:    * No resolved hospital problems. *       Significant therapeutic interventions:        19     · Had colonoscopy yesterday . DATE OF PROCEDURE: 2019     Beck Lopez MD     ASSISTANT: None     PREOPERATIVE Brigitte Newport, stool positive for occult blood.  Procedure performed to evaluate colonic lesions.       POSTOPERATIVE DIAGNOSIS: Multiple polyps.     OPERATION: Total colonoscopy and snare polypectomy ×3.     ANESTHESIA: MAC     ESTIMATED BLOOD LOSS: None     COMPLICATIONS: None      SPECIMENS:  Was Obtained: Polyp sigmoid colon, polyp left colon, polyp transverse colon.     HISTORY: The patient is a 72 y. o. year old female with history of above preop diagnosis.  I recommended colonoscopy with possible biopsy or polypectomy and I explained the risk, benefits, expected outcome, and alternatives to the procedure.  Risks included but are not limited to bleeding, infection, respiratory distress, hypotension, and perforation of the colon and day or so, uses walker at senior living [place])  Severity:  Moderate  Onset quality:  Gradual  Timing:  Constant  Progression:  Improving  Chronicity:  Recurrent  Context: not with loss of consciousness    Context comment:  While walking, felt off balance, it comes and goes for her  Relieved by:  Nothing  Worsened by:  Nothing  Ineffective treatments:  None tried  Associated symptoms: headaches    Associated symptoms: no chest pain, no palpitations and no shortness of breath    fell today trying to sit in chair, hit her head and back        5/12   egd gastritis   hb 8.4   no abd pain                   Significant Diagnostic Studies:   Labs / Micro:       Results for orders placed or performed during the hospital encounter of 05/10/19   CBC Auto Differential   Result Value Ref Range    WBC 5.2 3.5 - 11.0 k/uL    RBC 3.25 (L) 4.0 - 5.2 m/uL    Hemoglobin 7.1 (L) 12.0 - 16.0 g/dL    Hematocrit 23.4 (L) 36 - 46 %    MCV 72.0 (L) 80 - 100 fL    MCH 21.7 (L) 26 - 34 pg    MCHC 30.1 (L) 31 - 37 g/dL    RDW 18.2 (H) 11.5 - 14.9 %    Platelets 005 (L) 214 - 450 k/uL    MPV 6.8 6.0 - 12.0 fL    NRBC Automated NOT REPORTED per 100 WBC    Differential Type NOT REPORTED     Immature Granulocytes NOT REPORTED 0 %    Absolute Immature Granulocyte NOT REPORTED 0.00 - 0.30 k/uL    WBC Morphology NOT REPORTED     RBC Morphology NOT REPORTED     Platelet Estimate NOT REPORTED     Seg Neutrophils 72 (H) 36 - 66 %    Lymphocytes 18 (L) 24 - 44 %    Monocytes 7 1 - 7 %    Eosinophils % 2 0 - 4 %    Basophils 1 0 - 2 %    Segs Absolute 3.75 1.3 - 9.1 k/uL    Absolute Lymph # 0.94 (L) 1.0 - 4.8 k/uL    Absolute Mono # 0.36 0.1 - 1.3 k/uL    Absolute Eos # 0.10 0.0 - 0.4 k/uL    Basophils # 0.05 0.0 - 0.2 k/uL    Morphology BASOPHILIC STIPPLING PRESENT     Morphology HYPOCHROMIA PRESENT     Morphology ANISOCYTOSIS PRESENT     Morphology MICROCYTOSIS PRESENT    Comprehensive Metabolic Panel   Result Value Ref Range    Glucose 242 (H) 70 - 99 is no gross malalignment. Hip degenerative changes are noted bilaterally. There is no acute fracture     No acute abnormality. Xr Femur Left (min 2 Views)    Result Date: 5/7/2019  EXAMINATION: 2 XRAY VIEWS OF THE LEFT FEMUR 5/7/2019 12:58 pm COMPARISON: None. HISTORY: ORDERING SYSTEM PROVIDED HISTORY: fall TECHNOLOGIST PROVIDED HISTORY: fall FINDINGS: Two views of the left femur are submitted for review. No acute fracture or dislocation identified. Overlying soft tissues are unremarkable. Atherosclerotic calcifications are present within the overlying soft tissues. Multiple small calcifications are present overlying the pelvis. No radiographic abnormality of the left femur. Ct Head Wo Contrast    Result Date: 5/10/2019  EXAMINATION: CT OF THE HEAD WITHOUT CONTRAST; CT OF THE CERVICAL SPINE WITHOUT CONTRAST; CT OF THE THORACIC SPINE WITHOUT CONTRAST; CT OF THE LUMBAR SPINE WITHOUT CONTRAST  5/10/2019 8:09 pm TECHNIQUE: CT of the head was performed without the administration of intravenous contrast. Dose modulation, iterative reconstruction, and/or weight based adjustment of the mA/kV was utilized to reduce the radiation dose to as low as reasonably achievable.; CT of the cervical spine was performed without the administration of intravenous contrast. Multiplanar reformatted images are provided for review. Dose modulation, iterative reconstruction, and/or weight based adjustment of the mA/kV was utilized to reduce the radiation dose to as low as reasonably achievable.; CT of the thoracic spine was performed without the administration of intravenous contrast. Multiplanar reformatted images are provided for review.  Dose modulation, iterative reconstruction, and/or weight based adjustment of the mA/kV was utilized to reduce the radiation dose to as low as reasonably achievable.; CT of the lumbar spine was performed without the administration of intravenous contrast. Multiplanar reformatted images are vertebral body osteophytes. SOFT TISSUES: There is no prevertebral soft tissue swelling. CT THORACIC SPINE: BONES/ALIGNMENT: There is no evidence of an acute thoracic spine fracture. There is normal alignment of the thoracic spine. DEGENERATIVE CHANGES: Diffuse bridging thoracic osteophytosis. Moderate diffuse disc spaces narrowing with vacuum disc degenerative change in the midthoracic spine. SOFT TISSUES: There is no prevertebral soft tissue swelling. CT LUMBAR SPINE: BONES/ALIGNMENT: There is no evidence of an acute lumbar spine fracture. There is normal alignment of the lumbar spine. DEGENERATIVE CHANGES: Multilevel facet arthropathy. L2-L3 and L3-L4 vacuum disc degenerative change. Multilevel small vertebral body osteophytes. SOFT TISSUES: There is no prevertebral soft tissue swelling. No acute intracranial abnormality. No acute traumatic injury of the cervical, thoracic or lumbar spine. Moderate diffuse degenerative changes in the spine manifested mainly by multilevel bridging vertebral body osteophytes and scattered areas of degenerative disc disease. Ct Cervical Spine Wo Contrast    Result Date: 5/10/2019  EXAMINATION: CT OF THE HEAD WITHOUT CONTRAST; CT OF THE CERVICAL SPINE WITHOUT CONTRAST; CT OF THE THORACIC SPINE WITHOUT CONTRAST; CT OF THE LUMBAR SPINE WITHOUT CONTRAST  5/10/2019 8:09 pm TECHNIQUE: CT of the head was performed without the administration of intravenous contrast. Dose modulation, iterative reconstruction, and/or weight based adjustment of the mA/kV was utilized to reduce the radiation dose to as low as reasonably achievable.; CT of the cervical spine was performed without the administration of intravenous contrast. Multiplanar reformatted images are provided for review.  Dose modulation, iterative reconstruction, and/or weight based adjustment of the mA/kV was utilized to reduce the radiation dose to as low as reasonably achievable.; CT of the thoracic spine was performed without the administration of intravenous contrast. Multiplanar reformatted images are provided for review. Dose modulation, iterative reconstruction, and/or weight based adjustment of the mA/kV was utilized to reduce the radiation dose to as low as reasonably achievable.; CT of the lumbar spine was performed without the administration of intravenous contrast. Multiplanar reformatted images are provided for review. Dose modulation, iterative reconstruction, and/or weight based adjustment of the mA/kV was utilized to reduce the radiation dose to as low as reasonably achievable. COMPARISON: CT head 11/30/2018, CT thoracic and lumbar spine 05/07/2019 HISTORY: ORDERING SYSTEM PROVIDED HISTORY: trauma TECHNOLOGIST PROVIDED HISTORY: Ordering Physician Provided Reason for Exam: fall, pt states she thinks she hit the back of her head, low and upper back pain Acuity: Acute Type of Exam: Initial; ORDERING SYSTEM PROVIDED HISTORY: C-SPINE TRAUMA, NEXUS/CCR POSITIVE TECHNOLOGIST PROVIDED HISTORY: Ordering Physician Provided Reason for Exam: fall, pt states she thinks she hit the back of her head, low and upper back pain Acuity: Acute Type of Exam: Initial; ORDERING SYSTEM PROVIDED HISTORY: pain TECHNOLOGIST PROVIDED HISTORY: Ordering Physician Provided Reason for Exam: fall, pt states she thinks she hit the back of her head, low and upper back pain Acuity: Acute Type of Exam: Initial; ORDERING SYSTEM PROVIDED HISTORY: pain TECHNOLOGIST PROVIDED HISTORY: pain Ordering Physician Provided Reason for Exam: fall, pt states she thinks she hit the back of her head, low and upper back pain Acuity: Acute Type of Exam: Initial FINDINGS: CT HEAD: BRAIN/VENTRICLES: There is no acute intracranial hemorrhage, mass effect or midline shift. No abnormal extra-axial fluid collection. The gray-white differentiation is maintained without evidence of an acute infarct. There is no evidence of hydrocephalus.  ORBITS: The visualized portion of the reduce the radiation dose to as low as reasonably achievable.; CT of the thoracic spine was performed without the administration of intravenous contrast. Multiplanar reformatted images are provided for review. Dose modulation, iterative reconstruction, and/or weight based adjustment of the mA/kV was utilized to reduce the radiation dose to as low as reasonably achievable.; CT of the lumbar spine was performed without the administration of intravenous contrast. Multiplanar reformatted images are provided for review. Dose modulation, iterative reconstruction, and/or weight based adjustment of the mA/kV was utilized to reduce the radiation dose to as low as reasonably achievable. COMPARISON: CT head 11/30/2018, CT thoracic and lumbar spine 05/07/2019 HISTORY: ORDERING SYSTEM PROVIDED HISTORY: trauma TECHNOLOGIST PROVIDED HISTORY: Ordering Physician Provided Reason for Exam: fall, pt states she thinks she hit the back of her head, low and upper back pain Acuity: Acute Type of Exam: Initial; ORDERING SYSTEM PROVIDED HISTORY: C-SPINE TRAUMA, NEXUS/CCR POSITIVE TECHNOLOGIST PROVIDED HISTORY: Ordering Physician Provided Reason for Exam: fall, pt states she thinks she hit the back of her head, low and upper back pain Acuity: Acute Type of Exam: Initial; ORDERING SYSTEM PROVIDED HISTORY: pain TECHNOLOGIST PROVIDED HISTORY: Ordering Physician Provided Reason for Exam: fall, pt states she thinks she hit the back of her head, low and upper back pain Acuity: Acute Type of Exam: Initial; ORDERING SYSTEM PROVIDED HISTORY: pain TECHNOLOGIST PROVIDED HISTORY: pain Ordering Physician Provided Reason for Exam: fall, pt states she thinks she hit the back of her head, low and upper back pain Acuity: Acute Type of Exam: Initial FINDINGS: CT HEAD: BRAIN/VENTRICLES: There is no acute intracranial hemorrhage, mass effect or midline shift. No abnormal extra-axial fluid collection.   The gray-white differentiation is maintained without evidence of an acute infarct. There is no evidence of hydrocephalus. ORBITS: The visualized portion of the orbits demonstrate no acute abnormality. SINUSES: The visualized paranasal sinuses and mastoid air cells demonstrate no acute abnormality. SOFT TISSUES/SKULL:  No acute abnormality of the visualized skull or soft tissues. CT CERVICAL SPINE: BONES/ALIGNMENT: There is no evidence of an acute cervical spine fracture. There is normal alignment of the cervical spine. DEGENERATIVE CHANGES: Multilevel vertebral body osteophytes. SOFT TISSUES: There is no prevertebral soft tissue swelling. CT THORACIC SPINE: BONES/ALIGNMENT: There is no evidence of an acute thoracic spine fracture. There is normal alignment of the thoracic spine. DEGENERATIVE CHANGES: Diffuse bridging thoracic osteophytosis. Moderate diffuse disc spaces narrowing with vacuum disc degenerative change in the midthoracic spine. SOFT TISSUES: There is no prevertebral soft tissue swelling. CT LUMBAR SPINE: BONES/ALIGNMENT: There is no evidence of an acute lumbar spine fracture. There is normal alignment of the lumbar spine. DEGENERATIVE CHANGES: Multilevel facet arthropathy. L2-L3 and L3-L4 vacuum disc degenerative change. Multilevel small vertebral body osteophytes. SOFT TISSUES: There is no prevertebral soft tissue swelling. No acute intracranial abnormality. No acute traumatic injury of the cervical, thoracic or lumbar spine. Moderate diffuse degenerative changes in the spine manifested mainly by multilevel bridging vertebral body osteophytes and scattered areas of degenerative disc disease. Ct Lumbar Spine Wo Contrast    Result Date: 5/7/2019  EXAMINATION: CT OF THE LUMBAR SPINE WITHOUT CONTRAST  5/7/2019 TECHNIQUE: CT of the lumbar spine was performed without the administration of intravenous contrast. Multiplanar reformatted images are provided for review.  Dose modulation, iterative reconstruction, and/or weight based adjustment of the mA/kV was

## 2019-05-14 NOTE — CARE COORDINATION
ONGOING DISCHARGE PLAN:    Spoke with patient regarding discharge plan and patient confirms that plan is still to return to [facility] assisted living. Waiting on PT/OT evaluation to determine if patient has skilled needs. Venofer ordered for today. Will continue to follow for additional discharge needs.     Electronically signed by Santos Mai RN on 5/14/2019 at 12:05 PM

## 2019-05-14 NOTE — PROGRESS NOTES
(LANTUS) 100 UNIT/ML injection vial Inject 60 Units into the skin 2 times daily Patient states she is using the lantus pen    Historical Provider, MD        Allergies:     Codeine    Social History:     Tobacco:    reports that she has never smoked. She has never used smokeless tobacco.  Alcohol:      reports that she does not drink alcohol. Drug Use:  reports that she does not use drugs. Family History:     Family History   Problem Relation Age of Onset    Diabetes Brother        Review of Systems:     Positive and Negative as described in HPI. CONSTITUTIONAL:  negative for fevers, chills, sweats, fatigue, weight loss  HEENT:  negative for vision, hearing changes, runny nose, throat pain  RESPIRATORY:  negative for shortness of breath, cough, congestion, wheezing. CARDIOVASCULAR:  negative for chest pain, palpitations.   GASTROINTESTINAL:  negative for nausea, vomiting, diarrhea, constipation, change in bowel habits, abdominal pain   GENITOURINARY:  negative for difficulty of urination, burning with urination, frequency   INTEGUMENT:  negative for rash, skin lesions, easy bruising   HEMATOLOGIC/LYMPHATIC:  negative for swelling/edema   ALLERGIC/IMMUNOLOGIC:  negative for urticaria , itching  ENDOCRINE:  negative increase in drinking, increase in urination, hot or cold intolerance  MUSCULOSKELETAL:  negative joint pains, muscle aches, swelling of joints pos back pain  NEUROLOGICAL:  negative for headaches,pos   dizziness, lightheadedness, numbness, pain, tingling extremities  BEHAVIOR/PSYCH:  negative for depression, anxiety    Physical Exam:   BP (!) 124/40   Pulse 74   Temp 97.5 °F (36.4 °C) (Oral)   Resp 16   Ht 5' 1\" (1.549 m)   Wt 274 lb 11.1 oz (124.6 kg)   SpO2 90%   BMI 51.90 kg/m²   Recent Labs     05/13/19  1620 05/13/19  2236 05/14/19  0738 05/14/19  1124   POCGLU 156* 233* 163* 252*       General Appearance:  alert, well appearing, and in no acute distress, pos pallor  Mental status: HISTORY: ORDERING SYSTEM PROVIDED HISTORY: fall TECHNOLOGIST PROVIDED HISTORY: fall FINDINGS: Two views of the left femur are submitted for review. No acute fracture or dislocation identified. Overlying soft tissues are unremarkable. Atherosclerotic calcifications are present within the overlying soft tissues. Multiple small calcifications are present overlying the pelvis. No radiographic abnormality of the left femur. Ct Head Wo Contrast    Result Date: 5/10/2019  EXAMINATION: CT OF THE HEAD WITHOUT CONTRAST; CT OF THE CERVICAL SPINE WITHOUT CONTRAST; CT OF THE THORACIC SPINE WITHOUT CONTRAST; CT OF THE LUMBAR SPINE WITHOUT CONTRAST  5/10/2019 8:09 pm TECHNIQUE: CT of the head was performed without the administration of intravenous contrast. Dose modulation, iterative reconstruction, and/or weight based adjustment of the mA/kV was utilized to reduce the radiation dose to as low as reasonably achievable.; CT of the cervical spine was performed without the administration of intravenous contrast. Multiplanar reformatted images are provided for review. Dose modulation, iterative reconstruction, and/or weight based adjustment of the mA/kV was utilized to reduce the radiation dose to as low as reasonably achievable.; CT of the thoracic spine was performed without the administration of intravenous contrast. Multiplanar reformatted images are provided for review. Dose modulation, iterative reconstruction, and/or weight based adjustment of the mA/kV was utilized to reduce the radiation dose to as low as reasonably achievable.; CT of the lumbar spine was performed without the administration of intravenous contrast. Multiplanar reformatted images are provided for review. Dose modulation, iterative reconstruction, and/or weight based adjustment of the mA/kV was utilized to reduce the radiation dose to as low as reasonably achievable.  COMPARISON: CT head 11/30/2018, CT thoracic and lumbar spine 05/07/2019 HISTORY: achievable.; CT of the lumbar spine was performed without the administration of intravenous contrast. Multiplanar reformatted images are provided for review. Dose modulation, iterative reconstruction, and/or weight based adjustment of the mA/kV was utilized to reduce the radiation dose to as low as reasonably achievable. COMPARISON: CT head 11/30/2018, CT thoracic and lumbar spine 05/07/2019 HISTORY: ORDERING SYSTEM PROVIDED HISTORY: trauma TECHNOLOGIST PROVIDED HISTORY: Ordering Physician Provided Reason for Exam: fall, pt states she thinks she hit the back of her head, low and upper back pain Acuity: Acute Type of Exam: Initial; ORDERING SYSTEM PROVIDED HISTORY: C-SPINE TRAUMA, NEXUS/CCR POSITIVE TECHNOLOGIST PROVIDED HISTORY: Ordering Physician Provided Reason for Exam: fall, pt states she thinks she hit the back of her head, low and upper back pain Acuity: Acute Type of Exam: Initial; ORDERING SYSTEM PROVIDED HISTORY: pain TECHNOLOGIST PROVIDED HISTORY: Ordering Physician Provided Reason for Exam: fall, pt states she thinks she hit the back of her head, low and upper back pain Acuity: Acute Type of Exam: Initial; ORDERING SYSTEM PROVIDED HISTORY: pain TECHNOLOGIST PROVIDED HISTORY: pain Ordering Physician Provided Reason for Exam: fall, pt states she thinks she hit the back of her head, low and upper back pain Acuity: Acute Type of Exam: Initial FINDINGS: CT HEAD: BRAIN/VENTRICLES: There is no acute intracranial hemorrhage, mass effect or midline shift. No abnormal extra-axial fluid collection. The gray-white differentiation is maintained without evidence of an acute infarct. There is no evidence of hydrocephalus. ORBITS: The visualized portion of the orbits demonstrate no acute abnormality. SINUSES: The visualized paranasal sinuses and mastoid air cells demonstrate no acute abnormality. SOFT TISSUES/SKULL:  No acute abnormality of the visualized skull or soft tissues.  CT CERVICAL SPINE: BONES/ALIGNMENT: There weight based adjustment of the mA/kV was utilized to reduce the radiation dose to as low as reasonably achievable. COMPARISON: Thoracic radiographs December 13, 2017 HISTORY: ORDERING SYSTEM PROVIDED HISTORY: fall TECHNOLOGIST PROVIDED HISTORY: Ordering Physician Provided Reason for Exam: FALL Acuity: Acute Type of Exam: Initial Mechanism of Injury: PT STATES SHE FELL THIS MORNING, C/O BACK PAIN FINDINGS: BONES/ALIGNMENT: The vertebral body heights are maintained. No discrete fracture identified. Slight thoracic curvature to the right is again noted. DEGENERATIVE CHANGES: Multilevel anterior hypertrophic changes are noted in the cervical and thoracic spine. No osseous central canal encroachment. SOFT TISSUES: No soft tissue hematoma. The visualized lung fields reveal no acute findings. The visualized upper abdominal soft tissue reveals no discrete abnormality. No acute osseous abnormality identified. Ct Lumbar Spine Wo Contrast    Result Date: 5/10/2019  EXAMINATION: CT OF THE HEAD WITHOUT CONTRAST; CT OF THE CERVICAL SPINE WITHOUT CONTRAST; CT OF THE THORACIC SPINE WITHOUT CONTRAST; CT OF THE LUMBAR SPINE WITHOUT CONTRAST  5/10/2019 8:09 pm TECHNIQUE: CT of the head was performed without the administration of intravenous contrast. Dose modulation, iterative reconstruction, and/or weight based adjustment of the mA/kV was utilized to reduce the radiation dose to as low as reasonably achievable.; CT of the cervical spine was performed without the administration of intravenous contrast. Multiplanar reformatted images are provided for review. Dose modulation, iterative reconstruction, and/or weight based adjustment of the mA/kV was utilized to reduce the radiation dose to as low as reasonably achievable.; CT of the thoracic spine was performed without the administration of intravenous contrast. Multiplanar reformatted images are provided for review.  Dose modulation, iterative reconstruction, and/or weight based adjustment of the mA/kV was utilized to reduce the radiation dose to as low as reasonably achievable.; CT of the lumbar spine was performed without the administration of intravenous contrast. Multiplanar reformatted images are provided for review. Dose modulation, iterative reconstruction, and/or weight based adjustment of the mA/kV was utilized to reduce the radiation dose to as low as reasonably achievable. COMPARISON: CT head 11/30/2018, CT thoracic and lumbar spine 05/07/2019 HISTORY: ORDERING SYSTEM PROVIDED HISTORY: trauma TECHNOLOGIST PROVIDED HISTORY: Ordering Physician Provided Reason for Exam: fall, pt states she thinks she hit the back of her head, low and upper back pain Acuity: Acute Type of Exam: Initial; ORDERING SYSTEM PROVIDED HISTORY: C-SPINE TRAUMA, NEXUS/CCR POSITIVE TECHNOLOGIST PROVIDED HISTORY: Ordering Physician Provided Reason for Exam: fall, pt states she thinks she hit the back of her head, low and upper back pain Acuity: Acute Type of Exam: Initial; ORDERING SYSTEM PROVIDED HISTORY: pain TECHNOLOGIST PROVIDED HISTORY: Ordering Physician Provided Reason for Exam: fall, pt states she thinks she hit the back of her head, low and upper back pain Acuity: Acute Type of Exam: Initial; ORDERING SYSTEM PROVIDED HISTORY: pain TECHNOLOGIST PROVIDED HISTORY: pain Ordering Physician Provided Reason for Exam: fall, pt states she thinks she hit the back of her head, low and upper back pain Acuity: Acute Type of Exam: Initial FINDINGS: CT HEAD: BRAIN/VENTRICLES: There is no acute intracranial hemorrhage, mass effect or midline shift. No abnormal extra-axial fluid collection. The gray-white differentiation is maintained without evidence of an acute infarct. There is no evidence of hydrocephalus. ORBITS: The visualized portion of the orbits demonstrate no acute abnormality. SINUSES: The visualized paranasal sinuses and mastoid air cells demonstrate no acute abnormality.  SOFT TISSUES/SKULL:  No Acuity: Acute Type of Exam: Initial Mechanism of Injury: PT STATES SHE FELL THIS MORNING, C/O BACK PAIN FINDINGS: BONES/ALIGNMENT: The vertebral body heights are maintained. There is a hemangioma in the L5 vertebral body. The facet joints are aligned. There is no spondylolisthesis. There is no acute fracture. DEGENERATIVE CHANGES: There is mild multilevel degenerative disc disease. There is multilevel degenerative facet hypertrophy. There is no significant bony canal stenosis. There is right-sided foraminal narrowing at the L3-4 level. There is left-sided foraminal narrowing at L2-3, L3-4, L4-5, and L5-S1. SOFT TISSUES/RETROPERITONEUM: No paraspinal mass is seen. No acute fracture or malalignment of the lumbar spine. Multilevel degenerative disc disease and degenerative facet hypertrophy with bony foraminal narrowing bilaterally as described above. Xr Chest Portable    Result Date: 5/10/2019  EXAMINATION: ONE XRAY VIEW OF THE CHEST 5/10/2019 8:50 pm COMPARISON: November 2018 HISTORY: ORDERING SYSTEM PROVIDED HISTORY: fall TECHNOLOGIST PROVIDED HISTORY: fall Acuity: Acute Type of Exam: Initial Mechanism of Injury: Fall FINDINGS: Heart size is enlarged. There is no lung consolidation. Elevation of the right hemidiaphragm is noted. There is no effusion or pneumothorax     No acute abnormality            Impressions :      1. Active Problems:    GI bleed  Resolved Problems:    * No resolved hospital problems. *        2.  has a past medical history of Anemia, Cataract, GERD (gastroesophageal reflux disease), Headache, Hyperlipidemia, Neuropathy, Osteoarthritis, Restless leg syndrome, Seizures ([address] (since age 10), Short-term memory loss, Stroke (cerebrum) ([address] (2010), and Type 2 diabetes mellitus without complication ([address] (0946).            dizzyness from anemia    From upper gi bleed   hb 7   will transfuse     ppi gtt     prbc x 1     h/o colon ca father     will need colonoscopy also      back pain   xtays noted     no nsaid use      bp stable   Plans:     1.  ppi gtt   egd/ colo   prbc   home meds    Cbc daily       5/12   hb 8.4   egd gastritis   no active bleeding     Colonoscopy in am   on ppi     5/13/19  colonocsopy today .      Current Facility-Administered Medications   Medication Dose Route Frequency Provider Last Rate Last Dose    iron sucrose (VENOFER) 200 mg in sodium chloride 0.9 % 100 mL IVPB  200 mg Intravenous Once Patricia De La Fuente MD        sodium chloride flush 0.9 % injection 10 mL  10 mL Intravenous 2 times per day Patricia De La Fuente MD        sodium chloride flush 0.9 % injection 10 mL  10 mL Intravenous PRN Patricia De La Fuente MD        acetaminophen (TYLENOL) tablet 650 mg  650 mg Oral Q4H PRN Patricia De La Fuente MD        0.9 % sodium chloride infusion   Intravenous Continuous Patricia De La Fuente  mL/hr at 05/14/19 1155      0.9 % sodium chloride bolus  250 mL Intravenous Once Patricia De La Fuente MD        insulin lispro (HUMALOG) injection vial 0-6 Units  0-6 Units Subcutaneous TID WC Patricia De La Fuente MD   2 Units at 05/14/19 1155    insulin lispro (HUMALOG) injection vial 0-3 Units  0-3 Units Subcutaneous Nightly Patricia De La Fuente MD   1 Units at 05/13/19 2336    acetaminophen (TYLENOL) tablet 650 mg  650 mg Oral TID PRN Patricia De La Fuente MD        busPIRone (BUSPAR) tablet 10 mg  10 mg Oral TID Patricia De La Fuente MD   10 mg at 05/14/19 0834    furosemide (LASIX) tablet 20 mg  20 mg Oral Daily Yuri Nieves MD   20 mg at 05/14/19 0834    gabapentin (NEURONTIN) capsule 100 mg  100 mg Oral BID Patricia De La Fuente MD   100 mg at 05/14/19 0833    lamoTRIgine (LAMICTAL) tablet 200 mg  200 mg Oral BID Patricia De La Fuente MD   200 mg at 05/14/19 0836    insulin glargine (LANTUS) injection vial 30 Units  30 Units Subcutaneous BID Patricia De La Fuente MD   30 Units at 05/14/19 0830    levETIRAcetam (KEPPRA) tablet 1,250 mg  1,250 mg Oral BID Ilda Lara MD   1,250 mg at 05/14/19 2927    loperamide (IMODIUM) capsule 4 mg  4 mg Oral Daily PRN Ilda Lara MD        loperamide (IMODIUM) capsule 2 mg  2 mg Oral 4x Daily PRN Ilda Lara MD        metFORMIN (GLUCOPHAGE) tablet 1,000 mg  1,000 mg Oral BID WC Ilda Lara MD   1,000 mg at 05/14/19 0834    oxyCODONE-acetaminophen (PERCOCET) 5-325 MG per tablet 2 tablet  2 tablet Oral Q6H PRN Ilda Lara MD   2 tablet at 05/14/19 0531    PARoxetine (PAXIL) tablet 20 mg  20 mg Oral QAM Yuri Nieves MD   20 mg at 05/14/19 0836    pramipexole (MIRAPEX) tablet 1 mg  1 mg Oral Nightly Ilda Lara MD   1 mg at 05/13/19 2335    simvastatin (ZOCOR) tablet 40 mg  40 mg Oral Nightly Ilda Lara MD   40 mg at 05/13/19 2333    vitamin B-12 (CYANOCOBALAMIN) tablet 500 mcg  500 mcg Oral Daily Ilda Lara MD   500 mcg at 05/14/19 0834    lisinopril (PRINIVIL;ZESTRIL) tablet 10 mg  10 mg Oral Daily Ilda Lara MD   10 mg at 05/14/19 0834    hydrochlorothiazide (MICROZIDE) capsule 12.5 mg  12.5 mg Oral Daily Yuri Nieves MD   12.5 mg at 05/14/19 0842    glucose (GLUTOSE) 40 % oral gel 15 g  15 g Oral PRN Ilda Lara MD        dextrose 50 % solution 12.5 g  12.5 g Intravenous PRN Ilda Lara MD        glucagon (rDNA) injection 1 mg  1 mg Intramuscular PRN Ilda Lara MD        dextrose 5 % solution  100 mL/hr Intravenous PRN Ilda Lara MD            Home today .      Lanny Charles MD  5/14/2019  12:06 PM

## 2019-05-15 VITALS
OXYGEN SATURATION: 98 % | HEIGHT: 61 IN | SYSTOLIC BLOOD PRESSURE: 180 MMHG | BODY MASS INDEX: 51.86 KG/M2 | DIASTOLIC BLOOD PRESSURE: 68 MMHG | WEIGHT: 274.69 LBS | HEART RATE: 95 BPM | TEMPERATURE: 98.5 F | RESPIRATION RATE: 14 BRPM

## 2019-05-15 LAB
ABSOLUTE BANDS #: 0.12 K/UL (ref 0–1)
ABSOLUTE EOS #: 0 K/UL (ref 0–0.4)
ABSOLUTE IMMATURE GRANULOCYTE: ABNORMAL K/UL (ref 0–0.3)
ABSOLUTE LYMPH #: 0.58 K/UL (ref 1–4.8)
ABSOLUTE MONO #: 0.29 K/UL (ref 0.1–1.3)
BANDS: 2 % (ref 0–10)
BASOPHILS # BLD: 0 % (ref 0–2)
BASOPHILS ABSOLUTE: 0 K/UL (ref 0–0.2)
DIFFERENTIAL TYPE: ABNORMAL
EOSINOPHILS RELATIVE PERCENT: 0 % (ref 0–4)
GLUCOSE BLD-MCNC: 142 MG/DL (ref 65–105)
GLUCOSE BLD-MCNC: 198 MG/DL (ref 65–105)
HCT VFR BLD CALC: 27.1 % (ref 36–46)
HEMOGLOBIN: 8.3 G/DL (ref 12–16)
IMMATURE GRANULOCYTES: ABNORMAL %
LYMPHOCYTES # BLD: 10 % (ref 24–44)
MCH RBC QN AUTO: 22.4 PG (ref 26–34)
MCHC RBC AUTO-ENTMCNC: 30.5 G/DL (ref 31–37)
MCV RBC AUTO: 73.4 FL (ref 80–100)
MONOCYTES # BLD: 5 % (ref 1–7)
MORPHOLOGY: ABNORMAL
NRBC AUTOMATED: ABNORMAL PER 100 WBC
NUCLEATED RED BLOOD CELLS: 1 PER 100 WBC
PDW BLD-RTO: 19.4 % (ref 11.5–14.9)
PLATELET # BLD: 164 K/UL (ref 150–450)
PLATELET ESTIMATE: ABNORMAL
PMV BLD AUTO: 7 FL (ref 6–12)
RBC # BLD: 3.69 M/UL (ref 4–5.2)
RBC # BLD: ABNORMAL 10*6/UL
SEG NEUTROPHILS: 83 % (ref 36–66)
SEGMENTED NEUTROPHILS ABSOLUTE COUNT: 4.85 K/UL (ref 1.3–9.1)
WBC # BLD: 5.8 K/UL (ref 3.5–11)
WBC # BLD: ABNORMAL 10*3/UL

## 2019-05-15 PROCEDURE — 99232 SBSQ HOSP IP/OBS MODERATE 35: CPT | Performed by: INTERNAL MEDICINE

## 2019-05-15 PROCEDURE — 2580000003 HC RX 258: Performed by: INTERNAL MEDICINE

## 2019-05-15 PROCEDURE — 6370000000 HC RX 637 (ALT 250 FOR IP): Performed by: INTERNAL MEDICINE

## 2019-05-15 PROCEDURE — 82947 ASSAY GLUCOSE BLOOD QUANT: CPT

## 2019-05-15 PROCEDURE — 36415 COLL VENOUS BLD VENIPUNCTURE: CPT

## 2019-05-15 PROCEDURE — 85025 COMPLETE CBC W/AUTO DIFF WBC: CPT

## 2019-05-15 RX ORDER — OXYCODONE HYDROCHLORIDE AND ACETAMINOPHEN 5; 325 MG/1; MG/1
1 TABLET ORAL EVERY 6 HOURS PRN
Qty: 8 TABLET | Refills: 0 | Status: SHIPPED | OUTPATIENT
Start: 2019-05-15 | End: 2019-05-17

## 2019-05-15 RX ADMIN — Medication 10 ML: at 09:19

## 2019-05-15 RX ADMIN — METFORMIN HYDROCHLORIDE 1000 MG: 1000 TABLET ORAL at 09:18

## 2019-05-15 RX ADMIN — LEVETIRACETAM 1250 MG: 750 TABLET ORAL at 09:19

## 2019-05-15 RX ADMIN — LAMOTRIGINE 200 MG: 100 TABLET ORAL at 09:20

## 2019-05-15 RX ADMIN — GABAPENTIN 100 MG: 100 CAPSULE ORAL at 09:19

## 2019-05-15 RX ADMIN — BUSPIRONE HYDROCHLORIDE 10 MG: 10 TABLET ORAL at 09:20

## 2019-05-15 RX ADMIN — INSULIN GLARGINE 30 UNITS: 100 INJECTION, SOLUTION SUBCUTANEOUS at 09:18

## 2019-05-15 RX ADMIN — FUROSEMIDE 20 MG: 20 TABLET ORAL at 09:19

## 2019-05-15 RX ADMIN — HYDROCHLOROTHIAZIDE 12.5 MG: 12.5 CAPSULE ORAL at 09:19

## 2019-05-15 RX ADMIN — BUSPIRONE HYDROCHLORIDE 10 MG: 10 TABLET ORAL at 13:58

## 2019-05-15 RX ADMIN — PAROXETINE HYDROCHLORIDE 20 MG: 20 TABLET, FILM COATED ORAL at 09:19

## 2019-05-15 RX ADMIN — CYANOCOBALAMIN TAB 500 MCG 500 MCG: 500 TAB at 09:19

## 2019-05-15 RX ADMIN — LISINOPRIL 10 MG: 10 TABLET ORAL at 09:19

## 2019-05-15 ASSESSMENT — PAIN SCALES - GENERAL: PAINLEVEL_OUTOF10: 7

## 2019-05-15 NOTE — PROGRESS NOTES
History and Physical Service  [facility]              Date:   5/15/2019  Patient name:  Man Gallardo  MRN:   [id number]  Account:  [de-identified]  YOB: 1955  PCP:    Ghanshyam Solano MD  Code Status:    Full Code    Chief Complaint:     Chief Complaint   Patient presents with    Back Pain    Fall         History Obtained From:     patient    History of Present Illness:      5/15/19      Patient examined and chart reviewed . Nursing input obtained . Case management PT , OT input noted  [x]       IVA sent referral to [facility] this morning. Pre-cert was started this afternoon with therapy note. IVA spoke to pt who at first said she will return to Assisted Living but then said she will go to the skilled unit. IVA did speak to Starr Patel at the Assisted Living unit. She said there is not a nurse there after seven and if pt returns it would have to be before 700 pm. IVA asked Sera Rapp if pt could come under her medicaid benefit as there was a discharge order. Sera Rapp did call [employer] and was told this can only happen if insurance denies skilled care first. IVA started 7000 in [address]. Patient is progressing well . Her hemoglobin has improved to 8.3   She received 400 mg of Venofer yesterday            Over nite [] yes     [x] NO   ssues   with         appetite , bowels , sleep and / or pain control                                           Active Problems:    GI bleed    Polyp of colon  Resolved Problems:    * No resolved hospital problems. *             Interval  Status:                Symtoms   ;  [] persist , []  somewhat better ,   [x]  improved,  [x]  resolved ,   [] worse ---      As compared to yesterday ;    Clinical status ; clinical course ailmentcourse ;                                   are improving over time. Other inputs noted - in brief ;                  5/14/19    · Had colonoscopy yesterday .    DATE OF PROCEDURE: 5/13/2019 pylorus up to the second part of duodenum.       Findings:     Retropharyngeal area was grossly normal appearing     Esophagus: normal     Stomach:    Fundus and Cardia Examined in Retroflexed View: normal    Body: normal, patient has minimal inflammation in the stomach. No erosions, ulcer crater seen.          Antrum: normal     Duodenum:     Descending: normal    Bulb: normal     While withdrawing the scope the above findings were verified and the scope was removed. The patient has tolerated the procedure and conscious sedation without unusual events.            Recommendations/Plan:   1.    2. F/U In Office as instructed  3. Discussed with the family            The patient is a 70 y.o.   Non-/non  female who presents    Quality:  Lightheadedness (has this frequently, every other day or so, uses walker at senior living [place])  Severity:  Moderate  Onset quality:  Gradual  Timing:  Constant  Progression:  Improving  Chronicity:  Recurrent  Context: not with loss of consciousness    Context comment:  While walking, felt off balance, it comes and goes for her  Relieved by:  Nothing  Worsened by:  Nothing  Ineffective treatments:  None tried  Associated symptoms: headaches    Associated symptoms: no chest pain, no palpitations and no shortness of breath    fell today trying to sit in chair, hit her head and back       5/12   egd gastritis   hb 8.4   no abd pain        Past Medical History:     Past Medical History:   Diagnosis Date    Anemia     Cataract     GERD (gastroesophageal reflux disease)     Headache     Hyperlipidemia     Neuropathy     Osteoarthritis     Restless leg syndrome     Seizures ([address] since age 10   [employer] Short-term memory loss     Stroke (cerebrum) ([address] 2010    Type 2 diabetes mellitus without complication ([address] 5209        Past Surgical History:     Past Surgical History:   Procedure Laterality Date    CARDIAC CATHETERIZATION      CATARACT REMOVAL      CHOLECYSTECTOMY Take 2 mg by mouth 4 times daily as needed for Diarrhea (after initial 4 mg dose)    Historical Provider, MD   acetaminophen (TYLENOL) 325 MG tablet Take 650 mg by mouth 3 times daily as needed for Pain (mild)    Historical Provider, MD   metFORMIN (GLUCOPHAGE) 1000 MG tablet Take 1,000 mg by mouth 2 times daily (with meals)    Historical Provider, MD   Blood Glucose Monitoring Suppl FLAVIO Check blood sugars 3/day 11/30/17   Hazel Perla MD   Glucose Blood (BLOOD GLUCOSE TEST STRIPS) STRP Test 3  times daily Insulin Dependent mellitus 11/30/17   Hazel Perla MD   Lancets MISC Check 3/day 11/30/17   Hazel Perla MD   lisinopril-hydrochlorothiazide (PRINZIDE;ZESTORETIC) 10-12.5 MG per tablet Take 1 tablet by mouth daily    Historical Provider, MD   PARoxetine (PAXIL) 20 MG tablet Take 20 mg by mouth every morning    Historical Provider, MD   omeprazole (PRILOSEC) 40 MG delayed release capsule Take 40 mg by mouth Daily     Historical Provider, MD   loratadine (CLARITIN) 10 MG tablet Take 10 mg by mouth daily    Historical Provider, MD   simvastatin (ZOCOR) 40 MG tablet Take 40 mg by mouth nightly    Historical Provider, MD   pramipexole (MIRAPEX) 1 MG tablet Take 1 mg by mouth nightly    Historical Provider, MD   lamoTRIgine (LAMICTAL) 200 MG tablet Take 200 mg by mouth 2 times daily    Historical Provider, MD   busPIRone (BUSPAR) 5 MG tablet Take 10 mg by mouth 3 times daily    Historical Provider, MD   insulin glargine (LANTUS) 100 UNIT/ML injection vial Inject 60 Units into the skin 2 times daily Patient states she is using the lantus pen    Historical Provider, MD        Allergies:     Codeine    Social History:     Tobacco:    reports that she has never smoked. She has never used smokeless tobacco.  Alcohol:      reports that she does not drink alcohol. Drug Use:  reports that she does not use drugs.     Family History:     Family History   Problem Relation Age of Onset    Diabetes Brother Review of Systems:     Positive and Negative as described in HPI. CONSTITUTIONAL:  negative for fevers, chills, sweats, fatigue, weight loss  HEENT:  negative for vision, hearing changes, runny nose, throat pain  RESPIRATORY:  negative for shortness of breath, cough, congestion, wheezing. CARDIOVASCULAR:  negative for chest pain, palpitations. GASTROINTESTINAL:  negative for nausea, vomiting, diarrhea, constipation, change in bowel habits, abdominal pain   GENITOURINARY:  negative for difficulty of urination, burning with urination, frequency   INTEGUMENT:  negative for rash, skin lesions, easy bruising   HEMATOLOGIC/LYMPHATIC:  negative for swelling/edema   ALLERGIC/IMMUNOLOGIC:  negative for urticaria , itching  ENDOCRINE:  negative increase in drinking, increase in urination, hot or cold intolerance  MUSCULOSKELETAL:  negative joint pains, muscle aches, swelling of joints pos back pain  NEUROLOGICAL:  negative for headaches,pos   dizziness, lightheadedness, numbness, pain, tingling extremities  BEHAVIOR/PSYCH:  negative for depression, anxiety    Physical Exam:   BP (!) 171/55   Pulse 97   Temp 98.1 °F (36.7 °C) (Oral)   Resp 14   Ht 5' 1\" (1.549 m)   Wt 274 lb 11.1 oz (124.6 kg)   SpO2 97%   BMI 51.90 kg/m²   Recent Labs     05/14/19  1548 05/14/19  2040 05/15/19  0632 05/15/19  1106   POCGLU 213* 216* 142* 198*       General Appearance:  alert, well appearing, and in no acute distress, pos pallor  Mental status: oriented to person, place, and time with normal affect  Head:  normocephalic, atraumatic. Eye: no icterus, redness, pupils equal and reactive, extraocular eye movements intact, conjunctiva clear  Mouth: mucous membranes moist  Neck: supple, no carotid bruits, thyroid not palpable  Lungs: Bilateral equal air entry, clear to ausculation, no wheezing, rales or rhonchi, normal effort  Cardiovascular: normal rate, regular rhythm, no murmur, gallop, rub.   Abdomen: Soft,epigastric Absolute Bands # 0.12 0.0 - 1.0 k/uL    Morphology ANISOCYTOSIS PRESENT     Morphology HYPOCHROMIA PRESENT     Morphology MICROCYTOSIS PRESENT    POC Glucose Fingerstick    Collection Time: 05/15/19 11:06 AM   Result Value Ref Range    POC Glucose 198 (H) 65 - 105 mg/dL       Recent Labs     05/15/19  0758  05/13/19  0803  05/10/19  1955   HGB 8.3*   < > 8.4*   < > 7.1*   HCT 27.1*   < > 28.0*   < > 23.4*   WBC 5.8   < > 5.3   < > 5.2   MCV 73.4*   < > 73.1*   < > 72.0*   NA  --   --  142   < > 140   K  --   --  3.8   < > 5.3   CL  --   --  104   < > 103   CO2  --   --  24   < > 26   BUN  --   --  19   < > 42*   CREATININE  --   --  0.98*   < > 1.32*   GLUCOSE  --   --  145*   < > 242*   INR  --   --   --   --  1.1   PROTIME  --   --   --   --  14.6   AST  --   --   --   --  35*   ALT  --   --   --   --  22   LABALBU  --   --   --   --  3.0*    < > = values in this interval not displayed. Hematology:  Recent Labs     05/13/19  0803 05/14/19  0644 05/15/19  0758   WBC 5.3 4.1 5.8   RBC 3.83* 3.38* 3.69*   HGB 8.4* 7.5* 8.3*   HCT 28.0* 24.8* 27.1*   MCV 73.1* 73.3* 73.4*   MCH 22.0* 22.2* 22.4*   MCHC 30.1* 30.3* 30.5*   RDW 19.5* 19.0* 19.4*    135* 164   MPV 6.9 6.9 7.0     Chemistry:  Recent Labs     05/13/19  0803      K 3.8      CO2 24   GLUCOSE 145*   BUN 19   CREATININE 0.98*   ANIONGAP 14   LABGLOM 57*   GFRAA >60   CALCIUM 8.6     Recent Labs     05/14/19  0738 05/14/19  1124 05/14/19  1548 05/14/19  2040 05/15/19  0632 05/15/19  1106   POCGLU 163* 252* 213* 216* 142* 198*       Imaging/Diagnostics:       Xr Pelvis (1-2 Views)    Result Date: 5/10/2019  EXAMINATION: ONE XRAY VIEW OF THE PELVIS 5/10/2019 8:50 pm COMPARISON: None.  HISTORY: ORDERING SYSTEM PROVIDED HISTORY: fall TECHNOLOGIST PROVIDED HISTORY: fall Ordering Physician Provided Reason for Exam: Hip pain Acuity: Acute Type of Exam: Initial Mechanism of Injury: Fall FINDINGS: Single view of the pelvis demonstrates limited cervical spine. DEGENERATIVE CHANGES: Multilevel vertebral body osteophytes. SOFT TISSUES: There is no prevertebral soft tissue swelling. CT THORACIC SPINE: BONES/ALIGNMENT: There is no evidence of an acute thoracic spine fracture. There is normal alignment of the thoracic spine. DEGENERATIVE CHANGES: Diffuse bridging thoracic osteophytosis. Moderate diffuse disc spaces narrowing with vacuum disc degenerative change in the midthoracic spine. SOFT TISSUES: There is no prevertebral soft tissue swelling. CT LUMBAR SPINE: BONES/ALIGNMENT: There is no evidence of an acute lumbar spine fracture. There is normal alignment of the lumbar spine. DEGENERATIVE CHANGES: Multilevel facet arthropathy. L2-L3 and L3-L4 vacuum disc degenerative change. Multilevel small vertebral body osteophytes. SOFT TISSUES: There is no prevertebral soft tissue swelling. No acute intracranial abnormality. No acute traumatic injury of the cervical, thoracic or lumbar spine. Moderate diffuse degenerative changes in the spine manifested mainly by multilevel bridging vertebral body osteophytes and scattered areas of degenerative disc disease. Ct Cervical Spine Wo Contrast    Result Date: 5/10/2019  EXAMINATION: CT OF THE HEAD WITHOUT CONTRAST; CT OF THE CERVICAL SPINE WITHOUT CONTRAST; CT OF THE THORACIC SPINE WITHOUT CONTRAST; CT OF THE LUMBAR SPINE WITHOUT CONTRAST  5/10/2019 8:09 pm TECHNIQUE: CT of the head was performed without the administration of intravenous contrast. Dose modulation, iterative reconstruction, and/or weight based adjustment of the mA/kV was utilized to reduce the radiation dose to as low as reasonably achievable.; CT of the cervical spine was performed without the administration of intravenous contrast. Multiplanar reformatted images are provided for review.  Dose modulation, iterative reconstruction, and/or weight based adjustment of the mA/kV was utilized to reduce the radiation dose to as low as reasonably achievable.; CT of the thoracic spine was performed without the administration of intravenous contrast. Multiplanar reformatted images are provided for review. Dose modulation, iterative reconstruction, and/or weight based adjustment of the mA/kV was utilized to reduce the radiation dose to as low as reasonably achievable.; CT of the lumbar spine was performed without the administration of intravenous contrast. Multiplanar reformatted images are provided for review. Dose modulation, iterative reconstruction, and/or weight based adjustment of the mA/kV was utilized to reduce the radiation dose to as low as reasonably achievable. COMPARISON: CT head 11/30/2018, CT thoracic and lumbar spine 05/07/2019 HISTORY: ORDERING SYSTEM PROVIDED HISTORY: trauma TECHNOLOGIST PROVIDED HISTORY: Ordering Physician Provided Reason for Exam: fall, pt states she thinks she hit the back of her head, low and upper back pain Acuity: Acute Type of Exam: Initial; ORDERING SYSTEM PROVIDED HISTORY: C-SPINE TRAUMA, NEXUS/CCR POSITIVE TECHNOLOGIST PROVIDED HISTORY: Ordering Physician Provided Reason for Exam: fall, pt states she thinks she hit the back of her head, low and upper back pain Acuity: Acute Type of Exam: Initial; ORDERING SYSTEM PROVIDED HISTORY: pain TECHNOLOGIST PROVIDED HISTORY: Ordering Physician Provided Reason for Exam: fall, pt states she thinks she hit the back of her head, low and upper back pain Acuity: Acute Type of Exam: Initial; ORDERING SYSTEM PROVIDED HISTORY: pain TECHNOLOGIST PROVIDED HISTORY: pain Ordering Physician Provided Reason for Exam: fall, pt states she thinks she hit the back of her head, low and upper back pain Acuity: Acute Type of Exam: Initial FINDINGS: CT HEAD: BRAIN/VENTRICLES: There is no acute intracranial hemorrhage, mass effect or midline shift. No abnormal extra-axial fluid collection. The gray-white differentiation is maintained without evidence of an acute infarct.   There is no evidence of hydrocephalus. ORBITS: The visualized portion of the orbits demonstrate no acute abnormality. SINUSES: The visualized paranasal sinuses and mastoid air cells demonstrate no acute abnormality. SOFT TISSUES/SKULL:  No acute abnormality of the visualized skull or soft tissues. CT CERVICAL SPINE: BONES/ALIGNMENT: There is no evidence of an acute cervical spine fracture. There is normal alignment of the cervical spine. DEGENERATIVE CHANGES: Multilevel vertebral body osteophytes. SOFT TISSUES: There is no prevertebral soft tissue swelling. CT THORACIC SPINE: BONES/ALIGNMENT: There is no evidence of an acute thoracic spine fracture. There is normal alignment of the thoracic spine. DEGENERATIVE CHANGES: Diffuse bridging thoracic osteophytosis. Moderate diffuse disc spaces narrowing with vacuum disc degenerative change in the midthoracic spine. SOFT TISSUES: There is no prevertebral soft tissue swelling. CT LUMBAR SPINE: BONES/ALIGNMENT: There is no evidence of an acute lumbar spine fracture. There is normal alignment of the lumbar spine. DEGENERATIVE CHANGES: Multilevel facet arthropathy. L2-L3 and L3-L4 vacuum disc degenerative change. Multilevel small vertebral body osteophytes. SOFT TISSUES: There is no prevertebral soft tissue swelling. No acute intracranial abnormality. No acute traumatic injury of the cervical, thoracic or lumbar spine. Moderate diffuse degenerative changes in the spine manifested mainly by multilevel bridging vertebral body osteophytes and scattered areas of degenerative disc disease.      Ct Thoracic Spine Wo Contrast    Result Date: 5/10/2019  EXAMINATION: CT OF THE HEAD WITHOUT CONTRAST; CT OF THE CERVICAL SPINE WITHOUT CONTRAST; CT OF THE THORACIC SPINE WITHOUT CONTRAST; CT OF THE LUMBAR SPINE WITHOUT CONTRAST  5/10/2019 8:09 pm TECHNIQUE: CT of the head was performed without the administration of intravenous contrast. Dose modulation, iterative reconstruction, and/or weight based reconstruction, and/or weight based adjustment of the mA/kV was utilized to reduce the radiation dose to as low as reasonably achievable.; CT of the thoracic spine was performed without the administration of intravenous contrast. Multiplanar reformatted images are provided for review. Dose modulation, iterative reconstruction, and/or weight based adjustment of the mA/kV was utilized to reduce the radiation dose to as low as reasonably achievable.; CT of the lumbar spine was performed without the administration of intravenous contrast. Multiplanar reformatted images are provided for review. Dose modulation, iterative reconstruction, and/or weight based adjustment of the mA/kV was utilized to reduce the radiation dose to as low as reasonably achievable. COMPARISON: CT head 11/30/2018, CT thoracic and lumbar spine 05/07/2019 HISTORY: ORDERING SYSTEM PROVIDED HISTORY: trauma TECHNOLOGIST PROVIDED HISTORY: Ordering Physician Provided Reason for Exam: fall, pt states she thinks she hit the back of her head, low and upper back pain Acuity: Acute Type of Exam: Initial; ORDERING SYSTEM PROVIDED HISTORY: C-SPINE TRAUMA, NEXUS/CCR POSITIVE TECHNOLOGIST PROVIDED HISTORY: Ordering Physician Provided Reason for Exam: fall, pt states she thinks she hit the back of her head, low and upper back pain Acuity: Acute Type of Exam: Initial; ORDERING SYSTEM PROVIDED HISTORY: pain TECHNOLOGIST PROVIDED HISTORY: Ordering Physician Provided Reason for Exam: fall, pt states she thinks she hit the back of her head, low and upper back pain Acuity: Acute Type of Exam: Initial; ORDERING SYSTEM PROVIDED HISTORY: pain TECHNOLOGIST PROVIDED HISTORY: pain Ordering Physician Provided Reason for Exam: fall, pt states she thinks she hit the back of her head, low and upper back pain Acuity: Acute Type of Exam: Initial FINDINGS: CT HEAD: BRAIN/VENTRICLES: There is no acute intracranial hemorrhage, mass effect or midline shift.  No abnormal extra-axial modulation, iterative reconstruction, and/or weight based adjustment of the mA/kV was utilized to reduce the radiation dose to as low as reasonably achievable. COMPARISON: Lumbar spine MRI performed 12/14/2017. HISTORY: ORDERING SYSTEM PROVIDED HISTORY: fall TECHNOLOGIST PROVIDED HISTORY: fall Ordering Physician Provided Reason for Exam: fall; fall Acuity: Acute Type of Exam: Initial Mechanism of Injury: PT STATES SHE FELL THIS MORNING, C/O BACK PAIN FINDINGS: BONES/ALIGNMENT: The vertebral body heights are maintained. There is a hemangioma in the L5 vertebral body. The facet joints are aligned. There is no spondylolisthesis. There is no acute fracture. DEGENERATIVE CHANGES: There is mild multilevel degenerative disc disease. There is multilevel degenerative facet hypertrophy. There is no significant bony canal stenosis. There is right-sided foraminal narrowing at the L3-4 level. There is left-sided foraminal narrowing at L2-3, L3-4, L4-5, and L5-S1. SOFT TISSUES/RETROPERITONEUM: No paraspinal mass is seen. No acute fracture or malalignment of the lumbar spine. Multilevel degenerative disc disease and degenerative facet hypertrophy with bony foraminal narrowing bilaterally as described above. Xr Chest Portable    Result Date: 5/10/2019  EXAMINATION: ONE XRAY VIEW OF THE CHEST 5/10/2019 8:50 pm COMPARISON: November 2018 HISTORY: ORDERING SYSTEM PROVIDED HISTORY: fall TECHNOLOGIST PROVIDED HISTORY: fall Acuity: Acute Type of Exam: Initial Mechanism of Injury: Fall FINDINGS: Heart size is enlarged. There is no lung consolidation. Elevation of the right hemidiaphragm is noted. There is no effusion or pneumothorax     No acute abnormality            Impressions :      1. Active Problems:    GI bleed    Polyp of colon  Resolved Problems:    * No resolved hospital problems.  *        2.  has a past medical history of Anemia, Cataract, GERD (gastroesophageal reflux disease), Headache, Hyperlipidemia, Neuropathy, Osteoarthritis, Restless leg syndrome, Seizures ([facility] [address] (since age 10), Short-term memory loss, Stroke (cerebrum) ([facility] [address] (2010), and Type 2 diabetes mellitus without complication ([facility] [address] (4695). dizzyness from anemia    From upper gi bleed   hb 7   will transfuse     ppi gtt     prbc x 1     h/o colon ca father     will need colonoscopy also      back pain   xtays noted     no nsaid use      bp stable   Plans:     1.  ppi gtt   egd/ colo   prbc   home meds    Cbc daily       5/12   hb 8.4   egd gastritis   no active bleeding     Colonoscopy in am   on ppi     5/13/19  colonocsopy today .      Current Facility-Administered Medications   Medication Dose Route Frequency Provider Last Rate Last Dose    sodium chloride flush 0.9 % injection 10 mL  10 mL Intravenous 2 times per day Dagoberto Bennett MD   10 mL at 05/15/19 0919    sodium chloride flush 0.9 % injection 10 mL  10 mL Intravenous PRN Dagoberto Bennett MD        acetaminophen (TYLENOL) tablet 650 mg  650 mg Oral Q4H PRN Dagoberto Bennett MD        insulin lispro (HUMALOG) injection vial 0-6 Units  0-6 Units Subcutaneous TID WC Dagoberto Bennett MD   1 Units at 05/14/19 1730    insulin lispro (HUMALOG) injection vial 0-3 Units  0-3 Units Subcutaneous Nightly Dagoberto Bennett MD   1 Units at 05/14/19 2208    acetaminophen (TYLENOL) tablet 650 mg  650 mg Oral TID PRN Dagoberto Bennett MD        busPIRone (BUSPAR) tablet 10 mg  10 mg Oral TID Dagoberto Bennett MD   10 mg at 05/15/19 0920    furosemide (LASIX) tablet 20 mg  20 mg Oral Daily Yuri Nieves MD   20 mg at 05/15/19 0919    gabapentin (NEURONTIN) capsule 100 mg  100 mg Oral BID Dagoberto Bennett MD   100 mg at 05/15/19 0919    lamoTRIgine (LAMICTAL) tablet 200 mg  200 mg Oral BID Dagoberto Bennett MD   200 mg at 05/15/19 0920    insulin glargine (LANTUS) injection vial 30 Units  30 Units Subcutaneous BID Dagoberto Bennett MD   30 Units at

## 2019-05-15 NOTE — PROGRESS NOTES
IVA informed that insurance denied skilled unit request. Pt is approved to go to unit under her medicaid benefit. Await completed orders. IVA completed 7000. Transport set for 1415. Orders sent to facility.

## 2019-05-21 ENCOUNTER — OFFICE VISIT (OUTPATIENT)
Dept: GASTROENTEROLOGY | Age: 64
End: 2019-05-21
Payer: COMMERCIAL

## 2019-05-21 VITALS
WEIGHT: 278.4 LBS | SYSTOLIC BLOOD PRESSURE: 141 MMHG | DIASTOLIC BLOOD PRESSURE: 67 MMHG | BODY MASS INDEX: 52.6 KG/M2 | HEART RATE: 88 BPM

## 2019-05-21 DIAGNOSIS — K29.71 GASTROINTESTINAL HEMORRHAGE ASSOCIATED WITH GASTRITIS, UNSPECIFIED GASTRITIS TYPE: ICD-10-CM

## 2019-05-21 DIAGNOSIS — K21.9 GASTROESOPHAGEAL REFLUX DISEASE, ESOPHAGITIS PRESENCE NOT SPECIFIED: ICD-10-CM

## 2019-05-21 DIAGNOSIS — D50.8 OTHER IRON DEFICIENCY ANEMIA: ICD-10-CM

## 2019-05-21 DIAGNOSIS — K63.5 POLYP OF COLON, UNSPECIFIED PART OF COLON, UNSPECIFIED TYPE: Primary | ICD-10-CM

## 2019-05-21 PROCEDURE — 99214 OFFICE O/P EST MOD 30 MIN: CPT | Performed by: INTERNAL MEDICINE

## 2019-05-21 RX ORDER — FERROUS SULFATE 325(65) MG
325 TABLET ORAL
Qty: 30 TABLET | Refills: 5 | Status: SHIPPED | OUTPATIENT
Start: 2019-05-21 | End: 2019-06-21 | Stop reason: SDUPTHER

## 2019-05-21 ASSESSMENT — ENCOUNTER SYMPTOMS
WHEEZING: 0
CHOKING: 0
SORE THROAT: 0
BACK PAIN: 1
DIARRHEA: 1
CONSTIPATION: 0
RECTAL PAIN: 0
COUGH: 1
SINUS PRESSURE: 0
VOICE CHANGE: 0
ANAL BLEEDING: 0
ABDOMINAL PAIN: 0
VOMITING: 0
TROUBLE SWALLOWING: 0
BLOOD IN STOOL: 0
ABDOMINAL DISTENTION: 0
NAUSEA: 0

## 2019-05-21 NOTE — PROGRESS NOTES
Subjective:      Patient ID: Sylvie Bauer is a 70 y.o. female. HPI  Patient was seen at [address] [place] [address] recently with anemia. At that time workup revealed that she had multiple polyps in the colon removed. Histology revealed tubular adenoma. Patient received blood transfusion and also intravenous iron transfusion. At present she is doing well. Denies weakness tiredness. Tolerating diet. Still has couple of bowel movements a day, but significantly better than before. EGD revealed antral erosions. Review of Systems   Constitutional: Positive for unexpected weight change. Negative for appetite change and fatigue. HENT: Negative. Negative for dental problem, postnasal drip, sinus pressure, sore throat, trouble swallowing and voice change. Eyes: Positive for visual disturbance (glasses). Respiratory: Positive for cough. Negative for choking and wheezing. Cardiovascular: Positive for leg swelling. Negative for chest pain and palpitations. Gastrointestinal: Positive for diarrhea. Negative for abdominal distention, abdominal pain, anal bleeding, blood in stool, constipation, nausea, rectal pain and vomiting. Genitourinary: Negative. Negative for difficulty urinating. Musculoskeletal: Positive for arthralgias, back pain and gait problem. Negative for myalgias. Allergic/Immunologic: Positive for environmental allergies (seasonal). Negative for food allergies. Neurological: Positive for dizziness, weakness, light-headedness, numbness (neuropathy) and headaches. Hematological: Bruises/bleeds easily (bruises easily). Psychiatric/Behavioral: Negative for sleep disturbance. The patient is nervous/anxious. Objective:   Physical Exam   Constitutional: She is oriented to person, place, and time. She appears well-developed and well-nourished. Patient is moderately overweight. HENT:   Head: Normocephalic and atraumatic.    No oral lesions   Eyes: Pupils are equal, round,

## 2019-05-30 LAB
EKG ATRIAL RATE: 89 BPM
EKG P AXIS: 3 DEGREES
EKG P-R INTERVAL: 160 MS
EKG Q-T INTERVAL: 360 MS
EKG QRS DURATION: 82 MS
EKG QTC CALCULATION (BAZETT): 438 MS
EKG R AXIS: 8 DEGREES
EKG T AXIS: 23 DEGREES
EKG VENTRICULAR RATE: 89 BPM

## 2019-06-21 ENCOUNTER — OFFICE VISIT (OUTPATIENT)
Dept: GASTROENTEROLOGY | Age: 64
End: 2019-06-21
Payer: COMMERCIAL

## 2019-06-21 VITALS
WEIGHT: 266.4 LBS | BODY MASS INDEX: 50.34 KG/M2 | HEART RATE: 94 BPM | DIASTOLIC BLOOD PRESSURE: 78 MMHG | SYSTOLIC BLOOD PRESSURE: 156 MMHG

## 2019-06-21 DIAGNOSIS — D50.8 OTHER IRON DEFICIENCY ANEMIA: ICD-10-CM

## 2019-06-21 DIAGNOSIS — K29.71 GASTROINTESTINAL HEMORRHAGE ASSOCIATED WITH GASTRITIS, UNSPECIFIED GASTRITIS TYPE: ICD-10-CM

## 2019-06-21 DIAGNOSIS — K21.9 GASTROESOPHAGEAL REFLUX DISEASE, ESOPHAGITIS PRESENCE NOT SPECIFIED: ICD-10-CM

## 2019-06-21 DIAGNOSIS — K63.5 POLYP OF COLON, UNSPECIFIED PART OF COLON, UNSPECIFIED TYPE: Primary | ICD-10-CM

## 2019-06-21 PROCEDURE — 99214 OFFICE O/P EST MOD 30 MIN: CPT | Performed by: INTERNAL MEDICINE

## 2019-06-21 RX ORDER — FERROUS SULFATE 325(65) MG
325 TABLET ORAL EVERY 12 HOURS
Qty: 30 TABLET | Refills: 5 | Status: ON HOLD | OUTPATIENT
Start: 2019-06-21 | End: 2020-01-01

## 2019-06-21 ASSESSMENT — ENCOUNTER SYMPTOMS
DIARRHEA: 1
BACK PAIN: 1
SINUS PRESSURE: 0
ABDOMINAL DISTENTION: 1
NAUSEA: 0
VOMITING: 0
RESPIRATORY NEGATIVE: 1
WHEEZING: 0
CHOKING: 0
ANAL BLEEDING: 0
VOICE CHANGE: 0
RECTAL PAIN: 0
TROUBLE SWALLOWING: 0
SORE THROAT: 0
CONSTIPATION: 0
BLOOD IN STOOL: 0
ABDOMINAL PAIN: 1
COUGH: 0

## 2019-06-21 NOTE — PROGRESS NOTES
Subjective:      Patient ID: Patrick Camara is a 70 y.o. female. Dr. Meet Gentile MD our mutual patient Patrick Camara was seen  for   1. Polyp of colon, unspecified part of colon, unspecified type    2. Gastroesophageal reflux disease, esophagitis presence not specified    3. Gastrointestinal hemorrhage associated with gastritis, unspecified gastritis type     . Patient seen for follow-up of anemia. Recently patient was admitted at [facility], and at that time she was given blood transfusion. EGD was nonspecific with the exception of antral erosions. Colonoscopy revealed polyps, with benign histology. Following discharge she is doing reasonably well. At present her hemoglobin is about 7.8, appears to be stable. Iron levels low. Patient denies symptoms of weakness tiredness etc.  GERD symptoms are resolved. Denies constipation. She is eating balanced with food. No dyspeptic symptoms. Denies taking NSAIDs. .  Past Medical, Family, and Social History reviewed and does contribute to the patient presenting condition. patient\"s PMH/PSH,SH,PSYCH hx, MEDs, ALLERGIES, and ROS was all reviewed and updated ion the appropriate sections      HPI    Review of Systems   Constitutional: Positive for fatigue. Negative for appetite change and unexpected weight change. HENT: Negative. Negative for dental problem, postnasal drip, sinus pressure, sore throat, trouble swallowing and voice change. Eyes: Positive for visual disturbance (glasses). Respiratory: Negative. Negative for cough, choking and wheezing. Cardiovascular: Positive for leg swelling. Negative for chest pain and palpitations. Gastrointestinal: Positive for abdominal distention, abdominal pain and diarrhea. Negative for anal bleeding, blood in stool, constipation, nausea, rectal pain and vomiting. Genitourinary: Negative. Negative for difficulty urinating.    Musculoskeletal: Positive for arthralgias, back pain iron deficiency anemia  Iron    CBC     Denies constipation. No diarrhea. She is taking balance and food. No dysphagia. Plan:      Patient is advised ferrous sulfate 325 mg twice a day for a couple of months. We will repeat her CBC iron levels in 8 weeks. Also during next visit, she may need stool for occult blood done. Basing on the results of this investigation will decide further management. I have reviewed and agree with the ROS entered by the MA.

## 2019-06-22 LAB
BASOPHILS ABSOLUTE: NORMAL /ΜL
BASOPHILS RELATIVE PERCENT: NORMAL %
EOSINOPHILS ABSOLUTE: NORMAL /ΜL
EOSINOPHILS RELATIVE PERCENT: NORMAL %
HCT VFR BLD CALC: NORMAL % (ref 36–46)
HEMOGLOBIN: NORMAL G/DL (ref 12–16)
LYMPHOCYTES ABSOLUTE: NORMAL /ΜL
LYMPHOCYTES RELATIVE PERCENT: NORMAL %
MCH RBC QN AUTO: NORMAL PG
MCHC RBC AUTO-ENTMCNC: NORMAL G/DL
MCV RBC AUTO: NORMAL FL
MONOCYTES ABSOLUTE: NORMAL /ΜL
MONOCYTES RELATIVE PERCENT: NORMAL %
NEUTROPHILS ABSOLUTE: NORMAL /ΜL
NEUTROPHILS RELATIVE PERCENT: NORMAL %
PLATELET # BLD: NORMAL K/ΜL
PMV BLD AUTO: NORMAL FL
RBC # BLD: NORMAL 10^6/ΜL
WBC # BLD: NORMAL 10^3/ML

## 2019-06-25 DIAGNOSIS — D50.8 OTHER IRON DEFICIENCY ANEMIA: ICD-10-CM

## 2019-08-14 LAB — IRON: NORMAL

## 2020-01-01 ENCOUNTER — HOSPITAL ENCOUNTER (OUTPATIENT)
Age: 65
Setting detail: SPECIMEN
Discharge: HOME | End: 2020-08-04
Payer: COMMERCIAL

## 2020-01-01 ENCOUNTER — TELEPHONE (OUTPATIENT)
Dept: ONCOLOGY | Age: 65
End: 2020-01-01

## 2020-01-01 ENCOUNTER — NURSE ONLY (OUTPATIENT)
Dept: GASTROENTEROLOGY | Age: 65
End: 2020-01-01
Payer: COMMERCIAL

## 2020-01-01 ENCOUNTER — TELEPHONE (OUTPATIENT)
Dept: OBGYN CLINIC | Age: 65
End: 2020-01-01

## 2020-01-01 ENCOUNTER — APPOINTMENT (OUTPATIENT)
Dept: CT IMAGING | Age: 65
DRG: 604 | End: 2020-01-01
Payer: COMMERCIAL

## 2020-01-01 ENCOUNTER — APPOINTMENT (OUTPATIENT)
Dept: NUCLEAR MEDICINE | Age: 65
DRG: 378 | End: 2020-01-01
Payer: COMMERCIAL

## 2020-01-01 ENCOUNTER — HOSPITAL ENCOUNTER (INPATIENT)
Age: 65
LOS: 5 days | Discharge: HOME HEALTH | DRG: 811 | End: 2020-03-21
Attending: EMERGENCY MEDICINE | Admitting: INTERNAL MEDICINE
Payer: COMMERCIAL

## 2020-01-01 ENCOUNTER — TELEPHONE (OUTPATIENT)
Dept: INTERNAL MEDICINE CLINIC | Age: 65
End: 2020-01-01

## 2020-01-01 ENCOUNTER — OFFICE VISIT (OUTPATIENT)
Dept: INTERNAL MEDICINE CLINIC | Age: 65
End: 2020-01-01
Payer: COMMERCIAL

## 2020-01-01 ENCOUNTER — APPOINTMENT (OUTPATIENT)
Dept: GENERAL RADIOLOGY | Age: 65
DRG: 604 | End: 2020-01-01
Payer: COMMERCIAL

## 2020-01-01 ENCOUNTER — HOSPITAL ENCOUNTER (OUTPATIENT)
Age: 65
Setting detail: SPECIMEN
Discharge: HOME | End: 2020-05-12
Payer: COMMERCIAL

## 2020-01-01 ENCOUNTER — OFFICE VISIT (OUTPATIENT)
Dept: OBGYN CLINIC | Age: 65
End: 2020-01-01
Payer: COMMERCIAL

## 2020-01-01 ENCOUNTER — APPOINTMENT (OUTPATIENT)
Dept: CT IMAGING | Age: 65
DRG: 069 | End: 2020-01-01
Payer: COMMERCIAL

## 2020-01-01 ENCOUNTER — APPOINTMENT (OUTPATIENT)
Dept: ULTRASOUND IMAGING | Age: 65
DRG: 069 | End: 2020-01-01
Payer: COMMERCIAL

## 2020-01-01 ENCOUNTER — HOSPITAL ENCOUNTER (OUTPATIENT)
Age: 65
Setting detail: SPECIMEN
Discharge: HOME | End: 2020-06-04
Payer: COMMERCIAL

## 2020-01-01 ENCOUNTER — APPOINTMENT (OUTPATIENT)
Dept: GENERAL RADIOLOGY | Age: 65
DRG: 378 | End: 2020-01-01
Payer: COMMERCIAL

## 2020-01-01 ENCOUNTER — HOSPITAL ENCOUNTER (INPATIENT)
Age: 65
LOS: 1 days | Discharge: ANOTHER HOSPITAL | DRG: 604 | End: 2020-08-18
Attending: EMERGENCY MEDICINE | Admitting: INTERNAL MEDICINE
Payer: COMMERCIAL

## 2020-01-01 ENCOUNTER — OFFICE VISIT (OUTPATIENT)
Dept: ONCOLOGY | Age: 65
End: 2020-01-01
Payer: COMMERCIAL

## 2020-01-01 ENCOUNTER — APPOINTMENT (OUTPATIENT)
Dept: MRI IMAGING | Age: 65
DRG: 069 | End: 2020-01-01
Payer: COMMERCIAL

## 2020-01-01 ENCOUNTER — TELEPHONE (OUTPATIENT)
Dept: GASTROENTEROLOGY | Age: 65
End: 2020-01-01

## 2020-01-01 ENCOUNTER — APPOINTMENT (OUTPATIENT)
Dept: ULTRASOUND IMAGING | Age: 65
DRG: 811 | End: 2020-01-01
Payer: COMMERCIAL

## 2020-01-01 ENCOUNTER — HOSPITAL ENCOUNTER (OUTPATIENT)
Facility: MEDICAL CENTER | Age: 65
End: 2020-01-01
Payer: COMMERCIAL

## 2020-01-01 ENCOUNTER — ANESTHESIA EVENT (OUTPATIENT)
Dept: ENDOSCOPY | Age: 65
DRG: 811 | End: 2020-01-01
Payer: COMMERCIAL

## 2020-01-01 ENCOUNTER — HOSPITAL ENCOUNTER (OUTPATIENT)
Facility: MEDICAL CENTER | Age: 65
Discharge: HOME | End: 2020-05-20
Payer: COMMERCIAL

## 2020-01-01 ENCOUNTER — HOSPITAL ENCOUNTER (EMERGENCY)
Age: 65
End: 2020-08-29
Attending: EMERGENCY MEDICINE
Payer: COMMERCIAL

## 2020-01-01 ENCOUNTER — HOSPITAL ENCOUNTER (OUTPATIENT)
Facility: MEDICAL CENTER | Age: 65
Discharge: HOME | End: 2020-04-08
Payer: COMMERCIAL

## 2020-01-01 ENCOUNTER — HOSPITAL ENCOUNTER (EMERGENCY)
Age: 65
Discharge: HOME | End: 2020-01-11
Attending: EMERGENCY MEDICINE
Payer: COMMERCIAL

## 2020-01-01 ENCOUNTER — HOSPITAL ENCOUNTER (OUTPATIENT)
Dept: ULTRASOUND IMAGING | Age: 65
Discharge: HOME | End: 2020-01-20
Payer: COMMERCIAL

## 2020-01-01 ENCOUNTER — HOSPITAL ENCOUNTER (INPATIENT)
Age: 65
LOS: 7 days | Discharge: NURSING, REHAB OR LONG-TERM CARE | DRG: 069 | End: 2020-08-25
Attending: EMERGENCY MEDICINE | Admitting: INTERNAL MEDICINE
Payer: COMMERCIAL

## 2020-01-01 ENCOUNTER — APPOINTMENT (OUTPATIENT)
Dept: CT IMAGING | Age: 65
DRG: 811 | End: 2020-01-01
Payer: COMMERCIAL

## 2020-01-01 ENCOUNTER — ANESTHESIA (OUTPATIENT)
Dept: ENDOSCOPY | Age: 65
DRG: 811 | End: 2020-01-01
Payer: COMMERCIAL

## 2020-01-01 ENCOUNTER — ANESTHESIA (OUTPATIENT)
Dept: ENDOSCOPY | Age: 65
DRG: 378 | End: 2020-01-01
Payer: COMMERCIAL

## 2020-01-01 ENCOUNTER — HOSPITAL ENCOUNTER (INPATIENT)
Age: 65
LOS: 5 days | Discharge: NURSING, REHAB OR LONG-TERM CARE | DRG: 378 | End: 2020-08-10
Attending: EMERGENCY MEDICINE | Admitting: INTERNAL MEDICINE
Payer: COMMERCIAL

## 2020-01-01 ENCOUNTER — PROCEDURE VISIT (OUTPATIENT)
Dept: OBGYN CLINIC | Age: 65
End: 2020-01-01
Payer: COMMERCIAL

## 2020-01-01 ENCOUNTER — ANESTHESIA EVENT (OUTPATIENT)
Dept: ENDOSCOPY | Age: 65
DRG: 378 | End: 2020-01-01
Payer: COMMERCIAL

## 2020-01-01 VITALS
RESPIRATION RATE: 16 BRPM | SYSTOLIC BLOOD PRESSURE: 148 MMHG | WEIGHT: 260 LBS | HEIGHT: 61 IN | HEART RATE: 62 BPM | TEMPERATURE: 98.2 F | DIASTOLIC BLOOD PRESSURE: 50 MMHG | BODY MASS INDEX: 49.09 KG/M2 | OXYGEN SATURATION: 96 %

## 2020-01-01 VITALS
WEIGHT: 283 LBS | HEIGHT: 60 IN | TEMPERATURE: 97.6 F | HEART RATE: 71 BPM | DIASTOLIC BLOOD PRESSURE: 68 MMHG | BODY MASS INDEX: 55.56 KG/M2 | SYSTOLIC BLOOD PRESSURE: 130 MMHG | OXYGEN SATURATION: 95 %

## 2020-01-01 VITALS
BODY MASS INDEX: 55.56 KG/M2 | RESPIRATION RATE: 15 BRPM | SYSTOLIC BLOOD PRESSURE: 196 MMHG | WEIGHT: 283 LBS | HEIGHT: 60 IN | DIASTOLIC BLOOD PRESSURE: 62 MMHG | OXYGEN SATURATION: 93 % | TEMPERATURE: 96.6 F | HEART RATE: 66 BPM

## 2020-01-01 VITALS
SYSTOLIC BLOOD PRESSURE: 149 MMHG | TEMPERATURE: 97.3 F | RESPIRATION RATE: 18 BRPM | WEIGHT: 284.39 LBS | BODY MASS INDEX: 55.83 KG/M2 | DIASTOLIC BLOOD PRESSURE: 51 MMHG | HEIGHT: 60 IN | OXYGEN SATURATION: 97 % | HEART RATE: 88 BPM

## 2020-01-01 VITALS
BODY MASS INDEX: 49.09 KG/M2 | HEART RATE: 98 BPM | SYSTOLIC BLOOD PRESSURE: 120 MMHG | WEIGHT: 260 LBS | HEIGHT: 61 IN | TEMPERATURE: 97.4 F | DIASTOLIC BLOOD PRESSURE: 80 MMHG

## 2020-01-01 VITALS — HEART RATE: 78 BPM | SYSTOLIC BLOOD PRESSURE: 138 MMHG | TEMPERATURE: 97.5 F | DIASTOLIC BLOOD PRESSURE: 65 MMHG

## 2020-01-01 VITALS
BODY MASS INDEX: 50.22 KG/M2 | WEIGHT: 266 LBS | SYSTOLIC BLOOD PRESSURE: 124 MMHG | RESPIRATION RATE: 16 BRPM | HEART RATE: 90 BPM | TEMPERATURE: 98.5 F | DIASTOLIC BLOOD PRESSURE: 70 MMHG | OXYGEN SATURATION: 94 % | HEIGHT: 61 IN

## 2020-01-01 VITALS
BODY MASS INDEX: 50.41 KG/M2 | SYSTOLIC BLOOD PRESSURE: 136 MMHG | WEIGHT: 267 LBS | DIASTOLIC BLOOD PRESSURE: 64 MMHG | HEIGHT: 61 IN | HEART RATE: 98 BPM | TEMPERATURE: 96.4 F | OXYGEN SATURATION: 96 %

## 2020-01-01 VITALS
HEIGHT: 61 IN | SYSTOLIC BLOOD PRESSURE: 154 MMHG | RESPIRATION RATE: 18 BRPM | BODY MASS INDEX: 47.2 KG/M2 | HEART RATE: 90 BPM | DIASTOLIC BLOOD PRESSURE: 78 MMHG | TEMPERATURE: 98.6 F | OXYGEN SATURATION: 100 % | WEIGHT: 250 LBS

## 2020-01-01 VITALS
BODY MASS INDEX: 47.58 KG/M2 | HEART RATE: 68 BPM | SYSTOLIC BLOOD PRESSURE: 137 MMHG | DIASTOLIC BLOOD PRESSURE: 74 MMHG | TEMPERATURE: 97.6 F | WEIGHT: 252 LBS | HEIGHT: 61 IN | OXYGEN SATURATION: 97 %

## 2020-01-01 VITALS
SYSTOLIC BLOOD PRESSURE: 134 MMHG | WEIGHT: 270.1 LBS | HEART RATE: 78 BPM | BODY MASS INDEX: 51.06 KG/M2 | RESPIRATION RATE: 16 BRPM | DIASTOLIC BLOOD PRESSURE: 86 MMHG | TEMPERATURE: 98.2 F

## 2020-01-01 VITALS
WEIGHT: 283 LBS | HEIGHT: 60 IN | RESPIRATION RATE: 16 BRPM | TEMPERATURE: 97.7 F | BODY MASS INDEX: 55.56 KG/M2 | HEART RATE: 59 BPM | SYSTOLIC BLOOD PRESSURE: 132 MMHG | OXYGEN SATURATION: 98 % | DIASTOLIC BLOOD PRESSURE: 53 MMHG

## 2020-01-01 VITALS
HEIGHT: 61 IN | SYSTOLIC BLOOD PRESSURE: 124 MMHG | RESPIRATION RATE: 16 BRPM | WEIGHT: 256 LBS | OXYGEN SATURATION: 96 % | BODY MASS INDEX: 48.33 KG/M2 | DIASTOLIC BLOOD PRESSURE: 78 MMHG | HEART RATE: 92 BPM

## 2020-01-01 VITALS
DIASTOLIC BLOOD PRESSURE: 18 MMHG | OXYGEN SATURATION: 77 % | HEART RATE: 105 BPM | SYSTOLIC BLOOD PRESSURE: 253 MMHG | RESPIRATION RATE: 26 BRPM

## 2020-01-01 VITALS
HEIGHT: 61 IN | BODY MASS INDEX: 50.79 KG/M2 | SYSTOLIC BLOOD PRESSURE: 130 MMHG | HEART RATE: 93 BPM | DIASTOLIC BLOOD PRESSURE: 68 MMHG | WEIGHT: 269 LBS

## 2020-01-01 VITALS
RESPIRATION RATE: 16 BRPM | BODY MASS INDEX: 49.34 KG/M2 | WEIGHT: 261 LBS | DIASTOLIC BLOOD PRESSURE: 68 MMHG | SYSTOLIC BLOOD PRESSURE: 156 MMHG | HEART RATE: 101 BPM | TEMPERATURE: 98.5 F

## 2020-01-01 VITALS
BODY MASS INDEX: 51.16 KG/M2 | WEIGHT: 271 LBS | HEART RATE: 99 BPM | TEMPERATURE: 97.1 F | DIASTOLIC BLOOD PRESSURE: 60 MMHG | OXYGEN SATURATION: 95 % | HEIGHT: 61 IN | SYSTOLIC BLOOD PRESSURE: 114 MMHG

## 2020-01-01 VITALS — DIASTOLIC BLOOD PRESSURE: 65 MMHG | OXYGEN SATURATION: 100 % | SYSTOLIC BLOOD PRESSURE: 158 MMHG

## 2020-01-01 VITALS
BODY MASS INDEX: 49.28 KG/M2 | HEIGHT: 61 IN | DIASTOLIC BLOOD PRESSURE: 70 MMHG | SYSTOLIC BLOOD PRESSURE: 120 MMHG | WEIGHT: 261 LBS | TEMPERATURE: 98.3 F | HEART RATE: 88 BPM

## 2020-01-01 VITALS — SYSTOLIC BLOOD PRESSURE: 108 MMHG | DIASTOLIC BLOOD PRESSURE: 46 MMHG | OXYGEN SATURATION: 99 % | TEMPERATURE: 97.7 F

## 2020-01-01 DIAGNOSIS — D50.0 IRON DEFICIENCY ANEMIA DUE TO CHRONIC BLOOD LOSS: ICD-10-CM

## 2020-01-01 LAB
% CKMB: 3.8 % (ref 0–3)
% CKMB: 3.8 % (ref 0–3)
-: ABNORMAL
-: NORMAL
ABO/RH: NORMAL
ABO/RH: NORMAL
ABSOLUTE EOS #: 0 K/UL (ref 0–0.4)
ABSOLUTE EOS #: 0.04 K/UL (ref 0–0.44)
ABSOLUTE EOS #: 0.04 K/UL (ref 0–0.44)
ABSOLUTE EOS #: 0.05 K/UL (ref 0–0.4)
ABSOLUTE EOS #: 0.07 K/UL (ref 0–0.44)
ABSOLUTE EOS #: 0.08 K/UL (ref 0–0.4)
ABSOLUTE EOS #: 0.08 K/UL (ref 0–0.44)
ABSOLUTE EOS #: 0.09 K/UL (ref 0–0.4)
ABSOLUTE EOS #: 0.09 K/UL (ref 0–0.44)
ABSOLUTE EOS #: 0.1 K/UL (ref 0–0.4)
ABSOLUTE EOS #: 0.1 K/UL (ref 0–0.44)
ABSOLUTE EOS #: 0.11 K/UL (ref 0–0.4)
ABSOLUTE EOS #: 0.13 K/UL (ref 0–0.44)
ABSOLUTE EOS #: 0.13 K/UL (ref 0–0.44)
ABSOLUTE EOS #: 0.14 K/UL (ref 0–0.44)
ABSOLUTE EOS #: 0.14 K/UL (ref 0–0.44)
ABSOLUTE EOS #: 0.17 K/UL (ref 0–0.44)
ABSOLUTE IMMATURE GRANULOCYTE: 0 K/UL (ref 0–0.3)
ABSOLUTE IMMATURE GRANULOCYTE: 0.01 K/UL (ref 0–0.3)
ABSOLUTE IMMATURE GRANULOCYTE: 0.02 K/UL (ref 0–0.3)
ABSOLUTE IMMATURE GRANULOCYTE: 0.04 K/UL (ref 0–0.3)
ABSOLUTE IMMATURE GRANULOCYTE: 0.05 K/UL (ref 0–0.3)
ABSOLUTE IMMATURE GRANULOCYTE: <0.03 K/UL (ref 0–0.3)
ABSOLUTE IMMATURE GRANULOCYTE: <0.03 K/UL (ref 0–0.3)
ABSOLUTE IMMATURE GRANULOCYTE: ABNORMAL K/UL (ref 0–0.3)
ABSOLUTE LYMPH #: 0.65 K/UL (ref 1–4.8)
ABSOLUTE LYMPH #: 0.73 K/UL (ref 1.1–3.7)
ABSOLUTE LYMPH #: 0.75 K/UL (ref 1.1–3.7)
ABSOLUTE LYMPH #: 0.76 K/UL (ref 1.1–3.7)
ABSOLUTE LYMPH #: 0.8 K/UL (ref 1.1–3.7)
ABSOLUTE LYMPH #: 0.8 K/UL (ref 1–4.8)
ABSOLUTE LYMPH #: 0.8 K/UL (ref 1–4.8)
ABSOLUTE LYMPH #: 0.81 K/UL (ref 1.1–3.7)
ABSOLUTE LYMPH #: 0.85 K/UL (ref 1.1–3.7)
ABSOLUTE LYMPH #: 0.85 K/UL (ref 1–4.8)
ABSOLUTE LYMPH #: 0.86 K/UL (ref 1.1–3.7)
ABSOLUTE LYMPH #: 0.89 K/UL (ref 1–4.8)
ABSOLUTE LYMPH #: 0.9 K/UL (ref 1–4.8)
ABSOLUTE LYMPH #: 0.92 K/UL (ref 1–4.8)
ABSOLUTE LYMPH #: 0.99 K/UL (ref 1.1–3.7)
ABSOLUTE LYMPH #: 0.99 K/UL (ref 1.1–3.7)
ABSOLUTE LYMPH #: 1 K/UL (ref 1–4.8)
ABSOLUTE LYMPH #: 1.01 K/UL (ref 1–4.8)
ABSOLUTE LYMPH #: 1.05 K/UL (ref 1.1–3.7)
ABSOLUTE LYMPH #: 1.05 K/UL (ref 1.1–3.7)
ABSOLUTE LYMPH #: 1.1 K/UL (ref 1–4.8)
ABSOLUTE LYMPH #: 1.2 K/UL (ref 1–4.8)
ABSOLUTE LYMPH #: 1.43 K/UL (ref 1–4.8)
ABSOLUTE MONO #: 0.14 K/UL (ref 0.1–1.3)
ABSOLUTE MONO #: 0.18 K/UL (ref 0.1–1.2)
ABSOLUTE MONO #: 0.23 K/UL (ref 0.1–0.8)
ABSOLUTE MONO #: 0.23 K/UL (ref 0.1–1.3)
ABSOLUTE MONO #: 0.23 K/UL (ref 0.1–1.3)
ABSOLUTE MONO #: 0.24 K/UL (ref 0.1–1.3)
ABSOLUTE MONO #: 0.24 K/UL (ref 0.1–1.3)
ABSOLUTE MONO #: 0.25 K/UL (ref 0.1–1.2)
ABSOLUTE MONO #: 0.3 K/UL (ref 0.1–1.3)
ABSOLUTE MONO #: 0.32 K/UL (ref 0.1–1.2)
ABSOLUTE MONO #: 0.34 K/UL (ref 0.1–1.2)
ABSOLUTE MONO #: 0.34 K/UL (ref 0.1–1.3)
ABSOLUTE MONO #: 0.35 K/UL (ref 0.1–1.2)
ABSOLUTE MONO #: 0.36 K/UL (ref 0.1–1.2)
ABSOLUTE MONO #: 0.36 K/UL (ref 0.1–1.2)
ABSOLUTE MONO #: 0.39 K/UL (ref 0.1–1.2)
ABSOLUTE MONO #: 0.4 K/UL (ref 0.1–1.3)
ABSOLUTE RETIC #: 0.16 M/UL (ref 0.02–0.1)
AFP: 2 UG/L
ALBUMIN (CALCULATED): 2.8 G/DL (ref 3.2–5.2)
ALBUMIN (CALCULATED): 3 G/DL (ref 3.2–5.2)
ALBUMIN PERCENT: 48 % (ref 45–65)
ALBUMIN PERCENT: 48 % (ref 45–65)
ALBUMIN SERPL-MCNC: 2.6 G/DL (ref 3.5–5.2)
ALBUMIN SERPL-MCNC: 3 G/DL (ref 3.5–5.2)
ALBUMIN SERPL-MCNC: 3.1 G/DL (ref 3.5–5.2)
ALBUMIN/GLOBULIN RATIO: 0.7 (ref 1–2.5)
ALBUMIN/GLOBULIN RATIO: ABNORMAL (ref 1–2.5)
ALBUMIN/GLOBULIN RATIO: ABNORMAL (ref 1–2.5)
ALK PHOS BONE SPECIFIC: 36 U/L (ref 0–55)
ALK PHOS OTHER CALC: 0 U/L
ALK PHOSPHATASE: 92 U/L (ref 40–120)
ALKALINE PHOSPHATASE LIVER FRACTION: 56 U/L (ref 0–94)
ALLEN TEST: ABNORMAL
ALLEN TEST: ABNORMAL
ALP BLD-CCNC: 117 U/L (ref 35–104)
ALP BLD-CCNC: 84 U/L (ref 35–104)
ALP BLD-CCNC: 85 U/L (ref 35–104)
ALPHA 1 PERCENT: 4 % (ref 3–6)
ALPHA 1 PERCENT: 4 % (ref 3–6)
ALPHA 2 PERCENT: 14 % (ref 6–13)
ALPHA 2 PERCENT: 14 % (ref 6–13)
ALPHA-1 ANTITRYPSIN: 162 MG/DL (ref 90–200)
ALPHA-1-GLOBULIN: 0.2 G/DL (ref 0.1–0.4)
ALPHA-1-GLOBULIN: 0.2 G/DL (ref 0.1–0.4)
ALPHA-2-GLOBULIN: 0.8 G/DL (ref 0.5–0.9)
ALPHA-2-GLOBULIN: 0.9 G/DL (ref 0.5–0.9)
ALT SERPL-CCNC: 13 U/L (ref 5–33)
ALT SERPL-CCNC: 13 U/L (ref 5–33)
ALT SERPL-CCNC: 17 U/L (ref 5–33)
AMMONIA: 54 UMOL/L (ref 11–51)
AMORPHOUS: ABNORMAL
AMORPHOUS: NORMAL
ANION GAP SERPL CALCULATED.3IONS-SCNC: 10 MMOL/L (ref 9–17)
ANION GAP SERPL CALCULATED.3IONS-SCNC: 11 MMOL/L (ref 9–17)
ANION GAP SERPL CALCULATED.3IONS-SCNC: 12 MMOL/L (ref 9–17)
ANION GAP SERPL CALCULATED.3IONS-SCNC: 13 MMOL/L (ref 9–17)
ANION GAP SERPL CALCULATED.3IONS-SCNC: 14 MMOL/L (ref 9–17)
ANION GAP SERPL CALCULATED.3IONS-SCNC: 15 MMOL/L (ref 9–17)
ANION GAP SERPL CALCULATED.3IONS-SCNC: 15 MMOL/L (ref 9–17)
ANION GAP SERPL CALCULATED.3IONS-SCNC: 18 MMOL/L (ref 9–17)
ANION GAP SERPL CALCULATED.3IONS-SCNC: 7 MMOL/L (ref 9–17)
ANION GAP SERPL CALCULATED.3IONS-SCNC: 7 MMOL/L (ref 9–17)
ANION GAP SERPL CALCULATED.3IONS-SCNC: 8 MMOL/L (ref 9–17)
ANION GAP SERPL CALCULATED.3IONS-SCNC: 8 MMOL/L (ref 9–17)
ANION GAP SERPL CALCULATED.3IONS-SCNC: 9 MMOL/L (ref 9–17)
ANION GAP: 8 MMOL/L (ref 7–16)
ANION GAP: 8 MMOL/L (ref 7–16)
ANTI-NUCLEAR ANTIBODY (ANA): NEGATIVE
ANTIBODY IDENTIFICATION: NORMAL
ANTIBODY SCREEN: NEGATIVE
ANTIBODY SCREEN: POSITIVE
ANTIGEN TYPE, PATIENT: NORMAL
ARM BAND NUMBER: NORMAL
ARM BAND NUMBER: NORMAL
AST SERPL-CCNC: 24 U/L
AST SERPL-CCNC: 28 U/L
AST SERPL-CCNC: 30 U/L
BACTERIA: ABNORMAL
BACTERIA: NORMAL
BASOPHILS # BLD: 0 % (ref 0–2)
BASOPHILS # BLD: 1 % (ref 0–2)
BASOPHILS ABSOLUTE: 0 K/UL (ref 0–0.2)
BASOPHILS ABSOLUTE: 0.03 K/UL (ref 0–0.2)
BASOPHILS ABSOLUTE: 0.04 K/UL (ref 0–0.2)
BASOPHILS ABSOLUTE: 0.05 K/UL (ref 0–0.2)
BASOPHILS ABSOLUTE: 0.06 K/UL (ref 0–0.2)
BASOPHILS ABSOLUTE: <0.03 K/UL (ref 0–0.2)
BETA GLOBULIN: 1 G/DL (ref 0.5–1.1)
BETA GLOBULIN: 1 G/DL (ref 0.5–1.1)
BETA PERCENT: 15 % (ref 11–19)
BETA PERCENT: 18 % (ref 11–19)
BILIRUB SERPL-MCNC: 0.16 MG/DL (ref 0.3–1.2)
BILIRUB SERPL-MCNC: 0.18 MG/DL (ref 0.3–1.2)
BILIRUB SERPL-MCNC: 0.3 MG/DL (ref 0.3–1.2)
BILIRUBIN DIRECT: 0.12 MG/DL
BILIRUBIN URINE: NEGATIVE
BILIRUBIN, INDIRECT: 0.18 MG/DL (ref 0–1)
BLD PROD TYP BPU: NORMAL
BNP INTERPRETATION: ABNORMAL
BNP INTERPRETATION: NORMAL
BONE MARROW REPORT: NORMAL
BUN BLDV-MCNC: 37 MG/DL (ref 8–23)
BUN BLDV-MCNC: 44 MG/DL (ref 8–23)
BUN BLDV-MCNC: 45 MG/DL (ref 8–23)
BUN BLDV-MCNC: 46 MG/DL (ref 8–23)
BUN BLDV-MCNC: 47 MG/DL (ref 8–23)
BUN BLDV-MCNC: 47 MG/DL (ref 8–23)
BUN BLDV-MCNC: 48 MG/DL (ref 8–23)
BUN BLDV-MCNC: 51 MG/DL (ref 8–23)
BUN BLDV-MCNC: 53 MG/DL (ref 8–23)
BUN BLDV-MCNC: 53 MG/DL (ref 8–23)
BUN BLDV-MCNC: 54 MG/DL (ref 8–23)
BUN BLDV-MCNC: 55 MG/DL (ref 8–23)
BUN BLDV-MCNC: 57 MG/DL (ref 8–23)
BUN BLDV-MCNC: 59 MG/DL (ref 8–23)
BUN BLDV-MCNC: 59 MG/DL (ref 8–23)
BUN BLDV-MCNC: 61 MG/DL (ref 8–23)
BUN BLDV-MCNC: 63 MG/DL (ref 8–23)
BUN BLDV-MCNC: 65 MG/DL (ref 8–23)
BUN BLDV-MCNC: 65 MG/DL (ref 8–23)
BUN BLDV-MCNC: 66 MG/DL (ref 8–23)
BUN BLDV-MCNC: 68 MG/DL (ref 8–23)
BUN BLDV-MCNC: 69 MG/DL (ref 8–23)
BUN BLDV-MCNC: 74 MG/DL (ref 8–23)
BUN BLDV-MCNC: 80 MG/DL (ref 8–23)
BUN/CREAT BLD: ABNORMAL (ref 9–20)
C TRACH DNA GENITAL QL NAA+PROBE: NEGATIVE
C TRACH DNA GENITAL QL NAA+PROBE: NEGATIVE
C-REACTIVE PROTEIN: 1.8 MG/L (ref 0–5)
C-REACTIVE PROTEIN: 3.6 MG/L (ref 0–5)
CALCIUM SERPL-MCNC: 8.3 MG/DL (ref 8.6–10.4)
CALCIUM SERPL-MCNC: 8.3 MG/DL (ref 8.6–10.4)
CALCIUM SERPL-MCNC: 8.5 MG/DL (ref 8.6–10.4)
CALCIUM SERPL-MCNC: 8.5 MG/DL (ref 8.6–10.4)
CALCIUM SERPL-MCNC: 8.6 MG/DL (ref 8.6–10.4)
CALCIUM SERPL-MCNC: 8.7 MG/DL (ref 8.6–10.4)
CALCIUM SERPL-MCNC: 8.8 MG/DL (ref 8.6–10.4)
CALCIUM SERPL-MCNC: 8.9 MG/DL (ref 8.6–10.4)
CALCIUM SERPL-MCNC: 8.9 MG/DL (ref 8.6–10.4)
CALCIUM SERPL-MCNC: 9 MG/DL (ref 8.6–10.4)
CALCIUM SERPL-MCNC: 9.1 MG/DL (ref 8.6–10.4)
CALCIUM SERPL-MCNC: 9.2 MG/DL (ref 8.6–10.4)
CALCIUM SERPL-MCNC: 9.3 MG/DL (ref 8.6–10.4)
CALCIUM SERPL-MCNC: 9.4 MG/DL (ref 8.6–10.4)
CALCIUM SERPL-MCNC: 9.4 MG/DL (ref 8.6–10.4)
CALCIUM SERPL-MCNC: 9.5 MG/DL (ref 8.6–10.4)
CASTS UA: ABNORMAL /LPF
CASTS UA: ABNORMAL /LPF (ref 0–8)
CASTS UA: NORMAL /LPF (ref 0–8)
CERULOPLASMIN: 19 MG/DL (ref 16–45)
CHLORIDE BLD-SCNC: 101 MMOL/L (ref 98–107)
CHLORIDE BLD-SCNC: 102 MMOL/L (ref 98–107)
CHLORIDE BLD-SCNC: 105 MMOL/L (ref 98–107)
CHLORIDE BLD-SCNC: 105 MMOL/L (ref 98–107)
CHLORIDE BLD-SCNC: 106 MMOL/L (ref 98–107)
CHLORIDE BLD-SCNC: 107 MMOL/L (ref 98–107)
CHLORIDE BLD-SCNC: 108 MMOL/L (ref 98–107)
CHLORIDE BLD-SCNC: 109 MMOL/L (ref 98–107)
CHLORIDE BLD-SCNC: 110 MMOL/L (ref 98–107)
CHLORIDE, UR: 64 MMOL/L
CHOLESTEROL/HDL RATIO: 2.8
CHOLESTEROL/HDL RATIO: 3
CHOLESTEROL: 119 MG/DL
CHOLESTEROL: 124 MG/DL
CK MB: 2.3 NG/ML
CK MB: 2.4 NG/ML
CKMB INTERPRETATION: ABNORMAL
CKMB INTERPRETATION: ABNORMAL
CMV IGM: 0.2
CO2: 22 MMOL/L (ref 20–31)
CO2: 23 MMOL/L (ref 20–31)
CO2: 24 MMOL/L (ref 20–31)
CO2: 25 MMOL/L (ref 20–31)
CO2: 26 MMOL/L (ref 20–31)
CO2: 27 MMOL/L (ref 20–31)
CO2: 28 MMOL/L (ref 20–31)
CO2: 29 MMOL/L (ref 20–31)
COLOR: YELLOW
COMMENT UA: ABNORMAL
CREAT SERPL-MCNC: 1.34 MG/DL (ref 0.5–0.9)
CREAT SERPL-MCNC: 1.38 MG/DL (ref 0.5–0.9)
CREAT SERPL-MCNC: 1.44 MG/DL (ref 0.5–0.9)
CREAT SERPL-MCNC: 1.52 MG/DL (ref 0.5–0.9)
CREAT SERPL-MCNC: 1.54 MG/DL (ref 0.5–0.9)
CREAT SERPL-MCNC: 1.6 MG/DL (ref 0.5–0.9)
CREAT SERPL-MCNC: 1.74 MG/DL (ref 0.5–0.9)
CREAT SERPL-MCNC: 1.75 MG/DL (ref 0.5–0.9)
CREAT SERPL-MCNC: 1.75 MG/DL (ref 0.5–0.9)
CREAT SERPL-MCNC: 1.81 MG/DL (ref 0.5–0.9)
CREAT SERPL-MCNC: 1.82 MG/DL (ref 0.5–0.9)
CREAT SERPL-MCNC: 1.85 MG/DL (ref 0.5–0.9)
CREAT SERPL-MCNC: 1.86 MG/DL (ref 0.5–0.9)
CREAT SERPL-MCNC: 1.88 MG/DL (ref 0.5–0.9)
CREAT SERPL-MCNC: 1.92 MG/DL (ref 0.5–0.9)
CREAT SERPL-MCNC: 1.92 MG/DL (ref 0.5–0.9)
CREAT SERPL-MCNC: 1.97 MG/DL (ref 0.5–0.9)
CREAT SERPL-MCNC: 2.03 MG/DL (ref 0.5–0.9)
CREAT SERPL-MCNC: 2.05 MG/DL (ref 0.5–0.9)
CREAT SERPL-MCNC: 2.06 MG/DL (ref 0.5–0.9)
CREAT SERPL-MCNC: 2.07 MG/DL (ref 0.5–0.9)
CREAT SERPL-MCNC: 2.16 MG/DL (ref 0.5–0.9)
CREAT SERPL-MCNC: 2.35 MG/DL (ref 0.5–0.9)
CREAT SERPL-MCNC: 2.5 MG/DL (ref 0.5–0.9)
CREAT SERPL-MCNC: 2.52 MG/DL (ref 0.5–0.9)
CREAT SERPL-MCNC: 2.61 MG/DL (ref 0.5–0.9)
CREATININE URINE: 119 MG/DL (ref 28–217)
CREATININE URINE: 59.7 MG/DL (ref 28–217)
CREATININE URINE: 91.9 MG/DL (ref 28–217)
CREATININE URINE: 96.6 MG/DL (ref 28–217)
CROSSMATCH RESULT: NORMAL
CRYSTALS, UA: ABNORMAL /HPF
CRYSTALS, UA: NORMAL /HPF
CULTURE: NO GROWTH
CULTURE: NO GROWTH
CULTURE: NORMAL
CYTOLOGY REPORT: NORMAL
CYTOMEGALOVIRUS IGG ANTIBODY: 0.6
DAT IGG: POSITIVE
DAT, POLYSPECIFIC: POSITIVE
DATE, STOOL #1: ABNORMAL
DATE, STOOL #1: NORMAL
DATE, STOOL #2: ABNORMAL
DATE, STOOL #2: NORMAL
DATE, STOOL #3: ABNORMAL
DATE, STOOL #3: NORMAL
DIFFERENTIAL TYPE: ABNORMAL
DIRECT EXAM: NORMAL
DISPENSE STATUS BLOOD BANK: NORMAL
EBV EARLY ANTIGEN IGG: 120 U/ML
EBV INTERPRETATION: ABNORMAL
EBV NUCLEAR AG AB: 822 U/ML
EKG ATRIAL RATE: 100 BPM
EKG ATRIAL RATE: 66 BPM
EKG ATRIAL RATE: 72 BPM
EKG P AXIS: 24 DEGREES
EKG P AXIS: 33 DEGREES
EKG P AXIS: 48 DEGREES
EKG P-R INTERVAL: 166 MS
EKG P-R INTERVAL: 170 MS
EKG P-R INTERVAL: 182 MS
EKG Q-T INTERVAL: 354 MS
EKG Q-T INTERVAL: 402 MS
EKG Q-T INTERVAL: 434 MS
EKG QRS DURATION: 74 MS
EKG QRS DURATION: 74 MS
EKG QRS DURATION: 82 MS
EKG QTC CALCULATION (BAZETT): 440 MS
EKG QTC CALCULATION (BAZETT): 454 MS
EKG QTC CALCULATION (BAZETT): 456 MS
EKG R AXIS: 17 DEGREES
EKG R AXIS: 4 DEGREES
EKG T AXIS: 34 DEGREES
EKG T AXIS: 66 DEGREES
EKG T AXIS: 8 DEGREES
EKG VENTRICULAR RATE: 100 BPM
EKG VENTRICULAR RATE: 66 BPM
EKG VENTRICULAR RATE: 72 BPM
EOSINOPHILS RELATIVE PERCENT: 0 % (ref 0–4)
EOSINOPHILS RELATIVE PERCENT: 1 % (ref 0–4)
EOSINOPHILS RELATIVE PERCENT: 1 % (ref 1–4)
EOSINOPHILS RELATIVE PERCENT: 1 % (ref 1–4)
EOSINOPHILS RELATIVE PERCENT: 2 % (ref 0–4)
EOSINOPHILS RELATIVE PERCENT: 2 % (ref 1–4)
EOSINOPHILS RELATIVE PERCENT: 3 % (ref 0–4)
EOSINOPHILS RELATIVE PERCENT: 3 % (ref 0–4)
EOSINOPHILS RELATIVE PERCENT: 3 % (ref 1–4)
EOSINOPHILS RELATIVE PERCENT: 4 % (ref 1–4)
EOSINOPHILS RELATIVE PERCENT: 4 % (ref 1–4)
EPITHELIAL CELLS UA: ABNORMAL /HPF
EPITHELIAL CELLS UA: ABNORMAL /HPF (ref 0–5)
EPITHELIAL CELLS UA: NORMAL /HPF (ref 0–5)
EPSTEIN-BARR VCA IGG: 1112 U/ML
EPSTEIN-BARR VCA IGM: 38 U/ML
ERYTHROPOIETIN: 72 MU/ML (ref 4–27)
ESTIMATED AVERAGE GLUCOSE: 151 MG/DL
ESTIMATED AVERAGE GLUCOSE: 157 MG/DL
ESTIMATED AVERAGE GLUCOSE: 189 MG/DL
EXPIRATION DATE: NORMAL
EXPIRATION DATE: NORMAL
FACTOR VIII ACTIVITY: 229 % (ref 50–150)
FERRITIN: 11 UG/L (ref 13–150)
FERRITIN: 118 UG/L (ref 13–150)
FERRITIN: 20 UG/L (ref 13–150)
FIO2: ABNORMAL
FIO2: ABNORMAL
FOLATE: >20 NG/ML
FOLATE: >20 NG/ML
FREE KAPPA/LAMBDA RATIO: 1.45 (ref 0.26–1.65)
FREE KAPPA/LAMBDA RATIO: 1.49 (ref 0.26–1.65)
GAMMA GLOBULIN %: 16 % (ref 9–20)
GAMMA GLOBULIN %: 19 % (ref 9–20)
GAMMA GLOBULIN: 0.9 G/DL (ref 0.5–1.5)
GAMMA GLOBULIN: 1.2 G/DL (ref 0.5–1.5)
GFR AFRICAN AMERICAN: 22 ML/MIN
GFR AFRICAN AMERICAN: 23 ML/MIN
GFR AFRICAN AMERICAN: 23 ML/MIN
GFR AFRICAN AMERICAN: 25 ML/MIN
GFR AFRICAN AMERICAN: 28 ML/MIN
GFR AFRICAN AMERICAN: 29 ML/MIN
GFR AFRICAN AMERICAN: 30 ML/MIN
GFR AFRICAN AMERICAN: 31 ML/MIN
GFR AFRICAN AMERICAN: 32 ML/MIN
GFR AFRICAN AMERICAN: 33 ML/MIN
GFR AFRICAN AMERICAN: 33 ML/MIN
GFR AFRICAN AMERICAN: 34 ML/MIN
GFR AFRICAN AMERICAN: 34 ML/MIN
GFR AFRICAN AMERICAN: 35 ML/MIN
GFR AFRICAN AMERICAN: 39 ML/MIN
GFR AFRICAN AMERICAN: 41 ML/MIN
GFR AFRICAN AMERICAN: 41 ML/MIN
GFR AFRICAN AMERICAN: 44 ML/MIN
GFR AFRICAN AMERICAN: 46 ML/MIN
GFR AFRICAN AMERICAN: 48 ML/MIN
GFR NON-AFRICAN AMERICAN: 15 ML/MIN
GFR NON-AFRICAN AMERICAN: 18 ML/MIN
GFR NON-AFRICAN AMERICAN: 19 ML/MIN
GFR NON-AFRICAN AMERICAN: 19 ML/MIN
GFR NON-AFRICAN AMERICAN: 21 ML/MIN
GFR NON-AFRICAN AMERICAN: 23 ML/MIN
GFR NON-AFRICAN AMERICAN: 24 ML/MIN
GFR NON-AFRICAN AMERICAN: 25 ML/MIN
GFR NON-AFRICAN AMERICAN: 26 ML/MIN
GFR NON-AFRICAN AMERICAN: 26 ML/MIN
GFR NON-AFRICAN AMERICAN: 27 ML/MIN
GFR NON-AFRICAN AMERICAN: 28 ML/MIN
GFR NON-AFRICAN AMERICAN: 28 ML/MIN
GFR NON-AFRICAN AMERICAN: 29 ML/MIN
GFR NON-AFRICAN AMERICAN: 32 ML/MIN
GFR NON-AFRICAN AMERICAN: 34 ML/MIN
GFR NON-AFRICAN AMERICAN: 34 ML/MIN
GFR NON-AFRICAN AMERICAN: 36 ML/MIN
GFR NON-AFRICAN AMERICAN: 38 ML/MIN
GFR NON-AFRICAN AMERICAN: 40 ML/MIN
GFR SERPL CREATININE-BSD FRML MDRD: 19 ML/MIN
GFR SERPL CREATININE-BSD FRML MDRD: 31 ML/MIN
GFR SERPL CREATININE-BSD FRML MDRD: ABNORMAL ML/MIN/{1.73_M2}
GLOBULIN: ABNORMAL G/DL (ref 1.5–3.8)
GLUCOSE BLD-MCNC: 100 MG/DL (ref 65–105)
GLUCOSE BLD-MCNC: 100 MG/DL (ref 65–105)
GLUCOSE BLD-MCNC: 102 MG/DL (ref 70–99)
GLUCOSE BLD-MCNC: 104 MG/DL (ref 65–105)
GLUCOSE BLD-MCNC: 104 MG/DL (ref 70–99)
GLUCOSE BLD-MCNC: 105 MG/DL (ref 65–105)
GLUCOSE BLD-MCNC: 110 MG/DL (ref 65–105)
GLUCOSE BLD-MCNC: 112 MG/DL (ref 65–105)
GLUCOSE BLD-MCNC: 118 MG/DL (ref 70–99)
GLUCOSE BLD-MCNC: 119 MG/DL (ref 65–105)
GLUCOSE BLD-MCNC: 119 MG/DL (ref 65–105)
GLUCOSE BLD-MCNC: 121 MG/DL (ref 70–99)
GLUCOSE BLD-MCNC: 127 MG/DL (ref 65–105)
GLUCOSE BLD-MCNC: 129 MG/DL (ref 70–99)
GLUCOSE BLD-MCNC: 130 MG/DL (ref 65–105)
GLUCOSE BLD-MCNC: 131 MG/DL (ref 65–105)
GLUCOSE BLD-MCNC: 135 MG/DL (ref 65–105)
GLUCOSE BLD-MCNC: 137 MG/DL (ref 65–105)
GLUCOSE BLD-MCNC: 140 MG/DL (ref 70–99)
GLUCOSE BLD-MCNC: 141 MG/DL (ref 65–105)
GLUCOSE BLD-MCNC: 141 MG/DL (ref 65–105)
GLUCOSE BLD-MCNC: 141 MG/DL (ref 70–99)
GLUCOSE BLD-MCNC: 142 MG/DL (ref 65–105)
GLUCOSE BLD-MCNC: 144 MG/DL (ref 65–105)
GLUCOSE BLD-MCNC: 144 MG/DL (ref 65–105)
GLUCOSE BLD-MCNC: 145 MG/DL (ref 65–105)
GLUCOSE BLD-MCNC: 146 MG/DL (ref 65–105)
GLUCOSE BLD-MCNC: 146 MG/DL (ref 65–105)
GLUCOSE BLD-MCNC: 147 MG/DL (ref 65–105)
GLUCOSE BLD-MCNC: 148 MG/DL (ref 65–105)
GLUCOSE BLD-MCNC: 149 MG/DL (ref 74–100)
GLUCOSE BLD-MCNC: 151 MG/DL (ref 65–105)
GLUCOSE BLD-MCNC: 151 MG/DL (ref 65–105)
GLUCOSE BLD-MCNC: 156 MG/DL (ref 65–105)
GLUCOSE BLD-MCNC: 158 MG/DL (ref 65–105)
GLUCOSE BLD-MCNC: 158 MG/DL (ref 65–105)
GLUCOSE BLD-MCNC: 160 MG/DL (ref 65–105)
GLUCOSE BLD-MCNC: 165 MG/DL (ref 65–105)
GLUCOSE BLD-MCNC: 166 MG/DL (ref 65–105)
GLUCOSE BLD-MCNC: 168 MG/DL (ref 70–99)
GLUCOSE BLD-MCNC: 173 MG/DL (ref 65–105)
GLUCOSE BLD-MCNC: 174 MG/DL (ref 65–105)
GLUCOSE BLD-MCNC: 175 MG/DL (ref 70–99)
GLUCOSE BLD-MCNC: 178 MG/DL (ref 65–105)
GLUCOSE BLD-MCNC: 182 MG/DL (ref 65–105)
GLUCOSE BLD-MCNC: 184 MG/DL (ref 65–105)
GLUCOSE BLD-MCNC: 186 MG/DL (ref 65–105)
GLUCOSE BLD-MCNC: 189 MG/DL (ref 65–105)
GLUCOSE BLD-MCNC: 189 MG/DL (ref 70–99)
GLUCOSE BLD-MCNC: 191 MG/DL (ref 65–105)
GLUCOSE BLD-MCNC: 195 MG/DL (ref 65–105)
GLUCOSE BLD-MCNC: 197 MG/DL (ref 70–99)
GLUCOSE BLD-MCNC: 197 MG/DL (ref 70–99)
GLUCOSE BLD-MCNC: 200 MG/DL (ref 65–105)
GLUCOSE BLD-MCNC: 202 MG/DL (ref 65–105)
GLUCOSE BLD-MCNC: 203 MG/DL (ref 65–105)
GLUCOSE BLD-MCNC: 203 MG/DL (ref 70–99)
GLUCOSE BLD-MCNC: 211 MG/DL (ref 70–99)
GLUCOSE BLD-MCNC: 212 MG/DL (ref 70–99)
GLUCOSE BLD-MCNC: 214 MG/DL (ref 65–105)
GLUCOSE BLD-MCNC: 219 MG/DL (ref 65–105)
GLUCOSE BLD-MCNC: 219 MG/DL (ref 65–105)
GLUCOSE BLD-MCNC: 219 MG/DL (ref 70–99)
GLUCOSE BLD-MCNC: 223 MG/DL (ref 70–99)
GLUCOSE BLD-MCNC: 224 MG/DL (ref 65–105)
GLUCOSE BLD-MCNC: 224 MG/DL (ref 74–100)
GLUCOSE BLD-MCNC: 225 MG/DL (ref 65–105)
GLUCOSE BLD-MCNC: 228 MG/DL (ref 65–105)
GLUCOSE BLD-MCNC: 229 MG/DL (ref 65–105)
GLUCOSE BLD-MCNC: 235 MG/DL (ref 65–105)
GLUCOSE BLD-MCNC: 235 MG/DL (ref 70–99)
GLUCOSE BLD-MCNC: 236 MG/DL (ref 70–99)
GLUCOSE BLD-MCNC: 237 MG/DL (ref 65–105)
GLUCOSE BLD-MCNC: 238 MG/DL (ref 65–105)
GLUCOSE BLD-MCNC: 238 MG/DL (ref 65–105)
GLUCOSE BLD-MCNC: 243 MG/DL (ref 65–105)
GLUCOSE BLD-MCNC: 243 MG/DL (ref 65–105)
GLUCOSE BLD-MCNC: 244 MG/DL (ref 70–99)
GLUCOSE BLD-MCNC: 246 MG/DL (ref 65–105)
GLUCOSE BLD-MCNC: 247 MG/DL (ref 70–99)
GLUCOSE BLD-MCNC: 250 MG/DL (ref 65–105)
GLUCOSE BLD-MCNC: 254 MG/DL (ref 65–105)
GLUCOSE BLD-MCNC: 257 MG/DL (ref 65–105)
GLUCOSE BLD-MCNC: 258 MG/DL (ref 65–105)
GLUCOSE BLD-MCNC: 259 MG/DL (ref 65–105)
GLUCOSE BLD-MCNC: 264 MG/DL (ref 65–105)
GLUCOSE BLD-MCNC: 266 MG/DL (ref 65–105)
GLUCOSE BLD-MCNC: 290 MG/DL (ref 70–99)
GLUCOSE BLD-MCNC: 306 MG/DL (ref 65–105)
GLUCOSE BLD-MCNC: 59 MG/DL (ref 65–105)
GLUCOSE BLD-MCNC: 59 MG/DL (ref 70–99)
GLUCOSE BLD-MCNC: 62 MG/DL (ref 65–105)
GLUCOSE BLD-MCNC: 69 MG/DL (ref 65–105)
GLUCOSE BLD-MCNC: 74 MG/DL (ref 65–105)
GLUCOSE BLD-MCNC: 75 MG/DL (ref 65–105)
GLUCOSE BLD-MCNC: 77 MG/DL (ref 65–105)
GLUCOSE BLD-MCNC: 77 MG/DL (ref 65–105)
GLUCOSE BLD-MCNC: 83 MG/DL (ref 65–105)
GLUCOSE BLD-MCNC: 84 MG/DL (ref 70–99)
GLUCOSE BLD-MCNC: 86 MG/DL (ref 65–105)
GLUCOSE BLD-MCNC: 89 MG/DL (ref 65–105)
GLUCOSE BLD-MCNC: 91 MG/DL (ref 65–105)
GLUCOSE BLD-MCNC: 95 MG/DL (ref 65–105)
GLUCOSE BLD-MCNC: 99 MG/DL (ref 65–105)
GLUCOSE URINE: ABNORMAL
GLUCOSE URINE: NEGATIVE
GLUCOSE URINE: NEGATIVE
HAPTOGLOBIN: 153 MG/DL (ref 30–200)
HAV IGM SER IA-ACNC: NONREACTIVE
HBA1C MFR BLD: 6.9 % (ref 4–6)
HBA1C MFR BLD: 7.1 %
HBA1C MFR BLD: 7.1 % (ref 4–6)
HBA1C MFR BLD: 8.2 % (ref 4–6)
HCO3 VENOUS: 18.9 MMOL/L (ref 22–29)
HCO3 VENOUS: 33 MMOL/L (ref 22–29)
HCT VFR BLD CALC: 19.4 % (ref 36–46)
HCT VFR BLD CALC: 19.4 % (ref 36–46)
HCT VFR BLD CALC: 20 % (ref 36–46)
HCT VFR BLD CALC: 21.1 % (ref 36–46)
HCT VFR BLD CALC: 21.6 % (ref 36–46)
HCT VFR BLD CALC: 22.8 % (ref 36–46)
HCT VFR BLD CALC: 23.2 % (ref 36–46)
HCT VFR BLD CALC: 23.2 % (ref 36–46)
HCT VFR BLD CALC: 23.4 % (ref 36–46)
HCT VFR BLD CALC: 23.4 % (ref 36–46)
HCT VFR BLD CALC: 23.5 % (ref 36–46)
HCT VFR BLD CALC: 24 % (ref 36.3–47.1)
HCT VFR BLD CALC: 24 % (ref 36.3–47.1)
HCT VFR BLD CALC: 24.3 % (ref 36–46)
HCT VFR BLD CALC: 24.3 % (ref 36–46)
HCT VFR BLD CALC: 24.6 % (ref 36–46)
HCT VFR BLD CALC: 24.6 % (ref 36–46)
HCT VFR BLD CALC: 24.8 % (ref 36–46)
HCT VFR BLD CALC: 24.9 % (ref 36–46)
HCT VFR BLD CALC: 25.1 % (ref 36–46)
HCT VFR BLD CALC: 25.2 % (ref 36–46)
HCT VFR BLD CALC: 25.7 % (ref 36–46)
HCT VFR BLD CALC: 25.7 % (ref 36–46)
HCT VFR BLD CALC: 26.1 % (ref 36.3–47.1)
HCT VFR BLD CALC: 26.2 % (ref 36–46)
HCT VFR BLD CALC: 26.4 % (ref 36–46)
HCT VFR BLD CALC: 26.5 % (ref 36–46)
HCT VFR BLD CALC: 26.6 % (ref 36–46)
HCT VFR BLD CALC: 26.7 % (ref 36–46)
HCT VFR BLD CALC: 26.8 % (ref 36–46)
HCT VFR BLD CALC: 26.9 % (ref 36–46)
HCT VFR BLD CALC: 27 % (ref 36–46)
HCT VFR BLD CALC: 27.4 % (ref 36–46)
HCT VFR BLD CALC: 27.4 % (ref 36–46)
HCT VFR BLD CALC: 27.7 % (ref 36.3–47.1)
HCT VFR BLD CALC: 27.8 % (ref 36–46)
HCT VFR BLD CALC: 28.2 % (ref 36.3–47.1)
HCT VFR BLD CALC: 28.4 % (ref 36–46)
HCT VFR BLD CALC: 28.5 % (ref 36.3–47.1)
HCT VFR BLD CALC: 28.8 % (ref 36.3–47.1)
HCT VFR BLD CALC: 29.9 % (ref 36.3–47.1)
HCT VFR BLD CALC: 30.1 % (ref 36.3–47.1)
HCT VFR BLD CALC: 30.5 % (ref 36.3–47.1)
HCT VFR BLD CALC: 30.9 % (ref 36.3–47.1)
HCT VFR BLD CALC: 31.6 % (ref 36.3–47.1)
HDLC SERPL-MCNC: 42 MG/DL
HDLC SERPL-MCNC: 42 MG/DL
HEMOCCULT SP1 STL QL: NEGATIVE
HEMOCCULT SP1 STL QL: POSITIVE
HEMOCCULT SP2 STL QL: ABNORMAL
HEMOCCULT SP2 STL QL: NORMAL
HEMOCCULT SP3 STL QL: ABNORMAL
HEMOCCULT SP3 STL QL: NORMAL
HEMOGLOBIN: 5.6 G/DL (ref 12–16)
HEMOGLOBIN: 5.8 G/DL (ref 12–16)
HEMOGLOBIN: 6 G/DL (ref 12–16)
HEMOGLOBIN: 6.1 G/DL (ref 12–16)
HEMOGLOBIN: 6.5 G/DL (ref 11.9–15.1)
HEMOGLOBIN: 6.5 G/DL (ref 11.9–15.1)
HEMOGLOBIN: 6.6 G/DL (ref 12–16)
HEMOGLOBIN: 7.1 G/DL (ref 12–16)
HEMOGLOBIN: 7.1 G/DL (ref 12–16)
HEMOGLOBIN: 7.2 G/DL (ref 12–16)
HEMOGLOBIN: 7.3 G/DL (ref 12–16)
HEMOGLOBIN: 7.4 G/DL (ref 12–16)
HEMOGLOBIN: 7.5 G/DL (ref 11.9–15.1)
HEMOGLOBIN: 7.6 G/DL (ref 11.9–15.1)
HEMOGLOBIN: 7.6 G/DL (ref 12–16)
HEMOGLOBIN: 7.7 G/DL (ref 11.9–15.1)
HEMOGLOBIN: 7.7 G/DL (ref 12–16)
HEMOGLOBIN: 7.7 G/DL (ref 12–16)
HEMOGLOBIN: 7.8 G/DL (ref 11.9–15.1)
HEMOGLOBIN: 7.8 G/DL (ref 12–16)
HEMOGLOBIN: 7.8 G/DL (ref 12–16)
HEMOGLOBIN: 7.9 G/DL (ref 12–16)
HEMOGLOBIN: 8 G/DL (ref 11.9–15.1)
HEMOGLOBIN: 8 G/DL (ref 12–16)
HEMOGLOBIN: 8.1 G/DL (ref 11.9–15.1)
HEMOGLOBIN: 8.1 G/DL (ref 11.9–15.1)
HEMOGLOBIN: 8.1 G/DL (ref 12–16)
HEMOGLOBIN: 8.2 G/DL (ref 12–16)
HEMOGLOBIN: 8.4 G/DL (ref 12–16)
HEMOGLOBIN: 8.5 G/DL (ref 11.9–15.1)
HEMOGLOBIN: 8.5 G/DL (ref 12–16)
HEMOGLOBIN: 8.7 G/DL (ref 12–16)
HEMOGLOBIN: 9.2 G/DL (ref 11.9–15.1)
HEMOGLOBIN: 9.6 G/DL (ref 11.9–15.1)
HEPATITIS B CORE IGM ANTIBODY: NONREACTIVE
HEPATITIS B SURFACE ANTIGEN: NONREACTIVE
HEPATITIS C ANTIBODY: NONREACTIVE
HPV SAMPLE: NORMAL
HPV, GENOTYPE 16: NOT DETECTED
HPV, GENOTYPE 18: NOT DETECTED
HPV, HIGH RISK OTHER: NOT DETECTED
HPV, INTERPRETATION: NORMAL
IGA: 635 MG/DL (ref 70–400)
IGA: 728 MG/DL (ref 70–400)
IGG: 981 MG/DL (ref 700–1600)
IGM: 208 MG/DL (ref 40–230)
IMMATURE GRANULOCYTES: 0 %
IMMATURE GRANULOCYTES: 1 %
IMMATURE GRANULOCYTES: ABNORMAL %
IMMATURE RETIC FRACT: ABNORMAL %
INR BLD: 1
INR BLD: 1.1
IRON SATURATION: 10 % (ref 20–55)
IRON SATURATION: 38 % (ref 20–55)
IRON SATURATION: 40 % (ref 20–55)
IRON SATURATION: 8 % (ref 20–55)
IRON: 22 UG/DL (ref 37–145)
IRON: 27 UG/DL (ref 37–145)
IRON: 82 UG/DL (ref 37–145)
IRON: 91 UG/DL (ref 37–145)
KAPPA FREE LIGHT CHAINS QNT: 17.62 MG/DL (ref 0.37–1.94)
KAPPA FREE LIGHT CHAINS QNT: 19.78 MG/DL (ref 0.37–1.94)
KEPPRA: 41 UG/ML
KEPPRA: 53 UG/ML
KEPPRA: 76 UG/ML
KEPPRA: 87 UG/ML
KEPPRA: 99 UG/ML
KETONES, URINE: NEGATIVE
LACTATE DEHYDROGENASE: 182 U/L (ref 135–214)
LACTIC ACID, WHOLE BLOOD: 0.8 MMOL/L (ref 0.7–2.1)
LACTIC ACID: NORMAL MMOL/L
LAMBDA FREE LIGHT CHAINS QNT: 11.85 MG/DL (ref 0.57–2.63)
LAMBDA FREE LIGHT CHAINS QNT: 13.6 MG/DL (ref 0.57–2.63)
LAMOTRIGINE LEVEL: 7.1 UG/ML (ref 3–15)
LDL CHOLESTEROL: 39 MG/DL (ref 0–130)
LDL CHOLESTEROL: 49 MG/DL (ref 0–130)
LEUKOCYTE ESTERASE, URINE: ABNORMAL
LEUKOCYTE ESTERASE, URINE: ABNORMAL
LEUKOCYTE ESTERASE, URINE: NEGATIVE
LIPASE: 64 U/L (ref 13–60)
LIVER-KIDNEY MICROSOMAL AB: NORMAL
LV EF: 52 %
LV EF: 60 %
LV EF: 63 %
LVEF MODALITY: NORMAL
LYMPHOCYTES # BLD: 14 % (ref 24–44)
LYMPHOCYTES # BLD: 17 % (ref 24–43)
LYMPHOCYTES # BLD: 18 % (ref 24–43)
LYMPHOCYTES # BLD: 18 % (ref 24–44)
LYMPHOCYTES # BLD: 19 % (ref 24–43)
LYMPHOCYTES # BLD: 19 % (ref 24–43)
LYMPHOCYTES # BLD: 19 % (ref 24–44)
LYMPHOCYTES # BLD: 19 % (ref 24–44)
LYMPHOCYTES # BLD: 20 % (ref 24–43)
LYMPHOCYTES # BLD: 20 % (ref 24–44)
LYMPHOCYTES # BLD: 21 % (ref 24–43)
LYMPHOCYTES # BLD: 21 % (ref 24–44)
LYMPHOCYTES # BLD: 22 % (ref 24–44)
LYMPHOCYTES # BLD: 23 % (ref 24–43)
LYMPHOCYTES # BLD: 24 % (ref 24–44)
LYMPHOCYTES # BLD: 24 % (ref 24–44)
LYMPHOCYTES # BLD: 25 % (ref 24–43)
LYMPHOCYTES # BLD: 25 % (ref 24–43)
LYMPHOCYTES # BLD: 25 % (ref 24–44)
LYMPHOCYTES # BLD: 26 % (ref 24–44)
LYMPHOCYTES # BLD: 31 % (ref 24–44)
Lab: NORMAL
MAGNESIUM: 1.9 MG/DL (ref 1.6–2.6)
MAGNESIUM: 1.9 MG/DL (ref 1.6–2.6)
MAGNESIUM: 2 MG/DL (ref 1.6–2.6)
MAGNESIUM: 2.1 MG/DL (ref 1.6–2.6)
MCH RBC QN AUTO: 22.9 PG (ref 26–34)
MCH RBC QN AUTO: 23.3 PG (ref 26–34)
MCH RBC QN AUTO: 23.7 PG (ref 25.2–33.5)
MCH RBC QN AUTO: 23.7 PG (ref 25.2–33.5)
MCH RBC QN AUTO: 24.1 PG (ref 26–34)
MCH RBC QN AUTO: 24.3 PG (ref 26–34)
MCH RBC QN AUTO: 25.2 PG (ref 26–34)
MCH RBC QN AUTO: 25.3 PG (ref 25.2–33.5)
MCH RBC QN AUTO: 25.4 PG (ref 25.2–33.5)
MCH RBC QN AUTO: 25.4 PG (ref 26–34)
MCH RBC QN AUTO: 25.7 PG (ref 25.2–33.5)
MCH RBC QN AUTO: 25.8 PG (ref 25.2–33.5)
MCH RBC QN AUTO: 25.8 PG (ref 25.2–33.5)
MCH RBC QN AUTO: 25.8 PG (ref 26–34)
MCH RBC QN AUTO: 25.9 PG (ref 25.2–33.5)
MCH RBC QN AUTO: 26.1 PG (ref 25.2–33.5)
MCH RBC QN AUTO: 26.3 PG (ref 26–34)
MCH RBC QN AUTO: 26.4 PG (ref 25.2–33.5)
MCH RBC QN AUTO: 26.4 PG (ref 26–34)
MCH RBC QN AUTO: 26.4 PG (ref 26–34)
MCH RBC QN AUTO: 26.5 PG (ref 26–34)
MCH RBC QN AUTO: 26.5 PG (ref 26–34)
MCH RBC QN AUTO: 26.7 PG (ref 26–34)
MCH RBC QN AUTO: 27 PG (ref 25.2–33.5)
MCH RBC QN AUTO: 27.1 PG (ref 26–34)
MCH RBC QN AUTO: 27.1 PG (ref 26–34)
MCH RBC QN AUTO: 27.3 PG (ref 25.2–33.5)
MCH RBC QN AUTO: 27.5 PG (ref 26–34)
MCHC RBC AUTO-ENTMCNC: 24.9 G/DL (ref 28.4–34.8)
MCHC RBC AUTO-ENTMCNC: 27.1 G/DL (ref 28.4–34.8)
MCHC RBC AUTO-ENTMCNC: 27.7 G/DL (ref 28.4–34.8)
MCHC RBC AUTO-ENTMCNC: 27.8 G/DL (ref 28.4–34.8)
MCHC RBC AUTO-ENTMCNC: 28.2 G/DL (ref 28.4–34.8)
MCHC RBC AUTO-ENTMCNC: 28.4 G/DL (ref 28.4–34.8)
MCHC RBC AUTO-ENTMCNC: 28.9 G/DL (ref 31–37)
MCHC RBC AUTO-ENTMCNC: 29 G/DL (ref 31–37)
MCHC RBC AUTO-ENTMCNC: 29.5 G/DL (ref 28.4–34.8)
MCHC RBC AUTO-ENTMCNC: 29.5 G/DL (ref 31–37)
MCHC RBC AUTO-ENTMCNC: 29.7 G/DL (ref 31–37)
MCHC RBC AUTO-ENTMCNC: 29.8 G/DL (ref 28.4–34.8)
MCHC RBC AUTO-ENTMCNC: 29.9 G/DL (ref 31–37)
MCHC RBC AUTO-ENTMCNC: 30 G/DL (ref 31–37)
MCHC RBC AUTO-ENTMCNC: 30 G/DL (ref 31–37)
MCHC RBC AUTO-ENTMCNC: 30.2 G/DL (ref 31–37)
MCHC RBC AUTO-ENTMCNC: 30.4 G/DL (ref 28.4–34.8)
MCHC RBC AUTO-ENTMCNC: 30.4 G/DL (ref 31–37)
MCHC RBC AUTO-ENTMCNC: 30.5 G/DL (ref 31–37)
MCHC RBC AUTO-ENTMCNC: 31.1 G/DL (ref 31–37)
MCHC RBC AUTO-ENTMCNC: 31.1 G/DL (ref 31–37)
MCHC RBC AUTO-ENTMCNC: 31.2 G/DL (ref 31–37)
MCHC RBC AUTO-ENTMCNC: 31.4 G/DL (ref 31–37)
MCHC RBC AUTO-ENTMCNC: 31.4 G/DL (ref 31–37)
MCHC RBC AUTO-ENTMCNC: 31.5 G/DL (ref 31–37)
MCV RBC AUTO: 102 FL (ref 82.6–102.9)
MCV RBC AUTO: 78.4 FL (ref 80–100)
MCV RBC AUTO: 79 FL (ref 80–100)
MCV RBC AUTO: 80.4 FL (ref 80–100)
MCV RBC AUTO: 81 FL (ref 80–100)
MCV RBC AUTO: 81.1 FL (ref 80–100)
MCV RBC AUTO: 84.5 FL (ref 80–100)
MCV RBC AUTO: 84.8 FL (ref 80–100)
MCV RBC AUTO: 85.4 FL (ref 80–100)
MCV RBC AUTO: 86.1 FL (ref 80–100)
MCV RBC AUTO: 86.3 FL (ref 80–100)
MCV RBC AUTO: 86.5 FL (ref 80–100)
MCV RBC AUTO: 87 FL (ref 80–100)
MCV RBC AUTO: 87.1 FL (ref 80–100)
MCV RBC AUTO: 87.1 FL (ref 80–100)
MCV RBC AUTO: 87.6 FL (ref 82.6–102.9)
MCV RBC AUTO: 87.6 FL (ref 82.6–102.9)
MCV RBC AUTO: 87.9 FL (ref 80–100)
MCV RBC AUTO: 88.5 FL (ref 82.6–102.9)
MCV RBC AUTO: 89 FL (ref 82.6–102.9)
MCV RBC AUTO: 90.8 FL (ref 82.6–102.9)
MCV RBC AUTO: 91.5 FL (ref 82.6–102.9)
MCV RBC AUTO: 91.7 FL (ref 82.6–102.9)
MCV RBC AUTO: 92.4 FL (ref 80–100)
MCV RBC AUTO: 93.2 FL (ref 82.6–102.9)
MCV RBC AUTO: 93.3 FL (ref 82.6–102.9)
MCV RBC AUTO: 94.9 FL (ref 82.6–102.9)
MCV RBC AUTO: 95.3 FL (ref 82.6–102.9)
MITOCHONDRIAL ANTIBODY: 19.9 UNITS (ref 0–20)
MODE: ABNORMAL
MODE: ABNORMAL
MONOCYTES # BLD: 4 % (ref 1–7)
MONOCYTES # BLD: 5 % (ref 1–7)
MONOCYTES # BLD: 5 % (ref 3–12)
MONOCYTES # BLD: 6 % (ref 1–7)
MONOCYTES # BLD: 6 % (ref 3–12)
MONOCYTES # BLD: 7 % (ref 1–7)
MONOCYTES # BLD: 7 % (ref 3–12)
MONOCYTES # BLD: 8 % (ref 3–12)
MONOCYTES # BLD: 9 % (ref 3–12)
MONOCYTES # BLD: 9 % (ref 3–12)
MORPHOLOGY: ABNORMAL
MUCUS: ABNORMAL
MUCUS: NORMAL
MYOGLOBIN: 64 NG/ML (ref 25–58)
MYOGLOBIN: 66 NG/ML (ref 25–58)
MYOGLOBIN: 71 NG/ML (ref 25–58)
MYOGLOBIN: 80 NG/ML (ref 25–58)
N. GONORRHOEAE DNA: NEGATIVE
N. GONORRHOEAE DNA: NEGATIVE
NEGATIVE BASE EXCESS, VEN: 14 (ref 0–2)
NEGATIVE BASE EXCESS, VEN: ABNORMAL (ref 0–2)
NITRITE, URINE: NEGATIVE
NRBC AUTOMATED: 0 PER 100 WBC
NRBC AUTOMATED: ABNORMAL PER 100 WBC
O2 DEVICE/FLOW/%: ABNORMAL
O2 DEVICE/FLOW/%: ABNORMAL
O2 SAT, VEN: 33 % (ref 60–85)
O2 SAT, VEN: 56 % (ref 60–85)
OTHER OBSERVATIONS UA: ABNORMAL
OTHER OBSERVATIONS UA: NORMAL
PARTIAL THROMBOPLASTIN TIME: 24.3 SEC (ref 20.5–30.5)
PARTIAL THROMBOPLASTIN TIME: 31.5 SEC (ref 24–36)
PARTIAL THROMBOPLASTIN TIME: 32 SEC (ref 24–36)
PATHOLOGIST REVIEW: NORMAL
PATHOLOGIST: ABNORMAL
PATHOLOGIST: ABNORMAL
PATHOLOGIST: NORMAL
PATHOLOGIST: NORMAL
PATIENT TEMP: ABNORMAL
PATIENT TEMP: ABNORMAL
PCO2, VEN: 60.4 MM HG (ref 41–51)
PCO2, VEN: 96.1 MM HG (ref 41–51)
PDW BLD-RTO: 14.3 % (ref 11.8–14.4)
PDW BLD-RTO: 14.4 % (ref 11.5–14.9)
PDW BLD-RTO: 15 % (ref 11.8–14.4)
PDW BLD-RTO: 15.5 % (ref 11.8–14.4)
PDW BLD-RTO: 15.5 % (ref 11.8–14.4)
PDW BLD-RTO: 17.2 % (ref 11.5–14.9)
PDW BLD-RTO: 17.3 % (ref 11.5–14.9)
PDW BLD-RTO: 17.5 % (ref 11.5–14.9)
PDW BLD-RTO: 17.6 % (ref 11.5–14.9)
PDW BLD-RTO: 17.8 % (ref 11.5–14.9)
PDW BLD-RTO: 17.9 % (ref 11.5–14.9)
PDW BLD-RTO: 18.1 % (ref 11.5–14.9)
PDW BLD-RTO: 18.2 % (ref 11.5–14.9)
PDW BLD-RTO: 18.3 % (ref 11.5–14.9)
PDW BLD-RTO: 18.5 % (ref 11.5–14.9)
PDW BLD-RTO: 18.5 % (ref 11.8–14.4)
PDW BLD-RTO: 18.6 % (ref 11.5–14.9)
PDW BLD-RTO: 18.6 % (ref 11.8–14.4)
PDW BLD-RTO: 18.6 % (ref 11.8–14.4)
PDW BLD-RTO: 18.7 % (ref 11.5–14.9)
PDW BLD-RTO: 18.7 % (ref 11.5–14.9)
PDW BLD-RTO: 18.8 % (ref 11.8–14.4)
PDW BLD-RTO: 18.9 % (ref 11.8–14.4)
PDW BLD-RTO: 19.2 % (ref 11.8–14.4)
PDW BLD-RTO: 21.7 % (ref 11.5–14.9)
PDW BLD-RTO: 21.9 % (ref 11.5–14.9)
PH UA: 5 (ref 5–8)
PH UA: 5.5 (ref 5–8)
PH UA: 5.5 (ref 5–8)
PH UA: 6 (ref 5–8)
PH VENOUS: 6.9 (ref 7.32–7.43)
PH VENOUS: 7.35 (ref 7.32–7.43)
PLATELET # BLD: 100 K/UL (ref 150–450)
PLATELET # BLD: 103 K/UL (ref 150–450)
PLATELET # BLD: 104 K/UL (ref 150–450)
PLATELET # BLD: 105 K/UL (ref 150–450)
PLATELET # BLD: 105 K/UL (ref 150–450)
PLATELET # BLD: 106 K/UL (ref 150–450)
PLATELET # BLD: 109 K/UL (ref 150–450)
PLATELET # BLD: 111 K/UL (ref 138–453)
PLATELET # BLD: 111 K/UL (ref 138–453)
PLATELET # BLD: 67 K/UL (ref 138–453)
PLATELET # BLD: 70 K/UL (ref 138–453)
PLATELET # BLD: 82 K/UL (ref 138–453)
PLATELET # BLD: 84 K/UL (ref 138–453)
PLATELET # BLD: 84 K/UL (ref 138–453)
PLATELET # BLD: 86 K/UL (ref 138–453)
PLATELET # BLD: 89 K/UL (ref 138–453)
PLATELET # BLD: 90 K/UL (ref 138–453)
PLATELET # BLD: 92 K/UL (ref 150–450)
PLATELET # BLD: 93 K/UL (ref 150–450)
PLATELET # BLD: 93 K/UL (ref 150–450)
PLATELET # BLD: 97 K/UL (ref 138–453)
PLATELET # BLD: 97 K/UL (ref 150–450)
PLATELET # BLD: 99 K/UL (ref 150–450)
PLATELET # BLD: ABNORMAL K/UL (ref 138–453)
PLATELET ESTIMATE: ABNORMAL
PLATELET, FLUORESCENCE: 46 K/UL (ref 138–453)
PMV BLD AUTO: 10.2 FL (ref 8.1–13.5)
PMV BLD AUTO: 10.4 FL (ref 8.1–13.5)
PMV BLD AUTO: 10.4 FL (ref 8.1–13.5)
PMV BLD AUTO: 10.5 FL (ref 8.1–13.5)
PMV BLD AUTO: 10.5 FL (ref 8.1–13.5)
PMV BLD AUTO: 10.9 FL (ref 8.1–13.5)
PMV BLD AUTO: 11.2 FL (ref 8.1–13.5)
PMV BLD AUTO: 11.2 FL (ref 8.1–13.5)
PMV BLD AUTO: 7.4 FL (ref 6–12)
PMV BLD AUTO: 7.5 FL (ref 6–12)
PMV BLD AUTO: 7.6 FL (ref 6–12)
PMV BLD AUTO: 7.6 FL (ref 6–12)
PMV BLD AUTO: 7.7 FL (ref 6–12)
PMV BLD AUTO: 7.8 FL (ref 6–12)
PMV BLD AUTO: 7.9 FL (ref 6–12)
PMV BLD AUTO: 8.2 FL (ref 6–12)
PMV BLD AUTO: 8.6 FL (ref 6–12)
PMV BLD AUTO: ABNORMAL FL (ref 8.1–13.5)
PO2, VEN: 22.1 MM HG (ref 30–50)
PO2, VEN: 50.3 MM HG (ref 30–50)
POC CHLORIDE: 107 MMOL/L (ref 98–107)
POC CHLORIDE: 112 MMOL/L (ref 98–107)
POC CREATININE: 1.98 MG/DL (ref 0.51–1.19)
POC CREATININE: 3.05 MG/DL (ref 0.51–1.19)
POC HEMATOCRIT: 28 % (ref 36–46)
POC HEMATOCRIT: 28 % (ref 36–46)
POC HEMOGLOBIN: 9.4 G/DL (ref 12–16)
POC HEMOGLOBIN: 9.4 G/DL (ref 12–16)
POC IONIZED CALCIUM: 1.24 MMOL/L (ref 1.15–1.33)
POC IONIZED CALCIUM: 1.91 MMOL/L (ref 1.15–1.33)
POC LACTIC ACID: 1 MMOL/L (ref 0.56–1.39)
POC LACTIC ACID: 8.08 MMOL/L (ref 0.56–1.39)
POC PCO2 TEMP: ABNORMAL MM HG
POC PCO2 TEMP: ABNORMAL MM HG
POC PH TEMP: ABNORMAL
POC PH TEMP: ABNORMAL
POC PO2 TEMP: ABNORMAL MM HG
POC PO2 TEMP: ABNORMAL MM HG
POC POTASSIUM: 4.2 MMOL/L (ref 3.5–4.5)
POC POTASSIUM: 9 MMOL/L (ref 3.5–4.5)
POC SODIUM: 139 MMOL/L (ref 138–146)
POC SODIUM: 148 MMOL/L (ref 138–146)
POSITIVE BASE EXCESS, VEN: 6 (ref 0–3)
POSITIVE BASE EXCESS, VEN: ABNORMAL (ref 0–3)
POTASSIUM SERPL-SCNC: 3.5 MMOL/L (ref 3.7–5.3)
POTASSIUM SERPL-SCNC: 3.7 MMOL/L (ref 3.7–5.3)
POTASSIUM SERPL-SCNC: 3.9 MMOL/L (ref 3.7–5.3)
POTASSIUM SERPL-SCNC: 4 MMOL/L (ref 3.7–5.3)
POTASSIUM SERPL-SCNC: 4.1 MMOL/L (ref 3.7–5.3)
POTASSIUM SERPL-SCNC: 4.2 MMOL/L (ref 3.7–5.3)
POTASSIUM SERPL-SCNC: 4.3 MMOL/L (ref 3.7–5.3)
POTASSIUM SERPL-SCNC: 4.4 MMOL/L (ref 3.7–5.3)
POTASSIUM SERPL-SCNC: 4.5 MMOL/L (ref 3.7–5.3)
POTASSIUM SERPL-SCNC: 4.7 MMOL/L (ref 3.7–5.3)
POTASSIUM SERPL-SCNC: 4.8 MMOL/L (ref 3.7–5.3)
POTASSIUM SERPL-SCNC: 4.8 MMOL/L (ref 3.7–5.3)
POTASSIUM SERPL-SCNC: 5 MMOL/L (ref 3.7–5.3)
POTASSIUM SERPL-SCNC: 5 MMOL/L (ref 3.7–5.3)
POTASSIUM SERPL-SCNC: 5.3 MMOL/L (ref 3.7–5.3)
PRO-BNP: 2349 PG/ML
PRO-BNP: 258 PG/ML
PROCALCITONIN: 0.09 NG/ML
PROTEIN ELECTROPHORESIS, SERUM: ABNORMAL
PROTEIN ELECTROPHORESIS, SERUM: ABNORMAL
PROTEIN UA: ABNORMAL
PROTHROMBIN TIME: 10.1 SEC (ref 9–12)
PROTHROMBIN TIME: 13.6 SEC (ref 11.8–14.6)
PROTHROMBIN TIME: 14 SEC (ref 11.8–14.6)
PROTHROMBIN TIME: 14.1 SEC (ref 11.8–14.6)
RBC # BLD: 2.48 M/UL (ref 4–5.2)
RBC # BLD: 2.55 M/UL (ref 4–5.2)
RBC # BLD: 2.66 M/UL (ref 4–5.2)
RBC # BLD: 2.67 M/UL (ref 4–5.2)
RBC # BLD: 2.7 M/UL (ref 4–5.2)
RBC # BLD: 2.74 M/UL (ref 3.95–5.11)
RBC # BLD: 2.74 M/UL (ref 3.95–5.11)
RBC # BLD: 2.81 M/UL (ref 4–5.2)
RBC # BLD: 2.88 M/UL (ref 4–5.2)
RBC # BLD: 2.92 M/UL (ref 4–5.2)
RBC # BLD: 2.92 M/UL (ref 4–5.2)
RBC # BLD: 2.95 M/UL (ref 3.95–5.11)
RBC # BLD: 2.96 M/UL (ref 3.95–5.11)
RBC # BLD: 2.97 M/UL (ref 3.95–5.11)
RBC # BLD: 2.99 M/UL (ref 3.95–5.11)
RBC # BLD: 3.06 M/UL (ref 4–5.2)
RBC # BLD: 3.09 M/UL (ref 3.95–5.11)
RBC # BLD: 3.1 M/UL (ref 4–5.2)
RBC # BLD: 3.14 M/UL (ref 3.95–5.11)
RBC # BLD: 3.15 M/UL (ref 3.95–5.11)
RBC # BLD: 3.18 M/UL (ref 4–5.2)
RBC # BLD: 3.24 M/UL (ref 4–5.2)
RBC # BLD: 3.26 M/UL (ref 4–5.2)
RBC # BLD: 3.28 M/UL (ref 4–5.2)
RBC # BLD: 3.29 M/UL (ref 3.95–5.11)
RBC # BLD: 3.3 M/UL (ref 4–5.2)
RBC # BLD: 3.37 M/UL (ref 3.95–5.11)
RBC # BLD: 3.55 M/UL (ref 3.95–5.11)
RBC # BLD: ABNORMAL 10*6/UL
RBC UA: ABNORMAL /HPF
RBC UA: ABNORMAL /HPF (ref 0–4)
RBC UA: NORMAL /HPF (ref 0–4)
REASON FOR REJECTION: NORMAL
REASON FOR REJECTION: NORMAL
RENAL EPITHELIAL, UA: ABNORMAL /HPF
RENAL EPITHELIAL, UA: NORMAL /HPF
RETIC %: 5.5 % (ref 0.5–2)
RETIC HEMOGLOBIN: ABNORMAL PG (ref 28.2–35.7)
SAMPLE SITE: ABNORMAL
SAMPLE SITE: ABNORMAL
SARS-COV-2, PCR: NORMAL
SARS-COV-2, RAPID: NORMAL
SARS-COV-2: NOT DETECTED
SEDIMENTATION RATE, ERYTHROCYTE: 114 MM (ref 0–20)
SEDIMENTATION RATE, ERYTHROCYTE: 47 MM (ref 0–30)
SEG NEUTROPHILS: 62 % (ref 36–66)
SEG NEUTROPHILS: 63 % (ref 36–65)
SEG NEUTROPHILS: 64 % (ref 36–65)
SEG NEUTROPHILS: 64 % (ref 36–65)
SEG NEUTROPHILS: 64 % (ref 36–66)
SEG NEUTROPHILS: 65 % (ref 36–66)
SEG NEUTROPHILS: 66 % (ref 36–66)
SEG NEUTROPHILS: 67 % (ref 36–65)
SEG NEUTROPHILS: 67 % (ref 36–65)
SEG NEUTROPHILS: 67 % (ref 36–66)
SEG NEUTROPHILS: 67 % (ref 36–66)
SEG NEUTROPHILS: 68 % (ref 36–65)
SEG NEUTROPHILS: 69 % (ref 36–65)
SEG NEUTROPHILS: 70 % (ref 36–65)
SEG NEUTROPHILS: 70 % (ref 36–65)
SEG NEUTROPHILS: 70 % (ref 36–66)
SEG NEUTROPHILS: 71 % (ref 36–65)
SEG NEUTROPHILS: 72 % (ref 36–65)
SEG NEUTROPHILS: 72 % (ref 36–66)
SEG NEUTROPHILS: 74 % (ref 36–66)
SEG NEUTROPHILS: 74 % (ref 36–66)
SEG NEUTROPHILS: 76 % (ref 36–66)
SEG NEUTROPHILS: 77 % (ref 36–66)
SEGMENTED NEUTROPHILS ABSOLUTE COUNT: 2.2 K/UL (ref 1.3–9.1)
SEGMENTED NEUTROPHILS ABSOLUTE COUNT: 2.2 K/UL (ref 1.5–8.1)
SEGMENTED NEUTROPHILS ABSOLUTE COUNT: 2.41 K/UL (ref 1.5–8.1)
SEGMENTED NEUTROPHILS ABSOLUTE COUNT: 2.54 K/UL (ref 1.3–9.1)
SEGMENTED NEUTROPHILS ABSOLUTE COUNT: 2.6 K/UL (ref 1.3–9.1)
SEGMENTED NEUTROPHILS ABSOLUTE COUNT: 2.61 K/UL (ref 1.3–9.1)
SEGMENTED NEUTROPHILS ABSOLUTE COUNT: 2.68 K/UL (ref 1.5–8.1)
SEGMENTED NEUTROPHILS ABSOLUTE COUNT: 2.71 K/UL (ref 1.5–8.1)
SEGMENTED NEUTROPHILS ABSOLUTE COUNT: 2.8 K/UL (ref 1.3–9.1)
SEGMENTED NEUTROPHILS ABSOLUTE COUNT: 2.82 K/UL (ref 1.5–8.1)
SEGMENTED NEUTROPHILS ABSOLUTE COUNT: 2.82 K/UL (ref 1.5–8.1)
SEGMENTED NEUTROPHILS ABSOLUTE COUNT: 2.83 K/UL (ref 1.5–8.1)
SEGMENTED NEUTROPHILS ABSOLUTE COUNT: 2.85 K/UL (ref 1.8–7.7)
SEGMENTED NEUTROPHILS ABSOLUTE COUNT: 2.87 K/UL (ref 1.5–8.1)
SEGMENTED NEUTROPHILS ABSOLUTE COUNT: 3 K/UL (ref 1.3–9.1)
SEGMENTED NEUTROPHILS ABSOLUTE COUNT: 3.02 K/UL (ref 1.5–8.1)
SEGMENTED NEUTROPHILS ABSOLUTE COUNT: 3.14 K/UL (ref 1.5–8.1)
SEGMENTED NEUTROPHILS ABSOLUTE COUNT: 3.31 K/UL (ref 1.3–9.1)
SEGMENTED NEUTROPHILS ABSOLUTE COUNT: 3.47 K/UL (ref 1.3–9.1)
SEGMENTED NEUTROPHILS ABSOLUTE COUNT: 3.47 K/UL (ref 1.3–9.1)
SEGMENTED NEUTROPHILS ABSOLUTE COUNT: 3.7 K/UL (ref 1.5–8.1)
SEGMENTED NEUTROPHILS ABSOLUTE COUNT: 3.99 K/UL (ref 1.3–9.1)
SEGMENTED NEUTROPHILS ABSOLUTE COUNT: 4.5 K/UL (ref 1.3–9.1)
SEND OUT REPORT: NORMAL
SERUM IFX INTERP: NORMAL
SERUM IFX INTERP: NORMAL
SMOOTH MUSCLE ANTIBODY: 9 UNITS (ref 0–19)
SODIUM BLD-SCNC: 139 MMOL/L (ref 135–144)
SODIUM BLD-SCNC: 140 MMOL/L (ref 135–144)
SODIUM BLD-SCNC: 142 MMOL/L (ref 135–144)
SODIUM BLD-SCNC: 143 MMOL/L (ref 135–144)
SODIUM BLD-SCNC: 143 MMOL/L (ref 135–144)
SODIUM BLD-SCNC: 144 MMOL/L (ref 135–144)
SODIUM BLD-SCNC: 145 MMOL/L (ref 135–144)
SODIUM BLD-SCNC: 146 MMOL/L (ref 135–144)
SODIUM BLD-SCNC: 147 MMOL/L (ref 135–144)
SODIUM BLD-SCNC: 148 MMOL/L (ref 135–144)
SODIUM BLD-SCNC: 148 MMOL/L (ref 135–144)
SODIUM,UR: 37 MMOL/L
SODIUM,UR: 49 MMOL/L
SODIUM,UR: 81 MMOL/L
SOURCE: NORMAL
SPECIFIC GRAVITY UA: 1.01 (ref 1–1.03)
SPECIFIC GRAVITY UA: 1.02 (ref 1–1.03)
SPECIFIC GRAVITY UA: 1.02 (ref 1–1.03)
SPECIMEN DESCRIPTION: NORMAL
SURGICAL PATHOLOGY REPORT: NORMAL
TEST NAME: NORMAL
TIME, STOOL #1: ABNORMAL
TIME, STOOL #1: NORMAL
TIME, STOOL #2: ABNORMAL
TIME, STOOL #2: NORMAL
TIME, STOOL #3: ABNORMAL
TIME, STOOL #3: NORMAL
TOTAL CK: 61 U/L (ref 26–192)
TOTAL CK: 64 U/L (ref 26–192)
TOTAL CO2, VENOUS: 22 MMOL/L (ref 23–30)
TOTAL CO2, VENOUS: 35 MMOL/L (ref 23–30)
TOTAL IRON BINDING CAPACITY: 217 UG/DL (ref 250–450)
TOTAL IRON BINDING CAPACITY: 230 UG/DL (ref 250–450)
TOTAL IRON BINDING CAPACITY: 260 UG/DL (ref 250–450)
TOTAL IRON BINDING CAPACITY: 284 UG/DL (ref 250–450)
TOTAL PROT. SUM,%: 100 % (ref 98–102)
TOTAL PROT. SUM,%: 100 % (ref 98–102)
TOTAL PROT. SUM: 5.7 G/DL (ref 6.3–8.2)
TOTAL PROT. SUM: 6.3 G/DL (ref 6.3–8.2)
TOTAL PROTEIN, URINE: 320 MG/DL
TOTAL PROTEIN, URINE: 620 MG/DL
TOTAL PROTEIN: 5.8 G/DL (ref 6.4–8.3)
TOTAL PROTEIN: 6.1 G/DL (ref 6.4–8.3)
TOTAL PROTEIN: 6.2 G/DL (ref 6.4–8.3)
TOTAL PROTEIN: 6.7 G/DL (ref 6.4–8.3)
TOTAL PROTEIN: 6.9 G/DL (ref 6.4–8.3)
TRANSFUSION STATUS: NORMAL
TRICHOMONAS: ABNORMAL
TRICHOMONAS: NORMAL
TRIGL SERPL-MCNC: 167 MG/DL
TRIGL SERPL-MCNC: 190 MG/DL
TROPONIN INTERP: ABNORMAL
TROPONIN T: ABNORMAL NG/ML
TROPONIN, HIGH SENSITIVITY: 30 NG/L (ref 0–14)
TROPONIN, HIGH SENSITIVITY: 31 NG/L (ref 0–14)
TROPONIN, HIGH SENSITIVITY: 32 NG/L (ref 0–14)
TROPONIN, HIGH SENSITIVITY: 35 NG/L (ref 0–14)
TROPONIN, HIGH SENSITIVITY: 37 NG/L (ref 0–14)
TROPONIN, HIGH SENSITIVITY: 39 NG/L (ref 0–14)
TSH SERPL DL<=0.05 MIU/L-ACNC: 2.19 MIU/L (ref 0.3–5)
TURBIDITY: ABNORMAL
TURBIDITY: CLEAR
UNIT DIVISION: 0
UNIT NUMBER: NORMAL
UNIT TAG COMMENT: NORMAL
UNIT TAG COMMENT: NORMAL
UNSATURATED IRON BINDING CAPACITY: 135 UG/DL (ref 112–347)
UNSATURATED IRON BINDING CAPACITY: 139 UG/DL (ref 112–347)
UNSATURATED IRON BINDING CAPACITY: 238 UG/DL (ref 112–347)
UNSATURATED IRON BINDING CAPACITY: 257 UG/DL (ref 112–347)
URINE HGB: ABNORMAL
URINE TOTAL PROTEIN CREATININE RATIO: 5.21 (ref 0–0.2)
UROBILINOGEN, URINE: NORMAL
VITAMIN B-12: 492 PG/ML (ref 232–1245)
VITAMIN B-12: 635 PG/ML (ref 232–1245)
VITAMIN B-12: 660 PG/ML (ref 232–1245)
VLDLC SERPL CALC-MCNC: ABNORMAL MG/DL (ref 1–30)
VLDLC SERPL CALC-MCNC: ABNORMAL MG/DL (ref 1–30)
WBC # BLD: 3 K/UL (ref 3.5–11)
WBC # BLD: 3.3 K/UL (ref 3.5–11.3)
WBC # BLD: 3.4 K/UL (ref 3.5–11)
WBC # BLD: 3.5 K/UL (ref 3.5–11)
WBC # BLD: 3.5 K/UL (ref 3.5–11)
WBC # BLD: 3.7 K/UL (ref 3.5–11.3)
WBC # BLD: 3.8 K/UL (ref 3.5–11.3)
WBC # BLD: 3.9 K/UL (ref 3.5–11)
WBC # BLD: 3.9 K/UL (ref 3.5–11)
WBC # BLD: 4.1 K/UL (ref 3.5–11)
WBC # BLD: 4.1 K/UL (ref 3.5–11.3)
WBC # BLD: 4.2 K/UL (ref 3.5–11)
WBC # BLD: 4.2 K/UL (ref 3.5–11.3)
WBC # BLD: 4.3 K/UL (ref 3.5–11.3)
WBC # BLD: 4.3 K/UL (ref 3.5–11.3)
WBC # BLD: 4.5 K/UL (ref 3.5–11.3)
WBC # BLD: 4.6 K/UL (ref 3.5–11)
WBC # BLD: 4.6 K/UL (ref 3.5–11)
WBC # BLD: 4.6 K/UL (ref 3.5–11.3)
WBC # BLD: 4.7 K/UL (ref 3.5–11)
WBC # BLD: 4.7 K/UL (ref 3.5–11)
WBC # BLD: 5 K/UL (ref 3.5–11)
WBC # BLD: 5.2 K/UL (ref 3.5–11.3)
WBC # BLD: 5.3 K/UL (ref 3.5–11)
WBC # BLD: 5.7 K/UL (ref 3.5–11)
WBC # BLD: 5.9 K/UL (ref 3.5–11)
WBC # BLD: ABNORMAL 10*3/UL
WBC UA: ABNORMAL /HPF
WBC UA: ABNORMAL /HPF (ref 0–5)
WBC UA: NORMAL /HPF (ref 0–5)
YEAST: ABNORMAL
YEAST: NORMAL
ZZ NTE CLEAN UP: ORDERED TEST: NORMAL
ZZ NTE CLEAN UP: ORDERED TEST: NORMAL
ZZ NTE WITH NAME CLEAN UP: SPECIMEN SOURCE: NORMAL
ZZ NTE WITH NAME CLEAN UP: SPECIMEN SOURCE: NORMAL

## 2020-01-01 PROCEDURE — 83516 IMMUNOASSAY NONANTIBODY: CPT

## 2020-01-01 PROCEDURE — 36415 COLL VENOUS BLD VENIPUNCTURE: CPT

## 2020-01-01 PROCEDURE — 73130 X-RAY EXAM OF HAND: CPT

## 2020-01-01 PROCEDURE — 6360000002 HC RX W HCPCS: Performed by: NURSE PRACTITIONER

## 2020-01-01 PROCEDURE — 97161 PT EVAL LOW COMPLEX 20 MIN: CPT

## 2020-01-01 PROCEDURE — 6360000002 HC RX W HCPCS: Performed by: INTERNAL MEDICINE

## 2020-01-01 PROCEDURE — 87624 HPV HI-RISK TYP POOLED RSLT: CPT

## 2020-01-01 PROCEDURE — 87491 CHLMYD TRACH DNA AMP PROBE: CPT

## 2020-01-01 PROCEDURE — 99232 SBSQ HOSP IP/OBS MODERATE 35: CPT | Performed by: INTERNAL MEDICINE

## 2020-01-01 PROCEDURE — 83540 ASSAY OF IRON: CPT

## 2020-01-01 PROCEDURE — 85610 PROTHROMBIN TIME: CPT

## 2020-01-01 PROCEDURE — 80048 BASIC METABOLIC PNL TOTAL CA: CPT

## 2020-01-01 PROCEDURE — 87510 GARDNER VAG DNA DIR PROBE: CPT

## 2020-01-01 PROCEDURE — 85246 CLOT FACTOR VIII VW ANTIGEN: CPT

## 2020-01-01 PROCEDURE — 88104 CYTOPATH FL NONGYN SMEARS: CPT

## 2020-01-01 PROCEDURE — 6370000000 HC RX 637 (ALT 250 FOR IP): Performed by: STUDENT IN AN ORGANIZED HEALTH CARE EDUCATION/TRAINING PROGRAM

## 2020-01-01 PROCEDURE — 92950 HEART/LUNG RESUSCITATION CPR: CPT

## 2020-01-01 PROCEDURE — P9016 RBC LEUKOCYTES REDUCED: HCPCS

## 2020-01-01 PROCEDURE — 82565 ASSAY OF CREATININE: CPT

## 2020-01-01 PROCEDURE — 93005 ELECTROCARDIOGRAM TRACING: CPT | Performed by: EMERGENCY MEDICINE

## 2020-01-01 PROCEDURE — 83874 ASSAY OF MYOGLOBIN: CPT

## 2020-01-01 PROCEDURE — 99232 SBSQ HOSP IP/OBS MODERATE 35: CPT | Performed by: STUDENT IN AN ORGANIZED HEALTH CARE EDUCATION/TRAINING PROGRAM

## 2020-01-01 PROCEDURE — 85025 COMPLETE CBC W/AUTO DIFF WBC: CPT

## 2020-01-01 PROCEDURE — 70551 MRI BRAIN STEM W/O DYE: CPT

## 2020-01-01 PROCEDURE — 76770 US EXAM ABDO BACK WALL COMP: CPT

## 2020-01-01 PROCEDURE — 86850 RBC ANTIBODY SCREEN: CPT

## 2020-01-01 PROCEDURE — 99222 1ST HOSP IP/OBS MODERATE 55: CPT | Performed by: INTERNAL MEDICINE

## 2020-01-01 PROCEDURE — 2580000003 HC RX 258: Performed by: STUDENT IN AN ORGANIZED HEALTH CARE EDUCATION/TRAINING PROGRAM

## 2020-01-01 PROCEDURE — 86901 BLOOD TYPING SEROLOGIC RH(D): CPT

## 2020-01-01 PROCEDURE — 6370000000 HC RX 637 (ALT 250 FOR IP): Performed by: NURSE PRACTITIONER

## 2020-01-01 PROCEDURE — 85014 HEMATOCRIT: CPT

## 2020-01-01 PROCEDURE — 85018 HEMOGLOBIN: CPT

## 2020-01-01 PROCEDURE — 0W3P8ZZ CONTROL BLEEDING IN GASTROINTESTINAL TRACT, VIA NATURAL OR ARTIFICIAL OPENING ENDOSCOPIC: ICD-10-PCS | Performed by: INTERNAL MEDICINE

## 2020-01-01 PROCEDURE — 99223 1ST HOSP IP/OBS HIGH 75: CPT | Performed by: INTERNAL MEDICINE

## 2020-01-01 PROCEDURE — 99214 OFFICE O/P EST MOD 30 MIN: CPT | Performed by: OBSTETRICS & GYNECOLOGY

## 2020-01-01 PROCEDURE — 80175 DRUG SCREEN QUAN LAMOTRIGINE: CPT

## 2020-01-01 PROCEDURE — 82746 ASSAY OF FOLIC ACID SERUM: CPT

## 2020-01-01 PROCEDURE — 83690 ASSAY OF LIPASE: CPT

## 2020-01-01 PROCEDURE — 85027 COMPLETE CBC AUTOMATED: CPT

## 2020-01-01 PROCEDURE — 99211 OFF/OP EST MAY X REQ PHY/QHP: CPT | Performed by: INTERNAL MEDICINE

## 2020-01-01 PROCEDURE — 6370000000 HC RX 637 (ALT 250 FOR IP): Performed by: INTERNAL MEDICINE

## 2020-01-01 PROCEDURE — 84295 ASSAY OF SERUM SODIUM: CPT

## 2020-01-01 PROCEDURE — 94770 HC ETCO2 MONITOR DAILY: CPT

## 2020-01-01 PROCEDURE — 82728 ASSAY OF FERRITIN: CPT

## 2020-01-01 PROCEDURE — 86665 EPSTEIN-BARR CAPSID VCA: CPT

## 2020-01-01 PROCEDURE — 83883 ASSAY NEPHELOMETRY NOT SPEC: CPT

## 2020-01-01 PROCEDURE — 84300 ASSAY OF URINE SODIUM: CPT

## 2020-01-01 PROCEDURE — 6360000002 HC RX W HCPCS: Performed by: EMERGENCY MEDICINE

## 2020-01-01 PROCEDURE — 2580000003 HC RX 258: Performed by: EMERGENCY MEDICINE

## 2020-01-01 PROCEDURE — 82947 ASSAY GLUCOSE BLOOD QUANT: CPT

## 2020-01-01 PROCEDURE — 99239 HOSP IP/OBS DSCHRG MGMT >30: CPT | Performed by: INTERNAL MEDICINE

## 2020-01-01 PROCEDURE — 6360000002 HC RX W HCPCS: Performed by: NURSE ANESTHETIST, CERTIFIED REGISTERED

## 2020-01-01 PROCEDURE — 97110 THERAPEUTIC EXERCISES: CPT

## 2020-01-01 PROCEDURE — 86334 IMMUNOFIX E-PHORESIS SERUM: CPT

## 2020-01-01 PROCEDURE — C9113 INJ PANTOPRAZOLE SODIUM, VIA: HCPCS | Performed by: INTERNAL MEDICINE

## 2020-01-01 PROCEDURE — 2580000003 HC RX 258: Performed by: INTERNAL MEDICINE

## 2020-01-01 PROCEDURE — 82550 ASSAY OF CK (CPK): CPT

## 2020-01-01 PROCEDURE — 3700000000 HC ANESTHESIA ATTENDED CARE: Performed by: INTERNAL MEDICINE

## 2020-01-01 PROCEDURE — 99233 SBSQ HOSP IP/OBS HIGH 50: CPT | Performed by: INTERNAL MEDICINE

## 2020-01-01 PROCEDURE — 80061 LIPID PANEL: CPT

## 2020-01-01 PROCEDURE — 84484 ASSAY OF TROPONIN QUANT: CPT

## 2020-01-01 PROCEDURE — 6360000002 HC RX W HCPCS: Performed by: RADIOLOGY

## 2020-01-01 PROCEDURE — 86880 COOMBS TEST DIRECT: CPT

## 2020-01-01 PROCEDURE — 99284 EMERGENCY DEPT VISIT MOD MDM: CPT

## 2020-01-01 PROCEDURE — 6360000002 HC RX W HCPCS: Performed by: STUDENT IN AN ORGANIZED HEALTH CARE EDUCATION/TRAINING PROGRAM

## 2020-01-01 PROCEDURE — 43255 EGD CONTROL BLEEDING ANY: CPT | Performed by: INTERNAL MEDICINE

## 2020-01-01 PROCEDURE — 86920 COMPATIBILITY TEST SPIN: CPT

## 2020-01-01 PROCEDURE — 82803 BLOOD GASES ANY COMBINATION: CPT

## 2020-01-01 PROCEDURE — 76856 US EXAM PELVIC COMPLETE: CPT

## 2020-01-01 PROCEDURE — 3051F HG A1C>EQUAL 7.0%<8.0%: CPT | Performed by: PHYSICIAN ASSISTANT

## 2020-01-01 PROCEDURE — 2060000000 HC ICU INTERMEDIATE R&B

## 2020-01-01 PROCEDURE — 87480 CANDIDA DNA DIR PROBE: CPT

## 2020-01-01 PROCEDURE — 86140 C-REACTIVE PROTEIN: CPT

## 2020-01-01 PROCEDURE — 7100000000 HC PACU RECOVERY - FIRST 15 MIN: Performed by: INTERNAL MEDICINE

## 2020-01-01 PROCEDURE — 2700000000 HC OXYGEN THERAPY PER DAY

## 2020-01-01 PROCEDURE — 87086 URINE CULTURE/COLONY COUNT: CPT

## 2020-01-01 PROCEDURE — 85651 RBC SED RATE NONAUTOMATED: CPT

## 2020-01-01 PROCEDURE — 88311 DECALCIFY TISSUE: CPT

## 2020-01-01 PROCEDURE — 3051F HG A1C>EQUAL 7.0%<8.0%: CPT | Performed by: OBSTETRICS & GYNECOLOGY

## 2020-01-01 PROCEDURE — 80053 COMPREHEN METABOLIC PANEL: CPT

## 2020-01-01 PROCEDURE — 76830 TRANSVAGINAL US NON-OB: CPT

## 2020-01-01 PROCEDURE — 96365 THER/PROPH/DIAG IV INF INIT: CPT

## 2020-01-01 PROCEDURE — 88264 CHROMOSOME ANALYSIS 20-25: CPT

## 2020-01-01 PROCEDURE — 99285 EMERGENCY DEPT VISIT HI MDM: CPT

## 2020-01-01 PROCEDURE — A9500 TC99M SESTAMIBI: HCPCS | Performed by: INTERNAL MEDICINE

## 2020-01-01 PROCEDURE — C9113 INJ PANTOPRAZOLE SODIUM, VIA: HCPCS | Performed by: STUDENT IN AN ORGANIZED HEALTH CARE EDUCATION/TRAINING PROGRAM

## 2020-01-01 PROCEDURE — 83735 ASSAY OF MAGNESIUM: CPT

## 2020-01-01 PROCEDURE — 99222 1ST HOSP IP/OBS MODERATE 55: CPT | Performed by: PSYCHIATRY & NEUROLOGY

## 2020-01-01 PROCEDURE — 93325 DOPPLER ECHO COLOR FLOW MAPG: CPT

## 2020-01-01 PROCEDURE — 82272 OCCULT BLD FECES 1-3 TESTS: CPT

## 2020-01-01 PROCEDURE — 83605 ASSAY OF LACTIC ACID: CPT

## 2020-01-01 PROCEDURE — 97530 THERAPEUTIC ACTIVITIES: CPT

## 2020-01-01 PROCEDURE — 84132 ASSAY OF SERUM POTASSIUM: CPT

## 2020-01-01 PROCEDURE — 3609012300 HC EGD BAND LIGATION ESOPHGEAL/GASTRIC VARICES: Performed by: INTERNAL MEDICINE

## 2020-01-01 PROCEDURE — 85055 RETICULATED PLATELET ASSAY: CPT

## 2020-01-01 PROCEDURE — 88305 TISSUE EXAM BY PATHOLOGIST: CPT

## 2020-01-01 PROCEDURE — 6360000004 HC RX CONTRAST MEDICATION: Performed by: STUDENT IN AN ORGANIZED HEALTH CARE EDUCATION/TRAINING PROGRAM

## 2020-01-01 PROCEDURE — 82570 ASSAY OF URINE CREATININE: CPT

## 2020-01-01 PROCEDURE — 86900 BLOOD TYPING SEROLOGIC ABO: CPT

## 2020-01-01 PROCEDURE — 1111F DSCHRG MED/CURRENT MED MERGE: CPT | Performed by: PHYSICIAN ASSISTANT

## 2020-01-01 PROCEDURE — 92523 SPEECH SOUND LANG COMPREHEN: CPT

## 2020-01-01 PROCEDURE — 7100000001 HC PACU RECOVERY - ADDTL 15 MIN: Performed by: INTERNAL MEDICINE

## 2020-01-01 PROCEDURE — 84165 PROTEIN E-PHORESIS SERUM: CPT

## 2020-01-01 PROCEDURE — U0003 INFECTIOUS AGENT DETECTION BY NUCLEIC ACID (DNA OR RNA); SEVERE ACUTE RESPIRATORY SYNDROME CORONAVIRUS 2 (SARS-COV-2) (CORONAVIRUS DISEASE [COVID-19]), AMPLIFIED PROBE TECHNIQUE, MAKING USE OF HIGH THROUGHPUT TECHNOLOGIES AS DESCRIBED BY CMS-2020-01-R: HCPCS

## 2020-01-01 PROCEDURE — 82436 ASSAY OF URINE CHLORIDE: CPT

## 2020-01-01 PROCEDURE — APPNB30 APP NON BILLABLE TIME 0-30 MINS: Performed by: NURSE PRACTITIONER

## 2020-01-01 PROCEDURE — 2500000003 HC RX 250 WO HCPCS: Performed by: NURSE ANESTHETIST, CERTIFIED REGISTERED

## 2020-01-01 PROCEDURE — 80074 ACUTE HEPATITIS PANEL: CPT

## 2020-01-01 PROCEDURE — 3430000000 HC RX DIAGNOSTIC RADIOPHARMACEUTICAL: Performed by: INTERNAL MEDICINE

## 2020-01-01 PROCEDURE — 70496 CT ANGIOGRAPHY HEAD: CPT

## 2020-01-01 PROCEDURE — 84075 ASSAY ALKALINE PHOSPHATASE: CPT

## 2020-01-01 PROCEDURE — 99213 OFFICE O/P EST LOW 20 MIN: CPT

## 2020-01-01 PROCEDURE — 86644 CMV ANTIBODY: CPT

## 2020-01-01 PROCEDURE — 85730 THROMBOPLASTIN TIME PARTIAL: CPT

## 2020-01-01 PROCEDURE — 99214 OFFICE O/P EST MOD 30 MIN: CPT | Performed by: PHYSICIAN ASSISTANT

## 2020-01-01 PROCEDURE — 97165 OT EVAL LOW COMPLEX 30 MIN: CPT

## 2020-01-01 PROCEDURE — 3700000001 HC ADD 15 MINUTES (ANESTHESIA): Performed by: INTERNAL MEDICINE

## 2020-01-01 PROCEDURE — 93010 ELECTROCARDIOGRAM REPORT: CPT | Performed by: INTERNAL MEDICINE

## 2020-01-01 PROCEDURE — 82668 ASSAY OF ERYTHROPOIETIN: CPT

## 2020-01-01 PROCEDURE — 99233 SBSQ HOSP IP/OBS HIGH 50: CPT | Performed by: OBSTETRICS & GYNECOLOGY

## 2020-01-01 PROCEDURE — 6370000000 HC RX 637 (ALT 250 FOR IP): Performed by: EMERGENCY MEDICINE

## 2020-01-01 PROCEDURE — 99214 OFFICE O/P EST MOD 30 MIN: CPT | Performed by: INTERNAL MEDICINE

## 2020-01-01 PROCEDURE — 36430 TRANSFUSION BLD/BLD COMPNT: CPT

## 2020-01-01 PROCEDURE — 97535 SELF CARE MNGMENT TRAINING: CPT

## 2020-01-01 PROCEDURE — 80177 DRUG SCRN QUAN LEVETIRACETAM: CPT

## 2020-01-01 PROCEDURE — 97129 THER IVNTJ 1ST 15 MIN: CPT

## 2020-01-01 PROCEDURE — 99204 OFFICE O/P NEW MOD 45 MIN: CPT | Performed by: OBSTETRICS & GYNECOLOGY

## 2020-01-01 PROCEDURE — 82435 ASSAY OF BLOOD CHLORIDE: CPT

## 2020-01-01 PROCEDURE — 99495 TRANSJ CARE MGMT MOD F2F 14D: CPT | Performed by: PHYSICIAN ASSISTANT

## 2020-01-01 PROCEDURE — 2580000003 HC RX 258: Performed by: NURSE PRACTITIONER

## 2020-01-01 PROCEDURE — 99223 1ST HOSP IP/OBS HIGH 75: CPT | Performed by: STUDENT IN AN ORGANIZED HEALTH CARE EDUCATION/TRAINING PROGRAM

## 2020-01-01 PROCEDURE — 93970 EXTREMITY STUDY: CPT

## 2020-01-01 PROCEDURE — 81001 URINALYSIS AUTO W/SCOPE: CPT

## 2020-01-01 PROCEDURE — 94761 N-INVAS EAR/PLS OXIMETRY MLT: CPT

## 2020-01-01 PROCEDURE — 78452 HT MUSCLE IMAGE SPECT MULT: CPT

## 2020-01-01 PROCEDURE — 72125 CT NECK SPINE W/O DYE: CPT

## 2020-01-01 PROCEDURE — 86905 BLOOD TYPING RBC ANTIGENS: CPT

## 2020-01-01 PROCEDURE — 84156 ASSAY OF PROTEIN URINE: CPT

## 2020-01-01 PROCEDURE — 86978 RBC PRETREATMENT SERUM: CPT

## 2020-01-01 PROCEDURE — 6370000000 HC RX 637 (ALT 250 FOR IP): Performed by: FAMILY MEDICINE

## 2020-01-01 PROCEDURE — 88175 CYTOPATH C/V AUTO FLUID REDO: CPT

## 2020-01-01 PROCEDURE — 82390 ASSAY OF CERULOPLASMIN: CPT

## 2020-01-01 PROCEDURE — 93017 CV STRESS TEST TRACING ONLY: CPT

## 2020-01-01 PROCEDURE — 76857 US EXAM PELVIC LIMITED: CPT

## 2020-01-01 PROCEDURE — 07DR3ZX EXTRACTION OF ILIAC BONE MARROW, PERCUTANEOUS APPROACH, DIAGNOSTIC: ICD-10-PCS | Performed by: RADIOLOGY

## 2020-01-01 PROCEDURE — 87591 N.GONORRHOEAE DNA AMP PROB: CPT

## 2020-01-01 PROCEDURE — 2000000000 HC ICU R&B

## 2020-01-01 PROCEDURE — 86870 RBC ANTIBODY IDENTIFICATION: CPT

## 2020-01-01 PROCEDURE — 97116 GAIT TRAINING THERAPY: CPT

## 2020-01-01 PROCEDURE — 84443 ASSAY THYROID STIM HORMONE: CPT

## 2020-01-01 PROCEDURE — 91110 GI TRC IMG INTRAL ESOPH-ILE: CPT | Performed by: INTERNAL MEDICINE

## 2020-01-01 PROCEDURE — 97130 THER IVNTJ EA ADDL 15 MIN: CPT

## 2020-01-01 PROCEDURE — 82784 ASSAY IGA/IGD/IGG/IGM EACH: CPT

## 2020-01-01 PROCEDURE — 83036 HEMOGLOBIN GLYCOSYLATED A1C: CPT

## 2020-01-01 PROCEDURE — 88108 CYTOPATH CONCENTRATE TECH: CPT

## 2020-01-01 PROCEDURE — 93880 EXTRACRANIAL BILAT STUDY: CPT

## 2020-01-01 PROCEDURE — 93308 TTE F-UP OR LMTD: CPT

## 2020-01-01 PROCEDURE — 72128 CT CHEST SPINE W/O DYE: CPT

## 2020-01-01 PROCEDURE — 85045 AUTOMATED RETICULOCYTE COUNT: CPT

## 2020-01-01 PROCEDURE — 2500000003 HC RX 250 WO HCPCS: Performed by: RADIOLOGY

## 2020-01-01 PROCEDURE — 2500000003 HC RX 250 WO HCPCS

## 2020-01-01 PROCEDURE — 97166 OT EVAL MOD COMPLEX 45 MIN: CPT

## 2020-01-01 PROCEDURE — 2709999900 CT BIOPSY BONE MARROW

## 2020-01-01 PROCEDURE — G0328 FECAL BLOOD SCRN IMMUNOASSAY: HCPCS

## 2020-01-01 PROCEDURE — 86645 CMV ANTIBODY IGM: CPT

## 2020-01-01 PROCEDURE — 87660 TRICHOMONAS VAGIN DIR PROBE: CPT

## 2020-01-01 PROCEDURE — 99215 OFFICE O/P EST HI 40 MIN: CPT | Performed by: PHYSICIAN ASSISTANT

## 2020-01-01 PROCEDURE — 2709999900 HC NON-CHARGEABLE SUPPLY: Performed by: INTERNAL MEDICINE

## 2020-01-01 PROCEDURE — 83036 HEMOGLOBIN GLYCOSYLATED A1C: CPT | Performed by: PHYSICIAN ASSISTANT

## 2020-01-01 PROCEDURE — 99291 CRITICAL CARE FIRST HOUR: CPT | Performed by: PSYCHIATRY & NEUROLOGY

## 2020-01-01 PROCEDURE — G0438 PPPS, INITIAL VISIT: HCPCS | Performed by: PHYSICIAN ASSISTANT

## 2020-01-01 PROCEDURE — 74176 CT ABD & PELVIS W/O CONTRAST: CPT

## 2020-01-01 PROCEDURE — 76705 ECHO EXAM OF ABDOMEN: CPT

## 2020-01-01 PROCEDURE — 82105 ALPHA-FETOPROTEIN SERUM: CPT

## 2020-01-01 PROCEDURE — 83010 ASSAY OF HAPTOGLOBIN QUANT: CPT

## 2020-01-01 PROCEDURE — 82103 ALPHA-1-ANTITRYPSIN TOTAL: CPT

## 2020-01-01 PROCEDURE — 84155 ASSAY OF PROTEIN SERUM: CPT

## 2020-01-01 PROCEDURE — 86902 BLOOD TYPE ANTIGEN DONOR EA: CPT

## 2020-01-01 PROCEDURE — 83880 ASSAY OF NATRIURETIC PEPTIDE: CPT

## 2020-01-01 PROCEDURE — 2720000010 HC SURG SUPPLY STERILE: Performed by: INTERNAL MEDICINE

## 2020-01-01 PROCEDURE — 82607 VITAMIN B-12: CPT

## 2020-01-01 PROCEDURE — 06L38CZ OCCLUSION OF ESOPHAGEAL VEIN WITH EXTRALUMINAL DEVICE, VIA NATURAL OR ARTIFICIAL OPENING ENDOSCOPIC: ICD-10-PCS | Performed by: INTERNAL MEDICINE

## 2020-01-01 PROCEDURE — 86664 EPSTEIN-BARR NUCLEAR ANTIGEN: CPT

## 2020-01-01 PROCEDURE — 86663 EPSTEIN-BARR ANTIBODY: CPT

## 2020-01-01 PROCEDURE — 82330 ASSAY OF CALCIUM: CPT

## 2020-01-01 PROCEDURE — 85240 CLOT FACTOR VIII AHG 1 STAGE: CPT

## 2020-01-01 PROCEDURE — 85652 RBC SED RATE AUTOMATED: CPT

## 2020-01-01 PROCEDURE — 83550 IRON BINDING TEST: CPT

## 2020-01-01 PROCEDURE — 82553 CREATINE MB FRACTION: CPT

## 2020-01-01 PROCEDURE — 83615 LACTATE (LD) (LDH) ENZYME: CPT

## 2020-01-01 PROCEDURE — 99233 SBSQ HOSP IP/OBS HIGH 50: CPT | Performed by: STUDENT IN AN ORGANIZED HEALTH CARE EDUCATION/TRAINING PROGRAM

## 2020-01-01 PROCEDURE — 84080 ASSAY ALKALINE PHOSPHATASES: CPT

## 2020-01-01 PROCEDURE — 70450 CT HEAD/BRAIN W/O DYE: CPT

## 2020-01-01 PROCEDURE — 70544 MR ANGIOGRAPHY HEAD W/O DYE: CPT

## 2020-01-01 PROCEDURE — 71046 X-RAY EXAM CHEST 2 VIEWS: CPT

## 2020-01-01 PROCEDURE — 84145 PROCALCITONIN (PCT): CPT

## 2020-01-01 PROCEDURE — 70547 MR ANGIOGRAPHY NECK W/O DYE: CPT

## 2020-01-01 PROCEDURE — 3051F HG A1C>EQUAL 7.0%<8.0%: CPT | Performed by: INTERNAL MEDICINE

## 2020-01-01 PROCEDURE — 86376 MICROSOMAL ANTIBODY EACH: CPT

## 2020-01-01 PROCEDURE — 88185 FLOWCYTOMETRY/TC ADD-ON: CPT

## 2020-01-01 PROCEDURE — 97162 PT EVAL MOD COMPLEX 30 MIN: CPT

## 2020-01-01 PROCEDURE — C9113 INJ PANTOPRAZOLE SODIUM, VIA: HCPCS | Performed by: EMERGENCY MEDICINE

## 2020-01-01 PROCEDURE — 3609012400 HC EGD TRANSORAL BIOPSY SINGLE/MULTIPLE: Performed by: INTERNAL MEDICINE

## 2020-01-01 PROCEDURE — 2580000003 HC RX 258

## 2020-01-01 PROCEDURE — 88184 FLOWCYTOMETRY/ TC 1 MARKER: CPT

## 2020-01-01 PROCEDURE — 82140 ASSAY OF AMMONIA: CPT

## 2020-01-01 PROCEDURE — 86038 ANTINUCLEAR ANTIBODIES: CPT

## 2020-01-01 PROCEDURE — 93320 DOPPLER ECHO COMPLETE: CPT

## 2020-01-01 PROCEDURE — 71045 X-RAY EXAM CHEST 1 VIEW: CPT

## 2020-01-01 PROCEDURE — 6360000002 HC RX W HCPCS

## 2020-01-01 PROCEDURE — 93306 TTE W/DOPPLER COMPLETE: CPT

## 2020-01-01 PROCEDURE — 80076 HEPATIC FUNCTION PANEL: CPT

## 2020-01-01 PROCEDURE — 2500000003 HC RX 250 WO HCPCS: Performed by: STUDENT IN AN ORGANIZED HEALTH CARE EDUCATION/TRAINING PROGRAM

## 2020-01-01 RX ORDER — LISINOPRIL AND HYDROCHLOROTHIAZIDE 12.5; 1 MG/1; MG/1
1 TABLET ORAL DAILY
Status: DISCONTINUED | OUTPATIENT
Start: 2020-01-01 | End: 2020-01-01

## 2020-01-01 RX ORDER — ATROPINE SULFATE 0.1 MG/ML
0.5 INJECTION INTRAVENOUS EVERY 5 MIN PRN
Status: ACTIVE | OUTPATIENT
Start: 2020-01-01 | End: 2020-01-01

## 2020-01-01 RX ORDER — SODIUM CHLORIDE 0.9 % (FLUSH) 0.9 %
10 SYRINGE (ML) INJECTION PRN
Status: DISCONTINUED | OUTPATIENT
Start: 2020-01-01 | End: 2020-01-01 | Stop reason: HOSPADM

## 2020-01-01 RX ORDER — HEPARIN SODIUM 5000 [USP'U]/ML
5000 INJECTION, SOLUTION INTRAVENOUS; SUBCUTANEOUS EVERY 8 HOURS SCHEDULED
Status: DISCONTINUED | OUTPATIENT
Start: 2020-01-01 | End: 2020-01-01 | Stop reason: HOSPADM

## 2020-01-01 RX ORDER — CERAMIDES 1,3,6-II
1 LOTION (ML) TOPICAL 2 TIMES DAILY
Status: DISCONTINUED | OUTPATIENT
Start: 2020-01-01 | End: 2020-01-01 | Stop reason: HOSPADM

## 2020-01-01 RX ORDER — LISINOPRIL AND HYDROCHLOROTHIAZIDE 12.5; 1 MG/1; MG/1
1 TABLET ORAL DAILY
Status: DISCONTINUED | OUTPATIENT
Start: 2020-01-01 | End: 2020-01-01 | Stop reason: CLARIF

## 2020-01-01 RX ORDER — HYDRALAZINE HYDROCHLORIDE 25 MG/1
25 TABLET, FILM COATED ORAL EVERY 8 HOURS SCHEDULED
Qty: 90 TABLET | Refills: 3 | Status: SHIPPED | OUTPATIENT
Start: 2020-01-01 | End: 2020-01-01 | Stop reason: HOSPADM

## 2020-01-01 RX ORDER — HYDROCHLOROTHIAZIDE 25 MG/1
12.5 TABLET ORAL DAILY
Status: DISCONTINUED | OUTPATIENT
Start: 2020-01-01 | End: 2020-01-01 | Stop reason: HOSPADM

## 2020-01-01 RX ORDER — GABAPENTIN 100 MG/1
100 CAPSULE ORAL 2 TIMES DAILY
Status: DISCONTINUED | OUTPATIENT
Start: 2020-01-01 | End: 2020-01-01 | Stop reason: HOSPADM

## 2020-01-01 RX ORDER — HYDRALAZINE HYDROCHLORIDE 10 MG/1
10 TABLET, FILM COATED ORAL EVERY 8 HOURS SCHEDULED
Status: DISCONTINUED | OUTPATIENT
Start: 2020-01-01 | End: 2020-01-01

## 2020-01-01 RX ORDER — LAMOTRIGINE 100 MG/1
200 TABLET ORAL 2 TIMES DAILY
Status: DISCONTINUED | OUTPATIENT
Start: 2020-01-01 | End: 2020-01-01 | Stop reason: HOSPADM

## 2020-01-01 RX ORDER — LEVETIRACETAM 500 MG/1
500 TABLET ORAL 2 TIMES DAILY
Status: DISCONTINUED | OUTPATIENT
Start: 2020-01-01 | End: 2020-01-01

## 2020-01-01 RX ORDER — NADOLOL 20 MG/1
20 TABLET ORAL DAILY
Status: DISCONTINUED | OUTPATIENT
Start: 2020-01-01 | End: 2020-01-01 | Stop reason: HOSPADM

## 2020-01-01 RX ORDER — CETIRIZINE HYDROCHLORIDE 5 MG/1
5 TABLET ORAL DAILY
Status: DISCONTINUED | OUTPATIENT
Start: 2020-01-01 | End: 2020-01-01 | Stop reason: HOSPADM

## 2020-01-01 RX ORDER — DEXTROSE MONOHYDRATE 25 G/50ML
12.5 INJECTION, SOLUTION INTRAVENOUS PRN
Status: DISCONTINUED | OUTPATIENT
Start: 2020-01-01 | End: 2020-01-01 | Stop reason: HOSPADM

## 2020-01-01 RX ORDER — SUCRALFATE 1 G/1
1 TABLET ORAL EVERY 6 HOURS SCHEDULED
Status: DISCONTINUED | OUTPATIENT
Start: 2020-01-01 | End: 2020-01-01 | Stop reason: HOSPADM

## 2020-01-01 RX ORDER — PROPOFOL 10 MG/ML
INJECTION, EMULSION INTRAVENOUS PRN
Status: DISCONTINUED | OUTPATIENT
Start: 2020-01-01 | End: 2020-01-01 | Stop reason: SDUPTHER

## 2020-01-01 RX ORDER — SODIUM CHLORIDE 0.9 % (FLUSH) 0.9 %
10 SYRINGE (ML) INJECTION EVERY 12 HOURS SCHEDULED
Status: DISCONTINUED | OUTPATIENT
Start: 2020-01-01 | End: 2020-01-01 | Stop reason: HOSPADM

## 2020-01-01 RX ORDER — SODIUM CHLORIDE 450 MG/100ML
INJECTION, SOLUTION INTRAVENOUS CONTINUOUS
Status: DISCONTINUED | OUTPATIENT
Start: 2020-01-01 | End: 2020-01-01

## 2020-01-01 RX ORDER — HYDROCHLOROTHIAZIDE 12.5 MG/1
12.5 CAPSULE, GELATIN COATED ORAL DAILY
Status: DISCONTINUED | OUTPATIENT
Start: 2020-01-01 | End: 2020-01-01

## 2020-01-01 RX ORDER — PROMETHAZINE HYDROCHLORIDE 25 MG/1
12.5 TABLET ORAL EVERY 6 HOURS PRN
Status: DISCONTINUED | OUTPATIENT
Start: 2020-01-01 | End: 2020-01-01 | Stop reason: HOSPADM

## 2020-01-01 RX ORDER — PAROXETINE HYDROCHLORIDE 20 MG/1
20 TABLET, FILM COATED ORAL EVERY MORNING
Status: DISCONTINUED | OUTPATIENT
Start: 2020-01-01 | End: 2020-01-01 | Stop reason: HOSPADM

## 2020-01-01 RX ORDER — CLOTRIMAZOLE AND BETAMETHASONE DIPROPIONATE 10; .64 MG/G; MG/G
CREAM TOPICAL
Qty: 15 G | Refills: 0 | Status: SHIPPED | OUTPATIENT
Start: 2020-01-01

## 2020-01-01 RX ORDER — ATORVASTATIN CALCIUM 40 MG/1
40 TABLET, FILM COATED ORAL DAILY
Status: DISCONTINUED | OUTPATIENT
Start: 2020-01-01 | End: 2020-01-01

## 2020-01-01 RX ORDER — DIPHENHYDRAMINE HYDROCHLORIDE 50 MG/ML
25 INJECTION INTRAMUSCULAR; INTRAVENOUS EVERY 6 HOURS PRN
Status: DISCONTINUED | OUTPATIENT
Start: 2020-01-01 | End: 2020-01-01 | Stop reason: HOSPADM

## 2020-01-01 RX ORDER — SODIUM CHLORIDE 0.9 % (FLUSH) 0.9 %
10 SYRINGE (ML) INJECTION PRN
Status: ACTIVE | OUTPATIENT
Start: 2020-01-01 | End: 2020-01-01

## 2020-01-01 RX ORDER — ATORVASTATIN CALCIUM 20 MG/1
20 TABLET, FILM COATED ORAL DAILY
Status: DISCONTINUED | OUTPATIENT
Start: 2020-01-01 | End: 2020-01-01 | Stop reason: HOSPADM

## 2020-01-01 RX ORDER — LEVETIRACETAM 750 MG/1
750 TABLET ORAL 2 TIMES DAILY
Status: ON HOLD | COMMUNITY
End: 2020-01-01 | Stop reason: HOSPADM

## 2020-01-01 RX ORDER — DEXTROSE MONOHYDRATE 50 MG/ML
100 INJECTION, SOLUTION INTRAVENOUS PRN
Status: DISCONTINUED | OUTPATIENT
Start: 2020-01-01 | End: 2020-01-01 | Stop reason: HOSPADM

## 2020-01-01 RX ORDER — BUMETANIDE 1 MG/1
1 TABLET ORAL 3 TIMES DAILY
Status: ON HOLD | COMMUNITY
End: 2020-01-01 | Stop reason: HOSPADM

## 2020-01-01 RX ORDER — ATORVASTATIN CALCIUM 10 MG/1
10 TABLET, FILM COATED ORAL DAILY
Status: DISCONTINUED | OUTPATIENT
Start: 2020-01-01 | End: 2020-01-01

## 2020-01-01 RX ORDER — ATORVASTATIN CALCIUM 10 MG/1
10 TABLET, FILM COATED ORAL DAILY
Status: DISCONTINUED | OUTPATIENT
Start: 2020-01-01 | End: 2020-01-01 | Stop reason: HOSPADM

## 2020-01-01 RX ORDER — GLUCOSAMINE HCL/CHONDROITIN SU 500-400 MG
CAPSULE ORAL
Qty: 100 STRIP | Refills: 0 | Status: SHIPPED | OUTPATIENT
Start: 2020-01-01

## 2020-01-01 RX ORDER — ACETAMINOPHEN 325 MG/1
650 TABLET ORAL EVERY 6 HOURS PRN
Status: DISCONTINUED | OUTPATIENT
Start: 2020-01-01 | End: 2020-01-01 | Stop reason: HOSPADM

## 2020-01-01 RX ORDER — ONDANSETRON 2 MG/ML
4 INJECTION INTRAMUSCULAR; INTRAVENOUS EVERY 6 HOURS PRN
Status: DISCONTINUED | OUTPATIENT
Start: 2020-01-01 | End: 2020-01-01 | Stop reason: HOSPADM

## 2020-01-01 RX ORDER — PANTOPRAZOLE SODIUM 40 MG/10ML
40 INJECTION, POWDER, LYOPHILIZED, FOR SOLUTION INTRAVENOUS 2 TIMES DAILY
Status: DISCONTINUED | OUTPATIENT
Start: 2020-01-01 | End: 2020-01-01

## 2020-01-01 RX ORDER — ACETAMINOPHEN 650 MG
TABLET, EXTENDED RELEASE ORAL DAILY
Status: ON HOLD | COMMUNITY
End: 2020-01-01

## 2020-01-01 RX ORDER — PRAMIPEXOLE DIHYDROCHLORIDE 1 MG/1
1 TABLET ORAL NIGHTLY
Status: DISCONTINUED | OUTPATIENT
Start: 2020-01-01 | End: 2020-01-01 | Stop reason: HOSPADM

## 2020-01-01 RX ORDER — BUMETANIDE 1 MG/1
1 TABLET ORAL 3 TIMES DAILY
Status: DISCONTINUED | OUTPATIENT
Start: 2020-01-01 | End: 2020-01-01 | Stop reason: HOSPADM

## 2020-01-01 RX ORDER — METOPROLOL TARTRATE 5 MG/5ML
INJECTION INTRAVENOUS PRN
Status: DISCONTINUED | OUTPATIENT
Start: 2020-01-01 | End: 2020-01-01 | Stop reason: SDUPTHER

## 2020-01-01 RX ORDER — NADOLOL 20 MG/1
20 TABLET ORAL DAILY
Qty: 30 TABLET | Refills: 3 | Status: SHIPPED | OUTPATIENT
Start: 2020-01-01

## 2020-01-01 RX ORDER — METOPROLOL TARTRATE 5 MG/5ML
5 INJECTION INTRAVENOUS EVERY 5 MIN PRN
Status: ACTIVE | OUTPATIENT
Start: 2020-01-01 | End: 2020-01-01

## 2020-01-01 RX ORDER — FENTANYL CITRATE 50 UG/ML
INJECTION, SOLUTION INTRAMUSCULAR; INTRAVENOUS
Status: COMPLETED | OUTPATIENT
Start: 2020-01-01 | End: 2020-01-01

## 2020-01-01 RX ORDER — LANCETS 30 GAUGE
1 EACH MISCELLANEOUS 4 TIMES DAILY
Qty: 200 EACH | Refills: 0 | Status: SHIPPED | OUTPATIENT
Start: 2020-01-01

## 2020-01-01 RX ORDER — ACETAMINOPHEN 650 MG/1
650 SUPPOSITORY RECTAL EVERY 6 HOURS PRN
Status: DISCONTINUED | OUTPATIENT
Start: 2020-01-01 | End: 2020-01-01 | Stop reason: HOSPADM

## 2020-01-01 RX ORDER — OXYCODONE HYDROCHLORIDE AND ACETAMINOPHEN 5; 325 MG/1; MG/1
1 TABLET ORAL EVERY 6 HOURS PRN
COMMUNITY
End: 2020-01-01

## 2020-01-01 RX ORDER — SODIUM CHLORIDE 9 MG/ML
INJECTION, SOLUTION INTRAVENOUS CONTINUOUS
Status: CANCELLED | OUTPATIENT
Start: 2020-01-01

## 2020-01-01 RX ORDER — SODIUM CHLORIDE, SODIUM LACTATE, POTASSIUM CHLORIDE, CALCIUM CHLORIDE 600; 310; 30; 20 MG/100ML; MG/100ML; MG/100ML; MG/100ML
INJECTION, SOLUTION INTRAVENOUS CONTINUOUS
Status: DISCONTINUED | OUTPATIENT
Start: 2020-01-01 | End: 2020-01-01

## 2020-01-01 RX ORDER — ATORVASTATIN CALCIUM 80 MG/1
80 TABLET, FILM COATED ORAL NIGHTLY
Status: DISCONTINUED | OUTPATIENT
Start: 2020-01-01 | End: 2020-01-01 | Stop reason: HOSPADM

## 2020-01-01 RX ORDER — INSULIN GLARGINE 100 [IU]/ML
40 INJECTION, SOLUTION SUBCUTANEOUS 2 TIMES DAILY
Status: DISCONTINUED | OUTPATIENT
Start: 2020-01-01 | End: 2020-01-01 | Stop reason: HOSPADM

## 2020-01-01 RX ORDER — SODIUM CHLORIDE 9 MG/ML
INJECTION, SOLUTION INTRAVENOUS CONTINUOUS
Status: DISCONTINUED | OUTPATIENT
Start: 2020-01-01 | End: 2020-01-01 | Stop reason: HOSPADM

## 2020-01-01 RX ORDER — NICOTINE POLACRILEX 4 MG
15 LOZENGE BUCCAL PRN
Status: DISCONTINUED | OUTPATIENT
Start: 2020-01-01 | End: 2020-01-01 | Stop reason: HOSPADM

## 2020-01-01 RX ORDER — POTASSIUM CHLORIDE 7.45 MG/ML
10 INJECTION INTRAVENOUS PRN
Status: DISCONTINUED | OUTPATIENT
Start: 2020-01-01 | End: 2020-01-01 | Stop reason: HOSPADM

## 2020-01-01 RX ORDER — INSULIN GLARGINE 100 [IU]/ML
20 INJECTION, SOLUTION SUBCUTANEOUS 2 TIMES DAILY
Status: DISCONTINUED | OUTPATIENT
Start: 2020-01-01 | End: 2020-01-01

## 2020-01-01 RX ORDER — SODIUM CHLORIDE 0.9 % (FLUSH) 0.9 %
10 SYRINGE (ML) INJECTION PRN
Status: DISCONTINUED | OUTPATIENT
Start: 2020-01-01 | End: 2020-01-01

## 2020-01-01 RX ORDER — INSULIN GLARGINE 100 [IU]/ML
60 INJECTION, SOLUTION SUBCUTANEOUS 2 TIMES DAILY
COMMUNITY

## 2020-01-01 RX ORDER — GLUCOSAMINE HCL/CHONDROITIN SU 500-400 MG
CAPSULE ORAL
Qty: 100 STRIP | Refills: 11 | Status: SHIPPED | OUTPATIENT
Start: 2020-01-01 | End: 2020-01-01 | Stop reason: SDUPTHER

## 2020-01-01 RX ORDER — POTASSIUM CHLORIDE 20 MEQ/1
40 TABLET, EXTENDED RELEASE ORAL PRN
Status: DISCONTINUED | OUTPATIENT
Start: 2020-01-01 | End: 2020-01-01 | Stop reason: HOSPADM

## 2020-01-01 RX ORDER — LOPERAMIDE HYDROCHLORIDE 2 MG/1
2 CAPSULE ORAL 4 TIMES DAILY PRN
Qty: 60 CAPSULE | Refills: 2 | Status: ON HOLD | OUTPATIENT
Start: 2020-01-01 | End: 2020-01-01

## 2020-01-01 RX ORDER — CEPHALEXIN 500 MG/1
500 CAPSULE ORAL 3 TIMES DAILY
Qty: 21 CAPSULE | Refills: 0 | Status: ON HOLD | OUTPATIENT
Start: 2020-01-01 | End: 2020-01-01 | Stop reason: HOSPADM

## 2020-01-01 RX ORDER — LISINOPRIL 10 MG/1
10 TABLET ORAL DAILY
Status: DISCONTINUED | OUTPATIENT
Start: 2020-01-01 | End: 2020-01-01 | Stop reason: HOSPADM

## 2020-01-01 RX ORDER — POLYETHYLENE GLYCOL 3350 17 G/17G
17 POWDER, FOR SOLUTION ORAL DAILY PRN
Status: DISCONTINUED | OUTPATIENT
Start: 2020-01-01 | End: 2020-01-01 | Stop reason: HOSPADM

## 2020-01-01 RX ORDER — LEVETIRACETAM 500 MG/1
500 TABLET ORAL 2 TIMES DAILY
Status: DISCONTINUED | OUTPATIENT
Start: 2020-01-01 | End: 2020-01-01 | Stop reason: HOSPADM

## 2020-01-01 RX ORDER — BUMETANIDE 1 MG/1
1 TABLET ORAL 3 TIMES DAILY
Qty: 90 TABLET | Refills: 2 | Status: SHIPPED | OUTPATIENT
Start: 2020-01-01 | End: 2020-01-01 | Stop reason: DRUGHIGH

## 2020-01-01 RX ORDER — INSULIN GLARGINE 100 [IU]/ML
20 INJECTION, SOLUTION SUBCUTANEOUS 2 TIMES DAILY
Status: DISCONTINUED | OUTPATIENT
Start: 2020-01-01 | End: 2020-01-01 | Stop reason: HOSPADM

## 2020-01-01 RX ORDER — LABETALOL 20 MG/4 ML (5 MG/ML) INTRAVENOUS SYRINGE
10 EVERY 4 HOURS PRN
Status: DISCONTINUED | OUTPATIENT
Start: 2020-01-01 | End: 2020-01-01 | Stop reason: HOSPADM

## 2020-01-01 RX ORDER — BUSPIRONE HYDROCHLORIDE 10 MG/1
10 TABLET ORAL 3 TIMES DAILY
Status: DISCONTINUED | OUTPATIENT
Start: 2020-01-01 | End: 2020-01-01 | Stop reason: HOSPADM

## 2020-01-01 RX ORDER — SODIUM CHLORIDE 9 MG/ML
10 INJECTION INTRAVENOUS DAILY
Status: DISCONTINUED | OUTPATIENT
Start: 2020-01-01 | End: 2020-01-01

## 2020-01-01 RX ORDER — BUMETANIDE 2 MG/1
2 TABLET ORAL 2 TIMES DAILY
Qty: 30 TABLET | Refills: 3 | Status: SHIPPED | OUTPATIENT
Start: 2020-01-01

## 2020-01-01 RX ORDER — PANTOPRAZOLE SODIUM 40 MG/10ML
40 INJECTION, POWDER, LYOPHILIZED, FOR SOLUTION INTRAVENOUS DAILY
Status: DISCONTINUED | OUTPATIENT
Start: 2020-01-01 | End: 2020-01-01

## 2020-01-01 RX ORDER — BLOOD-GLUCOSE METER
1 KIT MISCELLANEOUS DAILY
Qty: 1 KIT | Refills: 0 | Status: SHIPPED | OUTPATIENT
Start: 2020-01-01

## 2020-01-01 RX ORDER — SODIUM CHLORIDE 0.9 % (FLUSH) 0.9 %
10 SYRINGE (ML) INJECTION PRN
Status: CANCELLED | OUTPATIENT
Start: 2020-01-01

## 2020-01-01 RX ORDER — HYDRALAZINE HYDROCHLORIDE 50 MG/1
50 TABLET, FILM COATED ORAL EVERY 8 HOURS SCHEDULED
Status: DISCONTINUED | OUTPATIENT
Start: 2020-01-01 | End: 2020-01-01 | Stop reason: HOSPADM

## 2020-01-01 RX ORDER — ATORVASTATIN CALCIUM 80 MG/1
80 TABLET, FILM COATED ORAL NIGHTLY
Qty: 30 TABLET | Refills: 3 | Status: SHIPPED | OUTPATIENT
Start: 2020-01-01

## 2020-01-01 RX ORDER — ERGOCALCIFEROL 1.25 MG/1
50000 CAPSULE ORAL WEEKLY
Status: DISCONTINUED | OUTPATIENT
Start: 2020-01-01 | End: 2020-01-01 | Stop reason: HOSPADM

## 2020-01-01 RX ORDER — AMLODIPINE BESYLATE 10 MG/1
10 TABLET ORAL DAILY
Qty: 30 TABLET | Refills: 3 | Status: SHIPPED | OUTPATIENT
Start: 2020-01-01

## 2020-01-01 RX ORDER — SODIUM CHLORIDE 9 MG/ML
10 INJECTION INTRAVENOUS DAILY
Status: DISCONTINUED | OUTPATIENT
Start: 2020-01-01 | End: 2020-01-01 | Stop reason: SDUPTHER

## 2020-01-01 RX ORDER — FUROSEMIDE 20 MG/1
20 TABLET ORAL DAILY
Status: CANCELLED | OUTPATIENT
Start: 2020-01-01

## 2020-01-01 RX ORDER — ASPIRIN 81 MG/1
81 TABLET, CHEWABLE ORAL DAILY
Qty: 30 TABLET | Refills: 3 | Status: SHIPPED | OUTPATIENT
Start: 2020-01-01

## 2020-01-01 RX ORDER — CHOLECALCIFEROL (VITAMIN D3) 125 MCG
500 CAPSULE ORAL DAILY
Status: DISCONTINUED | OUTPATIENT
Start: 2020-01-01 | End: 2020-01-01 | Stop reason: HOSPADM

## 2020-01-01 RX ORDER — LEVETIRACETAM 750 MG/1
750 TABLET ORAL 2 TIMES DAILY
Status: DISCONTINUED | OUTPATIENT
Start: 2020-01-01 | End: 2020-01-01 | Stop reason: HOSPADM

## 2020-01-01 RX ORDER — SODIUM CHLORIDE 9 MG/ML
INJECTION, SOLUTION INTRAVENOUS CONTINUOUS
Status: DISCONTINUED | OUTPATIENT
Start: 2020-01-01 | End: 2020-01-01

## 2020-01-01 RX ORDER — SODIUM CHLORIDE, SODIUM LACTATE, POTASSIUM CHLORIDE, AND CALCIUM CHLORIDE .6; .31; .03; .02 G/100ML; G/100ML; G/100ML; G/100ML
500 INJECTION, SOLUTION INTRAVENOUS ONCE
Status: COMPLETED | OUTPATIENT
Start: 2020-01-01 | End: 2020-01-01

## 2020-01-01 RX ORDER — 0.9 % SODIUM CHLORIDE 0.9 %
250 INTRAVENOUS SOLUTION INTRAVENOUS ONCE
Status: COMPLETED | OUTPATIENT
Start: 2020-01-01 | End: 2020-01-01

## 2020-01-01 RX ORDER — INSULIN GLARGINE 100 [IU]/ML
60 INJECTION, SOLUTION SUBCUTANEOUS 2 TIMES DAILY
Status: DISCONTINUED | OUTPATIENT
Start: 2020-01-01 | End: 2020-01-01 | Stop reason: HOSPADM

## 2020-01-01 RX ORDER — LIDOCAINE HYDROCHLORIDE 10 MG/ML
INJECTION, SOLUTION INFILTRATION; PERINEURAL
Status: COMPLETED | OUTPATIENT
Start: 2020-01-01 | End: 2020-01-01

## 2020-01-01 RX ORDER — BUMETANIDE 1 MG/1
2 TABLET ORAL 2 TIMES DAILY
Status: DISCONTINUED | OUTPATIENT
Start: 2020-01-01 | End: 2020-01-01 | Stop reason: HOSPADM

## 2020-01-01 RX ORDER — BUMETANIDE 1 MG/1
1 TABLET ORAL 2 TIMES DAILY
Qty: 90 TABLET | Refills: 2 | Status: ON HOLD | OUTPATIENT
Start: 2020-01-01 | End: 2020-01-01 | Stop reason: DRUGHIGH

## 2020-01-01 RX ORDER — FERROUS SULFATE 325(65) MG
325 TABLET ORAL EVERY 12 HOURS
Qty: 30 TABLET | Refills: 5 | Status: SHIPPED | OUTPATIENT
Start: 2020-01-01

## 2020-01-01 RX ORDER — AMLODIPINE BESYLATE 10 MG/1
10 TABLET ORAL DAILY
Status: DISCONTINUED | OUTPATIENT
Start: 2020-01-01 | End: 2020-01-01 | Stop reason: HOSPADM

## 2020-01-01 RX ORDER — ACETAMINOPHEN 325 MG/1
650 TABLET ORAL ONCE
Status: COMPLETED | OUTPATIENT
Start: 2020-01-01 | End: 2020-01-01

## 2020-01-01 RX ORDER — HYDRALAZINE HYDROCHLORIDE 25 MG/1
25 TABLET, FILM COATED ORAL EVERY 8 HOURS SCHEDULED
Status: DISCONTINUED | OUTPATIENT
Start: 2020-01-01 | End: 2020-01-01

## 2020-01-01 RX ORDER — PANTOPRAZOLE SODIUM 40 MG/10ML
40 INJECTION, POWDER, LYOPHILIZED, FOR SOLUTION INTRAVENOUS 2 TIMES DAILY
Status: DISCONTINUED | OUTPATIENT
Start: 2020-01-01 | End: 2020-01-01 | Stop reason: HOSPADM

## 2020-01-01 RX ORDER — 0.9 % SODIUM CHLORIDE 0.9 %
20 INTRAVENOUS SOLUTION INTRAVENOUS ONCE
Status: DISCONTINUED | OUTPATIENT
Start: 2020-01-01 | End: 2020-01-01 | Stop reason: HOSPADM

## 2020-01-01 RX ORDER — HEPARIN SODIUM 5000 [USP'U]/ML
5000 INJECTION, SOLUTION INTRAVENOUS; SUBCUTANEOUS EVERY 8 HOURS SCHEDULED
Status: DISCONTINUED | OUTPATIENT
Start: 2020-01-01 | End: 2020-01-01

## 2020-01-01 RX ORDER — PANTOPRAZOLE SODIUM 40 MG/1
40 TABLET, DELAYED RELEASE ORAL
Status: DISCONTINUED | OUTPATIENT
Start: 2020-01-01 | End: 2020-01-01 | Stop reason: HOSPADM

## 2020-01-01 RX ORDER — CLOTRIMAZOLE AND BETAMETHASONE DIPROPIONATE 10; .64 MG/G; MG/G
CREAM TOPICAL 2 TIMES DAILY
Status: DISCONTINUED | OUTPATIENT
Start: 2020-01-01 | End: 2020-01-01 | Stop reason: HOSPADM

## 2020-01-01 RX ORDER — PANTOPRAZOLE SODIUM 40 MG/10ML
40 INJECTION, POWDER, LYOPHILIZED, FOR SOLUTION INTRAVENOUS DAILY
Status: DISCONTINUED | OUTPATIENT
Start: 2020-01-01 | End: 2020-01-01 | Stop reason: SDUPTHER

## 2020-01-01 RX ORDER — POTASSIUM CHLORIDE 20 MEQ/1
30 TABLET, EXTENDED RELEASE ORAL DAILY
Status: DISCONTINUED | OUTPATIENT
Start: 2020-01-01 | End: 2020-01-01 | Stop reason: HOSPADM

## 2020-01-01 RX ORDER — AMINOPHYLLINE DIHYDRATE 25 MG/ML
50 INJECTION, SOLUTION INTRAVENOUS PRN
Status: ACTIVE | OUTPATIENT
Start: 2020-01-01 | End: 2020-01-01

## 2020-01-01 RX ORDER — BUMETANIDE 1 MG/1
1 TABLET ORAL 2 TIMES DAILY
COMMUNITY
End: 2020-01-01 | Stop reason: SDUPTHER

## 2020-01-01 RX ORDER — CERAMIDES 1,3,6-II
LOTION (ML) TOPICAL 2 TIMES DAILY
COMMUNITY

## 2020-01-01 RX ORDER — ASPIRIN 81 MG/1
81 TABLET, CHEWABLE ORAL DAILY
Status: DISCONTINUED | OUTPATIENT
Start: 2020-01-01 | End: 2020-01-01 | Stop reason: HOSPADM

## 2020-01-01 RX ORDER — ACETAMINOPHEN 325 MG/1
650 TABLET ORAL EVERY 4 HOURS PRN
Status: CANCELLED | OUTPATIENT
Start: 2020-01-01

## 2020-01-01 RX ORDER — MIDAZOLAM HYDROCHLORIDE 1 MG/ML
INJECTION INTRAMUSCULAR; INTRAVENOUS PRN
Status: DISCONTINUED | OUTPATIENT
Start: 2020-01-01 | End: 2020-01-01 | Stop reason: SDUPTHER

## 2020-01-01 RX ORDER — SODIUM CHLORIDE 9 MG/ML
500 INJECTION, SOLUTION INTRAVENOUS CONTINUOUS PRN
Status: ACTIVE | OUTPATIENT
Start: 2020-01-01 | End: 2020-01-01

## 2020-01-01 RX ORDER — LIDOCAINE HYDROCHLORIDE 10 MG/ML
INJECTION, SOLUTION EPIDURAL; INFILTRATION; INTRACAUDAL; PERINEURAL PRN
Status: DISCONTINUED | OUTPATIENT
Start: 2020-01-01 | End: 2020-01-01 | Stop reason: SDUPTHER

## 2020-01-01 RX ORDER — SODIUM CHLORIDE 0.9 % (FLUSH) 0.9 %
10 SYRINGE (ML) INJECTION EVERY 12 HOURS SCHEDULED
Status: CANCELLED | OUTPATIENT
Start: 2020-01-01

## 2020-01-01 RX ORDER — LEVETIRACETAM 500 MG/1
500 TABLET ORAL 2 TIMES DAILY
Qty: 60 TABLET | Refills: 3 | Status: SHIPPED | OUTPATIENT
Start: 2020-01-01

## 2020-01-01 RX ORDER — FUROSEMIDE 20 MG/1
20 TABLET ORAL ONCE
Status: DISCONTINUED | OUTPATIENT
Start: 2020-01-01 | End: 2020-01-01

## 2020-01-01 RX ORDER — SODIUM CHLORIDE 9 MG/ML
10 INJECTION INTRAVENOUS 2 TIMES DAILY
Status: DISCONTINUED | OUTPATIENT
Start: 2020-01-01 | End: 2020-01-01 | Stop reason: HOSPADM

## 2020-01-01 RX ORDER — HYDRALAZINE HYDROCHLORIDE 50 MG/1
50 TABLET, FILM COATED ORAL EVERY 8 HOURS SCHEDULED
Qty: 90 TABLET | Refills: 3 | Status: SHIPPED | OUTPATIENT
Start: 2020-01-01

## 2020-01-01 RX ORDER — LISINOPRIL 10 MG/1
10 TABLET ORAL DAILY
Status: DISCONTINUED | OUTPATIENT
Start: 2020-01-01 | End: 2020-01-01

## 2020-01-01 RX ORDER — NITROGLYCERIN 0.4 MG/1
0.4 TABLET SUBLINGUAL EVERY 5 MIN PRN
Status: ACTIVE | OUTPATIENT
Start: 2020-01-01 | End: 2020-01-01

## 2020-01-01 RX ORDER — IPRATROPIUM BROMIDE AND ALBUTEROL SULFATE 2.5; .5 MG/3ML; MG/3ML
1 SOLUTION RESPIRATORY (INHALATION)
Status: DISCONTINUED | OUTPATIENT
Start: 2020-01-01 | End: 2020-01-01

## 2020-01-01 RX ORDER — FUROSEMIDE 40 MG/1
40 TABLET ORAL ONCE
Status: COMPLETED | OUTPATIENT
Start: 2020-01-01 | End: 2020-01-01

## 2020-01-01 RX ORDER — HYDRALAZINE HYDROCHLORIDE 20 MG/ML
10 INJECTION INTRAMUSCULAR; INTRAVENOUS EVERY 4 HOURS PRN
Status: DISCONTINUED | OUTPATIENT
Start: 2020-01-01 | End: 2020-01-01

## 2020-01-01 RX ORDER — LEVETIRACETAM 750 MG/1
750 TABLET ORAL 2 TIMES DAILY
Status: DISCONTINUED | OUTPATIENT
Start: 2020-01-01 | End: 2020-01-01

## 2020-01-01 RX ORDER — SODIUM CHLORIDE 0.9 % (FLUSH) 0.9 %
10 SYRINGE (ML) INJECTION EVERY 12 HOURS SCHEDULED
Status: DISCONTINUED | OUTPATIENT
Start: 2020-01-01 | End: 2020-01-01

## 2020-01-01 RX ORDER — INSULIN GLARGINE 100 [IU]/ML
40 INJECTION, SOLUTION SUBCUTANEOUS 2 TIMES DAILY
Status: DISCONTINUED | OUTPATIENT
Start: 2020-01-01 | End: 2020-01-01

## 2020-01-01 RX ORDER — FUROSEMIDE 10 MG/ML
40 INJECTION INTRAMUSCULAR; INTRAVENOUS 2 TIMES DAILY
Status: DISCONTINUED | OUTPATIENT
Start: 2020-01-01 | End: 2020-01-01

## 2020-01-01 RX ORDER — HYDRALAZINE HYDROCHLORIDE 25 MG/1
25 TABLET, FILM COATED ORAL ONCE
Status: COMPLETED | OUTPATIENT
Start: 2020-01-01 | End: 2020-01-01

## 2020-01-01 RX ORDER — HYDROCHLOROTHIAZIDE 12.5 MG/1
12.5 CAPSULE, GELATIN COATED ORAL DAILY
Status: DISCONTINUED | OUTPATIENT
Start: 2020-01-01 | End: 2020-01-01 | Stop reason: HOSPADM

## 2020-01-01 RX ORDER — ALBUTEROL SULFATE 2.5 MG/3ML
2.5 SOLUTION RESPIRATORY (INHALATION)
Status: DISCONTINUED | OUTPATIENT
Start: 2020-01-01 | End: 2020-01-01 | Stop reason: HOSPADM

## 2020-01-01 RX ORDER — FERROUS SULFATE 325(65) MG
325 TABLET ORAL EVERY 12 HOURS
Status: DISCONTINUED | OUTPATIENT
Start: 2020-01-01 | End: 2020-01-01 | Stop reason: HOSPADM

## 2020-01-01 RX ORDER — KETAMINE HYDROCHLORIDE 50 MG/ML
INJECTION, SOLUTION, CONCENTRATE INTRAMUSCULAR; INTRAVENOUS PRN
Status: DISCONTINUED | OUTPATIENT
Start: 2020-01-01 | End: 2020-01-01 | Stop reason: SDUPTHER

## 2020-01-01 RX ADMIN — SODIUM CHLORIDE: 9 INJECTION, SOLUTION INTRAVENOUS at 03:50

## 2020-01-01 RX ADMIN — SODIUM CHLORIDE: 9 INJECTION, SOLUTION INTRAVENOUS at 15:15

## 2020-01-01 RX ADMIN — INSULIN LISPRO 2 UNITS: 100 INJECTION, SOLUTION INTRAVENOUS; SUBCUTANEOUS at 17:08

## 2020-01-01 RX ADMIN — SODIUM CHLORIDE, PRESERVATIVE FREE 10 ML: 5 INJECTION INTRAVENOUS at 21:00

## 2020-01-01 RX ADMIN — ATORVASTATIN CALCIUM 20 MG: 20 TABLET, FILM COATED ORAL at 12:21

## 2020-01-01 RX ADMIN — BUMETANIDE 1 MG: 1 TABLET ORAL at 09:37

## 2020-01-01 RX ADMIN — Medication 10 ML: at 22:56

## 2020-01-01 RX ADMIN — SODIUM CHLORIDE: 9 INJECTION, SOLUTION INTRAVENOUS at 17:50

## 2020-01-01 RX ADMIN — INSULIN LISPRO 2 UNITS: 100 INJECTION, SOLUTION INTRAVENOUS; SUBCUTANEOUS at 11:56

## 2020-01-01 RX ADMIN — ACETAMINOPHEN 650 MG: 325 TABLET, FILM COATED ORAL at 01:07

## 2020-01-01 RX ADMIN — BUSPIRONE HYDROCHLORIDE 10 MG: 10 TABLET ORAL at 12:41

## 2020-01-01 RX ADMIN — BUMETANIDE 1 MG: 1 TABLET ORAL at 15:56

## 2020-01-01 RX ADMIN — IRON SUCROSE 200 MG: 20 INJECTION, SOLUTION INTRAVENOUS at 12:50

## 2020-01-01 RX ADMIN — SODIUM CHLORIDE 8 MG/HR: 9 INJECTION, SOLUTION INTRAVENOUS at 12:20

## 2020-01-01 RX ADMIN — CEFTRIAXONE SODIUM 1 G: 1 INJECTION, POWDER, FOR SOLUTION INTRAMUSCULAR; INTRAVENOUS at 10:26

## 2020-01-01 RX ADMIN — LAMOTRIGINE 200 MG: 100 TABLET ORAL at 21:47

## 2020-01-01 RX ADMIN — SUCRALFATE 1 G: 1 TABLET ORAL at 06:35

## 2020-01-01 RX ADMIN — HYDROCHLOROTHIAZIDE 12.5 MG: 12.5 CAPSULE ORAL at 10:18

## 2020-01-01 RX ADMIN — CEFTRIAXONE SODIUM 1 G: 1 INJECTION, POWDER, FOR SOLUTION INTRAMUSCULAR; INTRAVENOUS at 08:42

## 2020-01-01 RX ADMIN — CETIRIZINE HYDROCHLORIDE 5 MG: 5 TABLET, FILM COATED ORAL at 11:02

## 2020-01-01 RX ADMIN — ANTI-FUNGAL POWDER MICONAZOLE NITRATE TALC FREE: 1.42 POWDER TOPICAL at 13:11

## 2020-01-01 RX ADMIN — LAMOTRIGINE 200 MG: 100 TABLET ORAL at 21:30

## 2020-01-01 RX ADMIN — INSULIN LISPRO 2 UNITS: 100 INJECTION, SOLUTION INTRAVENOUS; SUBCUTANEOUS at 09:54

## 2020-01-01 RX ADMIN — INSULIN GLARGINE 20 UNITS: 100 INJECTION, SOLUTION SUBCUTANEOUS at 21:16

## 2020-01-01 RX ADMIN — PANTOPRAZOLE SODIUM 8 MG/HR: 40 INJECTION, POWDER, FOR SOLUTION INTRAVENOUS at 19:13

## 2020-01-01 RX ADMIN — INSULIN GLARGINE 20 UNITS: 100 INJECTION, SOLUTION SUBCUTANEOUS at 08:43

## 2020-01-01 RX ADMIN — LAMOTRIGINE 200 MG: 100 TABLET ORAL at 08:53

## 2020-01-01 RX ADMIN — LAMOTRIGINE 200 MG: 100 TABLET ORAL at 10:03

## 2020-01-01 RX ADMIN — HEPARIN SODIUM 5000 UNITS: 5000 INJECTION INTRAVENOUS; SUBCUTANEOUS at 05:41

## 2020-01-01 RX ADMIN — BUMETANIDE 1 MG: 1 TABLET ORAL at 12:14

## 2020-01-01 RX ADMIN — DEXTROSE MONOHYDRATE 12.5 G: 25 INJECTION, SOLUTION INTRAVENOUS at 06:37

## 2020-01-01 RX ADMIN — SODIUM CHLORIDE: 9 INJECTION, SOLUTION INTRAVENOUS at 15:26

## 2020-01-01 RX ADMIN — SODIUM CHLORIDE, PRESERVATIVE FREE 10 ML: 5 INJECTION INTRAVENOUS at 08:41

## 2020-01-01 RX ADMIN — LEVETIRACETAM 500 MG: 500 TABLET ORAL at 09:56

## 2020-01-01 RX ADMIN — AMLODIPINE BESYLATE 10 MG: 10 TABLET ORAL at 08:42

## 2020-01-01 RX ADMIN — BUSPIRONE HYDROCHLORIDE 10 MG: 10 TABLET ORAL at 23:19

## 2020-01-01 RX ADMIN — PROPOFOL 20 MG: 10 INJECTION, EMULSION INTRAVENOUS at 09:39

## 2020-01-01 RX ADMIN — LAMOTRIGINE 200 MG: 100 TABLET ORAL at 21:28

## 2020-01-01 RX ADMIN — SUCRALFATE 1 G: 1 TABLET ORAL at 12:49

## 2020-01-01 RX ADMIN — INSULIN GLARGINE 20 UNITS: 100 INJECTION, SOLUTION SUBCUTANEOUS at 23:02

## 2020-01-01 RX ADMIN — CEFTRIAXONE SODIUM 1 G: 1 INJECTION, POWDER, FOR SOLUTION INTRAMUSCULAR; INTRAVENOUS at 08:56

## 2020-01-01 RX ADMIN — LAMOTRIGINE 200 MG: 100 TABLET ORAL at 12:44

## 2020-01-01 RX ADMIN — LEVETIRACETAM 1250 MG: 100 INJECTION, SOLUTION INTRAVENOUS at 21:56

## 2020-01-01 RX ADMIN — PROPOFOL 20 MG: 10 INJECTION, EMULSION INTRAVENOUS at 10:34

## 2020-01-01 RX ADMIN — SUCRALFATE 1 G: 1 TABLET ORAL at 17:49

## 2020-01-01 RX ADMIN — HYDRALAZINE HYDROCHLORIDE 25 MG: 25 TABLET, FILM COATED ORAL at 08:41

## 2020-01-01 RX ADMIN — HEPARIN SODIUM 5000 UNITS: 5000 INJECTION INTRAVENOUS; SUBCUTANEOUS at 21:55

## 2020-01-01 RX ADMIN — CEFEPIME HYDROCHLORIDE 2 G: 2 INJECTION, POWDER, FOR SOLUTION INTRAVENOUS at 05:33

## 2020-01-01 RX ADMIN — BUSPIRONE HYDROCHLORIDE 10 MG: 10 TABLET ORAL at 08:08

## 2020-01-01 RX ADMIN — LAMOTRIGINE 200 MG: 100 TABLET ORAL at 07:50

## 2020-01-01 RX ADMIN — BUSPIRONE HYDROCHLORIDE 10 MG: 10 TABLET ORAL at 21:46

## 2020-01-01 RX ADMIN — BUMETANIDE 1 MG: 1 TABLET ORAL at 09:55

## 2020-01-01 RX ADMIN — SUCRALFATE 1 G: 1 TABLET ORAL at 13:40

## 2020-01-01 RX ADMIN — INSULIN LISPRO 6 UNITS: 100 INJECTION, SOLUTION INTRAVENOUS; SUBCUTANEOUS at 14:19

## 2020-01-01 RX ADMIN — Medication 10 ML: at 15:15

## 2020-01-01 RX ADMIN — LAMOTRIGINE 200 MG: 100 TABLET ORAL at 08:41

## 2020-01-01 RX ADMIN — HEPARIN SODIUM 5000 UNITS: 5000 INJECTION INTRAVENOUS; SUBCUTANEOUS at 15:19

## 2020-01-01 RX ADMIN — SODIUM CHLORIDE, PRESERVATIVE FREE 10 ML: 5 INJECTION INTRAVENOUS at 08:09

## 2020-01-01 RX ADMIN — BUMETANIDE 1 MG: 1 TABLET ORAL at 10:18

## 2020-01-01 RX ADMIN — INSULIN LISPRO 2 UNITS: 100 INJECTION, SOLUTION INTRAVENOUS; SUBCUTANEOUS at 08:10

## 2020-01-01 RX ADMIN — FUROSEMIDE 40 MG: 10 INJECTION, SOLUTION INTRAMUSCULAR; INTRAVENOUS at 08:27

## 2020-01-01 RX ADMIN — SODIUM CHLORIDE 200 MG: 9 INJECTION, SOLUTION INTRAVENOUS at 18:27

## 2020-01-01 RX ADMIN — SODIUM CHLORIDE 80 MG: 9 INJECTION, SOLUTION INTRAVENOUS at 15:44

## 2020-01-01 RX ADMIN — SUCRALFATE 1 G: 1 TABLET ORAL at 19:00

## 2020-01-01 RX ADMIN — SODIUM CHLORIDE 10 ML: 9 INJECTION, SOLUTION INTRAMUSCULAR; INTRAVENOUS; SUBCUTANEOUS at 21:15

## 2020-01-01 RX ADMIN — BUMETANIDE 1 MG: 1 TABLET ORAL at 21:13

## 2020-01-01 RX ADMIN — FUROSEMIDE 40 MG: 10 INJECTION, SOLUTION INTRAMUSCULAR; INTRAVENOUS at 08:49

## 2020-01-01 RX ADMIN — LIDOCAINE HYDROCHLORIDE 50 MG: 10 INJECTION, SOLUTION EPIDURAL; INFILTRATION; INTRACAUDAL; PERINEURAL at 10:17

## 2020-01-01 RX ADMIN — ATORVASTATIN CALCIUM 20 MG: 20 TABLET, FILM COATED ORAL at 09:54

## 2020-01-01 RX ADMIN — HYDRALAZINE HYDROCHLORIDE 25 MG: 25 TABLET, FILM COATED ORAL at 14:50

## 2020-01-01 RX ADMIN — LEVETIRACETAM 1250 MG: 100 INJECTION, SOLUTION INTRAVENOUS at 21:30

## 2020-01-01 RX ADMIN — LAMOTRIGINE 200 MG: 100 TABLET ORAL at 08:16

## 2020-01-01 RX ADMIN — IRON SUCROSE 200 MG: 20 INJECTION, SOLUTION INTRAVENOUS at 13:30

## 2020-01-01 RX ADMIN — INSULIN LISPRO 4 UNITS: 100 INJECTION, SOLUTION INTRAVENOUS; SUBCUTANEOUS at 17:49

## 2020-01-01 RX ADMIN — HEPARIN SODIUM 5000 UNITS: 5000 INJECTION INTRAVENOUS; SUBCUTANEOUS at 12:14

## 2020-01-01 RX ADMIN — INSULIN LISPRO 6 UNITS: 100 INJECTION, SOLUTION INTRAVENOUS; SUBCUTANEOUS at 18:21

## 2020-01-01 RX ADMIN — LEVETIRACETAM 1250 MG: 100 INJECTION, SOLUTION INTRAVENOUS at 23:22

## 2020-01-01 RX ADMIN — DESMOPRESSIN ACETATE 40 MG: 0.2 TABLET ORAL at 08:46

## 2020-01-01 RX ADMIN — ACETAMINOPHEN 650 MG: 325 TABLET, FILM COATED ORAL at 04:27

## 2020-01-01 RX ADMIN — INSULIN LISPRO 2 UNITS: 100 INJECTION, SOLUTION INTRAVENOUS; SUBCUTANEOUS at 21:45

## 2020-01-01 RX ADMIN — SODIUM CHLORIDE 10 ML: 9 INJECTION, SOLUTION INTRAMUSCULAR; INTRAVENOUS; SUBCUTANEOUS at 09:56

## 2020-01-01 RX ADMIN — INSULIN GLARGINE 20 UNITS: 100 INJECTION, SOLUTION SUBCUTANEOUS at 21:23

## 2020-01-01 RX ADMIN — HEPARIN SODIUM 5000 UNITS: 5000 INJECTION INTRAVENOUS; SUBCUTANEOUS at 05:31

## 2020-01-01 RX ADMIN — VANCOMYCIN HYDROCHLORIDE 2500 MG: 1 INJECTION, POWDER, LYOPHILIZED, FOR SOLUTION INTRAVENOUS at 07:59

## 2020-01-01 RX ADMIN — LAMOTRIGINE 200 MG: 100 TABLET ORAL at 21:23

## 2020-01-01 RX ADMIN — HYDRALAZINE HYDROCHLORIDE 25 MG: 25 TABLET, FILM COATED ORAL at 12:28

## 2020-01-01 RX ADMIN — INSULIN LISPRO 3 UNITS: 100 INJECTION, SOLUTION INTRAVENOUS; SUBCUTANEOUS at 21:14

## 2020-01-01 RX ADMIN — SODIUM CHLORIDE: 4.5 INJECTION, SOLUTION INTRAVENOUS at 09:06

## 2020-01-01 RX ADMIN — INSULIN LISPRO 2 UNITS: 100 INJECTION, SOLUTION INTRAVENOUS; SUBCUTANEOUS at 12:41

## 2020-01-01 RX ADMIN — GABAPENTIN 100 MG: 100 CAPSULE ORAL at 08:08

## 2020-01-01 RX ADMIN — SUCRALFATE 1 G: 1 TABLET ORAL at 05:11

## 2020-01-01 RX ADMIN — HEPARIN SODIUM 5000 UNITS: 5000 INJECTION INTRAVENOUS; SUBCUTANEOUS at 17:07

## 2020-01-01 RX ADMIN — PRAMIPEXOLE DIHYDROCHLORIDE 1 MG: 1 TABLET ORAL at 23:19

## 2020-01-01 RX ADMIN — INSULIN LISPRO 2 UNITS: 100 INJECTION, SOLUTION INTRAVENOUS; SUBCUTANEOUS at 21:15

## 2020-01-01 RX ADMIN — PROPOFOL 20 MG: 10 INJECTION, EMULSION INTRAVENOUS at 09:35

## 2020-01-01 RX ADMIN — LEVETIRACETAM 500 MG: 500 TABLET, FILM COATED ORAL at 20:46

## 2020-01-01 RX ADMIN — IOHEXOL 90 ML: 350 INJECTION, SOLUTION INTRAVENOUS at 14:26

## 2020-01-01 RX ADMIN — BUMETANIDE 1 MG: 1 TABLET ORAL at 21:44

## 2020-01-01 RX ADMIN — ENOXAPARIN SODIUM 30 MG: 30 INJECTION SUBCUTANEOUS at 09:59

## 2020-01-01 RX ADMIN — FUROSEMIDE 40 MG: 10 INJECTION, SOLUTION INTRAMUSCULAR; INTRAVENOUS at 23:03

## 2020-01-01 RX ADMIN — GABAPENTIN 100 MG: 100 CAPSULE ORAL at 21:46

## 2020-01-01 RX ADMIN — ATORVASTATIN CALCIUM 80 MG: 80 TABLET, FILM COATED ORAL at 20:46

## 2020-01-01 RX ADMIN — SODIUM CHLORIDE 8 MG/HR: 9 INJECTION, SOLUTION INTRAVENOUS at 23:49

## 2020-01-01 RX ADMIN — BUMETANIDE 2 MG: 1 TABLET ORAL at 20:45

## 2020-01-01 RX ADMIN — HYDRALAZINE HYDROCHLORIDE 25 MG: 25 TABLET, FILM COATED ORAL at 08:50

## 2020-01-01 RX ADMIN — HEPARIN SODIUM 5000 UNITS: 5000 INJECTION INTRAVENOUS; SUBCUTANEOUS at 21:53

## 2020-01-01 RX ADMIN — LAMOTRIGINE 200 MG: 100 TABLET ORAL at 18:14

## 2020-01-01 RX ADMIN — PANTOPRAZOLE SODIUM 40 MG: 40 INJECTION, POWDER, FOR SOLUTION INTRAVENOUS at 09:56

## 2020-01-01 RX ADMIN — HYDRALAZINE HYDROCHLORIDE 25 MG: 25 TABLET, FILM COATED ORAL at 15:19

## 2020-01-01 RX ADMIN — CLOTRIMAZOLE AND BETAMETHASONE DIPROPIONATE: 10; .5 CREAM TOPICAL at 10:18

## 2020-01-01 RX ADMIN — HEPARIN SODIUM 5000 UNITS: 5000 INJECTION INTRAVENOUS; SUBCUTANEOUS at 12:43

## 2020-01-01 RX ADMIN — AMLODIPINE BESYLATE 10 MG: 10 TABLET ORAL at 15:43

## 2020-01-01 RX ADMIN — HEPARIN SODIUM 5000 UNITS: 5000 INJECTION INTRAVENOUS; SUBCUTANEOUS at 20:46

## 2020-01-01 RX ADMIN — INSULIN GLARGINE 20 UNITS: 100 INJECTION, SOLUTION SUBCUTANEOUS at 08:56

## 2020-01-01 RX ADMIN — GABAPENTIN 100 MG: 100 CAPSULE ORAL at 12:45

## 2020-01-01 RX ADMIN — LISINOPRIL 10 MG: 10 TABLET ORAL at 11:42

## 2020-01-01 RX ADMIN — SODIUM CHLORIDE, PRESERVATIVE FREE 10 ML: 5 INJECTION INTRAVENOUS at 09:25

## 2020-01-01 RX ADMIN — LEVETIRACETAM 1250 MG: 500 TABLET, FILM COATED ORAL at 07:50

## 2020-01-01 RX ADMIN — SODIUM CHLORIDE: 9 INJECTION, SOLUTION INTRAVENOUS at 13:47

## 2020-01-01 RX ADMIN — Medication 10 ML: at 20:09

## 2020-01-01 RX ADMIN — INSULIN GLARGINE 40 UNITS: 100 INJECTION, SOLUTION SUBCUTANEOUS at 21:45

## 2020-01-01 RX ADMIN — ATORVASTATIN CALCIUM 20 MG: 20 TABLET, FILM COATED ORAL at 12:14

## 2020-01-01 RX ADMIN — LEVETIRACETAM 500 MG: 500 TABLET, FILM COATED ORAL at 10:56

## 2020-01-01 RX ADMIN — HEPARIN SODIUM 5000 UNITS: 5000 INJECTION INTRAVENOUS; SUBCUTANEOUS at 08:49

## 2020-01-01 RX ADMIN — Medication 10 ML: at 17:53

## 2020-01-01 RX ADMIN — ASPIRIN 81 MG: 81 TABLET ORAL at 08:53

## 2020-01-01 RX ADMIN — INSULIN LISPRO 4 UNITS: 100 INJECTION, SOLUTION INTRAVENOUS; SUBCUTANEOUS at 13:14

## 2020-01-01 RX ADMIN — ATORVASTATIN CALCIUM 20 MG: 20 TABLET, FILM COATED ORAL at 08:08

## 2020-01-01 RX ADMIN — INSULIN LISPRO 4 UNITS: 100 INJECTION, SOLUTION INTRAVENOUS; SUBCUTANEOUS at 17:52

## 2020-01-01 RX ADMIN — INSULIN LISPRO 2 UNITS: 100 INJECTION, SOLUTION INTRAVENOUS; SUBCUTANEOUS at 08:55

## 2020-01-01 RX ADMIN — LAMOTRIGINE 200 MG: 100 TABLET ORAL at 08:45

## 2020-01-01 RX ADMIN — SODIUM CHLORIDE: 9 INJECTION, SOLUTION INTRAVENOUS at 08:41

## 2020-01-01 RX ADMIN — GABAPENTIN 100 MG: 100 CAPSULE ORAL at 23:19

## 2020-01-01 RX ADMIN — LEVETIRACETAM 500 MG: 500 TABLET, FILM COATED ORAL at 21:29

## 2020-01-01 RX ADMIN — AMLODIPINE BESYLATE 10 MG: 10 TABLET ORAL at 08:08

## 2020-01-01 RX ADMIN — INSULIN LISPRO 2 UNITS: 100 INJECTION, SOLUTION INTRAVENOUS; SUBCUTANEOUS at 16:44

## 2020-01-01 RX ADMIN — LEVETIRACETAM 500 MG: 500 TABLET, FILM COATED ORAL at 08:41

## 2020-01-01 RX ADMIN — SODIUM CHLORIDE, PRESERVATIVE FREE 10 ML: 5 INJECTION INTRAVENOUS at 08:43

## 2020-01-01 RX ADMIN — Medication 10 ML: at 22:02

## 2020-01-01 RX ADMIN — METOROPROLOL TARTRATE 5 MG: 5 INJECTION, SOLUTION INTRAVENOUS at 10:26

## 2020-01-01 RX ADMIN — BUSPIRONE HYDROCHLORIDE 10 MG: 10 TABLET ORAL at 10:16

## 2020-01-01 RX ADMIN — ATORVASTATIN CALCIUM 10 MG: 10 TABLET, FILM COATED ORAL at 10:56

## 2020-01-01 RX ADMIN — INSULIN GLARGINE 20 UNITS: 100 INJECTION, SOLUTION SUBCUTANEOUS at 22:05

## 2020-01-01 RX ADMIN — PANTOPRAZOLE SODIUM 8 MG/HR: 40 INJECTION, POWDER, FOR SOLUTION INTRAVENOUS at 03:14

## 2020-01-01 RX ADMIN — NADOLOL 20 MG: 20 TABLET ORAL at 12:47

## 2020-01-01 RX ADMIN — INSULIN GLARGINE 20 UNITS: 100 INJECTION, SOLUTION SUBCUTANEOUS at 23:20

## 2020-01-01 RX ADMIN — AMLODIPINE BESYLATE 10 MG: 10 TABLET ORAL at 08:50

## 2020-01-01 RX ADMIN — SODIUM CHLORIDE: 9 INJECTION, SOLUTION INTRAVENOUS at 05:15

## 2020-01-01 RX ADMIN — PAROXETINE HYDROCHLORIDE 20 MG: 20 TABLET, FILM COATED ORAL at 13:54

## 2020-01-01 RX ADMIN — HYDRALAZINE HYDROCHLORIDE 25 MG: 25 TABLET, FILM COATED ORAL at 23:01

## 2020-01-01 RX ADMIN — INSULIN LISPRO 2 UNITS: 100 INJECTION, SOLUTION INTRAVENOUS; SUBCUTANEOUS at 08:44

## 2020-01-01 RX ADMIN — BUMETANIDE 1 MG: 1 TABLET ORAL at 21:16

## 2020-01-01 RX ADMIN — INSULIN LISPRO 4 UNITS: 100 INJECTION, SOLUTION INTRAVENOUS; SUBCUTANEOUS at 17:41

## 2020-01-01 RX ADMIN — LAMOTRIGINE 200 MG: 100 TABLET ORAL at 11:41

## 2020-01-01 RX ADMIN — LEVETIRACETAM 1250 MG: 100 INJECTION, SOLUTION INTRAVENOUS at 10:53

## 2020-01-01 RX ADMIN — HEPARIN SODIUM 5000 UNITS: 5000 INJECTION INTRAVENOUS; SUBCUTANEOUS at 22:05

## 2020-01-01 RX ADMIN — BUMETANIDE 1 MG: 1 TABLET ORAL at 12:47

## 2020-01-01 RX ADMIN — BUSPIRONE HYDROCHLORIDE 10 MG: 10 TABLET ORAL at 14:36

## 2020-01-01 RX ADMIN — HEPARIN SODIUM 5000 UNITS: 5000 INJECTION INTRAVENOUS; SUBCUTANEOUS at 06:17

## 2020-01-01 RX ADMIN — INSULIN LISPRO 4 UNITS: 100 INJECTION, SOLUTION INTRAVENOUS; SUBCUTANEOUS at 12:28

## 2020-01-01 RX ADMIN — HYDROCHLOROTHIAZIDE 12.5 MG: 25 TABLET ORAL at 12:21

## 2020-01-01 RX ADMIN — CEFTRIAXONE SODIUM 1 G: 1 INJECTION, POWDER, FOR SOLUTION INTRAMUSCULAR; INTRAVENOUS at 06:52

## 2020-01-01 RX ADMIN — PAROXETINE HYDROCHLORIDE 20 MG: 20 TABLET, FILM COATED ORAL at 09:56

## 2020-01-01 RX ADMIN — INSULIN LISPRO 6 UNITS: 100 INJECTION, SOLUTION INTRAVENOUS; SUBCUTANEOUS at 17:32

## 2020-01-01 RX ADMIN — BUSPIRONE HYDROCHLORIDE 10 MG: 10 TABLET ORAL at 09:43

## 2020-01-01 RX ADMIN — PAROXETINE HYDROCHLORIDE 20 MG: 20 TABLET, FILM COATED ORAL at 10:16

## 2020-01-01 RX ADMIN — POTASSIUM BICARBONATE 40 MEQ: 782 TABLET, EFFERVESCENT ORAL at 13:16

## 2020-01-01 RX ADMIN — KETAMINE HYDROCHLORIDE 15 MG: 50 INJECTION, SOLUTION INTRAMUSCULAR; INTRAVENOUS at 10:22

## 2020-01-01 RX ADMIN — BUMETANIDE 2 MG: 1 TABLET ORAL at 08:41

## 2020-01-01 RX ADMIN — SODIUM CHLORIDE 10 ML: 9 INJECTION, SOLUTION INTRAMUSCULAR; INTRAVENOUS; SUBCUTANEOUS at 21:45

## 2020-01-01 RX ADMIN — PANTOPRAZOLE SODIUM 8 MG/HR: 40 INJECTION, POWDER, FOR SOLUTION INTRAVENOUS at 22:03

## 2020-01-01 RX ADMIN — INSULIN LISPRO 2 UNITS: 100 INJECTION, SOLUTION INTRAVENOUS; SUBCUTANEOUS at 17:45

## 2020-01-01 RX ADMIN — HEPARIN SODIUM 5000 UNITS: 5000 INJECTION INTRAVENOUS; SUBCUTANEOUS at 23:03

## 2020-01-01 RX ADMIN — INSULIN LISPRO 2 UNITS: 100 INJECTION, SOLUTION INTRAVENOUS; SUBCUTANEOUS at 21:53

## 2020-01-01 RX ADMIN — SUCRALFATE 1 G: 1 TABLET ORAL at 12:41

## 2020-01-01 RX ADMIN — HEPARIN SODIUM 5000 UNITS: 5000 INJECTION INTRAVENOUS; SUBCUTANEOUS at 08:08

## 2020-01-01 RX ADMIN — SODIUM CHLORIDE: 9 INJECTION, SOLUTION INTRAVENOUS at 09:59

## 2020-01-01 RX ADMIN — LEVETIRACETAM 500 MG: 500 TABLET, FILM COATED ORAL at 21:55

## 2020-01-01 RX ADMIN — ACETAMINOPHEN 650 MG: 325 TABLET ORAL at 02:10

## 2020-01-01 RX ADMIN — SODIUM CHLORIDE, PRESERVATIVE FREE 10 ML: 5 INJECTION INTRAVENOUS at 20:46

## 2020-01-01 RX ADMIN — INSULIN LISPRO 1 UNITS: 100 INJECTION, SOLUTION INTRAVENOUS; SUBCUTANEOUS at 23:03

## 2020-01-01 RX ADMIN — Medication 10 ML: at 08:58

## 2020-01-01 RX ADMIN — LAMOTRIGINE 200 MG: 100 TABLET ORAL at 20:46

## 2020-01-01 RX ADMIN — INSULIN GLARGINE 20 UNITS: 100 INJECTION, SOLUTION SUBCUTANEOUS at 08:29

## 2020-01-01 RX ADMIN — GABAPENTIN 100 MG: 100 CAPSULE ORAL at 20:53

## 2020-01-01 RX ADMIN — LAMOTRIGINE 200 MG: 100 TABLET ORAL at 23:01

## 2020-01-01 RX ADMIN — Medication 5 ML: at 10:09

## 2020-01-01 RX ADMIN — POTASSIUM CHLORIDE 30 MEQ: 1500 TABLET, EXTENDED RELEASE ORAL at 10:17

## 2020-01-01 RX ADMIN — DEXTROSE 15 G: 15 GEL ORAL at 22:39

## 2020-01-01 RX ADMIN — INSULIN LISPRO 2 UNITS: 100 INJECTION, SOLUTION INTRAVENOUS; SUBCUTANEOUS at 23:19

## 2020-01-01 RX ADMIN — INSULIN GLARGINE 20 UNITS: 100 INJECTION, SOLUTION SUBCUTANEOUS at 10:57

## 2020-01-01 RX ADMIN — HYDROCHLOROTHIAZIDE 12.5 MG: 25 TABLET ORAL at 09:55

## 2020-01-01 RX ADMIN — TETRAKIS(2-METHOXYISOBUTYLISOCYANIDE)COPPER(I) TETRAFLUOROBORATE 11.9 MILLICURIE: 1 INJECTION, POWDER, LYOPHILIZED, FOR SOLUTION INTRAVENOUS at 07:18

## 2020-01-01 RX ADMIN — ASPIRIN 81 MG: 81 TABLET ORAL at 08:28

## 2020-01-01 RX ADMIN — ANTI-FUNGAL POWDER MICONAZOLE NITRATE TALC FREE: 1.42 POWDER TOPICAL at 22:10

## 2020-01-01 RX ADMIN — SODIUM CHLORIDE 10 ML: 9 INJECTION, SOLUTION INTRAMUSCULAR; INTRAVENOUS; SUBCUTANEOUS at 12:13

## 2020-01-01 RX ADMIN — LAMOTRIGINE 200 MG: 100 TABLET ORAL at 21:16

## 2020-01-01 RX ADMIN — LEVETIRACETAM 1250 MG: 500 TABLET, FILM COATED ORAL at 21:12

## 2020-01-01 RX ADMIN — ATORVASTATIN CALCIUM 20 MG: 20 TABLET, FILM COATED ORAL at 11:46

## 2020-01-01 RX ADMIN — KETAMINE HYDROCHLORIDE 10 MG: 50 INJECTION, SOLUTION INTRAMUSCULAR; INTRAVENOUS at 10:27

## 2020-01-01 RX ADMIN — FUROSEMIDE 40 MG: 10 INJECTION, SOLUTION INTRAMUSCULAR; INTRAVENOUS at 17:36

## 2020-01-01 RX ADMIN — LAMOTRIGINE 200 MG: 100 TABLET ORAL at 20:52

## 2020-01-01 RX ADMIN — DESMOPRESSIN ACETATE 40 MG: 0.2 TABLET ORAL at 08:27

## 2020-01-01 RX ADMIN — INSULIN GLARGINE 20 UNITS: 100 INJECTION, SOLUTION SUBCUTANEOUS at 21:53

## 2020-01-01 RX ADMIN — SODIUM CHLORIDE: 9 INJECTION, SOLUTION INTRAVENOUS at 04:11

## 2020-01-01 RX ADMIN — LEVETIRACETAM 1250 MG: 100 INJECTION, SOLUTION INTRAVENOUS at 11:00

## 2020-01-01 RX ADMIN — MIDAZOLAM 2 MG: 1 INJECTION INTRAMUSCULAR; INTRAVENOUS at 10:15

## 2020-01-01 RX ADMIN — INSULIN LISPRO 4 UNITS: 100 INJECTION, SOLUTION INTRAVENOUS; SUBCUTANEOUS at 09:42

## 2020-01-01 RX ADMIN — BUSPIRONE HYDROCHLORIDE 10 MG: 10 TABLET ORAL at 13:55

## 2020-01-01 RX ADMIN — LEVETIRACETAM 500 MG: 500 TABLET, FILM COATED ORAL at 23:02

## 2020-01-01 RX ADMIN — PANTOPRAZOLE SODIUM 40 MG: 40 INJECTION, POWDER, FOR SOLUTION INTRAVENOUS at 21:15

## 2020-01-01 RX ADMIN — LEVETIRACETAM 500 MG: 500 TABLET ORAL at 18:14

## 2020-01-01 RX ADMIN — LEVETIRACETAM 500 MG: 500 TABLET, FILM COATED ORAL at 08:50

## 2020-01-01 RX ADMIN — LAMOTRIGINE 200 MG: 100 TABLET ORAL at 08:07

## 2020-01-01 RX ADMIN — AMLODIPINE BESYLATE 10 MG: 10 TABLET ORAL at 08:53

## 2020-01-01 RX ADMIN — INSULIN GLARGINE 20 UNITS: 100 INJECTION, SOLUTION SUBCUTANEOUS at 21:54

## 2020-01-01 RX ADMIN — ASPIRIN 81 MG: 81 TABLET ORAL at 10:56

## 2020-01-01 RX ADMIN — NADOLOL 20 MG: 20 TABLET ORAL at 13:11

## 2020-01-01 RX ADMIN — INSULIN LISPRO 1 UNITS: 100 INJECTION, SOLUTION INTRAVENOUS; SUBCUTANEOUS at 21:15

## 2020-01-01 RX ADMIN — ASPIRIN 81 MG: 81 TABLET ORAL at 08:41

## 2020-01-01 RX ADMIN — PROPOFOL 180 MG: 10 INJECTION, EMULSION INTRAVENOUS at 09:22

## 2020-01-01 RX ADMIN — KETAMINE HYDROCHLORIDE 25 MG: 50 INJECTION, SOLUTION INTRAMUSCULAR; INTRAVENOUS at 10:17

## 2020-01-01 RX ADMIN — PAROXETINE HYDROCHLORIDE 20 MG: 20 TABLET, FILM COATED ORAL at 12:14

## 2020-01-01 RX ADMIN — LAMOTRIGINE 200 MG: 100 TABLET ORAL at 22:10

## 2020-01-01 RX ADMIN — SODIUM CHLORIDE: 9 INJECTION, SOLUTION INTRAVENOUS at 19:11

## 2020-01-01 RX ADMIN — HYDRALAZINE HYDROCHLORIDE 50 MG: 50 TABLET, FILM COATED ORAL at 14:24

## 2020-01-01 RX ADMIN — INSULIN LISPRO 2 UNITS: 100 INJECTION, SOLUTION INTRAVENOUS; SUBCUTANEOUS at 12:14

## 2020-01-01 RX ADMIN — HYDRALAZINE HYDROCHLORIDE 25 MG: 25 TABLET, FILM COATED ORAL at 10:01

## 2020-01-01 RX ADMIN — SODIUM CHLORIDE, PRESERVATIVE FREE 10 ML: 5 INJECTION INTRAVENOUS at 21:54

## 2020-01-01 RX ADMIN — LISINOPRIL 10 MG: 10 TABLET ORAL at 09:37

## 2020-01-01 RX ADMIN — FENTANYL CITRATE 50 MCG: 50 INJECTION, SOLUTION INTRAMUSCULAR; INTRAVENOUS at 12:27

## 2020-01-01 RX ADMIN — Medication 10 ML: at 22:12

## 2020-01-01 RX ADMIN — MAGNESIUM SULFATE HEPTAHYDRATE 2 G: 500 INJECTION, SOLUTION INTRAMUSCULAR; INTRAVENOUS at 13:50

## 2020-01-01 RX ADMIN — GABAPENTIN 100 MG: 100 CAPSULE ORAL at 13:54

## 2020-01-01 RX ADMIN — LEVETIRACETAM 500 MG: 500 TABLET ORAL at 12:14

## 2020-01-01 RX ADMIN — PANTOPRAZOLE SODIUM 40 MG: 40 TABLET, DELAYED RELEASE ORAL at 10:17

## 2020-01-01 RX ADMIN — INSULIN GLARGINE 40 UNITS: 100 INJECTION, SOLUTION SUBCUTANEOUS at 21:14

## 2020-01-01 RX ADMIN — SODIUM CHLORIDE, PRESERVATIVE FREE 10 ML: 5 INJECTION INTRAVENOUS at 21:29

## 2020-01-01 RX ADMIN — INSULIN LISPRO 4 UNITS: 100 INJECTION, SOLUTION INTRAVENOUS; SUBCUTANEOUS at 18:28

## 2020-01-01 RX ADMIN — LEVETIRACETAM 500 MG: 500 TABLET ORAL at 10:17

## 2020-01-01 RX ADMIN — PAROXETINE HYDROCHLORIDE 20 MG: 20 TABLET, FILM COATED ORAL at 09:54

## 2020-01-01 RX ADMIN — INSULIN LISPRO 4 UNITS: 100 INJECTION, SOLUTION INTRAVENOUS; SUBCUTANEOUS at 12:49

## 2020-01-01 RX ADMIN — PAROXETINE HYDROCHLORIDE 20 MG: 20 TABLET, FILM COATED ORAL at 11:42

## 2020-01-01 RX ADMIN — SODIUM CHLORIDE: 9 INJECTION, SOLUTION INTRAVENOUS at 09:17

## 2020-01-01 RX ADMIN — LAMOTRIGINE 200 MG: 100 TABLET ORAL at 09:40

## 2020-01-01 RX ADMIN — IRON SUCROSE 200 MG: 20 INJECTION, SOLUTION INTRAVENOUS at 12:38

## 2020-01-01 RX ADMIN — ACETAMINOPHEN 650 MG: 325 TABLET ORAL at 10:56

## 2020-01-01 RX ADMIN — Medication 10 ML: at 10:08

## 2020-01-01 RX ADMIN — INSULIN GLARGINE 20 UNITS: 100 INJECTION, SOLUTION SUBCUTANEOUS at 08:59

## 2020-01-01 RX ADMIN — FUROSEMIDE 40 MG: 10 INJECTION, SOLUTION INTRAMUSCULAR; INTRAVENOUS at 21:23

## 2020-01-01 RX ADMIN — FERROUS SULFATE TAB 325 MG (65 MG ELEMENTAL FE) 325 MG: 325 (65 FE) TAB at 10:17

## 2020-01-01 RX ADMIN — ATORVASTATIN CALCIUM 80 MG: 80 TABLET, FILM COATED ORAL at 21:55

## 2020-01-01 RX ADMIN — LEVETIRACETAM 1250 MG: 500 TABLET, FILM COATED ORAL at 22:10

## 2020-01-01 RX ADMIN — PRAMIPEXOLE DIHYDROCHLORIDE 1 MG: 1 TABLET ORAL at 21:47

## 2020-01-01 RX ADMIN — NADOLOL 20 MG: 20 TABLET ORAL at 11:01

## 2020-01-01 RX ADMIN — NADOLOL 20 MG: 20 TABLET ORAL at 08:16

## 2020-01-01 RX ADMIN — LEVETIRACETAM 1250 MG: 500 TABLET, FILM COATED ORAL at 09:37

## 2020-01-01 RX ADMIN — FUROSEMIDE 40 MG: 10 INJECTION, SOLUTION INTRAMUSCULAR; INTRAVENOUS at 08:53

## 2020-01-01 RX ADMIN — GABAPENTIN 100 MG: 100 CAPSULE ORAL at 09:43

## 2020-01-01 RX ADMIN — INSULIN LISPRO 4 UNITS: 100 INJECTION, SOLUTION INTRAVENOUS; SUBCUTANEOUS at 17:15

## 2020-01-01 RX ADMIN — INSULIN GLARGINE 40 UNITS: 100 INJECTION, SOLUTION SUBCUTANEOUS at 09:56

## 2020-01-01 RX ADMIN — PROPOFOL 20 MG: 10 INJECTION, EMULSION INTRAVENOUS at 10:31

## 2020-01-01 RX ADMIN — IRON SUCROSE 200 MG: 20 INJECTION, SOLUTION INTRAVENOUS at 12:04

## 2020-01-01 RX ADMIN — LAMOTRIGINE 200 MG: 100 TABLET ORAL at 12:16

## 2020-01-01 RX ADMIN — ATORVASTATIN CALCIUM 10 MG: 10 TABLET, FILM COATED ORAL at 22:04

## 2020-01-01 RX ADMIN — FUROSEMIDE 40 MG: 40 TABLET ORAL at 15:43

## 2020-01-01 RX ADMIN — HEPARIN SODIUM 5000 UNITS: 5000 INJECTION INTRAVENOUS; SUBCUTANEOUS at 14:19

## 2020-01-01 RX ADMIN — PROPOFOL 20 MG: 10 INJECTION, EMULSION INTRAVENOUS at 10:37

## 2020-01-01 RX ADMIN — CEFTRIAXONE SODIUM 1 G: 1 INJECTION, POWDER, FOR SOLUTION INTRAMUSCULAR; INTRAVENOUS at 07:27

## 2020-01-01 RX ADMIN — Medication 10 ML: at 20:52

## 2020-01-01 RX ADMIN — INSULIN GLARGINE 40 UNITS: 100 INJECTION, SOLUTION SUBCUTANEOUS at 21:21

## 2020-01-01 RX ADMIN — ATORVASTATIN CALCIUM 20 MG: 20 TABLET, FILM COATED ORAL at 09:44

## 2020-01-01 RX ADMIN — HYDROCHLOROTHIAZIDE 12.5 MG: 25 TABLET ORAL at 09:38

## 2020-01-01 RX ADMIN — PAROXETINE HYDROCHLORIDE 20 MG: 20 TABLET, FILM COATED ORAL at 09:43

## 2020-01-01 RX ADMIN — SODIUM CHLORIDE, PRESERVATIVE FREE 10 ML: 5 INJECTION INTRAVENOUS at 09:09

## 2020-01-01 RX ADMIN — LEVETIRACETAM 1250 MG: 100 INJECTION, SOLUTION INTRAVENOUS at 21:49

## 2020-01-01 RX ADMIN — SUCRALFATE 1 G: 1 TABLET ORAL at 23:27

## 2020-01-01 RX ADMIN — INSULIN LISPRO 4 UNITS: 100 INJECTION, SOLUTION INTRAVENOUS; SUBCUTANEOUS at 17:36

## 2020-01-01 RX ADMIN — SODIUM CHLORIDE: 9 INJECTION, SOLUTION INTRAVENOUS at 15:44

## 2020-01-01 RX ADMIN — ATORVASTATIN CALCIUM 20 MG: 20 TABLET, FILM COATED ORAL at 13:54

## 2020-01-01 RX ADMIN — ERGOCALCIFEROL 50000 UNITS: 1.25 CAPSULE ORAL at 11:02

## 2020-01-01 RX ADMIN — LEVETIRACETAM 1250 MG: 100 INJECTION, SOLUTION INTRAVENOUS at 23:19

## 2020-01-01 RX ADMIN — ATORVASTATIN CALCIUM 80 MG: 80 TABLET, FILM COATED ORAL at 23:01

## 2020-01-01 RX ADMIN — PAROXETINE HYDROCHLORIDE 20 MG: 20 TABLET, FILM COATED ORAL at 12:20

## 2020-01-01 RX ADMIN — LAMOTRIGINE 200 MG: 100 TABLET ORAL at 21:13

## 2020-01-01 RX ADMIN — FUROSEMIDE 40 MG: 10 INJECTION, SOLUTION INTRAMUSCULAR; INTRAVENOUS at 17:12

## 2020-01-01 RX ADMIN — LIDOCAINE HYDROCHLORIDE 5 ML: 10 INJECTION, SOLUTION INFILTRATION; PERINEURAL at 12:27

## 2020-01-01 RX ADMIN — LAMOTRIGINE 200 MG: 100 TABLET ORAL at 08:50

## 2020-01-01 RX ADMIN — SODIUM CHLORIDE, PRESERVATIVE FREE 10 ML: 5 INJECTION INTRAVENOUS at 22:05

## 2020-01-01 RX ADMIN — PRAMIPEXOLE DIHYDROCHLORIDE 1 MG: 1 TABLET ORAL at 21:30

## 2020-01-01 RX ADMIN — TETRAKIS(2-METHOXYISOBUTYLISOCYANIDE)COPPER(I) TETRAFLUOROBORATE 39.7 MILLICURIE: 1 INJECTION, POWDER, LYOPHILIZED, FOR SOLUTION INTRAVENOUS at 10:09

## 2020-01-01 RX ADMIN — INSULIN GLARGINE 20 UNITS: 100 INJECTION, SOLUTION SUBCUTANEOUS at 08:10

## 2020-01-01 RX ADMIN — LISINOPRIL 10 MG: 10 TABLET ORAL at 12:21

## 2020-01-01 RX ADMIN — ASPIRIN 81 MG: 81 TABLET ORAL at 08:08

## 2020-01-01 RX ADMIN — BUSPIRONE HYDROCHLORIDE 10 MG: 10 TABLET ORAL at 13:40

## 2020-01-01 RX ADMIN — INSULIN LISPRO 4 UNITS: 100 INJECTION, SOLUTION INTRAVENOUS; SUBCUTANEOUS at 21:26

## 2020-01-01 RX ADMIN — LAMOTRIGINE 200 MG: 100 TABLET ORAL at 08:27

## 2020-01-01 RX ADMIN — INSULIN LISPRO 4 UNITS: 100 INJECTION, SOLUTION INTRAVENOUS; SUBCUTANEOUS at 08:50

## 2020-01-01 RX ADMIN — LAMOTRIGINE 200 MG: 100 TABLET ORAL at 10:56

## 2020-01-01 RX ADMIN — LISINOPRIL 10 MG: 10 TABLET ORAL at 10:16

## 2020-01-01 RX ADMIN — INSULIN LISPRO 6 UNITS: 100 INJECTION, SOLUTION INTRAVENOUS; SUBCUTANEOUS at 19:02

## 2020-01-01 RX ADMIN — SODIUM CHLORIDE 8 MG/HR: 9 INJECTION, SOLUTION INTRAVENOUS at 01:35

## 2020-01-01 RX ADMIN — AMLODIPINE BESYLATE 10 MG: 10 TABLET ORAL at 08:37

## 2020-01-01 RX ADMIN — HYDRALAZINE HYDROCHLORIDE 25 MG: 25 TABLET, FILM COATED ORAL at 08:07

## 2020-01-01 RX ADMIN — INSULIN LISPRO 3 UNITS: 100 INJECTION, SOLUTION INTRAVENOUS; SUBCUTANEOUS at 21:55

## 2020-01-01 RX ADMIN — CEFTRIAXONE SODIUM 1 G: 1 INJECTION, POWDER, FOR SOLUTION INTRAMUSCULAR; INTRAVENOUS at 10:59

## 2020-01-01 RX ADMIN — SODIUM CHLORIDE 8 MG/HR: 9 INJECTION, SOLUTION INTRAVENOUS at 09:36

## 2020-01-01 RX ADMIN — INSULIN GLARGINE 20 UNITS: 100 INJECTION, SOLUTION SUBCUTANEOUS at 08:07

## 2020-01-01 RX ADMIN — LAMOTRIGINE 200 MG: 100 TABLET ORAL at 21:55

## 2020-01-01 RX ADMIN — ASPIRIN 81 MG: 81 TABLET ORAL at 08:45

## 2020-01-01 RX ADMIN — LAMOTRIGINE 200 MG: 100 TABLET ORAL at 09:48

## 2020-01-01 RX ADMIN — INSULIN LISPRO 3 UNITS: 100 INJECTION, SOLUTION INTRAVENOUS; SUBCUTANEOUS at 21:39

## 2020-01-01 RX ADMIN — SODIUM CHLORIDE 8 MG/HR: 9 INJECTION, SOLUTION INTRAVENOUS at 13:58

## 2020-01-01 RX ADMIN — ANTI-FUNGAL POWDER MICONAZOLE NITRATE TALC FREE: 1.42 POWDER TOPICAL at 21:15

## 2020-01-01 RX ADMIN — HYDRALAZINE HYDROCHLORIDE 25 MG: 25 TABLET, FILM COATED ORAL at 20:46

## 2020-01-01 RX ADMIN — INSULIN GLARGINE 40 UNITS: 100 INJECTION, SOLUTION SUBCUTANEOUS at 09:42

## 2020-01-01 RX ADMIN — Medication 10 ML: at 07:19

## 2020-01-01 RX ADMIN — GABAPENTIN 100 MG: 100 CAPSULE ORAL at 10:18

## 2020-01-01 RX ADMIN — HYDROCHLOROTHIAZIDE 12.5 MG: 25 TABLET ORAL at 11:42

## 2020-01-01 RX ADMIN — ANTI-FUNGAL POWDER MICONAZOLE NITRATE TALC FREE: 1.42 POWDER TOPICAL at 07:52

## 2020-01-01 RX ADMIN — INSULIN GLARGINE 20 UNITS: 100 INJECTION, SOLUTION SUBCUTANEOUS at 21:38

## 2020-01-01 RX ADMIN — BUSPIRONE HYDROCHLORIDE 10 MG: 10 TABLET ORAL at 21:30

## 2020-01-01 RX ADMIN — ACETAMINOPHEN 650 MG: 325 TABLET, FILM COATED ORAL at 05:43

## 2020-01-01 RX ADMIN — LEVETIRACETAM 1250 MG: 500 TABLET, FILM COATED ORAL at 21:13

## 2020-01-01 RX ADMIN — SODIUM CHLORIDE, PRESERVATIVE FREE 10 ML: 5 INJECTION INTRAVENOUS at 23:04

## 2020-01-01 RX ADMIN — GABAPENTIN 100 MG: 100 CAPSULE ORAL at 09:54

## 2020-01-01 RX ADMIN — SODIUM CHLORIDE 8 MG/HR: 9 INJECTION, SOLUTION INTRAVENOUS at 23:50

## 2020-01-01 RX ADMIN — LEVETIRACETAM 500 MG: 500 TABLET ORAL at 21:45

## 2020-01-01 RX ADMIN — CYANOCOBALAMIN TAB 500 MCG 500 MCG: 500 TAB at 10:17

## 2020-01-01 RX ADMIN — SODIUM CHLORIDE: 9 INJECTION, SOLUTION INTRAVENOUS at 17:44

## 2020-01-01 RX ADMIN — LAMOTRIGINE 200 MG: 100 TABLET ORAL at 23:19

## 2020-01-01 RX ADMIN — Medication 10 ML: at 10:06

## 2020-01-01 RX ADMIN — LEVETIRACETAM 1250 MG: 100 INJECTION, SOLUTION INTRAVENOUS at 10:57

## 2020-01-01 RX ADMIN — LEVETIRACETAM 500 MG: 500 TABLET ORAL at 21:16

## 2020-01-01 RX ADMIN — ATORVASTATIN CALCIUM 20 MG: 20 TABLET, FILM COATED ORAL at 09:56

## 2020-01-01 RX ADMIN — DEXTROSE 15 G: 15 GEL ORAL at 22:10

## 2020-01-01 RX ADMIN — NADOLOL 20 MG: 20 TABLET ORAL at 12:42

## 2020-01-01 RX ADMIN — PAROXETINE HYDROCHLORIDE 20 MG: 20 TABLET, FILM COATED ORAL at 09:37

## 2020-01-01 RX ADMIN — SODIUM CHLORIDE: 9 INJECTION, SOLUTION INTRAVENOUS at 13:45

## 2020-01-01 RX ADMIN — ATORVASTATIN CALCIUM 20 MG: 20 TABLET, FILM COATED ORAL at 09:37

## 2020-01-01 RX ADMIN — REGADENOSON 0.4 MG: 0.08 INJECTION, SOLUTION INTRAVENOUS at 10:08

## 2020-01-01 RX ADMIN — LAMOTRIGINE 200 MG: 100 TABLET ORAL at 09:55

## 2020-01-01 RX ADMIN — BUMETANIDE 1 MG: 1 TABLET ORAL at 11:42

## 2020-01-01 RX ADMIN — ASPIRIN 81 MG: 81 TABLET ORAL at 08:50

## 2020-01-01 RX ADMIN — INSULIN LISPRO 2 UNITS: 100 INJECTION, SOLUTION INTRAVENOUS; SUBCUTANEOUS at 13:02

## 2020-01-01 RX ADMIN — SODIUM CHLORIDE: 9 INJECTION, SOLUTION INTRAVENOUS at 00:06

## 2020-01-01 RX ADMIN — ASPIRIN 81 MG: 81 TABLET ORAL at 22:13

## 2020-01-01 RX ADMIN — SODIUM CHLORIDE 250 ML: 0.9 INJECTION, SOLUTION INTRAVENOUS at 16:50

## 2020-01-01 RX ADMIN — CEFTRIAXONE SODIUM 1 G: 1 INJECTION, POWDER, FOR SOLUTION INTRAMUSCULAR; INTRAVENOUS at 08:05

## 2020-01-01 RX ADMIN — ATORVASTATIN CALCIUM 10 MG: 10 TABLET, FILM COATED ORAL at 10:17

## 2020-01-01 RX ADMIN — SODIUM CHLORIDE 80 MG: 9 INJECTION, SOLUTION INTRAVENOUS at 15:58

## 2020-01-01 RX ADMIN — SODIUM CHLORIDE: 9 INJECTION, SOLUTION INTRAVENOUS at 17:04

## 2020-01-01 RX ADMIN — LAMOTRIGINE 200 MG: 100 TABLET ORAL at 13:54

## 2020-01-01 RX ADMIN — PAROXETINE HYDROCHLORIDE 20 MG: 20 TABLET, FILM COATED ORAL at 08:08

## 2020-01-01 RX ADMIN — LEVETIRACETAM 1250 MG: 100 INJECTION, SOLUTION INTRAVENOUS at 12:56

## 2020-01-01 RX ADMIN — BUSPIRONE HYDROCHLORIDE 10 MG: 10 TABLET ORAL at 09:54

## 2020-01-01 RX ADMIN — PANTOPRAZOLE SODIUM 40 MG: 40 INJECTION, POWDER, FOR SOLUTION INTRAVENOUS at 21:45

## 2020-01-01 RX ADMIN — SODIUM CHLORIDE: 9 INJECTION, SOLUTION INTRAVENOUS at 19:45

## 2020-01-01 RX ADMIN — LEVETIRACETAM 500 MG: 500 TABLET, FILM COATED ORAL at 08:08

## 2020-01-01 RX ADMIN — LEVETIRACETAM 1250 MG: 500 TABLET, FILM COATED ORAL at 11:42

## 2020-01-01 RX ADMIN — NADOLOL 20 MG: 20 TABLET ORAL at 10:03

## 2020-01-01 RX ADMIN — BUSPIRONE HYDROCHLORIDE 10 MG: 10 TABLET ORAL at 20:53

## 2020-01-01 RX ADMIN — NADOLOL 20 MG: 20 TABLET ORAL at 09:48

## 2020-01-01 RX ADMIN — HYDRALAZINE HYDROCHLORIDE 25 MG: 25 TABLET, FILM COATED ORAL at 21:55

## 2020-01-01 RX ADMIN — SODIUM CHLORIDE: 9 INJECTION, SOLUTION INTRAVENOUS at 09:21

## 2020-01-01 RX ADMIN — SODIUM CHLORIDE: 4.5 INJECTION, SOLUTION INTRAVENOUS at 10:55

## 2020-01-01 RX ADMIN — LAMOTRIGINE 200 MG: 100 TABLET ORAL at 22:04

## 2020-01-01 RX ADMIN — SODIUM CHLORIDE, PRESERVATIVE FREE 10 ML: 5 INJECTION INTRAVENOUS at 10:58

## 2020-01-01 RX ADMIN — SODIUM CHLORIDE, POTASSIUM CHLORIDE, SODIUM LACTATE AND CALCIUM CHLORIDE 500 ML: 600; 310; 30; 20 INJECTION, SOLUTION INTRAVENOUS at 22:12

## 2020-01-01 RX ADMIN — LAMOTRIGINE 200 MG: 100 TABLET ORAL at 21:52

## 2020-01-01 RX ADMIN — PANTOPRAZOLE SODIUM 40 MG: 40 INJECTION, POWDER, FOR SOLUTION INTRAVENOUS at 12:13

## 2020-01-01 RX ADMIN — LEVETIRACETAM 1250 MG: 100 INJECTION, SOLUTION INTRAVENOUS at 10:02

## 2020-01-01 RX ADMIN — ACETAMINOPHEN 650 MG: 325 TABLET ORAL at 06:58

## 2020-01-01 RX ADMIN — LAMOTRIGINE 200 MG: 100 TABLET ORAL at 11:01

## 2020-01-01 RX ADMIN — INSULIN GLARGINE 20 UNITS: 100 INJECTION, SOLUTION SUBCUTANEOUS at 09:54

## 2020-01-01 RX ADMIN — SUCRALFATE 1 G: 1 TABLET ORAL at 09:43

## 2020-01-01 RX ADMIN — SODIUM CHLORIDE 8 MG/HR: 9 INJECTION, SOLUTION INTRAVENOUS at 16:26

## 2020-01-01 RX ADMIN — LEVETIRACETAM 750 MG: 750 TABLET ORAL at 10:19

## 2020-01-01 RX ADMIN — SODIUM CHLORIDE 200 MG: 9 INJECTION, SOLUTION INTRAVENOUS at 17:50

## 2020-01-01 RX ADMIN — HEPARIN SODIUM 5000 UNITS: 5000 INJECTION INTRAVENOUS; SUBCUTANEOUS at 12:28

## 2020-01-01 RX ADMIN — PANTOPRAZOLE SODIUM 8 MG/HR: 40 INJECTION, POWDER, FOR SOLUTION INTRAVENOUS at 22:56

## 2020-01-01 RX ADMIN — HYDROCHLOROTHIAZIDE 12.5 MG: 25 TABLET ORAL at 12:14

## 2020-01-01 RX ADMIN — PANTOPRAZOLE SODIUM 8 MG/HR: 40 INJECTION, POWDER, FOR SOLUTION INTRAVENOUS at 09:51

## 2020-01-01 RX ADMIN — SODIUM CHLORIDE, POTASSIUM CHLORIDE, SODIUM LACTATE AND CALCIUM CHLORIDE: 600; 310; 30; 20 INJECTION, SOLUTION INTRAVENOUS at 22:12

## 2020-01-01 RX ADMIN — INSULIN LISPRO 2 UNITS: 100 INJECTION, SOLUTION INTRAVENOUS; SUBCUTANEOUS at 21:14

## 2020-01-01 RX ADMIN — LEVETIRACETAM 500 MG: 500 TABLET, FILM COATED ORAL at 22:04

## 2020-01-01 RX ADMIN — BUMETANIDE 1 MG: 1 TABLET ORAL at 13:45

## 2020-01-01 RX ADMIN — PANTOPRAZOLE SODIUM 8 MG/HR: 40 INJECTION, POWDER, FOR SOLUTION INTRAVENOUS at 16:59

## 2020-01-01 RX ADMIN — INSULIN GLARGINE 20 UNITS: 100 INJECTION, SOLUTION SUBCUTANEOUS at 21:26

## 2020-01-01 RX ADMIN — HEPARIN SODIUM 5000 UNITS: 5000 INJECTION INTRAVENOUS; SUBCUTANEOUS at 21:45

## 2020-01-01 RX ADMIN — INSULIN GLARGINE 20 UNITS: 100 INJECTION, SOLUTION SUBCUTANEOUS at 08:50

## 2020-01-01 RX ADMIN — ANTI-FUNGAL POWDER MICONAZOLE NITRATE TALC FREE: 1.42 POWDER TOPICAL at 11:41

## 2020-01-01 RX ADMIN — GABAPENTIN 100 MG: 100 CAPSULE ORAL at 21:30

## 2020-01-01 RX ADMIN — INSULIN LISPRO 2 UNITS: 100 INJECTION, SOLUTION INTRAVENOUS; SUBCUTANEOUS at 10:57

## 2020-01-01 RX ADMIN — LISINOPRIL 10 MG: 10 TABLET ORAL at 09:56

## 2020-01-01 RX ADMIN — SUCRALFATE 1 G: 1 TABLET ORAL at 11:56

## 2020-01-01 RX ADMIN — SODIUM CHLORIDE: 9 INJECTION, SOLUTION INTRAVENOUS at 03:17

## 2020-01-01 RX ADMIN — INSULIN GLARGINE 60 UNITS: 100 INJECTION, SOLUTION SUBCUTANEOUS at 09:59

## 2020-01-01 RX ADMIN — NADOLOL 20 MG: 20 TABLET ORAL at 13:58

## 2020-01-01 RX ADMIN — FUROSEMIDE 40 MG: 10 INJECTION, SOLUTION INTRAMUSCULAR; INTRAVENOUS at 08:09

## 2020-01-01 ASSESSMENT — PAIN DESCRIPTION - PAIN TYPE
TYPE: ACUTE PAIN
TYPE: CHRONIC PAIN
TYPE: ACUTE PAIN
TYPE: ACUTE PAIN

## 2020-01-01 ASSESSMENT — ENCOUNTER SYMPTOMS
DIARRHEA: 0
ABDOMINAL PAIN: 0
CONSTIPATION: 0
NAUSEA: 0
COUGH: 0
CHEST TIGHTNESS: 0
GASTROINTESTINAL NEGATIVE: 1
BLOOD IN STOOL: 0
COLOR CHANGE: 0
EYE PAIN: 0
RHINORRHEA: 0
WHEEZING: 0
ABDOMINAL PAIN: 0
RHINORRHEA: 0
EYE ITCHING: 0
VOMITING: 0
ABDOMINAL DISTENTION: 0
BLOOD IN STOOL: 0
CONSTIPATION: 0
CONSTIPATION: 0
NAUSEA: 0
SHORTNESS OF BREATH: 0
EYE DISCHARGE: 0
ABDOMINAL DISTENTION: 0
EYES NEGATIVE: 1
CHEST TIGHTNESS: 0
TROUBLE SWALLOWING: 0
ABDOMINAL PAIN: 0
TROUBLE SWALLOWING: 0
RESPIRATORY NEGATIVE: 1
ABDOMINAL PAIN: 0
ABDOMINAL PAIN: 0
COUGH: 0
BACK PAIN: 0
CHEST TIGHTNESS: 0
SHORTNESS OF BREATH: 0
BACK PAIN: 0
SHORTNESS OF BREATH: 0
ABDOMINAL PAIN: 0
COLOR CHANGE: 0
VOMITING: 0
EYE REDNESS: 0
EYE PAIN: 0
DIARRHEA: 0
EYE ITCHING: 0
NAUSEA: 0
BACK PAIN: 0
SHORTNESS OF BREATH: 1
SHORTNESS OF BREATH: 0
EYE PAIN: 0
CONSTIPATION: 0
BLOOD IN STOOL: 1
VOMITING: 0
CHEST TIGHTNESS: 0
BLOOD IN STOOL: 0
CHOKING: 0
SHORTNESS OF BREATH: 0
ANAL BLEEDING: 0
ABDOMINAL PAIN: 0
VOMITING: 0
DIARRHEA: 1
ABDOMINAL PAIN: 0
VOMITING: 0
NAUSEA: 0
TROUBLE SWALLOWING: 0
EYE REDNESS: 0
SINUS PAIN: 0
BACK PAIN: 0
SORE THROAT: 0
ABDOMINAL PAIN: 0
ABDOMINAL DISTENTION: 0
VOICE CHANGE: 0
SHORTNESS OF BREATH: 0
DIARRHEA: 0
VOICE CHANGE: 0
EYE DISCHARGE: 0
VOMITING: 0
APNEA: 0
WHEEZING: 0
SHORTNESS OF BREATH: 0
DIARRHEA: 0
COLOR CHANGE: 0
CHEST TIGHTNESS: 0
SORE THROAT: 0
VOICE CHANGE: 0
ALLERGIC/IMMUNOLOGIC NEGATIVE: 1
ABDOMINAL DISTENTION: 0
COLOR CHANGE: 0
CHEST TIGHTNESS: 0
WHEEZING: 0
BLOOD IN STOOL: 0
SHORTNESS OF BREATH: 0
SHORTNESS OF BREATH: 0
EYES NEGATIVE: 1
CHEST TIGHTNESS: 0
ANAL BLEEDING: 0
NAUSEA: 0
COUGH: 0
BACK PAIN: 1
NAUSEA: 0
COUGH: 0
EYE ITCHING: 0
SHORTNESS OF BREATH: 0
RHINORRHEA: 0
BACK PAIN: 0
ABDOMINAL DISTENTION: 0
DIARRHEA: 0
SHORTNESS OF BREATH: 1
BLOOD IN STOOL: 0
BLOOD IN STOOL: 0
ABDOMINAL DISTENTION: 0
COUGH: 0
TROUBLE SWALLOWING: 0
COLOR CHANGE: 0
DIARRHEA: 0
COLOR CHANGE: 0
BLOOD IN STOOL: 0
CONSTIPATION: 0
DIARRHEA: 0
APNEA: 0
VOMITING: 0
SHORTNESS OF BREATH: 0
DIARRHEA: 0
SHORTNESS OF BREATH: 0
CHEST TIGHTNESS: 0
VOMITING: 0
CHOKING: 0
GASTROINTESTINAL NEGATIVE: 1
ABDOMINAL PAIN: 0
VOMITING: 0
EYE DISCHARGE: 0
SORE THROAT: 0
NAUSEA: 1
BLOOD IN STOOL: 0
COUGH: 0
ABDOMINAL DISTENTION: 1
COLOR CHANGE: 1
EYE PAIN: 0
COUGH: 0
SINUS PAIN: 0
ABDOMINAL PAIN: 0
BACK PAIN: 0
ABDOMINAL PAIN: 0
NAUSEA: 0
SINUS PAIN: 0
CHEST TIGHTNESS: 0
DIARRHEA: 0
ABDOMINAL DISTENTION: 0
BLOOD IN STOOL: 0
SORE THROAT: 0
BLOOD IN STOOL: 1

## 2020-01-01 ASSESSMENT — PAIN DESCRIPTION - LOCATION
LOCATION: HEAD
LOCATION: GENERALIZED
LOCATION: GENERALIZED
LOCATION: HEAD
LOCATION: HEAD
LOCATION: ABDOMEN
LOCATION: HEAD
LOCATION: HEAD
LOCATION: LEG
LOCATION: OTHER (COMMENT)
LOCATION: HEAD
LOCATION: HEAD

## 2020-01-01 ASSESSMENT — PAIN SCALES - GENERAL
PAINLEVEL_OUTOF10: 3
PAINLEVEL_OUTOF10: 6
PAINLEVEL_OUTOF10: 10
PAINLEVEL_OUTOF10: 0
PAINLEVEL_OUTOF10: 5
PAINLEVEL_OUTOF10: 0
PAINLEVEL_OUTOF10: 2
PAINLEVEL_OUTOF10: 0
PAINLEVEL_OUTOF10: 7
PAINLEVEL_OUTOF10: 0
PAINLEVEL_OUTOF10: 5
PAINLEVEL_OUTOF10: 5
PAINLEVEL_OUTOF10: 6
PAINLEVEL_OUTOF10: 2
PAINLEVEL_OUTOF10: 3
PAINLEVEL_OUTOF10: 0
PAINLEVEL_OUTOF10: 5
PAINLEVEL_OUTOF10: 5
PAINLEVEL_OUTOF10: 0
PAINLEVEL_OUTOF10: 2
PAINLEVEL_OUTOF10: 0
PAINLEVEL_OUTOF10: 3
PAINLEVEL_OUTOF10: 0
PAINLEVEL_OUTOF10: 5

## 2020-01-01 ASSESSMENT — PULMONARY FUNCTION TESTS
PIF_VALUE: 1
PIF_VALUE: 0
PIF_VALUE: 1
PIF_VALUE: 0
PIF_VALUE: 1
PIF_VALUE: 0
PIF_VALUE: 1
PIF_VALUE: 0
PIF_VALUE: 1
PIF_VALUE: 63
PIF_VALUE: 1
PIF_VALUE: 0
PIF_VALUE: 1
PIF_VALUE: 0
PIF_VALUE: 1
PIF_VALUE: 1

## 2020-01-01 ASSESSMENT — PAIN DESCRIPTION - FREQUENCY
FREQUENCY: INTERMITTENT
FREQUENCY: CONTINUOUS
FREQUENCY: CONTINUOUS

## 2020-01-01 ASSESSMENT — PAIN - FUNCTIONAL ASSESSMENT
PAIN_FUNCTIONAL_ASSESSMENT: ACTIVITIES ARE NOT PREVENTED
PAIN_FUNCTIONAL_ASSESSMENT: 0-10
PAIN_FUNCTIONAL_ASSESSMENT: ACTIVITIES ARE NOT PREVENTED
PAIN_FUNCTIONAL_ASSESSMENT: 0-10
PAIN_FUNCTIONAL_ASSESSMENT: 0-10

## 2020-01-01 ASSESSMENT — PAIN DESCRIPTION - DESCRIPTORS
DESCRIPTORS: ACHING
DESCRIPTORS: DISCOMFORT
DESCRIPTORS: ACHING
DESCRIPTORS: PINS AND NEEDLES

## 2020-01-01 ASSESSMENT — PAIN DESCRIPTION - ORIENTATION
ORIENTATION: POSTERIOR
ORIENTATION: RIGHT
ORIENTATION: RIGHT;LEFT;LOWER

## 2020-01-01 ASSESSMENT — PATIENT HEALTH QUESTIONNAIRE - PHQ9
SUM OF ALL RESPONSES TO PHQ QUESTIONS 1-9: 0
SUM OF ALL RESPONSES TO PHQ QUESTIONS 1-9: 0

## 2020-01-01 ASSESSMENT — LIFESTYLE VARIABLES: HOW OFTEN DO YOU HAVE A DRINK CONTAINING ALCOHOL: 0

## 2020-01-01 ASSESSMENT — PAIN DESCRIPTION - PROGRESSION: CLINICAL_PROGRESSION: GRADUALLY WORSENING

## 2020-01-01 ASSESSMENT — PAIN DESCRIPTION - ONSET: ONSET: ON-GOING

## 2020-01-11 PROBLEM — N95.0 POSTMENOPAUSAL BLEEDING: Status: ACTIVE | Noted: 2020-01-01

## 2020-01-12 NOTE — ED NOTES
Writer spoke with patient at the request of EDIS Galvan, she is in need of information to get moved from [facility]. Writer encouraged the patient to contact her  [facility] . Writer provided patient with information to the [place] and encouraged the patient to contact them to let them now of her concerns. Writer encouraged her to have family member get involved in her care. Writer educated patient on the services to Adult [facility] are able to help provided accountability for her care.

## 2020-01-12 NOTE — ED NOTES
Mode of arrival:  Friend drove pt in      Residence prior to admit: [address]      Chief complaint on admission: bleeding, cellulitis  Bleeding present intermittently for 2 months, yet has been increasing for the last 2 weeks. Pt has blood in stool and also vaginal bleeding. Redness and swelling to BL legs has worsened over the last 2 weeks. Pt states that Dr Fabricio Agudelo is aware of the blood in stool and attempting to do a camera procedure. Pt is Aox4 and ambulates per walker. C= \"Have you ever felt that you should Cut down on your drinking? \"  No  A= \"Have people Annoyed you by criticizing your drinking? \"  No  G= \"Have you ever felt bad or Guilty about your drinking? \"  No  E= \"Have you ever had a drink as an Eye-opener first thing in the morning to steady your nerves or to help a hangover? \"  No      Deferred []      Reason for deferring: N/A    *If yes to two or more: probable alcohol abuse. [facility], RN  01/11/20 4746

## 2020-01-12 NOTE — ED PROVIDER NOTES
[address]  Emergency Department Encounter  EmergencyMedicineResident     Pt Name: Aracely Mooney  MRN: [id number]  [place] 1955  Date of evaluation: 1/11/20  PCP: Mario Joseph MD    [address]       Chief Complaint   Patient presents with    Cellulitis     H/O cellulitis, non-healing wound left foot    Hematuria        HISTORY OF PRESENT ILLNESS  (Location/Symptom, Timing/Onset, Context/Setting, Quality, Duration, Modifying Factors, Severity.)      Aracely Mooney is a 75 y.o. female who presents with multiple complaints. She is concerned that she might be developing an infection of her lower extremities along with bilateral lower extremity swelling. Patient was here about 3 weeks ago after cutting her foot with a knife that fell from the countertop in her kitchen. The laceration was repaired primarily in the ED and patient followed up with podiatry afterwards. She also complains of hematuria that she notices in the toilet and in her diaper. She states that she sometimes doesn't feel when she is urinating in her diaper but when she checks she has blood there. Patient is unsure whether it is vaginal bleeding or hematuria. She is currently being evaluated by her GI physician for rectal bleeding. Patient does not take any blood thinners. Patient denies chest pain, shortness of breath, abdominal pain, nausea, vomiting, fever, chills, headache, dizziness, lightheadedness, vision changes, dysuria, changes in bowel movements. PAST MEDICAL / SURGICAL /SOCIAL / FAMILY HISTORY      has a past medical history of Anemia, Cataract, GERD (gastroesophageal reflux disease), Headache, Hyperlipidemia, Neuropathy, Osteoarthritis, Restless leg syndrome, Seizures ([address] Short-term memory loss, Stroke (cerebrum) ([address] and Type 2 diabetes mellitus without complication ([address] has a past surgical history that includes Cholecystectomy; Tonsillectomy;  Cataract removal; eye surgery; Cardiac catheterization; Upper gastrointestinal endoscopy (N/A, 5/12/2019); and Colonoscopy (N/A, 5/13/2019). Social History     Socioeconomic History    Marital status: Single     Spouse name: Not on file    Number of children: Not on file    Years of education: Not on file    Highest education level: Not on file   Occupational History    Not on file   Social Needs    Financial resource strain: Not on file    Food insecurity:     Worry: Not on file     Inability: Not on file    Transportation needs:     Medical: Not on file     Non-medical: Not on file   Tobacco Use    Smoking status: Never Smoker    Smokeless tobacco: Never Used   Substance and Sexual Activity    Alcohol use: No    Drug use: No    Sexual activity: Not on file   Lifestyle    Physical activity:     Days per week: Not on file     Minutes per session: Not on file    Stress: Not on file   Relationships    Social connections:     Talks on phone: Not on file     Gets together: Not on file     Attends Mu-ism service: Not on file     Active member of club or organization: Not on file     Attends meetings of clubs or organizations: Not on file     Relationship status: Not on file    Intimate partner violence:     Fear of current or ex partner: Not on file     Emotionally abused: Not on file     Physically abused: Not on file     Forced sexual activity: Not on file   Other Topics Concern    Not on file   Social History Narrative    Not on file       Family History   Problem Relation Age of Onset    Diabetes Brother        Allergies:  Codeine    Home Medications:  Prior to Admission medications    Medication Sig Start Date End Date Taking?  Authorizing Provider   ferrous sulfate 325 (65 Fe) MG tablet Take 1 tablet by mouth every 12 hours 6/21/19   Berna Charles MD   cephALEXin (KEFLEX) 500 MG capsule Take 500 mg by mouth 2 times daily Indications: started 1st dose 5-7-19 in am, taking for 7 days    Historical Provider, MD furosemide (LASIX) 20 MG tablet Take 20 mg by mouth daily    Historical Provider, MD   gabapentin (NEURONTIN) 100 MG capsule Take 100 mg by mouth 2 times daily. Екатерина Lizarraga     Historical Provider, MD   levETIRAcetam (KEPPRA) 500 MG tablet Take 1,250 mg by mouth 2 times daily 2.5 tablets = 1250 mg    Historical Provider, MD   insulin aspart (NOVOLOG) 100 UNIT/ML injection vial Inject into the skin Inject as per sliding scale before meals and at bedtime:    = 0 units; call physician if less than 70  151-200 = 2 units  201-250 = 4 units  251-300 = 6 units  301-350 = 8 units  351-400 = 10 units; call physician if greater than 400    Historical Provider, MD   potassium chloride (KLOR-CON M) 10 MEQ extended release tablet Take 10 mEq by mouth daily    Historical Provider, MD   vitamin B-12 (CYANOCOBALAMIN) 500 MCG tablet Take 500 mcg by mouth daily    Historical Provider, MD   benzonatate (TESSALON) 200 MG capsule Take 200 mg by mouth 2 times daily as needed for Cough    Historical Provider, MD   loperamide (IMODIUM) 2 MG capsule Take 4 mg by mouth daily as needed for Diarrhea (after first loose stool)    Historical Provider, MD   loperamide (IMODIUM) 2 MG capsule Take 2 mg by mouth 4 times daily as needed for Diarrhea (after initial 4 mg dose)    Historical Provider, MD   acetaminophen (TYLENOL) 325 MG tablet Take 650 mg by mouth 3 times daily as needed for Pain (mild)    Historical Provider, MD   metFORMIN (GLUCOPHAGE) 1000 MG tablet Take 1,000 mg by mouth 2 times daily (with meals)    Historical Provider, MD   Blood Glucose Monitoring Suppl FLAVIO Check blood sugars 3/day 11/30/17   Delicia Messina MD   Glucose Blood (BLOOD GLUCOSE TEST STRIPS) STRP Test 3  times daily Insulin Dependent mellitus 11/30/17   Delicia Messina MD   Lancets MISC Check 3/day 11/30/17   Delicia Messina MD   lisinopril-hydrochlorothiazide (PRINZIDE;ZESTORETIC) 10-12.5 MG per tablet Take 1 tablet by mouth daily    Historical Provider, MD Resp: 16 18   Temp: 98.2 °F (36.8 °C) 98.6 °F (37 °C)   TempSrc: Oral Oral   SpO2: 98% 100%   Weight: 250 lb (113.4 kg)    Height: 5' 1\" (1.549 m)          Physical Exam  Constitutional:       Appearance: She is well-developed. She is obese. She is not ill-appearing, toxic-appearing or diaphoretic. HENT:      Head: Normocephalic and atraumatic. Right Ear: External ear normal.      Left Ear: External ear normal.      Nose: Nose normal.      Mouth/Throat:      Mouth: Mucous membranes are moist.      Pharynx: No posterior oropharyngeal erythema. Eyes:      General: No scleral icterus. Right eye: No discharge. Left eye: No discharge. Extraocular Movements: Extraocular movements intact. Pupils: Pupils are equal, round, and reactive to light. Neck:      Musculoskeletal: Normal range of motion. Trachea: No tracheal deviation. Cardiovascular:      Rate and Rhythm: Normal rate and regular rhythm. Heart sounds: Normal heart sounds. No murmur. No friction rub. No gallop. Pulmonary:      Effort: Pulmonary effort is normal. No respiratory distress. Breath sounds: Normal breath sounds. No wheezing, rhonchi or rales. Abdominal:      General: Bowel sounds are normal. There is no distension. Palpations: Abdomen is soft. There is no mass. Tenderness: There is no tenderness. There is no guarding. Genitourinary:     Comments: Moderate to large amount of blood in the vaginal canal coming from the cervical os. Musculoskeletal: Normal range of motion. Right lower leg: Edema present. Left lower leg: Edema present. Skin:     General: Skin is warm and dry. Capillary Refill: Capillary refill takes less than 2 seconds. Findings: No rash. Comments: 3cm poorly healing laceration on the dorsal left foot. Oozing clear serous fluid. Bilateral lower legs have edema and a few oozing weeping wounds.    Neurological:      Mental Status: She is alert and 0.90 mg/dL    Bun/Cre Ratio NOT REPORTED 9 - 20    Calcium 9.2 8.6 - 10.4 mg/dL    Sodium 146 (H) 135 - 144 mmol/L    Potassium 4.3 3.7 - 5.3 mmol/L    Chloride 106 98 - 107 mmol/L    CO2 27 20 - 31 mmol/L    Anion Gap 13 9 - 17 mmol/L    GFR Non-African American 38 (L) >60 mL/min    GFR  46 (L) >60 mL/min    GFR Comment          GFR Staging NOT REPORTED    Urinalysis with Microscopic   Result Value Ref Range    Color, UA YELLOW YELLOW    Turbidity UA CLEAR CLEAR    Glucose, Ur TRACE (A) NEGATIVE    Bilirubin Urine NEGATIVE NEGATIVE    Ketones, Urine NEGATIVE NEGATIVE    Specific [place], UA 1.016 1.000 - 1.030    Urine Hgb MOD (A) NEGATIVE    pH, UA 5.0 5.0 - 8.0    Protein, UA 4+ (A) NEGATIVE    Urobilinogen, Urine Normal Normal    Nitrite, Urine NEGATIVE NEGATIVE    Leukocyte Esterase, Urine NEGATIVE NEGATIVE    Urinalysis Comments NOT REPORTED     -          WBC, UA 2 TO 5 /HPF    RBC, UA 5 TO 10 /HPF    Casts UA HYALINE /LPF    Casts UA 0 TO 2 /LPF    Crystals UA NOT REPORTED None /HPF    Epithelial Cells UA 0 TO 2 /HPF    Renal Epithelial, Urine NOT REPORTED 0 /HPF    Bacteria, UA FEW (A) None    Mucus, UA NOT REPORTED None    Trichomonas, UA NOT REPORTED None    Amorphous, UA NOT REPORTED None    Other Observations UA NOT REPORTED NOT REQ.     Yeast, UA NOT REPORTED None   Sedimentation Rate   Result Value Ref Range    Sed Rate 114 (H) 0 - 20 mm   C-Reactive Protein   Result Value Ref Range    CRP 1.8 0.0 - 5.0 mg/L       RADIOLOGY:  VASCULAR REPORT   Final Result      VL Lower Extremity Bilateral Venous Duplex   Final Result      US Pelvis Complete    (Results Pending)   US Non OB Transvaginal    (Results Pending)       ECG:  None     All EKG's are interpreted by the Emergency Department Physician who either signsor Co-signs this chart in the absence of a cardiologist.    BEDSIDE ULTRASOUND:  None     EMERGENCY DEPARTMENT COURSE:    ED Course as of Jan 11 2217   Sat Jan 11, 2020 2118 Hemoglobin is 8.5, consistent with prior hemoglobins around this number. CRP is 1.8. WBC is 3.5. Low suspicion for infectious etiology of her lower extremity color changes. Creatinine is 1.38, slightly elevated from her baseline of approximately 1.0.    []   2211 Vaginal exam performed with OB resident. She does have blood in the vaginal canal coming from the cervix. []      ED Course User Index  [] Lacretia Mcardle, MD   Patient will be following up outpatient with Ob/Gyn physician. No need for antibiotics for the lower extremity wounds as they do not appear to be infected. Patient was counseled to follow up with primary care physician and given appropriate emergency department return precautions. Patient was discharged in stable condition. PROCEDURES:  None     CONSULTS:  IP CONSULT TO OB GYN    CRITICAL CARE:  See attending physician note    FINAL IMPRESSION      1. Postmenopausal bleeding    2.  Lower extremity edema          DISPOSITION / PLAN     DISPOSITION    Discharge to home     PATIENT REFERRED TO:  Vinicio Willis, [address] [place] [address], [facility] [id number]  [phone]    Schedule an appointment as soon as possible for a visit in 1 week  [address] [place] [address]  [address]  [address] [place] [id number]  [phone]  Go to   If symptoms worsen    Jade Cooper MD  [address] [place] [address]    Schedule an appointment as soon as possible for a visit in 2 days  For follow-up of this visit      DISCHARGE MEDICATIONS:  Discharge Medication List as of 1/11/2020 10:21 PM        Discharge Medication List as of 1/11/2020 10:21 PM           Lacretia Mcardle, MD  Emergency Medicine Resident    (Please note that portions of this note were completed with a voice recognition program.  Efforts were made to edit the dictations but occasionally words are mis-transcribed.)

## 2020-01-12 NOTE — ED NOTES
Maryanne Hernandez, vascular tech, at bedside performing doppler.        Lytle Dancer, RN  01/11/20 2037

## 2020-01-12 NOTE — ED PROVIDER NOTES
EMERGENCY DEPARTMENT ENCOUNTER   ATTENDING ATTESTATION     Pt Name: Joseph Palma  MRN: [id number]  [place] 1955  Date of evaluation: 1/11/20       Joseph Palma is a 75 y.o. female who presents with Cellulitis (H/O cellulitis, non-healing wound left foot) and Hematuria      MDM:   This is a 41-year-old female with a history of diabetes who comes in today with leg wounds do not appear infected at this time states that she normally has weeping of her lower extremities this is not new. Also complaining of bleeding from either her vagina or urethra will further evaluate. GYN evaluated the patient recommends outpatient follow-up per hemoglobin is stable will have her follow-up with podiatry and GYN. Vitals:   Vitals:    01/11/20 1915   BP: (!) 160/60   Pulse: 94   Resp: 16   Temp: 98.2 °F (36.8 °C)   TempSrc: Oral   SpO2: 98%   Weight: 250 lb (113.4 kg)   Height: 5' 1\" (1.549 m)         I personally evaluated and examined the patient in conjunction with the resident and agree with the assessment, treatment plan, and disposition of the patient as recorded by the resident. I performed a history and physical examination of the patient and discussed management with the resident. I reviewed the residents note and agree with the documented findings and plan of care. Any areas of disagreement are noted on the chart. I was personally present for the key portions of any procedures. I have documented in the chart those procedures where I was not present during the key portions. I have personally reviewed all images and agree with the resident's interpretation. I have reviewed the emergency nurses triage note. I agree with the chief complaint, past medical history, past surgical history, allergies, medications, social and family history as documented unless otherwise noted.     Licha Sr MD  Attending Emergency Physician            Licha Sr MD  01/11/20 9089

## 2020-01-12 NOTE — CONSULTS
[address]    Patient Name: Nicho Falcon     Patient : 1955  Room/Bed:   Admission Date/Time: 2020  7:25 PM  Primary Care Physician: Raquel Spatz, MD    Consulting Provider: Dr. Terri Argueta  Reason for Consult: Vaginal Bleeding    CC:   Chief Complaint   Patient presents with    Cellulitis     H/O cellulitis, non-healing wound left foot    Hematuria                HPI: Nicho Falcon is a 75 y.o. female [de-identified]. She presents to the ED for non-healing bilateral lower extremity wounds. She has a significant history of diabetes mellitus that is not well-controlled. She also is complaining of hematuria and vaginal bleeding that began about 2 weeks ago and got worse about 3 days ago. She has been postmenopausal since age 37 and has had no bleeding until 2 weeks ago. She also is being worked up by GI for rectal bleeding. She denies chest pain, SOB, fever, chills, nausea, vomiting. She has not had a pap smear done in over 20 years and denies ever having had an abnormal pap. She has never seen a GYN but had her paps done by her PCP.     Patient is postmenopausal.     REVIEW OF SYSTEMS:  Constitutional: negative fever, negative chills  HEENT: negative visual disturbances, negative headaches  Respiratory: negative dyspnea, negative cough  Cardiovascular: negative chest pain,  negative palpitations  Gastrointestinal: negative abdominal pain, negative RUQ pain, negative N/V, negative diarrhea, negative constipation  Genitourinary: negative dysuria, negative vaginal discharge, positive vaginal bleeding  Dermatological: negative rash  Hematologic: negative bruising  Immunologic/Lymphatic: negative recent illness, negative recent sick contact  Musculoskeletal: negative back pain, positive bilateral lower extremity cellulitis and non-healing wounds  Neurological:  negative dizziness, negative weakness  Behavior/Psych: negative depression, negative Breast, Ovarian, Colon or Uterine Cancer: Father had colon cancer  family history includes Diabetes in her brother. SOCIAL HISTORY:   reports that she has never smoked. She has never used smokeless tobacco. She reports that she does not drink alcohol or use drugs. ________________________________________________________________________                                    Phuc Sprang:  Vitals:    01/11/20 1915   BP: (!) 160/60   Pulse: 94   Resp: 16   Temp: 98.2 °F (36.8 °C)   TempSrc: Oral   SpO2: 98%   Weight: 250 lb (113.4 kg)   Height: 5' 1\" (1.549 m)                                                    INPUT/OUTPUT:  I/O this shift:  In: -   Out: 225 [Urine:225]  In: -   Out: 225 [Urine:225]                                                                                                                               PHYSICAL EXAM:     General Appearance: Appears healthy. Alert; in no acute distress. Pleasant. Respiratory: Normal expansion. Clear to auscultation. No rales, rhonchi, or wheezing. Cardiovascular: normal, regular rate and rhythm  Abdomen: soft, non-tender, non-distended, no right upper quadrant tenderness and no CVA tenderness,   Pelvic Exam:   External genitalia: General appearance; normal, Hair distribution; normal, Lesions absent  Urinary system: urethral meatus normal  Vaginal: dark red blood within vaginal vault  Cervix: not visualized due to body habitus. Not palpated on bimanual due to tight introitus. Adnexa: non-palpable  Uterus: normal single, nontender  Musculoskeletal: no gross abnormalities  Extremities: bandages to bilateral lower extremities. Erythema noted in bilateral lower extremities  Psych:  oriented to time, place and person     OMM EXAM:  The patient did not complain of a Chief complaint requiring OMM.   Chief Complaint:none    Structural Exam: Refused    LAB RESULTS:  Results for orders placed or performed during the hospital encounter of 01/11/20   CBC Auto Differential Result Value Ref Range    WBC 3.5 3.5 - 11.0 k/uL    RBC 3.10 (L) 4.0 - 5.2 m/uL    Hemoglobin 8.5 (L) 12.0 - 16.0 g/dL    Hematocrit 26.9 (L) 36 - 46 %    MCV 87.0 80 - 100 fL    MCH 27.5 26 - 34 pg    MCHC 31.5 31 - 37 g/dL    RDW 14.4 11.5 - 14.9 %    Platelets 97 (L) 591 - 450 k/uL    MPV 8.2 6.0 - 12.0 fL    NRBC Automated NOT REPORTED per 100 WBC    Differential Type NOT REPORTED     Immature Granulocytes NOT REPORTED 0 %    Absolute Immature Granulocyte NOT REPORTED 0.00 - 0.30 k/uL    WBC Morphology NOT REPORTED     RBC Morphology NOT REPORTED     Platelet Estimate NOT REPORTED     Seg Neutrophils 64 36 - 66 %    Lymphocytes 25 24 - 44 %    Monocytes 7 1 - 7 %    Eosinophils % 3 0 - 4 %    Basophils 1 0 - 2 %    Segs Absolute 2.20 1.3 - 9.1 k/uL    Absolute Lymph # 0.90 (L) 1.0 - 4.8 k/uL    Absolute Mono # 0.30 0.1 - 1.3 k/uL    Absolute Eos # 0.10 0.0 - 0.4 k/uL    Basophils Absolute 0.00 0.0 - 0.2 k/uL   Basic Metabolic Panel   Result Value Ref Range    Glucose 219 (H) 70 - 99 mg/dL    BUN 37 (H) 8 - 23 mg/dL    CREATININE 1.38 (H) 0.50 - 0.90 mg/dL    Bun/Cre Ratio NOT REPORTED 9 - 20    Calcium 9.2 8.6 - 10.4 mg/dL    Sodium 146 (H) 135 - 144 mmol/L    Potassium 4.3 3.7 - 5.3 mmol/L    Chloride 106 98 - 107 mmol/L    CO2 27 20 - 31 mmol/L    Anion Gap 13 9 - 17 mmol/L    GFR Non-African American 38 (L) >60 mL/min    GFR  46 (L) >60 mL/min    GFR Comment          GFR Staging NOT REPORTED    Urinalysis with Microscopic   Result Value Ref Range    Color, UA YELLOW YELLOW    Turbidity UA CLEAR CLEAR    Glucose, Ur TRACE (A) NEGATIVE    Bilirubin Urine NEGATIVE NEGATIVE    Ketones, Urine NEGATIVE NEGATIVE    Specific Boyce, UA 1.016 1.000 - 1.030    Urine Hgb MOD (A) NEGATIVE    pH, UA 5.0 5.0 - 8.0    Protein, UA 4+ (A) NEGATIVE    Urobilinogen, Urine Normal Normal    Nitrite, Urine NEGATIVE NEGATIVE    Leukocyte Esterase, Urine NEGATIVE NEGATIVE    Urinalysis Comments NOT REPORTED     - WBC, UA 2 TO 5 /HPF    RBC, UA 5 TO 10 /HPF    Casts UA HYALINE /LPF    Casts UA 0 TO 2 /LPF    Crystals UA NOT REPORTED None /HPF    Epithelial Cells UA 0 TO 2 /HPF    Renal Epithelial, Urine NOT REPORTED 0 /HPF    Bacteria, UA FEW (A) None    Mucus, UA NOT REPORTED None    Trichomonas, UA NOT REPORTED None    Amorphous, UA NOT REPORTED None    Other Observations UA NOT REPORTED NOT REQ. Yeast, UA NOT REPORTED None   Sedimentation Rate   Result Value Ref Range    Sed Rate 114 (H) 0 - 20 mm   C-Reactive Protein   Result Value Ref Range    CRP 1.8 0.0 - 5.0 mg/L       O POSITIVE       [unfilled]    No results found for: PREGTESTUR, PREGSERUM, HCG, HCGQUANT    Lab Results   Component Value Date    WBC 3.5 01/11/2020    HGB 8.5 (L) 01/11/2020    HCT 26.9 (L) 01/11/2020    MCV 87.0 01/11/2020    PLT 97 (L) 01/11/2020       Lab Results   Component Value Date     01/11/2020    K 4.3 01/11/2020     01/11/2020    CO2 27 01/11/2020    BUN 37 01/11/2020    CREATININE 1.38 01/11/2020    GLUCOSE 219 01/11/2020    CALCIUM 9.2 01/11/2020        DIAGNOSTICS:  Bedside Dopplers negative per ED resident    ASSESSMENT & PLAN:    Lee Ann Haney is a 75 y.o. female G0      Postmenopausal Bleeding   - Vag probe/GC pending   - Os not visualized due to tight introitus and body habitus   - Patient will most likely need Hysteroscopy with D&C in OR for endometrial sampling   - TVUS ordered. Patient can get done in outpatient setting   - Hgb 8.5 which is normal for patient   - VSS   - Patient will need outpatient follow up for TVUS and endometrial sampling to rule out any abnormal pathology. Bilateral Lower Extremity Cellulitis   - Management per Emergency Department   - Bilateral LE Dopplers neg per ED Resident    Patient ok for discharge from OB standpoint. Patient will need to follow up with Dr. Kate Sidhu for endometrial sampling for new onset postmenopausal bleeding.     Patient Active Problem List    Diagnosis

## 2020-02-11 NOTE — PROGRESS NOTES
Ever Granger  2/11/2020    YOB: 1955          The patient was seen today. She is here regarding ER follow up from vaginal bleeding. Pt states she has had vaginal/ rectal / hematuria since 12/2019. Pt is in process of workup by GI. Pt has had colonoscopy showing colonic polyps which were benign. Pt has an EGD scheduled. Pt denies any family h/o gynecologic cancers  . Her bowels are regular and she is voiding without difficulty. HPI:  Ever Granger is a 75 y.o. female No obstetric history on file. OB History   No obstetric history on file.        Past Medical History:   Diagnosis Date    Anemia     Cataract     GERD (gastroesophageal reflux disease)     Headache     Hyperlipidemia     Neuropathy     Osteoarthritis     Restless leg syndrome     Seizures (HCC) since age 10   Beth Rakeshks Short-term memory loss     Stroke (cerebrum) ([facility] [address] 2010    Type 2 diabetes mellitus without complication ([facility] [address] 5457       Past Surgical History:   Procedure Laterality Date    CARDIAC CATHETERIZATION      CATARACT REMOVAL      CHOLECYSTECTOMY      COLONOSCOPY N/A 5/13/2019    COLONOSCOPY DIAGNOSTIC, POLYPECTOMIES performed by Shaun Rose MD at [address]      cataract    TONSILLECTOMY      UPPER GASTROINTESTINAL ENDOSCOPY N/A 5/12/2019    EGD ESOPHAGOGASTRODUODENOSCOPY performed by Shaun Rose MD at [address] [facility] OR       Family History   Problem Relation Age of Onset    Diabetes Brother        Social History     Socioeconomic History    Marital status: Single     Spouse name: Not on file    Number of children: Not on file    Years of education: Not on file    Highest education level: Not on file   Occupational History    Not on file   Social Needs    Financial resource strain: Not on file    Food insecurity:     Worry: Not on file     Inability: Not on file    Transportation needs:     Medical: Not on file     Non-medical: Not on file   Tobacco Use    Smoking capsule Take 200 mg by mouth 2 times daily as needed for Cough      loperamide (IMODIUM) 2 MG capsule Take 4 mg by mouth daily as needed for Diarrhea (after first loose stool)      loperamide (IMODIUM) 2 MG capsule Take 2 mg by mouth 4 times daily as needed for Diarrhea (after initial 4 mg dose)      acetaminophen (TYLENOL) 325 MG tablet Take 650 mg by mouth 3 times daily as needed for Pain (mild)      metFORMIN (GLUCOPHAGE) 1000 MG tablet Take 1,000 mg by mouth 2 times daily (with meals)      Blood Glucose Monitoring Suppl FLAVIO Check blood sugars 3/day 1 Device 0    Glucose Blood (BLOOD GLUCOSE TEST STRIPS) STRP Test 3  times daily Insulin Dependent mellitus 100 strip 11    Lancets MISC Check 3/day 100 each 11    lisinopril-hydrochlorothiazide (PRINZIDE;ZESTORETIC) 10-12.5 MG per tablet Take 1 tablet by mouth daily      PARoxetine (PAXIL) 20 MG tablet Take 20 mg by mouth every morning      omeprazole (PRILOSEC) 40 MG delayed release capsule Take 40 mg by mouth Daily       loratadine (CLARITIN) 10 MG tablet Take 10 mg by mouth daily      simvastatin (ZOCOR) 40 MG tablet Take 40 mg by mouth nightly      pramipexole (MIRAPEX) 1 MG tablet Take 1 mg by mouth nightly      lamoTRIgine (LAMICTAL) 200 MG tablet Take 200 mg by mouth 2 times daily      busPIRone (BUSPAR) 5 MG tablet Take 10 mg by mouth 3 times daily      insulin glargine (LANTUS) 100 UNIT/ML injection vial Inject 60 Units into the skin 2 times daily Patient states she is using the lantus pen       No current facility-administered medications for this visit. ALLERGIES:  Allergies as of 02/11/2020 - Review Complete 02/11/2020   Allergen Reaction Noted    Codeine  05/10/2017         REVIEW OF SYSTEMS:       A minimum of an eleven point review of systems was completed. Review Of Systems (11 point):  Constitutional: No fever, chills or malaise;  No weight change or fatigue  Head and Eyes: No vision, Headache, Dizziness or trauma in Right Ovary: Not visualized. No right adnexal lesion. Left Ovary:  Not visualized. No left adnexal lesion. Free Fluid: No evidence of free fluid. Normal endometrial thickness. Nonvisualization of the ovaries. Us Pelvis Limited    Result Date: 1/18/2020  EXAMINATION: PELVIC ULTRASOUND 1/18/2020 TECHNIQUE: Transabdominal and transvaginal pelvic ultrasound was performed with color Doppler flow evaluation. COMPARISON: None HISTORY: ORDERING SYSTEM PROVIDED HISTORY: Postmenopausal bleeding TECHNOLOGIST PROVIDED HISTORY: Postmenopausal Bleeding FINDINGS: Measurements: Uterus: 4.5 x 1.9 x 3.1 cm Endometrial stripe:  3.8 mm Right Ovary:  Not visualized Left Ovary:  Not visualized Ultrasound Findings: Uterus: Uterus demonstrates normal myometrial echotexture. Endometrial stripe: Endometrial stripe is within normal limits. Right Ovary: Not visualized. No right adnexal lesion. Left Ovary:  Not visualized. No left adnexal lesion. Free Fluid: No evidence of free fluid. Normal endometrial thickness. Nonvisualization of the ovaries. Lab Results:  Results for orders placed or performed during the hospital encounter of 01/11/20   Urine Culture   Result Value Ref Range    Specimen Description . URINE,STRAIGHT CATHETER     Special Requests NOT REPORTED     Culture NO GROWTH    C.trachomatis N.gonorrhoeae DNA   Result Value Ref Range    Specimen Description . CERVIX     C. trachomatis DNA NEGATIVE NEGATIVE    N. gonorrhoeae DNA NEGATIVE NEGATIVE   VAGINITIS DNA PROBE   Result Value Ref Range    Specimen Description . VAGINA     Special Requests NOT REPORTED     Direct Exam NEGATIVE for Trichomonas vaginalis     Direct Exam NEGATIVE for Gardnerella vaginalis     Direct Exam NEGATIVE for Candida sp.      Direct Exam       Method of testing is a DNA probe intended for detection and identification of Candida species, Gardnerella vaginalis, and Trichomonas vaginalis nucleic acid in vaginal fluid specimens from patients Urine NEGATIVE NEGATIVE    Urinalysis Comments NOT REPORTED     -          WBC, UA 2 TO 5 /HPF    RBC, UA 5 TO 10 /HPF    Casts UA HYALINE /LPF    Casts UA 0 TO 2 /LPF    Crystals UA NOT REPORTED None /HPF    Epithelial Cells UA 0 TO 2 /HPF    Renal Epithelial, Urine NOT REPORTED 0 /HPF    Bacteria, UA FEW (A) None    Mucus, UA NOT REPORTED None    Trichomonas, UA NOT REPORTED None    Amorphous, UA NOT REPORTED None    Other Observations UA NOT REPORTED NOT REQ. Yeast, UA NOT REPORTED None   Sedimentation Rate   Result Value Ref Range    Sed Rate 114 (H) 0 - 20 mm   C-Reactive Protein   Result Value Ref Range    CRP 1.8 0.0 - 5.0 mg/L         Assessment:   Diagnosis Orders   1. Post-menopausal bleeding     2. Encounter for screening mammogram for malignant neoplasm of breast  MANDEEP DIGITAL SCREEN W CAD BILATERAL   3. Other primary ovarian failure  DEXA Bone Density 2 Sites     Patient Active Problem List    Diagnosis Date Noted    Postmenopausal bleeding 01/11/2020     TVUS ordered 1/11/20      Polyp of colon     GI bleed 05/10/2019    Confusion state 12/01/2018    Gait disturbance 12/01/2018    Altered mental status 11/30/2018    Falling episodes 12/14/2017    Cerebral concussion 12/14/2017    Cervical spinal stenosis 12/14/2017    Diabetic polyneuropathy associated with type 2 diabetes mellitus ([facility] [address] 12/14/2017    History of cerebral infarction 12/14/2017     Overview:   2010      Seizure disorder ([facility] [address] 12/14/2017    CVA, old, hemiparesis ([facility] [address] 11/30/2017    Breakthrough seizure ([address]     Seizure disorder ([facility] [address] 10/21/2017     History of seizures since age 10. She was evaluated at the [address] [place] [address] in 2016. No family history of seizures. An EEG demonstrated generalized seizure activity. A MRI of the brain in September 2016 was normal.  Other medical history of diabetic neuropathy affecting bilateral lower extremities and intermittent left upper extremity intention tremor.     Recurrent SEIZURE October 2017. Seen at [facility] by Dr. Jessika Sanchez. A CT of the brain was normal in October 2017. Initially treated with Tegretol but switched to Lamictal 200 mg bid in 2016 and later Keppra 1000 mg bid was added. Keppra increased to 1250 mg bid in October 2017.  Type 2 diabetes mellitus ([facility] [address] 10/21/2017    Dizziness 04/20/2017    Generalized weakness 03/15/2017    Hyperglycemia 03/15/2017    Hyponatremia 03/15/2017    Depression with anxiety 01/09/2017    H/O falling 01/09/2017    Pure hypercholesterolemia 01/09/2017    RLS (restless legs syndrome) 01/09/2017    GERD (gastroesophageal reflux disease) 07/27/2016    Morbid obesity with BMI of 40.0-44.9, adult ([facility] [address] 07/27/2016    [facility]rombocyt[facility] 07/26/2016           PLAN:  Return in about 4 weeks (around 3/10/2020), or Sx board. Pt wishes to have procedure done under anesthetic after March 2020  Repeat Annual every 1 year  Cervical Cytology Evaluation begins at 22years old. If Negative Cytology, Follow-up screening per current guidelines. Return to the office in 4-6 weeks. Counseled on preventative health maintenance follow-up. Patient was seen with total face to face time of 45 minutes. More than 50% of this visit was counseling and education regarding The primary encounter diagnosis was Post-menopausal bleeding. Diagnoses of Encounter for screening mammogram for malignant neoplasm of breast and Other primary ovarian failure were also pertinent to this visit. and Established New Doctor and Vaginal Bleeding   as well as  counseling on preventative health maintenance follow-up.

## 2020-03-16 PROBLEM — D64.9 ANEMIA: Status: ACTIVE | Noted: 2020-01-01

## 2020-03-16 NOTE — ED PROVIDER NOTES
Past Medical History:   Diagnosis Date    Anemia     Cataract     GERD (gastroesophageal reflux disease)     Headache     Hyperlipidemia     Neuropathy     Osteoarthritis     Restless leg syndrome     Seizures ([facility] [address] since age 10   Mimi Samples Short-term memory loss     Stroke (cerebrum) ([facility] [address] 2010    Type 2 diabetes mellitus without complication ([facility] [address] 8200         SURGICAL HISTORY      has a past surgical history that includes Cholecystectomy; Tonsillectomy; Cataract removal; eye surgery; Cardiac catheterization; Upper gastrointestinal endoscopy (N/A, 5/12/2019); and Colonoscopy (N/A, 5/13/2019). CURRENT MEDICATIONS       Previous Medications    ACETAMINOPHEN (TYLENOL) 325 MG TABLET    Take 650 mg by mouth 3 times daily as needed for Pain (mild)    BENZONATATE (TESSALON) 200 MG CAPSULE    Take 200 mg by mouth 2 times daily as needed for Cough    BLOOD GLUCOSE MONITORING SUPPL FLAVIO    Check blood sugars 3/day    BUSPIRONE (BUSPAR) 5 MG TABLET    Take 10 mg by mouth 3 times daily    CEPHALEXIN (KEFLEX) 500 MG CAPSULE    Take 500 mg by mouth 2 times daily Indications: started 1st dose 5-7-19 in am, taking for 7 days    FERROUS SULFATE 325 (65 FE) MG TABLET    Take 1 tablet by mouth every 12 hours    FUROSEMIDE (LASIX) 20 MG TABLET    Take 20 mg by mouth daily    GABAPENTIN (NEURONTIN) 100 MG CAPSULE    Take 100 mg by mouth 2 times daily. [facility]     GLUCOSE BLOOD (BLOOD GLUCOSE TEST STRIPS) STRP    Test 3  times daily Insulin Dependent mellitus    INSULIN ASPART (NOVOLOG) 100 UNIT/ML INJECTION VIAL    Inject into the skin Inject as per sliding scale before meals and at bedtime:    = 0 units; call physician if less than 70  151-200 = 2 units  201-250 = 4 units  251-300 = 6 units  301-350 = 8 units  351-400 = 10 units; call physician if greater than 400    INSULIN GLARGINE (LANTUS) 100 UNIT/ML INJECTION VIAL    Inject 60 Units into the skin 2 times daily Patient states she is using the lantus pen    LAMOTRIGINE ED BEDSIDE ULTRASOUND:      LABS:  Labs Reviewed   CBC WITH AUTO DIFFERENTIAL - Abnormal; Notable for the following components:       Result Value    WBC 3.4 (*)     RBC 2.48 (*)     Hemoglobin 6.6 (*)     Hematocrit 21.6 (*)     MCHC 30.4 (*)     RDW 17.2 (*)     Platelets 448 (*)     Seg Neutrophils 77 (*)     Lymphocytes 19 (*)     Absolute Lymph # 0.65 (*)     All other components within normal limits   COMPREHENSIVE METABOLIC PANEL - Abnormal; Notable for the following components:    Glucose 290 (*)     BUN 80 (*)     CREATININE 2.05 (*)     Alkaline Phosphatase 117 (*)     Total Bilirubin 0.18 (*)     Alb 3.0 (*)     GFR Non- 24 (*)     GFR  29 (*)     All other components within normal limits   LIPASE - Abnormal; Notable for the following components:    Lipase 64 (*)     All other components within normal limits   MAGNESIUM   URINE RT REFLEX TO CULTURE   TYPE AND SCREEN   PREPARE RBC (CROSSMATCH)         EMERGENCY DEPARTMENT COURSE:   Vitals:    Vitals:    03/16/20 1436 03/16/20 1523 03/16/20 1530   BP: (!) 142/45 (!) 117/40 (!) 118/43   Pulse: 98 94 93   Resp: 20 17 19   Temp: 98.3 °F (36.8 °C)     TempSrc: Oral     SpO2: 94% (!) 16% 95%   Weight: 260 lb (117.9 kg)     Height: 5' 1\" (1.549 m)       3:48 PM EDT  Patient's hemoglobin is 6.6 here. Will transfuse 1 unit. I spoke with Dr. Jon Reyes and discussed case. He would like the patient admitted to trend hemoglobins, recommending Protonix bolus. I spoke with Dr. Alisson Hall who accepted patient for admission. Still hemodynamically stable at this time. CRITICALCARE:      CONSULTS:  IP CONSULT TO GI  IP CONSULT TO INTERNAL MEDICINE      PROCEDURES:    FINAL IMPRESSION      1. Gastrointestinal hemorrhage, unspecified gastrointestinal hemorrhage type            DISPOSITION/PLAN   DISPOSITION Decision To Admit 03/16/2020 02:54:52 PM          PATIENT REFERRED TO:  No follow-up provider specified.     DISCHARGE

## 2020-03-16 NOTE — PROGRESS NOTES
Medication History completed:    New medications: cholecalciferol (400 units daily and 50,000 units weekly on Sunday), povidone-iodine 10% solution, Percocet, Cerave, bumetanide    Medications discontinued: furosemide, cephalexin    Changes to dosing:   Simvastatin changed to 20 mg nightly  Potassium chloride changed to 30 mEq (3 tablets) daily  Levetiracetam changed to 1250 mg (500 mg + 750 mg) twice daily  Insulin glargine changed to [facility] formulation taking 60 units twice daily    Stated allergies: As listed    Other pertinent information: Medications confirmed with facility list.     Thank you,  Lena Antony, PharmD, BCPS  [phone]

## 2020-03-16 NOTE — ED NOTES
Critical Hgb:6.6 g/dL noted. RN notified Dr. Yobani Hernandez.      Roverto Fournier RN  03/16/20 4804

## 2020-03-16 NOTE — ED NOTES
Bed: 10  Expected date:   Expected time:   Means of arrival:   Comments:     Windy Paige RN  03/16/20 2418

## 2020-03-16 NOTE — H&P
MD   loratadine (CLARITIN) 10 MG tablet Take 10 mg by mouth daily   Yes Historical Provider, MD   simvastatin (ZOCOR) 20 MG tablet Take 20 mg by mouth nightly    Yes Historical Provider, MD   pramipexole (MIRAPEX) 1 MG tablet Take 1 mg by mouth nightly   Yes Historical Provider, MD   lamoTRIgine (LAMICTAL) 200 MG tablet Take 200 mg by mouth 2 times daily   Yes Historical Provider, MD   busPIRone (BUSPAR) 10 MG tablet Take 10 mg by mouth 3 times daily    Yes Historical Provider, MD   Blood Glucose Monitoring Suppl FLAVIO Check blood sugars 3/day 11/30/17   Will Sinha MD   Glucose Blood (BLOOD GLUCOSE TEST STRIPS) STRP Test 3  times daily Insulin Dependent mellitus 11/30/17   Will Sinha MD   Lancets MISC Check 3/day 11/30/17   Will Sinha MD        Allergies:     Codeine    Social History:     Tobacco:    reports that she has never smoked. She has never used smokeless tobacco.  Alcohol:      reports no history of alcohol use. Drug Use:  reports no history of drug use. Family History:     Family History   Problem Relation Age of Onset    Diabetes Brother        Review of Systems:     Positive and Negative as described in HPI. Review of Systems   Constitutional: Positive for fatigue. HENT: Negative for congestion. Respiratory: Negative for chest tightness and shortness of breath. Cardiovascular: Positive for leg swelling. Negative for chest pain and palpitations. Gastrointestinal: Positive for blood in stool. Negative for abdominal distention, abdominal pain, diarrhea and vomiting. Genitourinary: Positive for hematuria and vaginal bleeding. Negative for difficulty urinating, dysuria and vaginal pain. Musculoskeletal: Negative for arthralgias. Neurological: Negative for weakness and headaches. Psychiatric/Behavioral: Negative for agitation.        Physical Exam:   BP (!) 145/43   Pulse 93   Temp 98.8 °F (37.1 °C)   Resp 18   Ht 5' 1\" (1.549 m)   Wt 260 lb (117.9 kg)   SpO2 (A) CLEAR    Glucose, Ur NEGATIVE NEGATIVE    Bilirubin Urine NEGATIVE NEGATIVE    Ketones, Urine NEGATIVE NEGATIVE    Specific Gravity, UA 1.009 1.000 - 1.030    Urine Hgb MOD (A) NEGATIVE    pH, UA 5.5 5.0 - 8.0    Protein, UA 2+ (A) NEGATIVE    Urobilinogen, Urine Normal Normal    Nitrite, Urine NEGATIVE NEGATIVE    Leukocyte Esterase, Urine LARGE (A) NEGATIVE    Urinalysis Comments NOT REPORTED    Microscopic Urinalysis    Collection Time: 03/16/20  4:30 PM   Result Value Ref Range    -          WBC, UA 20 TO 50 /HPF    RBC, UA 10 TO 20 /HPF    Casts UA NOT REPORTED /LPF    Crystals, UA NOT REPORTED None /HPF    Epithelial Cells UA 5 TO 10 /HPF    Renal Epithelial, UA NOT REPORTED 0 /HPF    Bacteria, UA FEW (A) None    Mucus, UA 1+ (A) None    Trichomonas, UA NOT REPORTED None    Amorphous, UA NOT REPORTED None    Other Observations UA NOT REPORTED NOT REQ. Yeast, UA NOT REPORTED None       Imaging/Diagnostics:  No results found.     Assessment :      Primary Problem  <principal problem not specified>    Active Hospital Problems    Diagnosis Date Noted    Anemia [D64.9] 03/16/2020       Plan:     Patient status Admit as inpatient in the  Progressive Unit/Step down       Symptomatic Anemia   - consult to heme/onc  - INR/PT/PTT/peripheral smear   #GI bleeding  - consult to GI  - Hb 6.6  - 1 U PRBC in ED   - H&H Q 6  - IV protonix   - NPO   #Vaginal bleeding  - consult to OBGYN  - UA showed moderate blood   - H&H Q6    NIKKI  - gentle hydration  - hold home dose lasix     Epilepsy  - continue home medications     DM  - lantus 40 BID  - ISS  - hypoglycemia protocol  - POCT glucose     DVT  - held at this time for bleeding risk    Consultations:   IP CONSULT TO GI  IP CONSULT TO INTERNAL MEDICINE  IP CONSULT TO SOCIAL WORK  IP CONSULT TO OB GYN  IP CONSULT TO HEM/ONC     Patient is admitted as inpatient status because of co-morbiditieslisted above, severity of signs and symptoms as outlined, requirement for current

## 2020-03-17 NOTE — PROGRESS NOTES
Subcutaneous TID WC    insulin lispro  0-6 Units Subcutaneous Nightly    insulin glargine  20 Units Subcutaneous BID    levetiracetam  1,250 mg Intravenous Q12H     Continuous Infusions:    sodium chloride 100 mL/hr at 20 1347    pantoprozole (PROTONIX) infusion 8 mg/hr (20 0951)    dextrose       PRN Meds: perflutren lipid microspheres, sodium chloride flush, acetaminophen **OR** acetaminophen, polyethylene glycol, promethazine **OR** ondansetron, potassium chloride **OR** potassium alternative oral replacement **OR** potassium chloride, glucose, dextrose, glucagon (rDNA), dextrose    Data:     Past Medical History:   has a past medical history of Anemia, Cataract, GERD (gastroesophageal reflux disease), Headache, Hyperlipidemia, Neuropathy, Osteoarthritis, Restless leg syndrome, Seizures ([address] Short-term memory loss, Stroke (cerebrum) ([facility] [address] and Type 2 diabetes mellitus without complication ([facility] [address] Social History:   reports that she has never smoked. She has never used smokeless tobacco. She reports that she does not drink alcohol or use drugs. Family History:   Family History   Problem Relation Age of Onset    Diabetes Brother        Vitals:  BP (!) 171/57   Pulse 78   Temp 97.7 °F (36.5 °C) (Oral)   Resp 20   Ht 5' 1\" (1.549 m)   Wt 260 lb (117.9 kg)   SpO2 97%   BMI 49.13 kg/m²   Temp (24hrs), Av.3 °F (36.8 °C), Min:97.7 °F (36.5 °C), Max:99 °F (37.2 °C)    Recent Labs     20  2117 20  0817 20  1210   POCGLU 119* 100 104       I/O(24Hr): Intake/Output Summary (Last 24 hours) at 3/17/2020 1459  Last data filed at 3/17/2020 0986  Gross per 24 hour   Intake 100 ml   Output 350 ml   Net -250 ml       Labs:  [unfilled]    Lab Results   Component Value Date/Time    SPECIAL NOT REPORTED 2020 10:15 PM     Lab Results   Component Value Date/Time    CULTURE NO GROWTH 2020 09:05 PM       [facility]    Radiology:    No results found.       Physical Examination:        Physical Exam  Constitutional:       Appearance: Normal appearance. She is obese. HENT:      Head: Normocephalic and atraumatic. Eyes:      General: No scleral icterus. Conjunctiva/sclera: Conjunctivae normal.   Neck:      Musculoskeletal: Neck supple. Cardiovascular:      Rate and Rhythm: Normal rate and regular rhythm. Pulses: Normal pulses. Heart sounds: Normal heart sounds. Pulmonary:      Effort: Pulmonary effort is normal.      Breath sounds: Normal breath sounds. Abdominal:      General: Abdomen is flat. Bowel sounds are normal. There is no distension. Palpations: Abdomen is soft. Tenderness: There is no abdominal tenderness. Musculoskeletal:      Right lower leg: Edema present. Left lower leg: Edema present. Neurological:      Mental Status: She is alert and oriented to person, place, and time.    Psychiatric:         Mood and Affect: Mood normal.           Assessment:        Primary Problem  <principal problem not specified>    Active Hospital Problems    Diagnosis Date Noted    Anemia [D64.9] 03/16/2020       Plan:        Symptomatic Anemia   - consult to heme/onc  - INR/PT/PTT/peripheral smear   #GI bleeding  - consult to GI  - Hb 7.4  - 1 U PRBC in ED   - H&H Q 6  - IV protonix   - NPO   #Vaginal bleeding  - consult to OBGYN  - sign off, continue management outpatient  - UA showed moderate blood   - H&H Q6    Bilateral leg edema  - bilateral duplex lower extremity  - echo EF 49-18%, mild diastolic dysfunction  -       NIKKI  - gentle hydration  - hold home dose lasix      Epilepsy  - continue home medications      DM  - lantus 40 BID  - ISS  - hypoglycemia protocol  - POCT glucose      DVT  - held at this time for bleeding risk      Linsey Sinclair MD  3/17/2020  2:59 PM     Attending Physician Statement  I have discussed the care of Julissa Bermudez with the resident team. I have examined the patient myself and taken ros and hpi ,

## 2020-03-17 NOTE — CONSULTS
Colonoscopy (N/A, 5/13/2019). Medications:    Prior to Admission medications    Medication Sig Start Date End Date Taking? Authorizing Provider   insulin glargine (BASAGLAR KWIKPEN) 100 UNIT/ML injection pen Inject 60 Units into the skin 2 times daily   Yes Historical Provider, MD   bumetanide (BUMEX) 1 MG tablet Take 1 mg by mouth 2 times daily   Yes Historical Provider, MD   Emollient (CERAVE) LOTN Apply topically 2 times daily Indications: bilateral lower legs   Yes Historical Provider, MD   povidone-iodine (BETADINE) 10 % external solution Apply topically daily Indications: left foot wound   Yes Historical Provider, MD   levETIRAcetam (KEPPRA) 750 MG tablet Take 750 mg by mouth 2 times daily Indications: with 500 mg to make 1250 mg dose   Yes Historical Provider, MD   vitamin D (CHOLECALCIFEROL) 20409 UNIT CAPS Take 50,000 Units by mouth once a week Indications: Sundays   Yes Historical Provider, MD   Cholecalciferol 400 UNIT TABS tablet Take 400 Units by mouth daily   Yes Historical Provider, MD   ferrous sulfate 325 (65 Fe) MG tablet Take 1 tablet by mouth every 12 hours 6/21/19  Yes Michael Trinh MD   gabapentin (NEURONTIN) 100 MG capsule Take 100 mg by mouth 2 times daily. .   Yes Historical Provider, MD   levETIRAcetam (KEPPRA) 500 MG tablet Take 500 mg by mouth 2 times daily Indications: with 750 mg to make total dose 1250 mg    Yes Historical Provider, MD   insulin aspart (NOVOLOG) 100 UNIT/ML injection vial Inject into the skin Inject as per sliding scale before meals and at bedtime:    = 0 units; call physician if less than 70  151-200 = 2 units  201-250 = 4 units  251-300 = 6 units  301-350 = 8 units  351-400 = 10 units; call physician if greater than 400   Yes Historical Provider, MD   potassium chloride (KLOR-CON M) 10 MEQ extended release tablet Take 30 mEq by mouth daily    Yes Historical Provider, MD   vitamin B-12 (CYANOCOBALAMIN) 500 MCG tablet Take 500 mcg by mouth daily   Yes Historical Provider, MD   loperamide (IMODIUM) 2 MG capsule Take 4 mg by mouth daily as needed for Diarrhea (after first loose stool)   Yes Historical Provider, MD   loperamide (IMODIUM) 2 MG capsule Take 2 mg by mouth 4 times daily as needed for Diarrhea (after initial 4 mg dose)   Yes Historical Provider, MD   acetaminophen (TYLENOL) 325 MG tablet Take 650 mg by mouth 3 times daily as needed for Pain (mild)   Yes Historical Provider, MD   metFORMIN (GLUCOPHAGE) 1000 MG tablet Take 1,000 mg by mouth 2 times daily (with meals)   Yes Historical Provider, MD   lisinopril-hydrochlorothiazide (PRINZIDE;ZESTORETIC) 10-12.5 MG per tablet Take 1 tablet by mouth daily   Yes Historical Provider, MD   PARoxetine (PAXIL) 20 MG tablet Take 20 mg by mouth every morning   Yes Historical Provider, MD   omeprazole (PRILOSEC) 40 MG delayed release capsule Take 40 mg by mouth Daily    Yes Historical Provider, MD   loratadine (CLARITIN) 10 MG tablet Take 10 mg by mouth daily   Yes Historical Provider, MD   simvastatin (ZOCOR) 20 MG tablet Take 20 mg by mouth nightly    Yes Historical Provider, MD   pramipexole (MIRAPEX) 1 MG tablet Take 1 mg by mouth nightly   Yes Historical Provider, MD   lamoTRIgine (LAMICTAL) 200 MG tablet Take 200 mg by mouth 2 times daily   Yes Historical Provider, MD   busPIRone (BUSPAR) 10 MG tablet Take 10 mg by mouth 3 times daily    Yes Historical Provider, MD   oxyCODONE-acetaminophen (PERCOCET) 5-325 MG per tablet Take 1 tablet by mouth every 6 hours as needed for Pain.     Historical Provider, MD   benzonatate (TESSALON) 200 MG capsule Take 200 mg by mouth 2 times daily as needed for Cough    Historical Provider, MD   Blood Glucose Monitoring Suppl FLAVIO Check blood sugars 3/day 11/30/17   Will Sinha MD   Glucose Blood (BLOOD GLUCOSE TEST STRIPS) STRP Test 3  times daily Insulin Dependent mellitus 11/30/17   Will Sinha MD   Lancets MISC Check 3/day 11/30/17   Will Sinha MD     Current Facility-Administered Medications   Medication Dose Route Frequency Provider Last Rate Last Dose    perflutren lipid microspheres (DEFINITY) injection 2.2 mg  2 mL Intravenous ONCE PRN Jeffrey Nova MD        0.9 % sodium chloride infusion   Intravenous Continuous Reed Luciano  mL/hr at 03/17/20 1347      busPIRone (BUSPAR) tablet 10 mg  10 mg Oral TID Fred Willard MD   10 mg at 03/17/20 1355    gabapentin (NEURONTIN) capsule 100 mg  100 mg Oral BID Fred Willard MD   100 mg at 03/17/20 1354    lamoTRIgine (LAMICTAL) tablet 200 mg  200 mg Oral BID Fred Willard MD   200 mg at 03/17/20 1354    PARoxetine (PAXIL) tablet 20 mg  20 mg Oral QAM Fred Willard MD   20 mg at 03/17/20 1354    pramipexole (MIRAPEX) tablet 1 mg  1 mg Oral Nightly Fred Willard MD        atorvastatin (LIPITOR) tablet 20 mg  20 mg Oral Daily Fred Willard MD   20 mg at 03/17/20 1354    sodium chloride flush 0.9 % injection 10 mL  10 mL Intravenous 2 times per day Fred Willard MD   10 mL at 03/16/20 2256    sodium chloride flush 0.9 % injection 10 mL  10 mL Intravenous PRN Fred Willard MD        acetaminophen (TYLENOL) tablet 650 mg  650 mg Oral Q6H PRN Fred Willard MD        Or   [address] [place] [address] acetaminophen (TYLENOL) suppository 650 mg  650 mg Rectal Q6H PRN Fred Willard MD        polyethylene glycol [facility]) packet 17 g  17 g Oral Daily PRN Fred Willard MD        promethazine (PHENERGAN) tablet 12.5 mg  12.5 mg Oral Q6H PRN Fred Willard MD        Or    ondansetron [facility]) injection 4 mg  4 mg Intravenous Q6H PRN Fred Willard MD        pantoprazole (PROTONIX) 80 mg in sodium chloride 0.9 % 100 mL infusion  8 mg/hr Intravenous Continuous Fred Willard MD 10 mL/hr at 03/17/20 0951 8 mg/hr at 03/17/20 0951    potassium chloride (KLOR-CON M) extended release tablet 40 mEq  40 mEq Oral PRN Fred Willard MD        Or    potassium bicarb-citric acid (EFFER-K) effervescent tablet 40 mEq  40 mEq Oral PRN Samira Hartman MD        Or    potassium chloride 10 mEq/100 mL IVPB (Peripheral Line)  10 mEq Intravenous PRN Samira Hartman MD        insulin lispro (HUMALOG) injection vial 0-12 Units  0-12 Units Subcutaneous TID WC Samira Hartman MD        insulin lispro (HUMALOG) injection vial 0-6 Units  0-6 Units Subcutaneous Nightly Smaira Hartman MD        glucose (GLUTOSE) 40 % oral gel 15 g  15 g Oral PRN Samira Hartman MD        dextrose 50 % IV solution  12.5 g Intravenous PRN aSmira Hartman MD        glucagon (rDNA) injection 1 mg  1 mg Intramuscular PRN Samira Hartman MD        dextrose 5 % solution  100 mL/hr Intravenous PRN Samira Hartman MD        insulin glargine (LANTUS) injection vial 20 Units  20 Units Subcutaneous BID CANDI Man CNP        levETIRAcetam (KEPPRA) 1,250 mg in sodium chloride 0.9 % 100 mL IVPB  1,250 mg Intravenous Q12H CANDI Man CNP   Stopped at 03/17/20 1113       Allergies:  Codeine    Social History:   reports that she has never smoked. She has never used smokeless tobacco. She reports that she does not drink alcohol or use drugs. Family History: family history includes Diabetes in her brother. REVIEW OF SYSTEMS:    Constitutional: No fever or chills. No night sweats, no weight loss   Eyes: No eye discharge, double vision, or eye pain   HEENT: negative for sore mouth, sore throat, hoarseness and voice change   Respiratory: negative for cough , sputum, dyspnea, wheezing, hemoptysis, chest pain   Cardiovascular: negative for chest pain, dyspnea, palpitations, orthopnea, PND   Gastrointestinal: negative for nausea, vomiting, diarrhea, constipation, abdominal pain, Dysphagia, hematemesis and hematochezia   Genitourinary: negative for frequency, dysuria, nocturia, urinary incontinence, and hematuria   Integument: negative for rash, skin lesions, bruises. Hematologic/Lymphatic: negative for easy bruising, bleeding, lymphadenopathy, or petechiae   Endocrine: negative for heat or cold intolerance,weight changes, change in bowel habits and hair loss   Musculoskeletal: negative for myalgias, arthralgias, pain, joint swelling,and bone pain   Neurological: negative for headaches, dizziness, seizures, weakness, numbness    PHYSICAL EXAM:      BP (!) 171/57   Pulse 78   Temp 97.7 °F (36.5 °C) (Oral)   Resp 20   Ht 5' 1\" (1.549 m)   Wt 260 lb (117.9 kg)   SpO2 97%   BMI 49.13 kg/m²    Temp (24hrs), Av.3 °F (36.8 °C), Min:97.7 °F (36.5 °C), Max:99 °F (37.2 °C)    General appearance - well appearing, no in pain or distress   Mental status - alert and cooperative   Eyes - pupils equal and reactive, extraocular eye movements intact   Ears - bilateral TM's and external ear canals normal   Mouth - mucous membranes moist, pharynx normal without lesions   Neck - supple, no significant adenopathy   Lymphatics - no palpable lymphadenopathy, no hepatosplenomegaly   Chest - clear to auscultation, no wheezes, rales or rhonchi, symmetric air entry   Heart - normal rate, regular rhythm, normal S1, S2, no murmurs  Abdomen - soft, nontender, nondistended, no masses or organomegaly   Neurological - alert, oriented, normal speech, no focal findings or movement disorder noted   Musculoskeletal - no joint tenderness, deformity or swelling   Extremities - peripheral pulses normal,+ pedal edema, no clubbing or cyanosis   Skin - normal coloration and turgor, no rashes, no suspicious skin lesions noted ,    DATA:    Labs:   CBC:   Recent Labs     20  1440  20  0503 20  0957   WBC 3.4*  --  3.5  --    HGB 6.6*   < > 7.1* 7.4*   HCT 21.6*   < > 22.8* 23.2*   *  --  104*  --     < > = values in this interval not displayed.      BMP:   Recent Labs     20  0503 20  1118   * 147*   K 4.2 4.2   CO2 29 28   BUN 74* 69*   CREATININE 1.75* 1.60*   LABGLOM

## 2020-03-17 NOTE — CONSULTS
[address]    Patient Name: Reyna Colon     Patient : 1955  Room/Bed:   Admission Date/Time: 3/16/2020  2:35 PM  Primary Care Physician: Belkis Bustamante MD    Consulting Provider: Dr. Jefe Eckert   Reason for Consult: postmenopausal bleeding    CC:   Chief Complaint   Patient presents with    Abnormal Lab     Hgb                HPI: Reyna Colon is a 75 y.o. female [de-identified] postmenopausal female presents to the ED with anemia. She has a history of GI bleeding, postmenopausal bleeding, and hematuria. She has been following closely with her GI provider and is s/p an EGD and colonoscopy. She had lab work completed as an outpatient today and was supposed to follow up tomorrow with GI. However she received a phone call that her Hgb was 6 and was told to go to the ED. She reports she was completely asymptomatic and would not have reported to the ED if it was not for that phone call. She denies any chest pain, SOB, palpitations, dizziness, or increase in fatigue. She denies any HAs, fevers, chills, N/V/D, recent illness, abdominal or pelvic pain, or dysuria. She reports chronic vaginal odor/discharge which has not changed. She reports +dark blood in her stools and bright red blood in her urine. She has been experiencing a change in her stools over the past few years, which started out as chronic diarrhea. She thinks she noticed vaginal bleeding late December and early January of this year. She denies passing any large blood clots from her vagina or soaking through pads currently. She reports her main concern today was her Hgb and wants to figure out why she is bleeding from multiple sources. She is S/P an OB/GYN consult on 2020 for suspected postmenopausal bleeding. She completed a TVUS on  which showed a 4.5 x 1.9 x 3.1 uterus with a stripe of 3.8mm. She established care with Dr. Nicolle Joya on 2020. All options were discussed.  It was Continuous Gerold Fetch,  mL/hr at 03/16/20 1544      busPIRone (BUSPAR) tablet 10 mg  10 mg Oral TID Dominique Joshi MD        gabapentin (NEURONTIN) capsule 100 mg  100 mg Oral BID Dominique Joshi MD        lamoTRIgine (LAMICTAL) tablet 200 mg  200 mg Oral BID Dominique Joshi MD        levETIRAcetam (KEPPRA) tablet 1,250 mg  1,250 mg Oral BID Dominique Joshi MD        [START ON 3/17/2020] PARoxetine (PAXIL) tablet 20 mg  20 mg Oral QAM Dominique Joshi MD        pramipexole (MIRAPEX) tablet 1 mg  1 mg Oral Nightly Dominique Joshi MD        atorvastatin (LIPITOR) tablet 20 mg  20 mg Oral Daily Dominique Joshi MD        sodium chloride flush 0.9 % injection 10 mL  10 mL Intravenous 2 times per day Dominique Joshi MD        sodium chloride flush 0.9 % injection 10 mL  10 mL Intravenous PRN Dominique Joshi MD        acetaminophen (TYLENOL) tablet 650 mg  650 mg Oral Q6H PRN Dominique Joshi MD        Or    acetaminophen (TYLENOL) suppository 650 mg  650 mg Rectal Q6H PRANA Joshi MD        polyethylene glycol Rheta Peyer) packet 17 g  17 g Oral Daily PRANA Joshi MD        promethazine (PHENERGAN) tablet 12.5 mg  12.5 mg Oral Q6H PRANA Joshi MD        Or    ondansetron [facility]) injection 4 mg  4 mg Intravenous Q6H PRANA Joshi MD        pantoprazole (PROTONIX) 80 mg in sodium chloride 0.9 % 100 mL infusion  8 mg/hr Intravenous Continuous Dominique Joshi MD        0.9 % sodium chloride infusion   Intravenous Continuous Dominique Joshi MD        potassium chloride (KLOR-CON M) extended release tablet 40 mEq  40 mEq Oral PRANA Joshi MD        Or    potassium bicarb-citric acid (EFFER-K) effervescent tablet 40 mEq  40 mEq Oral PRN Dominique Joshi MD        Or    potassium chloride 10 mEq/100 mL IVPB (Peripheral Line)  10 mEq Intravenous PRANA Joshi MD        insulin lispro (HUMALOG) injection vial

## 2020-03-17 NOTE — PROGRESS NOTES
Physical Therapy    Facility/Department: [address] [place] [address] CARE  Initial Assessment    NAME: Anna Cabrera  : 1955  MRN: [id number]    Date of Service: 3/17/2020    Discharge Recommendations:  Patient would benefit from continued therapy after discharge   PT Equipment Recommendations  Equipment Needed: No    Assessment   Body structures, Functions, Activity limitations: Decreased functional mobility ; Decreased ADL status; Decreased strength;Decreased endurance;Decreased balance  Assessment: Pt doing well, deficits in decreased balance and activity tolerance with hx of multiple falls. Pt would benefit from continued therapy at d/c. Treatment Diagnosis: Impaired functional mobility 2* Anemia  Specific instructions for Next Treatment: progress gait distance (use of rollator), fall prevention due to numerous falls  Prognosis: Excellent  Decision Making: Low Complexity  History: Anna Cabrera is a 75 y.o. female who presents with a chief complaint of blood in stool. Patient states for the past several weeks she has had bloody bowel movements. Is been mostly dark red. States that her GI physician has been following her hemoglobin as an outpatient but she was told today that her hemoglobin is 6.2. Denies any nausea, vomiting, hematemesis. No abdominal pain. No fevers, chills or other illnesses. Denies any fatigue, weakness. She does not take blood thinners. Has never had a blood transfusion in the past.  Symptoms are subacute. Symptoms are moderate. Nothing make symptoms better or worse. Patient has no other complaints at this time. Exam: ROM, MMT, bed mobility transfers, amb, balance  Clinical Presentation: Pt alert, agreeable to PT, very pleasant. Barriers to Learning: none  REQUIRES PT FOLLOW UP: Yes  Activity Tolerance  Activity Tolerance: Patient limited by endurance; Patient Tolerated treatment well       Patient Diagnosis(es): The encounter diagnosis was Gastrointestinal hemorrhage, unspecified gastrointestinal hemorrhage type. has a past medical history of Anemia, Cataract, GERD (gastroesophageal reflux disease), Headache, Hyperlipidemia, Neuropathy, Osteoarthritis, Restless leg syndrome, Seizures ([address] Short-term memory loss, Stroke (cerebrum) ([address] and Type 2 diabetes mellitus without complication ([address] has a past surgical history that includes Cholecystectomy; Tonsillectomy; Cataract removal; eye surgery; Cardiac catheterization; Upper gastrointestinal endoscopy (N/A, 5/12/2019); and Colonoscopy (N/A, 5/13/2019). Restrictions  Restrictions/Precautions  Restrictions/Precautions: Fall Risk, Seizure  Required Braces or Orthoses?: No  Implants present? : (pt denies)  Position Activity Restriction  Other position/activity restrictions: up with assistance - no weight bearing restrictions noted LLE - pt states she is OK to ambulate on LLE  Vision/Hearing  Vision: Impaired  Vision Exceptions: Wears glasses for reading;Wears glasses for distance  Hearing: Within functional limits     Subjective  General  Chart Reviewed: Yes  Patient assessed for rehabilitation services?: Yes  Additional Pertinent Hx: T2DM, 2010 CVA, OA, Seizures, memory loss, low hemoglobin  Family / Caregiver Present: No  Referring Practitioner: Holger Beauchamp MD  Referral Date : 03/16/20  Diagnosis: Anemia  Follows Commands: Within Functional Limits  Subjective  Subjective: Pt in bed, very pleasant and agreeable to PT. EDIS Leach PT assessment.  LLE ACE wrapped  Pain Screening  Patient Currently in Pain: Denies  Vital Signs  Patient Currently in Pain: Denies  Oxygen Therapy  O2 Device: None (Room air)  Patient Observation  Observations: LLE ACE wrapped - pt states cut foot with blade from Esme lee on dorsal aspect of foot       Orientation  Orientation  Overall Orientation Status: Within Normal Limits  Social/Functional History  Social/Functional History  Type of Home: Facility([facility])  Home

## 2020-03-17 NOTE — PLAN OF CARE
normal rate, regular rhythm, normal S1 and S2, no murmurs, rubs, clicks or gallops, distal pulses intact, no carotid bruits  Abdomen: soft, obese non tender, non-distended, normal bowel sounds, no masses or organomegaly no ascites  Extremities: no cyanosis, clubbing or edema  Musculoskeletal: normal range of motion, no joint swelling, deformity or tenderness  Neurologic: no cranial nerve deficit and muscle strength normal    Assessment  Hematochezia  Hx of small bowel erosions with possible antral AVM  Iron deficiency anemia  Elevated alk phos  Thrombocytopenia  Family hx of colon cancer    Plan  D/w dr Guillermina Peter will plan for egd tomorrow  Trend hh and keep hgb>7  Liver US and alk phos isoenzyme ordered  Anemia profile and INR ordered  Soft diet ok for today then npo after midnight  obgyn eval for vaginal bleeding  Urology eval for hematuria   Formal gi consult to follow  . Pita Collins Bennie Seek, APRN - CNP

## 2020-03-17 NOTE — CONSULTS
[address] Ostomy Continence Nurse  Consult Note       NAME:  [address] RECORD NUMBER:  [id number]  AGE: 75 y.o. GENDER: female  : 1955  TODAY'S DATE:  3/17/2020    Subjective:     Reason for Wound [place] [facility] and Assessment: left foot wound      Sweetie Husain is a 75 y.o. female referred by:   [x] Physician  [] Nursing  [] Other:       Wound Identification:  Wound Type: traumatic  Contributing Factors: edema, venous stasis, diabetes and poor glucose control    Wound History: per the patient, she injured the foot initially by dropping a Ninja  blade and it landed on her foot. She is under the care of Dr. Sharon Bridges, podiatry and had an unna boot on until today. Nursing staff removed the dressing earlier today and discussed the case with Dr. Sharon Bridges and he recommended just using simple ace wrap compression.   Current Wound Care Treatment:  Unna boot previously, now roll gauze and ace wrap    Patient Goal of Care:  [x] Wound Healing  [] Odor Control  [] Palliative Care  [] Pain Control   [] Other:         PAST MEDICAL HISTORY        Diagnosis Date    Anemia     Cataract     GERD (gastroesophageal reflux disease)     Headache     Hyperlipidemia     Neuropathy     Osteoarthritis     Restless leg syndrome     Seizures ([address] since age 10   [employer] Short-term memory loss     Stroke (cerebrum) ([facility]     Type 2 diabetes mellitus without complication ([facility] [id number]       PAST SURGICAL HISTORY    Past Surgical History:   Procedure Laterality Date    CARDIAC CATHETERIZATION      CATARACT REMOVAL      CHOLECYSTECTOMY      COLONOSCOPY N/A 2019    COLONOSCOPY DIAGNOSTIC, POLYPECTOMIES performed by Dahlia Buck MD at [facility]      cataract    TONSILLECTOMY      UPPER GASTROINTESTINAL ENDOSCOPY N/A 2019    EGD ESOPHAGOGASTRODUODENOSCOPY performed by Dahlia Buck MD at [facility]    Family History   Problem Relation Age of Onset    Diabetes Brother        SOCIAL HISTORY    Social History     Tobacco Use    Smoking status: Never Smoker    Smokeless tobacco: Never Used   Substance Use Topics    Alcohol use: No    Drug use: No       ALLERGIES    Allergies   Allergen Reactions    Codeine            Objective:      BP (!) 171/57   Pulse 78   Temp 97.7 °F (36.5 °C) (Oral)   Resp 20   Ht 5' 1\" (1.549 m)   Wt 260 lb (117.9 kg)   SpO2 97%   BMI 49.13 kg/m²       LABS    CBC:   Lab Results   Component Value Date    WBC 3.5 03/17/2020    RBC 2.67 03/17/2020    HGB 7.4 03/17/2020     CMP:  Albumin:    Lab Results   Component Value Date    LABALBU 3.0 03/16/2020     PT/INR:    Lab Results   Component Value Date    PROTIME 14.1 03/16/2020    INR 1.1 03/16/2020     HgBA1c:    Lab Results   Component Value Date    LABA1C 15.5 10/22/2017     PTT: No components found for: LABPTT    Review of Systems    Assessment:     Physical Exam      Rick Risk Score: Rick Scale Score: 17    Patient Active Problem List   Diagnosis Code    Seizure disorder ([facility] G40.909    Type 2 diabetes mellitus ([facility] E11.9    Breakthrough seizure ([facility] G40.919    CVA, old, hemiparesis ([facility] I69.359    Falling episodes R29.6    Cerebral concussion S06. 6A9A    Cervical spinal stenosis M48.02    Diabetic polyneuropathy associated with type 2 diabetes mellitus ([facility]) E11.42    History of cerebral infarction Z86.73    Seizure disorder ([facility]) G40.909    Depression with anxiety F41.8    Dizziness R42    Generalized weakness R53.1    GERD (gastroesophageal reflux disease) K21.9    H/O falling Z91.81    Hyperglycemia R73.9    Hyponatremia E87.1    Morbid obesity with BMI of 40.0-44.9, adult ([facility]) E66.01, Z68.41    Pure hypercholesterolemia E78.00    RLS (restless legs syndrome) G25.81    Thrombocytopenia ([facility]) D69.6    Altered mental status R41.82    Confusion state F44.89    Gait disturbance R26.9    GI bleed K92.2    Polyp of colon K63.5    Postmenopausal sliding hand under the air overlay. Feel for the patient's heaviest/ most dependant body part. Ideally 1/2 to 1\" of air will be between your hand and the patient's body. Add air prn.     Specialty Bed Required : N/A   [] Low Air Loss   [] Pressure Redistribution  [] Fluid Immersion  [] Bariatric  [] Total Pressure Relief  [] Other:     Current Diet: Diet NPO Time Specified  DIET DENTAL SOFT;  Dietician consult:  No    Discharge Plan:  Placement for patient upon discharge: home with support   Patient appropriate for Outpatient [address]: Yes    Patient/Caregiver Teaching:  Level of patientunderstanding able to:     [x] Indicates understanding       [] Needs reinforcement  [] Unsuccessful      [x] Verbal Understanding  [] Demonstrated understanding       [] No evidence of learning  [] Refused teaching         [] N/A       Electronically signed by Gabi Engle RN on  3/17/2020 at 2:10 PM

## 2020-03-17 NOTE — PROGRESS NOTES
[address] [facility]    Occupational Therapy Evaluation  Date: 3/17/20  Patient Name: Consuelo Cotto       Room: [facility]/5875-25  MRN: [id number]  Account: [de-identified]   : 1955  (75 y.o.) Gender: female     Discharge Recommendations: The patient may need non-skilled assistance after discharge. Equipment Needed: No    Referring Practitioner: Dr. Proctor Camera  Diagnosis: Anemia          Past Medical History:  has a past medical history of Anemia, Cataract, GERD (gastroesophageal reflux disease), Headache, Hyperlipidemia, Neuropathy, Osteoarthritis, Restless leg syndrome, Seizures ([address] Short-term memory loss, Stroke (cerebrum) ([address] and Type 2 diabetes mellitus without complication ([address] Past Surgical History:   has a past surgical history that includes Cholecystectomy; Tonsillectomy; Cataract removal; eye surgery; Cardiac catheterization; Upper gastrointestinal endoscopy (N/A, 2019); and Colonoscopy (N/A, 2019). Restrictions  Restrictions/Precautions: Fall Risk, Seizure  Implants present? : (pt denies)  Required Braces or Orthoses?: No     Vitals  Temp: 97.7 °F (36.5 °C)  Pulse: 78  Resp: 20  BP: (!) 171/57  Height: 5' 1\" (154.9 cm)  Weight: 260 lb (117.9 kg)  BMI (Calculated): 49.2  Oxygen Therapy  SpO2: 97 %  O2 Device: None (Room air)  Level of Consciousness: Alert    Subjective  Subjective: \"It comes sometimes when my foot will start dragging\" Pt reports weakness in L side due to history of CVA with intermittent increased weakness.   Overall Orientation Status: Within Normal Limits  Vision  Vision: Impaired  Vision Exceptions: Wears glasses for reading, Wears glasses for distance  Hearing  Hearing: Within functional limits  Social/Functional History  Type of Home: Facility([facility])  Home Layout: One level  Home Access: Level entry  Bathroom Shower/Tub: Walk-in shower, Curtain  Bathroom Toilet: Standard  Bathroom Equipment: Shower chair, Hand-held shower, Grab Mobility          Balance  Sitting Balance: Independent  Standing Balance: Modified independent      Functional Mobility  Functional - Mobility Device: Rolling Walker  Activity: To/from bathroom  Assist Level: Modified independent         Transfers  Sit to stand: Modified independent  Stand to sit: Modified independent  Toilet Transfers  Toilet - Technique: Ambulating  Equipment Used: Grab bars  Toilet Transfer: Modified independent  Toilet Transfers Comments: per Pt report  Functional Activity Tolerance  Functional Activity Tolerance: Tolerates 10 - 20 min exercise with multiple rests   Assessment  Assessment: Pt reports and demonstrates MI with all self-care and functional mobility in room with RW. Pt denies any concerns regarding self-care for d/c home. No need for skilled OT services at this time. Prognosis: Good  Decision Making: Low Complexity  REQUIRES OT FOLLOW UP: No  No Skilled OT: Independent with functional mobility, Independent with ADL's, At baseline function, Safe to return home, No OT goals identified  Activity Tolerance: Patient Tolerated treatment well         Functional Outcome Measures  AM-PAC Daily Activity Inpatient   How much help for putting on and taking off regular lower body clothing?: None  How much help for Bathing?: None  How much help for Toileting?: None  How much help for putting on and taking off regular upper body clothing?: None  How much help for taking care of personal grooming?: None  How much help for eating meals?: None  AM-[facility] Inpatient Daily Activity Raw Score: 24  AM-PAC Inpatient ADL T-Scale Score : 57.54  ADL Inpatient CMS 0-100% Score: 0  ADL Inpatient CMS G-Code Modifier : [address]       Goals  Patient Goals   Patient goals :  To return to . [address] in place: Yes  Type of devices: Call light within reach, Gait belt, Patient at risk for falls, Left in bed             Equipment Recommendations  Equipment Needed: No  OT Individual

## 2020-03-18 NOTE — PROGRESS NOTES
[address] [facility].    PROGRESS NOTE             3/18/2020    1:45 PM    Name:   Essie Fang  MRN:     [id number]     Acct:      [de-identified]   Room:   2090/2090-01  IP Day:  2  Admit Date:  3/16/2020  2:35 PM    PCP:  Magdaleno Dandy, MD  Code Status:  Full Code    Subjective:     C/C:   Chief Complaint   Patient presents with    Abnormal Lab     Hgb     Interval History Status: improved. No acute events overnight. This morning patient resting comfortably in bed. She has no complaints at this time. No further bleeding in urine or in BM. Reports that lightheadedness has improved. EGD this morning showed esophageal varices and watermelon stomach. Patient to have liver US and liver workup to day as well as abdominal doppler. Tomorrow plan for biopsy per heme/onc     Brief History:     See H&P     Review of Systems:     Review of Systems   Constitutional: Negative for chills and fever. HENT: Negative for congestion. Respiratory: Negative for chest tightness and shortness of breath. Cardiovascular: Negative for chest pain, palpitations and leg swelling. Gastrointestinal: Negative for abdominal distention, abdominal pain, blood in stool, diarrhea and vomiting. Musculoskeletal: Negative for arthralgias. Neurological: Negative for light-headedness. Psychiatric/Behavioral: Negative for agitation. Medications: Allergies:     Allergies   Allergen Reactions    Codeine        Current Meds:   Scheduled Meds:    sucralfate  1 g Oral 4 times per day    iron sucrose  200 mg Intravenous Q24H    nadolol  20 mg Oral Daily    busPIRone  10 mg Oral TID    gabapentin  100 mg Oral BID    lamoTRIgine  200 mg Oral BID    PARoxetine  20 mg Oral QAM    pramipexole  1 mg Oral Nightly    atorvastatin  20 mg Oral Daily    sodium chloride flush  10 mL Intravenous 2 times per day    insulin lispro  0-12 Units Subcutaneous TID WC    insulin lispro  0-6 Units Subcutaneous Nightly    insulin glargine  20 Units Subcutaneous BID    levetiracetam  1,250 mg Intravenous Q12H     Continuous Infusions:    sodium chloride 100 mL/hr at 20 0841    pantoprozole (PROTONIX) infusion 8 mg/hr (20)    dextrose       PRN Meds: perflutren lipid microspheres, sodium chloride flush, acetaminophen **OR** acetaminophen, polyethylene glycol, promethazine **OR** ondansetron, potassium chloride **OR** potassium alternative oral replacement **OR** potassium chloride, glucose, dextrose, glucagon (rDNA), dextrose    Data:     Past Medical History:   has a past medical history of Anemia, Cataract, GERD (gastroesophageal reflux disease), Headache, Hyperlipidemia, Neuropathy, Osteoarthritis, Restless leg syndrome, Seizures ([address] Short-term memory loss, Stroke (cerebrum) ([facility] and Type 2 diabetes mellitus without complication ([facility] Social History:   reports that she has never smoked. She has never used smokeless tobacco. She reports that she does not drink alcohol or use drugs. Family History:   Family History   Problem Relation Age of Onset    Diabetes Brother        Vitals:  BP (!) 145/52   Pulse 81   Temp 97.6 °F (36.4 °C)   Resp 18   Ht 5' 1\" (1.549 m)   Wt 260 lb (117.9 kg)   SpO2 94%   BMI 49.13 kg/m²   Temp (24hrs), Av.7 °F (36.5 °C), Min:97.2 °F (36.2 °C), Max:98.6 °F (37 °C)    Recent Labs     20  2108 20  0811 20  0953 20  1140   POCGLU 306* 158* 158* 160*       I/O(24Hr):     Intake/Output Summary (Last 24 hours) at 3/18/2020 1345  Last data filed at 3/18/2020 1019  Gross per 24 hour   Intake 2235.69 ml   Output 1100 ml   Net 1135.69 ml       Labs:  [unfilled]    Lab Results   Component Value Date/Time    SPECIAL NOT REPORTED 2020 04:30 PM     Lab Results   Component Value Date/Time    CULTURE NO SIGNIFICANT GROWTH 2020 04:30 PM       [facility]    Radiology:    Vl Lower Extremity Bilateral Venous Duplex    Result Date: 3/17/2020    [facility]  Vascular Lower Extremities DVT Study Procedure   Patient Name  Werner Alexander    Date of Study           03/17/2020                Edrie Quale   Date of Birth 1955  Gender                  Female   Age           75 year(s)  Race                       Room Number   2090        Height:                 61.02 inch, 155 cm   Corporate ID  [id number]    Weight:                 260 pounds, 117.9 kg  #   Patient Acct  [de-identified]   BSA:        2.11 m^2    BMI:       49.09 kg/m^2  #   MR #          [id number]      Sonographer             Moi Bolivar   Accession #   [id number]   Interpreting Physician  Migel Arevalo   Referring                 Referring Physician     Jose Chaudhry  Nurse  Practitioner  Procedure Type of Study:   Veins: Lower Extremities DVT Study, Venous Scan Lower Bilateral.  Indications for Study:R/O DVT. Patient Status:Out Patient. Technical Quality:Limited visualization. Limitation reason:Bandage material.  Conclusions   Summary   Technically limited visualization. No evidence of superficial or deep venous thrombosis in both lower  extremities. Signature   ----------------------------------------------------------------  Electronically signed by Osmel Mancuso(Sonographer) on  03/17/2020 02:26 PM  ----------------------------------------------------------------   ----------------------------------------------------------------  Electronically signed by Migel Arevalo(Interpreting physician)  on 03/17/2020 11:22 PM  ----------------------------------------------------------------  Findings:   Right Impression:                    Left Impression:  Non - visualization of the posterior Non - visualization of the posterior  tibial and peroneal veins. tibial and peroneal veins. Remaining deep veins demonstrate     Remaining deep veins demonstrate  normal compressibility and           normal compressibility and  augmentation. augmentation. Normal compressibility of the great  Normal compressibility of the great  saphenous vein. saphenous vein. Normal compressibility of the small  Normal compressibility of the small  saphenous vein. saphenous vein. Velocities are measured in cm/s ; Diameters are measured in cm Right Lower Extremities DVT Study Measurements Right 2D Measurements +------------------------------------+----------+---------------+----------+ ! Location                            ! Visualized! Compressibility! Thrombosis! +------------------------------------+----------+---------------+----------+ ! Common Femoral                      !Yes       ! Yes            ! None      ! +------------------------------------+----------+---------------+----------+ ! Prox Femoral                        !Yes       ! Yes            ! None      ! +------------------------------------+----------+---------------+----------+ ! Mid Femoral                         !Yes       ! Yes            ! None      ! +------------------------------------+----------+---------------+----------+ ! Dist Femoral                        !Yes       ! Yes            ! None      ! +------------------------------------+----------+---------------+----------+ ! Popliteal                           !Yes       ! Yes            ! None      ! +------------------------------------+----------+---------------+----------+ ! Sapheno Femoral Junction            ! Yes       ! Yes            ! None      ! +------------------------------------+----------+---------------+----------+ ! PTV                                 ! No        !               !          ! +------------------------------------+----------+---------------+----------+ ! Peroneal                            !No        !               !          ! +------------------------------------+----------+---------------+----------+ ! Gastroc                             ! Yes       ! Yes !None      ! +------------------------------------+----------+---------------+----------+ ! GSV Thigh                           ! Yes       ! Yes            ! None      ! +------------------------------------+----------+---------------+----------+ ! GSV Knee                            ! Yes       ! Yes            ! None      ! +------------------------------------+----------+---------------+----------+ ! GSV Ankle                           ! No        !               !          ! +------------------------------------+----------+---------------+----------+ ! SSV                                 ! Yes       ! Yes            ! None      ! +------------------------------------+----------+---------------+----------+ Right Doppler Measurements +---------------------------+------+------+--------------------------------+ ! Location                   ! Signal!Reflux! Reflux (msec)                   ! +---------------------------+------+------+--------------------------------+ ! Common Femoral             !Phasic!      !                                ! +---------------------------+------+------+--------------------------------+ ! Prox Femoral               !Phasic!      !                                ! +---------------------------+------+------+--------------------------------+ ! Popliteal                  !Phasic!      !                                ! +---------------------------+------+------+--------------------------------+ Left Lower Extremities DVT Study Measurements Left 2D Measurements +------------------------------------+----------+---------------+----------+ ! Location                            ! Visualized! Compressibility! Thrombosis! +------------------------------------+----------+---------------+----------+ ! Common Femoral                      !Yes       ! Yes            ! None      ! +------------------------------------+----------+---------------+----------+ ! Prox Femoral                        !Yes       ! Yes

## 2020-03-18 NOTE — PLAN OF CARE
Problem: Falls - Risk of:  Goal: Will remain free from falls  Description: Will remain free from falls  3/18/2020 0413 by Vilma Street RN  Outcome: Ongoing     Problem: Falls - Risk of:  Goal: Absence of physical injury  Description: Absence of physical injury  3/18/2020 0413 by Vilma Street RN  Outcome: Ongoing     Problem: Nutrition  Goal: Optimal nutrition therapy  3/18/2020 0413 by Vilma Street RN  Outcome: Ongoing     Problem: Musculor/Skeletal Functional Status  Goal: Highest potential functional level  3/18/2020 0413 by Vilma Street RN  Outcome: Ongoing     Problem: Musculor/Skeletal Functional Status  Goal: Absence of falls  3/18/2020 0413 by Vilma Street RN  Outcome: Ongoing     Problem:  Bowel/Gastric:  Goal: Ability to achieve a regular elimination pattern will improve  Description: Ability to achieve a regular elimination pattern will improve  3/18/2020 0413 by Vilma Street RN  Outcome: Ongoing     Problem: Cardiac:  Goal: Ability to maintain an adequate cardiac output will improve  Description: Ability to maintain an adequate cardiac output will improve  3/18/2020 0413 by Vilma Street RN  Outcome: Ongoing     Problem: Cardiac:  Goal: Ability to maintain adequate ventilation will improve  Description: Ability to maintain adequate ventilation will improve  3/18/2020 0413 by Vilma Street RN  Outcome: Ongoing     Problem: Cardiac:  Goal: Ability to achieve and maintain adequate cardiopulmonary perfusion will improve  Description: Ability to achieve and maintain adequate cardiopulmonary perfusion will improve  3/18/2020 0413 by Vilma Street RN  Outcome: Ongoing     Problem: Coping:  Goal: Ability to adjust to condition or change in health will improve  Description: Ability to adjust to condition or change in health will improve  3/18/2020 0413 by Vilma Street RN  Outcome: Ongoing     Problem: Coping:  Goal: Communication of feelings regarding changes in body function or

## 2020-03-18 NOTE — CONSULTS
Gastroenterology Consult Note      Patient: Iraida Angela  : 1955  Acct#:  [id number]     Date:  3/18/2020    Subjective:       History of Present Illness  Patient is a 75 y.o.  female admitted with Anemia [D64.9] who is seen in consult for anemia     57-year-old lady history of GERD and iron deficiency anemia she does follow with my partner she came this time with shortness of breath and fatigue found to have a hemoglobin of 6.2 the patient also does report bloody colored stool for several months, also she does have postmenopausal vaginal bleed and hematuria and she is being currently evaluated by GYN for this. She has been following with my partner for iron deficiency anemia upper and lower scopes were done and then a capsule endoscopy was done which showed small bowel erosions and questionable antral AVMs. She denied taking any nonsteroidal or any aspirin she is not on any anticoagulation or any antiplatelet. She takes iron.   She denied any abdominal pain she denied any odynophagia dysphagia or change in her bowel habits she get blood transfusion now she is having hemoglobin of 7.4 her liver enzymes are all normal except the alkaline phosphatase being elevated around 117,   her INR is 1.1  Bilirubin is been normal, and albumin is been slightly decreased at 3        Past Medical History:   Diagnosis Date    Anemia     Cataract     GERD (gastroesophageal reflux disease)     Headache     Hyperlipidemia     Neuropathy     Osteoarthritis     Restless leg syndrome     Seizures ([address] since age 10   Mercedes Klein Short-term memory loss     Stroke (cerebrum) ([address]     Type 2 diabetes mellitus without complication ([facility] [address] 7371      Past Surgical History:   Procedure Laterality Date    CARDIAC CATHETERIZATION      CATARACT REMOVAL      CHOLECYSTECTOMY      COLONOSCOPY N/A 2019    COLONOSCOPY DIAGNOSTIC, POLYPECTOMIES performed by Bella Branham MD at [facility] negative  Eyes: negative  Ears, nose, mouth, throat, and face: negative  Respiratory: negative  Cardiovascular: negative  Gastrointestinal: negative  Genitourinary:negative  Integument/breast: negative  Hematologic/lymphatic: negative  Musculoskeletal:negative  Endocrine: negative           Physical Exam  Blood pressure (!) 141/58, pulse 75, temperature 97.2 °F (36.2 °C), temperature source Oral, resp. rate 16, height 5' 1\" (1.549 m), weight 260 lb (117.9 kg), SpO2 96 %, not currently breastfeeding. General Appearance: alert and oriented to person, place and time, well-developed and well-nourished, in no acute distress  Skin: warm and dry, no rash or erythema  Head: normocephalic and atraumatic  Eyes: pupils equal, round, and reactive to light, extraocular eye movements intact, conjunctivae normal  ENT: hearing grossly normal bilaterally  Neck: neck supple and non tender without mass, no thyromegaly or thyroid nodules, no cervical lymphadenopathy   Pulmonary/Chest: clear to auscultation bilaterally- no wheezes, rales or rhonchi, normal air movement, no respiratory distress  Cardiovascular: normal rate, regular rhythm, normal S1 and S2, no murmurs, rubs, clicks or gallops, distal pulses intact, no carotid bruits  Abdomen: soft, non-tender, non-distended, normal bowel sounds, no masses or organomegaly  Extremities: no cyanosis, clubbing or edema  Musculoskeletal: normal range of motion, no joint swelling, deformity or tenderness  Neurologic: no cranial nerve deficit and muscle strength normal    Data Review:    Recent Labs     03/16/20  1440  03/17/20  0503  03/17/20  1620 03/17/20  2230 03/18/20  0413   WBC 3.4*  --  3.5  --   --   --  3.0*   HGB 6.6*   < > 7.1*   < > 7.2* 7.4* 7.6*   HCT 21.6*   < > 22.8*   < > 23.2* 23.4* 24.3*   MCV 87.1  --  85.4  --   --   --  86.3   *  --  104*  --   --   --  100*    < > = values in this interval not displayed.      Recent Labs     03/17/20  0503 03/17/20  1118

## 2020-03-18 NOTE — PROGRESS NOTES
Attending Physician Statement  I have discussed the care of Caitlyn Arriaza with the resident team. I have examined the patient myself and taken ros and hpi , including pertinent history and exam findings,  with the resident. I have reviewed the key elements of all parts of the encounter with the resident. I agree with the assessment, plan and orders as documented by the resident. 75year-old admitted with low hemoglobin of 6.5. Acute on chronic blood loss anemia- H&H stable now. EGD today shows esophageal varices, banded. Also GAVE-cauterized. Started on nadolol  Grade 1 chronic diastolic heart failure-currently compensated. NIKKI-improving. Pancytopenia-hematology following-possible bone marrow biopsy later today. Iron deficiency-start IV Venofer. Morbid obesity.       Electronically signed by Alivia Shankar MD

## 2020-03-18 NOTE — ANESTHESIA POSTPROCEDURE EVALUATION
Department of Anesthesiology  Postprocedure Note    Patient: Lianne Gaytan  MRN: [id number]  YOB: 1955  Date of evaluation: 3/18/2020  Time:  10:45 AM     Procedure Summary     Date:  03/18/20 Room / Location:  [address] [place] [address] ENDO 03 / [facility] ENDO    Anesthesia Start:  6395 Anesthesia Stop:  0693    Procedure:  EGD WITH CAUTERIZATION OF WATERMELON STOMACH (GAVE) USING ARGON (N/A Esophagus) Diagnosis:       (BLOODY STOOL)      (ANEMIA)    Surgeon:  Raul Love MD Responsible Provider:  Ny Darby MD    Anesthesia Type:  MAC ASA Status:  3          Anesthesia Type: MAC    Jean Phase I: Jean Score: 8    Jean Phase II:      Last vitals: Reviewed and per EMR flowsheets.        Anesthesia Post Evaluation    Comments: POST- ANESTHESIA EVALUATION       Pt Name: Lianne Gaytan  MRN: [id number]  YOB: 1955  Date of evaluation: 3/18/2020  Time:  10:45 AM      BP (!) 153/68   Pulse 85   Temp 97.6 °F (36.4 °C)   Resp 18   Ht 5' 1\" (1.549 m)   Wt 260 lb (117.9 kg)   SpO2 94%   BMI 49.13 kg/m²      Consciousness Level  Awake  Cardiopulmonary Status  Stable  Pain Adequately Treated YES  Nausea / Vomiting  NO  Adequate Hydration  YES  Anesthesia Related Complications NONE      Electronically signed by Ny Darby MD on 3/18/2020 at 10:45 AM

## 2020-03-18 NOTE — ANESTHESIA PRE PROCEDURE
Department of Anesthesiology  Preprocedure Note       Name:  Teena Gauthier   Age:  75 y.o.  :  1955                                          MRN:  [id number]         Date:  3/18/2020      Surgeon: Blanco Erickson):  Florence Campbell MD    Procedure: EGD (N/A Esophagus)    Medications prior to admission:   Prior to Admission medications    Medication Sig Start Date End Date Taking? Authorizing Provider   insulin glargine (BASAGLAR KWIKPEN) 100 UNIT/ML injection pen Inject 60 Units into the skin 2 times daily   Yes Historical Provider, MD   bumetanide (BUMEX) 1 MG tablet Take 1 mg by mouth 2 times daily   Yes Historical Provider, MD   Emollient (CERAVE) LOTN Apply topically 2 times daily Indications: bilateral lower legs   Yes Historical Provider, MD   povidone-iodine (BETADINE) 10 % external solution Apply topically daily Indications: left foot wound   Yes Historical Provider, MD   levETIRAcetam (KEPPRA) 750 MG tablet Take 750 mg by mouth 2 times daily Indications: with 500 mg to make 1250 mg dose   Yes Historical Provider, MD   vitamin D (CHOLECALCIFEROL) 47987 UNIT CAPS Take 50,000 Units by mouth once a week Indications: Sundays   Yes Historical Provider, MD   Cholecalciferol 400 UNIT TABS tablet Take 400 Units by mouth daily   Yes Historical Provider, MD   ferrous sulfate 325 (65 Fe) MG tablet Take 1 tablet by mouth every 12 hours 19  Yes Damien Myers MD   gabapentin (NEURONTIN) 100 MG capsule Take 100 mg by mouth 2 times daily. .   Yes Historical Provider, MD   levETIRAcetam (KEPPRA) 500 MG tablet Take 500 mg by mouth 2 times daily Indications: with 750 mg to make total dose 1250 mg    Yes Historical Provider, MD   insulin aspart (NOVOLOG) 100 UNIT/ML injection vial Inject into the skin Inject as per sliding scale before meals and at bedtime:    = 0 units; call physician if less than 70  151-200 = 2 units  201-250 = 4 units  251-300 = 6 units  301-350 = 8 units  351-400 = 10 units; call physician BP Readings from Last 3 Encounters:   03/18/20 (!) 138/47   02/20/20 138/65   02/11/20 130/68       NPO Status:                                                                                 BMI:   Wt Readings from Last 3 Encounters:   03/18/20 260 lb (117.9 kg)   02/11/20 269 lb (122 kg)   01/11/20 250 lb (113.4 kg)     Body mass index is 49.13 kg/m². CBC:   Lab Results   Component Value Date    WBC 3.0 03/18/2020    RBC 2.81 03/18/2020    HGB 7.6 03/18/2020    HCT 24.3 03/18/2020    MCV 86.3 03/18/2020    RDW 18.5 03/18/2020     03/18/2020       CMP:   Lab Results   Component Value Date     03/18/2020    K 4.0 03/18/2020     03/18/2020    CO2 29 03/18/2020    BUN 57 03/18/2020    CREATININE 1.44 03/18/2020    GFRAA 44 03/18/2020    LABGLOM 36 03/18/2020    GLUCOSE 212 03/18/2020    PROT 6.2 03/17/2020    CALCIUM 8.7 03/18/2020    BILITOT 0.18 03/16/2020    ALKPHOS 117 03/16/2020    AST 30 03/16/2020    ALT 17 03/16/2020       POC Tests:   Recent Labs     03/18/20  0811   POCGLU 158*       Coags:   Lab Results   Component Value Date    PROTIME 13.6 03/17/2020    INR 1.1 03/17/2020    APTT 31.5 03/16/2020       HCG (If Applicable): No results found for: PREGTESTUR, PREGSERUM, HCG, HCGQUANT     ABGs: No results found for: PHART, PO2ART, GPQ4AXW, DHW7XRI, BEART, T9RVZMQS     Type & Screen (If Applicable):  No results found for: LABABO, [address]    Anesthesia Evaluation  Patient summary reviewed and Nursing notes reviewed no history of anesthetic complications:   Airway: Mallampati: III  TM distance: >3 FB   Neck ROM: full  Mouth opening: > = 3 FB Dental: normal exam         Pulmonary:Negative Pulmonary ROS and normal exam  breath sounds clear to auscultation                             Cardiovascular:    (+) hyperlipidemia      ECG reviewed  Rhythm: regular  Rate: normal  Echocardiogram reviewed               ROS comment: Estimated LV EF 60-65 %.      Neuro/Psych:   (+) CVA:, neuromuscular disease:, headaches:, psychiatric history:depression/anxiety              ROS comment: Cervical spinal stenosis  Diabetic polyneuropathy   Restless leg syndrome GI/Hepatic/Renal:   (+) GERD:, morbid obesity         ROS comment: GI Bleed. Endo/Other:    (+) Diabetes (FBS - 158)Type II DM, , : arthritis: OA., .                  ROS comment: Anemia - Hgb 7.6 Abdominal:           Vascular:                                      Anesthesia Plan      MAC     ASA 3       Induction: intravenous. MIPS: Prophylactic antiemetics administered. Anesthetic plan and risks discussed with patient. Plan discussed with CRNA.                   Amara Padilla MD   3/18/2020

## 2020-03-19 NOTE — PROGRESS NOTES
10 mL, PRN  acetaminophen (TYLENOL) tablet 650 mg, Q6H PRN    Or  acetaminophen (TYLENOL) suppository 650 mg, Q6H PRN  polyethylene glycol (GLYCOLAX) packet 17 g, Daily PRN  promethazine (PHENERGAN) tablet 12.5 mg, Q6H PRN    Or  ondansetron (ZOFRAN) injection 4 mg, Q6H PRN  pantoprazole (PROTONIX) 80 mg in sodium chloride 0.9 % 100 mL infusion, Continuous  potassium chloride (KLOR-CON M) extended release tablet 40 mEq, PRN    Or  potassium bicarb-citric acid (EFFER-K) effervescent tablet 40 mEq, PRN    Or  potassium chloride 10 mEq/100 mL IVPB (Peripheral Line), PRN  insulin lispro (HUMALOG) injection vial 0-12 Units, TID WC  insulin lispro (HUMALOG) injection vial 0-6 Units, Nightly  glucose (GLUTOSE) 40 % oral gel 15 g, PRN  dextrose 50 % IV solution, PRN  glucagon (rDNA) injection 1 mg, PRN  dextrose 5 % solution, PRN  insulin glargine (LANTUS) injection vial 20 Units, BID  levETIRAcetam (KEPPRA) 1,250 mg in sodium chloride 0.9 % 100 mL IVPB, Q12H        Data:     Code Status:  Full Code    Family History   Problem Relation Age of Onset    Diabetes Brother        Social History     Socioeconomic History    Marital status: Single     Spouse name: Not on file    Number of children: Not on file    Years of education: Not on file    Highest education level: Not on file   Occupational History    Not on file   Social Needs    Financial resource strain: Not on file    Food insecurity     Worry: Not on file     Inability: Not on file    Transportation needs     Medical: Not on file     Non-medical: Not on file   Tobacco Use    Smoking status: Never Smoker    Smokeless tobacco: Never Used   Substance and Sexual Activity    Alcohol use: No    Drug use: No    Sexual activity: Not on file   Lifestyle    Physical activity     Days per week: Not on file     Minutes per session: Not on file    Stress: Not on file   Relationships    Social connections     Talks on phone: Not on file     Gets together: Not on

## 2020-03-19 NOTE — PROGRESS NOTES
ambulate on LLE  Subjective   General  Family / Caregiver Present: No  Referring Practitioner: Teodoro Cooper MD  Subjective  Subjective: Pt alert in bed. States complaint of slight stomach uneasiness but agreeable for therapy. General Comment  Comments: RN Jerrica camargo's pt for PT. RN in room at end of treatment. Pain Assessment  Pain Level: 2  Pain Location: Abdomen  Pain Descriptors: Aching       Orientation  Orientation  Overall Orientation Status: Within Normal Limits  Cognition      Objective   Bed mobility  Rolling to Left: Independent  Supine to Sit: Modified independent(Use of L HR)  Sit to Supine: Independent  Scooting: Independent  Comment: Use of L HR supine to sit. No cues needed, HOB flat. Transfers  Sit to Stand: Stand by assistance  Stand to sit: Stand by assistance  Comment: STS x 5 no device, SBA as needed. Minimal cues for safety. Ambulation  Ambulation?: Yes  Ambulation 1  Surface: level tile  Device: Rolling Walker  Assistance: Stand by assistance;Contact guard assistance  Quality of Gait: Slow and steady, no LOB   Gait Deviations: Slow Milvia;Decreased step length  Distance: 100 ft   Comments: VCs for improved step length, good follow through. Stairs/Curb  Stairs?: No        Other exercises  Other exercises?: Yes  Other exercises 1: Seated EOB therapeutic exercises x5  Other exercises 2: Standing dynamic balance ~5 min   Other exercises 3: Standing therapeutic exercise using RW x10         Goals  Short term goals  Time Frame for Short term goals: 3-4 days  Short term goal 1: Pt to demonstrate Mod I transfers. Short term goal 2: Pt to amb 100' with RW/rollator  Mod I. Short term goal 3: Pt to improve B LE strength by 1/2 MMG. Short term goal 4: Pt to improve dynamic standing balance to GOOD to reduce fall risk. Short term goal 5: Pt to perform nustep 5-10 minutes for increased activity tolerance with HR/O2 sats WNL. Patient Goals   Patient goals :  To get back home    Plan Plan  Times per week: 5x/week  Specific instructions for Next Treatment: progress gait distance (use of rollator), fall prevention due to numerous falls  Current Treatment Recommendations: Strengthening, Balance Training, Functional Mobility Training, Transfer Training, Endurance Training, Gait Training, Equipment Evaluation, Education, & procurement, Patient/Caregiver Education & Training, Safety Education & Training, Home Exercise Program  Safety Devices  Type of devices:  All fall risk precautions in place, Left in bed, Nurse notified, Gait belt, Call light within reach, Bed alarm in place     Therapy Time   Individual Concurrent Group Co-treatment   Time In 0912         Time Out 0950         Minutes Jakobi 69, PTA

## 2020-03-19 NOTE — PROGRESS NOTES
catheterization; Upper gastrointestinal endoscopy (N/A, 5/12/2019); Colonoscopy (N/A, 5/13/2019); and Upper gastrointestinal endoscopy (N/A, 3/18/2020). Medications:    Prior to Admission medications    Medication Sig Start Date End Date Taking? Authorizing Provider   insulin glargine (BASAGLAR KWIKPEN) 100 UNIT/ML injection pen Inject 60 Units into the skin 2 times daily   Yes Historical Provider, MD   bumetanide (BUMEX) 1 MG tablet Take 1 mg by mouth 2 times daily   Yes Historical Provider, MD   Emollient (CERAVE) LOTN Apply topically 2 times daily Indications: bilateral lower legs   Yes Historical Provider, MD   povidone-iodine (BETADINE) 10 % external solution Apply topically daily Indications: left foot wound   Yes Historical Provider, MD   levETIRAcetam (KEPPRA) 750 MG tablet Take 750 mg by mouth 2 times daily Indications: with 500 mg to make 1250 mg dose   Yes Historical Provider, MD   vitamin D (CHOLECALCIFEROL) 11895 UNIT CAPS Take 50,000 Units by mouth once a week Indications: Sundays   Yes Historical Provider, MD   Cholecalciferol 400 UNIT TABS tablet Take 400 Units by mouth daily   Yes Historical Provider, MD   ferrous sulfate 325 (65 Fe) MG tablet Take 1 tablet by mouth every 12 hours 6/21/19  Yes Moshe Arthur MD   gabapentin (NEURONTIN) 100 MG capsule Take 100 mg by mouth 2 times daily. .   Yes Historical Provider, MD   levETIRAcetam (KEPPRA) 500 MG tablet Take 500 mg by mouth 2 times daily Indications: with 750 mg to make total dose 1250 mg    Yes Historical Provider, MD   insulin aspart (NOVOLOG) 100 UNIT/ML injection vial Inject into the skin Inject as per sliding scale before meals and at bedtime:    = 0 units; call physician if less than 70  151-200 = 2 units  201-250 = 4 units  251-300 = 6 units  301-350 = 8 units  351-400 = 10 units; call physician if greater than 400   Yes Historical Provider, MD   potassium chloride (KLOR-CON M) 10 MEQ extended release tablet Take 30 mEq by (ZOFRAN) injection 4 mg  4 mg Intravenous Q6H PRN Cornelio Kahn MD        pantoprazole (PROTONIX) 80 mg in sodium chloride 0.9 % 100 mL infusion  8 mg/hr Intravenous Continuous Cornelio Kahn MD 10 mL/hr at 03/18/20 1659 8 mg/hr at 03/18/20 1659    potassium chloride (KLOR-CON M) extended release tablet 40 mEq  40 mEq Oral PRN Cornelio Kahn MD        Or    potassium bicarb-citric acid (EFFER-K) effervescent tablet 40 mEq  40 mEq Oral PRN Cornelio Kahn MD        Or    potassium chloride 10 mEq/100 mL IVPB (Peripheral Line)  10 mEq Intravenous PRN Cornelio Kahn MD        insulin lispro (HUMALOG) injection vial 0-12 Units  0-12 Units Subcutaneous TID WC Cornelio Kahn MD   4 Units at 03/18/20 1715    insulin lispro (HUMALOG) injection vial 0-6 Units  0-6 Units Subcutaneous Nightly Cornelio Kahn MD   4 Units at 03/17/20 2126    glucose (GLUTOSE) 40 % oral gel 15 g  15 g Oral PRN Cornelio Kahn MD        dextrose 50 % IV solution  12.5 g Intravenous PRN Cornelio Kahn MD        glucagon (rDNA) injection 1 mg  1 mg Intramuscular PRN Cornelio Kahn MD        dextrose 5 % solution  100 mL/hr Intravenous PRN Cornelio Kahn MD        insulin glargine (LANTUS) injection vial 20 Units  20 Units Subcutaneous BID Cornelio Kahn MD   20 Units at 03/17/20 2126    levETIRAcetam (KEPPRA) 1,250 mg in sodium chloride 0.9 % 100 mL IVPB  1,250 mg Intravenous Q12H Cornelio Kahn MD 0 mL/hr at 03/18/20 1149 1,250 mg at 03/18/20 2149       Allergies:  Codeine    Social History:   reports that she has never smoked. She has never used smokeless tobacco. She reports that she does not drink alcohol or use drugs. Family History: family history includes Diabetes in her brother. REVIEW OF SYSTEMS:    Constitutional: No fever or chills.  No night sweats, no weight loss   Eyes: No eye discharge, double vision, or eye pain   HEENT: negative for sore mouth, sore throat, hoarseness and voice change   Respiratory: negative for cough , sputum, dyspnea,

## 2020-03-19 NOTE — PROGRESS NOTES
release tablet Take 30 mEq by mouth daily    Yes Historical Provider, MD   vitamin B-12 (CYANOCOBALAMIN) 500 MCG tablet Take 500 mcg by mouth daily   Yes Historical Provider, MD   loperamide (IMODIUM) 2 MG capsule Take 4 mg by mouth daily as needed for Diarrhea (after first loose stool)   Yes Historical Provider, MD   loperamide (IMODIUM) 2 MG capsule Take 2 mg by mouth 4 times daily as needed for Diarrhea (after initial 4 mg dose)   Yes Historical Provider, MD   acetaminophen (TYLENOL) 325 MG tablet Take 650 mg by mouth 3 times daily as needed for Pain (mild)   Yes Historical Provider, MD   metFORMIN (GLUCOPHAGE) 1000 MG tablet Take 1,000 mg by mouth 2 times daily (with meals)   Yes Historical Provider, MD   lisinopril-hydrochlorothiazide (PRINZIDE;ZESTORETIC) 10-12.5 MG per tablet Take 1 tablet by mouth daily   Yes Historical Provider, MD   PARoxetine (PAXIL) 20 MG tablet Take 20 mg by mouth every morning   Yes Historical Provider, MD   omeprazole (PRILOSEC) 40 MG delayed release capsule Take 40 mg by mouth Daily    Yes Historical Provider, MD   loratadine (CLARITIN) 10 MG tablet Take 10 mg by mouth daily   Yes Historical Provider, MD   simvastatin (ZOCOR) 20 MG tablet Take 20 mg by mouth nightly    Yes Historical Provider, MD   pramipexole (MIRAPEX) 1 MG tablet Take 1 mg by mouth nightly   Yes Historical Provider, MD   lamoTRIgine (LAMICTAL) 200 MG tablet Take 200 mg by mouth 2 times daily   Yes Historical Provider, MD   busPIRone (BUSPAR) 10 MG tablet Take 10 mg by mouth 3 times daily    Yes Historical Provider, MD   oxyCODONE-acetaminophen (PERCOCET) 5-325 MG per tablet Take 1 tablet by mouth every 6 hours as needed for Pain.     Historical Provider, MD   benzonatate (TESSALON) 200 MG capsule Take 200 mg by mouth 2 times daily as needed for Cough    Historical Provider, MD   Blood Glucose Monitoring Suppl FLAVIO Check blood sugars 3/day 11/30/17   Shiv Sanabria MD   Glucose Blood (BLOOD GLUCOSE TEST STRIPS) those of syntax and sound a like substitutions which may escape proof reading. It such instances, actual meaning can be extrapolated by contextual diversion.

## 2020-03-19 NOTE — PROGRESS NOTES
[address] [facility].    PROGRESS NOTE             3/19/2020    10:54 AM    Name:   Cameron Borrego  MRN:     [id number]     Acct:      [de-identified]   Room:   2090/2090-01  IP Day:  3  Admit Date:  3/16/2020  2:35 PM    PCP:  Martha Staples MD  Code Status:  Full Code    Subjective:     C/C:   Chief Complaint   Patient presents with    Abnormal Lab     Hgb     Interval History Status: improved. No acute events overnight. This morning patient stated that she did have two episodes of blood in her urine. Hb this morning is 7.1. EGD yesterday showed varices and watermelon stomach. Live US showed fatty liver infiltration and hepatomegaly. Plan for bone marrow biopsy today. Will get pelvic US and KUB. Brief History:     The patient is a 75 y.o. Non-/non  female who presents withAbnormal Lab (Hgb)   and she is admitted to the hospital for the management of  Symptomatic anemia. Patient was informed that hb today was 6.5 and that she needed to go to the ED. Patient states that she does feel fatigued but is otherwise asymptomatic. Patient reports history of blood in stools. Describes stools of dark red. Patient is currently being worked up by GI and recently did capsule study and colonoscopy. Patient also reports bright red blood in urine. Patient states she is following with OBGYN and has been diagnosed with post menopausal vaginal bleeding. She is scheduled for D&C. It is unclear at this time if the source of blood is vaginal or bladder. Patient is also reporting increased leg edema bilateraly. She denies a previous diagnosis of heart failure but is taking lasix at home. She also reports cutting her left  foot which is now having difficulty healing due to the leg edema.      Patient started on protonix drip and received one unit PRBC in ED.      Patient has a PMH of diabetes, obesity,  Epilepsy, restless leg syndrome.  Denies +------------------------------------+----------+---------------+----------+ ! Popliteal                           !Yes       ! Yes            ! None      ! +------------------------------------+----------+---------------+----------+ ! Sapheno Femoral Junction            ! Yes       ! Yes            ! None      ! +------------------------------------+----------+---------------+----------+ ! PTV                                 ! No        !               !          ! +------------------------------------+----------+---------------+----------+ ! Peroneal                            !No        !               !          ! +------------------------------------+----------+---------------+----------+ ! Gastroc                             ! Yes       ! Yes            ! None      ! +------------------------------------+----------+---------------+----------+ ! GSV Thigh                           ! Yes       ! Yes            ! None      ! +------------------------------------+----------+---------------+----------+ ! GSV Knee                            ! Yes       ! Yes            ! None      ! +------------------------------------+----------+---------------+----------+ ! GSV Ankle                           ! No        !               !          ! +------------------------------------+----------+---------------+----------+ ! SSV                                 ! Yes       ! Yes            ! None      ! +------------------------------------+----------+---------------+----------+ Right Doppler Measurements +---------------------------+------+------+--------------------------------+ ! Location                   ! Signal!Reflux! Reflux (msec)                   ! +---------------------------+------+------+--------------------------------+ ! Common Femoral             !Phasic!      !                                ! +---------------------------+------+------+--------------------------------+ ! Prox Femoral               !Phasic!      ! ! +---------------------------+------+------+--------------------------------+ ! Popliteal                  !Phasic!      !                                ! +---------------------------+------+------+--------------------------------+ Left Lower Extremities DVT Study Measurements Left 2D Measurements +------------------------------------+----------+---------------+----------+ ! Location                            ! Visualized! Compressibility! Thrombosis! +------------------------------------+----------+---------------+----------+ ! Common Femoral                      !Yes       ! Yes            ! None      ! +------------------------------------+----------+---------------+----------+ ! Prox Femoral                        !Yes       ! Yes            ! None      ! +------------------------------------+----------+---------------+----------+ ! Mid Femoral                         !Yes       ! Yes            ! None      ! +------------------------------------+----------+---------------+----------+ ! Dist Femoral                        !Yes       ! Yes            ! None      ! +------------------------------------+----------+---------------+----------+ ! Popliteal                           !Yes       ! Yes            ! None      ! +------------------------------------+----------+---------------+----------+ ! Sapheno Femoral Junction            ! Yes       ! Yes            ! None      ! +------------------------------------+----------+---------------+----------+ ! PTV                                 ! No        !               !          ! +------------------------------------+----------+---------------+----------+ ! Peroneal                            !No        !               !          ! +------------------------------------+----------+---------------+----------+ ! Gastroc                             ! Yes       ! Yes            ! None      ! +------------------------------------+----------+---------------+----------+ ! YUMIKO Thigh !Yes       !Yes            ! None      ! +------------------------------------+----------+---------------+----------+ ! GSV Knee                            ! Yes       ! Yes            ! None      ! +------------------------------------+----------+---------------+----------+ ! GSV Ankle                           ! No        !               !          ! +------------------------------------+----------+---------------+----------+ ! SSV                                 ! Yes       ! Yes            ! None      ! +------------------------------------+----------+---------------+----------+ Left Doppler Measurements +---------------------------+------+------+--------------------------------+ ! Location                   ! Signal!Reflux! Reflux (msec)                   ! +---------------------------+------+------+--------------------------------+ ! Common Femoral             !Phasic!      !                                ! +---------------------------+------+------+--------------------------------+ ! Prox Femoral               !Phasic!      !                                ! +---------------------------+------+------+--------------------------------+ ! Popliteal                  !Phasic!      !                                ! +---------------------------+------+------+--------------------------------+        Physical Examination:        Physical Exam  Constitutional:       Appearance: Normal appearance. She is obese. HENT:      Head: Normocephalic and atraumatic. Eyes:      General: No scleral icterus. Conjunctiva/sclera: Conjunctivae normal.   Neck:      Musculoskeletal: Neck supple. Cardiovascular:      Rate and Rhythm: Normal rate and regular rhythm. Pulmonary:      Effort: Pulmonary effort is normal.      Breath sounds: Normal breath sounds. Abdominal:      General: Abdomen is flat. Bowel sounds are normal. There is no distension. Palpations: Abdomen is soft. Tenderness: There is no abdominal tenderness. Genitourinary:     Comments: Lizbeth Gavin blood on vaginal exam  Musculoskeletal:      Right lower leg: Edema present. Left lower leg: Edema present. Neurological:      Mental Status: She is alert and oriented to person, place, and time. Psychiatric:         Mood and Affect: Mood normal.           Assessment:        Primary Problem  <principal problem not specified>    Active Hospital Problems    Diagnosis Date Noted    Elevated alkaline phosphatase level [R74.8]     Anemia [D64.9] 03/16/2020       Plan:        Symptomatic Anemia   - consult to heme/onc  - INR/PT/PTT/peripheral smear   - bone marrow biopsy today  #GI bleeding  - consult to GI  - Hb 7.1  - 1 U PRBC in ED   - H&H repeat once later today  - IV protonix   - NPO   - EGD: varices and watermelon stomach  - liver US: fatty infiltration and hepatomegaly  #Vaginal bleeding  - consult to OBGYN  - mirtha blood seen on vaginal exam  - UA showed moderate blood   - H&H Q6  - f/u KUB and pelvic ultrasound      NIKKI  - gentle hydration  - hold home dose lasix      Epilepsy  - continue home medications      DM  - lantus 40 BID  - ISS  - hypoglycemia protocol  - POCT glucose      DVT  - held at this time for bleeding risk    Yemi Case MD  3/19/2020  10:54 AM     Attending Physician Statement  I have discussed the care of Chrystal Oliveira with the resident team. I have examined the patient myself and taken ros and hpi , including pertinent history and exam findings,  with the resident. I have reviewed the key elements of all parts of the encounter with the resident. I agree with the assessment, plan and orders as documented by the resident. Multifactorial anemia- upper GI bleed, esophageal dialysis status post banding 3/18 trend H&H. Artemio Ellis Pancytopenia- bone marrow biopsy later today. Iron deficiency-IV Venofer. Postmenopausal vaginal bleed-gynecology consulted, plan D&C outpatient.   Hematuria- true versus contamination with vaginal bleed-check US KUB

## 2020-03-19 NOTE — PLAN OF CARE
Problem: Falls - Risk of:  Goal: Will remain free from falls  Description: Will remain free from falls  3/19/2020 0157 by Neel Charles RN  Outcome: Met This Shift  3/18/2020 2017 by Lian Kwok RN  Outcome: Met This Shift  Goal: Absence of physical injury  Description: Absence of physical injury  3/19/2020 0157 by Neel Charles RN  Outcome: Met This Shift  3/18/2020 2017 by Lian Kwok RN  Outcome: Met This Shift     Problem: Nutrition  Goal: Optimal nutrition therapy  3/19/2020 0157 by Neel Charles RN  Outcome: Met This Shift  3/18/2020 2017 by Lian Kwok RN  Outcome: Met This Shift     Problem: Musculor/Skeletal Functional Status  Goal: Highest potential functional level  3/19/2020 0157 by Neel Charles RN  Outcome: Met This Shift  3/18/2020 2017 by Lian Kwok RN  Outcome: Met This Shift  Goal: Absence of falls  3/19/2020 0157 by Neel Charles RN  Outcome: Met This Shift  3/18/2020 2017 by Lian Kwok RN  Outcome: Met This Shift     Problem: Activity:  Goal: Fatigue will decrease  Description: Fatigue will decrease  3/19/2020 0157 by Neel Charles RN  Outcome: Met This Shift  3/18/2020 2017 by Lian Kwok RN  Outcome: Met This Shift     Problem:  Bowel/Gastric:  Goal: Ability to achieve a regular elimination pattern will improve  Description: Ability to achieve a regular elimination pattern will improve  3/19/2020 0157 by Neel Charles RN  Outcome: Met This Shift  3/18/2020 2017 by Lian Kwok RN  Outcome: Met This Shift     Problem: Cardiac:  Goal: Ability to maintain an adequate cardiac output will improve  Description: Ability to maintain an adequate cardiac output will improve  3/19/2020 0157 by Neel Charles RN  Outcome: Met This Shift  3/18/2020 2017 by Lian Kwok RN  Outcome: Met This Shift  Goal: Ability to maintain adequate ventilation will improve  Description: Ability to maintain adequate ventilation will improve  3/19/2020 0157 by Holli Pak RN  Outcome: Met This Shift  3/18/2020 2017 by Adenike Barney RN  Outcome: Met This Shift  Goal: Ability to achieve and maintain adequate cardiopulmonary perfusion will improve  Description: Ability to achieve and maintain adequate cardiopulmonary perfusion will improve  3/19/2020 0157 by Holli Pak RN  Outcome: Met This Shift  3/18/2020 2017 by Adenike Barney RN  Outcome: Met This Shift     Problem: Coping:  Goal: Ability to adjust to condition or change in health will improve  Description: Ability to adjust to condition or change in health will improve  3/19/2020 0157 by Holli Pak RN  Outcome: Met This Shift  3/18/2020 2017 by Adenike Barney RN  Outcome: Met This Shift  Goal: Communication of feelings regarding changes in body function or appearance will improve  Description: Communication of feelings regarding changes in body function or appearance will improve  3/19/2020 0157 by Holli Pak RN  Outcome: Met This Shift  3/18/2020 2017 by Adenike Barney RN  Outcome: Met This Shift

## 2020-03-20 NOTE — PROGRESS NOTES
nausea vomiting. No signs of acute bleeding. Hemoglobin has been stable. Liver tests within normal limits. Positive Christi-Barr virus studies reveal possible past infection. Exam nonspecific. Recommendations:    From GI point of view patient may be followed as an outpatient. Discussed with the nursing staff. At present bone marrow results pending and this need to be followed by appropriate physician.

## 2020-03-20 NOTE — PROGRESS NOTES
length  Distance: 25ft(in pt's room only d/t low Hgb this date)  Comments: Pt reported disliking for in room RW and wished it was wider. Writer brought pt bariatric RW to room with pt reporting/demo more room and correct body mechanics with AD; writer left original RW in room        Stairs/Curb  Stairs?: No                                                     Posture: Good  Sitting - Static: Good  Sitting - Dynamic: Good  Standing - Static: Good;-  Standing - Dynamic: Good;-  Comments: No LOB, pt steady, fall risk with hx of falls; performed seated EOB/bedside chair and standing with RW and without     Other exercises?: Yes  Other exercises 1: Balanced static sitting EOB ~15 min for improved seated endurance  Other exercises 2: Seated EOB: LAQ x12 and x10 adduction squeezes to pillow  Other exercises 3: Standing marches x12 with RW  Other exercises 4: Tandem stance x2 set B LE's ~30 sec ea without holding onto RW and improving fwd gaze  Other exercises 5: Educated pt over benefits of sitting up in bedside chair throughout the day for at least 1-2 hours to improve postural muscle endurance; pt reported she has been doing since hospitalization  Other exercises 6: Monitored vitals: BP, MAP, SpO2 and HR throughout tx d/t 7.8 Hgb level  Other exercises 7: Educated pt over room safety awareness to hit call button when she wants to use toilet or transfer back to bed with assistance d/t I.V. pole/line management; pt verbalizes understanding  Other Activities  Comment: rest breaks PRN d/t fatigue; monitor vitals        Activity Tolerance: Patient Tolerated treatment well;Treatment limited secondary to medical complications (free text); Patient limited by fatigue(Pt reports fatigue after amb and Hgb 7.8 and /55)  PT Equipment Recommendations  Equipment Needed: No(Pt reports she has rollator)  Other Comments  Comments: Reduced amb distance d/t Hgb levels and pt reports of fatigue with amb/transfer    Assessment  Activity Tolerance: Patient Tolerated treatment well;Treatment limited secondary to medical complications (free text); Patient limited by fatigue(Pt reports fatigue after amb and Hgb 7.8 and /55)   Body structures, Functions, Activity limitations: Decreased functional mobility ; Decreased ADL status; Decreased strength;Decreased endurance;Decreased balance  Specific instructions for Next Treatment: progress gait distance (use of rollator), fall prevention due to numerous falls  Prognosis: Excellent  Discharge Recommendations: Patient would benefit from continued therapy after discharge     Type of devices: All fall risk precautions in place;Call light within reach;Gait belt;Patient at risk for falls; Left in chair;Nurse notified(EDIS Becerril/Giulia notified)     Plan  Times per week: 5x/week  Current Treatment Recommendations: Strengthening, Balance Training, Functional Mobility Training, Transfer Training, Endurance Training, Gait Training, Equipment Evaluation, Education, & procurement, Patient/Caregiver Education & Training, Safety Education & Training, Home Exercise Program    Patient Education  New Education Provided:  Reason for closely monitoring vitals throughout tx d/t low Hgb level this date, importance of correct hand technique with transfers to reduce chance of fall/injury and benefits of sitting up in bedside chair throughout day to improve postural muscles/sitting tolerance  Learner:patient  Method: demonstration and explanation       Outcome: acknowledged understanding of reason for closely monitoring vitals throughout tx d/t low Hgb level this date, importance of correct hand technique with transfers to reduce chance of fall/injury and benefits of sitting up in bedside chair throughout day to improve postural muscles/sitting tolerance and demonstrated understanding     Goals  Short term goals  Time Frame for Short term goals: 3-4 days  Short term goal 1: Pt to demonstrate Mod I transfers.   Short term goal 2: Pt to amb 100' with RW/rollator  Mod I. Short term goal 3: Pt to improve B LE strength by 1/2 MMG. Short term goal 4: Pt to improve dynamic standing balance to GOOD to reduce fall risk. Short term goal 5: Pt to perform nustep 5-10 minutes for increased activity tolerance with HR/O2 sats WNL.         Electronically signed by Antoinette Wylie, GARY on 3/20/20 at 2:43 PM EDT         03/20/20 1315   PT Individual Minutes   Time In 9393   Time Out 1410   Minutes 55   Time Code Minutes   Timed Code Treatment Minutes 50 Minutes *RN administering meds*

## 2020-03-20 NOTE — PROGRESS NOTES
[address] [facility]    PROGRESS NOTE             3/20/2020    1:08 PM    Name:   Dahlia Hoang  MRN:     [id number]     Acct:      [de-identified]   Room:   2090/2090-01  IP Day:  4  Admit Date:  3/16/2020  2:35 PM    PCP:  Damian Otto MD  Code Status:  Full Code    Subjective:     C/C:   Chief Complaint   Patient presents with    Abnormal Lab     Hgb     Interval History Status: improved. No acute events overnight. This morning patient complains of back pain 2/2 bone marrow biopsy yesterday. Denies any SOB/CP. Denies lightheadedness. Will discuss discharge planning with consulting teams. Brief History:     See H&P     Review of Systems:     Review of Systems   Constitutional: Negative for chills and fever. HENT: Negative for congestion. Respiratory: Negative for chest tightness and shortness of breath. Cardiovascular: Positive for leg swelling. Negative for chest pain and palpitations. Gastrointestinal: Negative for abdominal distention, abdominal pain and blood in stool. Genitourinary: Positive for vaginal bleeding. Musculoskeletal: Positive for back pain. Neurological: Negative for weakness and headaches. Psychiatric/Behavioral: Negative for agitation. Medications: Allergies:     Allergies   Allergen Reactions    Codeine        Current Meds:   Scheduled Meds:    sucralfate  1 g Oral 4 times per day    iron sucrose  200 mg Intravenous Q24H    nadolol  20 mg Oral Daily    busPIRone  10 mg Oral TID    gabapentin  100 mg Oral BID    lamoTRIgine  200 mg Oral BID    PARoxetine  20 mg Oral QAM    pramipexole  1 mg Oral Nightly    atorvastatin  20 mg Oral Daily    sodium chloride flush  10 mL Intravenous 2 times per day    insulin lispro  0-12 Units Subcutaneous TID WC    insulin lispro  0-6 Units Subcutaneous Nightly    insulin glargine  20 Units Subcutaneous BID    levetiracetam  1,250 mg Intravenous Q12H     Continuous Infusions:    sodium chloride 100 mL/hr at 20 0515    dextrose       PRN Meds: perflutren lipid microspheres, sodium chloride flush, acetaminophen **OR** acetaminophen, polyethylene glycol, promethazine **OR** ondansetron, potassium chloride **OR** potassium alternative oral replacement **OR** potassium chloride, glucose, dextrose, glucagon (rDNA), dextrose    Data:     Past Medical History:   has a past medical history of Anemia, Cataract, GERD (gastroesophageal reflux disease), Headache, Hyperlipidemia, Neuropathy, Osteoarthritis, Restless leg syndrome, Seizures ([address] Short-term memory loss, Stroke (cerebrum) ([facility] [address] and Type 2 diabetes mellitus without complication ([facility] [address] Social History:   reports that she has never smoked. She has never used smokeless tobacco. She reports that she does not drink alcohol or use drugs. Family History:   Family History   Problem Relation Age of Onset    Diabetes Brother        Vitals:  BP (!) 128/51   Pulse 68   Temp 98.4 °F (36.9 °C) (Oral)   Resp 17   Ht 5' 1\" (1.549 m)   Wt 260 lb (117.9 kg)   SpO2 95%   BMI 49.13 kg/m²   Temp (24hrs), Av.3 °F (36.8 °C), Min:97.7 °F (36.5 °C), Max:98.7 °F (37.1 °C)    Recent Labs     20  1637 20  2137 20  0729 20  1126   POCGLU 214* 178* 186* 243*       I/O(24Hr): Intake/Output Summary (Last 24 hours) at 3/20/2020 1308  Last data filed at 3/20/2020 1015  Gross per 24 hour   Intake 1880 ml   Output 1201 ml   Net 679 ml       Labs:  [unfilled]    Lab Results   Component Value Date/Time    SPECIAL NOT REPORTED 2020 04:30 PM     Lab Results   Component Value Date/Time    CULTURE NO SIGNIFICANT GROWTH 2020 04:30 PM       [facility]    Radiology:    Us Renal Complete    Result Date: 3/19/2020  EXAMINATION: RETROPERITONEAL ULTRASOUND OF THE KIDNEYS AND URINARY BLADDER 3/19/2020 COMPARISON: None.  HISTORY: ORDERING SYSTEM PROVIDED HISTORY: Hematuria, unspecified type TECHNOLOGIST PROVIDED HISTORY: KUB US Acuity: Acute Type of Exam: Initial Hematuria. Initial encounter. FINDINGS: Kidneys: The right kidney measures 10.3 cm in length and the left kidney measures 10.6 cm in length. Kidneys demonstrate normal cortical echogenicity. No evidence of hydronephrosis or intrarenal stones. Bladder: No evidence of bladder wall thickening. Prevoid bladder volume was approximately 440 cubic cm. Patient unable to void at the time of the examination. 1. Normal sonographic appearance of the kidneys with no evidence of obstructive uropathy. Us Liver    Result Date: 3/18/2020  EXAMINATION: RIGHT UPPER QUADRANT ULTRASOUND 3/18/2020 6:19 pm COMPARISON: None HISTORY: ORDERING SYSTEM PROVIDED HISTORY: Elevated alk phos TECHNOLOGIST PROVIDED HISTORY: Elevated alk phos FINDINGS: LIVER:  Suboptimal visualization is noted due to bowel gas. No hepatic masses or ductal dilatation is noted. Liver is mildly enlarged at 18.74 cm. Slightly coarse echotexture is noted with fatty infiltration not excluded. Normal hepatopetal flow is noted in the portal vein. BILIARY SYSTEM:  Patient is status post cholecystectomy. Common bile duct is within normal limits measuring 7.16 cm. RIGHT KIDNEY: The right kidney is grossly unremarkable without evidence of hydronephrosis. PANCREAS: The pancreas is suboptimally visualized and appears to be mildly hyperechoic without focal mass. OTHER: Ascites is noted in the right upper and right lower quadrants of the abdomen, mild to moderate. 1. Hepatomegaly with coarse echotexture to the liver suggesting fatty infiltration without focal masses. 2. Suboptimal visualization of the pancreas which appears hyperechoic without masses. Findings are nonspecific. Hyperechogenicity is sometimes seen in cases of chronic pancreatitis. 3. Mild to moderate right-sided abdominal ascites.      Vl Lower Extremity Bilateral Venous Duplex    Result Date: 3/17/2020 [facility]  Vascular Lower Extremities DVT Study Procedure   Patient Name  Werner Alexander    Date of Study           03/17/2020                Noelle Griffin   Date of Birth 1955  Gender                  Female   Age           75 year(s)  Race                       Room Number   2090        Height:                 61.02 inch, 155 cm   Corporate ID  [id number]    Weight:                 260 pounds, 117.9 kg  #   Patient Acct  [de-identified]   BSA:        2.11 m^2    BMI:       49.09 kg/m^2  #   MR #          [id number]      Sonographer             Maida Vijaya   Accession #   [id number]   Interpreting Physician  Migel Arevalo   Referring                 Referring Physician     Jose Chaudhry  Nurse  Practitioner  Procedure Type of Study:   Veins: Lower Extremities DVT Study, Venous Scan Lower Bilateral.  Indications for Study:R/O DVT. Patient Status:Out Patient. Technical Quality:Limited visualization. Limitation reason:Bandage material.  Conclusions   Summary   Technically limited visualization. No evidence of superficial or deep venous thrombosis in both lower  extremities. Signature   ----------------------------------------------------------------  Electronically signed by Osmel Rivera(Sonographer) on  03/17/2020 02:26 PM  ----------------------------------------------------------------   ----------------------------------------------------------------  Electronically signed by Migel Arevalo(Interpreting physician)  on 03/17/2020 11:22 PM  ----------------------------------------------------------------  Findings:   Right Impression:                    Left Impression:  Non - visualization of the posterior Non - visualization of the posterior  tibial and peroneal veins. tibial and peroneal veins. Remaining deep veins demonstrate     Remaining deep veins demonstrate  normal compressibility and           normal compressibility and  augmentation. augmentation.    Normal compressibility of the great  Normal compressibility of the great  saphenous vein. saphenous vein. Normal compressibility of the small  Normal compressibility of the small  saphenous vein. saphenous vein. Velocities are measured in cm/s ; Diameters are measured in cm Right Lower Extremities DVT Study Measurements Right 2D Measurements +------------------------------------+----------+---------------+----------+ ! Location                            ! Visualized! Compressibility! Thrombosis! +------------------------------------+----------+---------------+----------+ ! Common Femoral                      !Yes       ! Yes            ! None      ! +------------------------------------+----------+---------------+----------+ ! Prox Femoral                        !Yes       ! Yes            ! None      ! +------------------------------------+----------+---------------+----------+ ! Mid Femoral                         !Yes       ! Yes            ! None      ! +------------------------------------+----------+---------------+----------+ ! Dist Femoral                        !Yes       ! Yes            ! None      ! +------------------------------------+----------+---------------+----------+ ! Popliteal                           !Yes       ! Yes            ! None      ! +------------------------------------+----------+---------------+----------+ ! Sapheno Femoral Junction            ! Yes       ! Yes            ! None      ! +------------------------------------+----------+---------------+----------+ ! PTV                                 ! No        !               !          ! +------------------------------------+----------+---------------+----------+ ! Peroneal                            !No        !               !          ! +------------------------------------+----------+---------------+----------+ ! Gastroc                             ! Yes       ! Yes            ! None      ! ! +---------------------------+------+------+--------------------------------+ ! Common Femoral             !Phasic!      !                                ! +---------------------------+------+------+--------------------------------+ ! Prox Femoral               !Phasic!      !                                ! +---------------------------+------+------+--------------------------------+ ! Popliteal                  !Phasic!      !                                ! +---------------------------+------+------+--------------------------------+    Ct Biopsy Bone Marrow    Result Date: 3/19/2020  PROCEDURE: CT GUIDED BONE MARROW ASPIRATION AND CORE NEEDLE BONE BIOPSY OF THE LEFT ILIAC BONE. MODERATE CONSCIOUS SEDATION 3/19/2020 HISTORY: ORDERING SYSTEM PROVIDED HISTORY: send for flow and cytogenetics TECHNOLOGIST PROVIDED HISTORY: send for flow and cytogenetics SEDATION: Local anesthesia with fentanyl 50 mcg IV for pain TECHNIQUE: Informed consent was obtained following a detailed explanation of the procedure including risks, benefits, and alternatives. Universal protocol was followed. Axial images were obtained through the iliac bones using CT guidance and a suitable skin site was prepped and draped in sterile fashion. Local anesthesia was achieved with lidocaine. An 11 gauge NurseGrid bone marrow biopsy needle was advanced into the left posterior iliac bone and approximately 12 mL of bone marrow aspirate was obtained, without and with heparin. A single core biopsy specimen was obtained and the patient tolerated the procedure well. Dose modulation, iterative reconstruction, and/or weight based adjustment of the mA/kV was utilized to reduce the radiation dose to as low as reasonably achievable. Successful CT guided bone marrow aspiration and core biopsy of the iliac bone. Physical Examination:        Physical Exam  Constitutional:       Appearance: Normal appearance. She is obese.    HENT:      Head: examined the patient myself and taken ros and hpi , including pertinent history and exam findings,  with the resident. I have reviewed the key elements of all parts of the encounter with the resident. I agree with the assessment, plan and orders as documented by the resident. 49-year-old female admitted for significant anemia. Suspected upper GI bleed- EGD status post banding 3/18,  Pancytopenia-bone marrow biopsy 3/19-results awaited. Iron deficiency-IV Venofer. Postmenopausal vaginal bleed- outpatient D&C. Hematuria-likely contamination with vaginal bleed-US KUB unremarkable. NIKKI improved. H&H stable. Patient subjectively feeling well other than pain at biopsy site. Discharge planning- consider discharge later today if okay with GI and hematology.       Electronically signed by Sukh May MD

## 2020-03-21 NOTE — PROGRESS NOTES
removal; eye surgery; Cardiac catheterization; Upper gastrointestinal endoscopy (N/A, 5/12/2019); Colonoscopy (N/A, 5/13/2019); and Upper gastrointestinal endoscopy (N/A, 3/18/2020). Medications:    Prior to Admission medications    Medication Sig Start Date End Date Taking? Authorizing Provider   insulin glargine (BASAGLAR KWIKPEN) 100 UNIT/ML injection pen Inject 60 Units into the skin 2 times daily   Yes Historical Provider, MD   bumetanide (BUMEX) 1 MG tablet Take 1 mg by mouth 2 times daily   Yes Historical Provider, MD   Emollient (CERAVE) LOTN Apply topically 2 times daily Indications: bilateral lower legs   Yes Historical Provider, MD   povidone-iodine (BETADINE) 10 % external solution Apply topically daily Indications: left foot wound   Yes Historical Provider, MD   levETIRAcetam (KEPPRA) 750 MG tablet Take 750 mg by mouth 2 times daily Indications: with 500 mg to make 1250 mg dose   Yes Historical Provider, MD   vitamin D (CHOLECALCIFEROL) 01745 UNIT CAPS Take 50,000 Units by mouth once a week Indications: Sundays   Yes Historical Provider, MD   Cholecalciferol 400 UNIT TABS tablet Take 400 Units by mouth daily   Yes Historical Provider, MD   ferrous sulfate 325 (65 Fe) MG tablet Take 1 tablet by mouth every 12 hours 6/21/19  Yes Ilda Lara MD   gabapentin (NEURONTIN) 100 MG capsule Take 100 mg by mouth 2 times daily. .   Yes Historical Provider, MD   levETIRAcetam (KEPPRA) 500 MG tablet Take 500 mg by mouth 2 times daily Indications: with 750 mg to make total dose 1250 mg    Yes Historical Provider, MD   insulin aspart (NOVOLOG) 100 UNIT/ML injection vial Inject into the skin Inject as per sliding scale before meals and at bedtime:    = 0 units; call physician if less than 70  151-200 = 2 units  201-250 = 4 units  251-300 = 6 units  301-350 = 8 units  351-400 = 10 units; call physician if greater than 400   Yes Historical Provider, MD   potassium chloride (KLOR-CON M) 10 MEQ extended contextual diversion.

## 2020-03-21 NOTE — PROGRESS NOTES
always seem to have a little dizziness with prolonged sitting. \"  Comments: EDIS Chacko pt for therapy and is aware pt has 7.7 Hgb level this date and okays pt to amb in hallway (pt had contact precaution d/t MRSA dx from 2017)    Vital Signs  Pulse: 64(sitting EOB)  Heart Rate Source: Monitor  BP: (!) 146/71(sitting EOB)  BP Location: Right Arm  BP Upper/Lower: Upper  MAP (mmHg): 91(sitting EOB)  Patient Currently in Pain: No  Pain Assessment: 0-10  Pain Level: 0  Pain Type: Acute pain  Pain Location: Head(\"Feels like a tension headache\")  Pain Orientation: Right  Pain Descriptors: Aching(HA to R lateral portion)  Pain Frequency: Intermittent  Functional Pain Assessment: Activities are not prevented  Non-Pharmaceutical Pain Intervention(s): Distraction; Emotional support;Rest;Repositioned(Informed EDIS Aguilar over pt's reports of HA)  Response to Pain Intervention: Patient Satisfied     Oxygen Therapy  SpO2: 96 %(sitting EOB)  Pulse Oximeter Device Mode: Intermittent(frequently d/t low Hgb levels)  Pulse Oximeter Device Location: Finger  O2 Device: None (Room air)  Patient Observation  Observations: Writer donned B compression stockings to pt @ start of tx; pt reports she does not wear to bed. Pt's vitals @ start of tx supine in bed: R UE /64, HR 64, MAP 79, SpO2 97%. During tx sitting EOB to R UE /71, MAP 91, SpO2 96% and HR 64- End of tx sitting in bedside chair R UE /64, MAP 88 HR 67. Pt reports she does not have warning signs for before seizures, she notes it tends to happen if she's very warm. Pt SOB at end of tx d/t exertion but with seated rest break reduce SOB.        Bed Mobility  Supine to Sit: Modified independent  Scooting: Modified independent(scooting along EOB to reposition self)  Comment: (Pt relies heavily on B bedrails for bed mobs)      Transfers:  Sit to Stand: Supervision  Stand to sit: Supervision  Bed to Chair: Supervision  Comments:Pt had fair carryover follow correct hand technique with STS when utilizing RW- pt reports she tends to grap onto RW to stand at home d/t hers being a rollator with locked brakes to handle bars; writer educated pt she should still push off from seated surface with at least one hand even if pt's rollator is locked d/t potential fall risk, pt verbalized understanding        Lateral Transfers: Supervision;Stand by assistance     Ambulation 1  Surface: level tile  Device: Rolling Walker(Bariatric)  Other Apparatus: (RN Judy ji I.V. for amb and donned at end of tx)  Assistance: Supervision;Stand by assistance  Quality of Gait: Slow and steady, no LOB, B LE have slight ER, performs small turns and able to manage keeping RW close to AMY  Gait Deviations: Slow Milvia;Decreased step length  Distance: [id number](standing rest break; pt reported feeling okay during walk)        Stairs/Curb  Stairs?: No                                                     Posture: Good  Sitting - Static: Good  Sitting - Dynamic: Good  Standing - Static: Good;-  Standing - Dynamic: Good;-  Comments: No LOB, pt steady, fall risk with hx of falls; performed seated EOB/bedside chair and standing with RW and without     Other exercises?: Yes  Other exercises 1: Balanced static sitting DWAIN ~15 min x1 ~7 min x1  Other exercises 2: Donned compression stockings to B LE's   Other exercises 3: Educated pt and performed supine, seated and standing HEP and discussed safety parameters when performing; pt verbalized and demo understanding; gave pt lime green tb for D/C  Other exercises 4: Standing therex to countertop for B UE support: x7 reps: HS curls, heel toe rock, marches, hip flex  Other exercises 5: Static standing tolerance ~1 min x3  Other exercises 6: Monitored vitals: BP, MAP, SpO2 and HR throughout tx d/t 7.7 Hgb level  Other exercises 7: Educated/perform lateral side steps in room with RW to show pt how to manuever around narrow pathway  Other exercises 8: Educated pt over room safety awareness with amb to be mindful of steps and potential barriers that could cause tripping/falling  Other Activities  Comment: rest breaks PRN d/t fatigue after amb; monitor vitals        Activity Tolerance: Patient Tolerated treatment well;Treatment limited secondary to medical complications (free text); Patient limited by endurance(Pt's Hgb 7.7 /64 @ end tx monitoring vitals closely )  PT Equipment Recommendations  Equipment Needed: No(Pt reports she has rollator)  Other Comments  Comments: Pt is very friendly and talkative-VC's to keep pt focused on task    Assessment  Activity Tolerance: Patient Tolerated treatment well;Treatment limited secondary to medical complications (free text); Patient limited by endurance(Pt's Hgb 7.7 /64 @ end tx monitoring vitals closely )   Body structures, Functions, Activity limitations: Decreased functional mobility ; Decreased ADL status; Decreased strength;Decreased endurance;Decreased balance  Prognosis: Excellent  Discharge Recommendations: Patient would benefit from continued therapy after discharge     Type of devices: All fall risk precautions in place;Call light within reach;Gait belt;Patient at risk for falls; Left in chair;Nurse notified(RN Myna Goodell notified and in room @ end of tx)     Plan  Times per week: 5x/week  Current Treatment Recommendations: Strengthening, Balance Training, Functional Mobility Training, Transfer Training, Endurance Training, Gait Training, Equipment Evaluation, Education, & procurement, Patient/Caregiver Education & Training, Safety Education & Training, Home Exercise Program    Patient Education  New Education Provided:  How to perform supine, seated and standing HEP with lime green tb and safety parameters and to be mindful when amb/lateral steps to avoid tripping/fall risk  Learner:patient  Method: demonstration, explanation and handout       Outcome: acknowledged understanding of how to perform supine, seated and standing HEP

## 2020-03-21 NOTE — PLAN OF CARE
Problem: Falls - Risk of:  Goal: Will remain free from falls  Description: Will remain free from falls  3/21/2020 0120 by Dayana Herrera RN  Outcome: Ongoing  3/20/2020 2005 by Mendel Craw, RN  Outcome: Ongoing     Problem: Falls - Risk of:  Goal: Absence of physical injury  Description: Absence of physical injury  3/21/2020 0120 by Dayana Herrera RN  Outcome: Ongoing  3/20/2020 2005 by Mendel Craw, RN  Outcome: Ongoing   Pt has had zero falls or injuries so far this shift.

## 2020-03-21 NOTE — PROGRESS NOTES
Today's Date: 3/21/2020  Patient Name: Natty Tirado  Date of admission: 3/16/2020  2:35 PM  Patient's age: 75 y.o., 1955  Admission Dx: Anemia [D64.9]    Reason for Consult: presented with anemia, history of heavy bleeding requiring transfusions  Requesting Physician: Abraham Puente MD    CHIEF COMPLAINT:    Chief Complaint   Patient presents with    Abnormal Lab     Hgb     SUBJECTIVE:    Patient seen and examined  Labs and vitals reviewed. Patient feeling better. No new complaint. Cell counts are stable. Bone marrow biopsy report is pending    HISTORY OF PRESENT ILLNESS:    This is a 56-year-old female with past medical history of chronic anemia, GERD, hyperlipidemia, diabetes, neuropathy was admitted with worsening fatigue and shortness of breath and low hemoglobin. She has history of GI bleed in the past but this time she denies any blood in stool but had some hematuria and also has vaginal bleeding. She has had GI work-up in the past.    She was seen by OB/GYN for postmenopausal vaginal bleeding and she was planning to have D&C. On admission she received 1 unit PRBC and also was on Protonix drip. Hematology was consulted for further evaluation of her anemia. Her hemoglobin on admission was 6.6, white cell count 3.4, platelet count 501. Her peripheral blood smear showed normochromic normocytic anemia with occasional basophilic stippling. She denies any unintentional weight loss, drenching night sweats or fever chills. She reports she has been started on iron pill by her gastroenterologist last year. Past Medical History:   has a past medical history of Anemia, Cataract, GERD (gastroesophageal reflux disease), Headache, Hyperlipidemia, Neuropathy, Osteoarthritis, Restless leg syndrome, Seizures ([address] Short-term memory loss, Stroke (cerebrum) ([address] and Type 2 diabetes mellitus without complication ([address]     Past Surgical History:   has a past surgical history that includes Blood (BLOOD GLUCOSE TEST STRIPS) STRP Test 3  times daily Insulin Dependent mellitus 11/30/17   Derrick Platt MD   Lancets MISC Check 3/day 11/30/17   Derrick Platt MD     Current Facility-Administered Medications   Medication Dose Route Frequency Provider Last Rate Last Dose    levETIRAcetam (KEPPRA) tablet 1,250 mg  1,250 mg Oral BID Alonso Floyd MD        sucralfate (CARAFATE) tablet 1 g  1 g Oral 4 times per day Nadja Mistry MD   1 g at 03/21/20 1156    iron sucrose (VENOFER) 200 mg in sodium chloride 0.9 % 100 mL IVPB  200 mg Intravenous Q24H Holger Beauchamp  mL/hr at 03/21/20 1204 200 mg at 03/21/20 1204    nadolol (CORGARD) tablet 20 mg  20 mg Oral Daily Chris Aguirre MD   20 mg at 03/21/20 0816    perflutren lipid microspheres (DEFINITY) injection 2.2 mg  2 mL Intravenous ONCE PRN Cornelio Kahn MD        0.9 % sodium chloride infusion   Intravenous Continuous Cornelio Kahn  mL/hr at 03/20/20 1526      busPIRone (BUSPAR) tablet 10 mg  10 mg Oral TID Cornelio Kahn MD   10 mg at 03/21/20 0808    gabapentin (NEURONTIN) capsule 100 mg  100 mg Oral BID Cornelio Kahn MD   100 mg at 03/21/20 0808    lamoTRIgine (LAMICTAL) tablet 200 mg  200 mg Oral BID Cornelio Kahn MD   200 mg at 03/21/20 0816    PARoxetine (PAXIL) tablet 20 mg  20 mg Oral QAM Cornelio Kahn MD   20 mg at 03/21/20 0808    pramipexole (MIRAPEX) tablet 1 mg  1 mg Oral Nightly Cornelio Kahn MD   1 mg at 03/20/20 2319    atorvastatin (LIPITOR) tablet 20 mg  20 mg Oral Daily Cornelio Kahn MD   20 mg at 03/21/20 0808    sodium chloride flush 0.9 % injection 10 mL  10 mL Intravenous 2 times per day Cornelio Kahn MD   10 mL at 03/19/20 2052    sodium chloride flush 0.9 % injection 10 mL  10 mL Intravenous PRN Cornelio Kahn MD        acetaminophen (TYLENOL) tablet 650 mg  650 mg Oral Q6H PRN Cornelio Kahn MD   650 mg at 03/20/20 0427    Or    acetaminophen (TYLENOL) suppository 650 mg  650 mg Rectal Q6H PRN Nadja Mistry MD       [address] [place] [address]

## 2020-03-21 NOTE — PROGRESS NOTES
[address] [facility].    PROGRESS NOTE             3/21/2020    9:18 AM    Name:   Dorene Garces  MRN:     [id number]     Acct:      [de-identified]   Room:   2090/2090-01  IP Day:  5  Admit Date:  3/16/2020  2:35 PM    PCP:  Catha Lennox, MD  Code Status:  Full Code    Subjective:     C/C:   Chief Complaint   Patient presents with    Abnormal Lab     Hgb     Interval History Status: improved. No acute events overnight. This morning patient resting comfortably in bed. No complaints this morning. Improvement in lightheadedness. Denies CP/SOB. Denies abdominal pain N/V. Waiting for bone marrow biopsy results. Per GI patient stable to be followed as outpatient. Appreciate heme/onc recommendations. Brief History:     See H&P     Review of Systems:     Review of Systems   Constitutional: Negative for chills and fever. HENT: Negative for congestion. Respiratory: Negative for chest tightness and shortness of breath. Cardiovascular: Positive for leg swelling. Negative for chest pain and palpitations. Gastrointestinal: Negative for abdominal distention, abdominal pain, blood in stool and diarrhea. Genitourinary: Positive for vaginal bleeding. Musculoskeletal: Negative for arthralgias. Neurological: Negative for syncope, weakness and headaches. Psychiatric/Behavioral: Negative for agitation. Medications: Allergies:     Allergies   Allergen Reactions    Codeine        Current Meds:   Scheduled Meds:    sucralfate  1 g Oral 4 times per day    iron sucrose  200 mg Intravenous Q24H    nadolol  20 mg Oral Daily    busPIRone  10 mg Oral TID    gabapentin  100 mg Oral BID    lamoTRIgine  200 mg Oral BID    PARoxetine  20 mg Oral QAM    pramipexole  1 mg Oral Nightly    atorvastatin  20 mg Oral Daily    sodium chloride flush  10 mL Intravenous 2 times per day    insulin lispro  0-12 Units Subcutaneous TID  !No        !               !          ! +------------------------------------+----------+---------------+----------+ ! Peroneal                            !No        !               !          ! +------------------------------------+----------+---------------+----------+ ! Gastroc                             ! Yes       ! Yes            ! None      ! +------------------------------------+----------+---------------+----------+ ! GSV Thigh                           ! Yes       ! Yes            ! None      ! +------------------------------------+----------+---------------+----------+ ! GSV Knee                            ! Yes       ! Yes            ! None      ! +------------------------------------+----------+---------------+----------+ ! GSV Ankle                           ! No        !               !          ! +------------------------------------+----------+---------------+----------+ ! SSV                                 ! Yes       ! Yes            ! None      ! +------------------------------------+----------+---------------+----------+ Left Doppler Measurements +---------------------------+------+------+--------------------------------+ ! Location                   ! Signal!Reflux! Reflux (msec)                   ! +---------------------------+------+------+--------------------------------+ ! Common Femoral             !Phasic!      !                                ! +---------------------------+------+------+--------------------------------+ ! Prox Femoral               !Phasic!      !                                ! +---------------------------+------+------+--------------------------------+ ! Popliteal                  !Phasic!      !                                ! +---------------------------+------+------+--------------------------------+    Ct Biopsy Bone Marrow    Result Date: 3/19/2020  PROCEDURE: CT GUIDED BONE MARROW ASPIRATION AND CORE NEEDLE BONE BIOPSY OF THE LEFT ILIAC BONE.  MODERATE CONSCIOUS

## 2020-03-21 NOTE — CARE COORDINATION
CASE MANAGEMENT NOTE:    Admission Date:  3/16/2020 Stephy Goldstein is a 75 y.o.  female    Admitted for : Anemia [D64.9]    Met with:  Patient    PCP:  Marlen Hall                                Insurance:  Silverio Mike      Current Residence/ Living Arrangements:  in assisted living facility independently             Current Services PTA:  No    Is patient agreeable to VNS: No    Freedom of choice provided:  NA    List of [address] provided: NA    VNS chosen:  No    DME:  walker    Home Oxygen: No    Nebulizer: No    CPAP/BIPAP: No    Supplier: N/A    Potential Assistance Needed: No    SNF needed: No    Freedom of choice and list provided: NA    Pharmacy:  [address] [place] [address] Dr    Does Patient want to use MEDS to BEDS? No    Is the Patient an [facility] with Readmission Risk Score greater than 14%? No  If yes, pt needs a follow up appointment made within 7 days. Family Members/Caregivers that pt would like involved in their care:    Yes    If yes, list name here:  Friend emmanuel    Transportation Provider:  Family             Is patient in Isolation/One on One/Altered Mental Status? Yes  If yes, skip next question. If no, would they like an I-Pad to  use? No  If yes, call [phone]. Discharge Plan:  3/17/20 - Rose Torres - Patient is from [address] [place] [address], DME: Bianca Lopez,  Denies need for VNS. Plan is to return to [facility] assisted living. Will continue to follow patient. Will continue to follow.  //pf                 Electronically signed by: Jared Grajeda RN on 3/17/2020 at 4:11 PM
ONGOING DISCHARGE PLAN:    Spoke with patient regarding discharge plan and patient states she is thinking about just going back [facility]. She is worried that her apt will be given away. LSW following for pre cert to [facility]. Will not cancel pre cert yet. PT to work with patient again today. Patient had Bone marrow biopsy yesterday and she states \" they should have the bone marrow bx results tomorrow. POD #2 - EGD - esophageal varices, banded    Remains on Iv Venofer, IV Keppra,  IV fluids    Will continue to follow for additional discharge needs.     Electronically signed by Haleigh Bennett RN on 3/20/2020 at 11:38 AM
Dressing/Treatment Dry dressing 3/21/2020  8:00 AM   Wound Length (cm) 2.5 cm 3/19/2020  3:27 PM   Wound Width (cm) 0.5 cm 3/19/2020  3:27 PM   Wound Depth (cm) 0 cm 3/19/2020  3:27 PM   Wound Surface Area (cm^2) 1.25 cm^2 3/19/2020  3:27 PM   Wound Volume (cm^3) 0 cm^3 3/19/2020  3:27 PM   Wound Assessment Clean;Dry; Intact 3/21/2020  8:00 AM   Drainage Amount None 3/21/2020  8:00 AM   Odor None 3/21/2020  8:00 AM   Tabatha-wound Assessment Clean;Dry 3/21/2020  8:00 AM   Number of days: 2        Elimination:  Continence:   · Bowel: Yes  · Bladder: Yes  Urinary Catheter: None   Colostomy/Ileostomy/Ileal Conduit: No       Date of Last BM: 3/20/2020    Intake/Output Summary (Last 24 hours) at 3/21/2020 1224  Last data filed at 3/21/2020 3113  Gross per 24 hour   Intake 1915 ml   Output 500 ml   Net 1415 ml     I/O last 3 completed shifts: In: 5162 [P.O.:1140; I.V.:1315]  Out: 500 [Urine:500]    Safety Concerns:     None    Impairments/Disabilities:      None    Nutrition Therapy:  Current Nutrition Therapy:   - Oral Diet:  Carb Control 3 carbs/meal (1500kcals/day)    Routes of Feeding: Oral  Liquids: No Restrictions  Daily Fluid Restriction: no  Last Modified Barium Swallow with Video (Video Swallowing Test): not done    Treatments at the Time of Hospital Discharge:   Respiratory Treatments: See [facility]  Oxygen Therapy:  is on oxygen at 2 L/min per nasal cannula.   Ventilator:    - No ventilator support    Rehab Therapies: Physical Therapy and Occupational Therapy  Weight Bearing Status/Restrictions: No weight bearing restirctions  Other Medical Equipment (for information only, NOT a DME order):  walker  Other Treatments: Skilled Nursing Assessment; Medication Education and Monitor    Patient's personal belongings (please select all that are sent with patient):  given to patient    RN SIGNATURE:  Electronically signed by Sheryl Oropeza RN on 3/21/20 at 3:20 PM EDT    CASE MANAGEMENT/SOCIAL WORK SECTION    Inpatient Status

## 2020-03-21 NOTE — DISCHARGE INSTR - COC
older Afluria) 10/23/2017    Pneumococcal Polysaccharide ([id number]) 09/01/2015       Active Problems:  Patient Active Problem List   Diagnosis Code    Seizure disorder ([facility] G40.909    Type 2 diabetes mellitus ([facility] E11.9    Breakthrough seizure [facility] G40.919    CVA, old, hemiparesis ([facility] I69.359    Falling episodes R29.6    Cerebral concussion S06. 0R3I    Cervical spinal stenosis M48.02    Diabetic polyneuropathy associated with type 2 diabetes mellitus ([facility]) E11.42    History of cerebral infarction Z86.73    Seizure disorder ([facility]) G40.909    Depression with anxiety F41.8    Dizziness R42    Generalized weakness R53.1    GERD (gastroesophageal reflux disease) K21.9    H/O falling Z91.81    Hyperglycemia R73.9    Hyponatremia E87.1    Morbid obesity with BMI of 40.0-44.9, adult ([facility]) E66.01, Z68.41    Pure hypercholesterolemia E78.00    RLS (restless legs syndrome) G25.81    Thrombocytopenia ([facility]) D69.6    Altered mental status R41.82    Confusion state F44.89    Gait disturbance R26.9    GI bleed K92.2    Polyp of colon K63.5    Postmenopausal bleeding N95.0    Anemia D64.9    Elevated alkaline phosphatase level R74.8       Isolation/Infection:   Isolation          Contact        Patient Infection Status     Infection Onset Added Last Indicated Last Indicated By Review Planned Expiration Resolved Resolved By    MRSA  04/20/17 04/20/17 Fiorella Conde RN        Neck - 3/2017          Nurse Assessment:  Last Vital Signs: BP (!) 146/71 Comment: sitting EOB  Pulse 64 Comment: sitting EOB  Temp 97.8 °F (36.6 °C) (Oral)   Resp 20   Ht 5' 1\" (1.549 m)   Wt 260 lb (117.9 kg)   SpO2 96% Comment: sitting EOB  BMI 49.13 kg/m²     Last documented pain score (0-10 scale): Pain Level: 0  Last Weight:   Wt Readings from Last 1 Encounters:   03/18/20 260 lb (117.9 kg)     Mental Status:  oriented and alert    IV Access:  - None    Nursing Mobility/ADLs:  Walking   Assisted  Transfer

## 2020-03-23 NOTE — DISCHARGE SUMMARY
[address]    Discharge Summary     Patient ID: Jame Craven  :  1955   MRN: [id number]     ACCOUNT:  [de-identified]   Patient's PCP: Pérez Huertas MD  Admit Date: 3/16/2020   Discharge Date: 3/23/2020     Length of Stay: 5  Code Status:  Prior  Admitting Physician: Raphael Calhoun MD  Discharge Physician: Teodoro Cooper MD     Active Discharge Diagnoses:       Primary Problem  Anemia      Hospital Problems  Active Hospital Problems    Diagnosis Date Noted    Elevated alkaline phosphatase level [R74.8]     Anemia [D64.9] 2020       Admission Condition:  poor     Discharged Condition: good    Hospital Stay:       Hospital Course:  Jame Craven is a 75 y.o. female who was admitted for the management of   Anemia , presented to ER with Abnormal Lab (Hgb)  Patient admitted to the ED for symptomatc anemia. Patient reported bleeding per GI and per vagina. Patient was already being worked up for bleeding by bother services outpatient. Patient receieved one unit PRBC. OBGYN recommended outpatient follow up. GI performed EGD and found esophageal varices and GAVE. Heme/onc consulted for pancytopenia and performed bone marrow biopsy. Patient instructed to follow up outpatient with this service as well. On day of discharge patient hb had stabilized and no further episodes of bleeding.  Patient discharge on home medications         Significant therapeutic interventions: 1 u PRBC, bone marrow biopsy, EGD     Significant Diagnostic Studies:   Labs / Micro:  CBC:   Lab Results   Component Value Date    WBC 5.0 2020    RBC 2.92 2020    HGB 7.7 2020    HCT 25.7 2020    MCV 87.9 2020    MCH 26.4 2020    MCHC 30.0 2020    RDW 17.6 2020    PLT 99 2020     BMP:    Lab Results   Component Value Date    GLUCOSE 197 2020     2020    K 3.9 2020     2020 +------------------------------------+----------+---------------+----------+ ! Location                            ! Visualized! Compressibility! Thrombosis! +------------------------------------+----------+---------------+----------+ ! Common Femoral                      !Yes       ! Yes            ! None      ! +------------------------------------+----------+---------------+----------+ ! Prox Femoral                        !Yes       ! Yes            ! None      ! +------------------------------------+----------+---------------+----------+ ! Mid Femoral                         !Yes       ! Yes            ! None      ! +------------------------------------+----------+---------------+----------+ ! Dist Femoral                        !Yes       ! Yes            ! None      ! +------------------------------------+----------+---------------+----------+ ! Popliteal                           !Yes       ! Yes            ! None      ! +------------------------------------+----------+---------------+----------+ ! Sapheno Femoral Junction            ! Yes       ! Yes            ! None      ! +------------------------------------+----------+---------------+----------+ ! PTV                                 ! No        !               !          ! +------------------------------------+----------+---------------+----------+ ! Peroneal                            !No        !               !          ! +------------------------------------+----------+---------------+----------+ ! Gastroc                             ! Yes       ! Yes            ! None      ! +------------------------------------+----------+---------------+----------+ ! GSV Thigh                           ! Yes       ! Yes            ! None      ! +------------------------------------+----------+---------------+----------+ ! GSV Knee                            ! Yes       ! Yes            ! None      ! +------------------------------------+----------+---------------+----------+ ! GSV Ankle [address] [address]  [phone]          [address]  [address] [facility] [address] [phone]    Schedule an appointment as soon as possible for a visit in 2 weeks  ?liver disease anemia bloody stool    Demetrios Bamberger, MD  [facility]  [phone]    Schedule an appointment as soon as possible for a visit  For follow up       Requiring Further Evaluation/Follow Up POST HOSPITALIZATION/Incidental Findings: none    Diet: cardiac diet    Activity: As tolerated    Instructions to Patient: - Take all medications as prescribed  - Follow up with PCP   - In case of any worsening condition please visit Emergency Room.     Discharge Medications:      Medication List      CONTINUE taking these medications    acetaminophen 325 MG tablet  Commonly known as:  TYLENOL     Basaglar KwikPen 100 UNIT/ML injection pen  Generic drug:  insulin glargine     benzonatate 200 MG capsule  Commonly known as:  TESSALON     blood glucose monitor kit and supplies  Check blood sugars 3/day     blood glucose test strips  Test 3  times daily Insulin Dependent mellitus     bumetanide 1 MG tablet  Commonly known as:  BUMEX     busPIRone 10 MG tablet  Commonly known as:  BUSPAR     CeraVe Lotn     ferrous sulfate 325 (65 Fe) MG tablet  Commonly known as:  IRON 325  Take 1 tablet by mouth every 12 hours     gabapentin 100 MG capsule  Commonly known as:  NEURONTIN     lamoTRIgine 200 MG tablet  Commonly known as:  LAMICTAL     Lancets Misc  Check 3/day     * levETIRAcetam 500 MG tablet  Commonly known as:  KEPPRA     * levETIRAcetam 750 MG tablet  Commonly known as:  KEPPRA     lisinopril-hydroCHLOROthiazide 10-12.5 MG per tablet  Commonly known as:  PRINZIDE;ZESTORETIC     * loperamide 2 MG capsule  Commonly known as:  IMODIUM     * loperamide 2 MG capsule  Commonly known as:  IMODIUM     loratadine 10 MG tablet  Commonly known as:  CLARITIN     metFORMIN 1000 MG tablet  Commonly known as:  GLUCOPHAGE     NovoLOG 100 UNIT/ML injection vial  Generic drug:  insulin aspart     omeprazole 40 MG delayed release capsule  Commonly known as:  PRILOSEC     oxyCODONE-acetaminophen 5-325 MG per tablet  Commonly known as:  PERCOCET     PARoxetine 20 MG tablet  Commonly known as:  PAXIL     potassium chloride 10 MEQ extended release tablet  Commonly known as:  KLOR-CON M     povidone-iodine 10 % external solution  Commonly known as:  BETADINE     pramipexole 1 MG tablet  Commonly known as:  MIRAPEX     simvastatin 20 MG tablet  Commonly known as:  ZOCOR     vitamin B-12 500 MCG tablet  Commonly known as:  CYANOCOBALAMIN     * vitamin D 32098 UNIT Caps  Commonly known as:  CHOLECALCIFEROL     * Cholecalciferol 400 UNIT Tabs tablet         * This list has 6 medication(s) that are the same as other medications prescribed for you. Read the directions carefully, and ask your doctor or other care provider to review them with you. Time Spent on discharge is  31 mins in patient examination, evaluation, counseling as well as medication reconciliation, prescriptions for required medications, discharge plan and follow up. Electronically signed by   Lucero Beauchamp MD  3/23/2020  1:22 PM      Thank you Dr. Ghanshyam Solano MD for the opportunity to be involved in this patient's care.

## 2020-03-27 NOTE — TELEPHONE ENCOUNTER
#2 ATTEMPTED TO CONTACT PATIENT TO SCHEDULE F/U PER DR Elvis Tran, PHONE [address] MA[address]ED.

## 2020-03-31 NOTE — TELEPHONE ENCOUNTER
Pt d/c from [address] [place] [address] 3/28/20, dx: GI bleed, risk score: 27. Provider saw pt in hospital & would like to have f/u appt. Please call & advise.

## 2020-04-02 PROBLEM — K31.819 GAVE (GASTRIC ANTRAL VASCULAR ECTASIA): Status: ACTIVE | Noted: 2020-01-01

## 2020-04-02 PROBLEM — I85.00 ESOPHAGEAL VARICES DETERMINED BY ENDOSCOPY (HCC): Status: ACTIVE | Noted: 2020-01-01

## 2020-04-03 NOTE — PROGRESS NOTES
mg by mouth 2 times daily             busPIRone (BUSPAR) 10 MG tablet  Take 10 mg by mouth 3 times daily              Cholecalciferol 400 UNIT TABS tablet  Take 400 Units by mouth daily             Emollient (CERAVE) LOTN  Apply topically 2 times daily Indications: bilateral lower legs             ferrous sulfate 325 (65 Fe) MG tablet  Take 1 tablet by mouth every 12 hours             gabapentin (NEURONTIN) 100 MG capsule  Take 100 mg by mouth 2 times daily. .             Glucose Blood (BLOOD GLUCOSE TEST STRIPS) STRP  Test 3  times daily Insulin Dependent mellitus             insulin aspart (NOVOLOG) 100 UNIT/ML injection vial  Inject into the skin Inject as per sliding scale before meals and at bedtime:    = 0 units; call physician if less than 70  151-200 = 2 units  201-250 = 4 units  251-300 = 6 units  301-350 = 8 units  351-400 = 10 units; call physician if greater than 400             insulin glargine (BASAGLAR KWIKPEN) 100 UNIT/ML injection pen  Inject 60 Units into the skin 2 times daily             lamoTRIgine (LAMICTAL) 200 MG tablet  Take 200 mg by mouth 2 times daily             Lancets MISC  Check 3/day             levETIRAcetam (KEPPRA) 500 MG tablet  Take 500 mg by mouth 2 times daily Indications: with 750 mg to make total dose 1250 mg              levETIRAcetam (KEPPRA) 750 MG tablet  Take 750 mg by mouth 2 times daily Indications: with 500 mg to make 1250 mg dose             lisinopril-hydrochlorothiazide (PRINZIDE;ZESTORETIC) 10-12.5 MG per tablet  Take 1 tablet by mouth daily             loperamide (IMODIUM) 2 MG capsule  Take 4 mg by mouth daily as needed for Diarrhea (after first loose stool)             loperamide (IMODIUM) 2 MG capsule  Take 2 mg by mouth 4 times daily as needed for Diarrhea (after initial 4 mg dose)             loratadine (CLARITIN) 10 MG tablet  Take 10 mg by mouth daily             metFORMIN (GLUCOPHAGE) 1000 MG tablet  Take 1,000 mg by mouth 2 times daily (with meals)             omeprazole (PRILOSEC) 40 MG delayed release capsule  Take 40 mg by mouth Daily              oxyCODONE-acetaminophen (PERCOCET) 5-325 MG per tablet  Take 1 tablet by mouth every 6 hours as needed for Pain. PARoxetine (PAXIL) 20 MG tablet  Take 20 mg by mouth every morning             potassium chloride (KLOR-CON M) 10 MEQ extended release tablet  Take 30 mEq by mouth daily              povidone-iodine (BETADINE) 10 % external solution  Apply topically daily Indications: left foot wound             pramipexole (MIRAPEX) 1 MG tablet  Take 1 mg by mouth nightly             simvastatin (ZOCOR) 20 MG tablet  Take 20 mg by mouth nightly              vitamin B-12 (CYANOCOBALAMIN) 500 MCG tablet  Take 500 mcg by mouth daily             vitamin D (CHOLECALCIFEROL) 56510 UNIT CAPS  Take 50,000 Units by mouth once a week Indications: Sundays                 Medications marked \"taking\" at this time  No outpatient medications have been marked as taking for the 4/3/20 encounter (Office Visit) with Saundra Sanchez PA-C. Medications patient taking as of now reconciled against medications ordered at time of hospital discharge: Yes    Chief Complaint   Patient presents with    Follow-Up from Hospital     Pt is here after being admitted to [facility] and discharged on 3-28. Pt was diagnosed with GI and vaginal bleed. Pt states since home from the hospital she is still feeling weak and fatigue.  Established New Doctor     pt is also to new to provider. History of Present illness - Follow up of Hospital diagnosis(es):     1. Anemia  2. Pancytopenia  3. Gastrointestinal hemorrhage   4. Esophageal varices  5. GAVE  6. Post menopausal bleeding  7. Diabetes mellitus     Inpatient course: Discharge summary reviewed- see chart.     Gretchen Rubio is a 75 y.o. female who was admitted for the management of   Anemia , presented to ER with Abnormal Lab (Hgb)  Patient admitted to the ED for 148/50   03/18/20 (!) 108/46      Physical Exam:  General - alert, well appearing, and in no distress  Skin - normal coloration and turgor, no rashes  Eyes - pupils equal and reactive, extraocular eye movements intact, conj without pallor   Mouth - mucous membranes moist  Neck - supple, no significant adenopathy, no palpable masses  Chest - clear to auscultation, symmetric air entry  Heart - normal rate, regular rhythm, no murmur  Abdomen - non distended, soft, no tenderness to palpation   Back - no flank tenderness bilaterally   Neurological -alert, oriented, normal speech, no focal sensory deficit, minimal left sided weakness 4/5  Extremities - peripheral pulses normal, lymphedema present bilaterally, 1+ pitting raheem, no calf tenderness     Assessment/Plan:  1. Encounter to establish care  2. Hospital discharge follow-up  [facility] discharge summary reviewed, medications reconciled   - ND DISCHARGE MEDS RECONCILED W/ CURRENT OUTPATIENT MED LIST    3. Type 2 diabetes mellitus with diabetic polyneuropathy, with long-term current use of insulin (HCC)  - Well controlled, A1c 7.1 continue present management  - Basic Metabolic Panel; Future    4. Anemia due to acute blood loss  - Stable at discharge, repeat CBC, follow up with specialists as instructed   - Ang Barakat MD, Gastroenterology, Marcus Bales MD, Hematology/Oncology, [place]  - CBC Auto Differential; Future    5. Esophageal varices determined by endoscopy ([facility] [address]  6. GAVE (gastric antral vascular ectasia)  - Marisela Doan MD, Gastroenterology, [place]    7. Thrombocytopenia [facility])  - Glynn Zambrano MD, Hematology/Oncology, [place]    8. History of cerebral infarction  - Continue Simvastatin, ASA on hold due to bleeding/anemia    9. Postmenopausal bleeding  - TVUS on 1/18 which showed a 4.5 x 1.9 x 3.1 uterus with a stripe of 3.8mm  - Follow up with ob/gyn as instructed, plan for EUA, pap smear, D&C, hysteroscopy    10.

## 2020-04-08 NOTE — TELEPHONE ENCOUNTER
Suzanna Barraza MD VISIT  DR Keily Jeffers IN TO SEE PATIENT  ORDERS RECEIVED  LABS ON EXIT  RV 6 WEEKS W/ LABS  LABS CDP FE TIBC FERRITIN VITAMN B12 ON EXIT 4/8/20  LABS CDP 5/20/20  MD VISIT 5/20/20 @ 1:30PM  AVS PRINTED AND GIVEN TO PATIENT W/ INSTRUCTIONS  PATIENT DISCHARGED AMBULATORY

## 2020-04-08 NOTE — PROGRESS NOTES
mellitus without complication ([facility] [address] Past Surgical History:   has a past surgical history that includes Cholecystectomy; Tonsillectomy; Cataract removal; eye surgery; Cardiac catheterization; Upper gastrointestinal endoscopy (N/A, 5/12/2019); Colonoscopy (N/A, 5/13/2019); and Upper gastrointestinal endoscopy (N/A, 3/18/2020). Medications:    Prior to Admission medications    Medication Sig Start Date End Date Taking? Authorizing Provider   insulin glargine (BASAGLAR KWIKPEN) 100 UNIT/ML injection pen Inject 60 Units into the skin 2 times daily   Yes Historical Provider, MD   bumetanide (BUMEX) 1 MG tablet Take 1 mg by mouth 2 times daily   Yes Historical Provider, MD   Emollient (CERAVE) LOTN Apply topically 2 times daily Indications: bilateral lower legs   Yes Historical Provider, MD   povidone-iodine (BETADINE) 10 % external solution Apply topically daily Indications: left foot wound   Yes Historical Provider, MD   levETIRAcetam (KEPPRA) 750 MG tablet Take 750 mg by mouth 2 times daily Indications: with 500 mg to make 1250 mg dose   Yes Historical Provider, MD   vitamin D (CHOLECALCIFEROL) 03115 UNIT CAPS Take 50,000 Units by mouth once a week Indications: Sundays   Yes Historical Provider, MD   Cholecalciferol 400 UNIT TABS tablet Take 400 Units by mouth daily   Yes Historical Provider, MD   oxyCODONE-acetaminophen (PERCOCET) 5-325 MG per tablet Take 1 tablet by mouth every 6 hours as needed for Pain. Yes Historical Provider, MD   ferrous sulfate 325 (65 Fe) MG tablet Take 1 tablet by mouth every 12 hours 6/21/19  Yes Jamey Jc MD   gabapentin (NEURONTIN) 100 MG capsule Take 100 mg by mouth 2 times daily. .   Yes Historical Provider, MD   levETIRAcetam (KEPPRA) 500 MG tablet Take 500 mg by mouth 2 times daily Indications: with 750 mg to make total dose 1250 mg    Yes Historical Provider, MD   insulin aspart (NOVOLOG) 100 UNIT/ML injection vial Inject into the skin Inject as per sliding scale before meals and at bedtime:    = 0 units; call physician if less than 70  151-200 = 2 units  201-250 = 4 units  251-300 = 6 units  301-350 = 8 units  351-400 = 10 units; call physician if greater than 400   Yes Historical Provider, MD   potassium chloride (KLOR-CON M) 10 MEQ extended release tablet Take 30 mEq by mouth daily    Yes Historical Provider, MD   vitamin B-12 (CYANOCOBALAMIN) 500 MCG tablet Take 500 mcg by mouth daily   Yes Historical Provider, MD   benzonatate (TESSALON) 200 MG capsule Take 200 mg by mouth 2 times daily as needed for Cough   Yes Historical Provider, MD   loperamide (IMODIUM) 2 MG capsule Take 2 mg by mouth 4 times daily as needed for Diarrhea (after initial 4 mg dose)   Yes Historical Provider, MD   acetaminophen (TYLENOL) 325 MG tablet Take 650 mg by mouth 3 times daily as needed for Pain (mild)   Yes Historical Provider, MD   metFORMIN (GLUCOPHAGE) 1000 MG tablet Take 1,000 mg by mouth 2 times daily (with meals)   Yes Historical Provider, MD   Blood Glucose Monitoring Suppl FLAVIO Check blood sugars 3/day 11/30/17  Yes Harley Juan MD   Glucose Blood (BLOOD GLUCOSE TEST STRIPS) STRP Test 3  times daily Insulin Dependent mellitus 11/30/17  Yes Harley Juan MD   Lancets MISC Check 3/day 11/30/17  Yes Harley Juan MD   lisinopril-hydrochlorothiazide (PRINZIDE;ZESTORETIC) 10-12.5 MG per tablet Take 1 tablet by mouth daily   Yes Historical Provider, MD   PARoxetine (PAXIL) 20 MG tablet Take 20 mg by mouth every morning   Yes Historical Provider, MD   omeprazole (PRILOSEC) 40 MG delayed release capsule Take 40 mg by mouth Daily    Yes Historical Provider, MD   loratadine (CLARITIN) 10 MG tablet Take 10 mg by mouth daily   Yes Historical Provider, MD   simvastatin (ZOCOR) 20 MG tablet Take 20 mg by mouth nightly    Yes Historical Provider, MD   pramipexole (MIRAPEX) 1 MG tablet Take 1 mg by mouth nightly   Yes Historical Provider, MD   lamoTRIgine (LAMICTAL) 200 MG tablet loperamide (IMODIUM) 2 MG capsule Take 2 mg by mouth 4 times daily as needed for Diarrhea (after initial 4 mg dose)      acetaminophen (TYLENOL) 325 MG tablet Take 650 mg by mouth 3 times daily as needed for Pain (mild)      metFORMIN (GLUCOPHAGE) 1000 MG tablet Take 1,000 mg by mouth 2 times daily (with meals)      Blood Glucose Monitoring Suppl FLAVIO Check blood sugars 3/day 1 Device 0    Glucose Blood (BLOOD GLUCOSE TEST STRIPS) STRP Test 3  times daily Insulin Dependent mellitus 100 strip 11    Lancets MISC Check 3/day 100 each 11    lisinopril-hydrochlorothiazide (PRINZIDE;ZESTORETIC) 10-12.5 MG per tablet Take 1 tablet by mouth daily      PARoxetine (PAXIL) 20 MG tablet Take 20 mg by mouth every morning      omeprazole (PRILOSEC) 40 MG delayed release capsule Take 40 mg by mouth Daily       loratadine (CLARITIN) 10 MG tablet Take 10 mg by mouth daily      simvastatin (ZOCOR) 20 MG tablet Take 20 mg by mouth nightly       pramipexole (MIRAPEX) 1 MG tablet Take 1 mg by mouth nightly      lamoTRIgine (LAMICTAL) 200 MG tablet Take 200 mg by mouth 2 times daily      busPIRone (BUSPAR) 10 MG tablet Take 10 mg by mouth 3 times daily        No current facility-administered medications for this visit. Allergies:  Codeine    Social History:   reports that she has never smoked. She has never used smokeless tobacco. She reports that she does not drink alcohol or use drugs. Family History: family history includes Diabetes in her brother. REVIEW OF SYSTEMS:    Constitutional: No fever or chills.  No night sweats, no weight loss   Eyes: No eye discharge, double vision, or eye pain   HEENT: negative for sore mouth, sore throat, hoarseness and voice change   Respiratory: negative for cough , sputum, dyspnea, wheezing, hemoptysis, chest pain   Cardiovascular: negative for chest pain, dyspnea, palpitations, orthopnea, PND   Gastrointestinal: negative for nausea, vomiting, diarrhea, constipation,  Given these findings, the patient's anemia is favored to be secondary to their history of bleeding; however, other potential causes of anemia e.g. iron deficiency/malnutrition, chronic disease, hemoglobinopathies, renal failure/kidney disease, etc. must be clinically excluded. IMPRESSION:   1. Acute on chronic anemia: Possibly multifactorial with GI bleed, vaginal bleeding. However given her leukopenia and thrombocytopenia primary bone marrow disease cannot be excluded  2. Vaginal bleeding: She was seen by OB/GYN and work-up in progress  3. Acute kidney injury on chronic kidney disease  4. History of seizure  5. Diabetes mellitus  6. paraproteinemia    RECOMMENDATIONS:  1. I reviewed the lab work, imaging studies, diagnosis, prognosis and treatment options with patient  2. Her anemia appears multifactorial possibly Gi bleed. She had capsule study done 03/6/2020 revealing probably GAVE, small bowel erosion  3. I discussed the results of bone marrow biopsy which showed no acute bone marrow pathology. 4. Continue iron therapy  5. Mild elevation of light chains likely secondary to renal disease. IF negative IgA slightly up. 6. I advised her to follow with gynecologist for GYN bleeding  7. I will check labs today  8. RTC in 6 weeks with cbc prior    Anibal Bear MD  Hematologist/Medical Oncologist    This note is created with the assistance of a speech recognition program.  While intending to generate a document that actually reflects the content of the visit, the document can still have some errors including those of syntax and sound a like substitutions which may escape proof reading. It such instances, actual meaning can be extrapolated by contextual diversion.
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
## 2020-05-12 NOTE — PROGRESS NOTES
Maggi Dimas  2020      Maggi Dimas is a 75 y.o. female       The patient was seen today. She was here to follow-up regarding her labs and diagnostics ordered at her last visit for the diagnosis of:    ICD-10-CM    1. Post-menopausal bleeding N95.0 PAP Smear   2. Anemia, unspecified type D64.9    3. Thrombocytopenia ([facility] D69.6    4. Polyp of colon, unspecified part of colon, unspecified type K63.5    5. Gastrointestinal hemorrhage associated with gastritis, unspecified gastritis type K29.71    6. Type 2 diabetes mellitus without complication, unspecified whether long term insulin use (HCC)  E11.9        Her bowels are regular and she is voiding without difficulty.        Past Medical History:   Diagnosis Date    Anemia     Cataract     GERD (gastroesophageal reflux disease)     Headache     Hyperlipidemia     Neuropathy     Osteoarthritis     Restless leg syndrome     Seizures (HCC) since age 10   [facility] Short-term memory loss     Stroke (cerebrum) ([facility]     Type 2 diabetes mellitus without complication ([facility] 4243         Past Surgical History:   Procedure Laterality Date    CARDIAC CATHETERIZATION      CATARACT REMOVAL      CHOLECYSTECTOMY      COLONOSCOPY N/A 2019    COLONOSCOPY DIAGNOSTIC, POLYPECTOMIES performed by Sobeida Flores MD at [address] [facility]      cataract    TONSILLECTOMY      UPPER GASTROINTESTINAL ENDOSCOPY N/A 2019    EGD ESOPHAGOGASTRODUODENOSCOPY performed by Sobeida Flores MD at [address] N/A 3/18/2020    [facility] performed by Laisha Cross MD at [facility] ENDO         Family History   Problem Relation Age of Onset    Diabetes Brother          Social History     Tobacco Use    Smoking status: Never Smoker    Smokeless tobacco: Never Used   Substance Use Topics    Alcohol use: No    Drug use: No         MEDICATIONS:  Current Outpatient N.gonorrhoeae DNA   Result Value Ref Range     Specimen Description . CERVIX       C. trachomatis DNA NEGATIVE NEGATIVE     N. gonorrhoeae DNA NEGATIVE NEGATIVE   VAGINITIS DNA PROBE   Result Value Ref Range     Specimen Description . VAGINA       Special Requests NOT REPORTED       Direct Exam NEGATIVE for Trichomonas vaginalis       Direct Exam NEGATIVE for Gardnerella vaginalis       Direct Exam NEGATIVE for Candida sp.       Direct Exam           Method of testing is a DNA probe intended for detection and identification of Candida species, Gardnerella vaginalis, and Trichomonas vaginalis nucleic acid in vaginal fluid specimens from patients with symptoms of vaginitis/vaginosis.            Lab Results:  Results for orders placed or performed during the hospital encounter of 04/08/20   CBC With Auto Differential   Result Value Ref Range    WBC 3.3 (L) 3.5 - 11.3 k/uL    RBC 3.37 (L) 3.95 - 5.11 m/uL    Hemoglobin 9.2 (L) 11.9 - 15.1 g/dL    Hematocrit 30.9 (L) 36.3 - 47.1 %    MCV 91.7 82.6 - 102.9 fL    MCH 27.3 25.2 - 33.5 pg    MCHC 29.8 28.4 - 34.8 g/dL    RDW 15.0 (H) 11.8 - 14.4 %    Platelets 86 (L) 729 - 453 k/uL    MPV 10.4 8.1 - 13.5 fL    NRBC Automated 0.0 0.0 per 100 WBC    Differential Type NOT REPORTED     Seg Neutrophils 64 36 - 65 %    Lymphocytes 23 (L) 24 - 43 %    Monocytes 8 3 - 12 %    Eosinophils % 3 1 - 4 %    Basophils 1 0 - 2 %    Immature Granulocytes 1 (H) 0 %    Segs Absolute 2.20 1.50 - 8.10 k/uL    Absolute Lymph # 0.76 (L) 1.10 - 3.70 k/uL    Absolute Mono # 0.25 0.10 - 1.20 k/uL    Absolute Eos # 0.09 0.00 - 0.44 k/uL    Basophils Absolute <0.03 0.00 - 0.20 k/uL    Absolute Immature Granulocyte 0.02 0.00 - 0.30 k/uL    WBC Morphology NOT REPORTED     RBC Morphology ANISOCYTOSIS PRESENT     Platelet Estimate NOT REPORTED    Vitamin B12   Result Value Ref Range    Vitamin B-12 492 232 - 1245 pg/mL   Ferritin   Result Value Ref Range    Ferritin 118 13 - 150 ug/L   Iron And TIBC   Result

## 2020-05-15 PROBLEM — G40.909 SEIZURE DISORDER (HCC): Status: RESOLVED | Noted: 2017-10-21 | Resolved: 2020-01-01

## 2020-05-15 PROBLEM — R87.619 ATYPICAL GLANDULAR CELLS OF UNDETERMINED SIGNIFICANCE (AGUS) ON CERVICAL PAP SMEAR: Status: ACTIVE | Noted: 2020-01-01

## 2020-05-15 NOTE — PROGRESS NOTES
[address] [place] [address]  Dept: [phone]  Dept Fax: [phone]    OfficeProgress/Follow Up Note  Date of patient's visit: 5/18/2020  Patient's Name:  Jame Craven YOB: 1955            Patient Care Team:  Solis Cho PA-C as PCP - General (Physician Assistant)  Solis Cho PA-C as PCP - [facility] Provider  Elizabeth Oakley MD as Consulting Physician (Gastroenterology)    REASON FOR VISIT:  Routine outpatient follow up    HISTORY OF PRESENT ILLNESS:      Chief Complaint   Patient presents with    Diabetes     follow up     Vaginal Bleeding     seeing Dr. Pratima Kirkpatrick      History was obtained from the patient. Jame Craven is a 75 y.o. female here today for follow up. Patient recently hospitalized due to symptomatic anemia. Patient following with hematologist, ob/gyn, gastroenterologist.   Hemoglobin trending upwards 7.7 to 9.2. Continue to have intermittent vaginal bleeding, D&C scheduled. Recent transvaginal us negative for endometrial abnormality. Bone marrow biopsy which showed no acute bone marrow pathology. Fatigue has improved. No chest pain, dyspnea, lightheadedness. Bilateral lower extremity edema, no pain, no dyspnea, no orthopnea, weight stable.     Patient Active Problem List   Diagnosis    Type 2 diabetes mellitus ([address]    Breakthrough seizure ([address]    CVA, old, hemiparesis ([address]    Falling episodes    Cerebral concussion    Cervical spinal stenosis    Diabetic polyneuropathy associated with type 2 diabetes mellitus ([address]    History of cerebral infarction    Seizure disorder ([address]    Depression with anxiety    Dizziness    Generalized weakness    GERD (gastroesophageal reflux disease)    H/O falling    Hyperglycemia    Hyponatremia    Morbid obesity with BMI of 40.0-44.9, adult (HCC)    Pure hypercholesterolemia    RLS (restless legs syndrome)    Thrombocytopenia Indications: with 750 mg to make total dose 1250 mg       insulin aspart (NOVOLOG) 100 UNIT/ML injection vial Inject into the skin Inject as per sliding scale before meals and at bedtime:    = 0 units; call physician if less than 70  151-200 = 2 units  201-250 = 4 units  251-300 = 6 units  301-350 = 8 units  351-400 = 10 units; call physician if greater than 400      potassium chloride (KLOR-CON M) 10 MEQ extended release tablet Take 30 mEq by mouth daily       vitamin B-12 (CYANOCOBALAMIN) 500 MCG tablet Take 500 mcg by mouth daily      benzonatate (TESSALON) 200 MG capsule Take 200 mg by mouth 2 times daily as needed for Cough      loperamide (IMODIUM) 2 MG capsule Take 2 mg by mouth 4 times daily as needed for Diarrhea (after initial 4 mg dose)      acetaminophen (TYLENOL) 325 MG tablet Take 650 mg by mouth 3 times daily as needed for Pain (mild)      metFORMIN (GLUCOPHAGE) 1000 MG tablet Take 1,000 mg by mouth 2 times daily (with meals)      Blood Glucose Monitoring Suppl FLAVIO Check blood sugars 3/day 1 Device 0    Glucose Blood (BLOOD GLUCOSE TEST STRIPS) STRP Test 3  times daily Insulin Dependent mellitus 100 strip 11    Lancets MISC Check 3/day 100 each 11    lisinopril-hydrochlorothiazide (PRINZIDE;ZESTORETIC) 10-12.5 MG per tablet Take 1 tablet by mouth daily      PARoxetine (PAXIL) 20 MG tablet Take 20 mg by mouth every morning      omeprazole (PRILOSEC) 40 MG delayed release capsule Take 40 mg by mouth Daily       loratadine (CLARITIN) 10 MG tablet Take 10 mg by mouth daily      simvastatin (ZOCOR) 20 MG tablet Take 20 mg by mouth nightly       pramipexole (MIRAPEX) 1 MG tablet Take 1 mg by mouth nightly      lamoTRIgine (LAMICTAL) 200 MG tablet Take 200 mg by mouth 2 times daily      busPIRone (BUSPAR) 10 MG tablet Take 10 mg by mouth 3 times daily        No current facility-administered medications for this visit.       ALLERGIES:      Allergies   Allergen Reactions    Codeine Ur; Future    6. Bilateral lower extremity edema  - Recent dopplers with no evidence of superficial or deep venous thrombosis  - Will increase Bumex, advised low sodium diet, compression, leg elevation   - Urinalysis; Future    FOLLOW UP AND INSTRUCTIONS:   Return in about 4 weeks (around 6/12/2020), earlier if needed. Will have patient follow up with physician. Erica Claros received counseling on the following healthy behaviors: nutrition, exercise and medication adherence    Discussed use, benefit, and side effects of prescribed medications. Barriers to medication compliance addressed. All patient questions answered. Patient voiced understanding. Patient given educational materials - see patient instructions    Lio MARTINEZ [facility]  5/18/2020, 9:31 AM    Please note that this chart was generated using voice recognition Dragon dictation software. Although everyeffort was made to ensure the accuracy of this automated transcription, some errors in transcription may have occurred.

## 2020-05-18 NOTE — TELEPHONE ENCOUNTER
Outgoing call to [facility]  No answer  Message left  Advised pt of appt scheduled 5/20/2020

## 2020-05-20 NOTE — PROGRESS NOTES
([facility] [address] and Type 2 diabetes mellitus without complication ([facility] [address] Past Surgical History:   has a past surgical history that includes Cholecystectomy; Tonsillectomy; Cataract removal; eye surgery; Cardiac catheterization; Upper gastrointestinal endoscopy (N/A, 5/12/2019); Colonoscopy (N/A, 5/13/2019); and Upper gastrointestinal endoscopy (N/A, 3/18/2020). Medications:    Prior to Admission medications    Medication Sig Start Date End Date Taking? Authorizing Provider   bumetanide (BUMEX) 1 MG tablet Take 1 tablet by mouth 3 times daily 5/15/20   Meme Naik PA-C   insulin glargine (BASAGLAR KWIKPEN) 100 UNIT/ML injection pen Inject 60 Units into the skin 2 times daily    Historical Provider, MD   Emollient (CERAVE) LOTN Apply topically 2 times daily Indications: bilateral lower legs    Historical Provider, MD   povidone-iodine (BETADINE) 10 % external solution Apply topically daily Indications: left foot wound    Historical Provider, MD   levETIRAcetam (KEPPRA) 750 MG tablet Take 750 mg by mouth 2 times daily Indications: with 500 mg to make 1250 mg dose    Historical Provider, MD   vitamin D (CHOLECALCIFEROL) 44045 UNIT CAPS Take 50,000 Units by mouth once a week Indications: Sundays    Historical Provider, MD   Cholecalciferol 400 UNIT TABS tablet Take 400 Units by mouth daily    Historical Provider, MD   oxyCODONE-acetaminophen (PERCOCET) 5-325 MG per tablet Take 1 tablet by mouth every 6 hours as needed for Pain. Historical Provider, MD   ferrous sulfate 325 (65 Fe) MG tablet Take 1 tablet by mouth every 12 hours 6/21/19   John Gomez MD   gabapentin (NEURONTIN) 100 MG capsule Take 100 mg by mouth 2 times daily. Lauree Grade     Historical Provider, MD   levETIRAcetam (KEPPRA) 500 MG tablet Take 500 mg by mouth 2 times daily Indications: with 750 mg to make total dose 1250 mg     Historical Provider, MD   insulin aspart (NOVOLOG) 100 UNIT/ML injection vial Inject into the skin Inject as per sliding scale mouth 4 times daily as needed for Diarrhea (after initial 4 mg dose)      acetaminophen (TYLENOL) 325 MG tablet Take 650 mg by mouth 3 times daily as needed for Pain (mild)      metFORMIN (GLUCOPHAGE) 1000 MG tablet Take 1,000 mg by mouth 2 times daily (with meals)      Blood Glucose Monitoring Suppl FLAVIO Check blood sugars 3/day 1 Device 0    Glucose Blood (BLOOD GLUCOSE TEST STRIPS) STRP Test 3  times daily Insulin Dependent mellitus 100 strip 11    Lancets MISC Check 3/day 100 each 11    lisinopril-hydrochlorothiazide (PRINZIDE;ZESTORETIC) 10-12.5 MG per tablet Take 1 tablet by mouth daily      PARoxetine (PAXIL) 20 MG tablet Take 20 mg by mouth every morning      omeprazole (PRILOSEC) 40 MG delayed release capsule Take 40 mg by mouth Daily       loratadine (CLARITIN) 10 MG tablet Take 10 mg by mouth daily      simvastatin (ZOCOR) 20 MG tablet Take 20 mg by mouth nightly       pramipexole (MIRAPEX) 1 MG tablet Take 1 mg by mouth nightly      lamoTRIgine (LAMICTAL) 200 MG tablet Take 200 mg by mouth 2 times daily      busPIRone (BUSPAR) 10 MG tablet Take 10 mg by mouth 3 times daily        No current facility-administered medications for this visit. Allergies:  Codeine    Social History:   reports that she has never smoked. She has never used smokeless tobacco. She reports that she does not drink alcohol or use drugs. Family History: family history includes Diabetes in her brother. REVIEW OF SYSTEMS:    Constitutional: No fever or chills.  No night sweats, no weight loss   Eyes: No eye discharge, double vision, or eye pain   HEENT: negative for sore mouth, sore throat, hoarseness and voice change   Respiratory: negative for cough , sputum, dyspnea, wheezing, hemoptysis, chest pain   Cardiovascular: negative for chest pain, dyspnea, palpitations, orthopnea, PND   Gastrointestinal: negative for nausea, vomiting, diarrhea, constipation, abdominal pain, Dysphagia, hematemesis and hematochezia Genitourinary: negative for frequency, dysuria, nocturia, urinary incontinence, and hematuria   Integument: negative for rash, skin lesions, bruises.    Hematologic/Lymphatic: negative for easy bruising, bleeding, lymphadenopathy, or petechiae   Endocrine: negative for heat or cold intolerance,weight changes, change in bowel habits and hair loss   Musculoskeletal: negative for myalgias, arthralgias, pain, joint swelling,and bone pain   Neurological: negative for headaches, dizziness, seizures, weakness, numbness    PHYSICAL EXAM:      BP (!) 156/68   Pulse 101   Temp 98.5 °F (36.9 °C) (Temporal)   Resp 16   Wt 261 lb (118.4 kg)   BMI 49.34 kg/m²    Temp (24hrs), Av.4 °F (36.9 °C), Min:98.2 °F (36.8 °C), Max:98.7 °F (37.1 °C)    General appearance - well appearing, no in pain or distress   Mental status - alert and cooperative   Eyes - pupils equal and reactive, extraocular eye movements intact   Ears - bilateral TM's and external ear canals normal   Mouth - mucous membranes moist, pharynx normal without lesions   Neck - supple, no significant adenopathy   Lymphatics - no palpable lymphadenopathy, no hepatosplenomegaly   Chest - clear to auscultation, no wheezes, rales or rhonchi, symmetric air entry   Heart - normal rate, regular rhythm, normal S1, S2, no murmurs  Abdomen - soft, nontender, nondistended, no masses or organomegaly   Neurological - alert, oriented, normal speech, no focal findings or movement disorder noted   Musculoskeletal - no joint tenderness, deformity or swelling   Extremities - peripheral pulses normal,+ pedal edema, no clubbing or cyanosis   Skin - normal coloration and turgor, no rashes, no suspicious skin lesions noted ,    DATA:    Labs:   CBC:   Lab Results   Component Value Date    WBC 3.7 2020    HGB 9.6 (L) 2020    HCT 31.6 (L) 2020    MCV 89.0 2020    PLT 70 (L) 2020    LYMPHOPCT 20 (L) 2020    RBC 3.55 (L) 2020    MCH 27.0 2020

## 2020-05-20 NOTE — TELEPHONE ENCOUNTER
Janet Villarreal MD VISIT  DR Ginny Nyhan IN TO SEE PATIENT  ORDERS RECEIVED  RV 3 MONTHS W/LABS  LABS CDP FE TIBC FERRITIN 08/12/20  MD VISIT 08/19/20 @1PM  AVS PRINTED AND GIVEN TO PATIENT WITH INSTRUCTIONS  PATIENT DISCHARGED AMBULATORY

## 2020-06-11 NOTE — PROGRESS NOTES
05/20/2020 <0.03  0.00 - 0.20 k/uL Final    Absolute Immature Granulocyte 05/20/2020 0.01  0.00 - 0.30 k/uL Final    WBC Morphology 05/20/2020 NOT REPORTED   Final    RBC Morphology 05/20/2020 NOT REPORTED   Final    Platelet Estimate 48/94/2225 NOT REPORTED   Final   Hospital Outpatient Visit on 05/12/2020   Component Date Value Ref Range Status    Specimen Description 05/12/2020 . CERVIX   Final    HPV Sample 05/12/2020 . THIN PREP   Final    HPV, Genotype 16 05/12/2020 Not Detected  Not Detected Final    HPV, Genotype 18 05/12/2020 Not Detected  Not Detected Final    HPV, High Risk Other 05/12/2020 Not Detected  Not Detected Final    HPV, Interpretation 05/12/2020        Final    Comment: This test amplifies and detects DNA of 14 high-risk HPV types associated with cervical   cancer and its precursor lesions (HPV types 16,18, 31, 33, 35, 39, 45, 51, 52, 56, 58, 59,   66, and 68). Sensitivity may be affected by specimen collection methods, stage of infection, and the   presence of interfering substances. Results should be interpreted in conjunction with other available laboratory and clinical   data. A negative high-risk HPV result does not exclude the possibility of future cytologic HSIL or   underlying CIN2-3 or cancer. This test is intended for medical purposes only and is not valid for the evaluation of   suspected sexual abuse or for other forensic purposes.  Cytology Report 05/12/2020    Final-Edited                    Value:INTERPRETATION    Cervical material, (ThinPrep vial, Imaging-assisted review):  Specimen Adequacy:       Satisfactory for evaluation.       - Endocervical/transformation zone component present. - Partially obscuring blood. Descriptive Diagnosis:       Atypical glandular cells present (not otherwise specified). Endometrial cells are present in a woman 39years of age or older.   Comments:       Endometrial cells in women 39 years or older may be  Falling episodes 12/14/2017    Cerebral concussion 12/14/2017    Cervical spinal stenosis 12/14/2017    History of cerebral infarction 12/14/2017     Overview:   2010      Breakthrough seizure ([address]     Dizziness 04/20/2017    Generalized weakness 03/15/2017    Hyperglycemia 03/15/2017    Hyponatremia 03/15/2017    Depression with anxiety 01/09/2017    H/O falling 01/09/2017    Pure hypercholesterolemia 01/09/2017    RLS (restless legs syndrome) 01/09/2017    Morbid obesity with BMI of 40.0-44.9, adult ([address] 07/27/2016         Procedure Note:    The patient was positioned on the table. Both procedures were reviewed and the consents were signed. The speculum touched the external fourchette. And the patient declined proceeding with the procedure. She stated she wanted to go to the OR. Clearances and her medical morbidities were discussed. She stated she will attempt to get the clearances and RTO. I reviewed the possibility of an underlying malignancy and she voiced understanding for the need to proceed to get the tissue sampled. COLPOSCOPIC EXAMINATION:                Declined by patient today      Hysteroscopic Findings:  Declined by patient today    Assessment:   Diagnosis Orders   1. Endometrial cells on cervical Pap smear inconsistent w/LMP     2. Post-menopausal bleeding     3. Thrombocytopenia ([address]     4. Type 2 diabetes mellitus without complication, unspecified whether long term insulin use ([address]      5. Atypical glandular cells of undetermined significance (SLICK) on cervical Pap smear (NOS)     6. Diabetic polyneuropathy associated with type 2 diabetes mellitus ([address]     7.  GAVE (gastric antral vascular ectasia)       Patient Active Problem List    Diagnosis Date Noted    Atypical glandular cells of undetermined significance (SLICK) on cervical Pap smear (NOS) 05/15/2020     Priority: High     Overview Note:     Colposcopy with ECC-declined by pt 6/11/2020  Ultrasound of

## 2020-07-29 NOTE — PROGRESS NOTES
[address] [address]  Dept: [phone]  Dept Fax: [phone]    OfficeProgress/Follow Up Note  Date of patient's visit: 7/30/2020  Patient's Name:  Lilia Gustafson YOB: 1955            Patient Care Team:  Nan Stewart PA-C as PCP - General (Physician Assistant)  Nan Stewart PA-C as PCP - [facility]  Carlton Garcia MD as Consulting Physician (Gastroenterology)    REASON FOR VISIT:  Preoperative clearance    HISTORY OF PRESENT ILLNESS:      Chief Complaint   Patient presents with    Other     Surgery clearance for D&C H-scope [place] with ECC    Health Maintenance     Dtap    Discuss Medications     novolog, blood sugars are low in the morning, 53- 7/29, thinks she is getting too much at night time     Referral - General     podiatrist     History was obtained from the patient. Lilia Gustafson is a 75 y.o. female here today for preoperative clearance. Patient scheduled for [facility]  Hysterocope with Colposcopy and ECC. Patient with history of anemia, post menopausal bleeding, endometrial cells on cervical Pap. Pelvic ultrasound 3/2/20 atrophic uterus with suboptimal visualization due to body habitus, neither ovary visualized, no abnormal pelvic mass noted. Esophageal varices, gastric antral vascular ectasia, following with gastroenterologist. Patient with history of htn, diastolic chf, insulin dependent diabetes mellitus, chronic kidney disease, history of cerebrovascular accident. No history of coronary artery disease, denies recent chest pain/pressure, reports dyspnea on moderate exertion. Poor METS due to multiple factors. Echocardiogram 3/27/20 with estimated LV EF 60-65 %, mild (grade I) diastolic dysfunction. Anemia stable 9.6, platelets 70, following with hematologist. Recent labs reveal Cr increase 1.54 --> 1.86 mg/dL. She denies recent fever/chills, cough, sore throat, myalgias, headache. Patient Active Problem List   Diagnosis    Type 2 diabetes mellitus ([facility]    Breakthrough seizure ([facility]    [facility] old[facility]    Falling episodes    Cerebral concussion    Cervical spinal stenosis    Diabetic polyneuropathy associated with type 2 diabetes mellitus ([facility]    History of cerebral infarction    Seizure disorder ([facility]    Depression with anxiety    Dizziness    Generalized weakness    GERD (gastroesophageal reflux disease)    H/O falling    Hyperglycemia    Hyponatremia    Morbid obesity with BMI of 40.0-44.9, adult (HCC)    Pure hypercholesterolemia    RLS (restless legs syndrome)    Thrombocytopenia (HCC)    Altered mental status    Confusion state    Gait disturbance    GI bleed    Polyp of colon    Postmenopausal bleeding    Anemia    Elevated alkaline phosphatase level    GAVE (gastric antral vascular ectasia)    Esophageal varices determined by endoscopy ([facility]    Atypical glandular cells of undetermined significance (SLICK) on cervical Pap smear (NOS)     Health Maintenance Due   Topic Date Due    Diabetic foot exam  12/31/1963    Diabetic retinal exam  12/31/1963    DTaP/Tdap/Td vaccine (1 - Tdap) 12/31/1972    Breast cancer screen  12/31/2003    Shingles Vaccine (1 of 2) 12/31/2003    DEXA (modify frequency per FRAX score)  12/31/2008    Lipid screen  10/22/2018    Diabetic microalbuminuria test  11/30/2018    Annual Wellness Visit (AWV)  06/21/2019     MEDICATIONS:      Current Outpatient Medications   Medication Sig Dispense Refill    blood glucose monitor strips Test 3  times daily Insulin Dependent mellitus 100 strip 11    loperamide (IMODIUM) 2 MG capsule Take 1 capsule by mouth 4 times daily as needed for Diarrhea (after initial 4 mg dose) 60 capsule 2    bumetanide (BUMEX) 1 MG tablet Take 1 tablet by mouth 2 times daily 90 tablet 2    insulin glargine (BASAGLAR KWIKPEN) 100 UNIT/ML injection pen Inject 60 Units into the skin 2 times daily      Emollient (CERAVE) LOTN Apply topically 2 times daily Indications: bilateral lower legs      levETIRAcetam (KEPPRA) 750 MG tablet Take 750 mg by mouth 2 times daily Indications: with 500 mg to make 1250 mg dose      vitamin D (CHOLECALCIFEROL) 16104 UNIT CAPS Take 50,000 Units by mouth once a week Indications: Sundays      ferrous sulfate 325 (65 Fe) MG tablet Take 1 tablet by mouth every 12 hours 30 tablet 5    gabapentin (NEURONTIN) 100 MG capsule Take 100 mg by mouth 2 times daily. Jonathan Cancino levETIRAcetam (KEPPRA) 500 MG tablet Take 500 mg by mouth 2 times daily Indications: with 750 mg to make total dose 1250 mg       insulin aspart (NOVOLOG) 100 UNIT/ML injection vial Inject into the skin Inject as per sliding scale before meals and at bedtime:    = 0 units; call physician if less than 70  151-200 = 2 units  201-250 = 4 units  251-300 = 6 units  301-350 = 8 units  351-400 = 10 units; call physician if greater than 400      potassium chloride (KLOR-CON M) 10 MEQ extended release tablet Take 30 mEq by mouth daily       vitamin B-12 (CYANOCOBALAMIN) 500 MCG tablet Take 500 mcg by mouth daily      acetaminophen (TYLENOL) 325 MG tablet Take 650 mg by mouth 3 times daily as needed for Pain (mild)      metFORMIN (GLUCOPHAGE) 1000 MG tablet Take 1,000 mg by mouth 2 times daily (with meals)      Blood Glucose Monitoring Suppl FLAVIO Check blood sugars 3/day 1 Device 0    Lancets MISC Check 3/day 100 each 11    lisinopril-hydrochlorothiazide (PRINZIDE;ZESTORETIC) 10-12.5 MG per tablet Take 1 tablet by mouth daily      PARoxetine (PAXIL) 20 MG tablet Take 20 mg by mouth every morning      omeprazole (PRILOSEC) 40 MG delayed release capsule Take 40 mg by mouth Daily       loratadine (CLARITIN) 10 MG tablet Take 10 mg by mouth daily      simvastatin (ZOCOR) 20 MG tablet Take 20 mg by mouth nightly       pramipexole (MIRAPEX) 1 MG tablet Take 1 mg by mouth nightly      lamoTRIgine (LAMICTAL) 200 MG tablet Take 200 mg by mouth 2 times daily      busPIRone (BUSPAR) 10 MG tablet Take 10 mg by mouth 3 times daily       povidone-iodine (BETADINE) 10 % external solution Apply topically daily Indications: left foot wound      Cholecalciferol 400 UNIT TABS tablet Take 400 Units by mouth daily      oxyCODONE-acetaminophen (PERCOCET) 5-325 MG per tablet Take 1 tablet by mouth every 6 hours as needed for Pain.  benzonatate (TESSALON) 200 MG capsule Take 200 mg by mouth 2 times daily as needed for Cough       No current facility-administered medications for this visit. ALLERGIES:      Allergies   Allergen Reactions    Codeine        SOCIAL HISTORY    Reviewed and no change from previous record. Beulah Mesa  reports that she has never smoked. She has never used smokeless tobacco.    FAMILY HISTORY:    Reviewed and no change from previous visit    REVIEW OF SYSTEMS:    Review of Systems   Constitutional: Positive for fatigue. Negative for appetite change, chills, diaphoresis, fever and unexpected weight change. HENT: Negative for congestion, ear discharge, ear pain, hearing loss, rhinorrhea, sinus pain, sore throat, trouble swallowing and voice change. Eyes: Negative for pain, discharge, itching and visual disturbance. Respiratory: Negative for cough, chest tightness, shortness of breath and wheezing. Cardiovascular: Positive for leg swelling (chronic, bilateral). Negative for chest pain and palpitations. Gastrointestinal: Negative for abdominal pain, blood in stool, constipation, diarrhea, nausea and vomiting. Endocrine: Negative for cold intolerance and heat intolerance. Genitourinary: Positive for vaginal bleeding. Negative for difficulty urinating, dysuria, flank pain, frequency, hematuria, pelvic pain, urgency and vaginal discharge. Musculoskeletal: Negative for arthralgias, back pain, neck pain and neck stiffness. Skin: Negative for color change, pallor and rash.    Neurological: Negative for dizziness, weakness, light-headedness, numbness and headaches. Hematological: Negative for adenopathy. PHYSICAL EXAM:      Vitals:    07/29/20 0938   BP: 136/64   Pulse: 98   Temp: 96.4 °F (35.8 °C)   SpO2: 96%   Weight: 267 lb (121.1 kg)   Height: 5' 1\" (1.549 m)     BP Readings from Last 3 Encounters:   07/29/20 136/64   07/15/20 136/80   06/11/20 120/70      Wt Readings from Last 3 Encounters:   07/29/20 267 lb (121.1 kg)   07/15/20 265 lb (120.2 kg)   06/11/20 261 lb (118.4 kg)     General - alert, well appearing, and in no distress  Skin - normal coloration and turgor, no rash, no pallor   Eyes - pupils equal and reactive, extraocular eye movements intact, no sig pallor   Mouth - mucous membranes are moist, pharynx normal without lesions  Neck - supple, no significant adenopathy, no palpable masses   Lymphatics - no palpable lymphadenopathy, no hepatosplenomegaly  Chest - clear to auscultation, no wheezes, rales or rhonchi, symmetric air entry  Heart - normal rate, rhythm is regular, no murmurs, rubs, clicks or gallops  Abdomen - soft, nontender, nondistended, no masses or organomegaly  Back - full range of motion, no tenderness, palpable spasm or pain on motion  Neurological - alert, oriented x 3, normal speech, no focal sensory or motor deficit  Extremities - peripheral pulses normal, 2+ pedal edema bilaterally, no calf tenderness    DIAGNOSTIC IMAGING:    ECHO (3/27/20): Left ventricle is normal in size and wall thickness. Global left ventricular systolic function is normal. Estimated LV EF 60-65 %. No obvious wall motion abnormality seen. Evidence of mild (grade I) diastolic dysfunction.     LABORATORY FINDINGS:    CBC:  Lab Results   Component Value Date    WBC 3.7 05/20/2020    HGB 9.6 05/20/2020    PLT 70 05/20/2020     BMP:    Lab Results   Component Value Date     06/04/2020    K 4.8 06/04/2020     06/04/2020    CO2 27 06/04/2020    BUN 59 06/04/2020    CREATININE 1.86 06/04/2020    GLUCOSE 168 06/04/2020     HEMOGLOBIN A1C:   Lab Results   Component Value Date    LABA1C 7.1 04/03/2020     FASTING LIPID PANEL:  Lab Results   Component Value Date    CHOL 148 10/22/2017    HDL 49 10/22/2017    TRIG 118 10/22/2017     ASSESSMENT AND PLAN:      1. Encounter for pre-operative cardiovascular clearance  2. Postmenopausal bleeding  - Revised cardiac risk index is dafne, poor METS score  - Discussed case with Dr. Jose Mullins, low risk surgery however recommend stress test, labs     3. Type 2 diabetes mellitus with diabetic polyneuropathy, with long-term current use of insulin ([facility]  - Well controlled, A1c 7.1, will cut back insulin dose with evening snack, continue monitoring   - [address] [place] [address], [place], [place]    4. Heart failure with preserved ejection fraction, unspecified HF chronicity ([facility]  - Stable, continue present management    5. CKD (chronic kidney disease) stage 3, GFR 30-59 ml/min ([facility])  - Will decrease Bumex 1 mg BID, recheck bmp 1-2 weeks  - Basic Metabolic Panel; Future    6. History of cerebral infarction  - Continue Simvastatin, ASA on hold due to bleeding/anemia    7. Thrombocytopenia ([facility]  - Advised patient to contact hematologist will need recommendations prior to surgery   - CBC Auto Differential; Future    FOLLOW UP AND INSTRUCTIONS:   Return in about 6 weeks (around 9/9/2020). Rosemary Scott received counseling on the following healthy behaviors: nutrition, exercise and medication adherence    Discussed use, benefit, and side effects of prescribed medications. Barriers to medication compliance addressed. All patient questions answered. Patient voiced understanding. Patient given educational materials - see patient instructions    DEJON CarcamoJeff[facility]  7/30/2020, 2:30 PM    Please note that this chart was generated using voice recognition Dragon dictation software.   Although everyeffort was made to ensure the accuracy of this automated transcription, some errors in transcription may have occurred.

## 2020-08-04 NOTE — PATIENT INSTRUCTIONS
Personalized Preventive Plan for Lindsay Romero - 8/4/2020  Medicare offers a range of preventive health benefits. Some of the tests and screenings are paid in full while other may be subject to a deductible, co-insurance, and/or copay. Some of these benefits include a comprehensive review of your medical history including lifestyle, illnesses that may run in your family, and various assessments and screenings as appropriate. After reviewing your medical record and screening and assessments performed today your provider may have ordered immunizations, labs, imaging, and/or referrals for you. A list of these orders (if applicable) as well as your Preventive Care list are included within your After Visit Summary for your review. Other Preventive Recommendations:    · A preventive eye exam performed by an eye specialist is recommended every 1-2 years to screen for glaucoma; cataracts, macular degeneration, and other eye disorders. · A preventive dental visit is recommended every 6 months. · Try to get at least 150 minutes of exercise per week or 10,000 steps per day on a pedometer . · Order or download the FREE \"Exercise & Physical Activity: Your Everyday Guide\" from [employer]. Call 8-175.738.5489 or search [employer] online. · You need 9273-3816 mg of calcium and 1121-7902 IU of vitamin D per day. It is possible to meet your calcium requirement with diet alone, but a vitamin D supplement is usually necessary to meet this goal.  · When exposed to the sun, use a sunscreen that protects against both UVA and UVB radiation with an SPF of 30 or greater. Reapply every 2 to 3 hours or after sweating, drying off with a towel, or swimming. · Always wear a seat belt when traveling in a car. Always wear a helmet when riding a bicycle or motorcycle.

## 2020-08-04 NOTE — TELEPHONE ENCOUNTER
Pt was seen by LYNNE Palmer today &  [facility] called w/ a question regarding pts novolog. Pt stated Sergey Pyle discontinued novolog 12 units bid @ lunch & dinner. Pts orders from Sergey Pyle say to continue. Order does not say d/c or discontinue. Please advise.

## 2020-08-04 NOTE — PROGRESS NOTES
tablet Take 1 tablet by mouth every 12 hours Yes Vitor Zaragoza MD   gabapentin (NEURONTIN) 100 MG capsule Take 100 mg by mouth 2 times daily. . Yes Historical Provider, MD   levETIRAcetam (KEPPRA) 500 MG tablet Take 500 mg by mouth 2 times daily Indications: with 750 mg to make total dose 1250 mg  Yes Historical Provider, MD   insulin aspart (NOVOLOG) 100 UNIT/ML injection vial Inject into the skin Inject as per sliding scale before meals and at bedtime:    = 0 units; call physician if less than 70  151-200 = 2 units  201-250 = 4 units  251-300 = 6 units  301-350 = 8 units  351-400 = 10 units; call physician if greater than 400 Yes Historical Provider, MD   potassium chloride (KLOR-CON M) 10 MEQ extended release tablet Take 30 mEq by mouth daily  Yes Historical Provider, MD   vitamin B-12 (CYANOCOBALAMIN) 500 MCG tablet Take 500 mcg by mouth daily Yes Historical Provider, MD   acetaminophen (TYLENOL) 325 MG tablet Take 650 mg by mouth 3 times daily as needed for Pain (mild) Yes Historical Provider, MD   metFORMIN (GLUCOPHAGE) 1000 MG tablet Take 1,000 mg by mouth 2 times daily (with meals) Yes Historical Provider, MD   lisinopril-hydrochlorothiazide (PRINZIDE;ZESTORETIC) 10-12.5 MG per tablet Take 1 tablet by mouth daily Yes Historical Provider, MD   PARoxetine (PAXIL) 20 MG tablet Take 20 mg by mouth every morning Yes Historical Provider, MD   omeprazole (PRILOSEC) 40 MG delayed release capsule Take 40 mg by mouth Daily  Yes Historical Provider, MD   loratadine (CLARITIN) 10 MG tablet Take 10 mg by mouth daily Yes Historical Provider, MD   simvastatin (ZOCOR) 20 MG tablet Take 20 mg by mouth nightly  Yes Historical Provider, MD   pramipexole (MIRAPEX) 1 MG tablet Take 1 mg by mouth nightly Yes Historical Provider, MD   lamoTRIgine (LAMICTAL) 200 MG tablet Take 200 mg by mouth 2 times daily Yes Historical Provider, MD   busPIRone (BUSPAR) 10 MG tablet Take 10 mg by mouth 3 times daily  Yes Historical Provider, MD   povidone-iodine (BETADINE) 10 % external solution Apply topically daily Indications: left foot wound  Historical Provider, MD   Cholecalciferol 400 UNIT TABS tablet Take 400 Units by mouth daily  Historical Provider, MD   oxyCODONE-acetaminophen (PERCOCET) 5-325 MG per tablet Take 1 tablet by mouth every 6 hours as needed for Pain.   Historical Provider, MD   benzonatate (TESSALON) 200 MG capsule Take 200 mg by mouth 2 times daily as needed for Cough  Historical Provider, MD   Blood Glucose Monitoring Suppl FLAVIO Check blood sugars 3/day  Patient not taking: Reported on 8/4/2020  Dulce Bermudez MD         Past Medical History:   Diagnosis Date    Anemia     Cataract     GERD (gastroesophageal reflux disease)     Headache     Hyperlipidemia     Neuropathy     Osteoarthritis     Restless leg syndrome     Seizures ([facility] since age 10   [place]lam [place] Short-term memory loss     Stroke (cerebrum) ([facility] [address] [facility] 2010    Type 2 diabetes mellitus without complication ([facility] 8627     Past Surgical History:   Procedure Laterality Date    CARDIAC CATHETERIZATION      CATARACT REMOVAL      CHOLECYSTECTOMY      COLONOSCOPY N/A 5/13/2019    COLONOSCOPY DIAGNOSTIC, POLYPECTOMIES performed by Tawny Dancer, MD at [address] [place] [address]      cataract    TONSILLECTOMY      UPPER GASTROINTESTINAL ENDOSCOPY N/A 5/12/2019    EGD ESOPHAGOGASTRODUODENOSCOPY performed by Tawny Dancer, MD at [facility]. N/A 3/18/2020    EGD WITH CAUTERIZATION OF [facility] USING ARGON performed by Rebeka Matos MD at [facility]         Family History   Problem Relation Age of Onset    Diabetes Brother      CareTeam (Including outside providers/suppliers regularly involved in providing care):   Patient Care Team:  Mac Locke PA-C as PCP - General (Physician Assistant)  Mac Locke PA-C as PCP - [facility] Empaneled Provider  Tawny Dancer, MD as Consulting Physician (Gastroenterology)    Wt Readings from Last 3 Encounters:   08/04/20 271 lb (122.9 kg)   07/29/20 267 lb (121.1 kg)   07/15/20 265 lb (120.2 kg)     Vitals:    08/04/20 1423   BP: 114/60   Pulse: 99   Temp: 97.1 °F (36.2 °C)   SpO2: 95%   Weight: 271 lb (122.9 kg)   Height: 5' 1\" (1.549 m)     Body mass index is 51.21 kg/m². Based upon direct observation of the patient, evaluation of cognition reveals recent and remote memory intact. Patient's complete Health Risk Assessment and screening values have been reviewed and are found in Flowsheets. The following problems were reviewed today and where indicated follow up appointments were made and/or referrals ordered. Positive Risk Factor Screenings with Interventions:     Fall Risk:  2 or more falls in past year?: (!) yes  Fall with injury in past year?: no  Fall Risk Interventions:    · Home safety tips provided, home exercises provided to promote strength and balance  · Patient has wheeled walker which she uses at all times     General Health:  General  In general, how would you say your health is?: Good  In the past 7 days, have you experienced any of the following?  New or Increased Pain, New or Increased Fatigue, Loneliness, Social Isolation, Stress or Anger?: (!) Stress, Anger  Do you get the social and emotional support that you need?: Yes  Do you have a Living Will?: Yes  General Health Risk Interventions:  · Stress and anger: patient's comments regarding reasons for stress and/or anger: has had things happening at skilled nursing facility which have her annoyed, relaxation techniques discussed, recommended to discuss with , patient declines any further evaluation/treatment for this issue  · Patient reports low blood sugars morning, reportedly receiving Novolog 12 U BID in addition to sliding scale, will discontinue scheduled dosing and continue meal coverage     Health Habits/Nutrition:  Health Habits/Nutrition  Do you exercise for at least 20 minutes 2-3 times per week?: Yes  Have you lost any weight without trying in the past 3 months?: (!) Yes  Do you eat fewer than 2 meals per day?: No  Have you seen a dentist within the past year?: Yes  Body mass index is 51.21 kg/m². Health Habits/Nutrition Interventions:  · Nutritional issues:  educational materials for healthy, well-balanced diet provided, educational materials to promote weight loss provided    ADL:  ADLs  In the past 7 days, did you need help from others to perform any of the following everyday activities? Eating, dressing, grooming, bathing, toileting, or walking/balance?: (!) Walking/Balance  In the past 7 days, did you need help from others to take care of any of the following?  Laundry, housekeeping, banking/finances, shopping, telephone use, food preparation, transportation, or taking medications?: [employer], Transportation, Taking Medications  ADL Interventions:  · Patient declines any further evaluation/treatment for this issue, lives in skilled nursing facility with assistance with above    Personalized Preventive Plan   Current Health Maintenance Status  Immunization History   Administered Date(s) Administered    Influenza Virus Vaccine 12/06/2016, 11/01/2019    Influenza, Quadv, IM, PF (6 mo and older Fluzone, Flulaval, Fluarix, and 3 yrs and older Afluria) 10/23/2017    Pneumococcal Polysaccharide ([id number]) 09/01/2015      Health Maintenance   Topic Date Due    Diabetic foot exam  12/31/1963    Diabetic retinal exam  12/31/1963    DTaP/Tdap/Td vaccine (1 - Tdap) 12/31/1972    Breast cancer screen  12/31/2003    Shingles Vaccine (1 of 2) 12/31/2003    DEXA (modify frequency per FRAX score)  12/31/2008    Lipid screen  10/22/2018    Diabetic microalbuminuria test  11/30/2018    Annual Wellness Visit (AWV)  06/21/2019    Flu vaccine (1) 09/01/2020    Pneumococcal 65+ years Vaccine (1 of 1 - PPSV23) 09/01/2020    A1C test (Diabetic or Prediabetic)  04/03/2021

## 2020-08-05 PROBLEM — R31.9 HEMATURIA: Status: ACTIVE | Noted: 2020-01-01

## 2020-08-05 PROBLEM — N18.9 ACUTE KIDNEY INJURY SUPERIMPOSED ON CHRONIC KIDNEY DISEASE (HCC): Status: ACTIVE | Noted: 2020-01-01

## 2020-08-05 PROBLEM — D64.9 SYMPTOMATIC ANEMIA: Status: ACTIVE | Noted: 2020-01-01

## 2020-08-05 PROBLEM — N17.9 ACUTE KIDNEY INJURY SUPERIMPOSED ON CHRONIC KIDNEY DISEASE (HCC): Status: ACTIVE | Noted: 2020-01-01

## 2020-08-05 PROBLEM — N63.0 BREAST MASS IN FEMALE: Status: ACTIVE | Noted: 2020-01-01

## 2020-08-05 NOTE — PROGRESS NOTES
Pt arrived to floor via stretcher from ED and was transfered to bed. Vitals taken. No distress noted. See doc flowsheet and admission navigator for details. POC and education initiated and reviewed with patient. Call light within reach, and pt educated on its use. Bed in lowest position, and locked. Side rails up x 2. Denied further questions or needs at this time. Will continue to monitor.

## 2020-08-05 NOTE — PROGRESS NOTES
Patient has been admitted since 12. RN has still not heard from lab that her blood is here and ready. It is coming from ST.

## 2020-08-05 NOTE — ED NOTES
RN notified receiving RN of conversation with lab. Vital signs reviewed at this time.      Bebo Gonzalez RN  08/05/20 5269

## 2020-08-05 NOTE — ED PROVIDER NOTES
EMERGENCY DEPARTMENT ENCOUNTER    Pt Name: Teresa Bland  MRN: [id number]  [place] 1955  Date of evaluation: 8/5/20  CHIEF COMPLAINT       Chief Complaint   Patient presents with    Shortness of Breath     Just started as she was walking in    Abnormal Lab     HGB 6.5     HISTORY OF PRESENT ILLNESS   The pt presents for evaluation of abnormal hgb. Recent test at 6.5. Pt states that she has had some fatigue and shortness of breath worse with exertion. Pt has known gi bleeding. Continued dark stool. The history is provided by the patient. Shortness of Breath   Severity:  Mild  Onset quality:  Gradual  Timing:  Constant  Progression:  Worsening  Chronicity:  New  Context: activity    Relieved by:  Nothing  Worsened by:  Exertion  Ineffective treatments:  None tried  Associated symptoms: no abdominal pain, no chest pain, no fever, no headaches and no rash        REVIEW OF SYSTEMS     Review of Systems   Constitutional: Positive for fatigue. Negative for chills and fever. HENT: Negative. Negative for congestion. Eyes: Negative. Respiratory: Positive for shortness of breath. Cardiovascular: Negative. Negative for chest pain. Gastrointestinal: Negative. Negative for abdominal pain. Genitourinary: Negative. Musculoskeletal: Negative. Negative for back pain. Skin: Negative. Negative for rash. Neurological: Negative. Negative for headaches. All other systems reviewed and are negative.     PASTMEDICAL HISTORY     Past Medical History:   Diagnosis Date    Anemia     Cataract     GERD (gastroesophageal reflux disease)     Headache     Hyperlipidemia     Neuropathy     Osteoarthritis     Restless leg syndrome     Seizures ([address] since age 10   [place] Short-term memory loss     Stroke (cerebrum) ([address] 2010    Type 2 diabetes mellitus without complication ([address] 5661     Past Problem List  Patient Active Problem List   Diagnosis Code    Type 2 diabetes mellitus ([address] E11.9    200 MG CAPSULE    Take 200 mg by mouth 2 times daily as needed for Cough    BLOOD GLUCOSE MONITOR STRIPS    Test 4 times a day & as needed for symptoms of irregular blood glucose. Dispense sufficient amount for indicated testing frequency plus additional to accommodate PRN testing needs. BLOOD GLUCOSE MONITORING SUPPL FLAVIO    Check blood sugars 3/day    BUMETANIDE (BUMEX) 1 MG TABLET    Take 1 tablet by mouth 2 times daily    BUSPIRONE (BUSPAR) 10 MG TABLET    Take 10 mg by mouth 3 times daily     CHOLECALCIFEROL 400 UNIT TABS TABLET    Take 400 Units by mouth daily    EMOLLIENT (CERAVE) LOTN    Apply topically 2 times daily Indications: bilateral lower legs    FERROUS SULFATE 325 (65 FE) MG TABLET    Take 1 tablet by mouth every 12 hours    GABAPENTIN (NEURONTIN) 100 MG CAPSULE    Take 100 mg by mouth 2 times daily. [facility]     GLUCOSE MONITORING KIT (FREESTYLE) MONITORING KIT    1 kit by Does not apply route daily    INSULIN ASPART (NOVOLOG) 100 UNIT/ML INJECTION VIAL    Inject into the skin Inject as per sliding scale before meals and at bedtime:    = 0 units; call physician if less than 70  151-200 = 2 units  201-250 = 4 units  251-300 = 6 units  301-350 = 8 units  351-400 = 10 units; call physician if greater than 400    INSULIN GLARGINE (BASAGLAR KWIKPEN) 100 UNIT/ML INJECTION PEN    Inject 60 Units into the skin 2 times daily    LAMOTRIGINE (LAMICTAL) 200 MG TABLET    Take 200 mg by mouth 2 times daily    LANCETS MISC    1 each by Does not apply route 4 times daily    LEVETIRACETAM (KEPPRA) 500 MG TABLET    Take 500 mg by mouth 2 times daily Indications: with 750 mg to make total dose 1250 mg     LEVETIRACETAM (KEPPRA) 750 MG TABLET    Take 750 mg by mouth 2 times daily Indications: with 500 mg to make 1250 mg dose    LISINOPRIL-HYDROCHLOROTHIAZIDE (PRINZIDE;ZESTORETIC) 10-12.5 MG PER TABLET    Take 1 tablet by mouth daily    LOPERAMIDE (IMODIUM) 2 MG CAPSULE    Take 1 capsule by mouth 4 times daily as needed for Diarrhea (after initial 4 mg dose)    LORATADINE (CLARITIN) 10 MG TABLET    Take 10 mg by mouth daily    METFORMIN (GLUCOPHAGE) 1000 MG TABLET    Take 1,000 mg by mouth 2 times daily (with meals)    OMEPRAZOLE (PRILOSEC) 40 MG DELAYED RELEASE CAPSULE    Take 40 mg by mouth Daily     OXYCODONE-ACETAMINOPHEN (PERCOCET) 5-325 MG PER TABLET    Take 1 tablet by mouth every 6 hours as needed for Pain. PAROXETINE (PAXIL) 20 MG TABLET    Take 20 mg by mouth every morning    POTASSIUM CHLORIDE (KLOR-CON M) 10 MEQ EXTENDED RELEASE TABLET    Take 30 mEq by mouth daily     POVIDONE-IODINE (BETADINE) 10 % EXTERNAL SOLUTION    Apply topically daily Indications: left foot wound    PRAMIPEXOLE (MIRAPEX) 1 MG TABLET    Take 1 mg by mouth nightly    SIMVASTATIN (ZOCOR) 20 MG TABLET    Take 20 mg by mouth nightly     VITAMIN B-12 (CYANOCOBALAMIN) 500 MCG TABLET    Take 500 mcg by mouth daily    VITAMIN D (CHOLECALCIFEROL) 17960 UNIT CAPS    Take 50,000 Units by mouth once a week Indications: Sundays     ALLERGIES     is allergic to codeine. FAMILY HISTORY     She indicated that her mother is . She indicated that her father is . She indicated that her brother is alive. SOCIAL HISTORY       Social History     Tobacco Use    Smoking status: Never Smoker    Smokeless tobacco: Never Used   Substance Use Topics    Alcohol use: No    Drug use: No     PHYSICAL EXAM     INITIAL VITALS: BP (!) 142/38   Pulse 104   Temp 98.5 °F (36.9 °C) (Oral)   Resp 15   Ht 5' 1\" (1.549 m)   Wt 267 lb (121.1 kg)   SpO2 90%   BMI 50.45 kg/m²    Physical Exam  Vitals signs and nursing note reviewed. Constitutional:       Appearance: Normal appearance. HENT:      Head: Normocephalic and atraumatic. Nose: No congestion. Mouth/Throat:      Mouth: Mucous membranes are moist.   Eyes:      Conjunctiva/sclera: Conjunctivae normal.   Neck:      Musculoskeletal: Normal range of motion and neck supple. Cardiovascular:      Rate and Rhythm: Normal rate and regular rhythm. Heart sounds: No murmur. Pulmonary:      Effort: Pulmonary effort is normal.      Breath sounds: Normal breath sounds. Abdominal:      Palpations: Abdomen is soft. Tenderness: There is no abdominal tenderness. Musculoskeletal:         General: No signs of injury. Skin:     Capillary Refill: Capillary refill takes less than 2 seconds. Coloration: Skin is pale. Neurological:      General: No focal deficit present. Mental Status: She is alert and oriented to person, place, and time. MEDICAL DECISION MAKING:     Reviewed results. Low hgb. Transfusion ordered. Secondary to gib. On reeval, exam unchanged. Discussed with medicine, will admit for further therapy and evaluation. Pt is ready for admission at this time. CRITICAL CARE:       PROCEDURES:    Procedures    DIAGNOSTIC RESULTS   EKG:All EKG's are interpreted by the Emergency Department Physician who either signs or Co-signs this chart in the absence of a cardiologist.        RADIOLOGY:All plain film, CT, MRI, and formal ultrasound images (except ED bedside ultrasound) are read by the radiologist, see reports below, unless otherwisenoted in MDM or here. XR CHEST PORTABLE   Final Result   Cardiomegaly without convincing evidence for acute cardiopulmonary pathology. LABS: All lab results were reviewed by myself, and all abnormals are listed below.   Labs Reviewed   CBC WITH AUTO DIFFERENTIAL - Abnormal; Notable for the following components:       Result Value    RBC 2.66 (*)     Hemoglobin 6.1 (*)     Hematocrit 21.1 (*)     MCV 79.0 (*)     MCH 22.9 (*)     MCHC 29.0 (*)     RDW 17.8 (*)     Platelets 376 (*)     Seg Neutrophils 74 (*)     Lymphocytes 18 (*)     Absolute Lymph # 0.85 (*)     All other components within normal limits   COMPREHENSIVE METABOLIC PANEL - Abnormal; Notable for the following components:    Glucose 104 (*) BUN 65 (*)     CREATININE 2.07 (*)     Sodium 146 (*)     Chloride 108 (*)     Total Bilirubin 0.16 (*)     Alb 3.1 (*)     GFR Non- 24 (*)     GFR  29 (*)     All other components within normal limits   TROPONIN - Abnormal; Notable for the following components:    Troponin, High Sensitivity 37 (*)     All other components within normal limits   TROPONIN   TYPE AND SCREEN   PREPARE RBC (CROSSMATCH)       EMERGENCY DEPARTMENTCOURSE:         Vitals:    Vitals:    08/05/20 1146   BP: (!) 142/38   Pulse: 104   Resp: 15   Temp: 98.5 °F (36.9 °C)   TempSrc: Oral   SpO2: 90%   Weight: 267 lb (121.1 kg)   Height: 5' 1\" (1.549 m)       The patient was given the following medications while in the emergency department:  Orders Placed This Encounter   Medications    0.9 % sodium chloride bolus     CONSULTS:  IP CONSULT TO INTERNAL MEDICINE    FINAL IMPRESSION      1. Symptomatic anemia          DISPOSITION/PLAN   DISPOSITION Decision To Admit 08/05/2020 12:58:31 PM      PATIENT REFERRED TO:  No follow-up provider specified.   DISCHARGE MEDICATIONS:  New Prescriptions    No medications on file     Briana Bradley MD  Attending Emergency Physician                    Gonzales Ledesma MD  08/05/20 6008

## 2020-08-05 NOTE — ED NOTES
Mateo Louis, RN on PCU notified by this RN that blood is not ready and has not been started by ED RN due to the lab rerunning the type and screen.      Albert Taveras RN  08/05/20 8599

## 2020-08-05 NOTE — H&P
[address]      [address] [facility]     HISTORY AND PHYSICAL EXAMINATION            Date:   8/6/2020  Patient name:  Lindsay Romero  Date of admission:  8/5/2020 11:34 AM  MRN:   [id number]  Account:  [de-identified]  YOB: 1955  PCP:    Radha Diaz PA-C  Room:   [address] [place] [address]  Code Status:    Full Code    Chief Complaint:     Chief Complaint   Patient presents with    Shortness of Breath     Just started as she was walking in    Abnormal Lab     HGB 6.5       History Obtained From:     patient    History of Present Illness: The patient is a 78-year-old female with previous history of iron deficient anemia, CVA on aspirin, GERD, type 2 diabetes mellitus, colonic polyp, gastric antral vascular ectasia, esophageal varices, and GI bleed who presented with abnormal lab of hemoglobin 6.5. Patient states that yesterday she went to get routine Medicare labs and then this morning was called by her PCP who told her to present to the hospital immediately as her hemoglobin was low at 6.5. She reports some mild shortness of breath on exertion but denies any fatigue, dark stools, hematochezia, nausea, vomiting, hematemesis, chest pain, recent trauma or falls. She does report some hematuria yesterday as well as some increased urinary frequency but denies dysuria. She reports several years of chronic diarrhea which is somewhat improved with taking Imodium. Denies any changes in her diarrhea. She takes aspirin 81 mg every day due to her history of stroke and only takes ibuprofen as needed for knee pain, however denies any recent use of ibuprofen or other NSAID pain medications. She is not on any anticoagulant medications. She is unsure if she takes any iron supplements at home. Denies any other complaints at this time.     She additionally reports that 2 weeks ago she discovered a \"lump\"in her right breast.  Denies any nipple discharge. She lives at [facility]. She is admitted to the hospital for management of anemia. Past Medical History:     Past Medical History:   Diagnosis Date    Anemia     Cataract     GERD (gastroesophageal reflux disease)     Headache     Hyperlipidemia     Neuropathy     Osteoarthritis     Restless leg syndrome     Seizures ([facility] [address] since age 10   Nithya Muta Short-term memory loss     Stroke (cerebrum) ([facility] [address] 2010    Type 2 diabetes mellitus without complication ([facility] [address] 3921        Past SurgicalHistory:     Past Surgical History:   Procedure Laterality Date    CARDIAC CATHETERIZATION      CATARACT REMOVAL      CHOLECYSTECTOMY      COLONOSCOPY N/A 5/13/2019    COLONOSCOPY DIAGNOSTIC, POLYPECTOMIES performed by Cele Salmeron MD at [address]      cataract    TONSILLECTOMY      UPPER GASTROINTESTINAL ENDOSCOPY N/A 5/12/2019    EGD ESOPHAGOGASTRODUODENOSCOPY performed by Cele Salmeron MD at [address] 3/18/2020    EGD WITH CAUTERIZATION OF [facility] (GAVE) USING ARGON performed by Ning Conde MD at [facility] ENDO        Medications Prior to Admission:        Prior to Admission medications    Medication Sig Start Date End Date Taking?  Authorizing Provider   bumetanide (BUMEX) 1 MG tablet Take 1 mg by mouth 3 times daily   Yes Historical Provider, MD   insulin glargine (BASAGLAR KWIKPEN) 100 UNIT/ML injection pen Inject 60 Units into the skin 2 times daily   Yes Historical Provider, MD   Emollient (CERAVE) LOTN Apply topically 2 times daily Indications: bilateral lower legs   Yes Historical Provider, MD   levETIRAcetam (KEPPRA) 750 MG tablet Take 750 mg by mouth 2 times daily Indications: with 500 mg to make 1250 mg dose   Yes Historical Provider, MD   vitamin D (CHOLECALCIFEROL) 62497 UNIT CAPS Take 50,000 Units by mouth once a week Indications: Tuesdays Yes Historical Provider, MD   gabapentin (NEURONTIN) 100 MG capsule Take 100 mg by mouth 2 times daily. .   Yes Historical Provider, MD   levETIRAcetam (KEPPRA) 500 MG tablet Take 500 mg by mouth 2 times daily Indications: with 750 mg to make total dose 1250 mg    Yes Historical Provider, MD   insulin aspart (NOVOLOG) 100 UNIT/ML injection vial Inject into the skin Inject as per sliding scale before meals and at bedtime:    = 0 units; call physician if less than 70  151-200 = 2 units  201-250 = 4 units  251-300 = 6 units  301-350 = 8 units  351-400 = 10 units; call physician if greater than 400   Yes Historical Provider, MD   potassium chloride (KLOR-CON M) 10 MEQ extended release tablet Take 30 mEq by mouth daily    Yes Historical Provider, MD   vitamin B-12 (CYANOCOBALAMIN) 500 MCG tablet Take 500 mcg by mouth daily   Yes Historical Provider, MD   acetaminophen (TYLENOL) 325 MG tablet Take 650 mg by mouth 3 times daily as needed for Pain (mild)   Yes Historical Provider, MD   metFORMIN (GLUCOPHAGE) 1000 MG tablet Take 1,000 mg by mouth 2 times daily (with meals)   Yes Historical Provider, MD   lisinopril-hydrochlorothiazide (PRINZIDE;ZESTORETIC) 10-12.5 MG per tablet Take 1 tablet by mouth daily   Yes Historical Provider, MD   PARoxetine (PAXIL) 20 MG tablet Take 20 mg by mouth every morning   Yes Historical Provider, MD   omeprazole (PRILOSEC) 40 MG delayed release capsule Take 40 mg by mouth Daily    Yes Historical Provider, MD   loratadine (CLARITIN) 10 MG tablet Take 10 mg by mouth daily   Yes Historical Provider, MD   simvastatin (ZOCOR) 20 MG tablet Take 20 mg by mouth nightly    Yes Historical Provider, MD   pramipexole (MIRAPEX) 1 MG tablet Take 1 mg by mouth nightly   Yes Historical Provider, MD   lamoTRIgine (LAMICTAL) 200 MG tablet Take 200 mg by mouth 2 times daily   Yes Historical Provider, MD   busPIRone (BUSPAR) 10 MG tablet Take 10 mg by mouth 3 times daily    Yes Historical Provider, MD blood glucose monitor strips Test 4 times a day & as needed for symptoms of irregular blood glucose. Dispense sufficient amount for indicated testing frequency plus additional to accommodate PRN testing needs. 8/4/20   David Knight PA-C   Lancets MISC 1 each by Does not apply route 4 times daily 8/4/20   David Knight PA-C   glucose monitoring kit (FREESTYLE) monitoring kit 1 kit by Does not apply route daily 8/4/20   David Knight PA-C   Blood Glucose Monitoring Suppl FLAVIO Check blood sugars 3/day  Patient not taking: Reported on 8/4/2020 11/30/17   Terri Espino MD        Allergies:     Codeine    Social History:     Tobacco:    reports that she has never smoked. She has never used smokeless tobacco.  Alcohol:      reports no history of alcohol use. Drug Use:  reports no history of drug use. Family History:     Family History   Problem Relation Age of Onset    Diabetes Brother        Review of Systems:     Positive and Negative as described in HPI. CONSTITUTIONAL:  no fevers  EYES: negative for blury vision  HEENT: No headaches, no nasal congestion, no difficulty swallowing  RESPIRATORY: positive for dyspnea, no wheezing, no Cough  CARDIOVASCULAR: negative for chest pain, no palpitations  GASTROINTESTINAL: no nausea, no vomiting, no change in bowel habits, no abdominal pain   GENITOURINARY: negative for dysuria, positive for hematuria and increased urinary frequency  MUSCULOSKELETAL: no joint pains, no muscle aches, no swelling of joints. The bilateral lower extremities have erythema, xerosis and 1+ pitting edema. BREAST: The right breast has a central 5 cm x 5 cm soft mass with normal appearance of overlying skin. No axillary adenopathy bilaterally. Left breast is without any masses.   NEUROLOGICAL: No weakness or numbness      Physical Exam:   /80 Comment: manual  Pulse 82   Temp 97.5 °F (36.4 °C) (Infrared)   Resp 20   Ht 5' (1.524 m)   Wt 266 lb 8.6 oz (120.9 kg)   SpO2 100%   BMI 52.05 kg/m²   Temp (24hrs), Av.2 °F (36.8 °C), Min:97.5 °F (36.4 °C), Max:98.4 °F (36.9 °C)        Recent Labs     20  0702 20  0824 20  0915 20  1127   POCGLU 112* 91 83 89       Intake/Output Summary (Last 24 hours) at 2020 1401  Last data filed at 2020 0755  Gross per 24 hour   Intake 1474 ml   Output --   Net 1474 ml       PHYSICAL EXAM:  General Appearance  Alert , awake, not in acute distress  HEENT - Head is normocephalic, atraumatic. Conjunctiva are pink. Lungs - Bilateral equal air entry, no wheezes, rales or rhonchi, aeration good  Cardiovascular - Heart sounds are normal.  Regular rhythm, normal rate without murmur, gallop or rub. Abdomen - Soft, nontender, nondistended, no masses or organomegaly  Neurologic - There are no new focal motor or sensory deficits  Skin - No bruising or bleeding on exposed skin area. She appears pale. Extremities - No cyanosis, clubbing or edema      Investigations:     Laboratory Testing:  Recent Results (from the past 24 hour(s))   POC Glucose Fingerstick    Collection Time: 20  4:21 PM   Result Value Ref Range    POC Glucose 74 65 - 105 mg/dL   COVID-19    Collection Time: 20  4:26 PM    Specimen: Other   Result Value Ref Range    SARS-CoV-2 Not Detected Not Detected    SARS-CoV-2, Rapid          Source . NASOPHARYNGEAL SWAB     SARS-CoV-2, PCR         SPECIMEN REJECTION    Collection Time: 20  8:31 PM   Result Value Ref Range    Specimen Source . FECES     Ordered Test OBS     Reason for Rejection       Unable to perform testing: Specimen not processed correctly.    - NOT REPORTED    POC Glucose Fingerstick    Collection Time: 20  8:40 PM   Result Value Ref Range    POC Glucose 86 65 - 105 mg/dL   POC Glucose Fingerstick    Collection Time: 20 10:07 PM   Result Value Ref Range    POC Glucose 59 (L) 65 - 105 mg/dL   Hemoglobin and hematocrit, blood    Collection Time: 20 10:26 PM   Result Value Ref Range    Hemoglobin 5.8 (LL) 12.0 - 16.0 g/dL    Hematocrit 19.4 (L) 36 - 46 %   Path Review, Smear    Collection Time: 08/05/20 10:26 PM   Result Value Ref Range    Pathologist Review TO BE REVIEWED BY PATHOLOGIST    POC Glucose Fingerstick    Collection Time: 08/05/20 10:38 PM   Result Value Ref Range    POC Glucose 69 65 - 105 mg/dL   POC Glucose Fingerstick    Collection Time: 08/05/20 10:56 PM   Result Value Ref Range    POC Glucose 77 65 - 105 mg/dL   POC Glucose Fingerstick    Collection Time: 08/05/20 11:50 PM   Result Value Ref Range    POC Glucose 95 65 - 105 mg/dL   Occult Blood Screen    Collection Time: 08/06/20 12:43 AM   Result Value Ref Range    Occult Blood, Stool #1 POSITIVE (A) NEGATIVE    Date, Stool #1 [phone]     Time, Stool #1 UNKNOWN     Occult Blood, Stool #2 NOT REPORTED NEGATIVE    Date, Stool #2 NOT REPORTED     Time, Stool #2 NOT REPORTED     Occult Blood, Stool #3 NOT REPORTED NEGATIVE    Date, Stool #3 NOT REPORTED     Time, Stool #3 NOT REPORTED    Basic Metabolic Panel w/ Reflex to MG    Collection Time: 08/06/20  4:40 AM   Result Value Ref Range    Glucose 59 (L) 70 - 99 mg/dL    BUN 59 (H) 8 - 23 mg/dL    CREATININE 1.92 (H) 0.50 - 0.90 mg/dL    Bun/Cre Ratio NOT REPORTED 9 - 20    Calcium 8.7 8.6 - 10.4 mg/dL    Sodium 146 (H) 135 - 144 mmol/L    Potassium 4.3 3.7 - 5.3 mmol/L    Chloride 110 (H) 98 - 107 mmol/L    CO2 27 20 - 31 mmol/L    Anion Gap 9 9 - 17 mmol/L    GFR Non-African American 26 (L) >60 mL/min    GFR  32 (L) >60 mL/min    GFR Comment          GFR Staging NOT REPORTED    CBC auto differential    Collection Time: 08/06/20  4:40 AM   Result Value Ref Range    WBC 3.9 3.5 - 11.0 k/uL    RBC 2.55 (L) 4.0 - 5.2 m/uL    Hemoglobin 6.0 (LL) 12.0 - 16.0 g/dL    Hematocrit 20.0 (L) 36 - 46 %    MCV 78.4 (L) 80 - 100 fL    MCH 23.3 (L) 26 - 34 pg    MCHC 29.7 (L) 31 - 37 g/dL    RDW 17.3 (H) 11.5 - 14.9 %    Platelets 245 (L) 630 - 450 k/uL    MPV 7.7 6.0 - 12.0 fL    NRBC Automated NOT REPORTED per 100 WBC    Differential Type NOT REPORTED     Immature Granulocytes NOT REPORTED 0 %    Absolute Immature Granulocyte NOT REPORTED 0.00 - 0.30 k/uL    WBC Morphology NOT REPORTED     RBC Morphology NOT REPORTED     Platelet Estimate NOT REPORTED     Seg Neutrophils 65 36 - 66 %    Lymphocytes 26 24 - 44 %    Monocytes 6 1 - 7 %    Eosinophils % 2 0 - 4 %    Basophils 1 0 - 2 %    Segs Absolute 2.54 1.3 - 9.1 k/uL    Absolute Lymph # 1.01 1.0 - 4.8 k/uL    Absolute Mono # 0.23 0.1 - 1.3 k/uL    Absolute Eos # 0.08 0.0 - 0.4 k/uL    Basophils Absolute 0.04 0.0 - 0.2 k/uL    Morphology ANISOCYTOSIS PRESENT     Morphology HYPOCHROMIA PRESENT     Morphology 1+ POLYCHROMASIA     Morphology 1+ ELLIPTOCYTES    POC Glucose Fingerstick    Collection Time: 08/06/20  6:26 AM   Result Value Ref Range    POC Glucose 62 (L) 65 - 105 mg/dL   POC Glucose Fingerstick    Collection Time: 08/06/20  7:02 AM   Result Value Ref Range    POC Glucose 112 (H) 65 - 105 mg/dL   POC Glucose Fingerstick    Collection Time: 08/06/20  8:24 AM   Result Value Ref Range    POC Glucose 91 65 - 105 mg/dL   POC Glucose Fingerstick    Collection Time: 08/06/20  9:15 AM   Result Value Ref Range    POC Glucose 83 65 - 105 mg/dL   POC Glucose Fingerstick    Collection Time: 08/06/20 11:27 AM   Result Value Ref Range    POC Glucose 89 65 - 105 mg/dL         Current Facility-Administered Medications   Medication Dose Route Frequency Provider Last Rate Last Dose    insulin lispro (HUMALOG) injection vial 0-12 Units  0-12 Units Subcutaneous TID  Yuri Nieves MD        insulin lispro (HUMALOG) injection vial 0-6 Units  0-6 Units Subcutaneous Nightly Yuri Nieves MD        bumetanide (BUMEX) tablet 1 mg  1 mg Oral TID Yuri Nieves MD        0.9 % sodium chloride bolus  20 mL Intravenous Brien Deutsch MD        sodium chloride flush 0.9 % injection 10 mL  10 mL Intravenous 2 times per day Alize Rolon MD        sodium chloride flush 0.9 % injection 10 mL  10 mL Intravenous PRN Alize Rolon MD        0.9 % sodium chloride infusion   Intravenous Continuous Alize Rolon MD 75 mL/hr at 08/06/20 0917      lamoTRIgine (LAMICTAL) tablet 200 mg  200 mg Oral BID Alize Rolon MD   200 mg at 08/06/20 0750    PARoxetine (PAXIL) tablet 20 mg  20 mg Oral QAM Yuri Nieves MD   20 mg at 08/06/20 1220    atorvastatin (LIPITOR) tablet 20 mg  20 mg Oral Daily Alize Rolon MD   20 mg at 08/06/20 1221    sodium chloride flush 0.9 % injection 10 mL  10 mL Intravenous 2 times per day Alize Rolon MD   10 mL at 08/05/20 2212    sodium chloride flush 0.9 % injection 10 mL  10 mL Intravenous PRN Alize Rolon MD   10 mL at 08/05/20 1515    acetaminophen (TYLENOL) tablet 650 mg  650 mg Oral Q6H PRN Alize Rolon MD        Or    acetaminophen (TYLENOL) suppository 650 mg  650 mg Rectal Q6H PRN Ailze Rolon MD        polyethylene glycol (GLYCOLAX) packet 17 g  17 g Oral Daily PRN Alize Rolon MD        promethazine (PHENERGAN) tablet 12.5 mg  12.5 mg Oral Q6H PRN Alize Rolon MD        Or    ondansetron (ZOFRAN) injection 4 mg  4 mg Intravenous Q6H PRN Alize Rolon MD        pantoprazole (PROTONIX) 80 mg in sodium chloride 0.9 % 100 mL infusion  8 mg/hr Intravenous Continuous Sudhakar Gaynel Cabot, MD 10 mL/hr at 08/06/20 1220 8 mg/hr at 08/06/20 1220    [START ON 8/8/2020] pantoprazole (PROTONIX) injection 40 mg  40 mg Intravenous Daily Alize Rolon MD        And    [START ON 8/8/2020] sodium chloride (PF) 0.9 % injection 10 mL  10 mL Intravenous Daily Yuri Nieves MD        levETIRAcetam (KEPPRA) tablet 1,250 mg  1,250 mg Oral BID Alize Rolon MD   1,250 mg at 08/06/20 0750    lisinopril (PRINIVIL;ZESTRIL) tablet 10 mg  10 mg Oral Daily Alize Rolon MD 10 mg at 08/06/20 1221    And    hydroCHLOROthiazide (HYDRODIURIL) tablet 12.5 mg  12.5 mg Oral Daily Maye More MD   12.5 mg at 08/06/20 1221    miconazole (MICOTIN) 2 % powder   Topical BID Maye More MD        [Held by provider] insulin glargine (LANTUS) injection vial 40 Units  40 Units Subcutaneous BID Jeri Curtis MD        glucose (GLUTOSE) 40 % oral gel 15 g  15 g Oral PRN Maye More MD   15 g at 08/05/20 2239    dextrose 50 % IV solution  12.5 g Intravenous PRN Maye More MD   12.5 g at 08/06/20 8321    glucagon (rDNA) injection 1 mg  1 mg Intramuscular PRN Maye More MD        dextrose 5 % solution  100 mL/hr Intravenous PRN Maye More MD           Imaging/Diagnostics:      Assessment :      Primary Problem  <principal problem not specified>    Active Hospital Problems    Diagnosis Date Noted    Symptomatic anemia [D64.9] 08/05/2020    Breast mass in female [N63.0] 08/05/2020    Hematuria [R31.9] 08/05/2020    Acute kidney injury superimposed on chronic kidney disease ([facility] [address] [N17.9, N18.9] 08/05/2020    Type 2 diabetes mellitus ([facility] [address] [E11.9] 10/21/2017       Plan:     Patient status Admit as inpatient in the  Progressive Unit/Step down    Consultations:   IP CONSULT TO INTERNAL MEDICINE  IP CONSULT TO GI  IP CONSULT TO GI  IP CONSULT TO SOCIAL WORK    DVT prophylaxis: reason for no prophylaxis: anemia, possible GI bleed  GI prophylaxis: Protonix IV    Patient is admitted as inpatient status because of co-morbiditieslisted above, severity of signs and symptoms as outlined, requirement for current medical therapies and most importantly because of direct risk to patient if care not provided in a hospital setting.       Symptomatic anemia, history of gastrointestinal bleed  -2 units PRBCs ordered for transfusion  -Stool exam for occult blood  -IV Protonix, received bolus  -Normal saline at 75 mL/h  -Daily BMP, daily CBC   -We will follow H&H every 8 hours  -GI is consulted, planning for EGD tomorrow-n.p.o. after midnight    Acute kidney injury superimposed on chronic kidney disease  -Creatinine 2.07 today was 1.54 on March 21, 2020  -GFR 24 today, was 29 on March 21, 2020  -Etiology of NIKKI is likely prerenal due to anemia  -Urine sodium and urine creatinine ordered    Hematuria  -Urinalysis with microscopic exam and reflex to culture pending    Breast mass in female  -Ordered right breast ultrasound    Type 2 diabetes mellitus  -Lantus 40 units 2 times daily, high-dose correction scale  -Hypoglycemia protocol  -Held metformin  -Hemoglobin A1c was 8.2 on August 4, 2020    Other  -Paxil for mood  -Atorvastatin 20 mg p.o. daily  -Lisinopril 10 mg p.o. daily and hydrochlorothiazide 12.5 mg p.o. daily  -Lamictal and Keppra for seizure      Tianna Vieyra MD  8/6/2020  2:01 PM    Copy sent to ROBYN AmbroseC   Attending Physician Statement    I have discussed the case of Watson Jorgensen, including pertinent history and exam findings with the resident. I have seen and examined the patient and the key elements of the encounter have been performed by me. I agree with the assessment, plan, and orders as documented by the resident. Patient was evaluated by me yesterday on August 5. 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   Yesterday on my examination she was found to have right breast mass needs further investigation  Electronically signed by Tianna Vieyra MD on 8/6/2020 at 2:01 PM

## 2020-08-05 NOTE — ED NOTES
Per lab \"DARIEN\" is positive and interfering with pt's screen results, so pt will need additional testing to complete the type and screen. Pt's blood will have to be sent to [facility] for the testing and two units will have to come from [facility].        Albert Taveras RN  08/05/20 3931

## 2020-08-05 NOTE — PLAN OF CARE
Problem: Falls - Risk of:  Goal: Will remain free from falls  Outcome: Ongoing  Note: No falls noted this shift. Patient ambulates with x1 staff assistance without difficulty. Bed kept in low position. Safe environment maintained. Bedside table & call light in reach. Uses call light appropriately when needing assistance. She will be NPO tonight for EGD tomorrow. Awaiting her 2 units of PRBCs to be delivered here from [facility].    Goal: Absence of physical injury  Outcome: Ongoing

## 2020-08-05 NOTE — PLAN OF CARE
PRE-CONSULT ROUNDING NOTE    HPI    49-year-old female admitted from [facility] for symptomatic anemia, evaluation for low hemoglobin. Hgb 6.5 on admission; presently hgb 6.1. Patient has extensive GI history. Patient was recently seen in the hospital in March 2020. At that time she had hematuria, postmenopausal vaginal bleeding, and rectal bleeding. She had prior capsule endoscopy that showed small bowel erosions and a questionable antral AVM. On 03/18/20 she had EGD with Dr. Becca Terrell that revealed 3 columns of varices; banding was done on 2 varices. 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 Last colonoscopy in May 2019 with snare polypectomy x3; these were tubular adenomas. She had bone marrow biopsy in April that did not reveal any acute bone marrow pathology. She is following with Dr. Kyle Henley. Planning for hysteroscopy and colposcopy. Last CBC in April and her hgb was 9.2 at that time. At present patient denies any rectal bleeding or hematuria. No melanotic stools. Patient is still having vaginal bleeding. She reports chronic diarrhea, typically 2-3 bowel movements daily. She has fecal incontinence on occasion. No abdominal pain, nausea, vomiting. She has good appetite. Patient has changed her diet and is trying to lose weight. No dypshagia, odynophagia. No significant heartburn, dyspeptic symptoms. No cramping, bloating. Has urgency of bowel movements. Has bowel movements at night. Patient is to get 2 units or PRBCs    Troponins elevated on admission. BP stable  No tachycardia  Hgb 6.1  Platelets 066  BUN 65  Creatinine 2.07      Review of Systems   Constitutional: Positive for activity change and fatigue. Negative for appetite change, fever and unexpected weight change. HENT: Negative for mouth sores, sore throat, trouble swallowing and voice change. Eyes: Negative. Respiratory: Positive for shortness of breath. Negative for cough, choking and wheezing. Cardiovascular: Positive for leg swelling. Negative for chest pain and palpitations. Gastrointestinal: Positive for diarrhea. Negative for abdominal pain, anal bleeding, blood in stool, constipation, nausea and vomiting. Endocrine: Negative for polyphagia and polyuria. Genitourinary: Positive for vaginal bleeding. Negative for difficulty urinating, frequency, hematuria, pelvic pain and urgency. Musculoskeletal: Positive for arthralgias. Negative for back pain, gait problem and joint swelling. Skin: Positive for color change. Negative for pallor and rash. Allergic/Immunologic: Negative for food allergies. Neurological: Positive for weakness and headaches. Negative for dizziness, seizures and light-headedness. Hematological: Negative for adenopathy. Bruises/bleeds easily. Psychiatric/Behavioral: Negative for sleep disturbance. The patient is not nervous/anxious. Physical Exam  Vitals signs and nursing note reviewed. Constitutional:       Appearance: She is well-developed. She is obese. HENT:      Head: Normocephalic and atraumatic. Eyes:      General: No scleral icterus. Conjunctiva/sclera: Conjunctivae normal.      Pupils: Pupils are equal, round, and reactive to light. Neck:      Musculoskeletal: Normal range of motion and neck supple. Thyroid: No thyromegaly. Vascular: No hepatojugular reflux or JVD. Trachea: No tracheal deviation. Cardiovascular:      Rate and Rhythm: Normal rate and regular rhythm. Heart sounds: Normal heart sounds. Pulmonary:      Effort: Pulmonary effort is normal. No respiratory distress. Breath sounds: Normal breath sounds. No wheezing or rales. Abdominal:      General: Bowel sounds are normal. There is no distension. Palpations: Abdomen is soft. There is no hepatomegaly or mass. Tenderness: There is no abdominal tenderness. There is no rebound. Hernia: No hernia is present.       Comments: Obese abdomen, difficult to assess   Musculoskeletal:         General: No tenderness. Right lower leg: Edema present. Left lower leg: Edema present. Comments: No joint swelling   Lymphadenopathy:      Cervical: No cervical adenopathy. Skin:     General: Skin is warm. Coloration: Skin is pale. Findings: No bruising, ecchymosis, erythema or rash. Neurological:      Mental Status: She is alert and oriented to person, place, and time. Cranial Nerves: No cranial nerve deficit. Psychiatric:         Thought Content:  Thought content normal.         ASSESSMENT/PLAN    Symptomatic anemia  -Transfuse 2 units PRBCs  -Stool for occult blood  -PPI bolus followed by drip  -Monitor for bleeding  -Trend H&H  -Transfuse for hgb <7  -NPO after midnight  -Plan for EGD tomorrow  -COVID testing

## 2020-08-06 NOTE — ANESTHESIA PRE PROCEDURE
Department of Anesthesiology  Preprocedure Note       Name:  Vinod Amanda   Age:  75 y.o.  :  1955                                          MRN:  [id number]         Date:  2020      Surgeon: Lizzy Bergeron):  Conchetta Fothergill, MD    Procedure: EGD ESOPHAGOGASTRODUODENOSCOPY (N/A Esophagus)    Medications prior to admission:   Prior to Admission medications    Medication Sig Start Date End Date Taking? Authorizing Provider   bumetanide (BUMEX) 1 MG tablet Take 1 mg by mouth 3 times daily    Historical Provider, MD   blood glucose monitor strips Test 4 times a day & as needed for symptoms of irregular blood glucose. Dispense sufficient amount for indicated testing frequency plus additional to accommodate PRN testing needs. 20   Dedra James PA-C   Lancets MISC 1 each by Does not apply route 4 times daily 20   Dedra James PA-C   glucose monitoring kit (FREESTYLE) monitoring kit 1 kit by Does not apply route daily 20   Dedra James PA-C   insulin glargine (BASAGLAR KWIKPEN) 100 UNIT/ML injection pen Inject 60 Units into the skin 2 times daily    Historical Provider, MD   Emollient (CERAVE) LOTN Apply topically 2 times daily Indications: bilateral lower legs    Historical Provider, MD   levETIRAcetam (KEPPRA) 750 MG tablet Take 750 mg by mouth 2 times daily Indications: with 500 mg to make 1250 mg dose    Historical Provider, MD   vitamin D (CHOLECALCIFEROL) 44839 UNIT CAPS Take 50,000 Units by mouth once a week Indications:      Historical Provider, MD   gabapentin (NEURONTIN) 100 MG capsule Take 100 mg by mouth 2 times daily. Kevin Restrepo     Historical Provider, MD   levETIRAcetam (KEPPRA) 500 MG tablet Take 500 mg by mouth 2 times daily Indications: with 750 mg to make total dose 1250 mg     Historical Provider, MD   insulin aspart (NOVOLOG) 100 UNIT/ML injection vial Inject into the skin Inject as per sliding scale before meals and at bedtime:    = 0 units; call physician if less than 70  151-200 = 2 units  201-250 = 4 units  251-300 = 6 units  301-350 = 8 units  351-400 = 10 units; call physician if greater than 400    Historical Provider, MD   potassium chloride (KLOR-CON M) 10 MEQ extended release tablet Take 30 mEq by mouth daily     Historical Provider, MD   vitamin B-12 (CYANOCOBALAMIN) 500 MCG tablet Take 500 mcg by mouth daily    Historical Provider, MD   acetaminophen (TYLENOL) 325 MG tablet Take 650 mg by mouth 3 times daily as needed for Pain (mild)    Historical Provider, MD   metFORMIN (GLUCOPHAGE) 1000 MG tablet Take 1,000 mg by mouth 2 times daily (with meals)    Historical Provider, MD   Blood Glucose Monitoring Suppl FLAVIO Check blood sugars 3/day  Patient not taking: Reported on 8/4/2020 11/30/17   hCin Hurd MD   lisinopril-hydrochlorothiazide (PRINZIDE;ZESTORETIC) 10-12.5 MG per tablet Take 1 tablet by mouth daily    Historical Provider, MD   PARoxetine (PAXIL) 20 MG tablet Take 20 mg by mouth every morning    Historical Provider, MD   omeprazole (PRILOSEC) 40 MG delayed release capsule Take 40 mg by mouth Daily     Historical Provider, MD   loratadine (CLARITIN) 10 MG tablet Take 10 mg by mouth daily    Historical Provider, MD   simvastatin (ZOCOR) 20 MG tablet Take 20 mg by mouth nightly     Historical Provider, MD   pramipexole (MIRAPEX) 1 MG tablet Take 1 mg by mouth nightly    Historical Provider, MD   lamoTRIgine (LAMICTAL) 200 MG tablet Take 200 mg by mouth 2 times daily    Historical Provider, MD   busPIRone (BUSPAR) 10 MG tablet Take 10 mg by mouth 3 times daily     Historical Provider, MD       Current medications:    No current facility-administered medications for this visit. No current outpatient medications on file.      Facility-Administered Medications Ordered in Other Visits   Medication Dose Route Frequency Provider Last Rate Last Dose    insulin lispro (HUMALOG) injection vial 0-12 Units  0-12 Units Subcutaneous TID LEANNE Lafleur MD  insulin lispro (HUMALOG) injection vial 0-6 Units  0-6 Units Subcutaneous Nightly Yuri Nieves MD        bumetanide (BUMEX) tablet 1 mg  1 mg Oral TID Maye More MD        0.9 % sodium chloride bolus  20 mL Intravenous Once Maye More MD        sodium chloride flush 0.9 % injection 10 mL  10 mL Intravenous 2 times per day Maye More MD        sodium chloride flush 0.9 % injection 10 mL  10 mL Intravenous PRN Maye More MD        0.9 % sodium chloride infusion   Intravenous Continuous Maey More MD 75 mL/hr at 08/06/20 0917      lamoTRIgine (LAMICTAL) tablet 200 mg  200 mg Oral BID Maye More MD   200 mg at 08/06/20 0750    PARoxetine (PAXIL) tablet 20 mg  20 mg Oral QAM Yuri Nieves MD   20 mg at 08/06/20 1220    atorvastatin (LIPITOR) tablet 20 mg  20 mg Oral Daily Maye More MD   20 mg at 08/06/20 1221    sodium chloride flush 0.9 % injection 10 mL  10 mL Intravenous 2 times per day Maye More MD   10 mL at 08/05/20 2212    sodium chloride flush 0.9 % injection 10 mL  10 mL Intravenous PRN Maye More MD   10 mL at 08/05/20 1515    acetaminophen (TYLENOL) tablet 650 mg  650 mg Oral Q6H PRN Maye More MD        Or    acetaminophen (TYLENOL) suppository 650 mg  650 mg Rectal Q6H PRN Maye More MD        polyethylene glycol (GLYCOLAX) packet 17 g  17 g Oral Daily PRN Maye More MD        promethazine (PHENERGAN) tablet 12.5 mg  12.5 mg Oral Q6H PRN Maye More MD        Or    ondansetron (ZOFRAN) injection 4 mg  4 mg Intravenous Q6H PRN Maye More MD        pantoprazole (PROTONIX) 80 mg in sodium chloride 0.9 % 100 mL infusion  8 mg/hr Intravenous Continuous Maye More MD 10 mL/hr at 08/06/20 1220 8 mg/hr at 08/06/20 1220    [START ON 8/8/2020] pantoprazole (PROTONIX) injection 40 mg  40 mg Intravenous Daily Maye More MD And    [START ON 8/8/2020] sodium chloride (PF) 0.9 % injection 10 mL  10 mL Intravenous Daily Emile Navas MD        levETIRAcetam (KEPPRA) tablet 1,250 mg  1,250 mg Oral BID Emile Navas MD   1,250 mg at 08/06/20 0750    lisinopril (PRINIVIL;ZESTRIL) tablet 10 mg  10 mg Oral Daily Emile Navas MD   10 mg at 08/06/20 1221    And    hydroCHLOROthiazide (HYDRODIURIL) tablet 12.5 mg  12.5 mg Oral Daily Emile Navas MD   12.5 mg at 08/06/20 1221    miconazole (MICOTIN) 2 % powder   Topical BID Emile Navas MD        [Held by provider] insulin glargine (LANTUS) injection vial 40 Units  40 Units Subcutaneous BID Dulce Rodney MD        glucose (GLUTOSE) 40 % oral gel 15 g  15 g Oral PRN Emile Navas MD   15 g at 08/05/20 2239    dextrose 50 % IV solution  12.5 g Intravenous PRN Emile Navas MD   12.5 g at 08/06/20 0637    glucagon (rDNA) injection 1 mg  1 mg Intramuscular PRN Emile Navas MD        dextrose 5 % solution  100 mL/hr Intravenous PRN Emile Navas MD           Allergies: Allergies   Allergen Reactions    Codeine Hives and Shortness Of Breath       Problem List:    Patient Active Problem List   Diagnosis Code    Type 2 diabetes mellitus (formerly [facility]) E11.9    Breakthrough seizure ([facility] [address] G40.919    CVA, old, hemiparesis ([facility] [address] I69.359    Falling episodes R29.6    Cerebral concussion S06. 8Q4D    Cervical spinal stenosis M48.02    Diabetic polyneuropathy associated with type 2 diabetes mellitus (formerly [facility]) E11.42    History of cerebral infarction Z86.73    Seizure disorder (formerly [facility]) G40.909    Depression with anxiety F41.8    Dizziness R42    Generalized weakness R53.1    GERD (gastroesophageal reflux disease) K21.9    H/O falling Z91.81    Hyperglycemia R73.9    Hyponatremia E87.1    Morbid obesity with BMI of 40.0-44.9, adult (formerly [facility]) E66.01, Z68.41    Pure hypercholesterolemia E78.00    RLS (restless legs syndrome) G25.81    Thrombocytopenia (HCC) D69.6    Altered mental status R41.82    Confusion state F44.89    Gait disturbance R26.9    GI bleed K92.2    Polyp of colon K63.5    Postmenopausal bleeding N95.0    Anemia D64.9    Elevated alkaline phosphatase level R74.8    GAVE (gastric antral vascular ectasia) K31.819    Esophageal varices determined by endoscopy ([facility] [address] I85.00    Atypical glandular cells of undetermined significance (SLICK) on cervical Pap smear (NOS) R87.619    Symptomatic anemia D64.9    Breast mass in female N63.0    Hematuria R31.9    Acute kidney injury superimposed on chronic kidney disease (HCC) N17.9, N18.9       Past Medical History:        Diagnosis Date    Anemia     Cataract     GERD (gastroesophageal reflux disease)     Headache     Hyperlipidemia     Neuropathy     Osteoarthritis     Restless leg syndrome     Seizures ([facility] [address] since age 10   [place] Short-term memory loss     Stroke (cerebrum) ([facility] [address] 2010    Type 2 diabetes mellitus without complication ([facility] [address] 0825       Past Surgical History:        Procedure Laterality Date    CARDIAC CATHETERIZATION      CATARACT REMOVAL      CHOLECYSTECTOMY      COLONOSCOPY N/A 5/13/2019    COLONOSCOPY DIAGNOSTIC, POLYPECTOMIES performed by Miri Herbert MD at [address] [facility]      cataract    TONSILLECTOMY      UPPER GASTROINTESTINAL ENDOSCOPY N/A 5/12/2019    EGD ESOPHAGOGASTRODUODENOSCOPY performed by Miri Herbert MD at [address] [facility] N/A 3/18/2020    EGD WITH CAUTERIZATION OF [place] (GAVE) USING ARGON performed by Gladys Ames MD at [address] [facility] ENDO       Social History:    Social History     Tobacco Use    Smoking status: Never Smoker    Smokeless tobacco: Never Used   Substance Use Topics    Alcohol use: No                                Counseling given: Not Answered      Vital Signs (Current): There were no vitals filed for this visit. BP Readings from Last 3 Encounters:   08/06/20 98/79   08/04/20 114/60   07/29/20 136/64       NPO Status:                                                                                 BMI:   Wt Readings from Last 3 Encounters:   08/05/20 266 lb 8.6 oz (120.9 kg)   08/04/20 271 lb (122.9 kg)   07/29/20 267 lb (121.1 kg)     There is no height or weight on file to calculate BMI.    CBC:   Lab Results   Component Value Date    WBC 3.9 08/06/2020    RBC 2.55 08/06/2020    HGB 6.0 08/06/2020    HCT 20.0 08/06/2020    MCV 78.4 08/06/2020    RDW 17.3 08/06/2020     08/06/2020       CMP:   Lab Results   Component Value Date     08/06/2020    K 4.3 08/06/2020     08/06/2020    CO2 27 08/06/2020    BUN 59 08/06/2020    CREATININE 1.92 08/06/2020    GFRAA 32 08/06/2020    LABGLOM 26 08/06/2020    GLUCOSE 59 08/06/2020    PROT 6.7 08/05/2020    CALCIUM 8.7 08/06/2020    BILITOT 0.16 08/05/2020    ALKPHOS 84 08/05/2020    AST 24 08/05/2020    ALT 13 08/05/2020       POC Tests:   Recent Labs     08/06/20  1127   POCGLU 89       Coags:   Lab Results   Component Value Date    PROTIME 13.6 03/17/2020    INR 1.1 03/17/2020    APTT 31.5 03/16/2020       HCG (If Applicable): No results found for: PREGTESTUR, PREGSERUM, HCG, HCGQUANT     ABGs: No results found for: PHART, PO2ART, GVD8MVC, GRP4UFN, BEART, [id number]     Type & Screen (If Applicable):  No results found for: LABABO, [address]    Anesthesia Evaluation  Patient summary reviewed and Nursing notes reviewed no history of anesthetic complications:   Airway: Mallampati: III  TM distance: >3 FB   Neck ROM: full  Mouth opening: > = 3 FB Dental: normal exam         Pulmonary:normal exam  breath sounds clear to auscultation  (+) shortness of breath: new,                             Cardiovascular:    (+) hyperlipidemia      ECG reviewed  Rhythm: regular  Rate: normal  Echocardiogram reviewed               ROS comment: Estimated LV EF 60-65 %.      Neuro/Psych: (+) CVA:, neuromuscular disease:, headaches:, psychiatric history:depression/anxiety              ROS comment: Cervical spinal stenosis  Diabetic polyneuropathy   Restless leg syndrome GI/Hepatic/Renal:   (+) GERD:, renal disease (NIKKI on CKD): CRI, morbid obesity         ROS comment: GI Bleed. Endo/Other:    (+) DiabetesType II DM, , blood dyscrasia (Hgb - 6.0): anemia, arthritis: OA., .                 Abdominal:           Vascular: negative vascular ROS. Anesthesia Plan      MAC     ASA 4 - emergent     (Hb 6.1 on presentation, awaiting blood transfusion (+antibodies)  Elevated troponins)  Induction: intravenous. MIPS: Prophylactic antiemetics administered. Anesthetic plan and risks discussed with patient. Plan discussed with CRNA. 8/6 - Patient has not had blood transfusion and blood said to be coming from [facility]. It appears it would be safer to have blood available in house  prior to having the procedure done . The blood bank confirms that the blood is not in house. GI doctor agrees with this plan so case is rescheduled for now.     ** Please check that patient has had blood transfusion prior to procedure tomorrow**    Demetria Peña MD   8/6/2020

## 2020-08-06 NOTE — PLAN OF CARE
Problem: Falls - Risk of:  Goal: Will remain free from falls  Description: Will remain free from falls  8/6/2020 1406 by Radha Kelly RN  Outcome: Ongoing  8/6/2020 0327 by Denae Medellin RN  Outcome: Ongoing  Goal: Absence of physical injury  Description: Absence of physical injury  8/6/2020 1406 by Radha Kelly RN  Outcome: Ongoing  8/6/2020 0327 by Denae Medellin RN  Outcome: Ongoing     Problem: Bleeding:  Goal: Will show no signs and symptoms of excessive bleeding  Description: Will show no signs and symptoms of excessive bleeding  8/6/2020 1406 by Radha Kelly RN  Outcome: Ongoing  8/6/2020 0327 by Denae Medellin RN  Outcome: Ongoing     Problem: Serum Glucose Level - Abnormal:  Goal: Ability to maintain appropriate glucose levels has stabilized  Description: Ability to maintain appropriate glucose levels has stabilized  8/6/2020 1406 by Radha Kelly RN  Outcome: Ongoing  8/6/2020 0327 by Denae Medellin RN  Outcome: Ongoing     Problem: Mobility - Impaired:  Goal: Mobility will improve  Description: Mobility will improve  8/6/2020 1406 by Radha Kelly RN  Outcome: Ongoing  8/6/2020 0327 by Denae Medellin RN  Outcome: Ongoing       Patient has maintained safety throughout the shift, using call light and ambulatory aides. VSS. No signs of excessive bleeding noted throughout the shift.      Electronically signed by Radha Kelly RN on 8/6/2020 at 2:07 PM

## 2020-08-06 NOTE — PROGRESS NOTES
[facility]    PROGRESS NOTE             8/6/2020    2:03 PM    Name:   Radha Matos  MRN:     [id number]     Acct:      [de-identified]   Room:   2115/2115-01  IP Day:  1  Admit Date:  8/5/2020 11:34 AM    PCP:  Jose Guzman PA-C  Code Status:  Full Code    Subjective:     C/C:   Chief Complaint   Patient presents with    Shortness of Breath     Just started as she was walking in    Abnormal Lab     HGB 6.5     Interval History Status: not changed. Patient seen and examined at the bed side, no new acute events. She has still not received the 2 units of PRBCs that were ordered yesterday due to being DARIEN positivity on type and screen, blood will be coming from [facility]. Patient had hypoglycemia this morning when she was n.p.o. prior to receiving any insulin in the hospital, for this reason we have held her morning Lantus and will resume this evening if she begins eating. She is going for EGD this morning at 9 AM.    This morning the patient states that her shortness of breath is improved and that she has been moving around her room fine. She reports mild morning cough that she gets every day, not new for her. Denies any lightheadedness or dizziness, chest pain, hematochezia, ongoing hematuria, hematemesis, or any other complaints at this time. Notes from nursing staff and Consults had been reviewed, and the overnight progress had been checked with the nursing staff as well. Brief History:     Please see H&P. Review of Systems:     CONSTITUTIONAL:  no fevers  EYES: negative for blury vision  HEENT: No headaches, no nasal congestion, no difficulty swallowing  RESPIRATORY: Positive for for dyspnea, no wheezing, no Cough  CARDIOVASCULAR: negative for chest pain, no palpitations  GASTROINTESTINAL: no nausea, no vomiting, no change in bowel habits, no abdominal pain. Denies hematochezia.   GENITOURINARY: negative for dysuria, no hematuria   MUSCULOSKELETAL: no joint pains, no muscle aches, no swelling of joints or extremities  NEUROLOGICAL: No weakness or numbness      Medications: Allergies: Allergies   Allergen Reactions    Codeine Hives and Shortness Of Breath       Current Meds:   Scheduled Meds:    insulin lispro  0-12 Units Subcutaneous TID WC    insulin lispro  0-6 Units Subcutaneous Nightly    bumetanide  1 mg Oral TID    sodium chloride  20 mL Intravenous Once    sodium chloride flush  10 mL Intravenous 2 times per day    lamoTRIgine  200 mg Oral BID    PARoxetine  20 mg Oral QAM    atorvastatin  20 mg Oral Daily    sodium chloride flush  10 mL Intravenous 2 times per day    [START ON 8/8/2020] pantoprazole  40 mg Intravenous Daily    And    [START ON 8/8/2020] sodium chloride (PF)  10 mL Intravenous Daily    levETIRAcetam  1,250 mg Oral BID    lisinopril  10 mg Oral Daily    And    hydroCHLOROthiazide  12.5 mg Oral Daily    miconazole   Topical BID    [Held by provider] insulin glargine  40 Units Subcutaneous BID     Continuous Infusions:    sodium chloride 75 mL/hr at 08/06/20 0917    pantoprozole (PROTONIX) infusion 8 mg/hr (08/06/20 1220)    dextrose       PRN Meds: sodium chloride flush, sodium chloride flush, acetaminophen **OR** acetaminophen, polyethylene glycol, promethazine **OR** ondansetron, glucose, dextrose, glucagon (rDNA), dextrose    Data:     Past Medical History:   has a past medical history of Anemia, Cataract, GERD (gastroesophageal reflux disease), Headache, Hyperlipidemia, Neuropathy, Osteoarthritis, Restless leg syndrome, Seizures ([address] Short-term memory loss, Stroke (cerebrum) ([facility] and Type 2 diabetes mellitus without complication ([facility] [facility] Social History:   reports that she has never smoked. She has never used smokeless tobacco. She reports that she does not drink alcohol or use drugs.      Family History:   Family History   Problem Relation Age of Onset    Diabetes Brother Vitals:  /80 Comment: manual  Pulse 82   Temp 97.5 °F (36.4 °C) (Infrared)   Resp 20   Ht 5' (1.524 m)   Wt 266 lb 8.6 oz (120.9 kg)   SpO2 100%   BMI 52.05 kg/m²   Temp (24hrs), Av.2 °F (36.8 °C), Min:97.5 °F (36.4 °C), Max:98.4 °F (36.9 °C)      Recent Labs     20  0702 20  0824 20  0915 20  1127   POCGLU 112* 91 83 89       I/O(24Hr): Intake/Output Summary (Last 24 hours) at 2020 1403  Last data filed at 2020 0755  Gross per 24 hour   Intake 1474 ml   Output --   Net 1474 ml       Labs:    CBC with Differential:    Lab Results   Component Value Date    WBC 3.9 2020    RBC 2.55 2020    HGB 6.0 2020    HCT 20.0 2020     2020    MCV 78.4 2020    MCH 23.3 2020    MCHC 29.7 2020    RDW 17.3 2020    NRBC 1 05/15/2019    LYMPHOPCT 26 2020    MONOPCT 6 2020    BASOPCT 1 2020    MONOSABS 0.23 2020    LYMPHSABS 1.01 2020    EOSABS 0.08 2020    BASOSABS 0.04 2020    DIFFTYPE NOT REPORTED 2020       Lab Results   Component Value Date/Time    SPECIAL NOT REPORTED 2020 04:30 PM     Lab Results   Component Value Date/Time    CULTURE NO SIGNIFICANT GROWTH 2020 04:30 PM         Radiology:      Physical Examination:        PHYSICAL EXAM:  General Appearance  Alert , awake, not in acute distress. She appears pale. HEENT - Head is normocephalic, atraumatic. Conjunctive are pink. Lungs - Bilateral equal air entry, no wheezes, rales or rhonchi, aeration good. Cardiovascular - Heart sounds are normal.  Regular rhythm, normal rate without murmur, gallop or rub. Abdomen - Soft, nontender, nondistended, no masses or organomegaly  Neurologic - There are no new focal motor or sensory deficits  Skin - No bruising or bleeding on exposed skin area  Extremities - No cyanosis, clubbing or edema.   Bilateral lower extremities have xerosis, erythema, and trace pitting edema.       Assessment:        Primary Problem  <principal problem not specified>    Active Hospital Problems    Diagnosis Date Noted    Symptomatic anemia [D64.9] 08/05/2020    Breast mass in female [N63.0] 08/05/2020    Hematuria [R31.9] 08/05/2020    Acute kidney [facility] superimposed [facility] chronic [facility] disease ([facility] [N17.9, N18.9] 08/05/2020    Type 2 diabetes mellitus ([facility] [E11.9] 10/21/2017       Plan:        Symptomatic anemia, history of gastrointestinal bleed  -Hemoglobin 6.0 this morning, was 5.8 and 6.1 in hospital last night  -2 units PRBCs ordered for transfusion - still has not yet received due to DARIEN positivity causing delay as blood will come from [place] supposedly at noon on August 6, 2020  -Positive occult blood on stool exam  -IV Protonix, received bolus  -Normal saline at 75 mL/h  -Daily BMP, daily CBC   -We will follow H&H every 8 hours  -GI is consulted, planning for EGD on 08/7/20 after she receives PRBC transfusion     Acute kidney injury, prerenal, superimposed on chronic kidney disease  -Creatinine 1.92 today, was 2.07 yesterday and was 1.54 on March 21, 2020  -GFR 26 today, was 29 on March 21, 2020  -Etiology of NIKKI is likely prerenal due to anemia  -Urine sodium: 37  -Urine creatinine 96.6  -FENa is 0.5%, suggesting prerenal etiology that is consistent with her ongoing anemia     Hematuria  -Urinalysis with microscopic exam and reflex to culture pending     Breast mass in female  -Ordered right breast ultrasound     Type 2 diabetes mellitus  -Patient had hypoglycemia as low as 59 this morning but did not eat as she was n.p.o. after midnight  -Lantus 40 units 2 times daily - HELD THIS MORNING, plan to resume this afternoon if she begins eating.  -Medium-dose correction scale  -Hypoglycemia protocol  -Held metformin  -Hemoglobin A1c was 8.2 on August 4, 2020     Other  -Paxil for mood  -Atorvastatin 20 mg p.o. daily  -Lisinopril 10 mg p.o. daily and hydrochlorothiazide 12.5 mg p.o. daily  -Lamictal and Keppra for seizure     DVT prophylaxis: reason for no prophylaxis: anemia, possible GI bleed  GI prophylaxis: Protonix IV    Eri Quintana MD  8/6/2020  2:03 PM     Attending Physician Statement    I have discussed the case of Surjit Mcallister, including pertinent history and exam findings with the resident. I have seen and examined the patient and the key elements of the encounter have been performed by me. I agree with the assessment, plan, and orders as documented by the resident. She she did not have EGD today.   Will be given 1 unit of RBCs and after that she will be reassessed whether she is a candidate for that or not  Electronically signed by Eri Quintana MD on 8/6/2020 at 2:03 PM

## 2020-08-06 NOTE — PLAN OF CARE
Problem: Falls - Risk of:  Goal: Will remain free from falls  Description: Will remain free from falls  8/6/2020 0327 by Deena Parra RN  Outcome: Ongoing     Problem: Bleeding:  Goal: Will show no signs and symptoms of excessive bleeding  Description: Will show no signs and symptoms of excessive bleeding  Outcome: Ongoing     Problem: Serum Glucose Level - Abnormal:  Goal: Ability to maintain appropriate glucose levels has stabilized  Description: Ability to maintain appropriate glucose levels has stabilized  Outcome: Ongoing     Problem: Mobility - Impaired:  Goal: Mobility will improve  Description: Mobility will improve  Outcome: Ongoing   Pt labs monitored and treated appropriately. Fall precautions in place. Monitor glucose as ordered. Administer appropriate meds. Encourage frequent repositioning. Assist patient as a standby to bathroom.

## 2020-08-06 NOTE — CARE COORDINATION
CASE MANAGEMENT NOTE:    Admission Date:  8/5/2020 Naa Gonzalez is a 75 y.o.  female    Admitted for : Symptomatic anemia [D64.9]    Met with:  Patient    PCP:  Gisela Dawn                                Insurance:  Jonathon Luevano      Current Residence/ Living Arrangements:  [place] [address], is looking for a new apt. Current Services PTA:  No    Is patient agreeable to VNS: No    Freedom of choice provided:  Yes    List of [address] provided: No, declines    VNS chosen:  No    DME:  [employer]    Home Oxygen: No    Nebulizer: No    CPAP/BIPAP: No    Supplier: N/A    Potential Assistance Needed: No    SNF needed: No    Freedom of choice and list provided: NA    Pharmacy:  [facility]       Does Patient want to use MEDS to BEDS? No    Is the Patient an [facility] with Readmission Risk Score greater than 14%? No  If yes, pt needs a follow up appointment made within 7 days. Family Members/Caregivers that pt would like involved in their care:    Yes    If yes, list name here:  Mathew Huizary Provider:  Patient             Is patient in Isolation/One on One/Altered Mental Status? Yes  If yes, skip next question. If no, would they like an I-Pad to  use? NA  If yes, call [phone]. Discharge Plan:  8/6/20 Rose Torres Pt. Lives alone in [place] [address]. Is looking for a new apt. DME [employer]. Denies VNS/Needs. Protonix GTT, PT/OT, Hx of Esophageal Varices, HGB 6.0, will get transfused, Needs EGD, ? Efra, GI.  Full liq, Will follow//KB                 Electronically signed by: Arsalan Buck RN on 8/6/2020 at 10:30 AM

## 2020-08-06 NOTE — PROGRESS NOTES
Spoke Shannon in the blood bank, states they have just received the report from the [employer] 10 minutes ago. She states they are running the report, verifying that we have the right blood in stock and that it can take up to an hour. Residents notified. Patient resting in bed comfortably. Will continue monitor.      Electronically signed by Nori Ruff RN on 8/6/2020 at 5:30 PM

## 2020-08-06 NOTE — PROGRESS NOTES
2762: Spoke with Dominic Ayon from the blood bank, states the patient's blood is still being tested. 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 She stated that the JKA needs to tested in each unit then a full type and cross match needs to be completed before the blood can be released. 1900: Harshil Costa clinical lead RN called blood bank.     Electronically signed by Zulay Tripathi RN on 8/6/2020 at 7:04 PM

## 2020-08-06 NOTE — CONSULTS
GI Consult Note:    Name: Laure Rodriguez  MRN: [id number]     Acct: [de-identified]  Room: [facility]-    Admit Date: 8/5/2020  PCP: Toño Boss PA-C    Physician Requesting Consult: Lona Barksdale MD     Reason for Consult: Anemia, GI blood loss. Chief Complaint:     Chief Complaint   Patient presents with    Shortness of Breath     Just started as she was walking in    Abnormal Lab     HGB 6.5       History Obtained From:     patient, nursing staff, electronic health record. APRN. History of Present Illness:      Laure Rodriguez is a  75 y.o.  female who presents with Shortness of Breath (Just started as she was walking in) and Abnormal Lab (HGB 6.5)      Symptoms:  Patient is known to me for several years. She is known to have chronic anemia. In the past she had a work-up done including EGD, colonoscopy and capsule study of the small bowel and at that time no significant abnormalities identified that can account for her anemia. She is also known to have chronic liver disease may be secondary to [place]. She also had another EGD on March 18, 2020 by Dr. Babatunde Padilla, and at that time he did do variceal banding. Also described gastric vascular ectasia. Also in the past she had bone marrow biopsy done which was nonspecific. Recently patient is being followed by gynecologist regarding menorrhagia. Recently patient has been feeling weak tired. Her hemoglobin was 6.5. Subsequently she is admitted for further evaluation. In the last 3 months hemoglobin decreased from 9.2-6.5. Rest of the history reviewed with the patient as written in the APRN notes.   Past Medical History:     Past Medical History:   Diagnosis Date    Anemia     Cataract     GERD (gastroesophageal reflux disease)     Headache     Hyperlipidemia     Neuropathy     Osteoarthritis     Restless leg syndrome     Seizures ([address] since age 10   Kacy Heather Short-term memory loss     Stroke (cerebrum) ([facility] 2010    Type 2 units  251-300 = 6 units  301-350 = 8 units  351-400 = 10 units; call physician if greater than 400   Yes Historical Provider, MD   potassium chloride (KLOR-CON M) 10 MEQ extended release tablet Take 30 mEq by mouth daily    Yes Historical Provider, MD   vitamin B-12 (CYANOCOBALAMIN) 500 MCG tablet Take 500 mcg by mouth daily   Yes Historical Provider, MD   acetaminophen (TYLENOL) 325 MG tablet Take 650 mg by mouth 3 times daily as needed for Pain (mild)   Yes Historical Provider, MD   metFORMIN (GLUCOPHAGE) 1000 MG tablet Take 1,000 mg by mouth 2 times daily (with meals)   Yes Historical Provider, MD   lisinopril-hydrochlorothiazide (PRINZIDE;ZESTORETIC) 10-12.5 MG per tablet Take 1 tablet by mouth daily   Yes Historical Provider, MD   PARoxetine (PAXIL) 20 MG tablet Take 20 mg by mouth every morning   Yes Historical Provider, MD   omeprazole (PRILOSEC) 40 MG delayed release capsule Take 40 mg by mouth Daily    Yes Historical Provider, MD   loratadine (CLARITIN) 10 MG tablet Take 10 mg by mouth daily   Yes Historical Provider, MD   simvastatin (ZOCOR) 20 MG tablet Take 20 mg by mouth nightly    Yes Historical Provider, MD   pramipexole (MIRAPEX) 1 MG tablet Take 1 mg by mouth nightly   Yes Historical Provider, MD   lamoTRIgine (LAMICTAL) 200 MG tablet Take 200 mg by mouth 2 times daily   Yes Historical Provider, MD   busPIRone (BUSPAR) 10 MG tablet Take 10 mg by mouth 3 times daily    Yes Historical Provider, MD   blood glucose monitor strips Test 4 times a day & as needed for symptoms of irregular blood glucose. Dispense sufficient amount for indicated testing frequency plus additional to accommodate PRN testing needs.  8/4/20   Matt Ivey PA-C   Lancets MISC 1 each by Does not apply route 4 times daily 8/4/20   Matt Ivey PA-C   glucose monitoring kit (FREESTYLE) monitoring kit 1 kit by Does not apply route daily 8/4/20   Matt Ivey PA-C   Blood Glucose Monitoring Suppl FLAVIO Check blood sugars 3/day  Patient not taking: Reported on 17   Phyllis Condon MD        Allergies:       Codeine    Social History:     Tobacco:    reports that she has never smoked. She has never used smokeless tobacco.  Alcohol:      reports no history of alcohol use. Drug Use: reports no history of drug use. Family History:     Family History   Problem Relation Age of Onset    Diabetes Brother        Review of Systems:     Review of Systems   Constitutional: Positive for appetite change and fatigue. Negative for fever and unexpected weight change. HENT: Negative for mouth sores, sore throat, trouble swallowing and voice change. Eyes: Negative. Respiratory: Negative for cough, choking, shortness of breath and wheezing. Cardiovascular: Negative for chest pain, palpitations and leg swelling. Gastrointestinal: Positive for abdominal distention, diarrhea and nausea. Negative for abdominal pain. Endocrine: Negative for polyphagia and polyuria. Genitourinary: Negative for difficulty urinating, frequency, hematuria, pelvic pain, urgency and vaginal bleeding. Musculoskeletal: Positive for gait problem and myalgias. Negative for arthralgias, back pain and joint swelling. Skin: Negative for color change, pallor and rash. Allergic/Immunologic: Negative for food allergies. Neurological: Negative for dizziness, seizures, weakness, light-headedness and headaches. Hematological: Negative for adenopathy. Bruises/bleeds easily. Psychiatric/Behavioral: Negative for sleep disturbance. The patient is nervous/anxious. Code Status:  Full Code    Physical Exam:     Vitals:  BP (!) 143/66   Pulse 86   Temp 98.3 °F (36.8 °C) (Oral)   Resp 16   Ht 5' (1.524 m)   Wt 266 lb 8.6 oz (120.9 kg)   SpO2 97%   BMI 52.05 kg/m²   Temp (24hrs), Av.4 °F (36.9 °C), Min:98.2 °F (36.8 °C), Max:98.5 °F (36.9 °C)      Physical Exam  Vitals signs and nursing note reviewed.    Constitutional:       Appearance: RDW 17.8 08/05/2020    NRBC 1 05/15/2019    LYMPHOPCT 18 08/05/2020    MONOPCT 5 08/05/2020    BASOPCT 1 08/05/2020    MONOSABS 0.24 08/05/2020    LYMPHSABS 0.85 08/05/2020    EOSABS 0.09 08/05/2020    BASOSABS 0.05 08/05/2020    DIFFTYPE NOT REPORTED 08/05/2020     Hemoglobin/Hematocrit:  Lab Results   Component Value Date    HGB 6.1 08/05/2020    HCT 21.1 08/05/2020     CMP:    Lab Results   Component Value Date     08/05/2020    K 4.8 08/05/2020     08/05/2020    CO2 24 08/05/2020    BUN 65 08/05/2020    CREATININE 2.07 08/05/2020    GFRAA 29 08/05/2020    LABGLOM 24 08/05/2020    GLUCOSE 104 08/05/2020    PROT 6.7 08/05/2020    LABALBU 3.1 08/05/2020    CALCIUM 9.1 08/05/2020    BILITOT 0.16 08/05/2020    ALKPHOS 84 08/05/2020    AST 24 08/05/2020    ALT 13 08/05/2020     BMP:  Lab Results   Component Value Date     08/05/2020    K 4.8 08/05/2020     08/05/2020    CO2 24 08/05/2020    BUN 65 08/05/2020    LABALBU 3.1 08/05/2020    CREATININE 2.07 08/05/2020    CALCIUM 9.1 08/05/2020    GFRAA 29 08/05/2020    LABGLOM 24 08/05/2020    GLUCOSE 104 08/05/2020     PT/INR:    Lab Results   Component Value Date    PROTIME 13.6 03/17/2020    INR 1.1 03/17/2020     PTT:    Lab Results   Component Value Date    APTT 31.5 03/16/2020   [APTT}    Assesment:     Primary Problem  <principal problem not specified>    Active Hospital Problems    Diagnosis Date Noted    Symptomatic anemia [D64.9] 08/05/2020    Breast mass in female [N63.0] 08/05/2020    Hematuria [R31.9] 08/05/2020    Acute kidney injury superimposed on chronic [facility] disease ([facility] [N17.9, N18.9] 08/05/2020    Type 2 diabetes mellitus ([facility] [E11.9] 10/21/2017     This possible anemia may be secondary to renal insufficiency. Also chronic disease may be contributing to the anemia. Plan:     1. As patient had esophageal varices that were banded about 3 months ago, this needs to be further evaluated.   2. We will keep her on PPI therapy. 3. She may need EGD that will be arranged. 4. Transfuse and keep hemoglobin more than 7.  5. After discussion, patient understood and verbalized the consent for EGD. 6. Discussed with the nursing staff regarding the care. Thank you for allowing me to participate in the care of your patient. Please feel free to contact me with anyquestions or concerns. Electronically signed by Conchetta Fothergill, MD on 8/5/2020 at 8:17 PM     Copy sent to Dr. Dedra James PA-C    Note is dictated utilizing voice recognition software. Unfortunately this leads to occasional typographical errors. Please contact our office if you have any questions.

## 2020-08-06 NOTE — PROGRESS NOTES
Silas Messina from the lab, states that we are waiting for the units of PRBCs. She states the [facility] is doing additional testing. Reports she will call up to the floor once the units of blood are available.        Electronically signed by Dennie Push, RN on 8/6/2020 at 2:30 PM

## 2020-08-06 NOTE — PROGRESS NOTES
Writer spoke with Dr. Manny Huerta regarding patients anemia. Notified that the patient has not yet received her blood transfusion. States he would like the [employer] called.      Electronically signed by Sulaiman Longo RN on 8/6/2020 at 5:33 PM

## 2020-08-06 NOTE — PROGRESS NOTES
Physical Therapy    Facility/Department: [facility] PROGRESSIVE CARE  Initial Assessment    NAME: Levi Fields  : 1955  MRN: [id number]    Date of Service: 2020    Discharge Recommendations:  Patient would benefit from continued therapy after discharge        Assessment   Body structures, Functions, Activity limitations: Decreased functional mobility ; Decreased strength;Decreased balance  Assessment: some decline in functional mobility, endurance  and weakness due to anemia  Treatment Diagnosis: Difficulty walking  Specific instructions for Next Treatment: ther exs and ther activities as tolerated  Prognosis: Fair  Decision Making: Medium Complexity  Exam: functional mobility, ROM and strength testing  Clinical Presentation: decline in functional status and needs assistance for ambulation  PT Education: PT Role;Goals;Plan of Care;Transfer Training;Precautions;Gait Training  REQUIRES PT FOLLOW UP: Yes  Activity Tolerance  Activity Tolerance: Patient Tolerated treatment well       Patient Diagnosis(es): The encounter diagnosis was Symptomatic anemia. has a past medical history of Anemia, Cataract, GERD (gastroesophageal reflux disease), Headache, Hyperlipidemia, Neuropathy, Osteoarthritis, Restless leg syndrome, Seizures ([address] Short-term memory loss, Stroke (cerebrum) ([address] and Type 2 diabetes mellitus without complication ([address] has a past surgical history that includes Cholecystectomy; Tonsillectomy; Cataract removal; eye surgery; Cardiac catheterization; Upper gastrointestinal endoscopy (N/A, 2019); Colonoscopy (N/A, 2019); and Upper gastrointestinal endoscopy (N/A, 3/18/2020).     Restrictions  Restrictions/Precautions  Restrictions/Precautions: Fall Risk, General Precautions  Required Braces or Orthoses?: No  Vision/Hearing  Vision: Impaired  Vision Exceptions: Wears glasses for reading  Hearing: Within functional limits     Subjective  General  Chart Reviewed: Yes  Patient assessed for rehabilitation services?: Yes  Response To Previous Treatment: Not applicable(PT eval done today)  Family / Caregiver Present: No  Referring Practitioner: Chon Avalos  Referral Date : 08/05/20  Diagnosis: Symptomatic Anemia  Follows Commands: Within Functional Limits  Other (Comment): Pt seen supine in bed and ready for PT evaluation. Subjective  Subjective: Pt stated, \" I live by myself. \"  Pain Screening  Patient Currently in Pain: No  Vital Signs  Patient Currently in Pain: No       Orientation  Orientation  Overall Orientation Status: Within Normal Limits  Social/Functional History  Social/Functional History  Lives With: ([facility])  Type of Home: Assisted living  Home Layout: (no architectural barriers)  Bathroom Shower/Tub: Walk-in shower  Bathroom Toilet: Standard(lower than normal toilet seat makes difficult for her to get up)  Bathroom Equipment: Hand-held shower, Built-in shower seat, Grab bars in shower  Bathroom Accessibility: Walker accessible  Home Equipment: 4 wheeled walker  ADL Assistance: Independent  Homemaking Assistance: Independent  Homemaking Responsibilities: No  Ambulation Assistance: Independent(4 wheeled walker)  Transfer Assistance: Independent(4 wheeled walker)  Active : No  Mode of Transportation: (girl friend and her family drive her)  Occupation: Retired  Cognition   Cognition  Overall Cognitive Status: WFL    Objective          AROM RLE (degrees)  RLE AROM: WFL  AROM LLE (degrees)  LLE AROM : WFL  AROM RUE (degrees)  RUE AROM : WFL  AROM LUE (degrees)  LUE AROM : WFL  Strength RLE  Strength RLE: WFL  Comment: grossly 4/5  Strength LLE  Strength LLE: WFL  Comment: grossly 4/5  Strength RUE  Strength RUE: WFL  Comment: grossly 4/5  Strength LUE  Strength LUE: WFL  Comment: grossly 4/5     Sensation  Overall Sensation Status: (numbness both hands and forearm, and both feet and legs)  Bed mobility  Rolling to Left: Stand by assistance  Rolling to Right: Stand by assistance  Supine to Sit: Minimal assistance  Sit to Supine: Minimal assistance  Transfers  Stand to sit: Minimal Assistance  Ambulation  Ambulation?: Yes  Ambulation 1  Surface: level tile  Device: Rolling Walker  Assistance: Minimal assistance  Quality of Gait: forward flexed posture  Gait Deviations: Slow Milvia;Decreased step length  Distance: 10 feet  Comments: further ambulation deferred by pt     Balance  Posture: Fair(verbal cue to correct forward flexion of trunk)  Sitting - Static: Fair;+  Sitting - Dynamic: Fair;+  Standing - Static: Fair;-  Standing - Dynamic: Fair;-        Plan   Plan  Times per week: 6-7  Times per day: Daily  Plan weeks: 2  Specific instructions for Next Treatment: ther exs and ther activities as tolerated  Current Treatment Recommendations: Strengthening, Balance Training, Endurance Training, Transfer Training, Functional Mobility Training, Gait Training                                   AM-PAC Score     AM-PAC Inpatient Mobility without Stair Climbing Raw Score : 15 (08/06/20 1101)  AM-PAC Inpatient without Stair Climbing T-Scale Score : 43.03 (08/06/20 1101)  Mobility Inpatient CMS 0-100% Score: 47.43 (08/06/20 1101)  Mobility Inpatient without Stair CMS G-Code Modifier : CK (08/06/20 1101)       Goals  Short term goals  Time Frame for Short term goals: 4 sessions  Short term goal 1: Improve strength in both lower extremities to 5/5  Short term goal 2: Independent in bed mobiltiy  Short term goal 3:  Independent in sit to stand transfers  Short term goal 4: Improve sitting and standing balance to good -  Long term goals  Time Frame for Long term goals : 6 sessions  Long term goal 1: Independent ambulation 150 ft with RW  Patient Goals   Patient goals : return home       Therapy Time   Individual Concurrent Group Co-treatment   Time In 1101         Time Out [address] [place] [address],

## 2020-08-06 NOTE — PROGRESS NOTES
Spoke with Kari Serrano from the lab, states a  will come to the floor for the pateints blood draw.     Electronically signed by Sebastián Peres RN on 8/6/2020 at 2:32 PM

## 2020-08-06 NOTE — PROGRESS NOTES
Gastroenterology Progress Note    Shyam Little is a 75 y.o. female patient. Hospitalization Day:1    I am seeing the patient today for GI bleeding, anemia    SUBJECTIVE:      Patient had bowel movements. Not clear whether those bowel movements were melanotic. Hemoglobin has been around 6 g and stable. Blood transfusions were not given last night as ordered because of issues with antibodies. Hopefully she may get some blood today. She is supposed to have EGD done today however this is canceled because of low hemoglobin. Anesthesiologist has concerns regarding this. It was felt that as a patient is stable EGD can be rescheduled for tomorrow. Patient tolerating liquid diet. No nausea vomiting. Vital signs  Stable. No chest pain, shortness of breath at rest.  Physical    VITALS:  /63   Pulse 82   Temp 97.5 °F (36.4 °C) (Infrared)   Resp 20   Ht 5' (1.524 m)   Wt 266 lb 8.6 oz (120.9 kg)   SpO2 100%   BMI 52.05 kg/m²   TEMPERATURE:  Current - Temp: 97.5 °F (36.4 °C); Max - Temp  Av.2 °F (36.8 °C)  Min: 97.5 °F (36.4 °C)  Max: 98.5 °F (36.9 °C)    General:  Alert and oriented,  No apparent distress, patient is moderately overweight. Skin- without jaundice  Eyes: anicteric sclera  Cardiac: RRR, Nl s1s2, without murmurs  Lungs CTA Bilaterally, normal effort  Abdomen soft, ND, NT, no HSM, Bowel sounds normal, obese abdomen, bloated. No acute tenderness.       Ext: Has edema  Neuro: no asterixis     Data    Data Review:    Recent Labs     20  1343 20  2226 20  0440   WBC 4.2  4.2 4.7  --  3.9   HGB 6.5*  6.5* 6.1* 5.8* 6.0*   HCT 24.0*  24.0* 21.1* 19.4* 20.0*   MCV 87.6  87.6 79.0*  --  78.4*   *  111* 109*  --  106*     Recent Labs     20  1343 20  1200 20  0440   * 146* 146*   K 5.3 4.8 4.3    108* 110*   CO2 25 24 27   BUN 65* 65* 59*   CREATININE 2.06* 2.07* 1.92*     Recent Labs     20  1200   AST 24 ALT 13   BILITOT 0.16*   ALKPHOS 84     No results for input(s): LIPASE, AMYLASE in the last 72 hours. No results for input(s): PROTIME, INR in the last 72 hours. No results for input(s): PTT in the last 72 hours. Radiology Review:        ASSESSMENT:  Active Problems:    Type 2 diabetes mellitus (HCC)    Symptomatic anemia    Breast mass in female    Hematuria    Acute kidney injury superimposed on chronic kidney disease ([address]  Resolved Problems:    * No resolved hospital problems. *      The conditions that I am treating are Stable and show no change    PLAN :  1. At present patient is stable. 2. She needs EGD to evaluate upper GI issues especially variceal bleeding etc.  3. We will keep her on liquid diet and try to get the blood as early as possible. 4. Discussed with the patient. Explained regarding canceling the EGD today and rescheduling for tomorrow. Thank you for allowing me to participate in the care of your patient. Please feel free to contact me with any concerns. [phone]    Retia Oppenheim, MD    Note is dictated utilizing voice recognition software. Unfortunately this leads to occasional typographical errors. Please contact our office if you have any questions.

## 2020-08-07 NOTE — PROGRESS NOTES
[address] [facility].    PROGRESS NOTE             8/7/2020    9:25 AM    Name:   Laure Rodriguez  MRN:     [id number]     Acct:      [de-identified]   Room:   [facility]/NONE  IP Day:  2  Admit Date:  8/5/2020 11:34 AM    PCP:  Toño Boss PA-C  Code Status:  Full Code    Subjective:     C/C:   Chief Complaint   Patient presents with    Shortness of Breath     Just started as she was walking in    Abnormal Lab     HGB 6.5     Interval History Status: improved. Patient seen and examined at the bed side, no new acute events overnight. She received the RBC transfusion last night and denies any complications with it. Today she states that she feels improved without any dyspnea, shortness of breath, cough, chest pain, or weakness. She is going for EGD this morning. Would like to eat after. Notes from nursing staff and Consults had been reviewed, and the overnight progress had been checked with the nursing staff as well. Brief History:     Please see H&P. Review of Systems:     CONSTITUTIONAL:  no fevers  EYES: negative for blury vision  HEENT: No headaches, no nasal congestion, no difficulty swallowing  RESPIRATORY:negative for dyspnea, no wheezing, no Cough  CARDIOVASCULAR: negative for chest pain, no palpitations  GASTROINTESTINAL: no nausea, no vomiting, no change in bowel habits, no abdominal pain   GENITOURINARY: negative for dysuria, no hematuria   MUSCULOSKELETAL: no joint pains, no muscle aches, no swelling of joints or extremities  NEUROLOGICAL: No weakness or numbness      Medications: Allergies:     Allergies   Allergen Reactions    Codeine Hives and Shortness Of Breath       Current Meds:   Scheduled Meds:    [MAR Hold] insulin lispro  0-12 Units Subcutaneous TID WC    [MAR Hold] insulin lispro  0-6 Units Subcutaneous Nightly    [MAR Hold] bumetanide  1 mg Oral TID    [MAR Hold] sodium chloride  20 mL Intravenous Once    [facility] Hold] sodium chloride flush  10 mL Intravenous 2 times per day    [MAR Hold] lamoTRIgine  200 mg Oral BID    [MAR Hold] PARoxetine  20 mg Oral QAM    [MAR Hold] atorvastatin  20 mg Oral Daily    [MAR Hold] sodium chloride flush  10 mL Intravenous 2 times per day    [MAR Hold] pantoprazole  40 mg Intravenous Daily    And    [MAR Hold] sodium chloride (PF)  10 mL Intravenous Daily    [MAR Hold] levETIRAcetam  1,250 mg Oral BID    [MAR Hold] lisinopril  10 mg Oral Daily    And    [MAR Hold] hydroCHLOROthiazide  12.5 mg Oral Daily    [MAR Hold] miconazole   Topical BID    [Held by provider] insulin glargine  40 Units Subcutaneous BID     Continuous Infusions:    [MAR Hold] sodium chloride 75 mL/hr at 20 0921    [MAR Hold] pantoprozole (PROTONIX) infusion 8 mg/hr (20 2349)    [MAR Hold] dextrose       PRN Meds: [MAR Hold] diphenhydrAMINE, [MAR Hold] sodium chloride flush, [MAR Hold] sodium chloride flush, [MAR Hold] acetaminophen **OR** [MAR Hold] acetaminophen, [MAR Hold] polyethylene glycol, [MAR Hold] promethazine **OR** [MAR Hold] ondansetron, [MAR Hold] glucose, [MAR Hold] dextrose, [MAR Hold] glucagon (rDNA), [MAR Hold] dextrose    Data:     Past Medical History:   has a past medical history of Anemia, Cataract, GERD (gastroesophageal reflux disease), Headache, Hyperlipidemia, Neuropathy, Osteoarthritis, Restless leg syndrome, Seizures ([facility] Short-term memory loss, Stroke (cerebrum) ([facility] and Type 2 diabetes mellitus without complication ([facility] Social History:   reports that she has never smoked. She has never used smokeless tobacco. She reports that she does not drink alcohol or use drugs.      Family History:   Family History   Problem Relation Age of Onset    Diabetes Brother        Vitals:  BP (!) 148/68   Pulse 82   Temp 98 °F (36.7 °C)   Resp 18   Ht 5' (1.524 m)   Wt 272 lb 7.8 oz (123.6 kg)   SpO2 93%   BMI 53.22 kg/m²   Temp (24hrs), Av.8 °F (37.1 °C), Min:98 °F (36.7 °C), Max:99.6 °F (37.6 °C)      Recent Labs     08/06/20  1127 08/06/20  1645 08/06/20  2106 08/07/20  0846   POCGLU 89 131* 200* 144*       I/O(24Hr): Intake/Output Summary (Last 24 hours) at 8/7/2020 0925  Last data filed at 8/7/2020 0221  Gross per 24 hour   Intake 2189.1 ml   Output 1200 ml   Net 989.1 ml       Labs:    CBC with Differential:    Lab Results   Component Value Date    WBC 4.1 08/07/2020    RBC 3.06 08/07/2020    HGB 8.0 08/07/2020    HCT 26.7 08/07/2020     08/07/2020    MCV 80.4 08/07/2020    MCH 24.1 08/07/2020    MCHC 29.9 08/07/2020    RDW 17.5 08/07/2020    NRBC 1 05/15/2019    LYMPHOPCT 24 08/07/2020    MONOPCT 6 08/07/2020    BASOPCT 1 08/07/2020    MONOSABS 0.30 08/07/2020    LYMPHSABS 1.00 08/07/2020    EOSABS 0.10 08/07/2020    BASOSABS 0.00 08/07/2020    DIFFTYPE NOT REPORTED 08/07/2020       Lab Results   Component Value Date/Time    SPECIAL NOT REPORTED 03/16/2020 04:30 PM     Lab Results   Component Value Date/Time    CULTURE NO SIGNIFICANT GROWTH 03/16/2020 04:30 PM         Radiology:      Physical Examination:        PHYSICAL EXAM:  General Appearance  Alert , awake, not in acute distress. She appears pale. HEENT - Head is normocephalic, atraumatic. Nasal cannula in place. Lungs - Bilateral equal air entry, no wheezes, rales or rhonchi, aeration good  Cardiovascular - Heart sounds are normal.  Regular rhythm, normal rate without murmur, gallop or rub.   Abdomen - Soft, nontender, nondistended, no masses or organomegaly  Neurologic - There are no new focal motor or sensory deficits  Skin - No bruising or bleeding on exposed skin area  Extremities - No cyanosis, clubbing or edema      Assessment:        Primary Problem  <principal problem not specified>    Active Hospital Problems    Diagnosis Date Noted    Symptomatic anemia [D64.9] 08/05/2020    Breast mass in female [N63.0] 08/05/2020    Hematuria [R31.9] 08/05/2020    Acute kidney injury superimposed on chronic kidney disease ([facility] [N17.9, N18.9] 08/05/2020    Type 2 diabetes mellitus ([facility] [E11.9] 10/21/2017       Plan:        Symptomatic anemia, history of gastrointestinal bleed  -Hemoglobin 7.4 this morning, was 5.6 last night  -2 units PRBCs received last night  -Positive occult blood on stool exam  -IV Protonix, received bolus  -Normal saline at 75 mL/h  -Daily BMP, daily CBC   -We will follow H&H every 8 hours  -GI is consulted, planning for EGD on 08/7/20 after she receives PRBC transfusion  -NPO after midnight     Acute kidney injury, prerenal, superimposed on chronic kidney disease  -Creatinine 1.85 today, was 1.92 yesterday and was 1.54 on March 21, 2020  -GFR 27 today, was 29 on March 21, 2020  -Etiology of NIKKI is likely prerenal due to anemia  -Urine sodium: 37  -Urine creatinine 96.6  -FENa is 0.5%, suggesting prerenal etiology that is consistent with her ongoing anemia     Hematuria  -Urinalysis with microscopic exam and reflex to culture pending     Breast mass in female  -Diagnostic mammogram and ultrasound orders placed for outpatient follow-up     Type 2 diabetes mellitus  -Patient had hypoglycemia as low as 59 morning of 08/06/2020 but did not eat as she was n.p.o. after midnight  -Lantus 40 units 2 times daily - HELD , plan to resume when she begins eating after EGD    -Medium-dose correction scale  -Hypoglycemia protocol  -Held metformin  -Hemoglobin A1c was 8.2 on August 4, 2020     Other  -Paxil for mood  -Atorvastatin 20 mg p.o. daily  -Lisinopril 10 mg p.o. daily and hydrochlorothiazide 12.5 mg p.o. daily  -Lamictal and Keppra for seizure     DVT prophylaxis: reason for no prophylaxis: anemia, possible GI bleed  GI prophylaxis: Protonix IV       Rakel Wallace MD  8/7/2020  9:25 AM   Attending Physician Statement    I have discussed the case of Cody Garnett, including pertinent history and exam findings with the resident.  I have seen and examined the patient and the key elements of the encounter have been performed by me. I agree with the assessment, plan, and orders as documented by the resident. EGD was essentially normal today with no active bleeding.   She had small varices which are not significant  Electronically signed by Jazzy Courtney MD on 8/7/2020 at 2:25 PM

## 2020-08-07 NOTE — ANESTHESIA POSTPROCEDURE EVALUATION
POST- ANESTHESIA EVALUATION       Pt Name: Shannon Wood  MRN: [id number]  YOB: 1955  Date of evaluation: 8/7/2020  Time:  4:03 PM      BP (!) 138/56   Pulse 75   Temp 98.5 °F (36.9 °C) (Oral)   Resp 19   Ht 5' (1.524 m)   Wt 272 lb 7.8 oz (123.6 kg)   SpO2 94%   BMI 53.22 kg/m²      Consciousness Level  Awake  Cardiopulmonary Status  Stable  Pain Adequately Treated YES  Nausea / Vomiting  NO  Adequate Hydration  YES  Anesthesia Related Complications NONE      Electronically signed by Lucía Valentine MD on 8/7/2020 at 4:03 PM       Department of Anesthesiology  Postprocedure Note    Patient: Shannon Wood  MRN: [id number]  YOB: 1955  Date of evaluation: 8/7/2020  Time:  4:03 PM     Procedure Summary     Date:  08/07/20 Room / Location:  [place] [address] 03 / [facility]    Anesthesia Start:  3921 Anesthesia Stop:  7519    Procedure:  EGD BIOPSY BRUSH BX AND STOMACH FULGURATION WITH ARGON BEAM (N/A Esophagus) Diagnosis:  (ABDOMINAL PAIN)    Surgeon:  Char Cha MD Responsible Provider:  Lucía Valentine MD    Anesthesia Type:  MAC ASA Status:  4 - Emergent          Anesthesia Type: MAC    Jean Phase I: Jean Score: 8    Jean Phase II:      Last vitals: Reviewed and per EMR flowsheets.        Anesthesia Post Evaluation

## 2020-08-07 NOTE — PROGRESS NOTES
Comprehensive Nutrition Assessment    Type and Reason for Visit:  Positive Nutrition Screen(Weight loss and diarrhea (chronic))    Nutrition Recommendations/Plan: Change diet from Dental Soft (ground meats) to Low Fiber, more appropriate for pt's issues. Nutrition Assessment:  Patient has history of chronic diarrhea for which she takes Immodium. Pt has a good appetite, had changed diet trying to lose some weight. Admitted for anemia, EGD done noting small esophageal varices    Malnutrition Assessment:  Malnutrition Status:  No malnutrition    Context:  Acute Illness     Findings of the 6 clinical characteristics of malnutrition:  Energy Intake:  No significant decrease in energy intake  Weight Loss:  No significant weight loss     Body Fat Loss:  No significant body fat loss     Muscle Mass Loss:  No significant muscle mass loss    Fluid Accumulation:  No significant fluid accumulation     Strength:  Not Performed    Estimated Daily Nutrient Needs:  Energy (kcal):  1695 kcal based on Cullman-St. Jeor equation using adm wt 123.6 kg; Weight Used for Energy Requirements:  Admission     Protein (g):  72-81 gm protein based on 1.6-1.8 gm/kg IBW; Weight Used for Protein Requirements:  Ideal           Nutrition Related Findings:  GI: diarrhea which is chronic        Current Nutrition Therapies:    DIET LOW FIBER;     Anthropometric Measures:  · Height: 5' (152.4 cm)  · Current Body Weight: 272 lb (123.4 kg)   · Admission Body Weight: 272 lb (123.4 kg)    · Usual Body Weight:       · Ideal Body Weight: 100 lbs; % Ideal Body Weight 272 %   · BMI: 53.1  · BMI Categories: Obese Class 3 (BMI 40.0 or greater)       Nutrition Diagnosis:   · Inadequate oral intake related to altered GI function as evidenced by NPO or clear liquid status due to medical condition, lab values      Nutrition Interventions:   Food and/or Nutrient Delivery:  Start Oral Diet  Nutrition Education/Counseling:  Education not indicated Coordination of Nutrition Care:  Continued Inpatient Monitoring    Goals:  Meet 75% of estimated nutrient needs with PO diet. Nutrition Monitoring and Evaluation:   Food/Nutrient Intake Outcomes:  Diet Advancement/Tolerance, Food and Nutrient Intake  Physical Signs/Symptoms Outcomes:  Biochemical Data, Diarrhea, GI Status, Weight     Discharge Planning:    Continue current diet     Some areas of assessment may be incomplete due to COVID-19 precautions. Disha Colon R.D. L.MAI.   Clinical Dietitian  Office: [phone]

## 2020-08-07 NOTE — PLAN OF CARE
Problem: Falls - Risk of:  Goal: Will remain free from falls  Description: Will remain free from falls  Outcome: Ongoing     Problem: Bleeding:  Goal: Will show no signs and symptoms of excessive bleeding  Description: Will show no signs and symptoms of excessive bleeding  Outcome: Ongoing     Problem: Serum Glucose Level - Abnormal:  Goal: Ability to maintain appropriate glucose levels has stabilized  Description: Ability to maintain appropriate glucose levels has stabilized  Outcome: Ongoing     Problem: Mobility - Impaired:  Goal: Mobility will improve  Description: Mobility will improve  Outcome: Ongoing     Fall precautions in place. Pt HGB improved after 2 untis RBC. Continue to monitor closely. Monitor blood sugar as ordered. Standby assist with ambulating. Pt free from falls this shift.

## 2020-08-07 NOTE — ANESTHESIA PRE-OP
Charts and labs reviewed. Pt seen/exam/consent. Benefits and risks of anesthesia discussed .  She agrees to proceed with MAC

## 2020-08-07 NOTE — CARE COORDINATION
ONGOING DISCHARGE PLAN:    Spoke with patient regarding discharge plan and patient confirms that plan is still to return to her Assisted Living apt. W/ no needs. Denies VNS. Pt had EGD today w/ GI, Per Notes: Upper GI endoscopy APC cauterization of gastric vascular ectasia in the antrum.     Brushing from the duodenum. HGB today, 8.0. Low Fiber diet. Will continue to follow for additional discharge needs.     Electronically signed by Carmen Gutiérrez RN on 8/7/2020 at 2:43 PM

## 2020-08-07 NOTE — OP NOTE
ESOPHAGOGASTRODUODENOSCOPY   ( EGD )  DATE OF PROCEDURE: 8/7/2020     SURGEON: Moi Painter MD    ASSISTANT: None    PREOPERATIVE DIAGNOSIS: Patient has a recurrent anemia. In the past she had a EGD done by my colleague and diagnosed to have esophageal varices. Procedure performed to evaluate and manage upper GI source of bleeding. POSTOPERATIVE DIAGNOSIS: Small esophageal varices, do not think at risk of bleeding. Gastric vascular ectasia possible portal hypertensive gastropathy as well. Questionable Candida in the duodenum. OPERATION: Upper GI endoscopy APC cauterization of gastric vascular ectasia in the antrum. Brushing from the duodenum. ANESTHESIA: MAC    ESTIMATED BLOOD LOSS: None    COMPLICATIONS: None. SPECIMENS:  Was Obtained: Brushing from the duodenum to evaluate Candida. HISTORY: The patient is a 75y.o. year old female with history of above preop diagnosis. I recommended esophagogastroduodenoscopy with possible biopsy and I explained the risk, benefits, expected outcome, and alternatives to the procedure. Risks included but are not limited to bleeding, infection, respiratory distress, hypotension, and perforation of the esophagus, stomach, or duodenum. Patient understands and is in agreement. PROCEDURE: The patient was given IV conscious sedation. The patient's SPO2 remained above 90% throughout the procedure. Cetacaine spray given. Patient placed in left lateral position. Olympus  videogastroscope was inserted orally under vision into the esophagus without difficulty and advanced into the stomach then through the pylorus up to the second part of duodenum. Findings:    Retropharyngeal area was grossly normal appearing    Esophagus: abnormal: Small esophageal varices, moderate risk of bleeding. No peptic esophagitis. Stomach:    Fundus and Cardia Examined in Retroflexed View: normal, no gastric varices.     Body: abnormal: Minimal signs of portal hypertensive gastropathy. Antrum: abnormal: Has gastric vascular ectasia. With APC, gastric vascular ectasia cauterized. Duodenum:     Descending: abnormal: Whitish spots in the duodenum. Brushings obtained to rule out Candida. Bulb: normal    While withdrawing the scope the above findings were verified and the scope was removed. The patient has tolerated the procedure without unusual events. Recommendations/Plan:   1. To check brushing from the duodenum. 2. F/U In Office as instructed  3. Discussed with the family  4. To keep on beta-blocker therapy.                    Electronically signed by Emile Navas MD  on 8/7/2020 at 10:44 AM

## 2020-08-07 NOTE — PROGRESS NOTES
Physical Therapy  DATE: 2020    NAME: Teresa Bland  MRN: [id number]   : 1955    Patient not seen this date for Physical Therapy due to:  [] Blood transfusion in progress  [] Cancel by RN  [] Hemodialysis  []  Refusal by Patient   [] Spine Precautions   [] Strict Bedrest  [] Surgery  [x] Testing: Per RN, patient out of room at EGD. Will check back later as time allows. [] Other        [] PT being discontinued at this time. Patient independent. No further needs. [] PT being discontinued at this time as the patient has been transferred to hospice care. No further needs.     Latonya Baez, PTA

## 2020-08-08 NOTE — PROGRESS NOTES
[address] [email or web address]    PROGRESS NOTE             8/8/2020    11:33 AM    Name:   Jaylen Okeefe  MRN:     [id number]     Acct:      [de-identified]   Room:   [facility] Day:  3  Admit Date:  8/5/2020 11:34 AM    PCP:  Rusty Huerta PA-C  Code Status:  Full Code    Subjective:     C/C:   Chief Complaint   Patient presents with    Shortness of Breath     Just started as she was walking in    Abnormal Lab     HGB 6.5     Interval History Status: significantly improved. Patient was seen and examined this morning. VS review of yesterday and last night showed normal HR, afebrile and slightly hypertensive BP readings. Patient feels good this morning and more energetic with no dizziness reported. No dyspnea was reported as well. Diarrhea of 4 times yesterday. Nonbloody. No vomiting and no acute events overnight. S/P PRBC given yesterday. S/P EGD yesterday. Notes from nursing staff and consultations were reviewed. Hospital day (5)    Brief History:     Refer to H&P note    Review of Systems:     Review of Systems   Constitutional: Negative. HENT: Negative. Eyes: Negative. Respiratory: Negative. Cardiovascular: Negative. Gastrointestinal: Negative. Endocrine: Negative. Genitourinary: Negative. Musculoskeletal: Negative. Allergic/Immunologic: Negative. Neurological: Negative. Psychiatric/Behavioral: Negative. Medications: Allergies:     Allergies   Allergen Reactions    Codeine Hives and Shortness Of Breath       Current Meds:   Scheduled Meds:    pantoprazole  40 mg Intravenous BID    And    sodium chloride (PF)  10 mL Intravenous Daily    nadolol  20 mg Oral Daily    insulin lispro  0-12 Units Subcutaneous TID WC    insulin lispro  0-6 Units Subcutaneous Nightly    bumetanide  1 mg Oral TID    sodium chloride  20 mL Intravenous Once    sodium chloride flush  10 mL Intravenous 2 times per day  lamoTRIgine  200 mg Oral BID    PARoxetine  20 mg Oral QAM    atorvastatin  20 mg Oral Daily    sodium chloride flush  10 mL Intravenous 2 times per day    levETIRAcetam  1,250 mg Oral BID    lisinopril  10 mg Oral Daily    And    hydroCHLOROthiazide  12.5 mg Oral Daily    miconazole   Topical BID    insulin glargine  40 Units Subcutaneous BID     Continuous Infusions:    sodium chloride 75 mL/hr at 20 1744    dextrose       PRN Meds: diphenhydrAMINE, sodium chloride flush, sodium chloride flush, acetaminophen **OR** acetaminophen, polyethylene glycol, promethazine **OR** ondansetron, glucose, dextrose, glucagon (rDNA), dextrose    Data:     Past Medical History:   has a past medical history of Anemia, Cataract, GERD (gastroesophageal reflux disease), Headache, Hyperlipidemia, Neuropathy, Osteoarthritis, Restless leg syndrome, Seizures ([facility] [address] Short-term memory loss, Stroke (cerebrum) ([facility] [address] and Type 2 diabetes mellitus without complication ([facility] [address] Social History:   reports that she has never smoked. She has never used smokeless tobacco. She reports that she does not drink alcohol or use drugs. Family History:   Family History   Problem Relation Age of Onset    Diabetes Brother        Vitals:  BP (!) 132/57   Pulse 75   Temp 97.7 °F (36.5 °C) (Oral)   Resp 18   Ht 5' (1.524 m)   Wt 272 lb (123.4 kg)   SpO2 96%   BMI 53.12 kg/m²   Temp (24hrs), Av.3 °F (36.8 °C), Min:97.7 °F (36.5 °C), Max:98.7 °F (37.1 °C)    Recent Labs     20  1140 20  1632 20  2055 20  0705   POCGLU 135* 264* 237* 214*       I/O(24Hr):     Intake/Output Summary (Last 24 hours) at 2020 1133  Last data filed at 2020 8035  Gross per 24 hour   Intake 1961 ml   Output 500 ml   Net 1461 ml       Labs:    CBC:   Lab Results   Component Value Date    WBC 5.9 2020    RBC 3.30 2020    HGB 8.0 2020    HCT 26.8 2020    MCV 81.0 2020    MCH 24.3 2020 Cardiovascular:      Rate and Rhythm: Normal rate. Pulses: Normal pulses. Heart sounds: No murmur. No friction rub. No gallop. Pulmonary:      Effort: Pulmonary effort is normal.      Breath sounds: Normal breath sounds. Abdominal:      General: There is distension. Palpations: There is no mass. Tenderness: There is no abdominal tenderness. There is no guarding. Skin:     Capillary Refill: Capillary refill takes less than 2 seconds. Neurological:      General: No focal deficit present. Mental Status: She is alert. Psychiatric:         Mood and Affect: Mood normal.           Assessment:        Primary Problem  <principal problem not specified>    Active Hospital Problems    Diagnosis Date Noted    FRANCIS (nonalcoholic steatohepatitis) [K75.81]     Symptomatic anemia [D64.9] 08/05/2020    Breast mass in female [N63.0] 08/05/2020    Hematuria [R31.9] 08/05/2020    Acute kidney injury superimposed on chronic kidney disease ([facility] [address] [N17.9, N18.9] 08/05/2020    Type 2 diabetes mellitus ([facility] [E11.9] 10/21/2017       Plan:        Brief summary:  - 75years old female patient with a history of ESTELLE, CVA on ASA, GERD, Type 2 DM and esophageal varices and GI bleeding presented with abnormal Hemoglobin of 6.5. Had mild dizziness and SOB on exertion. No melena or obvious sources for blood loss. * Symptomatic anemia with dizziness and mild shortness of breath secondary to GI bleeding  - Hemoglobin today is 8. S/P PRBC transfusion   - GI consulted, underwent EGD yesterday ; findings include small esophageal varices with moderate risk of bleeding. Duodenum descending: abnormal whitish spots in the duodenum. Brushings obtained to R/O candida.  - Keep BB therapy as per GI recommendations given the findings of esopheageal varieces and hx of FRANCIS.  - GI prohylaxis: Protonix IV   - Patient can be follow up with GI on discharge.      * Type 2 DM:  - Had a hx of hypoglycemia two days ago but it was mostly due to NPO status before EGD. Last readings are above 200s (264, 237, 214). - Keep medium-dose correction scale  - Hypoglycemia protocl  - Last HbA1C was 8.2 of August 4, 2020. * Acute NIKKI superimposed on CKD:  - due to prerenal (anemia). * Right breast lump:  - US done, report pending.        * DVT prohylaxsis: Not used, GI bleeding              Mo'abner Solano MD  8/8/2020  11:33 AM

## 2020-08-08 NOTE — PROGRESS NOTES
[address]    Physical Therapy Progress Note    Date: 20  Patient Name: Sydnee Alvarado       Room: 5-  MRN: [id number]   Account: [de-identified]   : 1955  (66 y.o.)   Gender: female     Discharge Recommendations   Patient would benefit from continued therapy after discharge  Equipment Needed: (Pt reports having 2 rollators *rollator in room is from home)          Restrictions/Precautions: Fall Risk, General Precautions   Past Medical History:  has a past medical history of Anemia, Cataract, GERD (gastroesophageal reflux disease), Headache, Hyperlipidemia, Neuropathy, Osteoarthritis, Restless leg syndrome, Seizures ([address] Short-term memory loss, Stroke (cerebrum) ([facility] [address] and Type 2 diabetes mellitus without complication ([facility] [address] Past Surgical History:   has a past surgical history that includes Cholecystectomy; Tonsillectomy; Cataract removal; eye surgery; Cardiac catheterization; Upper gastrointestinal endoscopy (N/A, 2019); Colonoscopy (N/A, 2019); Upper gastrointestinal endoscopy (N/A, 3/18/2020); and Upper gastrointestinal endoscopy (N/A, 2020). Overall Orientation Status: Within Normal Limits  Restrictions/Precautions  Restrictions/Precautions: Fall Risk;General Precautions  Required Braces or Orthoses?: No    Subjective: Pt standing up from bedside chair stating \"I can't wait anymore, I have to go the bathroom. \" Pt okays writer to assist her to bathroom. Pt is agreeable to therapy after toilet transfer. Comments: EDIS Pearl pt for therapy; Hgb 8.0    Vital Signs  Patient Currently in Pain: Yes  Pain Assessment: 0-10  Pain Level: 3  Pain Type: Chronic pain  Pain Location: Leg  Pain Orientation: Right;Left;Lower(R LE>L LE)  Pain Descriptors: Pins and needles(\"My legs and arms are usually numb, sometimes I have pain. \")  Pain Frequency: Continuous  Functional Pain Assessment: Activities are not prevented  Non-Pharmaceutical Pain Intervention(s): Ambulation/Increased Activity; Distraction; Emotional support;Rest;Repositioned  Response to Pain Intervention: Patient Satisfied  Multiple Pain Sites: No        Patient Observation  Observations: Pt's R LE redness (writer felt no warmth and pt reported 3/10 pins and needles pain when touched) > L LE; writer notified RN [employer]. Pt is alert and talkative and reports, most mornings when she wakes up, she sits on edge of bed for a period of time due to dizziness from supine<>sit. Pt was fatigued after therapy tx and wanted to sit up in bedside chair to eat breakfast.(improved AROM L LE vs R LE)         Bed Mobility  Scooting: Stand by assistance(scooting along edge of bed)  Bed mobility  Scooting: Stand by assistance(scooting along edge of bed)    Transfers:  Sit to Stand: Stand by assistance  Stand to sit: Stand by assistance  Bed to Chair: Stand by assistance  Comments: Pt utilized Rollator from home for transfers with a verbal cue to remind pt to lock brakes and hand placement to reduce chance of fall; good carryover. Pt performed toilet transfer with SBA; IV pole management from writer.               Ambulation 1  Surface: level tile  Device: Rolling Walker(Rollator from home)  Other Apparatus: (IV pole)  Assistance: Stand by assistance  Quality of Gait: slow pace, steady with no LOB, slight external rotation Bilat LE's  Gait Deviations: Slow Milvia;Decreased step length  Distance: 15ft x2 with 4 90 degree turns(standing rest break)        Stairs/Curb  Stairs?: No                                                     Posture: Good  Sitting - Static: Fair;+  Sitting - Dynamic: Fair;+  Standing - Static: Fair  Standing - Dynamic: Fair  Comments: Seated edge of bed and standing with Rollator     Other exercises?: Yes  Other exercises 1: Seated edge of bed long arc quads x15 reps and marches x10 reps (educated pt to perform slow and steady)  Other exercises 2: Sit to Stands x7 reps from edge of bed with focus to improve hand lower extremities to 5/5  Short term goal 2: Independent in bed mobiltiy  Short term goal 3:  Independent in sit to stand transfers  Short term goal 4: Improve sitting and standing balance to good -  Long term goals  Time Frame for Long term goals : 6 sessions  Long term goal 1: Independent ambulation 150 ft with RW    PT Individual Minutes  Time In: 0807  Time Out: 0830  Minutes: 23    Electronically signed by Jerald Schreiber PTA on 8/8/20 at 8:52 AM EDT

## 2020-08-08 NOTE — DISCHARGE INSTR - COC
at [address] [place] [address] ENDO       Immunization History:   Immunization History   Administered Date(s) Administered    Influenza Virus Vaccine 12/06/2016, 11/01/2019    Influenza, Quadv, IM, PF (6 mo and older Fluzone, Flulaval, Fluarix, and 3 yrs and older Afluria) 10/23/2017    Pneumococcal Polysaccharide ([id number]) 09/01/2015       Active Problems:  Patient Active Problem List   Diagnosis Code    Type 2 diabetes mellitus ([facility] [address] E11.9    Breakthrough seizure ([facility] [address] G40.919    CVA, old, hemiparesis ([facility] [address] I69.359    Falling episodes R29.6    Cerebral concussion S06. 4H0E    Cervical spinal stenosis M48.02    Diabetic polyneuropathy associated with type 2 diabetes mellitus ([facility]) E11.42    History of cerebral infarction Z86.73    Seizure disorder ([facility]) G40.909    Depression with anxiety F41.8    Dizziness R42    Generalized weakness R53.1    GERD (gastroesophageal reflux disease) K21.9    H/O falling Z91.81    Hyperglycemia R73.9    Hyponatremia E87.1    Morbid obesity with BMI of 40.0-44.9, adult ([facility]) E66.01, Z68.41    Pure hypercholesterolemia E78.00    RLS (restless legs syndrome) G25.81    Thrombocytopenia ([facility]) D69.6    Altered mental status R41.82    Confusion state F44.89    Gait disturbance R26.9    GI bleed K92.2    Polyp of colon K63.5    Postmenopausal bleeding N95.0    Anemia D64.9    Elevated alkaline phosphatase level R74.8    GAVE (gastric antral vascular ectasia) K31.819    Esophageal varices determined by endoscopy ([facility]) I85.00    Atypical glandular cells of undetermined significance (SLICK) on cervical Pap smear (NOS) R87.619    Symptomatic anemia D64.9    Breast mass in female N63.0    Hematuria R31.9    Acute kidney injury superimposed on chronic kidney disease ([facility]) N17.9, N18.9    FRANCIS (nonalcoholic steatohepatitis) K75.81       Isolation/Infection:   Isolation          Contact        Patient Infection Status     Infection Onset Added Last Indicated Last Indicated By Review Planned Expiration Resolved Resolved By    MRSA  04/20/17 04/20/17 Alicia Recio RN        Neck - 3/2017    Resolved    COVID-19 Rule Out 08/05/20 08/05/20 08/05/20 COVID-19 (Ordered)   08/05/20 Rule-Out Test Resulted          Nurse Assessment:  Last Vital Signs: BP (!) 141/48   Pulse 66   Temp 98.4 °F (36.9 °C) (Oral)   Resp 18   Ht 5' (1.524 m)   Wt 272 lb (123.4 kg)   SpO2 95%   BMI 53.12 kg/m²     Last documented pain score (0-10 scale): Pain Level: 2  Last Weight:   Wt Readings from Last 1 Encounters:   08/08/20 272 lb (123.4 kg)     Mental Status:  alert    IV Access:  - None    Nursing Mobility/ADLs:  Walking   Independent  Transfer  Independent  Bathing  Independent  Dressing  Independent  Toileting  Independent  Feeding  Independent  Med Admin  Assisted  Med Delivery   whole    Wound Care Documentation and Therapy:  Wound 03/18/20 Foot Anterior; Left (Active)   Number of days: 142        Elimination:  Continence:   · Bowel: Yes  · Bladder: Yes  Urinary Catheter: None   Colostomy/Ileostomy/Ileal Conduit: No       Date of Last BM: 08/10/2020    Intake/Output Summary (Last 24 hours) at 8/8/2020 1651  Last data filed at 8/8/2020 0639  Gross per 24 hour   Intake 1611 ml   Output --   Net 1611 ml     I/O last 3 completed shifts: In: 7356 [I.V.:1611]  Out: -     Safety Concerns:     None    Impairments/Disabilities:      None    Nutrition Therapy:  Current Nutrition Therapy:   - Oral Diet:  Carb Control 4 carbs/meal (1800kcals/day)    Routes of Feeding: Oral  Liquids: No Restrictions  Daily Fluid Restriction: no  Last Modified Barium Swallow with Video (Video Swallowing Test): not done    Treatments at the Time of Hospital Discharge:   Respiratory Treatments: n/a  Oxygen Therapy:  is not on home oxygen therapy.   Ventilator:    - No ventilator support    Rehab Therapies:  Weight Bearing Status/Restrictions: No weight bearing restirctions  Other Medical Equipment (for information only, NOT a DME order): walker  Other Treatments:     Patient's personal belongings (please select all that are sent with patient):  Glasses    RN SIGNATURE:  Electronically signed by Emily Holloway on 8/10/20 at 4:53 PM EDT    CASE MANAGEMENT/SOCIAL WORK SECTION    Inpatient Status Date: ***    Readmission Risk Assessment Score:  Readmission Risk              Risk of Unplanned Readmission:        26           Discharging to Facility/ Agency   · Name:   · Address:  · Phone:  · Fax:    Dialysis Facility (if applicable)   · Name:  · Address:  · Dialysis Schedule:  · Phone:  · Fax:    / signature: {Esignature:[id number]}    PHYSICIAN SECTION    Prognosis: Good    Condition at Discharge: Stable    Rehab Potential (if transferring to Rehab): Good    Recommended Labs or Other Treatments After Discharge: n/a    Physician Certification: I certify the above information and transfer of Arie Meeks  is necessary for the continuing treatment of the diagnosis listed and that she requires Assisted Living for greater 30 days.      Update Admission H&P: No change in H&P    PHYSICIAN SIGNATURE:  Electronically signed by Aurora Barragan MD on 8/8/20 at 4:51 PM EDT none

## 2020-08-08 NOTE — PROGRESS NOTES
[facility]    Gastroenterology Daily Progress Note      Patient:   Mirza Puckett   :    1955   Facility:   [facility]  Date:     2020  Consultant:   Karlie Kevin CNP      SUBJECTIVE  75 y.o. female admitted 2020 with Symptomatic anemia [D64.9] and seen for anemia, FRANCIS. The pt was seen and examined. She is s/p egd yesterday, biopsy results are pending. Reports mild abdominal bloating but no pain or nausea. Had 3-4 episodes of non bloody diarrhea overnight. hgb 8.         OBJECTIVE  Scheduled Meds:   nadolol  20 mg Oral Daily    insulin lispro  0-12 Units Subcutaneous TID WC    insulin lispro  0-6 Units Subcutaneous Nightly    bumetanide  1 mg Oral TID    sodium chloride  20 mL Intravenous Once    sodium chloride flush  10 mL Intravenous 2 times per day    lamoTRIgine  200 mg Oral BID    PARoxetine  20 mg Oral QAM    atorvastatin  20 mg Oral Daily    sodium chloride flush  10 mL Intravenous 2 times per day    pantoprazole  40 mg Intravenous Daily    And    sodium chloride (PF)  10 mL Intravenous Daily    levETIRAcetam  1,250 mg Oral BID    lisinopril  10 mg Oral Daily    And    hydroCHLOROthiazide  12.5 mg Oral Daily    miconazole   Topical BID    insulin glargine  40 Units Subcutaneous BID       Vital Signs:  BP (!) 143/57   Pulse 67   Temp 98.3 °F (36.8 °C) (Oral)   Resp 16   Ht 5' (1.524 m)   Wt 272 lb (123.4 kg)   SpO2 100%   BMI 53.12 kg/m²      Physical Exam:     General Appearance: alert and oriented to person, place and time, well-developed and well-nourished, in no acute distress  Skin: warm and dry, no rash or erythema  Head: normocephalic and atraumatic  Eyes: pupils equal, round, and reactive to light, extraocular eye movements intact, conjunctivae normal  ENT: hearing grossly normal bilaterally  Neck: neck supple and non tender without mass, no thyromegaly or thyroid nodules, no cervical lymphadenopathy   Pulmonary/Chest: clear to [id number]) as of 8/8/2020 11:16   Ref. Range 3/18/2020 11:31   AFP (Alpha Fetoprotein) Latest Ref Range: <8.4 ug/L 2.0   Results for Robert Kaur (MRN [id number]) as of 8/8/2020 11:16   Ref. Range 3/18/2020 11:31   IgA Latest Ref Range: 70 - 400 mg/dL 728 (H)     Findings:egd dr Gaytan Semen 8/7/20     Retropharyngeal area was grossly normal appearing     Esophagus: abnormal: Small esophageal varices, moderate risk of bleeding. No peptic esophagitis.     Stomach:    Fundus and Cardia Examined in Retroflexed View: normal, no gastric varices. Body: abnormal: Minimal signs of portal hypertensive gastropathy. Antrum: abnormal: Has gastric vascular ectasia.     With APC, gastric vascular ectasia cauterized.     Duodenum:     Descending: abnormal: Whitish spots in the duodenum. Brushings obtained to rule out Candida. Bulb: normal    FINDINGS: liver us 3/18/20   LIVER:  Suboptimal visualization is noted due to bowel gas.  No hepatic    masses or ductal dilatation is noted.  Liver is mildly enlarged at 18.74 cm. Slightly coarse echotexture is noted with fatty infiltration not excluded. Normal hepatopetal flow is noted in the portal vein.         BILIARY SYSTEM:  Patient is status post cholecystectomy. Common bile duct is    within normal limits measuring 7.16 cm.         RIGHT KIDNEY: The right kidney is grossly unremarkable without evidence of    hydronephrosis.         PANCREAS: The pancreas is suboptimally visualized and appears to be mildly    hyperechoic without focal mass.         OTHER: Ascites is noted in the right upper and right lower quadrants of the    abdomen, mild to moderate.              Impression    1. Hepatomegaly with coarse echotexture to the liver suggesting fatty    infiltration without focal masses. 2. Suboptimal visualization of the pancreas which appears hyperechoic without    masses.  Findings are nonspecific.  Hyperechogenicity is sometimes seen in    cases of chronic pancreatitis. 3. Mild to moderate right-sided abdominal ascites. ASSESSMENT/PLAN:  1. Anemia  -S/p egd yesterday showing small EV possible candida,  Minimal signs of portal hypertensive gastropathy and gastric vascular ectasia  -ppi bid  Trend hh and keep hgb >7  -f/u on biopsy results as outpt    2. Liver disease likely secondary to FRANCIS; lft's currently normal   -continue the beta blocker therapy  -2gm low salt diet  -will need outpt follow up    Case d/w dr Vanna Marcus signing off f/u outpt    This plan was formulated in collaboration with  . Electronically signed by: CANDI Tee CNP on 8/8/2020 at 8:22 AM     Attending Physician Statement  I have discussed the care of Elvi Wilder and   I have examined the patient myselft independently, and taken ros and hpi , including pertinent history and exam findings,  with the author of this note . I have reviewed the key elements of all parts of the encounter with the nurse practitioner/resident. I agree with the assessment, plan and orders as documented by the above health care provider       While I was seen the patient, she said she was feeling her heart rate is fast and she is feeling some headache  I checked her pulse she was irregular but the rate was not increased I want to the telemetry and looked at it it seems to be irregularly irregular with a rate of 80  She said she does not have a history of A. Fib? ?   She was also having a little bit of pressure in her chest  I called the nurse and told her to immediately contact the primary team and inform them of the situation and probably initiate cardiac work-up, I was informed later that she had a cardiology consult pending      Electronically signed by Meri Mcclelland MD

## 2020-08-08 NOTE — PLAN OF CARE
Problem: Falls - Risk of:  Goal: Will remain free from falls  Description: Will remain free from falls  Outcome: Ongoing  Goal: Absence of physical injury  Description: Absence of physical injury  Outcome: Ongoing     Problem: Bleeding:  Goal: Will show no signs and symptoms of excessive bleeding  Description: Will show no signs and symptoms of excessive bleeding  Outcome: Ongoing     Problem: Serum Glucose Level - Abnormal:  Goal: Ability to maintain appropriate glucose levels has stabilized  Description: Ability to maintain appropriate glucose levels has stabilized  Outcome: Ongoing     Problem: Mobility - Impaired:  Goal: Mobility will improve  Description: Mobility will improve  Outcome: Ongoing     Problem: Nutrition  Goal: Optimal nutrition therapy  Outcome: Ongoing     Problem: Pain:  Goal: Pain level will decrease  Description: Pain level will decrease  Outcome: Ongoing  Goal: Control of acute pain  Description: Control of acute pain  Outcome: Ongoing  Goal: Control of chronic pain  Description: Control of chronic pain  Outcome: Ongoing

## 2020-08-08 NOTE — CARE COORDINATION
ONGOING DISCHARGE PLAN:    Spoke with patient regarding discharge plan and patient confirms that plan is still to return to her [facility]isted [facility] apt. W/ no needs.      Denies VNS.     Pt had EGD yesterday w/ GI, Per Notes: Upper GI endoscopy APC cauterization of gastric vascular ectasia in the antrum.     Brushing from the duodenum.     HGB today, 7.6 . Low Fiber diet. New Consult for Cardiology, for Chest pressure/dizziness. Will continue to follow for additional discharge needs.     Electronically signed by Deidre Araya RN on 8/8/2020 at 2:28 PM

## 2020-08-09 NOTE — PLAN OF CARE
Problem: Falls - Risk of:  Goal: Will remain free from falls  Description: Will remain free from falls  8/9/2020 1649 by Syd Seaman RN  Outcome: Ongoing     Problem: Bleeding:  Goal: Will show no signs and symptoms of excessive bleeding  Description: Will show no signs and symptoms of excessive bleeding  8/9/2020 1649 by Syd Seaman RN  Outcome: Ongoing     Problem: Mobility - Impaired:  Goal: Mobility will improve  Description: Mobility will improve  8/9/2020 1649 by Syd Seaman RN  Outcome: Ongoing     Problem: Nutrition  Goal: Optimal nutrition therapy  8/9/2020 1649 by Syd Seaman RN  Outcome: Ongoing     Problem: Pain:  Goal: Pain level will decrease  Description: Pain level will decrease  8/9/2020 1649 by Syd Seaman RN  Outcome: Ongoing     Problem: Pain:  Goal: Control of chronic pain  Description: Control of chronic pain  8/9/2020 1649 by Syd Seaman RN  Outcome: Ongoing

## 2020-08-09 NOTE — PLAN OF CARE
Problem: Falls - Risk of:  Goal: Will remain free from falls  Description: Will remain free from falls  8/9/2020 0430 by Lata Cooper RN  Outcome: Ongoing  Note: Pt remains free from falls this shift. Pt has bed wheels locked, call light within reach, and fall sign posted. Pt continues to be educated about fall risks. Will continue to monitor. Problem: Falls - Risk of:  Goal: Absence of physical injury  Description: Absence of physical injury  8/9/2020 0430 by Lata Cooper RN  Outcome: Ongoing  Note: Pt remains free from falls this shift. Pt has bed wheels locked, call light within reach, and fall sign posted. Pt continues to be educated about fall risks. Will continue to monitor. Problem: Bleeding:  Goal: Will show no signs and symptoms of excessive bleeding  Description: Will show no signs and symptoms of excessive bleeding  8/9/2020 0430 by Lata Cooper RN  Outcome: Ongoing  Note: Pt has been educated about risks for bleeding and precautions to take when discharged. Will continue to educate. Problem: Serum Glucose Level - Abnormal:  Goal: Ability to maintain appropriate glucose levels has stabilized  Description: Ability to maintain appropriate glucose levels has stabilized  8/9/2020 0430 by Lata Cooper RN  Outcome: Ongoing  Note: Pt educated abut maintaining blood glucose levels and its importance. Problem: Mobility - Impaired:  Goal: Mobility will improve  Description: Mobility will improve  8/9/2020 0430 by Lata Cooper RN  Outcome: Ongoing  Note: Pt mobility very improved. Pt able to ambulate with a stand by assist.      Problem: Pain:  Goal: Pain level will decrease  Description: Pain level will decrease  8/9/2020 0430 by Lata Cooper RN  Outcome: Ongoing  Note: Pt has pain controlled at this time. See MAR. Continue to assist with repositioning for comfort.         Problem: Pain:  Goal: Control of acute pain  Description: Control of acute pain  8/9/2020 0430 by Talisha Sinclair RN  Outcome: Ongoing  Note: Pt has pain controlled at this time. See MAR. Continue to assist with repositioning for comfort.

## 2020-08-09 NOTE — PROGRESS NOTES
[id number]    PROGRESS NOTE             8/9/2020    4:07 PM    Name:   Juan Carlos Laureano  MRN:     [id number]     Acct:      [de-identified]   Room:   2115/2115-01   Day:  4  Admit Date:  8/5/2020 11:34 AM    PCP:  Stas Price PA-C  Code Status:  Full Code    Subjective:     C/C:   Chief Complaint   Patient presents with    Shortness of Breath     Just started as she was walking in    Abnormal Lab     HGB 6.5     Interval History Status: improved    Patient seen and examined at the bed side, since the filing of yesterday's progress note the patient had an episode of chest pain. She states that yesterday she was seated in her chair in her room when he had onset of substernal, nonexertional, nonradiating, chest pain that lasted for 5 minutes. She says the pain first felt throbbing and then felt sharp, and she notes that it was worsened with breathing. She reports some palpitations associated with chest pain. She thought that she may have felt the same chest pain again this morning at 5:30 AM however she thinks this could have been positioning how she was lying in bed. The chest pain she experienced yesterday and felt dissimilar to her previous history of anxiety. Patient reports previous history of diagnostic catheterization with no stents placed. This morning the interview she states that she is tired but not in any chest pain, denies shortness of breath. She does report some dizziness and headache ongoing since yesterday. Notes from nursing staff and Consults had been reviewed, and the overnight progress had been checked with the nursing staff as well. Brief History:     Please see H&P. Review of Systems:     CONSTITUTIONAL:  no fevers  EYES: negative for blury vision  HEENT: Positive for headaches, no nasal congestion, no difficulty swallowing.    RESPIRATORY:negative for dyspnea, no wheezing, no Cough  CARDIOVASCULAR: negative for chest pain, positive for palpitations  GASTROINTESTINAL: no nausea, no vomiting, no change in bowel habits, no abdominal pain   GENITOURINARY: negative for dysuria, no hematuria   MUSCULOSKELETAL: no joint pains, no muscle aches, no swelling of joints or extremities  NEUROLOGICAL: No weakness or numbness. Positive for lightheadedness      Medications: Allergies:     Allergies   Allergen Reactions    Codeine Hives and Shortness Of Breath       Current Meds:   Scheduled Meds:    heparin (porcine)  5,000 Units Subcutaneous 3 times per day    sodium chloride (PF)  10 mL Intravenous BID    And    pantoprazole  40 mg Intravenous BID    iron sucrose  200 mg Intravenous Q24H    levETIRAcetam  500 mg Oral BID    nadolol  20 mg Oral Daily    insulin lispro  0-12 Units Subcutaneous TID WC    insulin lispro  0-6 Units Subcutaneous Nightly    bumetanide  1 mg Oral TID    sodium chloride  20 mL Intravenous Once    sodium chloride flush  10 mL Intravenous 2 times per day    lamoTRIgine  200 mg Oral BID    PARoxetine  20 mg Oral QAM    atorvastatin  20 mg Oral Daily    sodium chloride flush  10 mL Intravenous 2 times per day    lisinopril  10 mg Oral Daily    And    hydroCHLOROthiazide  12.5 mg Oral Daily    miconazole   Topical BID    insulin glargine  40 Units Subcutaneous BID     Continuous Infusions:    sodium chloride 75 mL/hr at 08/08/20 1345    dextrose       PRN Meds: diphenhydrAMINE, sodium chloride flush, sodium chloride flush, acetaminophen **OR** acetaminophen, polyethylene glycol, promethazine **OR** ondansetron, glucose, dextrose, glucagon (rDNA), dextrose    Data:     Past Medical History:   has a past medical history of Anemia, Cataract, GERD (gastroesophageal reflux disease), Headache, Hyperlipidemia, Neuropathy, Osteoarthritis, Restless leg syndrome, Seizures ([address] Short-term memory loss, Stroke (cerebrum) ([facility]), and Type 2 diabetes mellitus without complication ([address] Social History:   reports that she has never smoked. She has never used smokeless tobacco. She reports that she does not drink alcohol or use drugs. Family History:   Family History   Problem Relation Age of Onset    Diabetes Brother        Vitals:  BP (!) 122/40   Pulse 62   Temp 97.7 °F (36.5 °C) (Oral)   Resp 16   Ht 5' (1.524 m)   Wt 279 lb 12.2 oz (126.9 kg)   SpO2 96%   BMI 54.64 kg/m²   Temp (24hrs), Av.9 °F (36.6 °C), Min:97.7 °F (36.5 °C), Max:98.3 °F (36.8 °C)      Recent Labs     20  1603 20  0756 20  1106   POCGLU 173* 156* 99 147*       I/O(24Hr): Intake/Output Summary (Last 24 hours) at 2020 1607  Last data filed at 2020 7336  Gross per 24 hour   Intake 150 ml   Output 900 ml   Net -750 ml       Labs:    CBC with Differential:    Lab Results   Component Value Date    WBC 4.6 2020    RBC 3.18 2020    HGB 8.1 2020    HCT 26.6 2020    PLT 92 2020    MCV 86.1 2020    MCH 25.4 2020    MCHC 29.5 2020    RDW 18.1 2020    NRBC 1 05/15/2019    LYMPHOPCT 24 2020    MONOPCT 7 2020    BASOPCT 1 2020    MONOSABS 0.30 2020    LYMPHSABS 1.10 2020    EOSABS 0.10 2020    BASOSABS 0.00 2020    DIFFTYPE NOT REPORTED 2020       Lab Results   Component Value Date/Time    SPECIAL NOT REPORTED 2020 04:30 PM     Lab Results   Component Value Date/Time    CULTURE NO SIGNIFICANT GROWTH 2020 04:30 PM         Radiology:    Xr Chest Portable    Result Date: 2020  EXAMINATION: ONE XRAY VIEW OF THE CHEST 2020 12:14 pm COMPARISON: 05/10/2019 HISTORY: ORDERING SYSTEM PROVIDED HISTORY: shortness of breath TECHNOLOGIST PROVIDED HISTORY: shortness of breath Reason for Exam: middle chest pains Acuity: Acute Type of Exam: Initial FINDINGS: Single portable frontal view of the chest is submitted for review. The cardiac silhouette is enlarged.   Lung parenchyma is clear without focal airspace consolidation, sizeable pleural effusion, or pneumothorax. Trachea is midline. Visualized osseous structures and soft tissues are grossly intact. Cardiomegaly without convincing evidence for acute cardiopulmonary pathology. Physical Examination:        PHYSICAL EXAM:  General Appearance  Alert , awake, not in acute distress. She appears pale. HEENT - Head is normocephalic, atraumatic. She is wearing nasal cannula  Lungs - Bilateral equal air entry, no wheezes, rales or rhonchi, aeration good  Cardiovascular - Heart sounds are normal.  Regular rhythm, normal rate without murmur, gallop or rub. There is no chest wall tenderness to palpation. Abdomen - Soft, nontender, nondistended, no masses or organomegaly  Neurologic - There are no new focal motor or sensory deficits. EOMI. Cranial nerves II through XII grossly intact. Strength and sensation grossly intact. Skin - No bruising or bleeding on exposed skin area  Extremities - No cyanosis, clubbing or edema. There are pitting edema, xerosis, and erythematous venous stasis changes of the bilateral lower extremities. Assessment:        Primary Problem  Symptomatic anemia    Active Hospital Problems    Diagnosis Date Noted    FRANCIS (nonalcoholic steatohepatitis) [K75.81]     Symptomatic anemia [D64.9] 08/05/2020    Breast mass in female [N63.0] 08/05/2020    Hematuria [R31.9] 08/05/2020    Acute kidney injury superimposed on chronic kidney disease ([facility] [N17.9, N18.9] 08/05/2020    Type 2 diabetes mellitus ([facility] [E11.9] 10/21/2017       Plan:        Chest pain  -Patient began having chest pain in the hospital on 08/09/2020  -Cardiology consulted, awaiting their evaluation and recs  -Troponin: 30 > 31 > 31 > 31, appears to be at her baseline  -EKG: Nonspecific T wave abnormality now evident in inferior leads compared to 3 days prior.   Cannot rule out anterior infarct, age undetermined  -Hypertensive this morning at 151/61, vitals are otherwise normal    Symptomatic anemia with dizziness and mild shortness of breath secondary to GI bleeding  - Hemoglobin is 8.1 this morning, status post PRBC transfusion 2 units  - GI consulted, had EGD this admission: Findings include small esophageal varices with moderate risk of bleeding. Duodenum descending: abnormal whitish spots in the duodenum. Brushings obtained to R/O candida.  -Nadolol 20 mg p.o. daily: Keep BB therapy as per GI recommendations given the findings of esopheageal varices and hx of FRANCIS. -IV iron  -Protonix 40 mg IV twice daily  -follow-up with GI on discharge     Type 2 DM:  -Medium dose sliding scale  -Lantus 40 units twice daily  - Hypoglycemia protocol  - Last HbA1C was 8.2 of August 4, 2020. Acute NIKKI superimposed on CKD:  -Creatinine 1.82 this morning, mildly elevated from 1.75 yesterday, improved from 2.07 on 8/5/2020  -FENa is 0.5%, likely prerenal consistent with her anemia  -IV normal saline 75 mL/h    Right breast lump:  -She will follow-up on an outpatient basis.   -Outpatient orders given for diagnostic mammogram and ultrasound     DVT prohylaxsis: None due to her anemia    Charlie Haque MD  8/9/2020  4:07 PM

## 2020-08-09 NOTE — CONSULTS
[facility]  In PatientCardiology Consult             Date:   8/9/2020  Patient name: Laure Rodriguez  Date of admission:  8/5/2020 11:34 AM  MRN:   [id number]  YOB: 1955      Date:   8/9/2020  Patient name: Laure Rodriguez  Date of admission:  8/5/2020 11:34 AM  MRN:   [id number]  YOB: 1955    Reason for Admission: Symptomatic anemia    CHIEF COMPLAINT: Chest pain. History Obtained From:  Patient and medical records. HISTORY OF PRESENT ILLNESS:      Patient is 75years old female with past medical history of chronic anemia was admitted to the hospital underwent EGD and blood transfusion being treated by GI and hematology and her hemoglobin has been improving up to 8.1 today. Apparently yesterday she had an episode of chest pain that she described as pressure heaviness in the middle of the chest that lasted for about 5 minutes and is relieved by itself associated with palpitations and shortness of breath  . Patient had similar episodes in the past on and off with no associated dizziness near syncope or syncope. Apparently patient had [place] cardiac cath in [place] as per patient in 2008. past Medical History:   has a past medical history of Anemia, Cataract, GERD (gastroesophageal reflux disease), Headache, Hyperlipidemia, Neuropathy, Osteoarthritis, Restless leg syndrome, Seizures ([address] Short-term memory loss, Stroke (cerebrum) ([address] and Type 2 diabetes mellitus without complication ([address] Past Surgical History:   has a past surgical history that includes Cholecystectomy; Tonsillectomy; Cataract removal; eye surgery; Cardiac catheterization; Upper gastrointestinal endoscopy (N/A, 5/12/2019); Colonoscopy (N/A, 5/13/2019); Upper gastrointestinal endoscopy (N/A, 3/18/2020); and Upper gastrointestinal endoscopy (N/A, 8/7/2020). Home Medications:    Prior to Admission medications    Medication Sig Start Date End Date Taking?  Authorizing Provider bumetanide (BUMEX) 1 MG tablet Take 1 mg by mouth 3 times daily   Yes Historical Provider, MD   insulin glargine (BASAGLAR KWIKPEN) 100 UNIT/ML injection pen Inject 60 Units into the skin 2 times daily   Yes Historical Provider, MD   Emollient (CERAVE) LOTN Apply topically 2 times daily Indications: bilateral lower legs   Yes Historical Provider, MD   levETIRAcetam (KEPPRA) 750 MG tablet Take 750 mg by mouth 2 times daily Indications: with 500 mg to make 1250 mg dose   Yes Historical Provider, MD   vitamin D (CHOLECALCIFEROL) 00385 UNIT CAPS Take 50,000 Units by mouth once a week Indications: Tuesdays    Yes Historical Provider, MD   gabapentin (NEURONTIN) 100 MG capsule Take 100 mg by mouth 2 times daily. .   Yes Historical Provider, MD   levETIRAcetam (KEPPRA) 500 MG tablet Take 500 mg by mouth 2 times daily Indications: with 750 mg to make total dose 1250 mg    Yes Historical Provider, MD   insulin aspart (NOVOLOG) 100 UNIT/ML injection vial Inject into the skin Inject as per sliding scale before meals and at bedtime:    = 0 units; call physician if less than 70  151-200 = 2 units  201-250 = 4 units  251-300 = 6 units  301-350 = 8 units  351-400 = 10 units; call physician if greater than 400   Yes Historical Provider, MD   potassium chloride (KLOR-CON M) 10 MEQ extended release tablet Take 30 mEq by mouth daily    Yes Historical Provider, MD   vitamin B-12 (CYANOCOBALAMIN) 500 MCG tablet Take 500 mcg by mouth daily   Yes Historical Provider, MD   acetaminophen (TYLENOL) 325 MG tablet Take 650 mg by mouth 3 times daily as needed for Pain (mild)   Yes Historical Provider, MD   metFORMIN (GLUCOPHAGE) 1000 MG tablet Take 1,000 mg by mouth 2 times daily (with meals)   Yes Historical Provider, MD   lisinopril-hydrochlorothiazide (PRINZIDE;ZESTORETIC) 10-12.5 MG per tablet Take 1 tablet by mouth daily   Yes Historical Provider, MD   PARoxetine (PAXIL) 20 MG tablet Take 20 mg by mouth every morning   Yes Historical Provider, MD   omeprazole (PRILOSEC) 40 MG delayed release capsule Take 40 mg by mouth Daily    Yes Historical Provider, MD   loratadine (CLARITIN) 10 MG tablet Take 10 mg by mouth daily   Yes Historical Provider, MD   simvastatin (ZOCOR) 20 MG tablet Take 20 mg by mouth nightly    Yes Historical Provider, MD   pramipexole (MIRAPEX) 1 MG tablet Take 1 mg by mouth nightly   Yes Historical Provider, MD   lamoTRIgine (LAMICTAL) 200 MG tablet Take 200 mg by mouth 2 times daily   Yes Historical Provider, MD   busPIRone (BUSPAR) 10 MG tablet Take 10 mg by mouth 3 times daily    Yes Historical Provider, MD   blood glucose monitor strips Test 4 times a day & as needed for symptoms of irregular blood glucose. Dispense sufficient amount for indicated testing frequency plus additional to accommodate PRN testing needs. 8/4/20   Matt Ivey PA-C   Lancets MISC 1 each by Does not apply route 4 times daily 8/4/20   Matt Ivey PA-C   glucose monitoring kit (FREESTYLE) monitoring kit 1 kit by Does not apply route daily 8/4/20   Matt Ivey PA-C   Blood Glucose Monitoring Suppl FLAVIO Check blood sugars 3/day  Patient not taking: Reported on 8/4/2020 11/30/17   Lisa Trevizo MD       Allergies:  Codeine    Social History:   reports that she has never smoked. She has never used smokeless tobacco. She reports that she does not drink alcohol or use drugs. Family History: family history includes Diabetes in her brother. No h/o sudden cardiac death. REVIEW OF SYSTEMS:    · Constitutional: there has been no unanticipated weight loss. There's been No change in energy level, No change in activity level. · Eyes: No visual changes or diplopia. No scleral icterus. · ENT: No Headaches, hearing loss or vertigo. No mouth sores or sore throat. · Cardiovascular: As above. · Respiratory: As above. · Gastrointestinal: No abdominal pain, appetite loss, blood in stools.  No change in bowel or bladder 08/08/20  0433  08/08/20  2310 08/09/20  0459   WBC 5.9  --   --  4.6   HGB 8.0*   < > 8.0* 8.1*   HCT 26.8*   < > 26.4* 27.4*   *  --   --  92*    < > = values in this interval not displayed. BMP:   Recent Labs     08/08/20 0433 08/09/20 0459    144   K 4.0 3.7   CO2 24 26   BUN 47* 47*   CREATININE 1.75* 1.82*   LABGLOM 29* 28*   GLUCOSE 197* 121*     FASTING LIPID PANEL:  Lab Results   Component Value Date    HDL 49 10/22/2017    TRIG 118 10/22/2017       Patient Active Problem List   Diagnosis    Type 2 diabetes mellitus ([address]    Breakthrough seizure ([address]    CVA, old, hemiparesis ([address]    Falling episodes    Cerebral concussion    Cervical spinal stenosis    Diabetic polyneuropathy associated with type 2 diabetes mellitus ([address]    History of cerebral infarction    Seizure disorder ([address]    Depression with anxiety    Dizziness    Generalized weakness    GERD (gastroesophageal reflux disease)    H/O falling    Hyperglycemia    Hyponatremia    Morbid obesity with BMI of 40.0-44.9, adult (HCC)    Pure hypercholesterolemia    RLS (restless legs syndrome)    Thrombocytopenia (HCC)    Altered mental status    Confusion state    Gait disturbance    GI bleed    Polyp of colon    Postmenopausal bleeding    Anemia    Elevated alkaline phosphatase level    GAVE (gastric antral vascular ectasia)    Esophageal varices determined by endoscopy (HCC)    Atypical glandular cells of undetermined significance (SLICK) on cervical Pap smear (NOS)    Symptomatic anemia    Breast mass in female    Hematuria    Acute kidney injury superimposed on chronic kidney disease ([address]    FRANCIS (nonalcoholic steatohepatitis)     Echocardiogram 3/2020:  Technically difficult study. Left ventricle is normal in size and wall thickness. Global left ventricular systolic function is normal. Estimated LV EF 60-65%. No obvious wall motion abnormality seen.  Evidence of mild (grade I)  diastolic dysfunction. Both atria are normal in size. Difficult visualization of the right ventricle. MPI value suggests normal RV  systolic function. Mild mitral regurgitation. IMPRESSION:      1. Chest pain with mixed typical and atypical features with risk factors for CAD and no recent ischemia work-up  2. No significant EKG changes and borderline flat troponin level. 3. Chronic symptomatic anemia  4. Hypertension  5. Hyperlipidemia  6. History of CVA  7. NIKKI on CKD  8. Type 2 diabetes mellitus  9. Obesity      RECOMMENDATIONS:    1. Proceed with nuclear stress test for ischemia work-up in a.m. 2.  Continue beta-blockers, ACE inhibitors, diuretics, and statins. 3.  Further recommendations to follow      Discussed with patient and nursing.     Electronically signed by Claudia Call MD on 8/9/2020 at 11:32 [facility]  [phone]

## 2020-08-10 NOTE — PLAN OF CARE
Problem: Falls - Risk of:  Goal: Will remain free from falls  Description: Will remain free from falls  8/10/2020 0331 by Terell Meeks RN  Outcome: Ongoing  Note: Pt remains free from falls this shift. Pt has bed wheels locked, call light within reach, and fall sign posted. Pt continues to be educated about fall risks. Will continue to monitor. Problem: Falls - Risk of:  Goal: Absence of physical injury  Description: Absence of physical injury  8/10/2020 0331 by Terell Meeks RN  Outcome: Ongoing  Note: Pt remains free from falls this shift. Pt has bed wheels locked, call light within reach, and fall sign posted. Pt continues to be educated about fall risks. Will continue to monitor. Problem: Bleeding:  Goal: Will show no signs and symptoms of excessive bleeding  Description: Will show no signs and symptoms of excessive bleeding  8/10/2020 0331 by Terell Meeks RN  Outcome: Ongoing  Note: Pt hgb trending up. Pt told risks and ways to prevent bleeding. Pt reminded to be cautious when doing activities and to put pressure on any bleeding areas when discharged. Will continue to monitor. .      Problem: Serum Glucose Level - Abnormal:  Goal: Ability to maintain appropriate glucose levels has stabilized  Description: Ability to maintain appropriate glucose levels has stabilized  8/10/2020 0331 by Terell Meeks RN  Outcome: Ongoing  Note: Pt glucose controled at this time. Pt educated on ways to improve blood glucose levels through diet and exercise. Pt agreeable to suggestions. Will continue to monitor. Problem: Mobility - Impaired:  Goal: Mobility will improve  Description: Mobility will improve  8/10/2020 0331 by Terell Meeks RN  Outcome: Ongoing  Note: Pt able t ambulate safely without issue. Pt educated on safe ambulation and to ask for assistance when ambulating to and from bathroom and around pt room. Pt agreed to call for assistance. Will continuee to monitor. Problem: Nutrition  Goal: Optimal nutrition therapy  8/10/2020 0331 by Wen Campbell RN  Outcome: Ongoing  Note: Pt educated on proper diet and nutrition via nurse and hospital dietician. Pt stated she understands methods to adhere to proper diet when discharged. Will continue to educate and monitor. Problem: Pain:  Goal: Pain level will decrease  Description: Pain level will decrease  8/10/2020 0331 by Wen Campbell RN  Outcome: Ongoing  Note: Pt has pain controlled at this time. See MAR. Continue to assist with repositioning for comfort. Problem: Pain:  Goal: Control of acute pain  Description: Control of acute pain  8/10/2020 0331 by Wen Campbell RN  Outcome: Ongoing  Note: Pt has pain controlled at this time. See MAR. Continue to assist with repositioning for comfort. Problem: Pain:  Goal: Control of chronic pain  Description: Control of chronic pain  8/10/2020 0331 by Wen Campbell RN  Outcome: Ongoing  Note: Pt has pain controlled at this time. See MAR. Continue to assist with repositioning for comfort.

## 2020-08-10 NOTE — DISCHARGE SUMMARY
[address]    Discharge Summary     Patient ID: Char Craven  :  1955   MRN: [id number]     ACCOUNT:  [de-identified]   Patient's PCP: Lissa Wells PA-C  Admit Date: 2020   Discharge Date: 8/10/2020     Length of Stay: 5  Code Status:  Full Code  Admitting Physician: Leola Wilde MD  Discharge Physician: Lanre Gallego MD     Active Discharge Diagnoses:       Primary Problem  Symptomatic anemia      Hospital Problems  Active Hospital Problems    Diagnosis Date Noted    FRANICS (nonalcoholic steatohepatitis) [K75.81]     Symptomatic anemia [D64.9] 2020    Breast mass in female [N63.0] 2020    Hematuria [R31.9] 2020    Acute kidney injury superimposed on chronic kidney disease ([facility] [N17.9, N18.9] 2020    Type 2 diabetes mellitus ([facility] [E11.9] 10/21/2017       Admission Condition:  poor     Discharged Condition: good    Hospital Stay:       Hospital Course: Niya Bryant is a 58-year-old female who was admitted for the management of symptomatic anemia. Due to positive antibody results on type and screen, there was a delay in receipt of appropriate blood, however she eventually received 2 units PRBCs and her hemoglobin remained stable afterwards. She had an EGD that was negative for any acute gastrointestinal bleeding. Was started on nadolol for esophageal varices seen on EGD. Her hospital course was complicated by an episode of chest pain that extended her stay; she then had a stress test the morning of discharge which was low risk. She is given outpatient follow-up to nephrology to establish care regarding her chronic kidney disease. We have held lisinopril and metformin due to her NIKKI on CKD, to be restarted on an outpatient basis. She will have an outpatient BMP on 2020.   Tomorrow on 2020 she will go for outpatient [facility] for evaluation of the right breast mass found on her examination during the hospitalization. Significant therapeutic interventions: Transfusion of 2 PRBC units    Significant Diagnostic Studies:   Labs / Micro:  CBC:   Lab Results   Component Value Date    WBC 5.7 08/10/2020    RBC 3.26 08/10/2020    HGB 7.9 08/10/2020    HCT 25.7 08/10/2020    MCV 81.1 08/10/2020    MCH 25.2 08/10/2020    MCHC 31.1 08/10/2020    RDW 18.3 08/10/2020     08/10/2020           Radiology:    Xr Chest Portable    Result Date: 8/5/2020  EXAMINATION: ONE XRAY VIEW OF THE CHEST 8/5/2020 12:14 pm COMPARISON: 05/10/2019 HISTORY: ORDERING SYSTEM PROVIDED HISTORY: shortness of breath TECHNOLOGIST PROVIDED HISTORY: shortness of breath Reason for Exam: middle chest pains Acuity: Acute Type of Exam: Initial FINDINGS: Single portable frontal view of the chest is submitted for review. The cardiac silhouette is enlarged. Lung parenchyma is clear without focal airspace consolidation, sizeable pleural effusion, or pneumothorax. Trachea is midline. Visualized osseous structures and soft tissues are grossly intact. Cardiomegaly without convincing evidence for acute cardiopulmonary pathology. Nm Cardiac Stress Test Nuclear Imaging    Result Date: 8/10/2020  EXAMINATION: MYOCARDIAL PERFUSION IMAGING 8/10/2020 7:20 am TECHNIQUE: Rest dose:  11.9 mCi Tc-99m sestamibi intravenously Stress dose:  39.7 mCi Tc-99m sestamibi intravenously Under cardiology supervision, 0.4 mg Lexiscan was infused intravenously prior to injection of the stress dose. SPECT imaging was acquired following injection of the sestamibi. ECG gating was obtained following the stress acquisition. COMPARISON: None Available.  HISTORY: ORDERING SYSTEM PROVIDED HISTORY: Chest pain TECHNOLOGIST PROVIDED HISTORY: Reason for Exam: Chest pain Reason for Exam: Angina Procedure Type->Rx angina Reason for Exam: Chest pain Acuity: Unknown Type of Exam: Unknown 57-year-old female with chest pain FINDINGS: The patient achieved a maximum heart rate of 69 beats per minute, 44 % of the maximum age predicted heart rate of 154 beats per minute. Perfusion: Photopenia of the inferior wall on standard stress imaging resolves with prone stress imaging. This likely represents attenuation related artifact. There is no scintigraphic evidence for a reversible or fixed perfusion defect to suggest reversible ischemia or infarct. Function: The gated SPECT data demonstrates normal left ventricular size and normal wall motion. Left ventricular ejection fraction:  52% TID score:  1.08 (threshold value of 1.39 is used for Lexiscan stress with Tc-99m). There is no stress-induced cavitary dilatation to suggest compensated triple vessel disease. End diastolic volume:  214WL Scores are visually adjusted to account for potential artifact. Summed stress score:  0 Summed rest score:  0 Summed reversibility score:  0     1. No definitive scintigraphic evidence for reversible ischemia or infarct. 2. Left ventricular ejection fraction is 52%. 3.  Please see report for EKG portion of the examination which will be performed separately by physician from cardiology. Risk stratification:  Low risk Note:  Risk stratification incorporates both clinical history and test results. Final risk determination is the responsibility of the ordering provider as history and other test results may increase or decrease the risk stratification reported for this examination. Risk stratification criteria are adapted from \"Noninvasive Risk Stratification\" criteria from [employer]. Al, ACC/AATS/AHA/ASE/ASNC/SCAI/SCCT/STS 2017 Appropriate Use Criteria For Coronary Revascularization in Patients With Stable Ischemic Heart Disease [facility] Volume 69, Issue 17, May 2017 High risk (>3% annual death or MI) 1. Severe resting LV dysfunction (LVEF >35%) not readily explained by non coronary causes 2.   Resting perfusion abnormalities greater than 10% of the myocardium in patients without prior history or evidence of MI 3. Stress-induced perfusion abnormalities encumbering greater than or equal to 10% myocardium or stress segmental scores indicating multiple vascular territories with abnormalities 4. Stress-induced LV dilatation (TID ratio greater than 1.19 for exercise and greater than 1.39 for regadenoson) Intermediate risk (1% to 3% annual death or MI) 1. Mild/moderate resting LV dysfunction (LVEF 35% to 49%) not readily explained by non coronary causes. 2.  Resting perfusion abnormalities in 5%-9.9% of the myocardium in patients without a history or prior evidence of MI 3. Stress-induced perfusion abnormality encumbering 5%-9.9% of the myocardium or stress segmental scores indicating 1 vascular territory with abnormalities but without LV dilation 4. Small wall motion abnormality involving 1-2 segments and only 1 coronary bed. Low Risk (Less than 1% annual death or MI) 1. Normal or small myocardial perfusion defect at rest or with stress encumbering less than 5% of the myocardium. Consultations:    Consults:     Final Specialist Recommendations/Findings:   IP CONSULT TO INTERNAL MEDICINE  IP CONSULT TO GI  IP CONSULT TO GI  IP CONSULT TO SOCIAL WORK  IP CONSULT TO CARDIOLOGY  PHARMACY TO DOSE MEDICATION      The patient was seen and examined on day of discharge and this discharge summary is in conjunction with any daily progress note from day of discharge. Discharge plan:       Disposition: To a non-[facility] facility - [id number] [place] [address]    [facility] [address]  [phone]          [address]    Mammogram on 8/11/20 at 9:30, Rt Breast US at 10:00, No Perfumes, Powders, Deodorants, Come Alone, wear mask, Arrive only 10 min early.      Sandra Dickson MD  [address]  [address] [facility] [place]    Schedule an appointment as soon as possible for a visit in 2 weeks  anemia    Indira Martinez [address] [place] [address]  [phone]    In 1 week      Nilay Taylor MD  [address] [place], [place] [address] [place] [address]  [phone]    In 2 weeks  needs a nephrologist for CKD       Requiring Further Evaluation/Follow Up POST HOSPITALIZATION/Incidental Findings: right breast mass, scheduled for mammogram and ultrasound on outpatient basis on August 11, 2020    Diet: renal diet    Activity: As tolerated    Instructions to Patient: Follow-up with PCP within 1 week    Discharge Medications:      Medication List      START taking these medications    nadolol 20 MG tablet  Commonly known as:  CORGARD  Take 1 tablet by mouth daily        CONTINUE taking these medications    acetaminophen 325 MG tablet  Commonly known as:  TYLENOL     Basaglar KwikPen 100 UNIT/ML injection pen  Generic drug:  insulin glargine     * blood glucose monitor kit and supplies  Check blood sugars 3/day     * glucose monitoring kit monitoring kit  1 kit by Does not apply route daily     blood glucose test strips  Test 4 times a day & as needed for symptoms of irregular blood glucose. Dispense sufficient amount for indicated testing frequency plus additional to accommodate PRN testing needs.      bumetanide 1 MG tablet  Commonly known as:  BUMEX     busPIRone 10 MG tablet  Commonly known as:  BUSPAR     CeraVe Lotn     ferrous sulfate 325 (65 Fe) MG tablet  Commonly known as:  IRON 325  Take 1 tablet by mouth every 12 hours     gabapentin 100 MG capsule  Commonly known as:  NEURONTIN     lamoTRIgine 200 MG tablet  Commonly known as:  LAMICTAL     Lancets Misc  1 each by Does not apply route 4 times daily     * levETIRAcetam 500 MG tablet  Commonly known as:  KEPPRA     * levETIRAcetam 750 MG tablet  Commonly known as:  KEPPRA     lisinopril-hydroCHLOROthiazide 10-12.5 MG per tablet  Commonly known as:  PRINZIDE;ZESTORETIC     loratadine 10 MG tablet  Commonly known as:  CLARITIN     NovoLOG 100 UNIT/ML injection vial  Generic drug:  insulin aspart     omeprazole 40 MG delayed release capsule  Commonly known as:  PRILOSEC     PARoxetine 20 MG tablet  Commonly known as:  PAXIL     potassium chloride 10 MEQ extended release tablet  Commonly known as:  KLOR-CON M     pramipexole 1 MG tablet  Commonly known as:  MIRAPEX     simvastatin 20 MG tablet  Commonly known as:  ZOCOR     vitamin B-12 500 MCG tablet  Commonly known as:  CYANOCOBALAMIN     vitamin D 37768 UNIT Caps  Commonly known as:  CHOLECALCIFEROL         * This list has 4 medication(s) that are the same as other medications prescribed for you. Read the directions carefully, and ask your doctor or other care provider to review them with you. STOP taking these medications    metFORMIN 1000 MG tablet  Commonly known as:  GLUCOPHAGE           Where to Get Your Medications      These medications were sent to [address] [place] [address]    Phone:  [phone]   · ferrous sulfate 325 (65 Fe) MG tablet  · nadolol 20 MG tablet         Time Spent on discharge is  43 mins in patient examination, evaluation, counseling as well as medication reconciliation, prescriptions for required medications, discharge plan and follow up. Electronically signed by   Wilfredo Kee MD  8/10/2020  5:39 PM      Thank you Dr. Sarah Beth Jaimes PA-C for the opportunity to be involved in this patient's care.

## 2020-08-10 NOTE — PROGRESS NOTES
CLINICAL PHARMACY NOTE: MEDS TO [address] Select Patient?: No  Total # of Prescriptions Filled: 2   The following medications were delivered to the patient:  · Ferrous sulfate  · Nadolol  ·   Total # of Interventions Completed: 0  Time Spent (min): 45    Additional Documentation:

## 2020-08-10 NOTE — PLAN OF CARE
Problem: Falls - Risk of:  Goal: Will remain free from falls  Description: Will remain free from falls  8/10/2020 1614 by Brandon Ny RN  Outcome: Ongoing     Problem: Bleeding:  Goal: Will show no signs and symptoms of excessive bleeding  Description: Will show no signs and symptoms of excessive bleeding  8/10/2020 1614 by Brandon Ny RN  Outcome: Ongoing     Problem: Serum Glucose Level - Abnormal:  Goal: Ability to maintain appropriate glucose levels has stabilized  Description: Ability to maintain appropriate glucose levels has stabilized  8/10/2020 1614 by Brandon Ny RN  Outcome: Ongoing     Problem: Mobility - Impaired:  Goal: Mobility will improve  Description: Mobility will improve  8/10/2020 1614 by Brandon Ny RN  Outcome: Ongoing     Problem: Pain:  Goal: Pain level will decrease  Description: Pain level will decrease  8/10/2020 1614 by Brandon Ny RN  Outcome: Ongoing     Problem: Pain:  Goal: Control of acute pain  Description: Control of acute pain  8/10/2020 1614 by Brandon Ny RN  Outcome: Ongoing

## 2020-08-10 NOTE — PROGRESS NOTES
[facility]   Progress Note                   Date:   8/10/2020  Patient name: Porsche Guajardo  Date of admission:  8/5/2020 11:34 AM  MRN:   [id number]  YOB: 1955  PCP: Debra Villeda PA-C    Reason for Admission: Symptomatic anemia [D64.9]    Subjective:       Clinical Changes / Abnormalities: seen in stress lab. No chest pain. Reports dyspnea on exertion. Medications:   Scheduled Meds:   heparin (porcine)  5,000 Units Subcutaneous 3 times per day    sodium chloride (PF)  10 mL Intravenous BID    And    pantoprazole  40 mg Intravenous BID    iron sucrose  200 mg Intravenous Q24H    levETIRAcetam  500 mg Oral BID    nadolol  20 mg Oral Daily    insulin lispro  0-12 Units Subcutaneous TID WC    insulin lispro  0-6 Units Subcutaneous Nightly    bumetanide  1 mg Oral TID    sodium chloride  20 mL Intravenous Once    sodium chloride flush  10 mL Intravenous 2 times per day    lamoTRIgine  200 mg Oral BID    PARoxetine  20 mg Oral QAM    atorvastatin  20 mg Oral Daily    sodium chloride flush  10 mL Intravenous 2 times per day    [Held by provider] lisinopril  10 mg Oral Daily    And    hydroCHLOROthiazide  12.5 mg Oral Daily    miconazole   Topical BID    insulin glargine  40 Units Subcutaneous BID     Continuous Infusions:   sodium chloride      sodium chloride 75 mL/hr at 08/09/20 1750    dextrose       CBC:   Recent Labs     08/08/20  0433  08/09/20  0459 08/09/20  1332 08/09/20  2206 08/10/20  0541   WBC 5.9  --  4.6  --   --  5.7   HGB 8.0*   < > 8.1* 8.1* 7.7* 8.2*   *  --  92*  --   --  104*    < > = values in this interval not displayed.      BMP:    Recent Labs     08/08/20  0433 08/09/20  0459 08/10/20  0541    144 144   K 4.0 3.7 3.5*   * 110* 109*   CO2 24 26 28   BUN 47* 47* 48*   CREATININE 1.75* 1.82* 1.97*   GLUCOSE 197* 121* 84     Hepatic: No results for input(s): AST, ALT, ALB, BILITOT, ALKPHOS in the last 72 hours.  Troponin: No results for input(s): TROPONINI in the last 72 hours. BNP: No results for input(s): BNP in the last 72 hours. Lipids: No results for input(s): CHOL, HDL in the last 72 hours. Invalid input(s): LDLCALCU  INR: No results for input(s): INR in the last 72 hours. Objective:   Vitals: BP (!) 133/57   Pulse 83   Temp 98 °F (36.7 °C) (Oral)   Resp 16   Ht 5' (1.524 m)   Wt 283 lb (128.4 kg)   SpO2 98%   BMI 55.27 kg/m²   General appearance: alert and cooperative with exam  HEENT: Head: Normocephalic, no lesions, without obvious abnormality. Neck: no JVD  Lungs: CTAB  Heart: RRR s1+s2, no murmurs  Abdomen: soft, non-tender  Extremities: 2-3+ chronic edema  Neurologic: not done        Assessment / Acute Cardiac Problems:   1. Chest pain with mixed typical and atypical features with risk factors for CAD and no recent ischemia work-up  2. No significant EKG changes and borderline flat troponin level. 3. Chronic symptomatic anemia  4. Hypertension  5. Hyperlipidemia  6. History of CVA  7. NIKKI on CKD  8. Type 2 diabetes mellitus  9.  Obesity    Patient Active Problem List:     Type 2 diabetes mellitus ([address]     Breakthrough seizure ([address]     CVA, old, hemiparesis ([address]     Falling episodes     Cerebral concussion     Cervical spinal stenosis     Diabetic polyneuropathy associated with type 2 diabetes mellitus ([address]     History of cerebral infarction     Seizure disorder ([address]     Depression with anxiety     Dizziness     Generalized weakness     GERD (gastroesophageal reflux disease)     H/O falling     Hyperglycemia     Hyponatremia     Morbid obesity with BMI of 40.0-44.9, adult (HCC)     Pure hypercholesterolemia     RLS (restless legs syndrome)     Thrombocytopenia (HCC)     Altered mental status     Confusion state     Gait disturbance     GI bleed     Polyp of colon     Postmenopausal bleeding     Anemia     Elevated alkaline phosphatase level     GAVE (gastric antral vascular ectasia) Esophageal varices determined by endoscopy ([facility] [address]     Atypical glandular cells of undetermined significance (SLICK) on cervical Pap smear (NOS)     Symptomatic anemia     Breast mass in female     Hematuria     Acute kidney injury superimposed on chronic kidney disease (HCC)     FRANCIS (nonalcoholic steatohepatitis)      Plan of Treatment:   -Await results of stress test  -Continue current BP medications  -Recommend nephrology consultation  -Compression stocking    Jenni Jarvis [facility] Cardiology  [phone]

## 2020-08-10 NOTE — CARE COORDINATION
ONGOING DISCHARGE PLAN:    Spoke with patient regarding discharge plan and patient confirms that plan is still  to return to her [facility]isted Living apt. W/ no needs.      Denies VNS.     Pt had EGD 8/7, w/ GI, Per Notes: Upper GI endoscopy APC cauterization of gastric vascular ectasia in the antrum.     Brushing from the duodenum.     HGB today, 8.2 . IV Venofer, Low Fiber diet.       Cardiology, for Chest pressure/dizziness. Pt. Had Stress test today. ?DC today if neg. Will continue to follow for additional discharge needs.     Electronically signed by Elona Boeck, RN on 8/10/2020 at 12:11 PM

## 2020-08-10 NOTE — PROGRESS NOTES
Patient here for Lexiscan stress test.  Permission to start granted by Dr. Melonie Wolf  Pt reports \"funny feeling\", headache 6/10, slight lightneadedness, during stress test.  Pt given caffeine as reversal agent during recovery period. Pt reports feeling back to baseline during recovery period with headache pain rating 2/10. Permission to end granted by Dr. Melonie Wolf. Dr. Melonie Wolf  trevino negative on stress test flow sheet. Pt awaits nuclear medicine imaging and results. Patient may have caffeine for latent headache.

## 2020-08-10 NOTE — PROGRESS NOTES
Patient seen and examined, case discussed with family medicine residents  Patient is feeling much better today  Chest pain almost resolved  Hemoglobin stable  Creatinine is almost stable  Patient has CKD  Blood pressure is okay hold, agree with holding lisinopril hydrochlorothiazide  On Bumex  His chronic bilateral lymphedema in legs  Likely discharge later today, after results of stress test  Discussed with patient

## 2020-08-10 NOTE — PROGRESS NOTES
Current Meds:   Scheduled Meds:    potassium bicarb-citric acid  40 mEq Oral Once    heparin (porcine)  5,000 Units Subcutaneous 3 times per day    sodium chloride (PF)  10 mL Intravenous BID    And    pantoprazole  40 mg Intravenous BID    iron sucrose  200 mg Intravenous Q24H    levETIRAcetam  500 mg Oral BID    nadolol  20 mg Oral Daily    insulin lispro  0-12 Units Subcutaneous TID WC    insulin lispro  0-6 Units Subcutaneous Nightly    bumetanide  1 mg Oral TID    sodium chloride  20 mL Intravenous Once    lamoTRIgine  200 mg Oral BID    PARoxetine  20 mg Oral QAM    atorvastatin  20 mg Oral Daily    sodium chloride flush  10 mL Intravenous 2 times per day    [Held by provider] lisinopril  10 mg Oral Daily    And    hydroCHLOROthiazide  12.5 mg Oral Daily    miconazole   Topical BID    insulin glargine  40 Units Subcutaneous BID     Continuous Infusions:    sodium chloride      sodium chloride 75 mL/hr at 08/09/20 1750    dextrose       PRN Meds: regadenoson, sodium chloride flush, sodium chloride, atropine, nitroGLYCERIN, metoprolol, aminophylline, diphenhydrAMINE, sodium chloride flush, acetaminophen **OR** acetaminophen, polyethylene glycol, promethazine **OR** ondansetron, glucose, dextrose, glucagon (rDNA), dextrose    Data:     Past Medical History:   has a past medical history of Anemia, Cataract, GERD (gastroesophageal reflux disease), Headache, Hyperlipidemia, Neuropathy, Osteoarthritis, Restless leg syndrome, Seizures ([address] Short-term memory loss, Stroke (cerebrum) ([address] and Type 2 diabetes mellitus without complication ([address] Social History:   reports that she has never smoked. She has never used smokeless tobacco. She reports that she does not drink alcohol or use drugs.      Family History:   Family History   Problem Relation Age of Onset    Diabetes Brother        Vitals:  BP (!) 132/53   Pulse 59   Temp 97.7 °F (36.5 °C) (Oral)   Resp 16   Ht 5' (1.524 m) wheezes, rales or rhonchi, aeration good. She is able to converse in full sentence without difficulty. Cardiovascular - Heart sounds are normal.  Regular rhythm, normal rate without murmur, gallop or rub. Abdomen - Soft, nontender, obese, no masses or organomegaly  Neurologic - There are no new focal motor or sensory deficits  Skin - No bruising or bleeding on exposed skin area  Extremities - No cyanosis or clubbing. Bilateral lower extremities have erythema, xerosis, and venous stasis changes. No unilateral calf tenderness or swelling. Assessment:        Primary Problem  Symptomatic anemia    Active Hospital Problems    Diagnosis Date Noted    FRANCIS (nonalcoholic steatohepatitis) [K75.81]     Symptomatic anemia [D64.9] 08/05/2020    Breast mass in female [N63.0] 08/05/2020    Hematuria [R31.9] 08/05/2020    Acute kidney injury superimposed on chronic kidney disease ([facility] [address] [N17.9, N18.9] 08/05/2020    Type 2 diabetes mellitus ([facility] [address] [E11.9] 10/21/2017       Plan:          Chest pain  -Patient began having chest pain in the hospital on 08/09/2020  -Stress test negative this morning  -Troponin: 30 > 31 > 31 > 31, appears to be at her baseline  -EKG: Nonspecific T wave abnormality now evident in inferior leads compared to 3 days prior. Cannot rule out anterior infarct, age undetermined  -Hypertensive during this admission - HCTZ resumed, lisinopril held     Symptomatic anemia with dizziness and mild shortness of breath secondary to GI bleeding  - Hemoglobin is 8.1 this morning, status post PRBC transfusion 2 units  - GI consulted, had EGD this admission: Findings include small esophageal varices with moderate risk of bleeding. Duodenum descending: abnormal whitish spots in the duodenum. Brushings obtained to R/O candida.  -Nadolol 20 mg p.o. daily: Keep BB therapy as per GI recommendations given the findings of esopheageal varices and hx of FRANCIS.   -IV iron  -Protonix 40 mg IV twice daily  -follow-up with GI on discharge     Type 2 DM:  -Medium dose sliding scale  -Lantus 40 units twice daily  - Hypoglycemia protocol  - Last HbA1C was 8.2 of August 4, 2020. Acute NIKKI superimposed on CKD:  -Creatinine 1.97 this morning, mildly elevated from 1.82 yesterday, improved from 2.07 on 8/5/2020  -FENa is 0.5%, likely prerenal consistent with her anemia  -IV normal saline 75 mL/h  -Held lisinopril  -She is given referral to a nephrologist to establish care upon discharge    Right breast lump:  -She will follow-up on an outpatient basis.   -Outpatient orders given for diagnostic mammogram and ultrasound     DVT prohylaxsis: None due to her anemia    Dom Youssef MD  8/10/2020  1:05 PM

## 2020-08-17 NOTE — PROGRESS NOTES
Post-Discharge Transitional Care Management Services or Hospital Follow Up      Tony Zhang   YOB: 1955    Date of Office Visit:  8/17/2020  Date of Hospital Admission: 8/5/20  Date of Hospital Discharge: 8/10/20  Risk of hospital readmission (high >=14%.  Medium >=10%) :Readmission Risk Score: 29    Care management risk score Rising risk (score 2-5) and Complex Care (Scores >=6): 4     Patient Active Problem List   Diagnosis    Type 2 diabetes mellitus ([address]    Breakthrough seizure ([address]    CVA, old, hemiparesis ([address]    Falling episodes    Cerebral concussion    Cervical spinal stenosis    Diabetic polyneuropathy associated with type 2 diabetes mellitus ([address]    History of cerebral infarction    Seizure disorder ([address]    Depression with anxiety    Dizziness    Generalized weakness    GERD (gastroesophageal reflux disease)    H/O falling    Hyperglycemia    Hyponatremia    Morbid obesity with BMI of 40.0-44.9, adult (HCC)    Pure hypercholesterolemia    RLS (restless legs syndrome)    Thrombocytopenia (HCC)    Altered mental status    Confusion state    Gait disturbance    GI bleed    Polyp of colon    Postmenopausal bleeding    Anemia    Elevated alkaline phosphatase level    GAVE (gastric antral vascular ectasia)    Esophageal varices determined by endoscopy ([address]    Atypical glandular cells of undetermined significance (SLICK) on cervical Pap smear (NOS)    Symptomatic anemia    Breast mass in female    Hematuria    Acute kidney injury superimposed on chronic kidney disease (HCC)    FRANCIS (nonalcoholic steatohepatitis)     Allergies   Allergen Reactions    Codeine Hives and Shortness Of Breath     Medications listed as ordered at the time of discharge from hospital   [facility]:    Printed on:08/17/20 1122   Medication Information                      acetaminophen (TYLENOL) 325 MG tablet  Take 650 mg by mouth 3 times daily as needed for Pain (mild)             blood glucose monitor strips  Test 4 times a day & as needed for symptoms of irregular blood glucose. Dispense sufficient amount for indicated testing frequency plus additional to accommodate PRN testing needs. Blood Glucose Monitoring Suppl FLAVIO  Check blood sugars 3/day             bumetanide (BUMEX) 1 MG tablet  Take 1 mg by mouth 3 times daily             busPIRone (BUSPAR) 10 MG tablet  Take 10 mg by mouth 3 times daily              clotrimazole-betamethasone (LOTRISONE) 1-0.05 % cream  Apply topically 2 times daily. Emollient (CERAVE) LOTN  Apply topically 2 times daily Indications: bilateral lower legs             ferrous sulfate (IRON 325) 325 (65 Fe) MG tablet  Take 1 tablet by mouth every 12 hours             gabapentin (NEURONTIN) 100 MG capsule  Take 100 mg by mouth 2 times daily. [employer]              glucose monitoring kit (FREESTYLE) monitoring kit  1 kit by Does not apply route daily             insulin aspart (NOVOLOG) 100 UNIT/ML injection vial  Inject into the skin Inject as per sliding scale before meals and at bedtime:    = 0 units; call physician if less than 70  151-200 = 2 units  201-250 = 4 units  251-300 = 6 units  301-350 = 8 units  351-400 = 10 units; call physician if greater than 400             insulin glargine (BASAGLAR KWIKPEN) 100 UNIT/ML injection pen  Inject 60 Units into the skin 2 times daily             lamoTRIgine (LAMICTAL) 200 MG tablet  Take 200 mg by mouth 2 times daily             Lancets MISC  1 each by Does not apply route 4 times daily             levETIRAcetam (KEPPRA) 500 MG tablet  Take 500 mg by mouth 2 times daily Indications: with 750 mg to make total dose 1250 mg              levETIRAcetam (KEPPRA) 750 MG tablet  Take 750 mg by mouth 2 times daily Indications: with 500 mg to make 1250 mg dose             lisinopril-hydrochlorothiazide (PRINZIDE;ZESTORETIC) 10-12.5 MG per tablet  Take 1 tablet by mouth daily             loratadine (CLARITIN) 10 MG tablet  Take 10 mg by mouth daily             nadolol (CORGARD) 20 MG tablet  Take 1 tablet by mouth daily             omeprazole (PRILOSEC) 40 MG delayed release capsule  Take 40 mg by mouth Daily              PARoxetine (PAXIL) 20 MG tablet  Take 20 mg by mouth every morning             potassium chloride (KLOR-CON M) 10 MEQ extended release tablet  Take 30 mEq by mouth daily              pramipexole (MIRAPEX) 1 MG tablet  Take 1 mg by mouth nightly             simvastatin (ZOCOR) 20 MG tablet  Take 20 mg by mouth nightly              vitamin B-12 (CYANOCOBALAMIN) 500 MCG tablet  Take 500 mcg by mouth daily             vitamin D (CHOLECALCIFEROL) 97789 UNIT CAPS  Take 50,000 Units by mouth once a week Indications: Tuesdays                  Medications marked \"taking\" at this time  Outpatient Medications Marked as Taking for the 8/17/20 encounter (Office Visit) with Jose Guzman PA-C   Medication Sig Dispense Refill    clotrimazole-betamethasone (LOTRISONE) 1-0.05 % cream Apply topically 2 times daily. 15 g 0    nadolol (CORGARD) 20 MG tablet Take 1 tablet by mouth daily 30 tablet 3    ferrous sulfate (IRON 325) 325 (65 Fe) MG tablet Take 1 tablet by mouth every 12 hours 30 tablet 5    bumetanide (BUMEX) 1 MG tablet Take 1 mg by mouth 3 times daily      blood glucose monitor strips Test 4 times a day & as needed for symptoms of irregular blood glucose. Dispense sufficient amount for indicated testing frequency plus additional to accommodate PRN testing needs.  100 strip 0    Lancets MISC 1 each by Does not apply route 4 times daily 200 each 0    glucose monitoring kit (FREESTYLE) monitoring kit 1 kit by Does not apply route daily 1 kit 0    insulin glargine (BASAGLAR KWIKPEN) 100 UNIT/ML injection pen Inject 60 Units into the skin 2 times daily      Emollient (CERAVE) LOTN Apply topically 2 times daily Indications: bilateral lower legs      levETIRAcetam (KEPPRA) 750 MG tablet Take 750 mg by mouth 2 times daily Indications: with 500 mg to make 1250 mg dose      vitamin D (CHOLECALCIFEROL) 64419 UNIT CAPS Take 50,000 Units by mouth once a week Indications: Tuesdays       gabapentin (NEURONTIN) 100 MG capsule Take 100 mg by mouth 2 times daily. [employer] levETIRAcetam (KEPPRA) 500 MG tablet Take 500 mg by mouth 2 times daily Indications: with 750 mg to make total dose 1250 mg       insulin aspart (NOVOLOG) 100 UNIT/ML injection vial Inject into the skin Inject as per sliding scale before meals and at bedtime:    = 0 units; call physician if less than 70  151-200 = 2 units  201-250 = 4 units  251-300 = 6 units  301-350 = 8 units  351-400 = 10 units; call physician if greater than 400      potassium chloride (KLOR-CON M) 10 MEQ extended release tablet Take 30 mEq by mouth daily       vitamin B-12 (CYANOCOBALAMIN) 500 MCG tablet Take 500 mcg by mouth daily      acetaminophen (TYLENOL) 325 MG tablet Take 650 mg by mouth 3 times daily as needed for Pain (mild)      lisinopril-hydrochlorothiazide (PRINZIDE;ZESTORETIC) 10-12.5 MG per tablet Take 1 tablet by mouth daily      PARoxetine (PAXIL) 20 MG tablet Take 20 mg by mouth every morning      omeprazole (PRILOSEC) 40 MG delayed release capsule Take 40 mg by mouth Daily       loratadine (CLARITIN) 10 MG tablet Take 10 mg by mouth daily      simvastatin (ZOCOR) 20 MG tablet Take 20 mg by mouth nightly       pramipexole (MIRAPEX) 1 MG tablet Take 1 mg by mouth nightly      lamoTRIgine (LAMICTAL) 200 MG tablet Take 200 mg by mouth 2 times daily      busPIRone (BUSPAR) 10 MG tablet Take 10 mg by mouth 3 times daily         Medications patient taking as of now reconciled against medications ordered at time of hospital discharge: Yes    Chief Complaint   Patient presents with    Follow[facility]Up from [facility] 8/3-8/10, states been feeling okay since being discharged    Rash     underneath right breast, had physician look at when she was in house and stated she has a lump    Health Maintenance     Dtap    Shortness of Breath     with exertion, has to stop when walking down the hallway, previously has not had to do that, started while in the hospital, did let staff know while in house, was put on oxygen, issue was not addressed     History of Present illness - Follow up of Hospital diagnosis(es):     1. Anemia  2. Chest pain  3. Right breast mass  4. NIKKI on CKD  5. Congestive heart failure  6. Diabetes mellitus    Inpatient course: Discharge summary reviewed- see chart. Interval history/Current status:     Patient presents for hospital discharge follow up, admitted due to symptomatic anemia. Patient completed labs revealing hemoglobin 6.5, was instructed to present to hospital. Patient received two units. She was seen by gastroenterologist, has upper GI endoscopy APC cauterization of gastric vascular ectasia in the antrum. Patient developed chest pain, was seen by cardiologist, stress test low risk of ischemia. Dyspnea on exertion, chest xray with no acute process, no cough, hemoptysis, orthopnea, chronic pedal edema to baseline. Right breast mass discovered two weeks ago, reports recent rash below the breast, was referred to [facility] center has diagnostic mammogram and ultrasound scheduled this week. Blood sugars have been controlled. She denies recurrence chest pain/pressure. No fever/chills. A comprehensive review of systems was negative except for what was noted in the HPI. Vitals:    08/17/20 1019   BP: 130/68   Pulse: 71   Temp: 97.6 °F (36.4 °C)   SpO2: 95%   Weight: 283 lb (128.4 kg)   Height: 5' (1.524 m)     Body mass index is 55.27 kg/m².    Wt Readings from Last 3 Encounters:   08/17/20 283 lb (128.4 kg)   08/10/20 283 lb (128.4 kg)   08/04/20 271 lb (122.9 kg)     BP Readings from Last 3 Encounters:   08/17/20 130/68   08/10/20 (!) 132/53   08/07/20 (!) 158/65      Physical Exam:  General - alert, well appearing, non toxic, in no distress  Skin - normal coloration and turgor, rash right breast skin fold, erythematous macular rash, no skim dimpling, no nipple inversion, palpable mass right central/lateral breast  Eyes - pupils equal and reactive, extraocular eye movements intact, mild conj pallor   Mouth - mucous membranes moist  Neck - supple, no significant adenopathy, no palpable masses  Chest - clear to auscultation, symmetric air entry  Heart - normal rate, regular rhythm, no murmur  Abdomen - non distended, soft, no tenderness to palpation   Back - no flank tenderness bilaterally   Neurological -alert, oriented x 3, normal speech, no focal sensory or motor deficit  Extremities - peripheral pulses normal, 1+ pedal edema bilaterally, no calf tenderness    Assessment/Plan:  1. Hospital discharge follow-up  [facility] discharge summary reviewed, medications reconciled   - ID DISCHARGE MEDS RECONCILED W/ CURRENT OUTPATIENT MED LIST    2. Symptomatic anemia  - Status post transfusion, recheck CBC, continue iron, f/u heme/onc, gastroenterologist, gynecologist   - DME Order for Home Oxygen as OP    3. Esophageal varices determined by endoscopy ([facility]  - Stable, continue present management    4. Acute kidney injury superimposed on CKD ([facility]  - Recheck BMP, follow up with nephrologist, continue to hold Metformin    5. Type 2 diabetes mellitus with diabetic polyneuropathy, with long-term current use of insulin (HCC)  - Controlled, insulin ss, continue present management    6. Breast mass in female  - Right breast mass, diagnostic mammogram and ultrasound scheduled  - Rash below breast, hygiene, keep dry, trial antifungal/steroid cream    7. Postmenopausal bleeding  - Has follow up with gynecologist, scheduled for [facility]  Hysterocope with Colposcopy and ECC    8.  Heart failure with preserved ejection fraction, unspecified HF chronicity (HCC)  - Moderate dyspnea on exertion, desaturation 89% on 6-min walk test in office, supplemental oxygen  - DME Order for Home Oxygen as OP    Medical Decision Making: high complexity    Return in about 4 weeks (around 9/14/2020) for anemia, narendra on ckd, right breast mass. Will have patient follow up with physician in office.  Advised prompt return to clinic or call for new or worsening symptoms, she understands/agrees      DEJON Mcfarland [facility]  8/17/2020, 11:37 AM

## 2020-08-18 PROBLEM — S09.90XS: Status: ACTIVE | Noted: 2020-01-01

## 2020-08-18 PROBLEM — R47.01 APHASIA: Status: ACTIVE | Noted: 2020-01-01

## 2020-08-18 PROBLEM — J18.9 PNEUMONIA: Status: ACTIVE | Noted: 2020-01-01

## 2020-08-18 PROBLEM — R47.01 EXPRESSIVE APHASIA: Status: ACTIVE | Noted: 2020-01-01

## 2020-08-18 NOTE — FLOWSHEET NOTE
Writer responded to stroke alert. Staff attending to pt who was unsure of what was going on.  said prayer for pt and staff. SC will follow up.     08/18/20 1200   Encounter Summary   Services provided to: Patient   Referral/Consult From: Multi-disciplinary team   Complexity of Encounter Moderate   Length of Encounter 15 minutes   Crisis   Type Stroke Alert   Assessment Anxious   Intervention Sustaining presence/ Ministry of presence; Active listening;Prayer   Outcome Receptive

## 2020-08-18 NOTE — PROGRESS NOTES
Physical Therapy  DATE: 2020    NAME: Vinod Amanda  MRN: [id number]   : 1955    Patient not seen this date for Physical Therapy due to:  [] Blood transfusion in progress  [] Hemodialysis  []  Patient Declined  [] Spine Precautions   [] Strict Bedrest  [] Surgery/ Procedure  [] Testing      [x] Other Stroke alert called late this morning. Will check tomorrow. [] PT being discontinued at this time. Patient independent. No further needs. [] PT being discontinued at this time as the patient has been transferred to palliative care. No further needs.     Santos Graham, PT

## 2020-08-18 NOTE — ED NOTES
Pt placed on full cardiac monitor. Dr. Tere Oneill and Dr. Robb Villa at bedside to assess patient.       Tl Mathews RN  08/18/20 1675

## 2020-08-18 NOTE — CONSULTS
Endovascular Neurosurgery Consult      Reason for evaluation: L ICA stenosis, ? aphasia    SUBJECTIVE:   History of Chief Complaint:    71yo female nursing home pt with pmh of htn, hld, morbid obesity, dmii with neuropathy, stroke with LLE>LUE hemiweakness 2010, seizure d/o, FRANCIS, depression/anxiety. Pt suffered a fall/LOC episode with unclear details. While in the ED pt was observed to have difficulty speaking, which pt does not clearly recall. Pt is a poor historian. Yesterday evening pt was going to the kitchen to get pop from the fridge, she had dizziness (pt describes as room spinning), and fell. Pt doesn't recall the event well, she was not able to get up off the floor for some time. Unclear if there was LOC. She reported generalized weakness at that time. She suffered a contusion to the back of her head. When she was brought to the ED, on examination pt was thought to have difficulty getting words out. Because of this, and the finding of L ICA stenosis, pt was transferred to [facility] for higher level of care. Allergies  is allergic to codeine. Medications  Prior to Admission medications    Medication Sig Start Date End Date Taking? Authorizing Provider   cephALEXin (KEFLEX) 500 MG capsule Take 1 capsule by mouth 3 times daily for 7 days 8/18/20 8/25/20 Yes Lisset Blood MD   bumetanide (BUMEX) 1 MG tablet Take 1 mg by mouth 3 times daily   Yes Historical Provider, MD   insulin glargine (BASAGLAR KWIKPEN) 100 UNIT/ML injection pen Inject 60 Units into the skin 2 times daily   Yes Historical Provider, MD   levETIRAcetam (KEPPRA) 750 MG tablet Take 750 mg by mouth 2 times daily Indications: with 500 mg to make 1250 mg dose   Yes Historical Provider, MD   gabapentin (NEURONTIN) 100 MG capsule Take 100 mg by mouth 2 times daily. .   Yes Historical Provider, MD   levETIRAcetam (KEPPRA) 500 MG tablet Take 500 mg by mouth 2 times daily Indications: with 750 mg to make total dose 1250 mg    Yes Historical Provider, MD   vitamin B-12 (CYANOCOBALAMIN) 500 MCG tablet Take 500 mcg by mouth daily    Historical Provider, MD   acetaminophen (TYLENOL) 325 MG tablet Take 650 mg by mouth 3 times daily as needed for Pain (mild)    Historical Provider, MD   Blood Glucose Monitoring Suppl FLAVIO Check blood sugars 3/day 11/30/17   Liliane Porter MD   PARoxetine (PAXIL) 20 MG tablet Take 20 mg by mouth every morning    Historical Provider, MD   loratadine (CLARITIN) 10 MG tablet Take 10 mg by mouth daily    Historical Provider, MD   pramipexole (MIRAPEX) 1 MG tablet Take 1 mg by mouth nightly    Historical Provider, MD   busPIRone (BUSPAR) 10 MG tablet Take 10 mg by mouth 3 times daily     Historical Provider, MD    Scheduled Meds:  Continuous Infusions:  PRN Meds:.  Past Medical History   has a past medical history of Anemia, Cataract, GERD (gastroesophageal reflux disease), Headache, Hyperlipidemia, Neuropathy, Osteoarthritis, Restless leg syndrome, Seizures ([address] Short-term memory loss, Stroke (cerebrum) ([address] and Type 2 diabetes mellitus without complication ([facility] Past Surgical History   has a past surgical history that includes Cholecystectomy; Tonsillectomy; Cataract removal; eye surgery; Cardiac catheterization; Upper gastrointestinal endoscopy (N/A, 5/12/2019); Colonoscopy (N/A, 5/13/2019); Upper gastrointestinal endoscopy (N/A, 3/18/2020); and Upper gastrointestinal endoscopy (N/A, 8/7/2020). Social History   reports that she has never smoked. She has never used smokeless tobacco.   reports no history of alcohol use. reports no history of drug use. Family History  family history includes Diabetes in her brother.     Review of Systems:  CONSTITUTIONAL:  negative for fevers, chills, fatigue and malaise    EYES:  negative for double vision, blurred vision and photophobia     HEENT:  negative for tinnitus, epistaxis and sore throat    RESPIRATORY:  negative for cough, shortness of breath, wheezing CARDIOVASCULAR:  negative for chest pain, palpitations, syncope, edema    GASTROINTESTINAL:  negative for nausea, vomiting    GENITOURINARY:  negative for incontinence    MUSCULOSKELETAL:  negative for neck or back pain    NEUROLOGICAL:  Negative for weakness and tingling  negative for headaches and dizziness    PSYCHIATRIC:  negative for anxiety      Review of systems otherwise negative. OBJECTIVE:   There were no vitals filed for this visit. General:  Gen: morbidly obese habitus, NAD  HEENT: NCAT, mucosa moist  Cvs: RRR, S1 S2 normal  Resp: symmetric unlabored breathing. On NC  Abd: s/nd/nt  Ext: 2+ edema in legs b/l, red and warm in lower legs b/l  Skin: no lesions seen, warm and dry    Neuro:  Gen: awake and alert, oriented x3. Lang/speech: no aphasia or dysarthria. Follows commands. CN: PERRL, EOMI, VFF, V1-3 intact, face symmetric, hearing intact, shoulder shrug symmetric, tongue midline  Motor: grossly 4+/5 UE and LE b/l  Sense: decreased LT in legs b/l (chronic)  Coord: b/l mild dysmetria UE.  DTR: deferred  Gait: deferred    LABS:   Reviewed. RADIOLOGY:   Images were personally reviewed including:  MRI brain wo con 8/18/2020  No acute intracranial abnormality. CTA head and neck 8/10/2020  Calcification involving the carotid bulbs and proximal internal carotid    arteries bilaterally with approximately 70% stenosis in the proximal left    internal carotid artery by NASCET criteria.         No significant stenosis or occlusion within the intracranial vascularity. ASSESSMENT:   69yo female nursing home pt with pmh of htn, hld, morbid obesity, dmii with neuropathy, stroke with LLE>LUE hemiweakness 2010, seizure d/o, FRANCIS, depression/anxiety. Pt suffered a fall/LOC episode with unclear details. While in the ED pt was observed to have difficulty speaking, which pt does not clearly recall. MRI showed no stroke (acute or chronic), but also L cervical ica stenosis 70% on cta.  The ICA stenosis is asymptomatic, no intervention is needed at this time. pt is potentially a candidate for CREST 2 (stenting of asymptomatic ICA stenosis)    The etiology of the fall and speech difficulty are unclear, but not likely to be vascular related. ddx includes seizure, cardiogenic syncope, metabolic/infectious etiology. PLAN:   --no intervention of cerebral angiogram at this time. --continue/start asa 81 (not listed in medications list but pt reports she does take). --switch statin to atorvastatin 80 if no contraindications  --carotid u/s  -- research team to evaluate CREST 2 candidacy. --remainder of workup as per primary team. consider workup for syncope and seizure given history. --f/u dr. Pernell Vanegas in 2 weeks after discharge endovascular clinic, dr. Gisele Vera in 3 months. Case discussed with Dr. Bree Rodas attending.     Stefanie Salazar MD, PhD   Stroke, [facility]  [facility]  Electronically signed 8/18/2020 at 4:54 PM

## 2020-08-18 NOTE — CODE DOCUMENTATION
Spoke with Dr. Grant Patrick in regards to stroke alert and neuro assessment. Updated on patient history and NIH score. Dr. Grant Patrick would like called from ICU when patient returns to unit for further orders.

## 2020-08-18 NOTE — ED PROVIDER NOTES
[facility] [address] [facility]  Emergency Department  Emergency Medicine Resident Sign-out     Care of Gabe Crenshaw was assumed from Dr. Joelle Palomares and is being seen for Aphasia (Transfer from [facility])  . The patient's initial evaluation and plan have been discussed with the prior provider who initially evaluated the patient.      EMERGENCY DEPARTMENT COURSE / MEDICAL DECISION MAKING:       MEDICATIONS GIVEN:  Orders Placed This Encounter   Medications    iohexol (OMNIPAQUE 350) solution 90 mL       LABS / RADIOLOGY:     Labs Reviewed   STROKE PANEL - Abnormal; Notable for the following components:       Result Value    Glucose 211 (*)     BUN 53 (*)     CREATININE 1.74 (*)     Sodium 145 (*)     GFR Non- 29 (*)     GFR  35 (*)     RBC 3.29 (*)     Hemoglobin 8.5 (*)     Hematocrit 30.1 (*)     MCHC 28.2 (*)     RDW 19.2 (*)     Platelets 97 (*)     % CKMB 3.8 (*)     Immature Granulocytes 1 (*)     Seg Neutrophils 71 (*)     Lymphocytes 19 (*)     Absolute Lymph # 0.99 (*)     Myoglobin 71 (*)     Troponin, High Sensitivity 32 (*)     All other components within normal limits   HGB/HCT - Abnormal; Notable for the following components:    POC Hemoglobin 9.4 (*)     POC Hematocrit 28 (*)     All other components within normal limits   SODIUM (POC) - Abnormal; Notable for the following components:    POC Sodium 148 (*)     All other components within normal limits   AMMONIA - Abnormal; Notable for the following components:    Ammonia 54 (*)     All other components within normal limits   VENOUS BLOOD GAS, POINT OF CARE - Abnormal; Notable for the following components:    pCO2, Larry 60.4 (*)     pO2, Larry 22.1 (*)     HCO3, Venous 33.0 (*)     Total CO2, Venous 35 (*)     Positive Base Excess, Larry 6 (*)     O2 Sat, Larry 33 (*)     All other components within normal limits   CREATININE W/GFR POINT OF CARE - Abnormal; Notable for the following components:    POC Creatinine 1.98 (*) GFR Comment 31 (*)     GFR Non- 25 (*)     All other components within normal limits   POCT GLUCOSE - Abnormal; Notable for the following components:    POC Glucose 224 (*)     All other components within normal limits   POTASSIUM (POC)   CHLORIDE (POC)   CALCIUM, IONIC (POC)   LACTIC ACID,POINT OF CARE   ANION GAP (CALC) POC       Xr Chest (2 Vw)    Result Date: 8/18/2020  EXAMINATION: TWO XRAY VIEWS OF THE CHEST 8/18/2020 3:54 am COMPARISON: 08/05/2020 and 11/30/2018 HISTORY: ORDERING SYSTEM PROVIDED HISTORY: ams workup TECHNOLOGIST PROVIDED HISTORY: ams workup Reason for Exam: ams workup Acuity: Unknown Type of Exam: Unknown FINDINGS: Evaluation is limited by the patient's body habitus. The upper lungs are clear. There is questionable basilar airspace opacity noted on the lateral view. Cardiomegaly is grossly unchanged. There is no large pleural effusion or definite evidence for pneumothorax. Questionable basilar atelectasis versus pneumonia. Xr Hand Left (min 3 Views)    Result Date: 8/18/2020  EXAMINATION: THREE XRAY VIEWS OF THE LEFT HAND 8/18/2020 3:31 am COMPARISON: None. HISTORY: ORDERING SYSTEM PROVIDED HISTORY: left hand pain after fall TECHNOLOGIST PROVIDED HISTORY: left hand pain after fall Reason for Exam: left hand pain after fall Acuity: Unknown Type of Exam: Unknown FINDINGS: Frontal, lateral and oblique views of the left hand. Soft tissue swelling about the hand is most pronounced along the dorsum. No acute displaced fracture. Bony alignment is grossly normal.  Degenerative changes throughout the hand. Suggestion of osteopenia. No aggressive skeletal lesion. No acute displaced fracture or malalignment in the left hand. Degenerative osseous changes. Suggestion of osteopenia.      Ct Head Wo Contrast    Result Date: 8/18/2020  EXAMINATION: CT OF THE HEAD WITHOUT CONTRAST  8/18/2020 11:51 am TECHNIQUE: CT of the head was performed without the administration of bilateral internal or common carotid arteries. VERTEBRAL ARTERIES: No dissection, arterial injury, or significant stenosis. MRA HEAD: ANTERIOR CIRCULATION: No significant stenosis of the intracranial internal carotid, anterior cerebral, or middle cerebral arteries. POSTERIOR CIRCULATION: No significant stenosis of the vertebral, basilar, or posterior cerebral arteries. 70% stenosis at the origin of the left internal carotid artery. No acute abnormality or flow-limiting stenosis of the major arteries of the head. Xr Chest Portable    Result Date: 8/5/2020  EXAMINATION: ONE XRAY VIEW OF THE CHEST 8/5/2020 12:14 pm COMPARISON: 05/10/2019 HISTORY: ORDERING SYSTEM PROVIDED HISTORY: shortness of breath TECHNOLOGIST PROVIDED HISTORY: shortness of breath Reason for Exam: middle chest pains Acuity: Acute Type of Exam: Initial FINDINGS: Single portable frontal view of the chest is submitted for review. The cardiac silhouette is enlarged. Lung parenchyma is clear without focal airspace consolidation, sizeable pleural effusion, or pneumothorax. Trachea is midline. Visualized osseous structures and soft tissues are grossly intact. Cardiomegaly without convincing evidence for acute cardiopulmonary pathology. Cta Head Neck W Contrast    Result Date: 8/18/2020  EXAMINATION: CTA OF THE HEAD AND NECK WITH CONTRAST 8/18/2020 2:27 pm: TECHNIQUE: CTA of the head and neck was performed with the administration of intravenous contrast. Multiplanar reformatted images are provided for review. MIP images are provided for review. Stenosis of the internal carotid arteries measured using NASCET criteria. Dose modulation, iterative reconstruction, and/or weight based adjustment of the mA/kV was utilized to reduce the radiation dose to as low as reasonably achievable. COMPARISON: None.  HISTORY: ORDERING SYSTEM PROVIDED HISTORY: stroke alert, transfer TECHNOLOGIST PROVIDED HISTORY: stroke alert, transfer Reason for Exam: CVA alert Acuity: Acute Type of Exam: Initial FINDINGS: CTA NECK: AORTIC ARCH/ARCH VESSELS: No dissection or arterial injury. No significant stenosis of the brachiocephalic or subclavian arteries. CAROTID ARTERIES: The common carotid arteries are patent without stenosis. There is calcification involving the left carotid bulb and proximal left internal carotid artery with approximately 70% stenosis in the proximal left internal carotid artery by NASCET criteria. There is calcification involving the right carotid bulb and proximal right internal carotid artery without significant stenosis. The remainder of the internal carotid arteries are patent bilaterally. VERTEBRAL ARTERIES: No dissection, arterial injury, or significant stenosis. SOFT TISSUES: The lung apices are clear. No cervical or superior mediastinal lymphadenopathy. The larynx and pharynx are unremarkable. No acute abnormality of the salivary and thyroid glands. BONES: No acute osseous abnormality. CTA HEAD: ANTERIOR CIRCULATION: No significant stenosis of the intracranial internal carotid, anterior cerebral, or middle cerebral arteries. No aneurysm. POSTERIOR CIRCULATION: No significant stenosis of the vertebral, basilar, or posterior cerebral arteries. No aneurysm. OTHER: No dural venous sinus thrombosis on this non-dedicated study. BRAIN: No mass effect or midline shift. No extra-axial fluid collection. The gray-white differentiation is maintained. Calcification involving the carotid bulbs and proximal internal carotid arteries bilaterally with approximately 70% stenosis in the proximal left internal carotid artery by NASCET criteria. No significant stenosis or occlusion within the intracranial vascularity.      Mra Neck Wo Contrast    Result Date: 8/18/2020  EXAMINATION: MRA OF THE NECK WITHOUT CONTRAST; MRA OF THE HEAD WITHOUT CONTRAST 8/18/2020 3:19 pm: TECHNIQUE: Multiplanar multisequence MRA of the neck was performed without the administration of intravenous contrast. Stenosis of the internal carotid arteries measured using NASCET criteria.; MRA of the head was performed utilizing time-of-flight imaging with MIP images. No intravenous contrast was administered. COMPARISON: CTA head and neck August 18, 2020 HISTORY: ORDERING SYSTEM PROVIDED HISTORY: stroke alert, AMS, word finding difficulty TECHNOLOGIST PROVIDED HISTORY: stroke alert, AMS, word finding difficulty Reason for Exam: stroke alert FINDINGS: MRA NECK: AORTIC ARCH/ARCH VESSELS: No dissection or arterial injury. No significant stenosis of the brachiocephalic or subclavian arteries. CAROTID ARTERIES: There is 70% stenosis at the origin of the left internal carotid artery. No acute abnormality or flow-limiting stenosis in the remainder of the bilateral internal or common carotid arteries. VERTEBRAL ARTERIES: No dissection, arterial injury, or significant stenosis. MRA HEAD: ANTERIOR CIRCULATION: No significant stenosis of the intracranial internal carotid, anterior cerebral, or middle cerebral arteries. POSTERIOR CIRCULATION: No significant stenosis of the vertebral, basilar, or posterior cerebral arteries. 70% stenosis at the origin of the left internal carotid artery. No acute abnormality or flow-limiting stenosis of the major arteries of the head. Mri Brain Wo Contrast    Result Date: 8/18/2020  EXAMINATION: MRI OF THE BRAIN WITHOUT CONTRAST  8/18/2020 3:19 pm TECHNIQUE: Multiplanar multisequence MRI of the brain was performed without the administration of intravenous contrast. COMPARISON: MRI brain performed 08/18/2020. HISTORY: ORDERING SYSTEM PROVIDED HISTORY: stroke alert, aphasia TECHNOLOGIST PROVIDED HISTORY: stroke alert, aphasia Reason for Exam: stroke alert FINDINGS: INTRACRANIAL STRUCTURES/VENTRICLES: The sellar and suprasellar structures, optic chiasm, corpus callosum, pineal gland, tectum, and midline brainstem structures are unremarkable.   The craniocervical junction is unremarkable. There is no acute hemorrhage, mass effect, or midline shift. There is satisfactory overall gray-white matter differentiation. The ventricular structures are symmetric and unremarkable. The infratentorial structures including the cerebellopontine angles and internal auditory canals are unremarkable. There is no abnormal restricted diffusion. There is no abnormal blooming artifact on susceptibility weighted imaging. ORBITS: The visualized portion of the orbits demonstrate no acute abnormality. SINUSES: The visualized paranasal sinuses and mastoid air cells are well aerated. BONES/SOFT TISSUES: There is redemonstration of a scalp hematoma in the left parietooccipital region. No acute intracranial abnormality. Nm Cardiac Stress Test Nuclear Imaging    Result Date: 8/10/2020  EXAMINATION: MYOCARDIAL PERFUSION IMAGING 8/10/2020 7:20 am TECHNIQUE: Rest dose:  11.9 mCi Tc-99m sestamibi intravenously Stress dose:  39.7 mCi Tc-99m sestamibi intravenously Under cardiology supervision, 0.4 mg Lexiscan was infused intravenously prior to injection of the stress dose. SPECT imaging was acquired following injection of the sestamibi. ECG gating was obtained following the stress acquisition. COMPARISON: None Available. HISTORY: ORDERING SYSTEM PROVIDED HISTORY: Chest pain TECHNOLOGIST PROVIDED HISTORY: Reason for Exam: Chest pain Reason for Exam: Angina Procedure Type->Rx angina Reason for Exam: Chest pain Acuity: Unknown Type of Exam: Unknown 77-year-old female with chest pain FINDINGS: The patient achieved a maximum heart rate of 69 beats per minute, 44 % of the maximum age predicted heart rate of 154 beats per minute. Perfusion: Photopenia of the inferior wall on standard stress imaging resolves with prone stress imaging. This likely represents attenuation related artifact. There is no scintigraphic evidence for a reversible or fixed perfusion defect to suggest reversible ischemia or infarct.  Function: 35% to 49%) not readily explained by non coronary causes. 2.  Resting perfusion abnormalities in 5%-9.9% of the myocardium in patients without a history or prior evidence of MI 3. Stress-induced perfusion abnormality encumbering 5%-9.9% of the myocardium or stress segmental scores indicating 1 vascular territory with abnormalities but without LV dilation 4. Small wall motion abnormality involving 1-2 segments and only 1 coronary bed. Low Risk (Less than 1% annual death or MI) 1. Normal or small myocardial perfusion defect at rest or with stress encumbering less than 5% of the myocardium. RECENT VITALS:      ,   ,  ,  ,      This patient is a 75 y.o. Female transfer from [facility] for signs of a fascia. Last known well was 8 AM.  The patient was initially at [facility] for a fall. She was transferred after she was discovered to have aphasia around 11 AM.  On arrival, the aphasia had resolved. CT head from outlying facility was negative for any acute pathology. Stroke alert was paged on arrival.  Stroke team thought her symptoms were secondary to likely metabolic encephalopathy. The patient had NIKKI and UTI. OUTSTANDING TASKS / RECOMMENDATIONS:    1. Awaiting bed  2. Admitted to internal medicine     FINAL IMPRESSION:     1. NIKKI (acute kidney injury) ([facility] [address]    2. Acute cystitis without hematuria    3.  Cellulitis of lower extremity, unspecified laterality        DISPOSITION:         DISPOSITION:  []  Discharge   []  Transfer -    [x]  Admission -     []  Against Medical Advice   []  Eloped   FOLLOW-UP: [facility]  [phone]           DISCHARGE MEDICATIONS: New Prescriptions    No medications on file           Lesly Aaron MD  Emergency Medicine Resident  [address] Juliet Morillo MD  Resident  08/19/20 [id number]

## 2020-08-18 NOTE — ED NOTES
Cleaned pt prior to straight catheter administration. Pt's abdominal folds are reddened; applied barrier creme to affected areas.      Ralph Ramirez RN  08/18/20 [id number]

## 2020-08-18 NOTE — ED NOTES
Cleaned head wound and assessed pt for other injuries r/t a fall at home. Posterior head matted with blood but after cleaning, the laceration appears superficial. There is a bump and an abrasion as well to the posterior occipital area. Pt has a large swollen and abraded area to the left of the spine at the thoracic region. Pt's left hand is swollen and bruised. Pt's right hand is also swollen but this appears older. This RN removed rings from pt's hands as the swelling might cause an injury to her hands. Placed rings in a clean specimen cup and placed an ID sticker on the cup.      Rajan Dawson RN  08/18/20 [id number]

## 2020-08-18 NOTE — PROGRESS NOTES
Breath Sounds: Diminished  RR[de-identified] 16  Pulse Sat: 96% on room air  Home Meds: none    · Bronchodilator assessment at level: 1  · []    Home Level  BRONCHODILATOR ASSESSMENT SCORE  Score 0 1 2 3 4 5   Breath Sounds   []  Patient Baseline [x]  No Wheeze good aeration []  Faint, scattered wheezing, good aeration []  Expiratory Wheezing and or moderately diminished []  Insp/Exp wheeze and/or very diminished []  Insp/Exp and/ or marked distress   Respiratory Rate   []  Patient Baseline [x]  Less than 20 []  Less than 20 []  20-25 []  Greater than 25 []  Greater than 25   Peak flow % of Pred or PB [x]  NA   []  Greater than 90%  []  81-90% []  71-80% []  Less than or equal to 70%  or unable to perform []  Unable due to Respiratory Distress   Dyspnea re []  Patient Baseline []  No SOB []  No SOB [x]  SOB on exertion []  SOB min activity []  At rest/acute   e FEV% Predicted       []  NA []  Above 69%  [x]  Unable []  Above 60-69%  []  Unable []  Above 50-59%  []  Unable []  Above 35-49%  []  Unable []  Less than 35%  []  Unable

## 2020-08-18 NOTE — H&P
[facility]     Department of Internal Medicine - Staff Internal Medicine Teaching Service          ADMISSION NOTE/HISTORY AND PHYSICAL EXAMINATION   Date: 8/18/2020  Patient Name: Marco Antonio Campoverde  Date of admission: 8/18/2020  1:49 PM  YOB: 1955  PCP: Nicole Smart PA-C  History Obtained From:  patient    CHIEF COMPLAINT     Chief complaint: Aphasia for 1 day    HISTORY OF PRESENTING ILLNESS     The patient is a pleasant 75 y.o. female who has history of type II DM, falling episode, CVA, diabetic polyneuropathy of presents with a chief complaint of aphasia  (Unable to complete a sentence) presents 1 day. It started suddenly and is resolved now. She has history of fall today resulting hematoma and laceration to the occipital part of the head. She has history of fecal and urinary incontinence after left-sided stroke 3 years back. she has history of palpitation to episode 1 day before fall. has history of being treated for anemia secondary to esophageal varices 1 week back. She denies any abnormal movement of the body, loss of consciousness, headache, shortness of breath, chest painl, fevers, nausea and vomiting. On examination. Weakness arm left hand than leg both lower limbs has tingling sensation. Normal sensory sensation on both upper and lower limb.     CT head and neck showed 70% stenosis on left internal carotid artery    and MRI head showed no acute intracranial abnormality  Blood pressure 1 33/48  Sodium 146 blood urea nitrogen 53 creatinine 1.74  Glucose 211  Hemoglobin 8.5      PAST MEDICAL HISTORY     Past Medical History:   Diagnosis Date    Anemia     Cataract     GERD (gastroesophageal reflux disease)     Headache     Hyperlipidemia     Neuropathy     Osteoarthritis     Restless leg syndrome     Seizures ([address] since age 10   [employer] Short-term memory loss     Stroke (cerebrum) ([address] 2010    Type 2 diabetes mellitus without complication ([address] 2005       PAST SURGICAL HISTORY     Past Surgical History:   Procedure Laterality Date    CARDIAC CATHETERIZATION      CATARACT REMOVAL      CHOLECYSTECTOMY      COLONOSCOPY N/A 5/13/2019    COLONOSCOPY DIAGNOSTIC, POLYPECTOMIES performed by Vitor Zaragoza MD at [facility]      cataract    TONSILLECTOMY      UPPER GASTROINTESTINAL ENDOSCOPY N/A 5/12/2019    EGD ESOPHAGOGASTRODUODENOSCOPY performed by Vitor Zaragoza MD at [address] [place] [address] N/A 3/18/2020    EGD WITH CAUTERIZATION OF [place] (GAVE) USING ARGON performed by Jane Cooper MD at [address] [place] [address] N/A 8/7/2020    EGD BIOPSY BRUSH BX AND STOMACH FULGURATION WITH ARGON BEAM performed by Vitor Zaragoza MD at [address] [place] [address]     Prior to Admission medications    Medication Sig Start Date End Date Taking? Authorizing Provider   cephALEXin (KEFLEX) 500 MG capsule Take 1 capsule by mouth 3 times daily for 7 days 8/18/20 8/25/20 Yes Hannah Perdomo MD   bumetanide (BUMEX) 1 MG tablet Take 1 mg by mouth 3 times daily   Yes Historical Provider, MD   insulin glargine (BASAGLAR KWIKPEN) 100 UNIT/ML injection pen Inject 60 Units into the skin 2 times daily   Yes Historical Provider, MD   levETIRAcetam (KEPPRA) 750 MG tablet Take 750 mg by mouth 2 times daily Indications: with 500 mg to make 1250 mg dose   Yes Historical Provider, MD   gabapentin (NEURONTIN) 100 MG capsule Take 100 mg by mouth 2 times daily. .   Yes Historical Provider, MD   levETIRAcetam (KEPPRA) 500 MG tablet Take 500 mg by mouth 2 times daily Indications: with 750 mg to make total dose 1250 mg    Yes Historical Provider, MD   insulin aspart (NOVOLOG) 100 UNIT/ML injection vial Inject into the skin Inject as per sliding scale before meals and at bedtime:    = 0 units; call physician if less than 70  151-200 = 2 units  201-250 = 4 units  251-300 = 6 units  301-350 = 8 units  351-400 = 10 units; call physician if greater than 400   Yes Historical Provider, MD   lisinopril-hydrochlorothiazide (PRINZIDE;ZESTORETIC) 10-12.5 MG per tablet Take 1 tablet by mouth daily   Yes Historical Provider, MD   omeprazole (PRILOSEC) 40 MG delayed release capsule Take 40 mg by mouth Daily    Yes Historical Provider, MD   simvastatin (ZOCOR) 20 MG tablet Take 20 mg by mouth nightly    Yes Historical Provider, MD   lamoTRIgine (LAMICTAL) 200 MG tablet Take 200 mg by mouth 2 times daily   Yes Historical Provider, MD   clotrimazole-betamethasone (LOTRISONE) 1-0.05 % cream Apply topically 2 times daily. 8/17/20   Quinton Lackey PA-C   nadolol (CORGARD) 20 MG tablet Take 1 tablet by mouth daily 8/10/20   Lamonte Velez MD   ferrous sulfate (IRON 325) 325 (65 Fe) MG tablet Take 1 tablet by mouth every 12 hours 8/10/20   Lamonte Velez MD   blood glucose monitor strips Test 4 times a day & as needed for symptoms of irregular blood glucose. Dispense sufficient amount for indicated testing frequency plus additional to accommodate PRN testing needs.  8/4/20   Quinton Lackey PA-C   Lancets MISC 1 each by Does not apply route 4 times daily 8/4/20   Quinton Lackey PA-C   glucose monitoring kit (FREESTYLE) monitoring kit 1 kit by Does not apply route daily 8/4/20   Quinton Lackey PA-C   Emollient (CERAVE) LOTN Apply topically 2 times daily Indications: bilateral lower legs    Historical Provider, MD   vitamin D (CHOLECALCIFEROL) 42254 UNIT CAPS Take 50,000 Units by mouth once a week Indications: Tuesdays     Historical Provider, MD   potassium chloride (KLOR-CON M) 10 MEQ extended release tablet Take 30 mEq by mouth daily     Historical Provider, MD   vitamin B-12 (CYANOCOBALAMIN) 500 MCG tablet Take 500 mcg by mouth daily    Historical Provider, MD   acetaminophen (TYLENOL) 325 MG tablet Take 650 mg by mouth 3 times daily as needed for Pain (mild) Historical Provider, MD   Blood Glucose Monitoring Suppl FLAVIO Check blood sugars 3/day 17   Mary Jane Tobar MD   PARoxetine (PAXIL) 20 MG tablet Take 20 mg by mouth every morning    Historical Provider, MD   loratadine (CLARITIN) 10 MG tablet Take 10 mg by mouth daily    Historical Provider, MD   pramipexole (MIRAPEX) 1 MG tablet Take 1 mg by mouth nightly    Historical Provider, MD   busPIRone (BUSPAR) 10 MG tablet Take 10 mg by mouth 3 times daily     Historical Provider, MD       SOCIAL HISTORY     Tobacco: Does not smoke  Alcohol: Does not drink  Illicits: Does present illicit drugs     FAMILY HISTORY     Family History   Problem Relation Age of Onset    Diabetes Brother        PHYSICAL EXAM     Vitals: BP (!) 156/62   Pulse 63   Temp 97.8 °F (36.6 °C) (Oral)   Resp 17   Ht 5' (1.524 m)   Wt 284 lb 6.3 oz (129 kg)   SpO2 100%   BMI 55.54 kg/m²   Tmax: Temp (24hrs), Av.6 °F (36.4 °C), Min:96.6 °F (35.9 °C), Max:98.1 °F (36.7 °C)    Last Body weight:   Wt Readings from Last 3 Encounters:   20 284 lb 6.3 oz (129 kg)   20 283 lb (128.4 kg)   20 283 lb (128.4 kg)     Body Mass Index : Body mass index is 55.54 kg/m². PHYSICAL EXAMINATION:  Constitutional: This is a well developed, well nourished, Greater than 36 - Morbid Obesity / Extreme Obesity / Grade III 75y.o. year old female who is alert, oriented, cooperative and in no apparent distress. Head:normocephalic with tenderness and hematoma on occipital region  EENT:  PERRLA. No conjunctival injections. Neck: Supple without thyromegaly. No elevated JVP. Pal Brinks Respiratory: Chest was symmetrical without dullness to percussion. Breath sounds bilaterally were clear to auscultation. T  Cardiovascular: Regular without murmur, clicks, gallops or rubs. Abdomen: Slightly rounded and soft without organomegaly. No rebound, rigidity or guarding was appreciated.   .      Extremities: Left lower extremities decreased power edema present on both lower extremity . bilateral lower extremity edema, no any ulcerations, tenderness, varicosities or erythema. Muscle size, tone and strength are normal.  No involuntary movements are noted. Skin:  Warm and dry. Good color, turgor and pigmentation. No lesions or scars.   No cyanosis or clubbing  Neurological/Psychiatric: The patient's general behavior, level of consciousness, thought content and emotional status is normal.          INVESTIGATIONS     Laboratory Testing:     Recent Results (from the past 24 hour(s))   CBC Auto Differential    Collection Time: 08/17/20 11:18 PM   Result Value Ref Range    WBC 4.7 3.5 - 11.0 k/uL    RBC 3.28 (L) 4.0 - 5.2 m/uL    Hemoglobin 8.7 (L) 12.0 - 16.0 g/dL    Hematocrit 27.8 (L) 36 - 46 %    MCV 84.8 80 - 100 fL    MCH 26.5 26 - 34 pg    MCHC 31.2 31 - 37 g/dL    RDW 21.9 (H) 11.5 - 14.9 %    Platelets 476 (L) 505 - 450 k/uL    MPV 8.6 6.0 - 12.0 fL    NRBC Automated NOT REPORTED per 100 WBC    Differential Type NOT REPORTED     Immature Granulocytes NOT REPORTED 0 %    Absolute Immature Granulocyte NOT REPORTED 0.00 - 0.30 k/uL    WBC Morphology NOT REPORTED     RBC Morphology NOT REPORTED     Platelet Estimate NOT REPORTED     Seg Neutrophils 74 (H) 36 - 66 %    Lymphocytes 19 (L) 24 - 44 %    Monocytes 5 1 - 7 %    Eosinophils % 1 0 - 4 %    Basophils 1 0 - 2 %    Segs Absolute 3.47 1.3 - 9.1 k/uL    Absolute Lymph # 0.89 (L) 1.0 - 4.8 k/uL    Absolute Mono # 0.24 0.1 - 1.3 k/uL    Absolute Eos # 0.05 0.0 - 0.4 k/uL    Basophils Absolute 0.05 0.0 - 0.2 k/uL    Morphology ANISOCYTOSIS PRESENT     Morphology BASOPHILIC STIPPLING PRESENT     Morphology 1+ POLYCHROMASIA     Morphology 1+ ELLIPTOCYTES    Basic Metabolic Panel    Collection Time: 08/17/20 11:18 PM   Result Value Ref Range    Glucose 223 (H) 70 - 99 mg/dL    BUN 57 (H) 8 - 23 mg/dL    CREATININE 1.92 (H) 0.50 - 0.90 mg/dL    Bun/Cre Ratio NOT REPORTED 9 - 20    Calcium 9.4 8.6 - 10.4 mg/dL Sodium 147 (H) 135 - 144 mmol/L    Potassium 4.2 3.7 - 5.3 mmol/L    Chloride 109 (H) 98 - 107 mmol/L    CO2 29 20 - 31 mmol/L    Anion Gap 9 9 - 17 mmol/L    GFR Non-African American 26 (L) >60 mL/min    GFR  32 (L) >60 mL/min    GFR Comment          GFR Staging NOT REPORTED    Brain Natriuretic Peptide    Collection Time: 08/17/20 11:18 PM   Result Value Ref Range    Pro-BNP 2,349 (H) <300 pg/mL    BNP Interpretation Pro-BNP Reference Range:    Magnesium    Collection Time: 08/17/20 11:18 PM   Result Value Ref Range    Magnesium 2.0 1.6 - 2.6 mg/dL   Troponin    Collection Time: 08/17/20 11:18 PM   Result Value Ref Range    Troponin, High Sensitivity 31 (H) 0 - 14 ng/L    Troponin T NOT REPORTED <0.03 ng/mL    Troponin Interp NOT REPORTED    Troponin    Collection Time: 08/18/20  4:31 AM   Result Value Ref Range    Troponin, High Sensitivity 35 (H) 0 - 14 ng/L    Troponin T NOT REPORTED <0.03 ng/mL    Troponin Interp NOT REPORTED    Urinalysis    Collection Time: 08/18/20  4:32 AM   Result Value Ref Range    Color, UA YELLOW YELLOW    Turbidity UA CLEAR CLEAR    Glucose, Ur TRACE (A) NEGATIVE    Bilirubin Urine NEGATIVE NEGATIVE    Ketones, Urine NEGATIVE NEGATIVE    Specific [place], UA 1.015 1.000 - 1.030    Urine Hgb MOD (A) NEGATIVE    pH, UA 6.0 5.0 - 8.0    Protein, UA 4+ (A) NEGATIVE    Urobilinogen, Urine Normal Normal    Nitrite, Urine NEGATIVE NEGATIVE    Leukocyte Esterase, Urine NEGATIVE NEGATIVE    Urinalysis Comments NOT REPORTED    Microscopic Urinalysis    Collection Time: 08/18/20  4:32 AM   Result Value Ref Range    -          WBC, UA 2 TO 5 /HPF    RBC, UA 10 TO 20 /HPF    Casts UA HYALINE /LPF    Casts UA 0 TO 2 /LPF    Crystals, UA NOT REPORTED None /HPF    Epithelial Cells UA 0 TO 2 /HPF    Renal Epithelial, UA NOT REPORTED 0 /HPF    Bacteria, UA FEW (A) None    Mucus, UA NOT REPORTED None    Trichomonas, UA NOT REPORTED None    Amorphous, UA NOT REPORTED None    Other Observations UA NOT REPORTED NOT REQ.     Yeast, UA NOT REPORTED None   POC Glucose Fingerstick    Collection Time: 08/18/20  8:02 AM   Result Value Ref Range    POC Glucose 130 (H) 65 - 105 mg/dL   POC Glucose Fingerstick    Collection Time: 08/18/20 11:33 AM   Result Value Ref Range    POC Glucose 229 (H) 65 - 105 mg/dL   STROKE PANEL    Collection Time: 08/18/20 11:39 AM   Result Value Ref Range    Glucose 247 (H) 70 - 99 mg/dL    BUN 54 (H) 8 - 23 mg/dL    CREATININE 1.88 (H) 0.50 - 0.90 mg/dL    Bun/Cre Ratio NOT REPORTED 9 - 20    Calcium 8.9 8.6 - 10.4 mg/dL    Sodium 144 135 - 144 mmol/L    Potassium 4.1 3.7 - 5.3 mmol/L    Chloride 107 98 - 107 mmol/L    CO2 29 20 - 31 mmol/L    Anion Gap 8 (L) 9 - 17 mmol/L    GFR Non-African American 27 (L) >60 mL/min    GFR  32 (L) >60 mL/min    GFR Comment          GFR Staging NOT REPORTED     WBC 4.6 3.5 - 11.0 k/uL    RBC 3.24 (L) 4.0 - 5.2 m/uL    Hemoglobin 8.4 (L) 12.0 - 16.0 g/dL    Hematocrit 27.4 (L) 36 - 46 %    MCV 84.5 80 - 100 fL    MCH 25.8 (L) 26 - 34 pg    MCHC 30.5 (L) 31 - 37 g/dL    RDW 21.7 (H) 11.5 - 14.9 %    Platelets 93 (L) 054 - 450 k/uL    MPV 7.6 6.0 - 12.0 fL    NRBC Automated NOT REPORTED per 100 WBC    Total CK 61 26 - 192 U/L    CK-MB 2.3 <5.4 ng/mL    % CKMB 3.8 (H) 0.0 - 3.0 %    CKMB Interpretation NORMAL ISOENZYME PATTERN     Differential Type NOT REPORTED     Immature Granulocytes NOT REPORTED 0 %    Absolute Immature Granulocyte NOT REPORTED 0.00 - 0.30 k/uL    WBC Morphology NOT REPORTED     RBC Morphology NOT REPORTED     Platelet Estimate NOT REPORTED     Seg Neutrophils 72 (H) 36 - 66 %    Lymphocytes 20 (L) 24 - 44 %    Monocytes 5 1 - 7 %    Eosinophils % 2 0 - 4 %    Basophils 1 0 - 2 %    Segs Absolute 3.31 1.3 - 9.1 k/uL    Absolute Lymph # 0.92 (L) 1.0 - 4.8 k/uL    Absolute Mono # 0.23 0.1 - 1.3 k/uL    Absolute Eos # 0.09 0.0 - 0.4 k/uL    Basophils Absolute 0.05 0.0 - 0.2 k/uL    Morphology HYPOCHROMIA PRESENT     Morphology ANISOCYTOSIS PRESENT     Myoglobin 80 (H) 25 - 58 ng/mL    Protime 14.0 11.8 - 14.6 sec    INR 1.1     PTT 32.0 24.0 - 36.0 sec    Troponin, High Sensitivity 31 (H) 0 - 14 ng/L    Troponin T NOT REPORTED <0.03 ng/mL    Troponin Interp NOT REPORTED    Venous Blood Gas, POC    Collection Time: 08/18/20  1:50 PM   Result Value Ref Range    pH, Larry 7.346 7.320 - 7.430    pCO2, Larry 60.4 (H) 41.0 - 51.0 mm Hg    pO2, Larry 22.1 (L) 30.0 - 50.0 mm Hg    HCO3, Venous 33.0 (H) 22.0 - 29.0 mmol/L    Total CO2, Venous 35 (H) 23.0 - 30.0 mmol/L    Negative Base Excess, Larry NOT REPORTED 0.0 - 2.0    Positive Base Excess, Larry 6 (H) 0.0 - 3.0    O2 Sat, Larry 33 (L) 60.0 - 85.0 %    O2 Device/Flow/% NOT REPORTED     David Test NOT REPORTED     Sample Site NOT REPORTED     Mode NOT REPORTED     FIO2 NOT REPORTED     Pt Temp NOT REPORTED     POC pH Temp NOT REPORTED     POC pCO2 Temp NOT REPORTED mm Hg    POC pO2 Temp NOT REPORTED mm Hg   Hemoglobin and hematocrit, blood    Collection Time: 08/18/20  1:50 PM   Result Value Ref Range    POC Hemoglobin 9.4 (L) 12.0 - 16.0 g/dL    POC Hematocrit 28 (L) 36 - 46 %   Creatinine W/GFR Point of Care    Collection Time: 08/18/20  1:50 PM   Result Value Ref Range    POC Creatinine 1.98 (H) 0.51 - 1.19 mg/dL    GFR Comment 31 (L) >60 mL/min    GFR Non- 25 (L) >60 mL/min    GFR Comment         SODIUM (POC)    Collection Time: 08/18/20  1:50 PM   Result Value Ref Range    POC Sodium 148 (H) 138 - 146 mmol/L   POTASSIUM (POC)    Collection Time: 08/18/20  1:50 PM   Result Value Ref Range    POC Potassium 4.2 3.5 - 4.5 mmol/L   CHLORIDE (POC)    Collection Time: 08/18/20  1:50 PM   Result Value Ref Range    POC Chloride 107 98 - 107 mmol/L   CALCIUM, IONIC (POC)    Collection Time: 08/18/20  1:50 PM   Result Value Ref Range    POC Ionized Calcium 1.24 1.15 - 1.33 mmol/L   Lactic Acid, POC    Collection Time: 08/18/20  1:50 PM   Result Value Ref Range    POC Lactic Acid 1.00 0.56 - 1.39 mmol/L   POCT Glucose    Collection Time: 08/18/20  1:50 PM   Result Value Ref Range    POC Glucose 224 (H) 74 - 100 mg/dL   Anion Gap (Calc) POC    Collection Time: 08/18/20  1:50 PM   Result Value Ref Range    Anion Gap 8 7 - 16 mmol/L   STROKE PANEL    Collection Time: 08/18/20  2:00 PM   Result Value Ref Range    Glucose 211 (H) 70 - 99 mg/dL    BUN 53 (H) 8 - 23 mg/dL    CREATININE 1.74 (H) 0.50 - 0.90 mg/dL    Bun/Cre Ratio NOT REPORTED 9 - 20    Calcium 9.1 8.6 - 10.4 mg/dL    Sodium 145 (H) 135 - 144 mmol/L    Potassium 4.4 3.7 - 5.3 mmol/L    Chloride 106 98 - 107 mmol/L    CO2 27 20 - 31 mmol/L    Anion Gap 12 9 - 17 mmol/L    GFR Non-African American 29 (L) >60 mL/min    GFR  35 (L) >60 mL/min    GFR Comment          GFR Staging NOT REPORTED     WBC 5.2 3.5 - 11.3 k/uL    RBC 3.29 (L) 3.95 - 5.11 m/uL    Hemoglobin 8.5 (L) 11.9 - 15.1 g/dL    Hematocrit 30.1 (L) 36.3 - 47.1 %    MCV 91.5 82.6 - 102.9 fL    MCH 25.8 25.2 - 33.5 pg    MCHC 28.2 (L) 28.4 - 34.8 g/dL    RDW 19.2 (H) 11.8 - 14.4 %    Platelets 97 (L) 334 - 453 k/uL    MPV 10.5 8.1 - 13.5 fL    NRBC Automated 0.0 0.0 per 100 WBC    Total CK 64 26 - 192 U/L    CK-MB 2.4 <5.4 ng/mL    % CKMB 3.8 (H) 0.0 - 3.0 %    CKMB Interpretation NORMAL ISOENZYME PATTERN     Differential Type NOT REPORTED     WBC Morphology NOT REPORTED     RBC Morphology NOT REPORTED     Platelet Estimate NOT REPORTED     Immature Granulocytes 1 (H) 0 %    Seg Neutrophils 71 (H) 36 - 65 %    Lymphocytes 19 (L) 24 - 43 %    Monocytes 7 3 - 12 %    Eosinophils % 2 1 - 4 %    Basophils 0 0 - 2 %    Absolute Immature Granulocyte 0.05 0.00 - 0.30 k/uL    Segs Absolute 3.70 1.50 - 8.10 k/uL    Absolute Lymph # 0.99 (L) 1.10 - 3.70 k/uL    Absolute Mono # 0.36 0.10 - 1.20 k/uL    Absolute Eos # 0.10 0.00 - 0.44 k/uL    Basophils Absolute 0.00 0.00 - 0.20 k/uL    Morphology ANISOCYTOSIS PRESENT     Morphology HYPOCHROMIA PRESENT Myoglobin 71 (H) 25 - 58 ng/mL    Protime 10.1 9.0 - 12.0 sec    INR 1.0     PTT 24.3 20.5 - 30.5 sec    Troponin, High Sensitivity 32 (H) 0 - 14 ng/L    Troponin T NOT REPORTED <0.03 ng/mL    Troponin Interp NOT REPORTED    AMMONIA    Collection Time: 08/18/20  2:35 PM   Result Value Ref Range    Ammonia 54 (H) 11 - 51 umol/L       Imaging:   Xr Chest (2 Vw)    Result Date: 8/18/2020  Questionable basilar atelectasis versus pneumonia. Xr Hand Left (min 3 Views)    Result Date: 8/18/2020  No acute displaced fracture or malalignment in the left hand. Degenerative osseous changes. Suggestion of osteopenia. Ct Head Wo Contrast    Result Date: 8/18/2020  No acute intracranial abnormality. Similar soft tissue hematoma in the left parietooccipital region. Findings were discussed with Nilda Ayon at 12:08 pm on 8/18/2020. Ct Head Wo Contrast    Result Date: 8/18/2020  No acute abnormality of the cervical spine. Multilevel degenerative changes in the cervical spine. No acute intracranial abnormality. , no acute intracranial hemorrhage or mass effect. Left occipital scalp hematoma. No acute displaced skull fracture. Ct Cervical Spine Wo Contrast    Result Date: 8/18/2020  No acute abnormality of the cervical spine. Multilevel degenerative changes in the cervical spine. No acute intracranial abnormality. , no acute intracranial hemorrhage or mass effect. Left occipital scalp hematoma. No acute displaced skull fracture. Ct Thoracic Spine Wo Contrast    Result Date: 8/18/2020  No acute fracture or listhesis in the thoracic spine. Multilevel degenerative changes in the thoracic spine with bridging marginal osteophytes which could relate to component of DISH. Mra Head Wo Contrast    Result Date: 8/18/2020  70% stenosis at the origin of the left internal carotid artery. No acute abnormality or flow-limiting stenosis of the major arteries of the head.      Cta Head Neck W Contrast    Result Date: 8/18/2020  Calcification involving the carotid bulbs and proximal internal carotid arteries bilaterally with approximately 70% stenosis in the proximal left internal carotid artery by NASCET criteria. No significant stenosis or occlusion within the intracranial vascularity. Mra Neck Wo Contrast    Result Date: 8/18/2020  70% stenosis at the origin of the left internal carotid artery. No acute abnormality or flow-limiting stenosis of the major arteries of the head. Mri Brain Wo Contrast    Result Date: 8/18/2020  No acute intracranial abnormality. ASSESSMENT & PLAN   Principal Problem:    Accidental fall  Active Problems:    Type 2 diabetes mellitus (HCC)    Falling episodes    Generalized weakness    RLS (restless legs syndrome)    Thrombocytopenia (HCC)    Confusion state    Anemia    Acute kidney injury superimposed on chronic kidney disease ([address]    Aphasia  Resolved Problems:    * No resolved hospital problems. *    ASSESSMENT / PLAN:     IMPRESSION  This is a 75 y.o. female who presented with aphasia and found to have CT head with 70% stenosis in bilateral internal carotid artery. Patient admitted to inpatient status to monitor any neurological deficits and evaluate the patient. Aphasia secondary to metabolic encephalopathy versus CVA-CT head showed 70% stenosis in bilateral internal carotid artery. MRI no any acute intracranial abnormalities. Start aspirin  Start atorvastatin  Monitor neurological deficit progression  Monitor BMP  Monitor vitals      Active Problems:    Type 2 diabetes mellitus ([address]  Plan: Hold home med.   Scale insulin start sliding.  hypoglycemia protocol        Generalized weakness history of CVA   Plan: Continue aspirin and a statin    Polyneuropathy-pulling sensation bilateral limbs continue gabapentin        Anemia  Secondary to esophageal varices treated with IV iron followed by oral iron  Epilepsy well-controlled by levetiracetam          DVT ppx: Heparin  GI ppx:

## 2020-08-18 NOTE — PROGRESS NOTES
Pharmacy Note  Vancomycin Consult    Gabe Crenshaw is a 75 y.o. female ordered Vancomycin for pneumonia; consult received from Dr. Delisa Iniguez to manage therapy. Also receiving cefepime.     Patient Active Problem List   Diagnosis    Type 2 diabetes mellitus ([facility]    Breakthrough seizure ([facility]    CVA, old, hemiparesis ([facility]    Falling episodes    Cerebral concussion    Cervical spinal stenosis    Diabetic polyneuropathy associated with type 2 diabetes mellitus ([facility]    History of cerebral infarction    Seizure disorder ([facility]    Depression with anxiety    Dizziness    Generalized weakness    GERD (gastroesophageal reflux disease)    H/O falling    Hyperglycemia    Hyponatremia    Morbid obesity with BMI of 40.0-44.9, adult (HCC)    Pure hypercholesterolemia    RLS (restless legs syndrome)    Thrombocytopenia (HCC)    Altered mental status    Confusion state    Gait disturbance    GI bleed    Polyp of colon    Postmenopausal bleeding    Anemia    Elevated alkaline phosphatase level    GAVE (gastric antral vascular ectasia)    Esophageal varices determined by endoscopy ([facility]    Atypical glandular cells of undetermined significance (SLICK) on cervical Pap smear (NOS)    Symptomatic anemia    Breast mass in female    Hematuria    Acute kidney injury superimposed on chronic kidney disease (HCC)    FRANCIS (nonalcoholic steatohepatitis)    Pneumonia       Allergies:  Codeine     Temp max: 97.7 F    Recent Labs     08/17/20  2318   BUN 57*       Recent Labs     08/17/20  2318   CREATININE 1.92*       Recent Labs     08/17/20  2318   WBC 4.7         Intake/Output Summary (Last 24 hours) at 8/18/2020 0613  Last data filed at 8/18/2020 0446  Gross per 24 hour   Intake --   Output 700 ml   Net -700 ml       Culture Date      Source                       Results  See micro    Ht Readings from Last 1 Encounters:   08/17/20 5' (1.524 m)        Wt Readings from Last 1 Encounters:   08/17/20 283 lb (128.4 kg)         Estimated Creatinine Clearance: 36 mL/min (A) (based on SCr of 1.92 mg/dL (H)). GOAL TROUGH: 15-20    Assessment/Plan:  Will initiate vancomycin dosed on levels due to unstable renal function / NIKKI. Timing of level will be determined based on culture results, renal function, and clinical response. Thank you for the consult. Will continue to follow. Salty Worthington Pharm[facility] [address] in-patient pharmacy

## 2020-08-18 NOTE — ED PROVIDER NOTES
[facility]     Emergency Department     Faculty Note/ Attestation      Pt Name: Jaylen Okeefe                                       MRN: [id number]  Biancagfcorinne 1955  Date of evaluation: 8/18/2020  Patients PCP:    Rusty Huerta PA-C    Attestation  I performed a history and physical examination of the patient/ or directly observed  and discussed management with the resident. I reviewed the residents note and agree with the documented findings and plan of care. Any areas of disagreement are noted on the chart. I was personally present for the key portions of any procedures. I have documented in the chart those procedures where I was not present during the key portions. I have reviewed the emergency nurses triage note. I agree with the chief complaint, past medical history, past surgical history, allergies, medications, social and family history as documented unless otherwise noted below. For Physician Assistant/ Nurse Practitioner cases/documentation I have personally evaluated this patient and have completed at least one if not all key elements of the E/M (history, physical exam, and MDM). Additional findings are as noted. Initial Screens:             Vitals: There were no vitals filed for this visit. Chief Complaint    No chief complaint on file. vitals were not taken for this visit.             DIAGNOSTIC RESULTS       RADIOLOGY:   CTA HEAD NECK W CONTRAST    (Results Pending)         LABS:  Labs Reviewed   HGB/HCT - Abnormal; Notable for the following components:       Result Value    POC Hemoglobin 9.4 (*)     POC Hematocrit 28 (*)     All other components within normal limits   SODIUM (POC) - Abnormal; Notable for the following components:    POC Sodium 148 (*)     All other components within normal limits   VENOUS BLOOD GAS, POINT OF CARE - Abnormal; Notable for the following components:    pCO2, Larry 60.4 (*)     pO2, Larry 22.1 (*)     HCO3, Venous 33.0 (*) Total CO2, Venous 35 (*)     Positive Base Excess, Larry 6 (*)     O2 Sat, Larry 33 (*)     All other components within normal limits   CREATININE W/GFR POINT OF CARE - Abnormal; Notable for the following components:    POC Creatinine 1.98 (*)     GFR Comment 31 (*)     GFR Non- 25 (*)     All other components within normal limits   POCT GLUCOSE - Abnormal; Notable for the following components:    POC Glucose 224 (*)     All other components within normal limits   POTASSIUM (POC)   CHLORIDE (POC)   CALCIUM, IONIC (POC)   STROKE PANEL   LACTIC ACID,POINT OF CARE   ANION GAP (CALC) POC         EMERGENCY DEPARTMENT COURSE:     -------------------------       ,  ,  ,      System Problem List     Patient Active Problem List   Diagnosis    Type 2 diabetes mellitus ([facility]    Breakthrough seizure ([facility]    CVA, old, hemiparesis ([address]    Accidental fall    Falling episodes    Cerebral concussion    Cervical spinal stenosis    Diabetic polyneuropathy associated with type 2 diabetes mellitus (HCC)    History of cerebral infarction    Seizure disorder ([address]    Depression with anxiety    Dizziness    Generalized weakness    GERD (gastroesophageal reflux disease)    H/O falling    Hyperglycemia    Hyponatremia    Morbid obesity with BMI of 40.0-44.9, adult (HCC)    Pure hypercholesterolemia    RLS (restless legs syndrome)    Thrombocytopenia (HCC)    Altered mental status    Confusion state    Gait disturbance    GI bleed    Polyp of colon    Postmenopausal bleeding    Anemia    Elevated alkaline phosphatase level    GAVE (gastric antral vascular ectasia)    Esophageal varices determined by endoscopy (HCC)    Atypical glandular cells of undetermined significance (SLICK) on cervical Pap smear (NOS)    Symptomatic anemia    Breast mass in female    Hematuria    Acute kidney injury superimposed on chronic kidney disease (HCC)    FRANCIS (nonalcoholic steatohepatitis)    Pneumonia    Head injury, acute, sequela bruising    Expressive aphasia new          Comments  Chronic Prob List noted          Sheldon MD, F.A.C.E.P.   Attending Emergency Physician          Holly Alves MD  08/18/20 [id number]

## 2020-08-18 NOTE — FLOWSHEET NOTE
[address] [facility]     Emergency/Trauma Note    PATIENT NAME: Juan Carlos Laureano    Shift date: 8.18.2020  Shift day: Tuesday   Shift # 2    Room # 0425/4572-01   Name: Juan Carlos Laureano            Age: 75 y.o. Gender: female          [employer] [place]: [address] [place] [address] Street of [employer]: unknown    Trauma/Incident type: Stroke Alert  Admit Date & Time: 8/18/2020  1:49 PM  TRAUMA NAME: None        PATIENT/EVENT DESCRIPTION:  Juan Carlos Laureano is a 75 y.o. female who arrived as a STROKE Alert Pt to be admitted to 3292/7844-08. SPIRITUAL ASSESSMENT/INTERVENTION:   responded to page for a stroke alert. Patient appears to be calm and coping. States that her brother and friend are aware that she is in the hospital.  Patient cannot remember the number to the brother but wanted to get in touch with him again to provide an update.  assisted with finding brother's number on the medical records. Assisted patient in using room phone as she does not have her cell phone with her. Patient seems to be feeling better but does complain of some pain. Was thankful for  assistance in getting in touch with family. PATIENT BELONGINGS:  No belongings noted    ANY BELONGINGS OF SIGNIFICANT VALUE NOTED:  None    REGISTRATION STAFF NOTIFIED? Yes      WHAT IS YOUR SPIRITUAL CARE PLAN FOR THIS PATIENT?:   Chaplains will remain available to offer spiritual and emotional support as needed. Electronically signed by Lisseth Stuart on 8/18/2020 at 6:19 PM.  [facility]  [phone]       08/18/20 1800   Encounter Summary   Services provided to: Patient   Referral/Consult From: Multi-disciplinary team   Support System Family members   Continue Visiting   ([phone])   Complexity of Encounter High   Length of Encounter 15 minutes   Spiritual Assessment Completed Yes   Crisis   Type Stroke Alert   Assessment Calm; Approachable;Coping Intervention Active listening;Explored feelings, thoughts, concerns;Explored coping resources; Discussed illness/injury and it's impact;Sustaining presence/ Ministry of presence   Outcome Expressed gratitude     Electronically signed by Reina Alex on 8/18/2020 at 6:19 PM

## 2020-08-18 NOTE — H&P
[facility] Neurology   [address] [place] [address]    HISTORY AND PHYSICAL EXAMINATION            Date:   8/18/2020  Patient name:  Cody Garnett  Date of admission:  8/17/2020 10:49 PM  MRN:   [id number]  Account:  [de-identified]  YOB: 1955  PCP:    Milad Beard PA-C  Room:   [address] [place] [address]  Code Status:    Full Code    Chief Complaint:     Chief Complaint   Patient presents with    Fall    Head Injury       History Obtained From:     patient, electronic medical record    History of Present Illness: The patient is a 75 y.o.   Non-/non  female who presents with Fall and Head Injury   and she is admitted to the hospital for the management of    Patient had an accidental fall in her apartment  Was noted to have bruising behind the head   Ecchymoses over the left lateral thoracic paraspinal area with some midline thoracic spinal tenderness to palpation, left hand ecchymoses and swelling on the left hand when compared to the right   Skin:    In the ER they thought that the patient may have pneumonia urinary tract infection also noted to have elevated BNP and hyponatremia    Patient was alert and coherent this morning when she came to the floor from ER  At present she is having some difficulty with speaking  Looks like partial motor aphasia  Stroke alert is called  A stat C     T had been ordered  Patient did not have history of stroke and other multiple medical problems  He had an accidental fall at home yesterday in her apartment  He had a hematoma on the back of the head  Was admitted  She came to the floor around 1  Was alert and coherent  At present when I went to examine the patient she is having difficulty expressing and answering questions  He does seem to comprehend  However not able to communicate  Her sentences are half sentences at times  Otherwise she is alert  She does have a previous history of strokes  She also has a history of past concussion  Will get a stat CT scan of the head since the symptoms of aphagia are new in the last half an hour or so a stroke alert is being called  Active Problems:    Type 2 diabetes mellitus (HCC)    CVA, old, hemiparesis ([facility]    Accidental fall    Cervical spinal stenosis    Diabetic polyneuropathy associated with type 2 diabetes mellitus ([facility]    Morbid obesity with BMI of 40.0-44.9, adult ([facility]    Pneumonia    Head injury, acute, sequela bruising    Expressive aphasia new   Resolved Problems:    * No resolved hospital problems. *                Past Medical History:     Past Medical History:   Diagnosis Date    Anemia     Cataract     GERD (gastroesophageal reflux disease)     Headache     Hyperlipidemia     Neuropathy     Osteoarthritis     Restless leg syndrome     Seizures (HCC) since age 10   Florinda Cedeño Short-term memory loss     Stroke (cerebrum) ([facility] 2010    Type 2 diabetes mellitus without complication ([facility] 1931        Past Surgical History:     Past Surgical History:   Procedure Laterality Date    CARDIAC CATHETERIZATION      CATARACT REMOVAL      CHOLECYSTECTOMY      COLONOSCOPY N/A 5/13/2019    COLONOSCOPY DIAGNOSTIC, POLYPECTOMIES performed by Vitor Zaragoza MD at [facility]      cataract    TONSILLECTOMY      UPPER GASTROINTESTINAL ENDOSCOPY N/A 5/12/2019    EGD ESOPHAGOGASTRODUODENOSCOPY performed by Vitor Zaragoza MD at [address] [place] [address] N/A 3/18/2020    EGD WITH CAUTERIZATION OF [facility] (GAVE) USING ARGON performed by Jane Cooper MD at [place] [address] N/A 8/7/2020    EGD BIOPSY BRUSH BX AND STOMACH FULGURATION WITH ARGON BEAM performed by Vitor Zaragoza MD at [facility]        Medications Prior to Admission:     Prior to Admission medications    Medication Sig Start Date End Date Taking?  Authorizing Provider   cephALEXin (KEFLEX) 500 MG capsule Take 1 capsule by mouth 3 times daily for 7 days 8/18/20 8/25/20 Yes Jane Alvarez MD   clotrimazole-betamethasone (LOTRISONE) 1-0.05 % cream Apply topically 2 times daily. 8/17/20  Yes Rusty Huerta PA-C   nadolol (CORGARD) 20 MG tablet Take 1 tablet by mouth daily 8/10/20  Yes Clevester Siemens, MD   ferrous sulfate (IRON 325) 325 (65 Fe) MG tablet Take 1 tablet by mouth every 12 hours 8/10/20  Yes Clevester Siemens, MD   bumetanide (BUMEX) 1 MG tablet Take 1 mg by mouth 3 times daily   Yes Historical Provider, MD   insulin glargine (BASAGLAR KWIKPEN) 100 UNIT/ML injection pen Inject 60 Units into the skin 2 times daily   Yes Historical Provider, MD   Emollient (CERAVE) LOTN Apply topically 2 times daily Indications: bilateral lower legs   Yes Historical Provider, MD   levETIRAcetam (KEPPRA) 750 MG tablet Take 750 mg by mouth 2 times daily Indications: with 500 mg to make 1250 mg dose   Yes Historical Provider, MD   vitamin D (CHOLECALCIFEROL) 11863 UNIT CAPS Take 50,000 Units by mouth once a week Indications: Tuesdays    Yes Historical Provider, MD   gabapentin (NEURONTIN) 100 MG capsule Take 100 mg by mouth 2 times daily. .   Yes Historical Provider, MD   levETIRAcetam (KEPPRA) 500 MG tablet Take 500 mg by mouth 2 times daily Indications: with 750 mg to make total dose 1250 mg    Yes Historical Provider, MD   potassium chloride (KLOR-CON M) 10 MEQ extended release tablet Take 30 mEq by mouth daily    Yes Historical Provider, MD   vitamin B-12 (CYANOCOBALAMIN) 500 MCG tablet Take 500 mcg by mouth daily   Yes Historical Provider, MD   lisinopril-hydrochlorothiazide (PRINZIDE;ZESTORETIC) 10-12.5 MG per tablet Take 1 tablet by mouth daily   Yes Historical Provider, MD   PARoxetine (PAXIL) 20 MG tablet Take 20 mg by mouth every morning   Yes Historical Provider, MD   omeprazole (PRILOSEC) 40 MG delayed release capsule Take 40 mg by mouth Daily    Yes Historical Provider, MD   loratadine (CLARITIN) 10 MG tablet Take 10 mg by mouth daily   Yes Pulmonary/Chest:        Clear to auscultation bilaterally . No wheezes, rales or rhonchi . No abnormality on percussion                                                        Cardiovascular:            Normal rate, regular rhythm,                                          No murmur or  Gallop . Abdomen:                       Soft, non-tender                                           Normal bowels sounds,                                             Extremities:                    No  Edema .                                            Neurological ;                Patient is having expressive aphasia at present    Musculo-skeletal ;                  No  gait abnormality                  No significant joint abnormality,                   Psych:   Mood normal ,                 Memory intact ,                                                                                                  Investigations:      Laboratory Testing:  Significant last 24 hr data reviewed ;   Vitals:    08/17/20 2251 08/18/20 0536 08/18/20 0804 08/18/20 0835   BP: (!) 110/59 (!) 134/51 (!) 130/59 (!) 154/53   Pulse: 67 63 60 65   Resp: 18 18 16 20   Temp: 98.1 °F (36.7 °C) 97.7 °F (36.5 °C) 97.7 °F (36.5 °C) 97.6 °F (36.4 °C)   TempSrc: Oral Oral Oral Oral   SpO2: 99% 95% 95% 93%   Weight: 283 lb (128.4 kg)      Height: 5' (1.524 m)         Recent Results (from the past 24 hour(s))   CBC Auto Differential    Collection Time: 08/17/20 11:18 PM   Result Value Ref Range    WBC 4.7 3.5 - 11.0 k/uL    RBC 3.28 (L) 4.0 - 5.2 m/uL    Hemoglobin 8.7 (L) 12.0 - 16.0 g/dL    Hematocrit 27.8 (L) 36 - 46 %    MCV 84.8 80 - 100 fL    MCH 26.5 26 - 34 pg    MCHC 31.2 31 - 37 g/dL    RDW 21.9 (H) 11.5 - 14.9 %    Platelets 446 (L) 777 - 450 k/uL    MPV 8.6 6.0 - 12.0 fL    NRBC Automated NOT REPORTED per 100 WBC Differential Type NOT REPORTED     Immature Granulocytes NOT REPORTED 0 %    Absolute Immature Granulocyte NOT REPORTED 0.00 - 0.30 k/uL    WBC Morphology NOT REPORTED     RBC Morphology NOT REPORTED     Platelet Estimate NOT REPORTED     Seg Neutrophils 74 (H) 36 - 66 %    Lymphocytes 19 (L) 24 - 44 %    Monocytes 5 1 - 7 %    Eosinophils % 1 0 - 4 %    Basophils 1 0 - 2 %    Segs Absolute 3.47 1.3 - 9.1 k/uL    Absolute Lymph # 0.89 (L) 1.0 - 4.8 k/uL    Absolute Mono # 0.24 0.1 - 1.3 k/uL    Absolute Eos # 0.05 0.0 - 0.4 k/uL    Basophils Absolute 0.05 0.0 - 0.2 k/uL    Morphology ANISOCYTOSIS PRESENT     Morphology BASOPHILIC STIPPLING PRESENT     Morphology 1+ POLYCHROMASIA     Morphology 1+ ELLIPTOCYTES    Basic Metabolic Panel    Collection Time: 08/17/20 11:18 PM   Result Value Ref Range    Glucose 223 (H) 70 - 99 mg/dL    BUN 57 (H) 8 - 23 mg/dL    CREATININE 1.92 (H) 0.50 - 0.90 mg/dL    Bun/Cre Ratio NOT REPORTED 9 - 20    Calcium 9.4 8.6 - 10.4 mg/dL    Sodium 147 (H) 135 - 144 mmol/L    Potassium 4.2 3.7 - 5.3 mmol/L    Chloride 109 (H) 98 - 107 mmol/L    CO2 29 20 - 31 mmol/L    Anion Gap 9 9 - 17 mmol/L    GFR Non-African American 26 (L) >60 mL/min    GFR  32 (L) >60 mL/min    GFR Comment          GFR Staging NOT REPORTED    Brain Natriuretic Peptide    Collection Time: 08/17/20 11:18 PM   Result Value Ref Range    Pro-BNP 2,349 (H) <300 pg/mL    BNP Interpretation Pro-BNP Reference Range:    Magnesium    Collection Time: 08/17/20 11:18 PM   Result Value Ref Range    Magnesium 2.0 1.6 - 2.6 mg/dL   Troponin    Collection Time: 08/17/20 11:18 PM   Result Value Ref Range    Troponin, High Sensitivity 31 (H) 0 - 14 ng/L    Troponin T NOT REPORTED <0.03 ng/mL    Troponin Interp NOT REPORTED    Troponin    Collection Time: 08/18/20  4:31 AM   Result Value Ref Range    Troponin, High Sensitivity 35 (H) 0 - 14 ng/L    Troponin T NOT REPORTED <0.03 ng/mL    Troponin Interp NOT REPORTED Urinalysis    Collection Time: 08/18/20  4:32 AM   Result Value Ref Range    Color, UA YELLOW YELLOW    Turbidity UA CLEAR CLEAR    Glucose, Ur TRACE (A) NEGATIVE    Bilirubin Urine NEGATIVE NEGATIVE    Ketones, Urine NEGATIVE NEGATIVE    Specific Abbott, UA 1.015 1.000 - 1.030    Urine Hgb MOD (A) NEGATIVE    pH, UA 6.0 5.0 - 8.0    Protein, UA 4+ (A) NEGATIVE    Urobilinogen, Urine Normal Normal    Nitrite, Urine NEGATIVE NEGATIVE    Leukocyte Esterase, Urine NEGATIVE NEGATIVE    Urinalysis Comments NOT REPORTED    Microscopic Urinalysis    Collection Time: 08/18/20  4:32 AM   Result Value Ref Range    -          WBC, UA 2 TO 5 /HPF    RBC, UA 10 TO 20 /HPF    Casts UA HYALINE /LPF    Casts UA 0 TO 2 /LPF    Crystals, UA NOT REPORTED None /HPF    Epithelial Cells UA 0 TO 2 /HPF    Renal Epithelial, UA NOT REPORTED 0 /HPF    Bacteria, UA FEW (A) None    Mucus, UA NOT REPORTED None    Trichomonas, UA NOT REPORTED None    Amorphous, UA NOT REPORTED None    Other Observations UA NOT REPORTED NOT REQ. Yeast, UA NOT REPORTED None   POC Glucose Fingerstick    Collection Time: 08/18/20  8:02 AM   Result Value Ref Range    POC Glucose 130 (H) 65 - 105 mg/dL     Recent Labs     08/18/20  0802   POCGLU 130*        Xr Chest (2 Vw)    Result Date: 8/18/2020  EXAMINATION: TWO XRAY VIEWS OF THE CHEST 8/18/2020 3:54 am COMPARISON: 08/05/2020 and 11/30/2018 HISTORY: ORDERING SYSTEM PROVIDED HISTORY: ams workup TECHNOLOGIST PROVIDED HISTORY: ams workup Reason for Exam: ams workup Acuity: Unknown Type of Exam: Unknown FINDINGS: Evaluation is limited by the patient's body habitus. The upper lungs are clear. There is questionable basilar airspace opacity noted on the lateral view. Cardiomegaly is grossly unchanged. There is no large pleural effusion or definite evidence for pneumothorax. Questionable basilar atelectasis versus pneumonia.      Xr Hand Left (min 3 Views)    Result Date: 8/18/2020  EXAMINATION: THREE XRAY VIEWS OF THE LEFT HAND 8/18/2020 3:31 am COMPARISON: None. HISTORY: ORDERING SYSTEM PROVIDED HISTORY: left hand pain after fall TECHNOLOGIST PROVIDED HISTORY: left hand pain after fall Reason for Exam: left hand pain after fall Acuity: Unknown Type of Exam: Unknown FINDINGS: Frontal, lateral and oblique views of the left hand. Soft tissue swelling about the hand is most pronounced along the dorsum. No acute displaced fracture. Bony alignment is grossly normal.  Degenerative changes throughout the hand. Suggestion of osteopenia. No aggressive skeletal lesion. No acute displaced fracture or malalignment in the left hand. Degenerative osseous changes. Suggestion of osteopenia. Ct Head Wo Contrast    Result Date: 8/18/2020  EXAMINATION: CT OF THE HEAD WITHOUT CONTRAST; CT OF THE CERVICAL SPINE WITHOUT CONTRAST 8/18/2020 1:58 am; 8/18/2020 1:59 am TECHNIQUE: CT of the head was performed without the administration of intravenous contrast. Dose modulation, iterative reconstruction, and/or weight based adjustment of the mA/kV was utilized to reduce the radiation dose to as low as reasonably achievable.; CT of the cervical spine was performed without the administration of intravenous contrast. Multiplanar reformatted images are provided for review. Dose modulation, iterative reconstruction, and/or weight based adjustment of the mA/kV was utilized to reduce the radiation dose to as low as reasonably achievable. COMPARISON: May 10, 2019. HISTORY: ORDERING SYSTEM PROVIDED HISTORY: fall TECHNOLOGIST PROVIDED HISTORY: fall Reason for Exam: Fall, head injury, laceration Acuity: Acute Type of Exam: Initial; ORDERING SYSTEM PROVIDED HISTORY: fall elderly TECHNOLOGIST PROVIDED HISTORY: fall elderly Reason for Exam: Fall, head injury, laceration Acuity: Acute Type of Exam: Initial FINDINGS: CT CERVICAL SPINE: BONES/ALIGNMENT: There is no acute fracture or traumatic malalignment.  DEGENERATIVE CHANGES: Multilevel degenerative changes in the cervical spine. Osseous fusion of C5-C6. SOFT TISSUES: There is no prevertebral soft tissue swelling. CT HEAD: BRAIN/VENTRICLES: There is no acute intracranial hemorrhage, mass effect or midline shift. No abnormal extra-axial fluid collection. The gray-white differentiation is maintained without evidence of an acute infarct. There is no evidence of hydrocephalus. ORBITS: The visualized portion of the orbits demonstrate no acute abnormality. SINUSES: The visualized paranasal sinuses and mastoid air cells demonstrate no acute abnormality. SOFT TISSUES/SKULL: Left occipital scalp hematoma. No acute displaced skull fracture. No acute abnormality of the cervical spine. Multilevel degenerative changes in the cervical spine. No acute intracranial abnormality. , no acute intracranial hemorrhage or mass effect. Left occipital scalp hematoma. No acute displaced skull fracture. Ct Cervical Spine Wo Contrast    Result Date: 8/18/2020  EXAMINATION: CT OF THE HEAD WITHOUT CONTRAST; CT OF THE CERVICAL SPINE WITHOUT CONTRAST 8/18/2020 1:58 am; 8/18/2020 1:59 am TECHNIQUE: CT of the head was performed without the administration of intravenous contrast. Dose modulation, iterative reconstruction, and/or weight based adjustment of the mA/kV was utilized to reduce the radiation dose to as low as reasonably achievable.; CT of the cervical spine was performed without the administration of intravenous contrast. Multiplanar reformatted images are provided for review. Dose modulation, iterative reconstruction, and/or weight based adjustment of the mA/kV was utilized to reduce the radiation dose to as low as reasonably achievable. COMPARISON: May 10, 2019.  HISTORY: ORDERING SYSTEM PROVIDED HISTORY: fall TECHNOLOGIST PROVIDED HISTORY: fall Reason for Exam: Fall, head injury, laceration Acuity: Acute Type of Exam: Initial; ORDERING SYSTEM PROVIDED HISTORY: fall elderly TECHNOLOGIST PROVIDED HISTORY: fall elderly Reason for Exam: Fall, head injury, laceration Acuity: Acute Type of Exam: Initial FINDINGS: CT CERVICAL SPINE: BONES/ALIGNMENT: There is no acute fracture or traumatic malalignment. DEGENERATIVE CHANGES: Multilevel degenerative changes in the cervical spine. Osseous fusion of C5-C6. SOFT TISSUES: There is no prevertebral soft tissue swelling. CT HEAD: BRAIN/VENTRICLES: There is no acute intracranial hemorrhage, mass effect or midline shift. No abnormal extra-axial fluid collection. The gray-white differentiation is maintained without evidence of an acute infarct. There is no evidence of hydrocephalus. ORBITS: The visualized portion of the orbits demonstrate no acute abnormality. SINUSES: The visualized paranasal sinuses and mastoid air cells demonstrate no acute abnormality. SOFT TISSUES/SKULL: Left occipital scalp hematoma. No acute displaced skull fracture. No acute abnormality of the cervical spine. Multilevel degenerative changes in the cervical spine. No acute intracranial abnormality. , no acute intracranial hemorrhage or mass effect. Left occipital scalp hematoma. No acute displaced skull fracture. Ct Thoracic Spine Wo Contrast    Result Date: 8/18/2020  EXAMINATION: CT OF THE THORACIC SPINE WITHOUT CONTRAST  8/18/2020 3:47 am: TECHNIQUE: CT of the thoracic spine was performed without the administration of intravenous contrast. Multiplanar reformatted images are provided for review. Dose modulation, iterative reconstruction, and/or weight based adjustment of the mA/kV was utilized to reduce the radiation dose to as low as reasonably achievable. COMPARISON: 05/10/2019. HISTORY: ORDERING SYSTEM PROVIDED HISTORY: fall TECHNOLOGIST PROVIDED HISTORY: fall Reason for Exam: Fall, head injury, laceration Acuity: Acute Type of Exam: Initial FINDINGS: BONES/ALIGNMENT: There is normal alignment of the spine. The vertebral body heights are maintained. No osseous destructive lesion is seen. cetirizine  5 mg Oral Daily    nadolol  20 mg Oral Daily    pantoprazole  40 mg Oral QAM AC    PARoxetine  20 mg Oral QAM    potassium chloride  30 mEq Oral Daily    pramipexole  1 mg Oral Nightly    atorvastatin  10 mg Oral Daily    vitamin B-12  500 mcg Oral Daily    vitamin D  50,000 Units Oral Weekly    CeraVe  1 Dose Topical BID    clotrimazole-betamethasone   Topical BID    lisinopril  10 mg Oral Daily    And    hydroCHLOROthiazide  12.5 mg Oral Daily     Continuous Infusions:    dextrose      sodium chloride 75 mL/hr at 08/18/20 0959     1. PRN Meds: glucose, dextrose, glucagon (rDNA), dextrose, sodium chloride flush, acetaminophen **OR** acetaminophen, magnesium hydroxide, promethazine **OR** ondansetron, albuterol  2.   3.          Patient has anemia hemoglobin 8.7 creatinine is elevated    Probably patient has a chronic kidney disease and anemia associated with  Patient is on Bumex and hydrochlorothiazide at home we will hold    Patient is getting saline      Consultations:   PHARMACY TO DOSE VANCOMYCIN  IP CONSULT TO FAMILY MEDICINE  IP CONSULT TO NEUROLOGY  .     Juan Miguel Velasquez MD

## 2020-08-18 NOTE — CONSULTS
Endovascular Neurosurgery Note  Stroke Alert paged @ [phone]  ER Room # 24  Arrival to patient bedside @ [phone]  8/18/2020 1:55 PM    Pt Name: Yolis Kelly  MRN: [id number]  YOB: 1955  Date of evaluation: 8/18/2020  Primary Care Physician: Alexandria Weaver PA-C    Yolis Kelly is a 75 y.o. female who presents with brief aphasia. Symptom onset has been acute for a time period of hours. Severity is described as mild. Course of her symptoms over time is acute. Patient is admitted to [facility] and upon evaluation this morning had difficulty speaking. Likely word finding difficulties. No dysarthria. Unclear baseline. Patient has a previous stroke with left-sided weakness as residual deficit. No change in this. Patient is unclear sure was why she was transferred. Per note from outside hospital, patient cannot speak. Patient speaking in full sentences, however does have slightly slowed mentation but is unclear if this is baseline. NIKKI and UTI found at outside hospital and she is admitted for these. Creatinine 1.9 up from her baseline  Which was previously normal.    Patient's main complaint is back pain and soreness since she fell yesterday. Scans at outside hospital were negative. Allergies  is allergic to codeine. Medications  Prior to Admission medications    Medication Sig Start Date End Date Taking? Authorizing Provider   cephALEXin (KEFLEX) 500 MG capsule Take 1 capsule by mouth 3 times daily for 7 days 8/18/20 8/25/20  Charlette Alicia MD   clotrimazole-betamethasone (LOTRISONE) 1-0.05 % cream Apply topically 2 times daily.  8/17/20   Alexandria Weaver PA-C   nadolol (CORGARD) 20 MG tablet Take 1 tablet by mouth daily 8/10/20   Argentina Taylor MD   ferrous sulfate (IRON 325) 325 (65 Fe) MG tablet Take 1 tablet by mouth every 12 hours 8/10/20   Argentina Taylor MD   bumetanide (BUMEX) 1 MG tablet Take 1 mg by mouth 3 times daily    Historical Provider, MD   blood glucose monitor strips Test 4 times a day & as needed for symptoms of irregular blood glucose. Dispense sufficient amount for indicated testing frequency plus additional to accommodate PRN testing needs. 8/4/20   Matt Ivey PA-C   Lancets MISC 1 each by Does not apply route 4 times daily 8/4/20   Matt Ivey PA-C   glucose monitoring kit (FREESTYLE) monitoring kit 1 kit by Does not apply route daily 8/4/20   Matt Ivey PA-C   insulin glargine (BASAGLAR KWIKPEN) 100 UNIT/ML injection pen Inject 60 Units into the skin 2 times daily    Historical Provider, MD   Emollient (CERAVE) LOTN Apply topically 2 times daily Indications: bilateral lower legs    Historical Provider, MD   levETIRAcetam (KEPPRA) 750 MG tablet Take 750 mg by mouth 2 times daily Indications: with 500 mg to make 1250 mg dose    Historical Provider, MD   vitamin D (CHOLECALCIFEROL) 37630 UNIT CAPS Take 50,000 Units by mouth once a week Indications: Tuesdays     Historical Provider, MD   gabapentin (NEURONTIN) 100 MG capsule Take 100 mg by mouth 2 times daily. Conchetta Peaks     Historical Provider, MD   levETIRAcetam (KEPPRA) 500 MG tablet Take 500 mg by mouth 2 times daily Indications: with 750 mg to make total dose 1250 mg     Historical Provider, MD   insulin aspart (NOVOLOG) 100 UNIT/ML injection vial Inject into the skin Inject as per sliding scale before meals and at bedtime:    = 0 units; call physician if less than 70  151-200 = 2 units  201-250 = 4 units  251-300 = 6 units  301-350 = 8 units  351-400 = 10 units; call physician if greater than 400    Historical Provider, MD   potassium chloride (KLOR-CON M) 10 MEQ extended release tablet Take 30 mEq by mouth daily     Historical Provider, MD   vitamin B-12 (CYANOCOBALAMIN) 500 MCG tablet Take 500 mcg by mouth daily    Historical Provider, MD   acetaminophen (TYLENOL) 325 MG tablet Take 650 mg by mouth 3 times daily as needed for Pain (mild)    Historical Provider, MD   Blood Glucose Monitoring Suppl FLAVIO Check blood sugars 3/day 11/30/17   Valeriano Zapata MD   lisinopril-hydrochlorothiazide (PRINZIDE;ZESTORETIC) 10-12.5 MG per tablet Take 1 tablet by mouth daily    Historical Provider, MD   PARoxetine (PAXIL) 20 MG tablet Take 20 mg by mouth every morning    Historical Provider, MD   omeprazole (PRILOSEC) 40 MG delayed release capsule Take 40 mg by mouth Daily     Historical Provider, MD   loratadine (CLARITIN) 10 MG tablet Take 10 mg by mouth daily    Historical Provider, MD   simvastatin (ZOCOR) 20 MG tablet Take 20 mg by mouth nightly     Historical Provider, MD   pramipexole (MIRAPEX) 1 MG tablet Take 1 mg by mouth nightly    Historical Provider, MD   lamoTRIgine (LAMICTAL) 200 MG tablet Take 200 mg by mouth 2 times daily    Historical Provider, MD   busPIRone (BUSPAR) 10 MG tablet Take 10 mg by mouth 3 times daily     Historical Provider, MD    Scheduled Meds:  Continuous Infusions:  PRN Meds:.    Past Medical History   has a past medical history of Anemia, Cataract, GERD (gastroesophageal reflux disease), Headache, Hyperlipidemia, Neuropathy, Osteoarthritis, Restless leg syndrome, Seizures ([address] Short-term memory loss, Stroke (cerebrum) ([address] and Type 2 diabetes mellitus without complication ([address] OBJECTIVE  There were no vitals taken for this visit. ROS  CONSTITUTIONAL: negative for fatigue and malaise   EYES: negative for double vision and photophobia    HEENT: negative for tinnitus and sore throat   RESPIRATORY: negative for cough, shortness of breath   CARDIOVASCULAR: negative for chest pain, palpitations   GASTROINTESTINAL: negative for nausea, vomiting   GENITOURINARY: negative for incontinence   MUSCULOSKELETAL: negative for neck or back pain   NEUROLOGICAL: negative for seizures   PSYCHIATRIC: negative for agitated     Review of systems otherwise negative. EXAM:    CONSTITUTIONAL:  Well developed, well nourished, alert and oriented x 3, in no acute distress. GCS 15. Nontoxic. No dysarthria. No aphasia but some slowed speech. HEAD:  normocephalic, atraumatic    EYES:  PERRLA, EOMI.   ENT:  moist mucous membranes   NECK:  supple, symmetric, no midline tenderness to palpation    BACK:  without midline tenderness, step-offs or deformities    LUNGS:  Equal air entry bilaterally   CARDIOVASCULAR:  normal s1 / s2   ABDOMEN:  Soft, no rigidity   NEUROLOGIC:  Mental Status:  A & O x3,awake             Cranial Nerves:    cranial nerves II-XII are grossly intact    Motor Exam:    Drift:  absent  Tone:  normal    Motor exam is 5 out of 5 RUE and RLE and  3/5 strength LUE and LLE    Sensory:    Touch:    Right Upper Extremity:  normal  Left Upper Extremity:  normal  Right Lower Extremity:  normal  Left Lower Extremity:  Normal    Coordination/Dysmetria:  Finger to Nose:   Right:  normal  Left:  normal       Gait:  normal   SKIN:  no rash        INITIAL NIH STROKE SCALE    Time Performed:  2:02 PM    Administer stroke scale items in the order listed. Record performance in each category after each subscale exam. Do not go back and change scores. Follow directions provided for each exam technique. Scores should reflect what the patient does, not what the clinician thinks the patient can do. The clinician should record answers while administering the exam and work quickly. Except where indicated, the patient should not be coached (i.e., repeated requests to patient to make a special effort). 1a.  Level of consciousness:  0 - alert; keenly responsive  1b. Level of consciousness questions:  0 - answers both questions correctly  1c. Level of consciousness questions:  0 - performs both tasks correctly  2. Best Gaze:  0 - normal  3. Visual:  0 - no visual loss  4. Facial Palsy:  0 - normal symmetric movement  5a. Motor left arm:  0 - no drift, limb holds 90 (or 45) degrees for full 10 seconds  5b. Motor right arm:  0 - no drift, limb holds 90 (or 45) degrees for full 10 seconds  6a.   Motor left le - no drift; leg holds 30 degree position for full 5 seconds  6b. Motor right le - no drift; leg holds 30 degree position for full 5 seconds  7. Limb Ataxia:  0 - absent  8. Sensory:  0 - normal; no sensory loss  9. Best Language:  1 - mild to moderate aphasia; some obvious loss of fluency or facility of comprehension without significant limitation on ideas expressed or form of expression. Reduction of speech and/or comprehension, however, makes conversation about provided materials difficult or impossible. For example, in conversation about provided materials, examiner can identify picture or naming card content from patient's response. 10.  Dysarthria:  0 - normal  11. Extinction and Inattention:  0 - no abnormality    TOTAL:  1    Imaging:  CT brain without contrast:  No acute abnormality  CTA head/neck with and without contrast:  70% stenosis bilaterally  MRI brain without contrast (limited stroke protocol): No acute intracranial abnormality    Assessment  1. Last Known Well (date and time): 11am  2. Candidate for IV tPA therapy     Yes []     No  [x] due to the following exclusion criteria: window  3.  Candidate for Thrombectomy    Yes []      No [x] due to the following exclusion criteria: no thrombus    Recommendations:   [x] General Care Status - prefer 5th floor (5A/5C) under internal medicine with neurology consult for likely metabolic encephalopathy  [] ICU Status - prefer Neuro ICU (5B)  Please use the following admission order set for stroke admission:   [] [id number] - SHARRI Intercerebral Hemorrhage Admission   [] [id number] - SHARRI Sub Arachnoid Hemorrhage Admission   [] [id number] - SHARRI Ischemic Stroke TPA Treatment Focused   [] [id number] - IP Ischemic Stroke ICU Post Alteplase (TPA) Admission    [] [id number] - GEN Ischemic Stroke Non-Thrombolytic Focussed  Physical Therapy  Occupational Therapy  Speech Therapy with swallow evaluation  Fasting Lipid panel  Hgb A1c  70% carotid stenosis bilaterally, neuroendovascular consulted  [id number] [address]ares for primary team to start anticoagulation as appropriate for DVT prophylaxis  NIHSS assessment every shift / change in caregiver.      Discussed with Dr. Marivel De DO, EM2  Neuro Critical Care  Pager [address] [place] [phone]

## 2020-08-18 NOTE — ED PROVIDER NOTES
[address] [facility]  Emergency Department  Faculty Sign-Out Addendum     Care of Watson Jorgensen was assumed from previous attending and is being seen for Aphasia (Transfer from [facility])  . The patient's initial evaluation and plan have been discussed with the prior provider who initially evaluated the patient. Attestation    I was available and discussed any additional care issues that arose and coordinated the management plans with the resident(s) caring for the patient during my duty period. Any areas of disagreement with residents documentation of care or procedures are noted on the chart. I was personally present for the key portions of any/all procedures during my duty period. I have documented in the chart those procedures where I was not present during the key portions.     EMERGENCY DEPARTMENT COURSE / MEDICAL DECISION MAKING:       MEDICATIONS GIVEN:  Orders Placed This Encounter   Medications    iohexol (OMNIPAQUE 350) solution 90 mL       LABS / RADIOLOGY:     Labs Reviewed   STROKE PANEL - Abnormal; Notable for the following components:       Result Value    Glucose 211 (*)     BUN 53 (*)     CREATININE 1.74 (*)     Sodium 145 (*)     GFR Non- 29 (*)     GFR  35 (*)     RBC 3.29 (*)     Hemoglobin 8.5 (*)     Hematocrit 30.1 (*)     MCHC 28.2 (*)     RDW 19.2 (*)     Platelets 97 (*)     % CKMB 3.8 (*)     Immature Granulocytes 1 (*)     Seg Neutrophils 71 (*)     Lymphocytes 19 (*)     Absolute Lymph # 0.99 (*)     Myoglobin 71 (*)     Troponin, High Sensitivity 32 (*)     All other components within normal limits   HGB/HCT - Abnormal; Notable for the following components:    POC Hemoglobin 9.4 (*)     POC Hematocrit 28 (*)     All other components within normal limits   SODIUM (POC) - Abnormal; Notable for the following components:    POC Sodium 148 (*)     All other components within normal limits   AMMONIA - Abnormal; Notable for the following components:    Ammonia 54 (*)     All other components within normal limits   VENOUS BLOOD GAS, POINT OF CARE - Abnormal; Notable for the following components:    pCO2, Larry 60.4 (*)     pO2, Larry 22.1 (*)     HCO3, Venous 33.0 (*)     Total CO2, Venous 35 (*)     Positive Base Excess, Larry 6 (*)     O2 Sat, Larry 33 (*)     All other components within normal limits   CREATININE W/GFR POINT OF CARE - Abnormal; Notable for the following components:    POC Creatinine 1.98 (*)     GFR Comment 31 (*)     GFR Non- 25 (*)     All other components within normal limits   POCT GLUCOSE - Abnormal; Notable for the following components:    POC Glucose 224 (*)     All other components within normal limits   POTASSIUM (POC)   CHLORIDE (POC)   CALCIUM, IONIC (POC)   LACTIC ACID,POINT OF CARE   ANION GAP (CALC) POC       Xr Chest (2 Vw)    Result Date: 8/18/2020  EXAMINATION: TWO XRAY VIEWS OF THE CHEST 8/18/2020 3:54 am COMPARISON: 08/05/2020 and 11/30/2018 HISTORY: ORDERING SYSTEM PROVIDED HISTORY: ams workup TECHNOLOGIST PROVIDED HISTORY: ams workup Reason for Exam: ams workup Acuity: Unknown Type of Exam: Unknown FINDINGS: Evaluation is limited by the patient's body habitus. The upper lungs are clear. There is questionable basilar airspace opacity noted on the lateral view. Cardiomegaly is grossly unchanged. There is no large pleural effusion or definite evidence for pneumothorax. Questionable basilar atelectasis versus pneumonia. Xr Hand Left (min 3 Views)    Result Date: 8/18/2020  EXAMINATION: THREE XRAY VIEWS OF THE LEFT HAND 8/18/2020 3:31 am COMPARISON: None. HISTORY: ORDERING SYSTEM PROVIDED HISTORY: left hand pain after fall TECHNOLOGIST PROVIDED HISTORY: left hand pain after fall Reason for Exam: left hand pain after fall Acuity: Unknown Type of Exam: Unknown FINDINGS: Frontal, lateral and oblique views of the left hand.   Soft tissue swelling about the hand is most pronounced along the dorsum. No acute displaced fracture. Bony alignment is grossly normal.  Degenerative changes throughout the hand. Suggestion of osteopenia. No aggressive skeletal lesion. No acute displaced fracture or malalignment in the left hand. Degenerative osseous changes. Suggestion of osteopenia. Ct Head Wo Contrast    Result Date: 8/18/2020  EXAMINATION: CT OF THE HEAD WITHOUT CONTRAST  8/18/2020 11:51 am TECHNIQUE: CT of the head was performed without the administration of intravenous contrast. Dose modulation, iterative reconstruction, and/or weight based adjustment of the mA/kV was utilized to reduce the radiation dose to as low as reasonably achievable. COMPARISON: CT brain performed 08/18/2020. HISTORY: ORDERING SYSTEM PROVIDED HISTORY: difficulty talking TECHNOLOGIST PROVIDED HISTORY: difficulty talking Reason for Exam: changes in mental status Acuity: Unknown Type of Exam: Unknown FINDINGS: BRAIN/VENTRICLES: There is no acute intracranial hemorrhage, mass effect, or midline shift. There is satisfactory overall gray-white matter differentiation. The ventricular structures are symmetric and unremarkable. The infratentorial structures are unremarkable. ORBITS: The visualized portion of the orbits demonstrate no acute abnormality. SINUSES: The visualized paranasal sinuses and mastoid air cells demonstrate no acute abnormality. SOFT TISSUES/SKULL:  There is a soft tissue hematoma in the left parietooccipital region that is similar compared to prior. No acute intracranial abnormality. Similar soft tissue hematoma in the left parietooccipital region. Findings were discussed with Modesta Alarcon at 12:08 pm on 8/18/2020.      Ct Head Wo Contrast    Result Date: 8/18/2020  EXAMINATION: CT OF THE HEAD WITHOUT CONTRAST; CT OF THE CERVICAL SPINE WITHOUT CONTRAST 8/18/2020 1:58 am; 8/18/2020 1:59 am TECHNIQUE: CT of the head was performed without the administration of intravenous contrast. Dose modulation, iterative reconstruction, and/or weight based adjustment of the mA/kV was utilized to reduce the radiation dose to as low as reasonably achievable.; CT of the cervical spine was performed without the administration of intravenous contrast. Multiplanar reformatted images are provided for review. Dose modulation, iterative reconstruction, and/or weight based adjustment of the mA/kV was utilized to reduce the radiation dose to as low as reasonably achievable. COMPARISON: May 10, 2019. HISTORY: ORDERING SYSTEM PROVIDED HISTORY: fall TECHNOLOGIST PROVIDED HISTORY: fall Reason for Exam: Fall, head injury, laceration Acuity: Acute Type of Exam: Initial; ORDERING SYSTEM PROVIDED HISTORY: fall elderly TECHNOLOGIST PROVIDED HISTORY: fall elderly Reason for Exam: Fall, head injury, laceration Acuity: Acute Type of Exam: Initial FINDINGS: CT CERVICAL SPINE: BONES/ALIGNMENT: There is no acute fracture or traumatic malalignment. DEGENERATIVE CHANGES: Multilevel degenerative changes in the cervical spine. Osseous fusion of C5-C6. SOFT TISSUES: There is no prevertebral soft tissue swelling. CT HEAD: BRAIN/VENTRICLES: There is no acute intracranial hemorrhage, mass effect or midline shift. No abnormal extra-axial fluid collection. The gray-white differentiation is maintained without evidence of an acute infarct. There is no evidence of hydrocephalus. ORBITS: The visualized portion of the orbits demonstrate no acute abnormality. SINUSES: The visualized paranasal sinuses and mastoid air cells demonstrate no acute abnormality. SOFT TISSUES/SKULL: Left occipital scalp hematoma. No acute displaced skull fracture. No acute abnormality of the cervical spine. Multilevel degenerative changes in the cervical spine. No acute intracranial abnormality. , no acute intracranial hemorrhage or mass effect. Left occipital scalp hematoma. No acute displaced skull fracture.      Ct Cervical Spine Wo Contrast    Result Date: 8/18/2020  EXAMINATION: CT OF THE HEAD WITHOUT CONTRAST; CT OF THE CERVICAL SPINE WITHOUT CONTRAST 8/18/2020 1:58 am; 8/18/2020 1:59 am TECHNIQUE: CT of the head was performed without the administration of intravenous contrast. Dose modulation, iterative reconstruction, and/or weight based adjustment of the mA/kV was utilized to reduce the radiation dose to as low as reasonably achievable.; CT of the cervical spine was performed without the administration of intravenous contrast. Multiplanar reformatted images are provided for review. Dose modulation, iterative reconstruction, and/or weight based adjustment of the mA/kV was utilized to reduce the radiation dose to as low as reasonably achievable. COMPARISON: May 10, 2019. HISTORY: ORDERING SYSTEM PROVIDED HISTORY: fall TECHNOLOGIST PROVIDED HISTORY: fall Reason for Exam: Fall, head injury, laceration Acuity: Acute Type of Exam: Initial; ORDERING SYSTEM PROVIDED HISTORY: fall elderly TECHNOLOGIST PROVIDED HISTORY: fall elderly Reason for Exam: Fall, head injury, laceration Acuity: Acute Type of Exam: Initial FINDINGS: CT CERVICAL SPINE: BONES/ALIGNMENT: There is no acute fracture or traumatic malalignment. DEGENERATIVE CHANGES: Multilevel degenerative changes in the cervical spine. Osseous fusion of C5-C6. SOFT TISSUES: There is no prevertebral soft tissue swelling. CT HEAD: BRAIN/VENTRICLES: There is no acute intracranial hemorrhage, mass effect or midline shift. No abnormal extra-axial fluid collection. The gray-white differentiation is maintained without evidence of an acute infarct. There is no evidence of hydrocephalus. ORBITS: The visualized portion of the orbits demonstrate no acute abnormality. SINUSES: The visualized paranasal sinuses and mastoid air cells demonstrate no acute abnormality. SOFT TISSUES/SKULL: Left occipital scalp hematoma. No acute displaced skull fracture. No acute abnormality of the cervical spine.   Multilevel degenerative changes in the cervical spine. No acute intracranial abnormality. , no acute intracranial hemorrhage or mass effect. Left occipital scalp hematoma. No acute displaced skull fracture. Ct Thoracic Spine Wo Contrast    Result Date: 8/18/2020  EXAMINATION: CT OF THE THORACIC SPINE WITHOUT CONTRAST  8/18/2020 3:47 am: TECHNIQUE: CT of the thoracic spine was performed without the administration of intravenous contrast. Multiplanar reformatted images are provided for review. Dose modulation, iterative reconstruction, and/or weight based adjustment of the mA/kV was utilized to reduce the radiation dose to as low as reasonably achievable. COMPARISON: 05/10/2019. HISTORY: ORDERING SYSTEM PROVIDED HISTORY: fall TECHNOLOGIST PROVIDED HISTORY: fall Reason for Exam: Fall, head injury, laceration Acuity: Acute Type of Exam: Initial FINDINGS: BONES/ALIGNMENT: There is normal alignment of the spine. The vertebral body heights are maintained. No osseous destructive lesion is seen. DEGENERATIVE CHANGES: Multilevel degenerative changes in the thoracic spine. Multilevel bridging marginal osteophytes may relate to DISH. No definite severe thoracic spinal canal stenosis. SOFT TISSUES: No paraspinal mass is seen. Trace right pleural fluid. No acute fracture or listhesis in the thoracic spine. Multilevel degenerative changes in the thoracic spine with bridging marginal osteophytes which could relate to component of DISH. Xr Chest Portable    Result Date: 8/5/2020  EXAMINATION: ONE XRAY VIEW OF THE CHEST 8/5/2020 12:14 pm COMPARISON: 05/10/2019 HISTORY: ORDERING SYSTEM PROVIDED HISTORY: shortness of breath TECHNOLOGIST PROVIDED HISTORY: shortness of breath Reason for Exam: middle chest pains Acuity: Acute Type of Exam: Initial FINDINGS: Single portable frontal view of the chest is submitted for review. The cardiac silhouette is enlarged.   Lung parenchyma is clear without focal airspace consolidation, sizeable pleural effusion, or pneumothorax. Trachea is midline. Visualized osseous structures and soft tissues are grossly intact. Cardiomegaly without convincing evidence for acute cardiopulmonary pathology. Cta Head Neck W Contrast    Result Date: 8/18/2020  EXAMINATION: CTA OF THE HEAD AND NECK WITH CONTRAST 8/18/2020 2:27 pm: TECHNIQUE: CTA of the head and neck was performed with the administration of intravenous contrast. Multiplanar reformatted images are provided for review. MIP images are provided for review. Stenosis of the internal carotid arteries measured using NASCET criteria. Dose modulation, iterative reconstruction, and/or weight based adjustment of the mA/kV was utilized to reduce the radiation dose to as low as reasonably achievable. COMPARISON: None. HISTORY: ORDERING SYSTEM PROVIDED HISTORY: stroke alert, transfer TECHNOLOGIST PROVIDED HISTORY: stroke alert, transfer Reason for Exam: CVA alert Acuity: Acute Type of Exam: Initial FINDINGS: CTA NECK: AORTIC ARCH/ARCH VESSELS: No dissection or arterial injury. No significant stenosis of the brachiocephalic or subclavian arteries. CAROTID ARTERIES: The common carotid arteries are patent without stenosis. There is calcification involving the left carotid bulb and proximal left internal carotid artery with approximately 70% stenosis in the proximal left internal carotid artery by NASCET criteria. There is calcification involving the right carotid bulb and proximal right internal carotid artery without significant stenosis. The remainder of the internal carotid arteries are patent bilaterally. VERTEBRAL ARTERIES: No dissection, arterial injury, or significant stenosis. SOFT TISSUES: The lung apices are clear. No cervical or superior mediastinal lymphadenopathy. The larynx and pharynx are unremarkable. No acute abnormality of the salivary and thyroid glands. BONES: No acute osseous abnormality.  CTA HEAD: ANTERIOR CIRCULATION: No significant stenosis of the intracranial internal carotid, anterior cerebral, or middle cerebral arteries. No aneurysm. POSTERIOR CIRCULATION: No significant stenosis of the vertebral, basilar, or posterior cerebral arteries. No aneurysm. OTHER: No dural venous sinus thrombosis on this non-dedicated study. BRAIN: No mass effect or midline shift. No extra-axial fluid collection. The gray-white differentiation is maintained. Calcification involving the carotid bulbs and proximal internal carotid arteries bilaterally with approximately 70% stenosis in the proximal left internal carotid artery by NASCET criteria. No significant stenosis or occlusion within the intracranial vascularity. Nm Cardiac Stress Test Nuclear Imaging    Result Date: 8/10/2020  EXAMINATION: MYOCARDIAL PERFUSION IMAGING 8/10/2020 7:20 am TECHNIQUE: Rest dose:  11.9 mCi Tc-99m sestamibi intravenously Stress dose:  39.7 mCi Tc-99m sestamibi intravenously Under cardiology supervision, 0.4 mg Lexiscan was infused intravenously prior to injection of the stress dose. SPECT imaging was acquired following injection of the sestamibi. ECG gating was obtained following the stress acquisition. COMPARISON: None Available. HISTORY: ORDERING SYSTEM PROVIDED HISTORY: Chest pain TECHNOLOGIST PROVIDED HISTORY: Reason for Exam: Chest pain Reason for Exam: Angina Procedure Type->Rx angina Reason for Exam: Chest pain Acuity: Unknown Type of Exam: Unknown 33-year-old female with chest pain FINDINGS: The patient achieved a maximum heart rate of 69 beats per minute, 44 % of the maximum age predicted heart rate of 154 beats per minute. Perfusion: Photopenia of the inferior wall on standard stress imaging resolves with prone stress imaging. This likely represents attenuation related artifact. There is no scintigraphic evidence for a reversible or fixed perfusion defect to suggest reversible ischemia or infarct.  Function: The gated SPECT data demonstrates normal left ventricular size and normal wall motion. Left ventricular ejection fraction:  52% TID score:  1.08 (threshold value of 1.39 is used for Lexiscan stress with Tc-99m). There is no stress-induced cavitary dilatation to suggest compensated triple vessel disease. End diastolic volume:  944BC Scores are visually adjusted to account for potential artifact. Summed stress score:  0 Summed rest score:  0 Summed reversibility score:  0     1. No definitive scintigraphic evidence for reversible ischemia or infarct. 2. Left ventricular ejection fraction is 52%. 3.  Please see report for EKG portion of the examination which will be performed separately by physician from cardiology. Risk stratification:  Low risk Note:  Risk stratification incorporates both clinical history and test results. Final risk determination is the responsibility of the ordering provider as history and other test results may increase or decrease the risk stratification reported for this examination. Risk stratification criteria are adapted from \"Noninvasive Risk Stratification\" criteria from [facility]. Al, ACC/AATS/AHA/ASE/ASNC/SCAI/SCCT/STS 2017 Appropriate Use Criteria For Coronary Revascularization in Patients With Stable Ischemic Heart Disease [facility] Volume 69, Issue 17, May 2017 High risk (>3% annual death or MI) 1. Severe resting LV dysfunction (LVEF >35%) not readily explained by non coronary causes 2. Resting perfusion abnormalities greater than 10% of the myocardium in patients without prior history or evidence of MI 3. Stress-induced perfusion abnormalities encumbering greater than or equal to 10% myocardium or stress segmental scores indicating multiple vascular territories with abnormalities 4. Stress-induced LV dilatation (TID ratio greater than 1.19 for exercise and greater than 1.39 for regadenoson) Intermediate risk (1% to 3% annual death or MI) 1. Mild/moderate resting LV dysfunction (LVEF 35% to 49%) not readily explained by non coronary causes.  2. Resting perfusion abnormalities in 5%-9.9% of the myocardium in patients without a history or prior evidence of MI 3. Stress-induced perfusion abnormality encumbering 5%-9.9% of the myocardium or stress segmental scores indicating 1 vascular territory with abnormalities but without LV dilation 4. Small wall motion abnormality involving 1-2 segments and only 1 coronary bed. Low Risk (Less than 1% annual death or MI) 1. Normal or small myocardial perfusion defect at rest or with stress encumbering less than 5% of the myocardium. RECENT VITALS:      ,   ,  ,  ,      Enmanuel Preston is a 75 y.o. female who presents with complaint of aphasia. Struck head yesterday was admitted to [facility] had fluctuating aphasic symptoms on the floor today. Transferred here for stroke work-up. Plan is admission. OUTSTANDING TASKS / RECOMMENDATIONS:    1.  Disposition made by previous attending and nothing to do      Lissy Mcguire MD, [facility]  Attending Emergency Physician  [address]  ED        Noelle Emerson MD  08/18/20 [id number]

## 2020-08-18 NOTE — ED NOTES
Bed: 12  Expected date:   Expected time:   Means of arrival:   Comments:  [facility] [address] Darrel CHOWDARY RN  08/17/20 [id number]

## 2020-08-18 NOTE — ED PROVIDER NOTES
ESOPHAGOGASTRODUODENOSCOPY performed by Laury Nunes MD at [address] [facility] 3/18/2020    EGD WITH CAUTERIZATION OF [place] (GAVE) USING ARGON performed by Chavez Arcos MD at [address] [facility] N/A 8/7/2020    EGD BIOPSY BRUSH BX AND STOMACH FULGURATION WITH ARGON BEAM performed by Laury Nunes MD at [address]       Previous Medications    ACETAMINOPHEN (TYLENOL) 325 MG TABLET    Take 650 mg by mouth 3 times daily as needed for Pain (mild)    BLOOD GLUCOSE MONITOR STRIPS    Test 4 times a day & as needed for symptoms of irregular blood glucose. Dispense sufficient amount for indicated testing frequency plus additional to accommodate PRN testing needs. BLOOD GLUCOSE MONITORING SUPPL FLAVIO    Check blood sugars 3/day    BUMETANIDE (BUMEX) 1 MG TABLET    Take 1 mg by mouth 3 times daily    BUSPIRONE (BUSPAR) 10 MG TABLET    Take 10 mg by mouth 3 times daily     CLOTRIMAZOLE-BETAMETHASONE (LOTRISONE) 1-0.05 % CREAM    Apply topically 2 times daily. EMOLLIENT (CERAVE) LOTN    Apply topically 2 times daily Indications: bilateral lower legs    FERROUS SULFATE (IRON 325) 325 (65 FE) MG TABLET    Take 1 tablet by mouth every 12 hours    GABAPENTIN (NEURONTIN) 100 MG CAPSULE    Take 100 mg by mouth 2 times daily. Bala Schmidt     GLUCOSE MONITORING KIT (FREESTYLE) MONITORING KIT    1 kit by Does not apply route daily    INSULIN ASPART (NOVOLOG) 100 UNIT/ML INJECTION VIAL    Inject into the skin Inject as per sliding scale before meals and at bedtime:    = 0 units; call physician if less than 70  151-200 = 2 units  201-250 = 4 units  251-300 = 6 units  301-350 = 8 units  351-400 = 10 units; call physician if greater than 400    INSULIN GLARGINE (BASAGLAR KWIKPEN) 100 UNIT/ML INJECTION PEN    Inject 60 Units into the skin 2 times daily    LAMOTRIGINE (LAMICTAL) 200 MG TABLET    Take 200 mg by mouth 2 times daily LANCETS MISC    1 each by Does not apply route 4 times daily    LEVETIRACETAM (KEPPRA) 500 MG TABLET    Take 500 mg by mouth 2 times daily Indications: with 750 mg to make total dose 1250 mg     LEVETIRACETAM (KEPPRA) 750 MG TABLET    Take 750 mg by mouth 2 times daily Indications: with 500 mg to make 1250 mg dose    LISINOPRIL-HYDROCHLOROTHIAZIDE (PRINZIDE;ZESTORETIC) 10-12.5 MG PER TABLET    Take 1 tablet by mouth daily    LORATADINE (CLARITIN) 10 MG TABLET    Take 10 mg by mouth daily    NADOLOL (CORGARD) 20 MG TABLET    Take 1 tablet by mouth daily    OMEPRAZOLE (PRILOSEC) 40 MG DELAYED RELEASE CAPSULE    Take 40 mg by mouth Daily     PAROXETINE (PAXIL) 20 MG TABLET    Take 20 mg by mouth every morning    POTASSIUM CHLORIDE (KLOR-CON M) 10 MEQ EXTENDED RELEASE TABLET    Take 30 mEq by mouth daily     PRAMIPEXOLE (MIRAPEX) 1 MG TABLET    Take 1 mg by mouth nightly    SIMVASTATIN (ZOCOR) 20 MG TABLET    Take 20 mg by mouth nightly     VITAMIN B-12 (CYANOCOBALAMIN) 500 MCG TABLET    Take 500 mcg by mouth daily    VITAMIN D (CHOLECALCIFEROL) 62461 UNIT CAPS    Take 50,000 Units by mouth once a week Indications:       ALLERGIES     is allergic to codeine. FAMILY HISTORY     She indicated that her mother is . She indicated that her father is . She indicated that her brother is alive. SOCIAL HISTORY       Social History     Tobacco Use    Smoking status: Never Smoker    Smokeless tobacco: Never Used   Substance Use Topics    Alcohol use: No    Drug use: No     PHYSICAL EXAM     INITIAL VITALS: BP (!) 110/59   Pulse 67   Temp 98.1 °F (36.7 °C) (Oral)   Resp 18   Ht 5' (1.524 m)   Wt 283 lb (128.4 kg)   SpO2 99%   BMI 55.27 kg/m²    Physical Exam  Vitals signs and nursing note reviewed. Constitutional:       General: She is not in acute distress. Appearance: She is well-developed.    HENT:      Head:      Comments: Large posterior scalp hematoma with abrasion no laceration  Eyes:      Conjunctiva/sclera: Conjunctivae normal.      Pupils: Pupils are equal, round, and reactive to light. Neck:      Musculoskeletal: Neck supple. Comments: No midline spinal tenderness to palpation  Cardiovascular:      Rate and Rhythm: Normal rate and regular rhythm. Pulses: Normal pulses. Heart sounds: No murmur. No friction rub. Pulmonary:      Effort: Pulmonary effort is normal. No respiratory distress. Breath sounds: Normal breath sounds. No wheezing or rhonchi. Abdominal:      General: There is no distension. Palpations: Abdomen is soft. Tenderness: There is no abdominal tenderness. There is no guarding or rebound. Musculoskeletal:      Comments: Ecchymoses over the left lateral thoracic paraspinal area with some midline thoracic spinal tenderness to palpation, left hand ecchymoses and swelling on the left hand when compared to the right   Skin:     General: Skin is warm and dry. Capillary Refill: Capillary refill takes less than 2 seconds. Neurological:      Mental Status: She is alert. DIAGNOSTIC RESULTS   RADIOLOGY:All plain film, CT, MRI, and formal ultrasound images (except ED bedside ultrasound) are read by the radiologist, see reports below, unless otherwisenoted in MDM or here. XR CHEST (2 VW)   Final Result   Questionable basilar atelectasis versus pneumonia. CT THORACIC SPINE WO CONTRAST   Final Result   No acute fracture or listhesis in the thoracic spine. Multilevel degenerative changes in the thoracic spine with bridging marginal   osteophytes which could relate to component of DISH. XR HAND LEFT (MIN 3 VIEWS)   Final Result   No acute displaced fracture or malalignment in the left hand. Degenerative osseous changes. Suggestion of osteopenia. CT CERVICAL SPINE WO CONTRAST   Final Result   No acute abnormality of the cervical spine. Multilevel degenerative changes   in the cervical spine. No acute intracranial abnormality. , no acute intracranial hemorrhage or mass   effect. Left occipital scalp hematoma. No acute displaced skull fracture. CT HEAD WO CONTRAST   Final Result   No acute abnormality of the cervical spine. Multilevel degenerative changes   in the cervical spine. No acute intracranial abnormality. , no acute intracranial hemorrhage or mass   effect. Left occipital scalp hematoma. No acute displaced skull fracture. LABS: All lab results were reviewed by myself, and all abnormals are listed below.   Labs Reviewed   CBC WITH AUTO DIFFERENTIAL - Abnormal; Notable for the following components:       Result Value    RBC 3.28 (*)     Hemoglobin 8.7 (*)     Hematocrit 27.8 (*)     RDW 21.9 (*)     Platelets 625 (*)     Seg Neutrophils 74 (*)     Lymphocytes 19 (*)     Absolute Lymph # 0.89 (*)     All other components within normal limits   BASIC METABOLIC PANEL - Abnormal; Notable for the following components:    Glucose 223 (*)     BUN 57 (*)     CREATININE 1.92 (*)     Sodium 147 (*)     Chloride 109 (*)     GFR Non- 26 (*)     GFR  32 (*)     All other components within normal limits   BRAIN NATRIURETIC PEPTIDE - Abnormal; Notable for the following components:    Pro-BNP 2,349 (*)     All other components within normal limits   TROPONIN - Abnormal; Notable for the following components:    Troponin, High Sensitivity 31 (*)     All other components within normal limits   TROPONIN - Abnormal; Notable for the following components:    Troponin, High Sensitivity 35 (*)     All other components within normal limits   URINALYSIS - Abnormal; Notable for the following components:    Glucose, Ur TRACE (*)     Urine Hgb MOD (*)     Protein, UA 4+ (*)     All other components within normal limits   MICROSCOPIC URINALYSIS - Abnormal; Notable for the following components:    Bacteria, UA FEW (*)     All other components within normal limits   MAGNESIUM       EMERGENCY DEPARTMENTCOURSE:   Differential diagnosis includes intracranial bleed, fracture, dislocation. The patient was initially awake alert able to tell me that she fell and able to tell me that she was bleeding internally and that she was taken off anticoagulation. After her CT scan was negative I was more able to evaluate her posterior scalp and she appears more altered than she did earlier. She is a nursing home patient she could be sundowning will contact the nursing home to see what her normal mental status is at night. 3:45 AM EDT  Nursing staff spoke to the nursing home and normally the patient is completely awake alert able to do things by herself but today she was more \"off \" and was hypoxic to 65%. Given this new information will perform an altered mental status work-up.     5:26 AM EDT  Patient sodium is 147. Creatinine is baseline at 1.92. No anion gap elevation. She does have a new BNP elevation at 2300. Her chest x-ray is concerning for pneumonia. She did state that she just started requiring oxygen this could be an etiology for her hypoxia we will treat her for hospital-acquired pneumonia given the fact that she is in a nursing home setting. She has no leukocytosis she has anemia with 8.7 but this appears chronic it is no worsening. Her troponin is 30 1 repeat is 34 we will continue to trend. Her urine is concerning for urinary tract infection which will be covered with the cefepime. CT head reveals a left occipital scalp hematoma with no intracranial abnormality cervical spine is negative for fracture. Thoracic spine is negative for fracture. Hand x-ray is negative for fracture. The patient will be admitted for further management. Since this patient is a trauma I spoke to Dr. Lazara Gupta who recommends admitting the patient under medicine with trauma as a consultation. I spoke to [facility] practitioner who will admit the patient.   On reevaluation the patient is more awake is back to the mental status that I saw when I first evaluated her. EKG: All EKG's are interpreted by the Emergency Department Physician who either signs or Co-signs this chart in the absence of a cardiologist.  Normal sinus rhythm with a heart rate of 66 bpm no prolonged intervals normal axis no acute ST or T wave changes      CONSULTS:  PHARMACY TO DOSE VANCOMYCIN  IP CONSULT TO FAMILY MEDICINE    Vitals:    Vitals:    08/17/20 2251   BP: (!) 110/59   Pulse: 67   Resp: 18   Temp: 98.1 °F (36.7 °C)   TempSrc: Oral   SpO2: 99%   Weight: 283 lb (128.4 kg)   Height: 5' (1.524 m)       The patient was given the following medications while in the emergency department:  Orders Placed This Encounter   Medications    cephALEXin (KEFLEX) 500 MG capsule     Sig: Take 1 capsule by mouth 3 times daily for 7 days     Dispense:  21 capsule     Refill:  0    cefepime (MAXIPIME) 2 g IVPB minibag         FINAL IMPRESSION      1. Injury of head, initial encounter    2. Urinary tract infection without hematuria, site unspecified    3. Pneumonia due to organism    4. Elevated brain natriuretic peptide (BNP) level    5.  Hypernatremia         DISPOSITION/PLAN   DISPOSITION Decision To Admit 08/18/2020 05:27:22 AM    Leandro Fu MD  Attending Emergency Physician    This charting supersedes any ED resident or staff charting and was written using speech recognition software       Leandro Fu MD  08/18/20 [address], [place]  08/18/20 [id number]

## 2020-08-18 NOTE — CONSULTS
 Seizures ([facility] [address] since age 10   24 [facility] Short-term memory loss     Stroke (cerebrum) ([facility] [address] 2010    Type 2 diabetes mellitus without complication ([facility] [address] 2905        Past Surgical History:     Past Surgical History:   Procedure Laterality Date    CARDIAC CATHETERIZATION      CATARACT REMOVAL      CHOLECYSTECTOMY      COLONOSCOPY N/A 5/13/2019    COLONOSCOPY DIAGNOSTIC, POLYPECTOMIES performed by Vinay Quintana MD at [address]      cataract    TONSILLECTOMY      UPPER GASTROINTESTINAL ENDOSCOPY N/A 5/12/2019    EGD ESOPHAGOGASTRODUODENOSCOPY performed by Vinay Quintana MD at [facility] 3/18/2020    E[facility] USING ARGON performed by Kamila Ngo MD at [facility] N/A 8/7/2020    EGD BIOPSY BRUSH BX AND STOMACH FULGURATION WITH ARGON BEAM performed by Vinay Quintana MD at [address] [place] [address]        Medications Prior to Admission:     Prior to Admission medications    Medication Sig Start Date End Date Taking? Authorizing Provider   cephALEXin (KEFLEX) 500 MG capsule Take 1 capsule by mouth 3 times daily for 7 days 8/18/20 8/25/20 Yes Viet Lakhani MD   bumetanide (BUMEX) 1 MG tablet Take 1 mg by mouth 3 times daily   Yes Historical Provider, MD   insulin glargine (BASAGLAR KWIKPEN) 100 UNIT/ML injection pen Inject 60 Units into the skin 2 times daily   Yes Historical Provider, MD   levETIRAcetam (KEPPRA) 750 MG tablet Take 750 mg by mouth 2 times daily Indications: with 500 mg to make 1250 mg dose   Yes Historical Provider, MD   gabapentin (NEURONTIN) 100 MG capsule Take 100 mg by mouth 2 times daily. .   Yes Historical Provider, MD   levETIRAcetam (KEPPRA) 500 MG tablet Take 500 mg by mouth 2 times daily Indications: with 750 mg to make total dose 1250 mg    Yes Historical Provider, MD   insulin aspart (NOVOLOG) 100 UNIT/ML injection vial Inject into the skin Inject as per Provider, MD   acetaminophen (TYLENOL) 325 MG tablet Take 650 mg by mouth 3 times daily as needed for Pain (mild)    Historical Provider, MD   Blood Glucose Monitoring Suppl FLAVIO Check blood sugars 3/day 17   Tommy Herrera MD   PARoxetine (PAXIL) 20 MG tablet Take 20 mg by mouth every morning    Historical Provider, MD   loratadine (CLARITIN) 10 MG tablet Take 10 mg by mouth daily    Historical Provider, MD   pramipexole (MIRAPEX) 1 MG tablet Take 1 mg by mouth nightly    Historical Provider, MD   busPIRone (BUSPAR) 10 MG tablet Take 10 mg by mouth 3 times daily     Historical Provider, MD        Allergies:     Codeine    Social History:     Tobacco:    reports that she has never smoked. She has never used smokeless tobacco.  Alcohol:      reports no history of alcohol use. Drug Use:  reports no history of drug use. Family History:     Family History   Problem Relation Age of Onset    Diabetes Brother        Review of Systems:     ROS:  Constitutional  Negative for fever and chills    HEENT  Negative for ear discharge, ear pain, nosebleed    Eyes  Negative for photophobia, pain and discharge    Respiratory  Positive productive cough    Cardiovascular  Negative for orthopnea, claudication and PND    Gastrointestinal  Negative for abdominal pain, diarrhea, blood in stool    Musculoskeletal  Negative for joint pain, negative for myalgia    Neurology Negative for seizures, Positive LOC    Skin  Negative for rash or itching    Endo/heme/allergies  Negative for polydipsia, environmental allergy    Psychiatric/behavioral  Negative for suicidal ideation. Patient is not anxious        Physical Exam:   There were no vitals taken for this visit.   Temp (24hrs), Av.6 °F (36.4 °C), Min:96.6 °F (35.9 °C), Max:98.1 °F (36.7 °C)    Recent Labs     20  0802 20  1133 20  1350   POCGLU 130* 229* 224*     No intake or output data in the 24 hours ending 20 1600      NEUROLOGIC EXAMINATION  GENERAL  morbid obesity, Appears comfortable and in no distress   HEENT  NC/ AT   NECK  Supple and no bruits heard   MENTAL STATUS:  Alert, oriented, intact memory, no confusion, normal speech, normal language, no hallucination or delusion   CRANIAL NERVES: II     -      Visual fields intact to confrontation  III,IV,VI -  EOMs full, no afferent defect, no GRAY, no ptosis  V     -     Normal facial sensation  VII    -     Normal facial symmetry  VIII   -     Intact hearing  IX,X -     Symmetrical palate  XI    -     Symmetrical shoulder shrug  XII   -     Midline tongue, no atrophy    MOTOR FUNCTION:  significant for good strength of grade 5/5 in bilateral proximal and distal muscle groups of both upper and lower extremities with normal bulk, normal tone and no involuntary movements, no tremor   SENSORY FUNCTION:  Normal touch, normal pin, normal vibration, normal proprioception   CEREBELLAR FUNCTION:  Intact fine motor control over upper limbs   REFLEX FUNCTION:  Symmetric, no perverted reflex, no Babinski sign   STATION and GAIT  Not tested       Investigations:      Laboratory Testing:  Recent Results (from the past 24 hour(s))   CBC Auto Differential    Collection Time: 08/17/20 11:18 PM   Result Value Ref Range    WBC 4.7 3.5 - 11.0 k/uL    RBC 3.28 (L) 4.0 - 5.2 m/uL    Hemoglobin 8.7 (L) 12.0 - 16.0 g/dL    Hematocrit 27.8 (L) 36 - 46 %    MCV 84.8 80 - 100 fL    MCH 26.5 26 - 34 pg    MCHC 31.2 31 - 37 g/dL    RDW 21.9 (H) 11.5 - 14.9 %    Platelets 693 (L) 701 - 450 k/uL    MPV 8.6 6.0 - 12.0 fL    NRBC Automated NOT REPORTED per 100 WBC    Differential Type NOT REPORTED     Immature Granulocytes NOT REPORTED 0 %    Absolute Immature Granulocyte NOT REPORTED 0.00 - 0.30 k/uL    WBC Morphology NOT REPORTED     RBC Morphology NOT REPORTED     Platelet Estimate NOT REPORTED     Seg Neutrophils 74 (H) 36 - 66 %    Lymphocytes 19 (L) 24 - 44 %    Monocytes 5 1 - 7 %    Eosinophils % 1 0 - 4 % Basophils 1 0 - 2 %    Segs Absolute 3.47 1.3 - 9.1 k/uL    Absolute Lymph # 0.89 (L) 1.0 - 4.8 k/uL    Absolute Mono # 0.24 0.1 - 1.3 k/uL    Absolute Eos # 0.05 0.0 - 0.4 k/uL    Basophils Absolute 0.05 0.0 - 0.2 k/uL    Morphology ANISOCYTOSIS PRESENT     Morphology BASOPHILIC STIPPLING PRESENT     Morphology 1+ POLYCHROMASIA     Morphology 1+ ELLIPTOCYTES    Basic Metabolic Panel    Collection Time: 08/17/20 11:18 PM   Result Value Ref Range    Glucose 223 (H) 70 - 99 mg/dL    BUN 57 (H) 8 - 23 mg/dL    CREATININE 1.92 (H) 0.50 - 0.90 mg/dL    Bun/Cre Ratio NOT REPORTED 9 - 20    Calcium 9.4 8.6 - 10.4 mg/dL    Sodium 147 (H) 135 - 144 mmol/L    Potassium 4.2 3.7 - 5.3 mmol/L    Chloride 109 (H) 98 - 107 mmol/L    CO2 29 20 - 31 mmol/L    Anion Gap 9 9 - 17 mmol/L    GFR Non-African American 26 (L) >60 mL/min    GFR  32 (L) >60 mL/min    GFR Comment          GFR Staging NOT REPORTED    Brain Natriuretic Peptide    Collection Time: 08/17/20 11:18 PM   Result Value Ref Range    Pro-BNP 2,349 (H) <300 pg/mL    BNP Interpretation Pro-BNP Reference Range:    Magnesium    Collection Time: 08/17/20 11:18 PM   Result Value Ref Range    Magnesium 2.0 1.6 - 2.6 mg/dL   Troponin    Collection Time: 08/17/20 11:18 PM   Result Value Ref Range    Troponin, High Sensitivity 31 (H) 0 - 14 ng/L    Troponin T NOT REPORTED <0.03 ng/mL    Troponin Interp NOT REPORTED    Troponin    Collection Time: 08/18/20  4:31 AM   Result Value Ref Range    Troponin, High Sensitivity 35 (H) 0 - 14 ng/L    Troponin T NOT REPORTED <0.03 ng/mL    Troponin Interp NOT REPORTED    Urinalysis    Collection Time: 08/18/20  4:32 AM   Result Value Ref Range    Color, UA YELLOW YELLOW    Turbidity UA CLEAR CLEAR    Glucose, Ur TRACE (A) NEGATIVE    Bilirubin Urine NEGATIVE NEGATIVE    Ketones, Urine NEGATIVE NEGATIVE    Specific [place], UA 1.015 1.000 - 1.030    Urine Hgb MOD (A) NEGATIVE    pH, UA 6.0 5.0 - 8.0    Protein, UA 4+ (A) NEGATIVE    Urobilinogen, Urine Normal Normal    Nitrite, Urine NEGATIVE NEGATIVE    Leukocyte Esterase, Urine NEGATIVE NEGATIVE    Urinalysis Comments NOT REPORTED    Microscopic Urinalysis    Collection Time: 08/18/20  4:32 AM   Result Value Ref Range    -          WBC, UA 2 TO 5 /HPF    RBC, UA 10 TO 20 /HPF    Casts UA HYALINE /LPF    Casts UA 0 TO 2 /LPF    Crystals, UA NOT REPORTED None /HPF    Epithelial Cells UA 0 TO 2 /HPF    Renal Epithelial, UA NOT REPORTED 0 /HPF    Bacteria, UA FEW (A) None    Mucus, UA NOT REPORTED None    Trichomonas, UA NOT REPORTED None    Amorphous, UA NOT REPORTED None    Other Observations UA NOT REPORTED NOT REQ.     Yeast, UA NOT REPORTED None   POC Glucose Fingerstick    Collection Time: 08/18/20  8:02 AM   Result Value Ref Range    POC Glucose 130 (H) 65 - 105 mg/dL   POC Glucose Fingerstick    Collection Time: 08/18/20 11:33 AM   Result Value Ref Range    POC Glucose 229 (H) 65 - 105 mg/dL   STROKE PANEL    Collection Time: 08/18/20 11:39 AM   Result Value Ref Range    Glucose 247 (H) 70 - 99 mg/dL    BUN 54 (H) 8 - 23 mg/dL    CREATININE 1.88 (H) 0.50 - 0.90 mg/dL    Bun/Cre Ratio NOT REPORTED 9 - 20    Calcium 8.9 8.6 - 10.4 mg/dL    Sodium 144 135 - 144 mmol/L    Potassium 4.1 3.7 - 5.3 mmol/L    Chloride 107 98 - 107 mmol/L    CO2 29 20 - 31 mmol/L    Anion Gap 8 (L) 9 - 17 mmol/L    GFR Non-African American 27 (L) >60 mL/min    GFR  32 (L) >60 mL/min    GFR Comment          GFR Staging NOT REPORTED     WBC 4.6 3.5 - 11.0 k/uL    RBC 3.24 (L) 4.0 - 5.2 m/uL    Hemoglobin 8.4 (L) 12.0 - 16.0 g/dL    Hematocrit 27.4 (L) 36 - 46 %    MCV 84.5 80 - 100 fL    MCH 25.8 (L) 26 - 34 pg    MCHC 30.5 (L) 31 - 37 g/dL    RDW 21.7 (H) 11.5 - 14.9 %    Platelets 93 (L) 341 - 450 k/uL    MPV 7.6 6.0 - 12.0 fL    NRBC Automated NOT REPORTED per 100 WBC    Total CK 61 26 - 192 U/L    CK-MB 2.3 <5.4 ng/mL    % CKMB 3.8 (H) 0.0 - 3.0 %    CKMB Interpretation NORMAL ISOENZYME PATTERN     Differential Type NOT REPORTED     Immature Granulocytes NOT REPORTED 0 %    Absolute Immature Granulocyte NOT REPORTED 0.00 - 0.30 k/uL    WBC Morphology NOT REPORTED     RBC Morphology NOT REPORTED     Platelet Estimate NOT REPORTED     Seg Neutrophils 72 (H) 36 - 66 %    Lymphocytes 20 (L) 24 - 44 %    Monocytes 5 1 - 7 %    Eosinophils % 2 0 - 4 %    Basophils 1 0 - 2 %    Segs Absolute 3.31 1.3 - 9.1 k/uL    Absolute Lymph # 0.92 (L) 1.0 - 4.8 k/uL    Absolute Mono # 0.23 0.1 - 1.3 k/uL    Absolute Eos # 0.09 0.0 - 0.4 k/uL    Basophils Absolute 0.05 0.0 - 0.2 k/uL    Morphology HYPOCHROMIA PRESENT     Morphology ANISOCYTOSIS PRESENT     Myoglobin 80 (H) 25 - 58 ng/mL    Protime 14.0 11.8 - 14.6 sec    INR 1.1     PTT 32.0 24.0 - 36.0 sec    Troponin, High Sensitivity 31 (H) 0 - 14 ng/L    Troponin T NOT REPORTED <0.03 ng/mL    Troponin Interp NOT REPORTED    Venous Blood Gas, POC    Collection Time: 08/18/20  1:50 PM   Result Value Ref Range    pH, Larry 7.346 7.320 - 7.430    pCO2, Larry 60.4 (H) 41.0 - 51.0 mm Hg    pO2, Larry 22.1 (L) 30.0 - 50.0 mm Hg    HCO3, Venous 33.0 (H) 22.0 - 29.0 mmol/L    Total CO2, Venous 35 (H) 23.0 - 30.0 mmol/L    Negative Base Excess, Larry NOT REPORTED 0.0 - 2.0    Positive Base Excess, Larry 6 (H) 0.0 - 3.0    O2 Sat, Larry 33 (L) 60.0 - 85.0 %    O2 Device/Flow/% NOT REPORTED     David Test NOT REPORTED     Sample Site NOT REPORTED     Mode NOT REPORTED     FIO2 NOT REPORTED     Pt Temp NOT REPORTED     POC pH Temp NOT REPORTED     POC pCO2 Temp NOT REPORTED mm Hg    POC pO2 Temp NOT REPORTED mm Hg   Hemoglobin and hematocrit, blood    Collection Time: 08/18/20  1:50 PM   Result Value Ref Range    POC Hemoglobin 9.4 (L) 12.0 - 16.0 g/dL    POC Hematocrit 28 (L) 36 - 46 %   Creatinine W/GFR Point of Care    Collection Time: 08/18/20  1:50 PM   Result Value Ref Range    POC Creatinine 1.98 (H) 0.51 - 1.19 mg/dL    GFR Comment 31 (L) >60 mL/min    GFR Non- American 25 (L) >60 mL/min    GFR Comment         SODIUM (POC)    Collection Time: 08/18/20  1:50 PM   Result Value Ref Range    POC Sodium 148 (H) 138 - 146 mmol/L   POTASSIUM (POC)    Collection Time: 08/18/20  1:50 PM   Result Value Ref Range    POC Potassium 4.2 3.5 - 4.5 mmol/L   CHLORIDE (POC)    Collection Time: 08/18/20  1:50 PM   Result Value Ref Range    POC Chloride 107 98 - 107 mmol/L   CALCIUM, IONIC (POC)    Collection Time: 08/18/20  1:50 PM   Result Value Ref Range    POC Ionized Calcium 1.24 1.15 - 1.33 mmol/L   Lactic Acid, POC    Collection Time: 08/18/20  1:50 PM   Result Value Ref Range    POC Lactic Acid 1.00 0.56 - 1.39 mmol/L   POCT Glucose    Collection Time: 08/18/20  1:50 PM   Result Value Ref Range    POC Glucose 224 (H) 74 - 100 mg/dL   Anion Gap (Calc) POC    Collection Time: 08/18/20  1:50 PM   Result Value Ref Range    Anion Gap 8 7 - 16 mmol/L   STROKE PANEL    Collection Time: 08/18/20  2:00 PM   Result Value Ref Range    Glucose 211 (H) 70 - 99 mg/dL    BUN 53 (H) 8 - 23 mg/dL    CREATININE 1.74 (H) 0.50 - 0.90 mg/dL    Bun/Cre Ratio NOT REPORTED 9 - 20    Calcium 9.1 8.6 - 10.4 mg/dL    Sodium 145 (H) 135 - 144 mmol/L    Potassium 4.4 3.7 - 5.3 mmol/L    Chloride 106 98 - 107 mmol/L    CO2 27 20 - 31 mmol/L    Anion Gap 12 9 - 17 mmol/L    GFR Non-African American 29 (L) >60 mL/min    GFR  35 (L) >60 mL/min    GFR Comment          GFR Staging NOT REPORTED     WBC 5.2 3.5 - 11.3 k/uL    RBC 3.29 (L) 3.95 - 5.11 m/uL    Hemoglobin 8.5 (L) 11.9 - 15.1 g/dL    Hematocrit 30.1 (L) 36.3 - 47.1 %    MCV 91.5 82.6 - 102.9 fL    MCH 25.8 25.2 - 33.5 pg    MCHC 28.2 (L) 28.4 - 34.8 g/dL    RDW 19.2 (H) 11.8 - 14.4 %    Platelets 97 (L) 914 - 453 k/uL    MPV 10.5 8.1 - 13.5 fL    NRBC Automated 0.0 0.0 per 100 WBC    Total CK 64 26 - 192 U/L    CK-MB 2.4 <5.4 ng/mL    % CKMB 3.8 (H) 0.0 - 3.0 %    CKMB Interpretation NORMAL ISOENZYME PATTERN     Differential Type NOT REPORTED WBC Morphology NOT REPORTED     RBC Morphology NOT REPORTED     Platelet Estimate NOT REPORTED     Immature Granulocytes 1 (H) 0 %    Seg Neutrophils 71 (H) 36 - 65 %    Lymphocytes 19 (L) 24 - 43 %    Monocytes 7 3 - 12 %    Eosinophils % 2 1 - 4 %    Basophils 0 0 - 2 %    Absolute Immature Granulocyte 0.05 0.00 - 0.30 k/uL    Segs Absolute 3.70 1.50 - 8.10 k/uL    Absolute Lymph # 0.99 (L) 1.10 - 3.70 k/uL    Absolute Mono # 0.36 0.10 - 1.20 k/uL    Absolute Eos # 0.10 0.00 - 0.44 k/uL    Basophils Absolute 0.00 0.00 - 0.20 k/uL    Morphology ANISOCYTOSIS PRESENT     Morphology HYPOCHROMIA PRESENT     Myoglobin 71 (H) 25 - 58 ng/mL    Protime 10.1 9.0 - 12.0 sec    INR 1.0     PTT 24.3 20.5 - 30.5 sec    Troponin, High Sensitivity 32 (H) 0 - 14 ng/L    Troponin T NOT REPORTED <0.03 ng/mL    Troponin Interp NOT REPORTED    AMMONIA    Collection Time: 08/18/20  2:35 PM   Result Value Ref Range    Ammonia 54 (H) 11 - 51 umol/L         Assessment :      Primary Problem  Accidental fall    Active Hospital Problems    Diagnosis Date Noted    Aphasia [R47.01] 08/18/2020    Acute kidney injury superimposed on chronic kidney disease ([facility] [address] [N17.9, N18.9] 08/05/2020    Anemia [D64.9] 03/16/2020    Confusion state [F44.89] 12/01/2018    Falling episodes [R29.6] 12/14/2017    Accidental fall [W19. XXXA] 12/13/2017    Type 2 diabetes mellitus ([facility] [address] [E11.9] 10/21/2017    Generalized weakness [R53.1] 03/15/2017    RLS (restless legs syndrome) [G25.81] 01/09/2017    Thrombocytopenia ([facility] [address] [D69.6] 07/26/2016       Plan:     Patient status Admit as inpatient in the  Progressive Unit/Step down    She admitted to medicine with neurology consulted. She transferred direct from [facility] due to concern for CVA. At this time patient is no longer having any aphasia. non focal neuro exam.  CT head unremarkable, CTA head and neck showing bilateral carotid bulb ossifications, 70% proximal left ICA stenosis.

## 2020-08-18 NOTE — PROGRESS NOTES
Pt transported from [facility] by [facility], destination [facility] per Dr Radha Persaud request.

## 2020-08-18 NOTE — PROGRESS NOTES
Pt arrived to floor via stretcher from ED and was transfered to bed. Vitals taken. Admission complete. No distress noted. See doc flowsheet and admission navigator for details. POC and education initiated and reviewed with patient. Call light within reach, and pt educated on its use. Bed in lowest position, and locked. Side rails up x 2. Denied further questions or needs at this time. Will continue to monitor. .. Du Duong RN

## 2020-08-18 NOTE — PLAN OF CARE
Patient did not have history of stroke and other multiple medical problems  He had an accidental fall at home yesterday in her apartment  He had a hematoma on the back of the head  Was admitted  She came to the floor around 1  Was alert and coherent  At present when I went to examine the patient she is having difficulty expressing and answering questions  He does seem to comprehend  However not able to communicate  Her sentences are half sentences at times  Otherwise she is alert  She does have a previous history of strokes  She also has a history of past concussion  Will get a stat CT scan of the head since the symptoms of aphagia are new in the last half an hour or so a stroke alert is being called  Active Problems:    Type 2 diabetes mellitus ([address]    CVA, old, hemiparesis ([address]    Accidental fall    Cervical spinal stenosis    Diabetic polyneuropathy associated with type 2 diabetes mellitus ([address]    Morbid obesity with BMI of 40.0-44.9, adult ([address]    Pneumonia    Head injury, acute, sequela bruising    Expressive aphasia new   Resolved Problems:    * No resolved hospital problems.  *

## 2020-08-18 NOTE — ED PROVIDER NOTES
[address]  ED  Emergency Department Encounter  Emergency Medicine Resident     Pt Name: Lindsay Romero  MRN: [id number]  [place] 1955  Date of evaluation: 8/18/20  PCP:  Radha Diaz PA-C    Chief Complaint     Chief Complaint   Patient presents with    Aphasia     Transfer from [facility]       History of present illness (HPI)  (Location/Symptom, Timing/Onset, Context/Setting, Quality, Duration, Modifying Factors, Severity.)      Lindsay Romero is a 75 y.o. female who presented to the emergency department with concerns for aphasia noted at outside facility. The last known well was earlier this morning and at 8 AM.  Patient is currently asymptomatic, states that she has some bilateral lower extremity weakness which is consistent with her baseline secondary to a stroke which occurred approximately 10 years ago. Patient was a transfer from [facility], for which she was admitted following a fall in which she struck her head, and transferred secondary to aphasia noted on rounds today at 6 AM, aphasia since resolved, patient is awake alert and oriented x4, states that she has no focal change in baseline. Patient has CT scan at outside facility yesterday which is negative for acute pathology as well as a repeat one this morning which was negative for acute intracranial pathology. Stroke alert was paged prior to patient arrival, patient was evaluated by stroke team and imaging was ordered including a CTA head and neck as well as an MRI head and neck. Past medical / surgical/ social/ family history      Past Medical Hx:   Patient has no medical problems   has a past medical history of Anemia, Cataract, GERD (gastroesophageal reflux disease), Headache, Hyperlipidemia, Neuropathy, Osteoarthritis, Restless leg syndrome, Seizures ([address] Short-term memory loss, Stroke (cerebrum) ([address] and Type 2 diabetes mellitus without complication ([address]     Past Surgical Hx:  Patient has had no surgeries   has a past surgical history that includes Cholecystectomy; Tonsillectomy; Cataract removal; eye surgery; Cardiac catheterization; Upper gastrointestinal endoscopy (N/A, 5/12/2019); Colonoscopy (N/A, 5/13/2019); Upper gastrointestinal endoscopy (N/A, 3/18/2020); and Upper gastrointestinal endoscopy (N/A, 8/7/2020). Social Hx:  Social History     Socioeconomic History    Marital status: Single     Spouse name: Not on file    Number of children: Not on file    Years of education: Not on file    Highest education level: Not on file   Occupational History    Not on file   Social Needs    Financial resource strain: Not on file    Food insecurity     Worry: Not on file     Inability: Not on file    Transportation needs     Medical: Not on file     Non-medical: Not on file   Tobacco Use    Smoking status: Never Smoker    Smokeless tobacco: Never Used   Substance and Sexual Activity    Alcohol use: No    Drug use: No    Sexual activity: Not on file   Lifestyle    Physical activity     Days per week: Not on file     Minutes per session: Not on file    Stress: Not on file   Relationships    Social connections     Talks on phone: Not on file     Gets together: Not on file     Attends [employer] service: Not on file     Active member of club or organization: Not on file     Attends meetings of clubs or organizations: Not on file     Relationship status: Not on file    Intimate partner violence     Fear of current or ex partner: Not on file     Emotionally abused: Not on file     Physically abused: Not on file     Forced sexual activity: Not on file   Other Topics Concern    Not on file   Social History Narrative    Not on file       Family Hx:  No relevant family history is reported  Family History   Problem Relation Age of Onset    Diabetes Brother        Allergies:    No known medication allergies  Codeine    Home Medications:   The patient takes no medications  Prior to Admission 8/10/20   Marisol Darby MD   blood glucose monitor strips Test 4 times a day & as needed for symptoms of irregular blood glucose. Dispense sufficient amount for indicated testing frequency plus additional to accommodate PRN testing needs. 8/4/20   Jose Guzman PA-C   Lancets MISC 1 each by Does not apply route 4 times daily 8/4/20   Jose Guzman PA-C   glucose monitoring kit (FREESTYLE) monitoring kit 1 kit by Does not apply route daily 8/4/20   Jose Guzman PA-C   Emollient (CERAVE) LOTN Apply topically 2 times daily Indications: bilateral lower legs    Historical Provider, MD   vitamin D (CHOLECALCIFEROL) 28659 UNIT CAPS Take 50,000 Units by mouth once a week Indications: Tuesdays     Historical Provider, MD   potassium chloride (KLOR-CON M) 10 MEQ extended release tablet Take 30 mEq by mouth daily     Historical Provider, MD   vitamin B-12 (CYANOCOBALAMIN) 500 MCG tablet Take 500 mcg by mouth daily    Historical Provider, MD   acetaminophen (TYLENOL) 325 MG tablet Take 650 mg by mouth 3 times daily as needed for Pain (mild)    Historical Provider, MD   Blood Glucose Monitoring Suppl FLAVIO Check blood sugars 3/day 11/30/17   Joel Lea MD   PARoxetine (PAXIL) 20 MG tablet Take 20 mg by mouth every morning    Historical Provider, MD   loratadine (CLARITIN) 10 MG tablet Take 10 mg by mouth daily    Historical Provider, MD   pramipexole (MIRAPEX) 1 MG tablet Take 1 mg by mouth nightly    Historical Provider, MD   busPIRone (BUSPAR) 10 MG tablet Take 10 mg by mouth 3 times daily     Historical Provider, MD       Review of systems  (2-9 systems for level 4, 10 or more for level 5)      Constitutional: Denies recent fever, chills  Eyes: No visual changes. Neck: No midline neck pain  Respiratory: Denies shortness of breath or dyspnea. Cardiac:  Denies chest pain. GI: denies any recent abdominal pain nausea or vomiting. Denies Blood in the stool or black tarry stools.     : Denies Dysuria  Musculoskeletal: Denies focal weakness. Neurologic: denies headache or focal weakness. Denies lightheadedness or dizziness. Does endorse bilateral lower extremity weakness unchanged from baseline as well as difficulty moving her tongue which is unchanged from baseline  Skin:  Denies rashes or wounds      Physical exam  (up to 7 for level 4, 8 or more for level 5)      There were no vitals taken for this visit. GENERAL APPEARANCE: AxOx4, generally well-appearing. no acute distress. HEENT: Normocephalic and atraumatic. Mucus membranes moist. EOMI, clear conjunctiva, oropharynx clear. NECK: Supple without lymphadenopathy. No stiffness or restricted ROM. HEART: Normal rate and regular rhythm, normal S1/S1, no m/r/g, 2+ radial, femoral DP, PT pulses  LUNGS: CTA bilaterally with even and unlabored respirations  ABDOMEN: Soft, nontender, nondistended with good bowel sounds heard. BACK: No CVA tenderness, no obvious deformity. EXTREMITIES: Without cyanosis, clubbing or edema. NEUROLOGICAL: Grossly nonfocal. Alert and oriented x4, cranial nerves II through XII grossly intact, intact visual fields, no hemineglect, bilateral lower extremity weakness unable to lift against gravity, neurovascularly intact with localization of the soft touch bilateral lower extremity,  SKIN: Warm and dry without any rash.     Plan     DIAGNOSTIC ORDERS:  Orders Placed This Encounter   Procedures    CTA HEAD NECK W CONTRAST    MRI BRAIN WO CONTRAST    MRA NECK WO CONTRAST    MRA HEAD WO CONTRAST    STROKE PANEL    Hemoglobin and hematocrit, blood    SODIUM (POC)    POTASSIUM (POC)    CHLORIDE (POC)    CALCIUM, IONIC (POC)    AMMONIA    Inpatient Consult to Stroke Team    Inpatient consult to Internal Medicine    Inpatient consult to Neurology    Venous Blood Gas, POC    Creatinine W/GFR Point of Care    Lactic Acid, POC    POCT Glucose    Anion Gap (Calc) POC    PATIENT STATUS (FROM ED OR OR/PROCEDURAL) Inpatient       MEDICATION ORDERS:  Orders Placed This Encounter   Medications    iohexol (OMNIPAQUE 350) solution 90 mL       Differential diagnosis        Stroke/ TIA, Vascular, Infectious/inflammatory, NIKKI, altered mental status,      INITIAL NIH STROKE SCALE:  NIH Stroke Scale  Interval: Baseline  Level of Consciousness (1a. ): Alert  LOC Questions (1b. ): Answers both correctly  LOC Commands (1c. ): Performs both tasks correctly  Best Gaze (2. ): Normal  Visual (3. ): No visual loss  Facial Palsy (4. ): Normal symmetrical movement  Motor Arm, Left (5a. ): No drift  Motor Arm, Right (5b. ): No drift  Motor Leg, Left (6a. ): No drift(weak but no drift)  Motor Leg, Right (6b. ): No drift(weak but no drift)  Limb Ataxia (7. ): Absent  Sensory (8. ): Normal(denies)  Best Language (9. ): No aphasia(slight pause in answering but correct answers)  Dysarthria (10. ): Normal  Extinction and Inattention (11): No abnormality  Total: 0    0 = no stroke  1-4 = minor stroke: Bilateral lower extremity weakness with decreased effort against gravity, placing an NIH of 4, however this is unchanged from patient's baseline.   5-15 = moderate stroke  15-20 = moderate/severe stroke  21-42 = severe stroke    Diagnostic Results     LABS:  Not indicated  Results for orders placed or performed during the hospital encounter of 08/18/20   STROKE PANEL   Result Value Ref Range    Glucose 211 (H) 70 - 99 mg/dL    BUN 53 (H) 8 - 23 mg/dL    CREATININE 1.74 (H) 0.50 - 0.90 mg/dL    Bun/Cre Ratio NOT REPORTED 9 - 20    Calcium 9.1 8.6 - 10.4 mg/dL    Sodium 145 (H) 135 - 144 mmol/L    Potassium 4.4 3.7 - 5.3 mmol/L    Chloride 106 98 - 107 mmol/L    CO2 27 20 - 31 mmol/L    Anion Gap 12 9 - 17 mmol/L    GFR Non-African American 29 (L) >60 mL/min    GFR  35 (L) >60 mL/min    GFR Comment          GFR Staging NOT REPORTED     WBC 5.2 3.5 - 11.3 k/uL    RBC 3.29 (L) 3.95 - 5.11 m/uL    Hemoglobin 8.5 (L) 11.9 - 15.1 g/dL    Hematocrit 30.1 (L) 36.3 - 47.1 %    MCV 91.5 82.6 - 102.9 fL    MCH 25.8 25.2 - 33.5 pg    MCHC 28.2 (L) 28.4 - 34.8 g/dL    RDW 19.2 (H) 11.8 - 14.4 %    Platelets 97 (L) 671 - 453 k/uL    MPV 10.5 8.1 - 13.5 fL    NRBC Automated 0.0 0.0 per 100 WBC    Total CK 64 26 - 192 U/L    CK-MB 2.4 <5.4 ng/mL    % CKMB 3.8 (H) 0.0 - 3.0 %    CKMB Interpretation NORMAL ISOENZYME PATTERN     Differential Type NOT REPORTED     WBC Morphology NOT REPORTED     RBC Morphology NOT REPORTED     Platelet Estimate NOT REPORTED     Immature Granulocytes 1 (H) 0 %    Seg Neutrophils 71 (H) 36 - 65 %    Lymphocytes 19 (L) 24 - 43 %    Monocytes 7 3 - 12 %    Eosinophils % 2 1 - 4 %    Basophils 0 0 - 2 %    Absolute Immature Granulocyte 0.05 0.00 - 0.30 k/uL    Segs Absolute 3.70 1.50 - 8.10 k/uL    Absolute Lymph # 0.99 (L) 1.10 - 3.70 k/uL    Absolute Mono # 0.36 0.10 - 1.20 k/uL    Absolute Eos # 0.10 0.00 - 0.44 k/uL    Basophils Absolute 0.00 0.00 - 0.20 k/uL    Morphology ANISOCYTOSIS PRESENT     Morphology HYPOCHROMIA PRESENT     Myoglobin 71 (H) 25 - 58 ng/mL    Protime 10.1 9.0 - 12.0 sec    INR 1.0     PTT 24.3 20.5 - 30.5 sec    Troponin, High Sensitivity 32 (H) 0 - 14 ng/L    Troponin T NOT REPORTED <0.03 ng/mL    Troponin Interp NOT REPORTED    Hemoglobin and hematocrit, blood   Result Value Ref Range    POC Hemoglobin 9.4 (L) 12.0 - 16.0 g/dL    POC Hematocrit 28 (L) 36 - 46 %   SODIUM (POC)   Result Value Ref Range    POC Sodium 148 (H) 138 - 146 mmol/L   POTASSIUM (POC)   Result Value Ref Range    POC Potassium 4.2 3.5 - 4.5 mmol/L   CHLORIDE (POC)   Result Value Ref Range    POC Chloride 107 98 - 107 mmol/L   CALCIUM, IONIC (POC)   Result Value Ref Range    POC Ionized Calcium 1.24 1.15 - 1.33 mmol/L   AMMONIA   Result Value Ref Range    Ammonia 54 (H) 11 - 51 umol/L   Venous Blood Gas, POC   Result Value Ref Range    pH, Larry 7.346 7.320 - 7.430    pCO2, Laryr 60.4 (H) 41.0 - 51.0 mm Hg    pO2, Larry 22.1 (L) 30.0 - 50.0 mm Hg    HCO3, Venous 33.0 (H) 22.0 - 29.0 mmol/L    Total CO2, Venous 35 (H) 23.0 - 30.0 mmol/L    Negative Base Excess, Larry NOT REPORTED 0.0 - 2.0    Positive Base Excess, Larry 6 (H) 0.0 - 3.0    O2 Sat, Larry 33 (L) 60.0 - 85.0 %    O2 Device/Flow/% NOT REPORTED     David Test NOT REPORTED     Sample Site NOT REPORTED     Mode NOT REPORTED     FIO2 NOT REPORTED     Pt Temp NOT REPORTED     POC pH Temp NOT REPORTED     POC pCO2 Temp NOT REPORTED mm Hg    POC pO2 Temp NOT REPORTED mm Hg   Creatinine W/GFR Point of Care   Result Value Ref Range    POC Creatinine 1.98 (H) 0.51 - 1.19 mg/dL    GFR Comment 31 (L) >60 mL/min    GFR Non- 25 (L) >60 mL/min    GFR Comment         Lactic Acid, POC   Result Value Ref Range    POC Lactic Acid 1.00 0.56 - 1.39 mmol/L   POCT Glucose   Result Value Ref Range    POC Glucose 224 (H) 74 - 100 mg/dL   Anion Gap (Calc) POC   Result Value Ref Range    Anion Gap 8 7 - 16 mmol/L       RADIOLOGY:  Not indicated  CTA HEAD NECK W CONTRAST   Final Result   Calcification involving the carotid bulbs and proximal internal carotid   arteries bilaterally with approximately 70% stenosis in the proximal left   internal carotid artery by NASCET criteria. No significant stenosis or occlusion within the intracranial vascularity. MRI BRAIN WO CONTRAST    (Results Pending)   MRA NECK WO CONTRAST    (Results Pending)   MRA HEAD WO CONTRAST    (Results Pending)         Medical decision making  (MDM) / ED Course     Elvi Wilder is a 75 y.o. female who presents [facility] transfer EMS with symptoms concerning for acute CVA versus TIA. Other items on the differential include dissection, AMI, hypoglycemia or other metabolic derangement such as hepatic/uremic encephalopathy, medication side effect, or post-ictal Eduardos paralysis. However, presentation most concerning for a CVA. Fingerstick BS not hypoglycemic, and clinical picture does not suggest other stroke mimic. Plan to workup for acute CVA / TIA. Plan: Stroke alert called prior to patient presentation, CT Head, CTA head and neck, stroke panel, Neurointervention consult        IMPRESSION:   Acute kidney injury, transient ischemic attack, hepatic encephalopathy, metabolic encephalopathy. Patient currently has an MRI MRA planning of the head and neck, patient care handoff to Dr. Renata Ordoñez while patient is awaiting bed placement. CONSULTS:  IP CONSULT TO STROKE TEAM  IP CONSULT TO INTERNAL MEDICINE  IP CONSULT TO NEUROLOGY    Procedures     None    Final impression      1. NIKKI (acute kidney injury) ([facility]    2. Acute cystitis without hematuria    3.  Cellulitis of lower extremity, unspecified laterality           Disposition with plan     Disposition: Admitted    PATIENT REFERRED TO:  [address]  [phone]            DISCHARGE MEDICATIONS:  New Prescriptions    No medications on file         Yareli Osborne MD  Emergency Medicine Resident    (Please note that portions of this note were completed with a voice recognition program.  Efforts were made to edit the dictations but occasionally words are mis-transcribed.)       Isaiah Brownlee MD  Resident  08/18/20 1088

## 2020-08-18 NOTE — ED NOTES
Pt transfer from [facility] for further evaluation by the Stroke Team for new onset aphasia s/p trip and fall yesterday. Pt tripped over her walker and hit her head, no LOC, pt was admitted to the ICU at [facility]. LKW 1102 this morning. Pt arrives A&O x4 and follows commands. No slurred speech or facial droop noted, denies weakness, N/T, no vision changes, headache or dizziness. Pt currently being treated for UTI and pneumonia with  IV antibiotics. Placed on full continuous monitor, no acute distress noted, rr even and NL. Stroke team at the bedside to evaluate the pt.      Joi Varela RN  08/18/20 [id number]

## 2020-08-18 NOTE — PROGRESS NOTES
Jack Desouza note noted . Will stop vancomycin . Repeat chest xray in am .  The patient is a 75 yea rold  female who presented from Assisted living unit [facility] after falling in her apartment. According to patient, she was seen in her PCP office earlier today for a follow-up from a hospital discharge on 810. Reports that she was started on oxygen at 2 L a minute due to being having O2 sat in the upper 80s while ambulating in office. Patient reports hitting head during fall with small hematoma on posterior aspect of head. Patient denies syncope, chest pain, and other prodromal symptoms prior to falling. Patient states that she tripped over her walker causing her to fall on the floor. Patient being admitted for pneumonia. Vancomycin and cefepime initiated in ED; continue on admission. Will obtain MRSA nasal swab. Home medications reconciled and additional admission orders entered.

## 2020-08-18 NOTE — ED NOTES
Bed: 24  Expected date:   Expected time:   Means of arrival:   Comments:  [address], RN  08/18/20 5053

## 2020-08-19 NOTE — PROGRESS NOTES
[facility]  Internal Medicine Teaching Residency Program  Inpatient Daily Progress Note  ______________________________________________________________________________    Patient: Watson Jorgensen  YOB: 1955   LCR:[id number]    Acct: [de-identified]     Room: [facility]7614-90  Admit date: 8/18/2020  Today's date: 08/19/20  Number of days in the hospital: 1    SUBJECTIVE   Admitting Diagnosis: Accidental fall  CC: No complaints overnight . Her aphasia has improved  Pt examined at bedside. Chart & results reviewed. Patient sitting comfortably on the bed saturating well with 2 L of oxygen through nasal cannula. No new weakness or sensory loss has been developed    CT head and neck showed 70% stenosis of the left internal carotid artery  And MRA head showed no acute intracranial abnormalities  Blood pressure 1 33/48  Sodium 146  Glucose 145  Blood urea nitrogen 53  Creatinine 1.81  Hemoglobulin 8.5      ROS:  Constitutional:  negative for chills, fevers, sweats  Respiratory:  negative for cough, dyspnea on exertion, hemoptysis, shortness of breath, wheezing  Cardiovascular:  negative for chest pain, chest pressure/discomfort, lower extremity edema, palpitations  Gastrointestinal:  negative for abdominal pain, constipation, diarrhea, nausea, vomiting  Neurological:  negative for dizziness, headache  BRIEF HISTORY     The patient is a pleasant 75 y.o. female who has history of type II DM, falling episode, CVA, diabetic polyneuropathy of presents with a chief complaint of aphasia  (Unable to complete a sentence) presents 1 day. It started suddenly and is resolved now. She has history of fall today resulting hematoma and laceration to the occipital part of the head. She has history of fecal and urinary incontinence after left-sided stroke 3 years back.  she has history of palpitation to episode 1 day before fall. has history of being treated for anemia secondary to esophageal varices 1 week back. She denies any abnormal movement of the body, loss of consciousness, headache, shortness of breath, chest painl, fevers, nausea and vomiting.     On examination. Weakness arm left hand than leg both lower limbs has tingling sensation. Normal sensory sensation on both upper and lower limb.     CT head and neck showed 70% stenosis on left internal carotid artery    and MRI head showed no acute intracranial abnormality  Blood pressure 1 33/48  Sodium 146 blood urea nitrogen 53 creatinine 1.74  Glucose 211  Hemoglobin 8.5    OBJECTIVE     Vital Signs:  BP (!) 127/58   Pulse 57   Temp 97.9 °F (36.6 °C) (Oral)   Resp 16   Ht 5' (1.524 m)   Wt 284 lb 6.3 oz (129 kg)   SpO2 99%   BMI 55.54 kg/m²     Temp (24hrs), Av.5 °F (36.4 °C), Min:96.6 °F (35.9 °C), Max:97.9 °F (36.6 °C)    No intake/output data recorded. Physical Exam:  Constitutional: This is a well developed, well nourished, Greater than 40 - Morbid Obesity / Extreme Obesity / Grade III 75y.o. year old female who is alert, oriented, cooperative and in no apparent distress. Head:normocephalic normocephalic with tenderness and hematoma on occipital region  EENT:  PERRLA. No conjunctival injections. Neck: Supple without thyromegaly. No elevated JVP. Respiratory: Chest was symmetrical without dullness to percussion. Breath sounds bilaterally were clear to auscultation. Cardiovascular: Regular without murmur, clicks, gallops or rubs. Abdomen: Slightly rounded and soft without organomegaly. No rebound, rigidity or guarding was appreciated. Musculoskeletal: Normal curvature of the spine. No gross muscle weakness. Extremities: Left lower extremity decreased power edema present on both lower extremity bilateral lower extremity edema, ulcerations, tenderness, varicosities or erythema. Muscle size, tone and strength are normal.  No involuntary movements are noted. Skin:  Warm and dry.   Good color, turgor and pigmentation. No lesions or scars. No cyanosis or clubbing  Neurological/Psychiatric: The patient's general behavior, level of consciousness, thought content and emotional status is normal.        Medications:  Scheduled Medications:    cefTRIAXone (ROCEPHIN) IV  1 g Intravenous Q24H    lamoTRIgine  200 mg Oral BID    [Held by provider] levETIRAcetam  750 mg Oral BID    atorvastatin  10 mg Oral Daily    levETIRAcetam  500 mg Oral BID    sodium chloride flush  10 mL Intravenous 2 times per day    insulin glargine  20 Units Subcutaneous BID    insulin lispro  0-12 Units Subcutaneous TID WC    insulin lispro  0-6 Units Subcutaneous Nightly    aspirin  81 mg Oral Daily     Continuous Infusions:    sodium chloride 75 mL/hr at 08/19/20 1055    dextrose       PRN Medicationssodium chloride flush, 10 mL, PRN  acetaminophen, 650 mg, Q6H PRN    Or  acetaminophen, 650 mg, Q6H PRN  polyethylene glycol, 17 g, Daily PRN  potassium chloride, 40 mEq, PRN    Or  potassium alternative oral replacement, 40 mEq, PRN    Or  potassium chloride, 10 mEq, PRN  glucose, 15 g, PRN  dextrose, 12.5 g, PRN  glucagon (rDNA), 1 mg, PRN  dextrose, 100 mL/hr, PRN        Diagnostic Labs:  CBC:   Recent Labs     08/18/20  1139 08/18/20  1400 08/19/20  0335   WBC 4.6 5.2 4.6   RBC 3.24* 3.29* 2.97*   HGB 8.4* 8.5* 7.5*   HCT 27.4* 30.1* 27.7*   MCV 84.5 91.5 93.3   RDW 21.7* 19.2* 18.9*   PLT 93* 97* 82*     BMP:   Recent Labs     08/18/20  1139 08/18/20  1350 08/18/20  1400 08/18/20  2258 08/19/20  0335 08/19/20  0745     --  145* 146* 147* 146*   K 4.1  --  4.4  --  4.3  --      --  106  --  108*  --    CO2 29  --  27  --  28  --    BUN 54*  --  53*  --  53*  --    CREATININE 1.88* 1.98* 1.74*  --  1.81*  --      BNP: No results for input(s): BNP in the last 72 hours.   PT/INR:   Recent Labs     08/18/20  1139 08/18/20  1400   PROTIME 14.0 10.1   INR 1.1 1.0     APTT:   Recent Labs     08/18/20  1139 08/18/20  1400   APTT 32.0 24.3     CARDIAC ENZYMES:   Recent Labs     08/18/20  1139 08/18/20  1400   CKMB 2.3 2.4     FASTING LIPID PANEL:  Lab Results   Component Value Date    CHOL 148 10/22/2017    HDL 49 10/22/2017    TRIG 118 10/22/2017     LIVER PROFILE:   Recent Labs     08/18/20  2100   AST 28   ALT 13   BILIDIR 0.12   BILITOT 0.30   ALKPHOS 85      MICROBIOLOGY:   Lab Results   Component Value Date/Time    CULTURE NO SIGNIFICANT GROWTH 03/16/2020 04:30 PM       Imaging:    Xr Chest (2 Vw)    Result Date: 8/18/2020  Questionable basilar atelectasis versus pneumonia. Xr Hand Left (min 3 Views)    Result Date: 8/18/2020  No acute displaced fracture or malalignment in the left hand. Degenerative osseous changes. Suggestion of osteopenia. Ct Head Wo Contrast    Result Date: 8/18/2020  No acute intracranial abnormality. Similar soft tissue hematoma in the left parietooccipital region. Findings were discussed with Curtis Isabel at 12:08 pm on 8/18/2020. Ct Head Wo Contrast    Result Date: 8/18/2020  No acute abnormality of the cervical spine. Multilevel degenerative changes in the cervical spine. No acute intracranial abnormality. , no acute intracranial hemorrhage or mass effect. Left occipital scalp hematoma. No acute displaced skull fracture. Ct Cervical Spine Wo Contrast    Result Date: 8/18/2020  No acute abnormality of the cervical spine. Multilevel degenerative changes in the cervical spine. No acute intracranial abnormality. , no acute intracranial hemorrhage or mass effect. Left occipital scalp hematoma. No acute displaced skull fracture. Ct Thoracic Spine Wo Contrast    Result Date: 8/18/2020  No acute fracture or listhesis in the thoracic spine. Multilevel degenerative changes in the thoracic spine with bridging marginal osteophytes which could relate to component of DISH. Mra Head Wo Contrast    Result Date: 8/18/2020  70% stenosis at the origin of the left internal carotid artery.  No acute abnormality or flow-limiting stenosis of the major arteries of the head. Us Renal Complete    Result Date: 8/18/2020  1. Large indeterminate anechoic cystic structure in the pelvis near the urinary bladder, the origin of which is unclear. Further evaluation with dedicated CT abdomen pelvis is recommended. 2. Nonvisualization of the ureteral jets are ureters. Urinary bladder is collapsed with Julien catheter present. 3. Fatty liver. Trace perihepatic fluid. 4. Unremarkable renal ultrasound. No hydronephrosis. Cta Head Neck W Contrast    Result Date: 8/18/2020  Calcification involving the carotid bulbs and proximal internal carotid arteries bilaterally with approximately 70% stenosis in the proximal left internal carotid artery by NASCET criteria. No significant stenosis or occlusion within the intracranial vascularity. Mra Neck Wo Contrast    Result Date: 8/18/2020  70% stenosis at the origin of the left internal carotid artery. No acute abnormality or flow-limiting stenosis of the major arteries of the head. Mri Brain Wo Contrast    Result Date: 8/18/2020  No acute intracranial abnormality. ASSESSMENT & PLAN   Principal Problem:    Accidental fall  Active Problems:    Type 2 diabetes mellitus (HCC)    Falling episodes    Generalized weakness    RLS (restless legs syndrome)    Thrombocytopenia (HCC)    Confusion state    Anemia    Acute kidney injury superimposed on chronic kidney disease ([facility] [address]    Aphasia  Resolved Problems:    * No resolved hospital problems. *    ASSESSMENT / PLAN: There is a 58-year-old female who presented with aphasia and found to have CT with 70% stenosis and left internal carotid artery patient admitted to inpatient status to monitor any neurological deficits and evaluate the patient      Aphasia secondary to metabolic encephalopathy versus CVA-CT head showed 70% stenosis in the left internal carotid artery. MRI no any acute intracranial abnormalities .   Continue aspirin  Continue atorvastatin  Monitor neurological deficit progression  Monitor BMP  Monitor vitals  Consult neurology  Monitor Sodium    Active problems-   Type 2 diabetes mellitus  Hold home med . Scale insulin sliding. Hypoglycemia protocol    Generalised wealness history of CVA  continue asprin and statin    Polyneuropathy-tingling sensation over her bilateral limbs- continue gabapentin    Anemia secondary to variceal bleed - treated with IV iron followed by oral iron    Epilepsy  Well controlled by levetirecetam      DVT ppx :  heparin  GI ppx:     PT/OT:   Discharge Planning / SW: ongoing    Shravan Marquez MD  Internal Medicine Resident, PGY-1  [address], [place]  8/19/2020, 12:11 PM

## 2020-08-19 NOTE — PROGRESS NOTES
Physical Therapy    Facility/Department: [facility] NEURO  Initial Assessment    NAME: Sophia Nunez  : 1955  MRN: [id number]    Date of Service: 2020    Discharge Recommendations:  Patient would benefit from continued therapy after discharge        Assessment   Body structures, Functions, Activity limitations: Decreased functional mobility ; Decreased balance;Decreased strength;Decreased safe awareness;Decreased coordination;Decreased endurance  Assessment: Impaired judgment and safety demonstrated during gait. Preexisting weakness compounded by new weakness limits function. Patient needs further PT to regain functional independence. Prognosis: Good  Decision Making: Medium Complexity  Clinical Presentation: evolving  PT Education: Gait Training;General Safety;Goals;Plan of Care; Functional Mobility Training;Equipment;Transfer Training  REQUIRES PT FOLLOW UP: Yes  Activity Tolerance  Activity Tolerance: Patient limited by endurance; Patient limited by fatigue       Patient Diagnosis(es): The primary encounter diagnosis was NIKKI (acute kidney injury) ([address] Diagnoses of Acute cystitis without hematuria and Cellulitis of lower extremity, unspecified laterality were also pertinent to this visit. has a past medical history of Anemia, Cataract, GERD (gastroesophageal reflux disease), Headache, Hyperlipidemia, Neuropathy, Osteoarthritis, Restless leg syndrome, Seizures ([address] Short-term memory loss, Stroke (cerebrum) ([address] and Type 2 diabetes mellitus without complication ([address] has a past surgical history that includes Cholecystectomy; Tonsillectomy; Cataract removal; eye surgery; Cardiac catheterization; Upper gastrointestinal endoscopy (N/A, 2019); Colonoscopy (N/A, 2019); Upper gastrointestinal endoscopy (N/A, 3/18/2020); and Upper gastrointestinal endoscopy (N/A, 2020).     Restrictions  Restrictions/Precautions  Restrictions/Precautions: (Up with assist)  Required Braces or Orthoses?: Knee Extension: 4-/5  Strength LLE  Strength LLE: Exception  L Hip Flexion: 3-/5  L Knee Flexion: 4-/5  L Knee Extension: 4-/5        Bed mobility  Supine to Sit: Minimal assistance  Scooting: Minimal assistance  Transfers  Sit to Stand: Contact guard assistance  Stand to sit: Minimal Assistance  Bed to Chair: Minimal assistance  Ambulation  Ambulation?: Yes  Ambulation 1  Surface: level tile  Device: Rolling Walker  Assistance: Minimal assistance  Quality of Gait: Veered close to wall before therapist pointed out the problem. Repeated this error several minutes later with the wall. Then needed cues to keep from walking into a computer on the wall. She denies anything is wrong with her vision. Distance: 125'     Balance  Sitting - Static: Fair  Sitting - Dynamic: Fair  Standing - Static: Poor;+  Standing - Dynamic: Poor  Exercises  Comments: AAROM B hips and knees. AROM ankles B. Plan   Plan  Times per week: 5-6x/week  Current Treatment Recommendations: Functional Mobility Training, Transfer Training, Gait Training, Balance Training, Endurance Training, Home Exercise Program, Strengthening  Safety Devices  Type of devices: Left in chair, Chair alarm in place, Gait belt, Patient at risk for falls, Nurse notified, Call light within reach             AM-PAC Score  AM-PAC Inpatient Mobility Raw Score : 17 (08/19/20 1437)  AM-PAC Inpatient T-Scale Score : 42.13 (08/19/20 1437)  Mobility Inpatient CMS 0-100% Score: 50.57 (08/19/20 1437)  Mobility Inpatient CMS G-Code Modifier : CK (08/19/20 1437)          Goals  Short term goals  Time Frame for Short term goals: 14 visits  Short term goal 1: Supine to/from sit with SBA. Short term goal 2: Sit to/from stand SBA. Short term goal 3: Ambulate 150' with walker with SBA, able to avoid obstacles. Short term goal 4: Improve LE strength to support function as seen by completion of 10 reps standing LE exercise.   Patient Goals   Patient goals : Go home       Therapy Time   Individual Concurrent Group Co-treatment   Time In 1120         Time Out 1215         Minutes 55         Timed Code Treatment Minutes: Rylie 112, PT

## 2020-08-19 NOTE — PROGRESS NOTES
Occupational Therapy   Occupational Therapy Initial Assessment  Date: 2020   Patient Name: Fatmata German  MRN: [id number]     : 1955     Chief Complaint   Patient presents with    Aphasia     Transfer from [facility]       Date of Service: 2020    Discharge Recommendations:  Patient would benefit from continued therapy after discharge    Assessment   Performance deficits / Impairments: Decreased functional mobility ; Decreased ADL status; Decreased endurance;Decreased cognition  Assessment: Pt would benefit from continued acute care and post acute care OT to address minimal deficits in ADL/ functional activities, endurance and functional transfers/ mobility following admission. Pt require dCGA for functional transfers/ mobility and increased time and effort to complete activities. Treatment Diagnosis: Aphasia  Prognosis: Good  Decision Making: Medium Complexity  OT Education: OT Role;Plan of Care;Transfer Training  REQUIRES OT FOLLOW UP: Yes  Activity Tolerance  Activity Tolerance: Patient Tolerated treatment well  Safety Devices  Safety Devices in place: Yes  Type of devices: Left in chair;Call light within reach;Nurse notified; Chair alarm in place;Gait belt;Patient at risk for falls  Restraints  Initially in place: No         Patient Diagnosis(es): The primary encounter diagnosis was NIKKI (acute kidney injury) ([address] Diagnoses of Acute cystitis without hematuria and Cellulitis of lower extremity, unspecified laterality were also pertinent to this visit. has a past medical history of Anemia, Cataract, GERD (gastroesophageal reflux disease), Headache, Hyperlipidemia, Neuropathy, Osteoarthritis, Restless leg syndrome, Seizures ([address] Short-term memory loss, Stroke (cerebrum) ([address] and Type 2 diabetes mellitus without complication ([address] has a past surgical history that includes Cholecystectomy; Tonsillectomy; Cataract removal; eye surgery; Cardiac catheterization;  Upper gastrointestinal endoscopy (N/A, 5/12/2019); Colonoscopy (N/A, 5/13/2019); Upper gastrointestinal endoscopy (N/A, 3/18/2020); and Upper gastrointestinal endoscopy (N/A, 8/7/2020). Treatment Diagnosis: Aphasia      Restrictions  Restrictions/Precautions  Restrictions/Precautions: (Up with assist)  Required Braces or Orthoses?: No  Position Activity Restriction  Other position/activity restrictions: Up with assistance, pt reports L sided weakness chronic, R side getting \"more weak\", 1 L O2. CT:  hematoma left parietooccipital region.   Hgb 7.5    Subjective   General  Patient assessed for rehabilitation services?: Yes  Family / Caregiver Present: No  General Comment  Comments: Pt reports ~6 falls in last 6 months  Patient Currently in Pain: Yes  Pain Assessment  Pain Assessment: 0-10  Pain Level: 5  Pain Type: Acute pain  Pain Location: Head    Oxygen Therapy  SpO2: 96 %  O2 Device: Nasal cannula  O2 Flow Rate (L/min): 1 L/min    Social/Functional History  Social/Functional History  Lives With: Alone  Type of Home: Apartment  Home Layout: One level  Home Access: Level entry  Bathroom Shower/Tub: Walk-in shower  Bathroom Toilet: Standard  Bathroom Equipment: Hand-held shower, Grab bars in shower, Grab bars around toilet, Built-in shower seat  Home Equipment: 4 wheeled walker, Oxygen  Receives Help From: Other (comment)(saff at complex)  ADL Assistance: Independent  Homemaking Responsibilities: No(Pt reports staff completes home care activities)  Meal Prep Responsibility: Secondary(Pt reports meals provided, pt able to complete simple meals with rice cooker)  Laundry Responsibility: No  Cleaning Responsibility: No  Shopping Responsibility: No  Ambulation Assistance: Independent  Transfer Assistance: Independent  Active : No  Patient's  Info: facility provides transportation  Occupation: Retired    Objective   Vision: Impaired(Pt reports \"corrective lenses\")  Hearing: Within functional limits    Orientation  Overall Unable to assess(Pt left up in recliner)  Scooting: Minimal assistance  Comment: HOB raised, use of bed rails, assist to initiate/ advance BLE     Transfers  Stand Step Transfers: Contact guard assistance  Sit to stand: Contact guard assistance  Stand to sit: Contact guard assistance  Transfer Comments: VCs for use of RW     Cognition  Overall Cognitive Status: Exceptions  Following Commands: Follows one step commands with repetition  Attention Span: Difficulty attending to directions; Difficulty dividing attention  Safety Judgement: Decreased awareness of need for safety;Decreased awareness of need for assistance  Insights: Decreased awareness of deficits  Initiation: Requires cues for some  Cognition Comment: Pt required frequent redirection to tasks and to maintain midline during functional mobility     Perception  Overall Perceptual Status: Impaired  Unilateral Attention: Cues to maintain midline in standing        Sensation  Overall Sensation Status: WFL    LUE AROM (degrees)  LUE AROM : WFL  RUE AROM (degrees)  RUE AROM : WFL  LUE Strength  Gross LUE Strength: WFL  RUE Strength  Gross RUE Strength: WFL     Plan   Plan  Times per week: 3-5x/wk  Current Treatment Recommendations: Functional Mobility Training, Endurance Training, Safety Education & Training, Self-Care / ADL, Patient/Caregiver Education & Training, Equipment Evaluation, Education, & procurement    AM-PAC Score  AM-[facility] Inpatient Daily Activity Raw Score: 17 (08/19/20 1440)  AM-PAC Inpatient ADL T-Scale Score : 37.26 (08/19/20 1440)  ADL Inpatient CMS 0-100% Score: 50.11 (08/19/20 1440)  ADL Inpatient CMS G-Code Modifier : CK (08/19/20 1440)    Goals  Short term goals  Time Frame for Short term goals: by discharge, pt will  Short term goal 1: demo I in UE ADL activities  Short term goal 2: demo mod I for LE ADL activities with AD as needed  Short term goal 3: demo I in functional transfers/ mobility with use of RW, good safety awareness and maintain

## 2020-08-19 NOTE — PROGRESS NOTES
[id number] [facility] Neurology   [address] [place] [address]    Progress Note            Date:   8/19/2020  Patient name:  Vinod Amanda  Date of admission:  8/18/2020  1:49 PM  MRN:   [id number]  Account:  [de-identified]  YOB: 1955  PCP:    Dedra James PA-C  Room:   [address] [place] [address]  Code Status:    Full Code    Chief Complaint:     Chief Complaint   Patient presents with    Aphasia     Transfer from [address] [place] [address]       Interval hx: The Patient was seen and examined at bedside  AFVSS  No acute events overnight  The patient states that she has some neck pain. CT C-spine done showing degen changes. Otherwise no acute changes      Brief History of Present Illness:     Patient is a 64-year-old female who presented as a transfer from [facility] for further evaluation and stroke work-up. Patient has a history of diabetes, hyperlipidemia, prior stroke in 2010 with some mild residual left-sided weakness, seizure disorder  Patient lives in an assisted living facility. She reports having a fall. She mentioned that she was lightheaded and dizzy at the time of the fall. Does endorse head trauma however no LOC. With regards to the fall, history is slightly inconsistent is in one place she denies dizziness and another time she states she did have dizziness questionable mechanical fall. He also reports history of chronic lower extremity weakness however at this time she feels her left leg is weaker. Patient also reports a history of anemia. At [facility] prior to transfer patient was being treated for pneumonia and UTI. She started having aphasia and decision was made to transfer to [facility]. CT head at [facility] did reveal left occipital scalp hematoma.   No acute intracranial abnormality  CT C-spine with multilevel degenerative changes  CT thoracic spine with no acute fracture or listhesis    At [facility], CTA head and neck, MRI/MRA were obtained  Patient was noted to have proximal left ICA stenosis 70%. Endovascular consultation was obtained. Have ordered Carotid dopplers in addition to present work up. Okay to resume aspirin and Lipitor. Past Medical History:     Past Medical History:   Diagnosis Date    Anemia     Cataract     GERD (gastroesophageal reflux disease)     Headache     Hyperlipidemia     Neuropathy     Osteoarthritis     Restless leg syndrome     Seizures (HCC) since age 10   [facility] Short-term memory loss     Stroke (cerebrum) ([facility] 2010    Type 2 diabetes mellitus without complication ([facility] 6786        Past Surgical History:     Past Surgical History:   Procedure Laterality Date    CARDIAC CATHETERIZATION      CATARACT REMOVAL      CHOLECYSTECTOMY      COLONOSCOPY N/A 5/13/2019    COLONOSCOPY DIAGNOSTIC, POLYPECTOMIES performed by Vinay Quintana MD at [address]      cataract    TONSILLECTOMY      UPPER GASTROINTESTINAL ENDOSCOPY N/A 5/12/2019    EGD ESOPHAGOGASTRODUODENOSCOPY performed by Vinay Quintana MD at [facility] N/A 3/18/2020    EGD WITH CAUTERIZATION OF [facility] (GAVE) USING ARGON performed by Kamila Ngo MD at [facility] N/A 8/7/2020    EGD BIOPSY BRUSH BX AND STOMACH FULGURATION WITH ARGON BEAM performed by Vinay Quintana MD at [facility]        Medications Prior to Admission:     Prior to Admission medications    Medication Sig Start Date End Date Taking?  Authorizing Provider   cephALEXin (KEFLEX) 500 MG capsule Take 1 capsule by mouth 3 times daily for 7 days 8/18/20 8/25/20 Yes Viet Lakhani MD   bumetanide (BUMEX) 1 MG tablet Take 1 mg by mouth 3 times daily   Yes Historical Provider, MD   insulin glargine (BASAGLAR KWIKPEN) 100 UNIT/ML injection pen Inject 60 Units into the skin 2 times daily   Yes Historical Provider, MD   levETIRAcetam (KEPPRA) 750 MG tablet Take 750 mg by mouth 2 times daily Indications: with 500 mg to make 1250 mg dose   Yes Historical Provider, MD   gabapentin (NEURONTIN) 100 MG capsule Take 100 mg by mouth 2 times daily. .   Yes Historical Provider, MD   levETIRAcetam (KEPPRA) 500 MG tablet Take 500 mg by mouth 2 times daily Indications: with 750 mg to make total dose 1250 mg    Yes Historical Provider, MD   insulin aspart (NOVOLOG) 100 UNIT/ML injection vial Inject into the skin Inject as per sliding scale before meals and at bedtime:    = 0 units; call physician if less than 70  151-200 = 2 units  201-250 = 4 units  251-300 = 6 units  301-350 = 8 units  351-400 = 10 units; call physician if greater than 400   Yes Historical Provider, MD   lisinopril-hydrochlorothiazide (PRINZIDE;ZESTORETIC) 10-12.5 MG per tablet Take 1 tablet by mouth daily   Yes Historical Provider, MD   omeprazole (PRILOSEC) 40 MG delayed release capsule Take 40 mg by mouth Daily    Yes Historical Provider, MD   simvastatin (ZOCOR) 20 MG tablet Take 20 mg by mouth nightly    Yes Historical Provider, MD   lamoTRIgine (LAMICTAL) 200 MG tablet Take 200 mg by mouth 2 times daily   Yes Historical Provider, MD   clotrimazole-betamethasone (LOTRISONE) 1-0.05 % cream Apply topically 2 times daily. 8/17/20   Alexandria Weaver PA-C   nadolol (CORGARD) 20 MG tablet Take 1 tablet by mouth daily 8/10/20   Argentina Taylor MD   ferrous sulfate (IRON 325) 325 (65 Fe) MG tablet Take 1 tablet by mouth every 12 hours 8/10/20   Argentina Taylor MD   blood glucose monitor strips Test 4 times a day & as needed for symptoms of irregular blood glucose. Dispense sufficient amount for indicated testing frequency plus additional to accommodate PRN testing needs.  8/4/20   Alexandria Weaver PA-C   Lancets MISC 1 each by Does not apply route 4 times daily 8/4/20   Alexandria Waever PA-C   glucose monitoring kit (FREESTYLE) monitoring kit 1 kit by Does not apply route daily 8/4/20   Alexandria Weaver PA-C   Emollient (CERAVE) LOTN Apply topically 2 times daily Indications: bilateral lower legs    Historical Provider, MD   vitamin D (CHOLECALCIFEROL) 65870 UNIT CAPS Take 50,000 Units by mouth once a week Indications: Tuesdays     Historical Provider, MD   potassium chloride (KLOR-CON M) 10 MEQ extended release tablet Take 30 mEq by mouth daily     Historical Provider, MD   vitamin B-12 (CYANOCOBALAMIN) 500 MCG tablet Take 500 mcg by mouth daily    Historical Provider, MD   acetaminophen (TYLENOL) 325 MG tablet Take 650 mg by mouth 3 times daily as needed for Pain (mild)    Historical Provider, MD   Blood Glucose Monitoring Suppl FLAVIO Check blood sugars 3/day 11/30/17   Joel Lea MD   PARoxetine (PAXIL) 20 MG tablet Take 20 mg by mouth every morning    Historical Provider, MD   loratadine (CLARITIN) 10 MG tablet Take 10 mg by mouth daily    Historical Provider, MD   pramipexole (MIRAPEX) 1 MG tablet Take 1 mg by mouth nightly    Historical Provider, MD   busPIRone (BUSPAR) 10 MG tablet Take 10 mg by mouth 3 times daily     Historical Provider, MD        Allergies:     Codeine    Social History:     Tobacco:    reports that she has never smoked. She has never used smokeless tobacco.  Alcohol:      reports no history of alcohol use. Drug Use:  reports no history of drug use.     Family History:     Family History   Problem Relation Age of Onset    Diabetes Brother        Review of Systems:     ROS:  Constitutional  Negative for fever and chills    HEENT  Negative for ear discharge, ear pain, nosebleed    Eyes  Negative for photophobia, pain and discharge    Respiratory  Negative for hemoptysis and sputum    Cardiovascular  Negative for orthopnea, claudication and PND    Gastrointestinal  Negative for abdominal pain, diarrhea, blood in stool    Musculoskeletal  Negative for joint pain, negative for myalgia    Neurology Negative for seizures, loss of consciousness   Skin  Negative for rash or itching    Endo/heme/allergies  Negative for polydipsia, environmental allergy    Psychiatric/behavioral  Negative for suicidal ideation.  Patient is not anxious        Physical Exam:   BP (!) 138/46   Pulse 57   Temp 97.9 °F (36.6 °C) (Oral)   Resp 16   Ht 5' (1.524 m)   Wt 284 lb 6.3 oz (129 kg)   SpO2 99%   BMI 55.54 kg/m²   Temp (24hrs), Av.5 °F (36.4 °C), Min:96.6 °F (35.9 °C), Max:97.9 °F (36.6 °C)    Recent Labs     20  1133 20  1350 205 20  0830   POCGLU 229* 224* 105 145*     No intake or output data in the 24 hours ending 20 0857      NEUROLOGIC EXAMINATION  GENERAL  Appears comfortable and in no distress   HEENT  NC/ AT   NECK  Supple and no bruits heard   MENTAL STATUS:  Alert, oriented, intact memory, no confusion, normal speech, normal language, no hallucination or delusion   CRANIAL NERVES: II     -      Visual fields intact to confrontation  III,IV,VI -  EOMs full, no afferent defect, no GRAY, no ptosis  V     -     Normal facial sensation  VII    -     Normal facial symmetry  VIII   -     Intact hearing  IX,X -     Symmetrical palate  XI    -     Symmetrical shoulder shrug  XII   -     Midline tongue, no atrophy    MOTOR FUNCTION:  significant for good strength of grade 5/5 in bilateral proximal and distal muscle groups of both upper and lower extremities with normal bulk, normal tone and no involuntary movements, no tremor   SENSORY FUNCTION:  Normal touch, normal pin, normal vibration, normal proprioception   CEREBELLAR FUNCTION:  Intact fine motor control over upper limbs   REFLEX FUNCTION:  Symmetric, no perverted reflex, no Babinski sign   STATION and GAIT  Not tested       Investigations:      Laboratory Testing:  Recent Results (from the past 24 hour(s))   POC Glucose Fingerstick    Collection Time: 20 11:33 AM   Result Value Ref Range    POC Glucose 229 (H) 65 - 105 mg/dL   STROKE PANEL    Collection Time: 20 11:39 AM   Result Value Ref Range    Glucose 247 (H) 70 - 99 mg/dL    BUN 54 (H) 8 - 23 mg/dL    CREATININE 1.88 (H) 0.50 - 0.90 mg/dL    Bun/Cre Ratio NOT REPORTED 9 - 20    Calcium 8.9 8.6 - 10.4 mg/dL    Sodium 144 135 - 144 mmol/L    Potassium 4.1 3.7 - 5.3 mmol/L    Chloride 107 98 - 107 mmol/L    CO2 29 20 - 31 mmol/L    Anion Gap 8 (L) 9 - 17 mmol/L    GFR Non-African American 27 (L) >60 mL/min    GFR  32 (L) >60 mL/min    GFR Comment          GFR Staging NOT REPORTED     WBC 4.6 3.5 - 11.0 k/uL    RBC 3.24 (L) 4.0 - 5.2 m/uL    Hemoglobin 8.4 (L) 12.0 - 16.0 g/dL    Hematocrit 27.4 (L) 36 - 46 %    MCV 84.5 80 - 100 fL    MCH 25.8 (L) 26 - 34 pg    MCHC 30.5 (L) 31 - 37 g/dL    RDW 21.7 (H) 11.5 - 14.9 %    Platelets 93 (L) 389 - 450 k/uL    MPV 7.6 6.0 - 12.0 fL    NRBC Automated NOT REPORTED per 100 WBC    Total CK 61 26 - 192 U/L    CK-MB 2.3 <5.4 ng/mL    % CKMB 3.8 (H) 0.0 - 3.0 %    CKMB Interpretation NORMAL ISOENZYME PATTERN     Differential Type NOT REPORTED     Immature Granulocytes NOT REPORTED 0 %    Absolute Immature Granulocyte NOT REPORTED 0.00 - 0.30 k/uL    WBC Morphology NOT REPORTED     RBC Morphology NOT REPORTED     Platelet Estimate NOT REPORTED     Seg Neutrophils 72 (H) 36 - 66 %    Lymphocytes 20 (L) 24 - 44 %    Monocytes 5 1 - 7 %    Eosinophils % 2 0 - 4 %    Basophils 1 0 - 2 %    Segs Absolute 3.31 1.3 - 9.1 k/uL    Absolute Lymph # 0.92 (L) 1.0 - 4.8 k/uL    Absolute Mono # 0.23 0.1 - 1.3 k/uL    Absolute Eos # 0.09 0.0 - 0.4 k/uL    Basophils Absolute 0.05 0.0 - 0.2 k/uL    Morphology HYPOCHROMIA PRESENT     Morphology ANISOCYTOSIS PRESENT     Myoglobin 80 (H) 25 - 58 ng/mL    Protime 14.0 11.8 - 14.6 sec    INR 1.1     PTT 32.0 24.0 - 36.0 sec    Troponin, High Sensitivity 31 (H) 0 - 14 ng/L    Troponin T NOT REPORTED <0.03 ng/mL    Troponin Interp NOT REPORTED    Venous Blood Gas, POC    Collection Time: 08/18/20  1:50 PM   Result Value Ref Range    pH, Larry 7.346 7.320 - 7.430    pCO2, Larry 60.4 (H) 41.0 - 51.0 mm Hg    pO2, Larry 22.1 (L) 30.0 - 50.0 mm Hg    HCO3, Venous 33.0 (H) 22.0 - 29.0 mmol/L    Total CO2, Venous 35 (H) 23.0 - 30.0 mmol/L    Negative Base Excess, Larry NOT REPORTED 0.0 - 2.0    Positive Base Excess, Larry 6 (H) 0.0 - 3.0    O2 Sat, Larry 33 (L) 60.0 - 85.0 %    O2 Device/Flow/% NOT REPORTED     David Test NOT REPORTED     Sample Site NOT REPORTED     Mode NOT REPORTED     FIO2 NOT REPORTED     Pt Temp NOT REPORTED     POC pH Temp NOT REPORTED     POC pCO2 Temp NOT REPORTED mm Hg    POC pO2 Temp NOT REPORTED mm Hg   Hemoglobin and hematocrit, blood    Collection Time: 08/18/20  1:50 PM   Result Value Ref Range    POC Hemoglobin 9.4 (L) 12.0 - 16.0 g/dL    POC Hematocrit 28 (L) 36 - 46 %   Creatinine W/GFR Point of Care    Collection Time: 08/18/20  1:50 PM   Result Value Ref Range    POC Creatinine 1.98 (H) 0.51 - 1.19 mg/dL    GFR Comment 31 (L) >60 mL/min    GFR Non- 25 (L) >60 mL/min    GFR Comment         SODIUM (POC)    Collection Time: 08/18/20  1:50 PM   Result Value Ref Range    POC Sodium 148 (H) 138 - 146 mmol/L   POTASSIUM (POC)    Collection Time: 08/18/20  1:50 PM   Result Value Ref Range    POC Potassium 4.2 3.5 - 4.5 mmol/L   CHLORIDE (POC)    Collection Time: 08/18/20  1:50 PM   Result Value Ref Range    POC Chloride 107 98 - 107 mmol/L   CALCIUM, IONIC (POC)    Collection Time: 08/18/20  1:50 PM   Result Value Ref Range    POC Ionized Calcium 1.24 1.15 - 1.33 mmol/L   Lactic Acid, POC    Collection Time: 08/18/20  1:50 PM   Result Value Ref Range    POC Lactic Acid 1.00 0.56 - 1.39 mmol/L   POCT Glucose    Collection Time: 08/18/20  1:50 PM   Result Value Ref Range    POC Glucose 224 (H) 74 - 100 mg/dL   Anion Gap (Calc) POC    Collection Time: 08/18/20  1:50 PM   Result Value Ref Range    Anion Gap 8 7 - 16 mmol/L   STROKE PANEL    Collection Time: 08/18/20  2:00 PM   Result Value Ref Range    Glucose 211 (H) 70 - 99 mg/dL    BUN 53 (H) 8 - 23 mg/dL    CREATININE 1.74 (H) 0.50 - 0.90 mg/dL    Bun/Cre Ratio NOT REPORTED 9 - 20    Calcium 9.1 8.6 - 10.4 mg/dL    Sodium 145 (H) 135 - 144 mmol/L    Potassium 4.4 3.7 - 5.3 mmol/L    Chloride 106 98 - 107 mmol/L    CO2 27 20 - 31 mmol/L    Anion Gap 12 9 - 17 mmol/L    GFR Non-African American 29 (L) >60 mL/min    GFR  35 (L) >60 mL/min    GFR Comment          GFR Staging NOT REPORTED     WBC 5.2 3.5 - 11.3 k/uL    RBC 3.29 (L) 3.95 - 5.11 m/uL    Hemoglobin 8.5 (L) 11.9 - 15.1 g/dL    Hematocrit 30.1 (L) 36.3 - 47.1 %    MCV 91.5 82.6 - 102.9 fL    MCH 25.8 25.2 - 33.5 pg    MCHC 28.2 (L) 28.4 - 34.8 g/dL    RDW 19.2 (H) 11.8 - 14.4 %    Platelets 97 (L) 203 - 453 k/uL    MPV 10.5 8.1 - 13.5 fL    NRBC Automated 0.0 0.0 per 100 WBC    Total CK 64 26 - 192 U/L    CK-MB 2.4 <5.4 ng/mL    % CKMB 3.8 (H) 0.0 - 3.0 %    CKMB Interpretation NORMAL ISOENZYME PATTERN     Differential Type NOT REPORTED     WBC Morphology NOT REPORTED     RBC Morphology NOT REPORTED     Platelet Estimate NOT REPORTED     Immature Granulocytes 1 (H) 0 %    Seg Neutrophils 71 (H) 36 - 65 %    Lymphocytes 19 (L) 24 - 43 %    Monocytes 7 3 - 12 %    Eosinophils % 2 1 - 4 %    Basophils 0 0 - 2 %    Absolute Immature Granulocyte 0.05 0.00 - 0.30 k/uL    Segs Absolute 3.70 1.50 - 8.10 k/uL    Absolute Lymph # 0.99 (L) 1.10 - 3.70 k/uL    Absolute Mono # 0.36 0.10 - 1.20 k/uL    Absolute Eos # 0.10 0.00 - 0.44 k/uL    Basophils Absolute 0.00 0.00 - 0.20 k/uL    Morphology ANISOCYTOSIS PRESENT     Morphology HYPOCHROMIA PRESENT     Myoglobin 71 (H) 25 - 58 ng/mL    Protime 10.1 9.0 - 12.0 sec    INR 1.0     PTT 24.3 20.5 - 30.5 sec    Troponin, High Sensitivity 32 (H) 0 - 14 ng/L    Troponin T NOT REPORTED <0.03 ng/mL    Troponin Interp NOT REPORTED    Iron and TIBC    Collection Time: 08/18/20  2:00 PM   Result Value Ref Range    Iron 91 37 - 145 ug/dL    TIBC 230 (L) 250 - 450 ug/dL    Iron Saturation 40 20 - 55 %    UIBC 139 112 - 347 ug/dL   Hemoglobin A1C    Collection Time: 08/18/20  2:00 PM   Result Value Ref Range    Hemoglobin A1C 7.1 (H) 4.0 - 6.0 %    Estimated Avg Glucose 157 mg/dL   AMMONIA    Collection Time: 08/18/20  2:35 PM   Result Value Ref Range    Ammonia 54 (H) 11 - 51 umol/L   POC Glucose Fingerstick    Collection Time: 08/18/20  8:25 PM   Result Value Ref Range    POC Glucose 105 65 - 105 mg/dL   Hepatic function panel    Collection Time: 08/18/20  9:00 PM   Result Value Ref Range    Alb 2.6 (L) 3.5 - 5.2 g/dL    Alkaline Phosphatase 85 35 - 104 U/L    ALT 13 5 - 33 U/L    AST 28 <32 U/L    Total Bilirubin 0.30 0.3 - 1.2 mg/dL    Bilirubin, Direct 0.12 <0.31 mg/dL    Bilirubin, Indirect 0.18 0.00 - 1.00 mg/dL    Total Protein 6.1 (L) 6.4 - 8.3 g/dL    Globulin NOT REPORTED 1.5 - 3.8 g/dL    Albumin/Globulin Ratio 0.7 (L) 1.0 - 2.5   Lactic acid, plasma    Collection Time: 08/18/20  9:00 PM   Result Value Ref Range    Lactic Acid NOT REPORTED mmol/L    Lactic Acid, Whole Blood 0.8 0.7 - 2.1 mmol/L   LEVETIRACETAM LEVEL    Collection Time: 08/18/20  9:01 PM   Result Value Ref Range    Levetiracetam Lvl 99 ug/mL   LAMOTRIGINE LEVEL    Collection Time: 08/18/20  9:01 PM   Result Value Ref Range    Lamotrigine Lvl 7.1 3.0 - 15.0 ug/mL   SODIUM    Collection Time: 08/18/20 10:58 PM   Result Value Ref Range    Sodium 146 (H) 135 - 144 mmol/L   Basic Metabolic Panel w/ Reflex to MG    Collection Time: 08/19/20  3:35 AM   Result Value Ref Range    Glucose 189 (H) 70 - 99 mg/dL    BUN 53 (H) 8 - 23 mg/dL    CREATININE 1.81 (H) 0.50 - 0.90 mg/dL    Bun/Cre Ratio NOT REPORTED 9 - 20    Calcium 8.7 8.6 - 10.4 mg/dL    Sodium 147 (H) 135 - 144 mmol/L    Potassium 4.3 3.7 - 5.3 mmol/L    Chloride 108 (H) 98 - 107 mmol/L    CO2 28 20 - 31 mmol/L    Anion Gap 11 9 - 17 mmol/L    GFR Non-African American 28 (L) >60 mL/min    GFR  34 (L) >60 mL/min    GFR Comment          GFR Staging NOT REPORTED    CBC auto differential    Collection Time: 08/19/20  3:35 AM   Result hematoma in the left parietooccipital region.         Findings were discussed with Maninder Yoder at 12:08 pm on 8/18/2020. CTA Head Neck W Contrast 8/18/2020  Impression    Calcification involving the carotid bulbs and proximal internal carotid    arteries bilaterally with approximately 70% stenosis in the proximal left    internal carotid artery by NASCET criteria.         No significant stenosis or occlusion within the intracranial vascularity. MRI Brain WO Contrast 8/18/2020  Impression    No acute intracranial abnormality. MRA Head Neck WO Contrast 8/18/2020  Impression    70% stenosis at the origin of the left internal carotid artery.         No acute abnormality or flow-limiting stenosis of the major arteries of the    head. 2D Echo 8/19/2020  Summary  Technically difficult study. Patient unable to lay on left side due to  difficulty moving. Not all walls well visualized. Left ventricle is normal in size Global left ventricular systolic function  is normal Estimated ejection fraction is 60 % . Thickened mitral valve leaflets. Mild mitral regurgitation. Trivial tricuspid regurgitation. Mild pulmonary hypertension. Estimated right ventricular systolic pressure  is 01HFHR. IVC Increased diameter and impaired or no inspiratory variation indicating  elevated RA filling pressure (i.e. CVP) .      Signature  ----------------------------------------------------------------------------   Electronically signed by Jamey Madrigal on 08/19/2020 09:38   AM  ----------------------------------------------------------------------------     ----------------------------------------------------------------------------   Electronically signed by Lenard Diaz(Interpreting physician) on 08/19/2020   11:18 AM    Assessment :      Primary Problem  Accidental fall    Active Hospital Problems    Diagnosis Date Noted    Aphasia [R47.01] 08/18/2020    Acute kidney injury superimposed on chronic kidney disease ([facility] [N17.9, N18.9] 08/05/2020    Anemia [D64.9] 03/16/2020    Confusion state [F44.89] 12/01/2018    Falling episodes [R29.6] 12/14/2017    Accidental fall [W19. XXXA] 12/13/2017    Type 2 diabetes mellitus ([facility] [E11.9] 10/21/2017    Generalized weakness [R53.1] 03/15/2017    RLS (restless legs syndrome) [G25.81] 01/09/2017    Thrombocytopenia ([facility] [D69.6] 07/26/2016     75 y.o female with fall, mechanical vs syncopal  // Seizure disorder  // History of CVA in 2010  // Diabetic neuropathy  // Proximal L ICA 70% stenosis    Plan:     A1c 7.1, Lipid panel pending  Continue ASA 81 and Lipitor as per endovascular  Resume DM meds and monitor per primary team  Home AED dose of Keppra 1250mg BID, presently due to elevated level, continued on 500mg BID and will recheck levels and adjust  PT/OT      Follow-up further recommendations after discussing the case with attending  The plan was discussed with the patient, patient's family and the medical staff.    Consultations:   Germaine Douglas  IP CONSULT TO INTERNAL MEDICINE  IP CONSULT TO NEUROLOGY  IP CONSULT TO CASE MANAGEMENT    Shobha Mckeon MD   Neurology PGY-2  8/19/2020  8:57 AM

## 2020-08-19 NOTE — PROGRESS NOTES
This is a 73-year-old female who was initially admitted to [facility] from assisted living facility after falling in her apartment. Patient mentioned that she felt lightheaded dizzy and fell. She had episodes of palpitations and shortness of breath associated with blurring of vision headache. She admits that she hit her head but denies LOC. She mentioned that she has weakness in her bilateral lower extremity is chronic but the weakness in her left leg is more now. Patient also mentioned that she has bowel and bladder incontinence since her previous episode of stroke. Patient was admitted to [facility] for management of pneumonia and UTI (with Rocephin). She mentioned that she was talking to her brother over phone at [facility] when she started noticing that she forgot what she was talking (word finding difficulty) about and her brother noticed slurring in her speech. Patient was then transferred to [facility] for neurology consult and management of her aphasia. According to patient. She was started on 2 L home oxygen by her PCP due to her oxygen desat into high 80s. On physical examination, patient has bruises on her back due to fall. Left hand swollen erythematous. Patient has hematoma on the back of her head. No neurovascular deficit. She has previous history of recurrent falls and concussion. Past medical history: Type 2 diabetes mellitus, history of CVA, history of recurrent accidental falls, diabetic neuropathy and morbid obesity    Imaging: Ct head at [facility]: Negative for acute intracranial pathology. Left occipital scalp hematoma present  CT cervical spine: Unremarkable CT thoracic spine: Degenerative changes. Chest x-ray: Basilar atelectasis  Repeat CT head: No acute intracranial abnormality. CTA head and neck: Left proximal ICA stenosis   MRI brain: No acute intracranial abnormality  MRA head and neck: 70% stenosis left internal carotid artery.     In the ED patient was evaluated by stroke team and neuro endovascular was consulted due to 70% left cervical ICA stenosis. Assessment and plan:    Expressive aphasia: MRI brain not concerning for acute ischemia. Monitor mental status and speech. Follow-up neurology recommendations. On aspirin and Lipitor. fall: Likely mechanical.  Follow-up echo. NIKKI on CKD: Likely secondary to dehydration. Fluid boluses. Baseline creatinine around 1.3. Renal ultrasound: Unremarkable no hydronephrosis. Indeterminate anechoic cystic structure in pelvis near the urinary bladder. Hold diuretic. Suspected pneumonia: Afebrile, WBC WNL. Patient denied productive cough fever or URI-like symptoms. Low suspicion for pneumonia. Basilar opacification likely atelectasis. UTI: On Rocephin. Follow-up urine culture. Patient has symptoms. Urine analysis shows small bacteria. Negative for leukocyte esterase and nitrate. Bilateral lower extremity venous stasis dermatitis. Cellulitis ruled out. Chronic diastolic CHF: Not in acute exacerbation. EF 3/16/2020: 60-65%. Chronic hypernatremia: Fluid boluses. Free water deficit 3.5 L. Monitor sodium. History of CVA: Aspirin and Lipitor. Type 2 diabetes mellitus: On 20 units Lantus twice daily and sliding scale. Hypoglycemia protocol. History of seizure disorder: On Keppra and Lamictal.    Will need outpatient follow up for pelvic lesion with her OB GYN.       David Umanzor MD  PGY-2, Internal Medicine Resident  [facility]  8/19/2020 7:13 AM

## 2020-08-19 NOTE — PLAN OF CARE
Problem: Falls - Risk of:  Goal: Will remain free from falls  Description: Will remain free from falls  Outcome: Ongoing  Goal: Absence of physical injury  Description: Absence of physical injury  Outcome: Ongoing     Problem: HEMODYNAMIC STATUS  Goal: Patient has stable vital signs and fluid balance  Outcome: Ongoing     Problem: COMMUNICATION IMPAIRMENT  Goal: Ability to express needs and understand communication  Outcome: Ongoing     Problem: Pain:  Goal: Pain level will decrease  Description: Pain level will decrease  Outcome: Ongoing  Goal: Control of acute pain  Description: Control of acute pain  Outcome: Ongoing  Goal: Control of chronic pain  Description: Control of chronic pain  Outcome: Ongoing     Problem: Skin Integrity:  Goal: Will show no infection signs and symptoms  Description: Will show no infection signs and symptoms  Outcome: Ongoing  Goal: Absence of new skin breakdown  Description: Absence of new skin breakdown  Outcome: Ongoing     Problem: Musculor/Skeletal Functional Status  Goal: Highest potential functional level  Outcome: Ongoing  Goal: Absence of falls  Outcome: Ongoing

## 2020-08-19 NOTE — SIGNIFICANT EVENT
Ultrasound result noted a catheter in place. Patient only has a pure wick external catj in place and after speaking with [facility] radiology the notation of   Large indeterminate anechoic cystic structure in the pelvis near the    urinary bladder, the origin of which is unclear     Most likely is the bladder with the knowledge of no in-dwelling catheter in place. An addendum will be added to the chart.

## 2020-08-19 NOTE — CARE COORDINATION
Case Management Initial Discharge Plan  Donna Wolff,             Met with:patient to discuss discharge plans. Information verified: address, contacts, phone number, , insurance Yes    Emergency Contact/Next of Kin name & number: Georgiana Torres [phone]    PCP: Keyon Walters PA-C  Date of last visit: 20    Insurance Provider: Mohinder Henriquez    Discharge Planning    Living Arrangements:  (Assisted Living)   Support Systems:  Friends/Neighbors    Home has 1 stories  0 stairs to climb to get into front door, n/a stairs to climb to reach second floor  Location of bedroom/bathroom in home main    Patient able to perform ADL's:Assisted    Current Services (outpatient & in home) Assisted Living  DME equipment: walker  DME provider: insurance, unknown agency    Receiving oral anticoagulation therapy? No    If indicated:   Physician managing anticoagulation treatment: n/a  Where does patient obtain lab work for ATC treatment? n/a      Potential Assistance Needed:       Patient agreeable to home care: No  [place] of choice provided:  n/a    Prior SNF/Rehab Placement and Facility: [facility]  Agreeable to SNF/Rehab: No  [place] of choice provided: n/a     Evaluation: n/a    Expected Discharge date:       Patient expects to be discharged to: Follow Up Appointment: Best Day/ Time:      Transportation provider: tatyana Herman  Transportation arrangements needed for discharge: No    Readmission Risk              Risk of Unplanned Readmission:        32             Does patient have a readmission risk score greater than 14?: Yes  If yes, follow-up appointment must be made within 7 days of discharge. Goals of Care: Improve speech, strength and balance      Discharge Plan: Return to [facility]. Will need PCP appt. Batsheva friend will provide transportation.            Electronically signed by Zaina Proctor RN on 20 at 4:36 PM EDT

## 2020-08-20 NOTE — PROGRESS NOTES
Physical Therapy  Facility/Department: [facility] NEURO  Daily Treatment Note  NAME: Fatmata German  : 1955  MRN: [id number]    Date of Service: 2020    Discharge Recommendations:  Patient would benefit from continued therapy after discharge        Assessment   Body structures, Functions, Activity limitations: Decreased functional mobility ; Decreased endurance  PT Education: Plan of Care;General Safety  Activity Tolerance  Activity Tolerance: Patient limited by fatigue;Patient limited by endurance     Patient Diagnosis(es): The primary encounter diagnosis was NIKKI (acute kidney injury) ([facility] [address] Diagnoses of Acute cystitis without hematuria and Cellulitis of lower extremity, unspecified laterality were also pertinent to this visit. has a past medical history of Anemia, Cataract, GERD (gastroesophageal reflux disease), Headache, Hyperlipidemia, Neuropathy, Osteoarthritis, Restless leg syndrome, Seizures ([address] Short-term memory loss, Stroke (cerebrum) ([address] and Type 2 diabetes mellitus without complication ([facility] [address] has a past surgical history that includes Cholecystectomy; Tonsillectomy; Cataract removal; eye surgery; Cardiac catheterization; Upper gastrointestinal endoscopy (N/A, 2019); Colonoscopy (N/A, 2019); Upper gastrointestinal endoscopy (N/A, 3/18/2020); and Upper gastrointestinal endoscopy (N/A, 2020). Restrictions  Restrictions/Precautions  Restrictions/Precautions: Fall Risk, Up as Tolerated  Required Braces or Orthoses?: No  Position Activity Restriction  Subjective   General  Family / Caregiver Present: No  Subjective  Subjective: Pt needing to use the bathroom. General Comment  Comments: Pt on 2 L 02. Sp02 at 100%. 02 removed. Pain Screening  Patient Currently in Pain: Denies  Vital Signs  Patient Currently in Pain: Denies       Orientation  Orientation  Overall Orientation Status: Within Functional Limits  Cognition   Cognition  Following Commands:  Follows one step

## 2020-08-20 NOTE — PLAN OF CARE
Neuro assessment completed, fall precautions in place, aspirations precautions in place, assess for barriers in communication and mobility, interventions to assist in communication and mobility in place, encouraged to call for assistance, adaptive devices used as needed, assess emotional state and support offered, encouraged patient to communicate by available means, and support systems included in patient care. Patient remained free from injury. Patient verbalized understanding of need for the safety precautions. Demonstrates proper use of assistive devices. Bed remains in the lowest position. Call light remains within reach. [place] [facility] in use.

## 2020-08-20 NOTE — PROGRESS NOTES
[facility]  Internal Medicine Teaching Residency Program  Inpatient Daily Progress Note  ______________________________________________________________________________    Patient: Radha Matos  YOB: 1955   [facility]:[id number]    Acct: [de-identified]     Room: [address] [place] [address]  Admit date: 8/18/2020  Today's date: 08/20/20  Number of days in the hospital: 2    SUBJECTIVE   Admitting Diagnosis: Accidental fall  CC: Complaints of dizziness while sitting in the bed which lasted for the for 12 minutes . Numbness over the left lower limbs. No complaints of chest pain shortness of breath palpitation her aphasia has improved  Pt examined at bedside. Chart & results reviewed.      Sitting comfortably on the bed saturating well with 2 L oxygen through nasal cannula  MRI showed no any intracranial abnormalities  CT head and neck showed 70% stenosis of left internal carotid artery  Carotid duplex showed 50 to 69% stenosis on bilateral internal carotid artery    Blood pressure 172/57  Sodium 145  BUN 55  Creatinine 2.16  ESR 23  Glucose 140  Triglyceride 167, LDL 49, HDL 42  Hemoglobin 8.1  WBC 4.1  A1c 7.1    Neurology consulted  Continue ASA and Lipitor  Hold Keppra because level is elevated 99>87 now trend down to 76  Continue Lamictal 270      Plan  Consult vascular surgery for aortic aneurysm  Rft  Chest x-ray as crackles were heard  Resume Lasix      ROS:  Constitutional:  negative for chills, fevers, sweats  Respiratory:  negative for cough, dyspnea on exertion, hemoptysis, shortness of breath, wheezing  Cardiovascular:  negative for chest pain, chest pressure/discomfort, lower extremity edema, palpitations  Gastrointestinal:  negative for abdominal pain, constipation, diarrhea, nausea, vomiting  Neurological:  negative for dizziness, headache  BRIEF HISTORY   The patient is a pleasant 64 y.o. female who has history of type II DM, falling episode, CVA, diabetic polyneuropathy of presents with a chief complaint of aphasia  (Unable to complete a sentence) presents 1 day.  It started suddenly and is resolved now. Kevin Marinelli has history of fall today resulting hematoma and laceration to the occipital part of the head.  She has history of fecal and urinary incontinence after left-sided stroke 3 years back. she has history of palpitation to episode 1 day before fall. has history of being treated for anemia secondary to esophageal varices 1 week back.  She denies any abnormal movement of the body, loss of consciousness, headache, shortness of breath, chest painl, fevers, nausea and vomiting.     On examination. Weakness arm left hand than leg both lower limbs has tingling sensation. Normal sensory sensation on both upper and lower limb.     CT head and neck showed 70% stenosis on left internal carotid artery    and MRI head showed no acute intracranial abnormality  Blood pressure 1 33/48  Sodium 146 blood urea nitrogen 53 creatinine 1.74  Glucose 211  Hemoglobin 8.5      OBJECTIVE     Vital Signs:  BP (!) 155/50   Pulse 66   Temp 97.5 °F (36.4 °C) (Oral)   Resp 20   Ht 5' (1.524 m)   Wt 284 lb 6.3 oz (129 kg)   SpO2 96%   BMI 55.54 kg/m²     Temp (24hrs), Av.3 °F (36.3 °C), Min:97.1 °F (36.2 °C), Max:97.5 °F (36.4 °C)    In: 200   Out: 1550 [Urine:1550]    Physical Exam:  Constitutional: This is a well developed, well nourished, Greater than 40 - Morbid Obesity / Extreme Obesity / Grade III 75y.o. year old female who is alert, oriented, cooperative and in no apparent distress. Head:normocephalic and atraumatic. EENT:  PERRLA. No conjunctival injections. Neck: Supple without thyromegaly. No elevated JVP. Respiratory: Chest was symmetrical without dullness to percussion. Breath sounds bilaterally crackles were heard. There were no wheezes, rhonchi or rales. .Cardiovascular: Regular without murmur, clicks, gallops or rubs.    Abdomen: Slightly rounded and soft CREATININE 1.88* 1.98* 1.74*  --  1.81*  --   --   --     < > = values in this interval not displayed. BNP: No results for input(s): BNP in the last 72 hours. PT/INR:   Recent Labs     08/18/20  1139 08/18/20  1400   PROTIME 14.0 10.1   INR 1.1 1.0     APTT:   Recent Labs     08/18/20  1139 08/18/20  1400   APTT 32.0 24.3     CARDIAC ENZYMES:   Recent Labs     08/18/20  1139 08/18/20  1400   CKMB 2.3 2.4     FASTING LIPID PANEL:  Lab Results   Component Value Date    CHOL 124 08/19/2020    HDL 42 08/19/2020    TRIG 167 (H) 08/19/2020     LIVER PROFILE:   Recent Labs     08/18/20  2100   AST 28   ALT 13   BILIDIR 0.12   BILITOT 0.30   ALKPHOS 80      MICROBIOLOGY:   Lab Results   Component Value Date/Time    CULTURE NO SIGNIFICANT GROWTH 03/16/2020 04:30 PM       Imaging:    Xr Chest (2 Vw)    Result Date: 8/18/2020  Questionable basilar atelectasis versus pneumonia. Xr Hand Left (min 3 Views)    Result Date: 8/18/2020  No acute displaced fracture or malalignment in the left hand. Degenerative osseous changes. Suggestion of osteopenia. Ct Head Wo Contrast    Result Date: 8/18/2020  No acute intracranial abnormality. Similar soft tissue hematoma in the left parietooccipital region. Findings were discussed with Jyothi Meraz at 12:08 pm on 8/18/2020. Ct Head Wo Contrast    Result Date: 8/18/2020  No acute abnormality of the cervical spine. Multilevel degenerative changes in the cervical spine. No acute intracranial abnormality. , no acute intracranial hemorrhage or mass effect. Left occipital scalp hematoma. No acute displaced skull fracture. Ct Cervical Spine Wo Contrast    Result Date: 8/18/2020  No acute abnormality of the cervical spine. Multilevel degenerative changes in the cervical spine. No acute intracranial abnormality. , no acute intracranial hemorrhage or mass effect. Left occipital scalp hematoma. No acute displaced skull fracture.      Ct Thoracic Spine Wo Contrast    Result Date: 8/18/2020  No acute fracture or listhesis in the thoracic spine. Multilevel degenerative changes in the thoracic spine with bridging marginal osteophytes which could relate to component of DISH. Mra Head Wo Contrast    Result Date: 8/18/2020  70% stenosis at the origin of the left internal carotid artery. No acute abnormality or flow-limiting stenosis of the major arteries of the head. Us Renal Complete    Addendum Date: 8/19/2020    ADDENDUM: Correction: \"Catheter present\" was improperly denoted on the technologist notes. Please note that in fact the urinary bladder is collapsed and no Julien catheter was present. Result Date: 8/19/2020  1. Large indeterminate anechoic cystic structure in the pelvis near the urinary bladder, the origin of which is unclear. Further evaluation with dedicated CT abdomen pelvis is recommended. 2. Nonvisualization of the ureteral jets are ureters. Urinary bladder is collapsed with Julien catheter present. 3. Fatty liver. Trace perihepatic fluid. 4. Unremarkable renal ultrasound. No hydronephrosis. Cta Head Neck W Contrast    Result Date: 8/18/2020  Calcification involving the carotid bulbs and proximal internal carotid arteries bilaterally with approximately 70% stenosis in the proximal left internal carotid artery by NASCET criteria. No significant stenosis or occlusion within the intracranial vascularity. Mra Neck Wo Contrast    Result Date: 8/18/2020  70% stenosis at the origin of the left internal carotid artery. No acute abnormality or flow-limiting stenosis of the major arteries of the head. Mri Brain Wo Contrast    Result Date: 8/18/2020  No acute intracranial abnormality.        ASSESSMENT & PLAN   Principal Problem:    Accidental fall  Active Problems:    Type 2 diabetes mellitus (HCC)    Falling episodes    Generalized weakness    RLS (restless legs syndrome)    Thrombocytopenia (HCC)    Confusion state    Anemia    Acute kidney injury superimposed on chronic kidney disease ([facility] [address]    Aphasia    NIKKI (acute kidney injury) ([facility] [address]    Acute cystitis without hematuria    Cellulitis of lower extremity  Resolved Problems:    * No resolved hospital problems. *    ASSESSMENT / PLAN: There is a 57-year-old female who presented with aphasia and found to have CT with 70% bilateral internall carotid artery. Admitted to inpatient status to monitor any residual deficits and evaluate patient    Aphasia secondary to metabolic encephalopathy versus CVA-  MRI showed no any acute intracranial abnormalities. Duplex internal carotid artery stenosis 50 to 69% of stenosis in bilateral internal carotid artery  Plan  Neuro onboard  Consult vascular surgery for bilateral internal carotid stenosis  Monitor renal function test  Resume Lasix  Chest x-ray as bilateral crackles are heard  Monitor sodium    Active problems    Type 2 diabetes mellitus  Hold home med. Blood sugar level trending down from 233-140  Insulin sliding scale  HypoGlycemic protocol     Generalized weakness history of CVA  Continue aspirin and statin    Polyneuropathy-tingling sensation over her bilateral legs continue gabapentin    Anemia secondary to variceal bleed treated with IV iron followed by oral iron    Epilepsy well-controlled with levetiracetam             DVT ppx : Heparin  GI ppx:     PT/OT: On board  Discharge Planning / SW: Ongoing    Marvin Brewer MD  Internal Medicine Resident, PGY-1  [facility];  [place], [place]  8/20/2020, 8:09 AM

## 2020-08-20 NOTE — PROGRESS NOTES
[facility] Neurology   [address] [place] [address]    Progress Note            Date:   8/20/2020  Patient name:  Loralee Paget  Date of admission:  8/18/2020  1:49 PM  MRN:   [id number]  Account:  [de-identified]  YOB: 1955  PCP:    Ramya Sue PA-C  Room:   [address] [place] [address]  Code Status:    Full Code    Chief Complaint:     Chief Complaint   Patient presents with    Aphasia     Transfer from [facility]       Interval hx: The Patient was seen and examined at bedside  AFVSS  No acute events overnight  Worked with PT and note reviewed. Otherwise no acute changes  States last seizure was over 4 years ago. Taking meds as prescribed. No new changes. Brief History of Present Illness:     Patient is a 58-year-old female who presented as a transfer from [facility] for further evaluation and stroke work-up. Patient has a history of diabetes, hyperlipidemia, prior stroke in 2010 with some mild residual left-sided weakness, seizure disorder  Patient lives in an assisted living facility. She reports having a fall. She mentioned that she was lightheaded and dizzy at the time of the fall. Does endorse head trauma however no LOC. With regards to the fall, history is slightly inconsistent is in one place she denies dizziness and another time she states she did have dizziness questionable mechanical fall. He also reports history of chronic lower extremity weakness however at this time she feels her left leg is weaker. Patient also reports a history of anemia. At [facility] prior to transfer patient was being treated for pneumonia and UTI. She started having aphasia and decision was made to transfer to [facility]. [place]. CT head at [facility] did reveal left occipital scalp hematoma.   No acute intracranial abnormality  CT C-spine with multilevel degenerative changes  CT thoracic spine with no acute fracture or listhesis    At [facility]. Doyle's, CTA head and neck, MRI/MRA mouth 2 times daily Indications: with 500 mg to make 1250 mg dose   Yes Historical Provider, MD   gabapentin (NEURONTIN) 100 MG capsule Take 100 mg by mouth 2 times daily. .   Yes Historical Provider, MD   levETIRAcetam (KEPPRA) 500 MG tablet Take 500 mg by mouth 2 times daily Indications: with 750 mg to make total dose 1250 mg    Yes Historical Provider, MD   insulin aspart (NOVOLOG) 100 UNIT/ML injection vial Inject into the skin Inject as per sliding scale before meals and at bedtime:    = 0 units; call physician if less than 70  151-200 = 2 units  201-250 = 4 units  251-300 = 6 units  301-350 = 8 units  351-400 = 10 units; call physician if greater than 400   Yes Historical Provider, MD   lisinopril-hydrochlorothiazide (PRINZIDE;ZESTORETIC) 10-12.5 MG per tablet Take 1 tablet by mouth daily   Yes Historical Provider, MD   omeprazole (PRILOSEC) 40 MG delayed release capsule Take 40 mg by mouth Daily    Yes Historical Provider, MD   simvastatin (ZOCOR) 20 MG tablet Take 20 mg by mouth nightly    Yes Historical Provider, MD   lamoTRIgine (LAMICTAL) 200 MG tablet Take 200 mg by mouth 2 times daily   Yes Historical Provider, MD   clotrimazole-betamethasone (LOTRISONE) 1-0.05 % cream Apply topically 2 times daily. 8/17/20   Rusty Huerta PA-C   nadolol (CORGARD) 20 MG tablet Take 1 tablet by mouth daily 8/10/20   Clevester Siemens, MD   ferrous sulfate (IRON 325) 325 (65 Fe) MG tablet Take 1 tablet by mouth every 12 hours 8/10/20   Clevester Siemens, MD   blood glucose monitor strips Test 4 times a day & as needed for symptoms of irregular blood glucose. Dispense sufficient amount for indicated testing frequency plus additional to accommodate PRN testing needs.  8/4/20   Rusty Huerta PA-C   Lancets MISC 1 each by Does not apply route 4 times daily 8/4/20   Rusty Huerta PA-C   glucose monitoring kit (FREESTYLE) monitoring kit 1 kit by Does not apply route daily 8/4/20   Rusty Huerta PA-C   Emollient (CERAVE) LOTN Apply topically 2 times daily Indications: bilateral lower legs    Historical Provider, MD   vitamin D (CHOLECALCIFEROL) 06416 UNIT CAPS Take 50,000 Units by mouth once a week Indications: Tuesdays     Historical Provider, MD   potassium chloride (KLOR-CON M) 10 MEQ extended release tablet Take 30 mEq by mouth daily     Historical Provider, MD   vitamin B-12 (CYANOCOBALAMIN) 500 MCG tablet Take 500 mcg by mouth daily    Historical Provider, MD   acetaminophen (TYLENOL) 325 MG tablet Take 650 mg by mouth 3 times daily as needed for Pain (mild)    Historical Provider, MD   Blood Glucose Monitoring Suppl FLAVIO Check blood sugars 3/day 11/30/17   Isabella Orellana MD   PARoxetine (PAXIL) 20 MG tablet Take 20 mg by mouth every morning    Historical Provider, MD   loratadine (CLARITIN) 10 MG tablet Take 10 mg by mouth daily    Historical Provider, MD   pramipexole (MIRAPEX) 1 MG tablet Take 1 mg by mouth nightly    Historical Provider, MD   busPIRone (BUSPAR) 10 MG tablet Take 10 mg by mouth 3 times daily     Historical Provider, MD        Allergies:     Codeine    Social History:     Tobacco:    reports that she has never smoked. She has never used smokeless tobacco.  Alcohol:      reports no history of alcohol use. Drug Use:  reports no history of drug use.     Family History:     Family History   Problem Relation Age of Onset    Diabetes Brother        Review of Systems:     ROS:  Constitutional  Negative for fever and chills    HEENT  Negative for ear discharge, ear pain, nosebleed    Eyes  Negative for photophobia, pain and discharge    Respiratory  Negative for hemoptysis and sputum    Cardiovascular  Negative for orthopnea, claudication and PND    Gastrointestinal  Negative for abdominal pain, diarrhea, blood in stool    Musculoskeletal  Negative for joint pain, negative for myalgia    Neurology Negative for seizures, loss of consciousness   Skin  Negative for rash or itching    Endo/heme/allergies Negative for polydipsia, environmental allergy    Psychiatric/behavioral  Negative for suicidal ideation.  Patient is not anxious        Physical Exam:   BP (!) 163/57   Pulse 64   Temp 97.4 °F (36.3 °C) (Oral)   Resp 18   Ht 5' (1.524 m)   Wt 284 lb 6.3 oz (129 kg)   SpO2 98%   BMI 55.54 kg/m²   Temp (24hrs), Av.4 °F (36.3 °C), Min:97.1 °F (36.2 °C), Max:97.5 °F (36.4 °C)    Recent Labs     20  1250 20  1621 20  2112 20  0814   POCGLU 184* 182* 259* 141*       Intake/Output Summary (Last 24 hours) at 2020 0839  Last data filed at 2020  Gross per 24 hour   Intake 200 ml   Output 1550 ml   Net -1350 ml         NEUROLOGIC EXAMINATION  GENERAL  Appears comfortable and in no distress   HEENT  NC/ AT   NECK  Supple and no bruits heard   MENTAL STATUS:  Alert, oriented, intact memory, no confusion, normal speech, normal language, no hallucination or delusion   CRANIAL NERVES: II     -      Visual fields intact to confrontation  III,IV,VI -  EOMs full, no afferent defect, no GRAY, no ptosis  V     -     Normal facial sensation  VII    -     Normal facial symmetry  VIII   -     Intact hearing  IX,X -     Symmetrical palate  XI    -     Symmetrical shoulder shrug  XII   -     Midline tongue, no atrophy    MOTOR FUNCTION: B/L UE 5/5, B/L LE 4/5  normal bulk, normal tone and no involuntary movements, no tremor   SENSORY FUNCTION:  Normal touch, normal pin, normal vibration, normal proprioception   CEREBELLAR FUNCTION:  Intact fine motor control over upper limbs   REFLEX FUNCTION:  Symmetric, no perverted reflex, no Babinski sign   STATION and GAIT  Not tested       Investigations:      Laboratory Testing:  Recent Results (from the past 24 hour(s))   VITAMIN B12 & FOLATE    Collection Time: 20 12:10 PM   Result Value Ref Range    Vitamin B-12 660 232 - 1245 pg/mL    Folate >20.0 >4.8 ng/mL   POC Glucose Fingerstick    Collection Time: 20 12:50 PM   Result Value Ref Range    POC Glucose 184 (H) 65 - 105 mg/dL   POC Glucose Fingerstick    Collection Time: 08/19/20  4:21 PM   Result Value Ref Range    POC Glucose 182 (H) 65 - 105 mg/dL   SODIUM    Collection Time: 08/19/20  4:32 PM   Result Value Ref Range    Sodium 144 135 - 144 mmol/L   Lipid Panel    Collection Time: 08/19/20  4:32 PM   Result Value Ref Range    Cholesterol 124 <200 mg/dL    HDL 42 >40 mg/dL    LDL Cholesterol 49 0 - 130 mg/dL    Chol/HDL Ratio 3.0 <5    Triglycerides 167 (H) <150 mg/dL    VLDL NOT REPORTED (H) 1 - 30 mg/dL   LEVETIRACETAM LEVEL    Collection Time: 08/19/20  4:32 PM   Result Value Ref Range    Levetiracetam Lvl 87 ug/mL   POC Glucose Fingerstick    Collection Time: 08/19/20  9:12 PM   Result Value Ref Range    POC Glucose 259 (H) 65 - 105 mg/dL   SODIUM    Collection Time: 08/20/20 12:24 AM   Result Value Ref Range    Sodium 147 (H) 135 - 144 mmol/L   POC Glucose Fingerstick    Collection Time: 08/20/20  8:14 AM   Result Value Ref Range    POC Glucose 141 (H) 65 - 105 mg/dL     CT Head WO Contrast 8/18/2020  CT C-spine WO Contrast 8/18/2020  Impression    No acute abnormality of the cervical spine.  Multilevel degenerative changes    in the cervical spine.         No acute intracranial abnormality. , no acute intracranial hemorrhage or mass    effect.         Left occipital scalp hematoma.  No acute displaced skull fracture. CT Thoracic Spine WO Contrast 8/18/2020  Impression    No acute fracture or listhesis in the thoracic spine.         Multilevel degenerative changes in the thoracic spine with bridging marginal    osteophytes which could relate to component of DISH. CT Head WO Contrast 8/18/2020  Impression    No acute intracranial abnormality.         Similar soft tissue hematoma in the left parietooccipital region.         Findings were discussed with Vance Mayer at 12:08 pm on 8/18/2020.      CTA Head Neck W Contrast 8/18/2020  Impression    Calcification involving the carotid bulbs and proximal internal carotid    arteries bilaterally with approximately 70% stenosis in the proximal left    internal carotid artery by NASCET criteria.         No significant stenosis or occlusion within the intracranial vascularity. MRI Brain WO Contrast 8/18/2020  Impression    No acute intracranial abnormality. MRA Head Neck WO Contrast 8/18/2020  Impression    70% stenosis at the origin of the left internal carotid artery.         No acute abnormality or flow-limiting stenosis of the major arteries of the    head. 2D Echo 8/19/2020  Summary  Technically difficult study. Patient unable to lay on left side due to  difficulty moving. Not all walls well visualized. Left ventricle is normal in size Global left ventricular systolic function  is normal Estimated ejection fraction is 60 % . Thickened mitral valve leaflets. Mild mitral regurgitation. Trivial tricuspid regurgitation. Mild pulmonary hypertension. Estimated right ventricular systolic pressure  is 99PIVO. IVC Increased diameter and impaired or no inspiratory variation indicating  elevated RA filling pressure (i.e. CVP) .      Signature  ----------------------------------------------------------------------------   Electronically signed by Antonina Bonner on 08/19/2020 09:38   AM  ----------------------------------------------------------------------------     ----------------------------------------------------------------------------   Electronically signed by Lenard Diaz(Interpreting physician) on 08/19/2020   11:18 AM    VL Dup Carotid Bilateral 8/19/2020  Summary          Simultaneous real time imaging utilizing B-Mode, color flow doppler and     spectral waveform analysis was performed on the bilateral extracerebral     vascular system.     The study demonstrates:          Right:     Internal carotid artery has a moderate, 50-69% stenosis based on     velocities.     The vertebral artery is patent with antegrade flow.          Left:     Internal carotid artery has a moderate, 50-69% stenosis based on     velocities.     The vertebral artery is patent with antegrade flow.          Signature          ----------------------------------------------------------------     Electronically signed by Mckenna Varela RVT(Sonographer)     on 08/19/2020 10:59 AM     ----------------------------------------------------------------          ----------------------------------------------------------------     Electronically signed by Dee Pearson(Interpreting     physician) on 08/19/2020 08:49 PM        Assessment :      Primary Problem  Accidental fall    Active Hospital Problems    Diagnosis Date Noted    NIKKI (acute kidney injury) ([facility] [N17.9]     Acute cystitis without hematuria [N30.00]     Cellulitis of lower extremity [P75.785]     Aphasia [R47.01] 08/18/2020    Acute kidney injury superimposed on chronic kidney disease ([facility] [N17.9, N18.9] 08/05/2020    Anemia [D64.9] 03/16/2020    Confusion state [F44.89] 12/01/2018    Falling episodes [R29.6] 12/14/2017    Accidental fall [W19. XXXA] 12/13/2017    Type 2 diabetes mellitus ([facility] [E11.9] 10/21/2017    Generalized weakness [R53.1] 03/15/2017    RLS (restless legs syndrome) [G25.81] 01/09/2017    Thrombocytopenia ([facility] [D69.6] 07/26/2016     75 y.o female with fall, mechanical vs syncopal  // Seizure disorder  // History of CVA in 2010  // Diabetic neuropathy  // Proximal L ICA 70% stenosis    Plan:     A1c 7.1, Lipid panel Chol 124, LDL 49, HDL 42  Continue ASA 81 and Lipitor as per endovascular  Resume DM meds and monitor per primary team  Home AED dose of Keppra 1250mg BID, presently due to elevated level, will hold Keppra dosage and recheck Keppra levels.   Since admission levels 99 -> 87 -> recheck pending  Continue Lamictal 200mg BID  PT/OT      Follow-up further recommendations after discussing the case with attending  The plan was discussed with the patient, patient's family and the medical staff.    Consultations:   Pietro Raya  IP CONSULT TO INTERNAL MEDICINE  IP CONSULT TO NEUROLOGY  IP CONSULT TO CASE MANAGEMENT    Erin Galindo MD   Neurology PGY-2  8/20/2020  8:39 AM

## 2020-08-20 NOTE — PLAN OF CARE
Problem: Falls - Risk of:  Goal: Will remain free from falls  Description: Will remain free from falls  8/20/2020 0656 by Chaz Campbell RN  Outcome: Not Met This Shift  8/19/2020 1826 by Miguel Wills RN  Outcome: Ongoing  Goal: Absence of physical injury  Description: Absence of physical injury  8/20/2020 0656 by Chaz Campbell RN  Outcome: Not Met This Shift  8/19/2020 1826 by Miguel Wills RN  Outcome: Ongoing     Problem: HEMODYNAMIC STATUS  Goal: Patient has stable vital signs and fluid balance  8/20/2020 0656 by Chaz Campbell RN  Outcome: Not Met This Shift  8/19/2020 1826 by Miguel Wills RN  Outcome: Ongoing

## 2020-08-21 NOTE — PROGRESS NOTES
Carotid dopplers in addition to present work up. Okay to resume aspirin and Lipitor. Past Medical History:     Past Medical History:   Diagnosis Date    Anemia     Cataract     GERD (gastroesophageal reflux disease)     Headache     Hyperlipidemia     Neuropathy     Osteoarthritis     Restless leg syndrome     Seizures (HCC) since age 10   Marissa Harris Short-term memory loss     Stroke (cerebrum) ([facility] 2010    Type 2 diabetes mellitus without complication ([facility] 2605        Past Surgical History:     Past Surgical History:   Procedure Laterality Date    CARDIAC CATHETERIZATION      CATARACT REMOVAL      CHOLECYSTECTOMY      COLONOSCOPY N/A 5/13/2019    COLONOSCOPY DIAGNOSTIC, POLYPECTOMIES performed by Tameka Galindo MD at [address]      cataract    TONSILLECTOMY      UPPER GASTROINTESTINAL ENDOSCOPY N/A 5/12/2019    EGD ESOPHAGOGASTRODUODENOSCOPY performed by Tameka Galindo MD at [address] [place] [address] N/A 3/18/2020    EGD WITH CAUTERIZATION OF [facility] (GAVE) USING ARGON performed by Teresa Arana MD at [address] [place] [address] N/A 8/7/2020    EGD BIOPSY BRUSH BX AND STOMACH FULGURATION WITH ARGON BEAM performed by Tameka Galindo MD at [address] [place] [address]        Medications Prior to Admission:     Prior to Admission medications    Medication Sig Start Date End Date Taking?  Authorizing Provider   cephALEXin (KEFLEX) 500 MG capsule Take 1 capsule by mouth 3 times daily for 7 days 8/18/20 8/25/20 Yes Bladimir Barry MD   bumetanide (BUMEX) 1 MG tablet Take 1 mg by mouth 3 times daily   Yes Historical Provider, MD   insulin glargine (BASAGLAR KWIKPEN) 100 UNIT/ML injection pen Inject 60 Units into the skin 2 times daily   Yes Historical Provider, MD   levETIRAcetam (KEPPRA) 750 MG tablet Take 750 mg by mouth 2 times daily Indications: with 500 mg to make 1250 mg dose   Yes Historical Provider, MD   gabapentin (NEURONTIN) 100 MG capsule Take 100 mg by mouth 2 times daily. .   Yes Historical Provider, MD   levETIRAcetam (KEPPRA) 500 MG tablet Take 500 mg by mouth 2 times daily Indications: with 750 mg to make total dose 1250 mg    Yes Historical Provider, MD   insulin aspart (NOVOLOG) 100 UNIT/ML injection vial Inject into the skin Inject as per sliding scale before meals and at bedtime:    = 0 units; call physician if less than 70  151-200 = 2 units  201-250 = 4 units  251-300 = 6 units  301-350 = 8 units  351-400 = 10 units; call physician if greater than 400   Yes Historical Provider, MD   lisinopril-hydrochlorothiazide (PRINZIDE;ZESTORETIC) 10-12.5 MG per tablet Take 1 tablet by mouth daily   Yes Historical Provider, MD   omeprazole (PRILOSEC) 40 MG delayed release capsule Take 40 mg by mouth Daily    Yes Historical Provider, MD   simvastatin (ZOCOR) 20 MG tablet Take 20 mg by mouth nightly    Yes Historical Provider, MD   lamoTRIgine (LAMICTAL) 200 MG tablet Take 200 mg by mouth 2 times daily   Yes Historical Provider, MD   clotrimazole-betamethasone (LOTRISONE) 1-0.05 % cream Apply topically 2 times daily. 8/17/20   Ramya Sue PA-C   nadolol (CORGARD) 20 MG tablet Take 1 tablet by mouth daily 8/10/20   Charlie Haque MD   ferrous sulfate (IRON 325) 325 (65 Fe) MG tablet Take 1 tablet by mouth every 12 hours 8/10/20   Charlie Haque MD   blood glucose monitor strips Test 4 times a day & as needed for symptoms of irregular blood glucose. Dispense sufficient amount for indicated testing frequency plus additional to accommodate PRN testing needs.  8/4/20   Ramya Sue PA-C   Lancets MISC 1 each by Does not apply route 4 times daily 8/4/20   Ramya Sue PA-C   glucose monitoring kit (FREESTYLE) monitoring kit 1 kit by Does not apply route daily 8/4/20   Ramya Sue PA-C   Emollient (CERAVE) LOTN Apply topically 2 times daily Indications: bilateral lower legs    Historical Provider, MD   vitamin D (CHOLECALCIFEROL) 87867 UNIT CAPS Take 50,000 Units by mouth once a week Indications: Tuesdays     Historical Provider, MD   potassium chloride (KLOR-CON M) 10 MEQ extended release tablet Take 30 mEq by mouth daily     Historical Provider, MD   vitamin B-12 (CYANOCOBALAMIN) 500 MCG tablet Take 500 mcg by mouth daily    Historical Provider, MD   acetaminophen (TYLENOL) 325 MG tablet Take 650 mg by mouth 3 times daily as needed for Pain (mild)    Historical Provider, MD   Blood Glucose Monitoring Suppl FLAVIO Check blood sugars 3/day 11/30/17   Lisa Trevizo MD   PARoxetine (PAXIL) 20 MG tablet Take 20 mg by mouth every morning    Historical Provider, MD   loratadine (CLARITIN) 10 MG tablet Take 10 mg by mouth daily    Historical Provider, MD   pramipexole (MIRAPEX) 1 MG tablet Take 1 mg by mouth nightly    Historical Provider, MD   busPIRone (BUSPAR) 10 MG tablet Take 10 mg by mouth 3 times daily     Historical Provider, MD        Allergies:     Codeine    Social History:     Tobacco:    reports that she has never smoked. She has never used smokeless tobacco.  Alcohol:      reports no history of alcohol use. Drug Use:  reports no history of drug use. Family History:     Family History   Problem Relation Age of Onset    Diabetes Brother        Review of Systems:     ROS:  Constitutional  Negative for fever and chills    HEENT  Negative for ear discharge, ear pain, nosebleed    Eyes  Negative for photophobia, pain and discharge    Respiratory  Negative for hemoptysis and sputum    Cardiovascular  Negative for orthopnea, claudication and PND    Gastrointestinal  Negative for abdominal pain, diarrhea, blood in stool    Musculoskeletal  Negative for joint pain, negative for myalgia    Neurology Negative for seizures, loss of consciousness   Skin  Negative for rash or itching    Endo/heme/allergies  Negative for polydipsia, environmental allergy    Psychiatric/behavioral  Negative for suicidal ideation.  Patient is (H) 98 - 107 mmol/L    CO2 23 20 - 31 mmol/L    Anion Gap 13 9 - 17 mmol/L    GFR Non-African American 23 (L) >60 mL/min    GFR  28 (L) >60 mL/min    GFR Comment          GFR Staging NOT REPORTED    CBC auto differential    Collection Time: 08/20/20  9:06 AM   Result Value Ref Range    WBC 4.1 3.5 - 11.3 k/uL    RBC 3.15 (L) 3.95 - 5.11 m/uL    Hemoglobin 8.1 (L) 11.9 - 15.1 g/dL    Hematocrit 29.9 (L) 36.3 - 47.1 %    MCV 94.9 82.6 - 102.9 fL    MCH 25.7 25.2 - 33.5 pg    MCHC 27.1 (L) 28.4 - 34.8 g/dL    RDW 18.9 (H) 11.8 - 14.4 %    Platelets 84 (L) 107 - 453 k/uL    MPV 11.2 8.1 - 13.5 fL    NRBC Automated 0.0 0.0 per 100 WBC    Differential Type NOT REPORTED     WBC Morphology NOT REPORTED     RBC Morphology NOT REPORTED     Platelet Estimate NOT REPORTED     Immature Granulocytes 1 (H) 0 %    Seg Neutrophils 69 (H) 36 - 65 %    Lymphocytes 21 (L) 24 - 43 %    Monocytes 6 3 - 12 %    Eosinophils % 2 1 - 4 %    Basophils 1 0 - 2 %    Absolute Immature Granulocyte 0.04 0.00 - 0.30 k/uL    Segs Absolute 2.83 1.50 - 8.10 k/uL    Absolute Lymph # 0.86 (L) 1.10 - 3.70 k/uL    Absolute Mono # 0.25 0.10 - 1.20 k/uL    Absolute Eos # 0.08 0.00 - 0.44 k/uL    Basophils Absolute 0.04 0.00 - 0.20 k/uL    Morphology ANISOCYTOSIS PRESENT    LEVETIRACETAM LEVEL    Collection Time: 08/20/20  9:06 AM   Result Value Ref Range    Levetiracetam Lvl 76 ug/mL   POC Glucose Fingerstick    Collection Time: 08/20/20 12:22 PM   Result Value Ref Range    POC Glucose 219 (H) 65 - 105 mg/dL   POC Glucose Fingerstick    Collection Time: 08/20/20  3:33 PM   Result Value Ref Range    POC Glucose 214 (H) 65 - 105 mg/dL   SODIUM, URINE, RANDOM    Collection Time: 08/20/20  7:35 PM   Result Value Ref Range    Sodium,Ur 49 mmol/L   CREATININE, RANDOM URINE    Collection Time: 08/20/20  7:35 PM   Result Value Ref Range    Creatinine, Ur 91.9 28.0 - 217.0 mg/dL   POC Glucose Fingerstick    Collection Time: 08/20/20  9:21 PM   Result Fingerstick    Collection Time: 08/21/20  8:10 AM   Result Value Ref Range    POC Glucose 77 65 - 105 mg/dL     CT Head WO Contrast 8/18/2020  CT C-spine WO Contrast 8/18/2020  Impression    No acute abnormality of the cervical spine.  Multilevel degenerative changes    in the cervical spine.         No acute intracranial abnormality. , no acute intracranial hemorrhage or mass    effect.         Left occipital scalp hematoma.  No acute displaced skull fracture. CT Thoracic Spine WO Contrast 8/18/2020  Impression    No acute fracture or listhesis in the thoracic spine.         Multilevel degenerative changes in the thoracic spine with bridging marginal    osteophytes which could relate to component of DISH. CT Head WO Contrast 8/18/2020  Impression    No acute intracranial abnormality.         Similar soft tissue hematoma in the left parietooccipital region.         Findings were discussed with Blanka Morales at 12:08 pm on 8/18/2020. CTA Head Neck W Contrast 8/18/2020  Impression    Calcification involving the carotid bulbs and proximal internal carotid    arteries bilaterally with approximately 70% stenosis in the proximal left    internal carotid artery by NASCET criteria.         No significant stenosis or occlusion within the intracranial vascularity. MRI Brain WO Contrast 8/18/2020  Impression    No acute intracranial abnormality. MRA Head Neck WO Contrast 8/18/2020  Impression    70% stenosis at the origin of the left internal carotid artery.         No acute abnormality or flow-limiting stenosis of the major arteries of the    head. 2D Echo 8/19/2020  Summary  Technically difficult study. Patient unable to lay on left side due to  difficulty moving. Not all walls well visualized. Left ventricle is normal in size Global left ventricular systolic function  is normal Estimated ejection fraction is 60 % . Thickened mitral valve leaflets. Mild mitral regurgitation.   Trivial tricuspid regurgitation. Mild pulmonary hypertension. Estimated right ventricular systolic pressure  is [id number]. IVC Increased diameter and impaired or no inspiratory variation indicating  elevated RA filling pressure (i.e. CVP) .      Signature  ----------------------------------------------------------------------------   Electronically signed by Vanna Cordova on 08/19/2020 09:38   AM  ----------------------------------------------------------------------------     ----------------------------------------------------------------------------   Electronically signed by Lenard Diaz(Interpreting physician) on 08/19/2020   11:18 AM    VL Dup Carotid Bilateral 8/19/2020  Summary          Simultaneous real time imaging utilizing B-Mode, color flow doppler and     spectral waveform analysis was performed on the bilateral extracerebral     vascular system.     The study demonstrates:          Right:     Internal carotid artery has a moderate, 50-69% stenosis based on     velocities.     The vertebral artery is patent with antegrade flow.          Left:     Internal carotid artery has a moderate, 50-69% stenosis based on     velocities.     The vertebral artery is patent with antegrade flow.          Signature          ----------------------------------------------------------------     Electronically signed by Jun Hutchison RVT(Sonographer)     on 08/19/2020 10:59 AM     ----------------------------------------------------------------          ----------------------------------------------------------------     Electronically signed by Dee Burns(Interpreting    [facility]) on 08/19/2020 08:49 PM        Assessment :      Primary Problem  Accidental fall    Active Hospital Problems    Diagnosis Date Noted    [facility] [N17.9]     Acute cystitis without hematuria [N30.00]     Cellulitis of lower extremity [F02.746]     Aphasia [R47.01] 08/18/2020    Acute kidney injury superimposed on chronic kidney disease ([facility] [N17.9, N18.9] 08/05/2020    Anemia [D64.9] 03/16/2020    Confusion state [F44.89] 12/01/2018    Falling episodes [R29.6] 12/14/2017    Accidental fall [W19. XXXA] 12/13/2017    Type 2 diabetes mellitus ([facility] [E11.9] 10/21/2017    Generalized weakness [R53.1] 03/15/2017    RLS (restless legs syndrome) [G25.81] 01/09/2017    Thrombocytopenia ([facility] [D69.6] 07/26/2016     75 y.o female with fall, mechanical vs syncopal  // Seizure disorder  // History of CVA in 2010  // Diabetic neuropathy  // Proximal L ICA 70% stenosis  // NIKKI on CKD 3  Plan:     A1c 7.1, Lipid panel Chol 124, LDL 49, HDL 42  Continue ASA 81 and Lipitor as per endovascular  Resume DM meds and monitor per primary team  Home AED dose of Keppra 1250mg BID, presently due to elevated level, will hold Keppra dosage and recheck Keppra levels. Since admission levels 99 -> 87 -> 76 -> 53  Due to CrCl of 26mL/min today, will renally adjust dose to 500mg BID once levels therapeutic and restart. Continue Lamictal 200mg BID  Discussed with patient regarding establishing with an epileptologist in [place]. She is amenable and will follow up. PT/OT      Follow-up further recommendations after discussing the case with attending  The plan was discussed with the patient, patient's family and the medical staff.      Sherren Lea, MD   Neurology PGY-2  8/21/2020  8:43 AM

## 2020-08-21 NOTE — CARE COORDINATION
Spoke to patient regarding transition plan. At this time she denies need for the skilled portion of [facility]. She states that she will be returning to the assisted living part. She informs me that she has oxygen at home and is requesting a walker.   Will notify physician     [id number] for walker is in chart, face to face need to be signed by attending

## 2020-08-21 NOTE — PLAN OF CARE
Problem: Falls - Risk of:  Goal: Will remain free from falls  Description: Will remain free from falls  8/21/2020 0523 by Padmini Huerta RN  Outcome: Ongoing  8/20/2020 1814 by Gavin Velez RN  Outcome: Ongoing  Goal: Absence of physical injury  Description: Absence of physical injury  8/21/2020 0523 by Padmini Huerta RN  Outcome: Ongoing  8/20/2020 1814 by Gavin Velez RN  Outcome: Ongoing   Patient remained free from injury. Patient verbalized understanding of need for the safety precautions. Demonstrates proper use of assistive devices. Bed remains in the lowest position. Call light remains within reach. [place] [facility] in use. Problem: HEMODYNAMIC STATUS  Goal: Patient has stable vital signs and fluid balance  8/21/2020 0523 by Padmini Huerta RN  Outcome: Ongoing  8/20/2020 1814 by Gavin Velez RN  Outcome: Ongoing     Problem: COMMUNICATION IMPAIRMENT  Goal: Ability to express needs and understand communication  8/21/2020 0523 by Padmini Huerta RN  Outcome: Ongoing  8/20/2020 1814 by Gavin Velez RN  Outcome: Ongoing     Problem: Pain:  Goal: Pain level will decrease  Description: Pain level will decrease  8/21/2020 0523 by Padmini Huerta RN  Outcome: Ongoing  8/20/2020 1814 by Gavin Velez RN  Outcome: Ongoing  Goal: Control of acute pain  Description: Control of acute pain  8/21/2020 0523 by Padmini Huerta RN  Outcome: Ongoing  8/20/2020 1814 by Gavin Velez RN  Outcome: Ongoing  Goal: Control of chronic pain  Description: Control of chronic pain  8/21/2020 0523 by Padmini Huerta RN  Outcome: Ongoing  8/20/2020 1814 by Gavin Velez RN  Outcome: Ongoing   Pain level assessment complete. Pt educated on pain scale and control interventions. PRN pain medication given per pt request. Pt instructed to call out with new onset of pain or unrelieved pain. Will continue to monitor.      Problem: Skin Integrity:  Goal: Will show no infection signs and symptoms  Description: Will show no infection signs and symptoms  8/21/2020 0523 by Tee Rowland RN  Outcome: Ongoing  8/20/2020 1814 by Elizabeth Farrell RN  Outcome: Ongoing  Goal: Absence of new skin breakdown  Description: Absence of new skin breakdown  8/21/2020 0523 by Tee Rowland RN  Outcome: Ongoing  8/20/2020 1814 by Elizabeth Farrell RN  Outcome: Ongoing     Problem: Musculor/Skeletal Functional Status  Goal: Highest potential functional level  8/21/2020 0523 by Tee Rowland RN  Outcome: Ongoing  8/20/2020 1814 by Elizabeth Farrell RN  Outcome: Ongoing  Goal: Absence of falls  8/21/2020 0523 by Tee Rowland RN  Outcome: Ongoing  8/20/2020 1814 by Elizabeth Farrell RN  Outcome: Ongoing

## 2020-08-21 NOTE — PROGRESS NOTES
Renal Progress Note    Patient :  Elvi Wilder; 75 y.o. MRN# [id number]  Location:  0379/8838-82  Attending:  Jonelle Ramírez MD  Admit Date:  8/18/2020   Hospital Day: 3      Subjective:     Patient was seen and examined. No new issues reported overnight. Serum creatinine increased to 2.61 mg/DL today. Electrolytes okay. Blood pressure okay. Urine output documented as 300 cc in the last 24 hours. Renal ultrasound 8/18/2020:  Right kidney 10.4 cm left kidney 9.3 cm. Impression:          1. Large indeterminate anechoic cystic structure in the pelvis near the   urinary bladder, the origin of which is unclear.  Further evaluation with   dedicated CT abdomen pelvis is recommended. 2. Nonvisualization of the ureteral jets are ureters.  Urinary bladder is   collapsed with Julien catheter present. 3. Fatty liver.  Trace perihepatic fluid. 4. Unremarkable renal ultrasound.  No hydronephrosis. Outpatient Medications:     Medications Prior to Admission: cephALEXin (KEFLEX) 500 MG capsule, Take 1 capsule by mouth 3 times daily for 7 days  bumetanide (BUMEX) 1 MG tablet, Take 1 mg by mouth 3 times daily  insulin glargine (BASAGLAR KWIKPEN) 100 UNIT/ML injection pen, Inject 60 Units into the skin 2 times daily  levETIRAcetam (KEPPRA) 750 MG tablet, Take 750 mg by mouth 2 times daily Indications: with 500 mg to make 1250 mg dose  gabapentin (NEURONTIN) 100 MG capsule, Take 100 mg by mouth 2 times daily. Kandis Hutchinson   levETIRAcetam (KEPPRA) 500 MG tablet, Take 500 mg by mouth 2 times daily Indications: with 750 mg to make total dose 1250 mg   insulin aspart (NOVOLOG) 100 UNIT/ML injection vial, Inject into the skin Inject as per sliding scale before meals and at bedtime:   = 0 units; call physician if less than 70 151-200 = 2 units 201-250 = 4 units 251-300 = 6 units 301-350 = 8 units 351-400 = 10 units; call physician if greater than 400  lisinopril-hydrochlorothiazide (PRINZIDE;ZESTORETIC) 10-12.5 MG per tablet, Take 1 tablet by mouth daily  omeprazole (PRILOSEC) 40 MG delayed release capsule, Take 40 mg by mouth Daily   simvastatin (ZOCOR) 20 MG tablet, Take 20 mg by mouth nightly   lamoTRIgine (LAMICTAL) 200 MG tablet, Take 200 mg by mouth 2 times daily  clotrimazole-betamethasone (LOTRISONE) 1-0.05 % cream, Apply topically 2 times daily. nadolol (CORGARD) 20 MG tablet, Take 1 tablet by mouth daily  ferrous sulfate (IRON 325) 325 (65 Fe) MG tablet, Take 1 tablet by mouth every 12 hours  blood glucose monitor strips, Test 4 times a day & as needed for symptoms of irregular blood glucose. Dispense sufficient amount for indicated testing frequency plus additional to accommodate PRN testing needs.   Lancets MISC, 1 each by Does not apply route 4 times daily  glucose monitoring kit (FREESTYLE) monitoring kit, 1 kit by Does not apply route daily  Emollient (CERAVE) LOTN, Apply topically 2 times daily Indications: bilateral lower legs  vitamin D (CHOLECALCIFEROL) 45503 UNIT CAPS, Take 50,000 Units by mouth once a week Indications: Tuesdays   potassium chloride (KLOR-CON M) 10 MEQ extended release tablet, Take 30 mEq by mouth daily   vitamin B-12 (CYANOCOBALAMIN) 500 MCG tablet, Take 500 mcg by mouth daily  acetaminophen (TYLENOL) 325 MG tablet, Take 650 mg by mouth 3 times daily as needed for Pain (mild)  Blood Glucose Monitoring Suppl FLAVIO, Check blood sugars 3/day  PARoxetine (PAXIL) 20 MG tablet, Take 20 mg by mouth every morning  loratadine (CLARITIN) 10 MG tablet, Take 10 mg by mouth daily  pramipexole (MIRAPEX) 1 MG tablet, Take 1 mg by mouth nightly  busPIRone (BUSPAR) 10 MG tablet, Take 10 mg by mouth 3 times daily     Current Medications:     Scheduled Meds:    [START ON 8/22/2020] atorvastatin  80 mg Oral Nightly    heparin (porcine)  5,000 Units Subcutaneous 3 times per day    amLODIPine  10 mg Oral Daily    furosemide  40 mg Intravenous BID    cefTRIAXone (ROCEPHIN) IV  1 g Intravenous Q24H    lamoTRIgine  200 mg Oral BID    [Held by provider] levETIRAcetam  750 mg Oral BID    [Held by provider] levETIRAcetam  500 mg Oral BID    sodium chloride flush  10 mL Intravenous 2 times per day    insulin glargine  20 Units Subcutaneous BID    insulin lispro  0-12 Units Subcutaneous TID WC    insulin lispro  0-6 Units Subcutaneous Nightly    aspirin  81 mg Oral Daily     Continuous Infusions:    dextrose       PRN Meds:  sodium chloride flush, acetaminophen **OR** acetaminophen, polyethylene glycol, potassium chloride **OR** potassium alternative oral replacement **OR** potassium chloride, glucose, dextrose, glucagon (rDNA), dextrose    Input/Output:       I/O last 3 completed shifts:  In: -   Out: 300 [Urine:300]. Patient Vitals for the past 96 hrs (Last 3 readings):   Weight   20 1815 284 lb 6.3 oz (129 kg)       Vital Signs:   Temperature:  Temp: 97.8 °F (36.6 °C)  TMax:   Temp (24hrs), Av.2 °F (36.8 °C), Min:97.7 °F (36.5 °C), Max:98.8 °F (37.1 °C)    Respirations:  Resp: 15  Pulse:   Pulse: 61  BP:    BP: (!) 155/62  BP Range: Systolic (11PFK), ZKF:796 , Min:135 , UBW:527       Diastolic (24DZK), YOB:83, Min:49, Max:62      Physical Examination:     General:  AAO x 3, speaking in full sentences, no accessory muscle use. HEENT: Atraumatic, normocephalic, no throat congestion, moist mucosa. Eyes:   Pupils equal, round and reactive to light, EOMI. Neck:   Supple  Chest:   Bilateral vesicular breath sounds, no rales or wheezes. Cardiac:  S1 S2 RR, no murmurs, gallops or rubs. Abdomen: Soft, obese, non-tender, no masses or organomegaly, BS audible. :   No suprapubic or flank tenderness. Neuro:  AAO x 3, No FND. SKIN:  No rashes, good skin turgor. Extremities:  Positive edema.     Labs:       Recent Labs     20  0335 20  0906 20  0401   WBC 4.6 4.1 4.3   RBC 2.97* 3.15* 2.99*   HGB 7.5* 8.1* 7.6*   HCT 27.7* 29.9* 30.5*   MCV 93.3 94.9 102.0   MCH 25.3 25.7 25.4   MCHC 27.1* 27.1* Acute kidney injury likely secondary to contrast exposure. 2. Chronic kidney disease stage III likely due to nephrosclerosis approaching stage IV with a baseline creatinine of 1.8 to 2 mg/dL. 2.  Hepatic cirrhosis/FRANCIS. 3.  Portal hypertension  4. Lower extremity edema and fluid retention  5. Body habitus suggestive of obstructive sleep apnea  6. Hypertension  7. Anemia of chronic disease  8. Diabetes type 2.  9.  Large indeterminate anechoic cystic structure in the pelvis near the urinary bladder. Origin not clear. Plan:   1. Continue Lasix 40 mg IV twice daily. 2.  Will check urinalysis with microscopy and urine protein creatinine ratio to rule out any underlying proteinuria. 3.  Monitor strict I's and O's and renal function. 4.  Check CT abdomen pelvis without contrast for large indeterminate anechoic cystic structure in the pelvis near urinary bladder as recommended by radiology from renal ultrasound. 5.  BMP in a.m.  6.  Will follow. Patient's nurse was updated. Nutrition   Please ensure that patient is on a renal diet/TF. Avoid nephrotoxic drugs/contrast exposure. We will continue to follow along with you. Shakeel Shaw MD  [facility]     This note is created with the assistance of a speech-recognition program. While intending to generate a document that actually reflects the content of the visit, no guarantees can be provided that every mistake has been identified and corrected by editing.

## 2020-08-21 NOTE — PROGRESS NOTES
Physical Therapy  Facility/Department: Formerly named [facility] NEURO  Daily Treatment Note  NAME: Juan Carlos Laureano  : 1955  MRN: [id number]    Date of Service: 2020  Chief Complaint   Patient presents with    Aphasia     Transfer from [facility]       Discharge Recommendations:  Patient would benefit from continued therapy after discharge        Assessment   Body structures, Functions, Activity limitations: Decreased functional mobility ; Decreased balance;Decreased strength;Decreased safe awareness;Decreased coordination;Decreased endurance  Assessment: Impaired judgment and safety demonstrated during gait. Preexisting weakness compounded by new weakness limits function. Patient needs further PT to regain functional independence. Prognosis: Good  Decision Making: Medium Complexity  PT Education: Plan of Care;General Safety  REQUIRES PT FOLLOW UP: Yes  Activity Tolerance  Activity Tolerance: Patient limited by fatigue;Patient limited by endurance     Patient Diagnosis(es): The primary encounter diagnosis was NIKKI (acute kidney injury) ([address] Diagnoses of Acute cystitis without hematuria and Cellulitis of lower extremity, unspecified laterality were also pertinent to this visit. has a past medical history of Anemia, Cataract, GERD (gastroesophageal reflux disease), Headache, Hyperlipidemia, Neuropathy, Osteoarthritis, Restless leg syndrome, Seizures ([address] Short-term memory loss, Stroke (cerebrum) ([address] and Type 2 diabetes mellitus without complication ([address] has a past surgical history that includes Cholecystectomy; Tonsillectomy; Cataract removal; eye surgery; Cardiac catheterization; Upper gastrointestinal endoscopy (N/A, 2019); Colonoscopy (N/A, 2019); Upper gastrointestinal endoscopy (N/A, 3/18/2020); and Upper gastrointestinal endoscopy (N/A, 2020).     Restrictions  Restrictions/Precautions  Restrictions/Precautions: Fall Risk, Up as Tolerated  Required Braces or Orthoses?: No  Position Activity Restriction  Other position/activity restrictions: Up with assistance, pt reports L sided weakness chronic, R side getting \"more weak\", 1 L O2. CT:  hematoma left parietooccipital region. Hgb 7.5  Subjective   General  Family / Caregiver Present: No  Subjective  Subjective: Pt found in bed upon arrival. Pt and RN agreeable to therapy. Pt pleasant and cooperative through session. Pain Screening  Patient Currently in Pain: Denies  Pain Assessment  Pain Assessment: 0-10  Pain Level: 0  Vital Signs  Patient Currently in Pain: Denies       Orientation  Orientation  Overall Orientation Status: Within Functional Limits  Cognition   Cognition  Following Commands: Follows one step commands with repetition  Attention Span: Difficulty attending to directions  Safety Judgement: Decreased awareness of need for assistance  Objective   Bed mobility  Rolling to Left: Minimal assistance(HHA for torso)  Sit to Supine: Minimal assistance(HHA for torso)  Scooting: Contact guard assistance  Transfers  Sit to Stand: Contact guard assistance(Needed min A for STS x3 reps)  Stand to sit: Contact guard assistance  Ambulation  Ambulation?: Yes  Ambulation 1  Surface: level tile  Device: Rolling Walker  Assistance: Contact guard assistance  Gait Deviations: Slow Milvia;Decreased step length;Decreased step height;Deviated path(Went towards left side often)  Distance: 100ft x2     Balance  Posture: Fair  Sitting - Static: Fair;+  Sitting - Dynamic: Fair;+  Standing - Static: Fair;+  Standing - Dynamic: Fair    Exercises: sitting in chair, x10 reps each leg.  Yellow band on R, L against gravity through AROM  -Ankle DF  -Leg extension  -Leg flexion  -Bicep curl  -Tricep extension  -punches  5 STS with CGA-min A    Pt oxygen levels:  Initially at supine: 95  Sitting EOB: 92  Standing static: 92  Walking: Lowest was 90  Sitting in chair after session: 95    Goals  Short term goals  Time Frame for Short term goals: 14 visits  Short term goal 1: Supine to/from sit with SBA. Short term goal 2: Sit to/from stand SBA. Short term goal 3: Ambulate 150' with walker with SBA, able to avoid obstacles. Short term goal 4: Improve LE strength to support function as seen by completion of 10 reps standing LE exercise. Patient Goals   Patient goals : Go home    Plan    Plan  Times per week: 5-6x/week  Times per day: Daily  Current Treatment Recommendations: Functional Mobility Training, Transfer Training, Gait Training, Balance Training, Endurance Training, Home Exercise Program, Strengthening  Safety Devices  Type of devices: Left in chair, Chair alarm in place, Call light within reach, Nurse notified, Patient at risk for falls     Therapy Time   Individual Concurrent Group Co-treatment   Time In 1042         Time Out 1140         Minutes 58         Timed Code Treatment Minutes: 39 Minutes       Stefanie Franklin This treatment/evaluation completed by signing SPT. Signing PT agrees with treatment and documentation.

## 2020-08-21 NOTE — PROGRESS NOTES
Arie Meeks was evaluated today and a DME order was entered for a wheeled walker because she requires this to successfully complete daily living tasks of eating, bathing and ambulating. A wheeled walker is necessary due to the patient's unsteady gait, upper body weakness, and inability to  an ambulation device; and she can ambulate only by pushing a walker instead of a lesser assistive device such as a cane, crutch, or standard walker. The need for this equipment was discussed with the patient and she understands and is in agreement.     William Dye MD  PGY-2, Internal Medicine Resident  [address] [facility]  8/21/2020 3:28 PM

## 2020-08-21 NOTE — PLAN OF CARE
Problem: Falls - Risk of:  Goal: Will remain free from falls  8/21/2020 1416 by Severo Collum, RN  Outcome: Met This Shift  8/21/2020 0523 by Lynn Duong RN  Outcome: Ongoing  Goal: Absence of physical injury  8/21/2020 1416 by Severo Collum, RN  Outcome: Met This Shift  8/21/2020 0523 by Lynn Duong RN  Outcome: Ongoing     Problem: HEMODYNAMIC STATUS  Goal: Patient has stable vital signs and fluid balance  8/21/2020 1416 by Severo Collum, RN  Outcome: Ongoing  8/21/2020 0523 by Lynn Duong RN  Outcome: Ongoing     Problem: COMMUNICATION IMPAIRMENT  Goal: Ability to express needs and understand communication  8/21/2020 1416 by Severo Collum, RN  Outcome: Ongoing  8/21/2020 0523 by Lynn Duong RN  Outcome: Ongoing     Problem: Pain:  Goal: Pain level will decrease  8/21/2020 1416 by Severo Collum, RN  Outcome: Ongoing  8/21/2020 0523 by Lynn Duong RN  Outcome: Ongoing  Goal: Control of acute pain  8/21/2020 1416 by Severo Collum, RN  Outcome: Ongoing  8/21/2020 0523 by Lynn Duong RN  Outcome: Ongoing  Goal: Control of chronic pain  8/21/2020 1416 by Severo Collum, RN  Outcome: Ongoing  8/21/2020 0523 by Lynn Duong RN  Outcome: Ongoing     Problem: Skin Integrity:  Goal: Will show no infection signs and symptoms  8/21/2020 1416 by Severo Collum, RN  Outcome: Ongoing  8/21/2020 0523 by Lynn Duong RN  Outcome: Ongoing  Goal: Absence of new skin breakdown  8/21/2020 1416 by Severo Collum, RN  Outcome: Ongoing  8/21/2020 0523 by Lynn Duong RN  Outcome: Ongoing     Problem: Musculor/Skeletal Functional Status  Goal: Highest potential functional level  8/21/2020 1416 by Severo Collum, RN  Outcome: Ongoing  8/21/2020 0523 by Lynn Duong RN  Outcome: Ongoing  Goal: Absence of falls  8/21/2020 1416 by Severo Collum, RN  Outcome: Ongoing  8/21/2020 0523 by Lynn Duong RN  Outcome: Ongoing

## 2020-08-21 NOTE — PROGRESS NOTES
oxygen by her PCP due to her oxygen desat into high 80s.    On physical examination, patient has bruises on her back due to fall. Left hand swollen erythematous. Patient has hematoma on the back of her head. No neurovascular deficit. She has previous history of recurrent falls and concussion.     Past medical history: Type 2 diabetes mellitus, history of CVA, history of recurrent accidental falls, diabetic neuropathy and morbid obesity     Imaging: Ct head at [facility]: Negative for acute intracranial pathology. Left occipital scalp hematoma present  CT cervical spine: Unremarkable CT thoracic spine: Degenerative changes. Chest x-ray: Basilar atelectasis  Repeat CT head: No acute intracranial abnormality. CTA head and neck: Left proximal ICA stenosis   MRI brain: No acute intracranial abnormality  MRA head and neck: 70% stenosis left internal carotid artery.     In the ED patient was evaluated by stroke team and neuro endovascular was consulted due to 70% left cervical ICA stenosis. OBJECTIVE     Vital Signs:  BP (!) 155/62   Pulse 61   Temp 98.7 °F (37.1 °C) (Oral)   Resp 15   Ht 5' (1.524 m)   Wt 284 lb 6.3 oz (129 kg)   SpO2 100%   BMI 55.54 kg/m²     Temp (24hrs), Av.1 °F (36.7 °C), Min:97.6 °F (36.4 °C), Max:98.8 °F (37.1 °C)    In: -   Out: 300 [Urine:300]    Physical Exam:  Constitutional: This is a well developed, well nourished, Greater than 40 - Morbid Obesity / Extreme Obesity / Grade III 75y.o. year old female who is alert, oriented, cooperative and in no apparent distress. Head:normocephalic and atraumatic. Neck: Supple without thyromegaly. Respiratory: Bibasilar crackles present. Cardiovascular: Regular without murmur, clicks, gallops or rubs. Abdomen: Slightly rounded and soft without organomegaly. No rebound, rigidity or guarding was appreciated. Extremities: Bilateral pitting pedal edema present.   Bilateral venous stasis dermatitis present. Neurological/Psychiatric: The patient's general behavior, level of consciousness, thought content and emotional status is normal.        Medications:  Scheduled Medications:    [START ON 8/22/2020] atorvastatin  80 mg Oral Nightly    amLODIPine  10 mg Oral Daily    furosemide  40 mg Intravenous BID    cefTRIAXone (ROCEPHIN) IV  1 g Intravenous Q24H    lamoTRIgine  200 mg Oral BID    [Held by provider] levETIRAcetam  750 mg Oral BID    [Held by provider] levETIRAcetam  500 mg Oral BID    sodium chloride flush  10 mL Intravenous 2 times per day    insulin glargine  20 Units Subcutaneous BID    insulin lispro  0-12 Units Subcutaneous TID WC    insulin lispro  0-6 Units Subcutaneous Nightly    aspirin  81 mg Oral Daily     Continuous Infusions:    dextrose       PRN Medicationssodium chloride flush, 10 mL, PRN  acetaminophen, 650 mg, Q6H PRN    Or  acetaminophen, 650 mg, Q6H PRN  polyethylene glycol, 17 g, Daily PRN  potassium chloride, 40 mEq, PRN    Or  potassium alternative oral replacement, 40 mEq, PRN    Or  potassium chloride, 10 mEq, PRN  glucose, 15 g, PRN  dextrose, 12.5 g, PRN  glucagon (rDNA), 1 mg, PRN  dextrose, 100 mL/hr, PRN        Diagnostic Labs:  CBC:   Recent Labs     08/19/20  0335 08/20/20  0906 08/21/20  0401   WBC 4.6 4.1 4.3   RBC 2.97* 3.15* 2.99*   HGB 7.5* 8.1* 7.6*   HCT 27.7* 29.9* 30.5*   MCV 93.3 94.9 102.0   RDW 18.9* 18.9* 18.5*   PLT 82* 84* 67*     BMP:   Recent Labs     08/19/20  0335  08/20/20  0024 08/20/20  0906 08/21/20  0401   *   < > 147* 145* 142   K 4.3  --   --  4.7 4.5   *  --   --  109* 107   CO2 28  --   --  23 22   BUN 53*  --   --  55* 57*   CREATININE 1.81*  --   --  2.16* 2.61*    < > = values in this interval not displayed. BNP: No results for input(s): BNP in the last 72 hours. PT/INR: No results for input(s): PROTIME, INR in the last 72 hours. APTT: No results for input(s): APTT in the last 72 hours.   CARDIAC ENZYMES: No results for input(s): CKMB, CKMBINDEX, TROPONINI in the last 72 hours. Invalid input(s): CKTOTAL;3  FASTING LIPID PANEL:  Lab Results   Component Value Date    CHOL 124 08/19/2020    HDL 42 08/19/2020    TRIG 167 (H) 08/19/2020     LIVER PROFILE:   Recent Labs     08/18/20  2100   AST 28   ALT 13   BILIDIR 0.12   BILITOT 0.30   ALKPHOS 85      MICROBIOLOGY:   Lab Results   Component Value Date/Time    CULTURE NO SIGNIFICANT GROWTH 03/16/2020 04:30 PM       Imaging:    Xr Chest (2 Vw)    Result Date: 8/18/2020  Questionable basilar atelectasis versus pneumonia. Xr Hand Left (min 3 Views)    Result Date: 8/18/2020  No acute displaced fracture or malalignment in the left hand. Degenerative osseous changes. Suggestion of osteopenia. Ct Head Wo Contrast    Result Date: 8/18/2020  No acute intracranial abnormality. Similar soft tissue hematoma in the left parietooccipital region. Findings were discussed with Corby Calderón at 12:08 pm on 8/18/2020. Ct Head Wo Contrast    Result Date: 8/18/2020  No acute abnormality of the cervical spine. Multilevel degenerative changes in the cervical spine. No acute intracranial abnormality. , no acute intracranial hemorrhage or mass effect. Left occipital scalp hematoma. No acute displaced skull fracture. Ct Cervical Spine Wo Contrast    Result Date: 8/18/2020  No acute abnormality of the cervical spine. Multilevel degenerative changes in the cervical spine. No acute intracranial abnormality. , no acute intracranial hemorrhage or mass effect. Left occipital scalp hematoma. No acute displaced skull fracture. Ct Thoracic Spine Wo Contrast    Result Date: 8/18/2020  No acute fracture or listhesis in the thoracic spine. Multilevel degenerative changes in the thoracic spine with bridging marginal osteophytes which could relate to component of DISH. Mra Head Wo Contrast    Result Date: 8/18/2020  70% stenosis at the origin of the left internal carotid artery.  No

## 2020-08-22 NOTE — PROGRESS NOTES
Speech Language Pathology  Facility/Department: [facility] NEURO  Initial Speech/Language/Cognitive Assessment    NAME: Loralee Paget  : 1955   MRN: [id number]  ADMISSION DATE: 2020  ADMITTING DIAGNOSIS: has Type 2 diabetes mellitus ([address] Breakthrough seizure ([address] CVA, old, hemiparesis ([address] Accidental fall; Falling episodes; Cerebral concussion; Cervical spinal stenosis; Diabetic polyneuropathy associated with type 2 diabetes mellitus ([address] History of cerebral infarction; Seizure disorder ([address] Depression with anxiety; Dizziness; Generalized weakness; GERD (gastroesophageal reflux disease); H/O falling; Hyperglycemia; Hyponatremia; Morbid obesity with BMI of 40.0-44.9, adult ([address] Pure hypercholesterolemia; RLS (restless legs syndrome); Thrombocytopenia ([address] Altered mental status; Confusion state; Gait disturbance; GI bleed; Polyp of colon; Postmenopausal bleeding; Anemia; Elevated alkaline phosphatase level; GAVE (gastric antral vascular ectasia); Esophageal varices determined by endoscopy ([address] Atypical glandular cells of undetermined significance (SLICK) on cervical Pap smear (NOS); Symptomatic anemia; Breast mass in female; Hematuria; Acute kidney injury superimposed on chronic kidney disease ([address] FRANCIS (nonalcoholic steatohepatitis); Pneumonia; Head injury, acute, sequela bruising; Expressive aphasia new ; Aphasia; NIKKI (acute kidney injury) ([address] Acute cystitis without hematuria; and Cellulitis of lower extremity on their problem list.    Date of Eval: 2020   Evaluating Therapist: MAGALIS Ruiz    RECENT RESULTS MRI of Brain: (  2020  )    Impression    No acute intracranial abnormality.                Primary Complaint: Per chart, The patient is a pleasant 75 y.o. female who has history of type II DM, falling episode, CVA, diabetic polyneuropathy of presents with a chief complaint of aphasia (Unable to complete a sentence) presents 1 day.  It started suddenly and is resolved now. She has history of fall today resulting hematoma and laceration to the occipital part of the head. She has history of fecal and urinary incontinence after left-sided stroke 3 years back. she has history of palpitation to episode 1 day before fall. has history of being treated for anemia secondary to esophageal varices 1 week back. She denies any abnormal movement of the body, loss of consciousness, headache, shortness of breath, chest painl, fevers, nausea and vomiting. Pain:  Pain Assessment  Pain Assessment: 0-10  Pain Level: 0    Assessment: Pt presents with slight-mild cognitive deficits characterized by difficulty with delayed recall. Pt reports short term memory difficulty for quite some time, with difficulty remembering events of the day and days before. No dysarthria or oral motor deficits noted. Pt provided written list of memory compensatory strategies & provided education re: use of strategies to improve short term memory. ST to follow up to address noted deficits. Results & recommendations reviewed with pt who verbalized understanding & reported to RN. Recommendations:  Requires SLP Intervention: Yes  Duration/Frequency of Treatment: 3-5x per week  D/C Recommendations: No therapy recommended at discharge. Plan:   Goals:  Short-term Goals  Goal 1: Pt will recall 3-5 units with distractions with 90% accuracy. Goal 2: Pt will utilize memory compensatory strategies to improve recall. Patient/family involved in developing goals and treatment plan: yes    Subjective:  General  Chart Reviewed: Yes  Family / Caregiver Present: No  Social/Functional History  Type of Home: Assisted living  Vision  Vision: Within Functional Limits  Hearing  Hearing: Within functional limits           Objective:     Oral/Motor  Oral Motor:  Within functional limits    Auditory Comprehension  Comprehension: Within Functional Limits    Expression  Primary Mode of Expression: Verbal    Verbal Expression  Verbal Expression: Within functional limits    Motor Speech  Motor Speech:  Within Functional Limits    Cognition:      Orientation  Overall Orientation Status: Within Normal Limits  Attention  Attention: Within Functional Limits  Memory  Memory: Exceptions to [facility]  Short-term Memory: Mild(Delayed Recall: 2/3; 3/3)  Problem Solving  Problem Solving: Within Functional Limits  Abstract Reasoning  Abstract Reasoning: Within Functional Limits  Safety/Judgement  Safety/Judgement: Within Functional Limits  Verbal Sequencing: WFL  Thought Organization: [facility]  Word Generation: [facility]    Prognosis:  Speech Therapy Prognosis  Prognosis: Good  Individuals consulted  Consulted and agree with results and recommendations: Patient;RN    Education:  Patient Education: yes  Patient Education Response: Verbalizes understanding          Therapy Time:   Individual Concurrent Group Co-treatment   Time In 0845         Time Out 0857         Minutes [address] [facility] [id number] [place] [address]    8/22/2020 9:35 AM

## 2020-08-22 NOTE — CONSULTS
[facility]    Patient Name: Watson Jorgensen     Patient : 1955  Room/Bed: 7153/0315-34  Admission Date/Time: 2020  1:49 PM  Primary Care Physician: Sarah Baez PA-C    Consulting Provider: Anna Beckett MD  Reason for Consult: Postmenopausal bleeding    CC:   Chief Complaint   Patient presents with    Aphasia     Transfer from [facility]                HPI: Watson Jorgensen is a 75 y.o. postmenopausal female  who was admitted to the hospital after falling at home. She was transferred here from [facility] due to aphasia. It was found that she had 70% stenosis in bilateral internal carotid arteries. She has internal medicine and nephrology following her for metabolic encephalopathy, NIKKI superimposed on chronic kidney disease, type two diabetes & cellulitis on her lower extremities. She recently was hospitalized for rectal bleeding and it was found that she had bleeding esophageal varices which were cauterized on 20. OBGYN team was consulted due to new onset vaginal bleeding. She noticed the bleeding two days ago. Reports she has periods of on and off vaginal bleeding since the beginning of . Prior to that she has not had vaginal bleeding since menopause at 46. States she is not saturating through any pads. She mostly notices the bleeding when she uses the rest room. RN confirmed that she has not soaked through the peripads on her bed. She denies any pelvic pain, cramping or abnormal discharge. She does have an odor when she urinates but denies dysuria. Reports her urine sometimes has an orange tinge to it. She has a chronic hx of fecal and urinary incontinence. She recently established OBGYN care in May 2020 with Dr Mally Gloria where her pap smear was performed showing atypical glandular cells present (endometrial cells).  Colposcopy with endometrial biopsy was attempted in the office but patient declined and requested for exam to be done under general anesthesia. She was scheduled for [facility]  Hysteroscopy but needed medical clearance. She reports she has had great GYN care her entire life and never had an abnormal pap smear before. She denies any personal hx of GYN cancer. Reports her father had colon cancer. She states she started her periods when she was 3years old and they have always been abnormal. She states they used to occur 1-2 weeks at a time and were very heavy. Denies ever having pelvic surgery and denies ever being on birth control. She was never pregnant, has never been sexually active and denies ever having an STI. Also reports that she was supposed to get a mammogram with [facility] on 8/19/20 due to a newly found right breast lump. Denies any breast pain today. She has a history of  GERD, epilepsy and diabetic neuropathy. She denies using any tobacco products. REVIEW OF SYSTEMS:   Constitutional: negative fever, negative chills, negative weight changes   HEENT: negative visual disturbances, negative headaches   Breast: Negative breast abnormalities, pos R breast lump, negative nipple discharge  Respiratory: negative dyspnea, negative cough, negative SOB  Cardiovascular: negative chest pain,  negative palpitations   Gastrointestinal: negative abdominal pain,  negative N/V, negative diarrhea   Genitourinary: negative dysuria, negative hematuria, negative urinary incontinence, pos vaginal bleeding  Dermatological: negative rash, negative pruritis   Hematologic: negative bruising  Immunologic/Lymphatic: negative recent illness, negative recent sick contact  Musculoskeletal: negative back pain, negative myalgias   Neurological:  negative dizziness, negative migraines  Behavior/Psych: negative depression, negative anxiety   _______________________________________________________________________    GYNECOLOGICAL HISTORY:  Age of Menarche: 7  She has never had regular periods, has a history of heavy periods.   Age of Menopause: 46 Sexually Active: no   STD History: no past history    Pap History: Last abnormal pap: 2020  Colposcopy History:declines     Permanent Sterilization: denies  Reversible Birth Control: denies  Hormone Replacement Exposure: denies    OBSTETRICAL HISTORY:   OB History    Para Term  AB Living   0 0 0 0 0 0   SAB TAB Ectopic Molar Multiple Live Births   0 0 0 0 0 0       PAST MEDICAL HISTORY:   has a past medical history of Anemia, Cataract, GERD (gastroesophageal reflux disease), Headache, Hyperlipidemia, Neuropathy, Osteoarthritis, Restless leg syndrome, Seizures ([address] Short-term memory loss, Stroke (cerebrum) ([facility] [address] and Type 2 diabetes mellitus without complication ([facility] [address] PAST SURGICAL HISTORY:   has a past surgical history that includes Cholecystectomy; Tonsillectomy; Cataract removal; eye surgery; Cardiac catheterization; Upper gastrointestinal endoscopy (N/A, 2019); Colonoscopy (N/A, 2019); Upper gastrointestinal endoscopy (N/A, 3/18/2020); and Upper gastrointestinal endoscopy (N/A, 2020).     ALLERGIES:  Allergies as of 2020 - Review Complete 2020   Allergen Reaction Noted    Codeine Hives and Shortness Of Breath 05/10/2017       MEDICATIONS:  Current Facility-Administered Medications   Medication Dose Route Frequency Provider Last Rate Last Dose    hydrALAZINE (APRESOLINE) tablet 25 mg  25 mg Oral 3 times per day Chaim Nunez MD        atorvastatin (LIPITOR) tablet 80 mg  80 mg Oral Nightly Chaim Nunez MD        heparin (porcine) injection 5,000 Units  5,000 Units Subcutaneous 3 times per day Chaim Nunez MD   5,000 Units at 20 1214    amLODIPine (NORVASC) tablet 10 mg  10 mg Oral Daily Chaim Nunez MD   10 mg at 20 0853    furosemide (LASIX) injection 40 mg  40 mg Intravenous BID James Mae MD   40 mg at 20 0853    cefTRIAXone (ROCEPHIN) 1 g IVPB in 50 mL D5W minibag  1 g Intravenous Q24H Chaim Nunez MD   Stopped at 08/22/20 1122    lamoTRIgine (LAMICTAL) tablet 200 mg  200 mg Oral BID Karley Stone MD   200 mg at 08/22/20 0853    [Held by provider] levETIRAcetam (KEPPRA) tablet 750 mg  750 mg Oral BID Karley Stone MD       [facility] by provider] levETIRAcetam (KEPPRA) tablet 500 mg  500 mg Oral BID Karley Stone MD   500 mg at 08/19/20 2129    sodium chloride flush 0.9 % injection 10 mL  10 mL Intravenous 2 times per day Karley Stone MD   10 mL at 08/22/20 0925    sodium chloride flush 0.9 % injection 10 mL  10 mL Intravenous PRN Karley Stone MD   10 mL at 08/22/20 0858    acetaminophen (TYLENOL) tablet 650 mg  650 mg Oral Q6H PRN Karley Stone MD   650 mg at 08/21/20 0210    Or    acetaminophen (TYLENOL) suppository 650 mg  650 mg Rectal Q6H PRN Karley Stone MD        polyethylene glycol (GLYCOLAX) packet 17 g  17 g Oral Daily PRN Karley Stone MD        potassium chloride (KLOR-CON M) extended release tablet 40 mEq  40 mEq Oral PRN Karley Stone MD        Or    potassium bicarb-citric acid (EFFER-K) effervescent tablet 40 mEq  40 mEq Oral PRN Karley Stnoe MD        Or    potassium chloride 10 mEq/100 mL IVPB (Peripheral Line)  10 mEq Intravenous PRN Karley Stone MD        insulin glargine (LANTUS) injection vial 20 Units  20 Units Subcutaneous BID Karley Stone MD   20 Units at 08/22/20 0859    insulin lispro (HUMALOG) injection vial 0-12 Units  0-12 Units Subcutaneous TID WC Karley Stone MD   2 Units at 08/22/20 1214    insulin lispro (HUMALOG) injection vial 0-6 Units  0-6 Units Subcutaneous Nightly Karley Stone MD   2 Units at 08/21/20 2153    glucose (GLUTOSE) 40 % oral gel 15 g  15 g Oral PRN Karley Stone MD        dextrose 50 % IV solution  12.5 g Intravenous PRN Karley Stone MD        glucagon (rDNA) injection 1 mg  1 mg Intramuscular PRN Karley Stone MD        dextrose 5 % solution  100 mL/hr Intravenous PRN Karley Stone MD       [facility] aspirin chewable tablet 81 mg  81 mg Oral Daily David Pisano MD   81 mg at 08/22/20 5317       FAMILY HISTORY:  Family History of Breast, Ovarian, Colon or Uterine Cancer:   Father had colon cancer  family history includes Diabetes in her brother. SOCIAL HISTORY:   reports that she has never smoked. She has never used smokeless tobacco. She reports that she does not drink alcohol or use drugs. HEALTH MAINTENANCE:  Date of Last Mammogram: had to miss appointment due to current hospital admission   Date of Last Colonoscopy: 5/13/19  Date of Last Bone Density:n/a  ________________________________________________________________________                                    VITALS:  Vitals:    08/21/20 2037 08/21/20 2101 08/22/20 0815 08/22/20 1206   BP:  (!) 153/59 (!) 155/75    Pulse:  67 67    Resp:  16 22    Temp:  97.9 °F (36.6 °C) 98.1 °F (36.7 °C) 98.3 °F (36.8 °C)   TempSrc: Oral Oral Oral Oral   SpO2:  95% 96%    Weight:       Height:           INPUT/OUTPUT:  No intake/output data recorded.   In: -   Out: 500 [Urine:500]                                                                                                                               PHYSICAL EXAM:     General appearance: no apparent distress, alert and cooperative, pleasant   HEENT: head atraumatic, normocephalic, trachea midline, moist mucous membranes, nasal canula in place   Neurologic:  oriented, normal speech, no focal findings or movement disorder noted  Lungs: no increased work of breathing, good air exchange   Heart: regular rate and rhythm   Abdomen: soft,  non-tender on palpation,  uterus non-tender, no guarding or rebound   Extremities:  no calf tenderness bilaterally, cellulitis noted on BL lower extremities with no drainage   Musculoskeletal: no gross abnormalities, range of motion appropriate for age- able to get on top of bed pain with assistance   Psychiatric: mood appropriate, normal affect   Pelvic Exam:very limited due to body habitus and intolerance to exam    Vulva: normal appearing vulva, no masses, tenderness or lesions, normal clitoris, dark yellow discharge present on vulva    Vagina: erythematous appearing urethral meatus, atrophic appearing vaginal mucosa with normal color and physiologic discharge, no lesions, minimal amount of watery vaginal bleeding     Cervix: normal appearing cervix without pathologic appearing discharge or lesions, no cervical motion tenderness, no protruding cervical masses seen    Uterus:  Midline, normal size, shape, consistency and non-tender, bladder smooth    Adnexa: non-tender, no palpable masses        LAB RESULTS:  Recent Results (from the past 24 hour(s))   Chloride, Random Urine    Collection Time: 08/21/20  7:52 PM   Result Value Ref Range    Chloride, Ur 64 mmol/L   Creatinine, Random Urine    Collection Time: 08/21/20  7:52 PM   Result Value Ref Range    Creatinine, Ur 59.7 28.0 - 217.0 mg/dL   Protein, urine, random    Collection Time: 08/21/20  7:52 PM   Result Value Ref Range    Total Protein, Urine 320 mg/dL   Sodium, urine, random    Collection Time: 08/21/20  7:52 PM   Result Value Ref Range    Sodium,Ur 81 mmol/L   Urinalysis with Microscopic    Collection Time: 08/21/20  7:52 PM   Result Value Ref Range    Color, UA YELLOW YELLOW    Turbidity UA CLEAR CLEAR    Glucose, Ur TRACE (A) NEGATIVE    Bilirubin Urine NEGATIVE NEGATIVE    Ketones, Urine NEGATIVE NEGATIVE    Specific Gravity, UA 1.014 1.005 - 1.030    Urine Hgb LARGE (A) NEGATIVE    pH, UA 5.0 5.0 - 8.0    Protein, UA 3+ (A) NEGATIVE    Urobilinogen, Urine Normal Normal    Nitrite, Urine NEGATIVE NEGATIVE    Leukocyte Esterase, Urine NEGATIVE NEGATIVE    -          WBC, UA 10 TO 20 0 - 5 /HPF    RBC, UA TOO NUMEROUS TO COUNT 0 - 4 /HPF    Casts UA  0 - 8 /LPF     2 TO 5 HYALINE Reference range defined for non-centrifuged specimen.     Crystals, UA NOT REPORTED None /HPF    Epithelial Cells UA 0 TO 2 0 - 5 /HPF    Renal Epithelial, UA NOT REPORTED 0 /HPF    Bacteria, UA NOT REPORTED None    Mucus, UA NOT REPORTED None    Trichomonas, UA NOT REPORTED None    Amorphous, UA NOT REPORTED None    Other Observations UA NOT REPORTED NOT REQ.     Yeast, UA NOT REPORTED None   POC Glucose Fingerstick    Collection Time: 08/21/20  9:07 PM   Result Value Ref Range    POC Glucose 224 (H) 65 - 105 mg/dL   Sedimentation Rate    Collection Time: 08/22/20 12:05 AM   Result Value Ref Range    Sed Rate 47 (H) 0 - 30 mm   C-Reactive Protein    Collection Time: 08/22/20 12:05 AM   Result Value Ref Range    CRP 3.6 0.0 - 5.0 mg/L   Lipid Panel    Collection Time: 08/22/20 12:05 AM   Result Value Ref Range    Cholesterol 119 <200 mg/dL    HDL 42 >40 mg/dL    LDL Cholesterol 39 0 - 130 mg/dL    Chol/HDL Ratio 2.8 <5    Triglycerides 190 (H) <150 mg/dL    VLDL NOT REPORTED (H) 1 - 30 mg/dL   Procalcitonin    Collection Time: 08/22/20 12:05 AM   Result Value Ref Range    Procalcitonin 0.09 (H) <0.09 ng/mL   Basic Metabolic Panel w/ Reflex to MG    Collection Time: 08/22/20  6:11 AM   Result Value Ref Range    Glucose 141 (H) 70 - 99 mg/dL    BUN 61 (H) 8 - 23 mg/dL    CREATININE 2.50 (H) 0.50 - 0.90 mg/dL    Bun/Cre Ratio NOT REPORTED 9 - 20    Calcium 8.8 8.6 - 10.4 mg/dL    Sodium 143 135 - 144 mmol/L    Potassium 4.5 3.7 - 5.3 mmol/L    Chloride 107 98 - 107 mmol/L    CO2 25 20 - 31 mmol/L    Anion Gap 11 9 - 17 mmol/L    GFR Non-African American 19 (L) >60 mL/min    GFR  23 (L) >60 mL/min    GFR Comment          GFR Staging NOT REPORTED    CBC auto differential    Collection Time: 08/22/20  6:11 AM   Result Value Ref Range    WBC 3.8 3.5 - 11.3 k/uL    RBC 2.95 (L) 3.95 - 5.11 m/uL    Hemoglobin 7.7 (L) 11.9 - 15.1 g/dL    Hematocrit 26.1 (L) 36.3 - 47.1 %    MCV 88.5 82.6 - 102.9 fL    MCH 26.1 25.2 - 33.5 pg    MCHC 29.5 28.4 - 34.8 g/dL    RDW 18.6 (H) 11.8 - 14.4 %    Platelets See Reflexed IPF Result 138 - 453 k/uL    MPV NOT REPORTED 8.1 - 13.5 fL    NRBC Automated 0.0 0.0 per 100 WBC    Differential Type NOT REPORTED     WBC Morphology NOT REPORTED     RBC Morphology ANISOCYTOSIS PRESENT     Platelet Estimate NOT REPORTED     Seg Neutrophils 64 36 - 65 %    Lymphocytes 23 (L) 24 - 43 %    Monocytes 9 3 - 12 %    Eosinophils % 4 1 - 4 %    Basophils 1 0 - 2 %    Immature Granulocytes 1 (H) 0 %    Segs Absolute 2.41 1.50 - 8.10 k/uL    Absolute Lymph # 0.85 (L) 1.10 - 3.70 k/uL    Absolute Mono # 0.32 0.10 - 1.20 k/uL    Absolute Eos # 0.13 0.00 - 0.44 k/uL    Basophils Absolute <0.03 0.00 - 0.20 k/uL    Absolute Immature Granulocyte <0.03 0.00 - 0.30 k/uL   Immature Platelet Fraction    Collection Time: 08/22/20  6:11 AM   Result Value Ref Range    Platelet, Fluorescence 46 (L) 138 - 453 k/uL   LEVETIRACETAM LEVEL    Collection Time: 08/22/20  8:17 AM   Result Value Ref Range    Levetiracetam Lvl 41 ug/mL   POC Glucose Fingerstick    Collection Time: 08/22/20  8:22 AM   Result Value Ref Range    POC Glucose 110 (H) 65 - 105 mg/dL   POC Glucose Fingerstick    Collection Time: 08/22/20 12:09 PM   Result Value Ref Range    POC Glucose 174 (H) 65 - 105 mg/dL       DIAGNOSTICS:   Ct Abdomen Pelvis Wo Contrast Additional Contrast? None  Result Date: 8/22/2020  EXAMINATION: CT OF THE ABDOMEN AND PELVIS WITHOUT CONTRAST 8/22/2020 9:34 am TECHNIQUE: CT of the abdomen and pelvis was performed without the administration of intravenous contrast. Multiplanar reformatted images are provided for review. Dose modulation, iterative reconstruction, and/or weight based adjustment of the mA/kV was utilized to reduce the radiation dose to as low as reasonably achievable. COMPARISON: Renal ultrasound 08/18/2020. Pelvic ultrasound 03/20/2020.  HISTORY: ORDERING SYSTEM PROVIDED HISTORY: anechoic cystic structure in the pelvis near the bladder TECHNOLOGIST PROVIDED HISTORY: anechoic cystic structure in the pelvis near the bladder FINDINGS: Lower Chest: Small pleural effusions and dependent atelectasis. Organs: Evaluation of the abdominal and pelvic viscera is limited in the absence of contrast.  Surface features of the liver and mild periportal widening suggestive of chronic liver disease. The spleen is also enlarged measuring 15 cm. Status post cholecystectomy. The pancreas, adrenals and kidneys reveal no acute findings. GI/Bowel: There is no bowel dilatation or wall thickening identified. Pelvis: The bladder is well distended. No Julien catheter is present. The uterus is visualized, position on the left side. The ovaries are not identified. No mass or loculated fluid collection identified. Peritoneum/Retroperitoneum: Small volume abdominopelvic ascites. No free air. No loculated fluid collection. Edematous appearance of the omentum is noted without discrete peritoneal nodule. No enlarged or suspicious-appearing lymph nodes. The aorta is normal in caliber. Bones/Soft Tissues: No acute abnormality identified. Advanced disc disease and facet arthropathy in the lower lumbar spine. Mild body wall edema. 1.  The bladder is well distended. No pelvic mass identified. The uterus appears grossly unremarkable. The ovaries are not visualized. 2.  Morphologic findings suggestive of chronic liver disease. Mild splenomegaly. 3.  Small volume abdominopelvic ascites, small pleural effusions with associated relaxation atelectasis, and body wall edema.         ASSESSMENT & PLAN:  Levi Fields is a 75 y.o. female  with post menopausal bleeding    - VSS except for elevated blood pressures    - Patient hemodynamically stable     - Vaginitis and Gc/C collected, will follow results and treat accordingly    - Labs:    - Hgb 7.7, Plts 46, electrolytes wnl     - Hgb has remained stable through out this admission  (7-)    - 20 her Hgb was 6.1 prior to this admission     - Consider iron supplementation if indicated     - BMP and CBC ordered daily and managed by 08/18/2020    Expressive aphasia new  08/18/2020    Aphasia 08/18/2020    FRANCIS (nonalcoholic steatohepatitis)     Symptomatic anemia 08/05/2020    Breast mass in female 08/05/2020    Hematuria 08/05/2020    Acute kidney injury superimposed on chronic kidney disease ([address] 08/05/2020    Atypical glandular cells of undetermined significance (SLICK) on cervical Pap smear (NOS) 05/15/2020     Colposcopy with ECC-declined by pt 6/11/2020  Ultrasound of Pelvis-Done  Endometrial evaluation in office at pt request-Attempted 6/11/2020-pt halted procedure      GAVE (gastric antral vascular ectasia) 04/02/2020    Esophageal varices determined by endoscopy ([address] 04/02/2020    Elevated alkaline phosphatase level     Anemia 03/16/2020    Postmenopausal bleeding 01/11/2020     TVUS ordered 1/11/20      Polyp of colon     GI bleed 05/10/2019    Confusion state 12/01/2018    Gait disturbance 12/01/2018    Altered mental status 11/30/2018    Falling episodes 12/14/2017    Cerebral concussion 12/14/2017    Cervical spinal stenosis 12/14/2017    Diabetic polyneuropathy associated with type 2 diabetes mellitus ([address] 12/14/2017    History of cerebral infarction 12/14/2017     Overview:   2010      Seizure disorder ([address] 12/14/2017    Accidental fall 12/13/2017    CVA, old, hemiparesis ([address] 11/30/2017    Breakthrough seizure ([address]     Type 2 diabetes mellitus ([address] 10/21/2017    Dizziness 04/20/2017    Generalized weakness 03/15/2017    Hyperglycemia 03/15/2017    Hyponatremia 03/15/2017    Depression with anxiety 01/09/2017    H/O falling 01/09/2017    Pure hypercholesterolemia 01/09/2017    RLS (restless legs syndrome) 01/09/2017    GERD (gastroesophageal reflux disease) 07/27/2016    Morbid obesity with BMI of 40.0-44.9, adult ([address] 07/27/2016    Thrombocytopenia ([address] 07/26/2016       Plan discussed with Dr. Candida Lin (attending), who is agreeable.       Calista De La Cruz  Ob/Gyn Resident    [facility]  8/22/2020, 1:38 PM

## 2020-08-22 NOTE — PROGRESS NOTES
Renal Progress Note    Patient :  Sydnee Alvarado; 75 y.o. MRN# [id number]  Location:  8903/1519-81  Attending:  Judy Fulton MD  Admit Date:  2020   Hospital Day: 4    Subjective   Patient was seen and examined. The patient is alert and oriented. She is tolerating her diet. No acute events overnight according to RN  Serum creatinine decreased to 2.50 from 2.61. Electrolytes are WDL. Julien catheter removed and patient is ambulating and voiding well. Vital signs stable     Patient was noted to have proximal left ICA stenosis 70%  Endovascular consultation was obtained.  Carotid dopplers ordered  Pelvic exam with pap completed today by OB d/t abnormal vaginal bleeding   Pelvic US to be completed today    Objective     VS: BP (!) 155/75   Pulse 67   Temp 98.3 °F (36.8 °C) (Oral)   Resp 22   Ht 5' (1.524 m)   Wt 284 lb 6.3 oz (129 kg)   SpO2 96%   BMI 55.54 kg/m²   MAXIMUM TEMPERATURE OVER 24HRS:  Temp (24hrs), Av.2 °F (36.8 °C), Min:97.8 °F (36.6 °C), Max:98.7 °F (37.1 °C)    24HR BLOOD PRESSURE RANGE:  Systolic (70MBH), QFY:421 , Min:153 , FZ   ; Diastolic (34ODH), FOR:46, Min:59, Max:75    24HR INTAKE/OUTPUT:      Intake/Output Summary (Last 24 hours) at 2020 1428  Last data filed at 2020 1534  Gross per 24 hour   Intake --   Output 500 ml   Net -500 ml     WEIGHT :  Patient Vitals for the past 96 hrs (Last 3 readings):   Weight   20 1815 284 lb 6.3 oz (129 kg)       Current Medications:     Scheduled Meds:    hydrALAZINE  25 mg Oral 3 times per day    atorvastatin  80 mg Oral Nightly    heparin (porcine)  5,000 Units Subcutaneous 3 times per day    amLODIPine  10 mg Oral Daily    furosemide  40 mg Intravenous BID    cefTRIAXone (ROCEPHIN) IV  1 g Intravenous Q24H    lamoTRIgine  200 mg Oral BID    [Held by provider] levETIRAcetam  750 mg Oral BID    [Held by provider] levETIRAcetam  500 mg Oral BID    sodium chloride flush  10 mL Intravenous 2 times per day    insulin glargine  20 Units Subcutaneous BID    insulin lispro  0-12 Units Subcutaneous TID WC    insulin lispro  0-6 Units Subcutaneous Nightly    aspirin  81 mg Oral Daily     Continuous Infusions:    dextrose         Physical Examination:     General:  AAO x 3, speaking in full sentences, no accessory muscle use. Chest:   Bilateral vesicular breath sounds, no rales or wheezes. Cardiac:  S1 S2 RR, no murmurs, gallops or rubs, JVP not raised. Abdomen: Soft, non-tender, non distended, BS audible. SKIN:  No rashes, good skin turgor. Extremities:  3+ pitting edema BLE, left arm 3+ non-pitting edema   Neuro:  AAO x 3, No FND. PERRLA    Labs:       Recent Labs     08/20/20  0906 08/21/20  0401 08/22/20  0611   WBC 4.1 4.3 3.8   RBC 3.15* 2.99* 2.95*   HGB 8.1* 7.6* 7.7*   HCT 29.9* 30.5* 26.1*   MCV 94.9 102.0 88.5   MCH 25.7 25.4 26.1   MCHC 27.1* 24.9* 29.5   RDW 18.9* 18.5* 18.6*   PLT 84* 67* See Reflexed IPF Result   MPV 11.2 10.4 NOT REPORTED      BMP:   Recent Labs     08/20/20  0906 08/21/20  0401 08/22/20  0611   * 142 143   K 4.7 4.5 4.5   * 107 107   CO2 23 22 25   BUN 55* 57* 61*   CREATININE 2.16* 2.61* 2.50*   GLUCOSE 140* 129* 141*   CALCIUM 9.0 8.5* 8.8      Phosphorus:   No results for input(s): PHOS in the last 72 hours. Magnesium:  No results for input(s): MG in the last 72 hours. Albumin:  No results for input(s): LABALBU in the last 72 hours.   BNP:    No results found for: BNP  PTH:   No results found for: IPTH  Blood cultures:  No results found for: [facility]    Urinalysis/Chemistries:      Lab Results   Component Value Date    NITRU NEGATIVE 08/21/2020    COLORU YELLOW 08/21/2020    PHUR 5.0 08/21/2020    WBCUA 10 TO 20 08/21/2020    RBCUA TOO NUMEROUS TO COUNT 08/21/2020    MUCUS NOT REPORTED 08/21/2020    TRICHOMONAS NOT REPORTED 08/21/2020    YEAST NOT REPORTED 08/21/2020    BACTERIA NOT REPORTED 08/21/2020    SPECGRAV 1.014 08/21/2020    LEUKOCYTESUR NEGATIVE 08/21/2020 UROBILINOGEN Normal 08/21/2020    BILIRUBINUR NEGATIVE 08/21/2020    GLUCOSEU TRACE 08/21/2020    KETUA NEGATIVE 08/21/2020    AMORPHOUS NOT REPORTED 08/21/2020       Radiology:       EXAMINATION:   CT OF THE ABDOMEN AND PELVIS WITHOUT CONTRAST 8/22/2020 9:34 am     TECHNIQUE:   CT of the abdomen and pelvis was performed without the administration of   intravenous contrast. Multiplanar reformatted images are provided for review. Dose modulation, iterative reconstruction, and/or weight based adjustment of   the mA/kV was utilized to reduce the radiation dose to as low as reasonably   achievable. COMPARISON:   Renal ultrasound 08/18/2020. Pelvic ultrasound 03/20/2020. HISTORY:   ORDERING SYSTEM PROVIDED HISTORY: anechoic cystic structure in the pelvis   near the bladder   TECHNOLOGIST PROVIDED HISTORY:   anechoic cystic structure in the pelvis near the bladder       FINDINGS:   Lower Chest: Small pleural effusions and dependent atelectasis. Organs: Evaluation of the abdominal and pelvic viscera is limited in the   absence of contrast.  Surface features of the liver and mild periportal   widening suggestive of chronic liver disease.  The spleen is also enlarged   measuring 15 cm.  Status post cholecystectomy.  The pancreas, adrenals and   kidneys reveal no acute findings. GI/Bowel: There is no bowel dilatation or wall thickening identified. Pelvis: The bladder is well distended.  No Julien catheter is present.  The   uterus is visualized, position on the left side.  The ovaries are not   identified.  No mass or loculated fluid collection identified. Peritoneum/Retroperitoneum: Small volume abdominopelvic ascites.  No free   air.  No loculated fluid collection.  Edematous appearance of the omentum is   noted without discrete peritoneal nodule.  No enlarged or   suspicious-appearing lymph nodes.  The aorta is normal in caliber.      Bones/Soft Tissues: No acute abnormality identified.  Advanced disc disease   and facet arthropathy in the lower lumbar spine.  Mild body wall edema.        Impression:         1.  The bladder is well distended.  No pelvic mass identified.  The uterus   appears grossly unremarkable.  The ovaries are not visualized. 2.  Morphologic findings suggestive of chronic liver disease.  Mild   splenomegaly. 3.  Small volume abdominopelvic ascites, small pleural effusions with   associated relaxation atelectasis, and body wall edema. Renal ultrasound 8/18/2020:  Right kidney 10.4 cm left kidney 9.3 cm. Impression:           1. Large indeterminate anechoic cystic structure in the pelvis near the   urinary bladder, the origin of which is unclear.  Further evaluation with   dedicated CT abdomen pelvis is recommended. 2. Nonvisualization of the ureteral jets are ureters.  Urinary bladder is   collapsed with Julien catheter present. 3. Fatty liver.  Trace perihepatic fluid. 4. Unremarkable renal ultrasound.  No hydronephrosis. Assessment:     1. NIKKI secondary to contrast exposure  2. Anemia of chronic disease  3. Chronic kidney disease stage III likely due to nephrosclerosis and diuretic use in the setting of some right sided cardiac issues with likely JACOBY/pulmonary hypertension approaching stage IV with a baseline creatinine of 1.8 to 2 mg/dL. 4. Portal Hypertension  5. DM II-on insulin  6. Morbid obesity  7. Large indeterminate anechoic cystic structure in the pelvis near the urinary bladder. Origin not clear  Plan:   1. Continue diuretics-Lasix 40 mg IV twice daily  2. Strict Input and Output, Daily weigh and document in the chart. 3. Daily BMP  4. Patient will need to have a sleep study for JACOBY post discharge and she is in agreement     Nutrition   Renal Diet/TF    Thank you. Please call with any questions. Angeli Aguilar.  Maria Esther CHRISTOPHER CNP    [facility]    Attending Physician Statement  I have discussed the care of Madi Pang,

## 2020-08-22 NOTE — PROGRESS NOTES
[facility]  Internal Medicine Teaching Residency Program  Inpatient Daily Progress Note  ______________________________________________________________________________    Patient: Miriam Madden  YOB: 1955   SFE:[id number]    Acct: [de-identified]     Room: [facility]105-86  Admit date: 8/18/2020  Today's date: 08/22/20  Number of days in the hospital: 4    SUBJECTIVE   Admitting Diagnosis: Accidental fall  CC: Complaints of vaginal bleeding. No shortness of breath, chest pain or any progression in focal neurological deficit. Aphasia has resolved  Pt examined at bedside. Chart & results reviewed. Patient saturating well on 2 L oxygen through nasal cannula which is same as her home oxygen level. Total output 500-strict input and output ordered    Creatinine-trend down to 2.50 from 2.61. Likely due to Lasix  Consulted with nephro-recommends to continue Lasix    Plans  Consult gynae for vaginal bleed. Pelvic USG  Straight cath to rule out hematuria.         ROS:  Constitutional:  negative for chills, fevers, sweats  Respiratory:  negative for cough, dyspnea on exertion, hemoptysis, shortness of breath, wheezing  Cardiovascular:  negative for chest pain, chest pressure/discomfort, lower extremity edema, palpitations  Gastrointestinal:  negative for abdominal pain, constipation, diarrhea, nausea, vomiting  Neurological:  negative for dizziness, headache  BRIEF HISTORY   This is a 77-year-old female who was initially admitted to [facility] from assisted living facility after falling in her apartment. Haley Urias mentioned that she felt lightheaded dizzy and fell. Paul Ruiz had episodes of palpitations and shortness of breath associated with blurring of vision headache.  She admits that she hit her head but denies LOC.  She mentioned that she has weakness in her bilateral lower extremity is chronic but the weakness in her left leg is more now.  Patient also mentioned that she has bowel and bladder incontinence since her previous episode of stroke.  Patient was admitted to [facility] for management of pneumonia and UTI (with Rocephin). She mentioned that she was talking to her brother over phone at [facility] when she started noticing that she forgot what she was talking (word finding difficulty) about and her brother noticed slurring in her speech.  Patient was then transferred to [facility] for neurology consult and management of her aphasia.     According to patient[place]as Hill was started on 2 L home oxygen by her PCP due to her oxygen desat into high 80s.    On physical examination, patient has bruises on her back due to fall.  Left hand swollen erythematous.  Patient has hematoma on the back of her head.  No neurovascular deficit.  She has previous history of recurrent falls and concussion.     Past medical history: Type 2 diabetes mellitus, history of CVA, history of recurrent accidental falls, diabetic neuropathy and morbid obesity     Imaging: Ct head at [facility]: Negative for acute intracranial pathology.  Left occipital scalp hematoma present  CT cervical spine: Unremarkable CT thoracic spine: Degenerative changes. Chest x-ray: Basilar atelectasis  Repeat CT head: No acute intracranial abnormality. CTA head and neck: Left proximal ICA stenosis   MRI brain: No acute intracranial abnormality  MRA head and neck: 70% stenosis left internal carotid artery.     In the ED patient was evaluated by stroke team and neuro endovascular was consulted due to 70% left cervical ICA stenosis.       OBJECTIVE     Vital Signs:  BP (!) 155/75   Pulse 67   Temp 98.1 °F (36.7 °C) (Oral)   Resp 22   Ht 5' (1.524 m)   Wt 284 lb 6.3 oz (129 kg)   SpO2 96%   BMI 55.54 kg/m²     Temp (24hrs), Av.2 °F (36.8 °C), Min:97.8 °F (36.6 °C), Max:98.7 °F (37.1 °C)    In: -   Out: 500 [Urine:500]    Physical Exam:  Constitutional: This is a well developed, well nourished, Greater than 40 - Morbid Obesity / Extreme Obesity / Grade III 75y.o. year old female who is alert, oriented, cooperative and in no apparent distress. Head:normocephalic and atraumatic. EENT:  PERRLA. No conjunctival injections. Septum was midline, mucosa was without erythema, exudates or cobblestoning. No thrush was noted. Neck: Supple without thyromegaly. No elevated JVP. Trachea was midline. Respiratory: Chest was symmetrical without dullness to percussion. Breath sounds bilaterally were clear to auscultation. There were no wheezes, rhonchi or rales. There is no intercostal retraction or use of accessory muscles. No egophony noted. Cardiovascular: Regular without murmur, clicks, gallops or rubs. Abdomen: Slightly rounded and soft without organomegaly. No rebound, rigidity or guarding was appreciated. Lymphatic: No lymphadenopathy. Musculoskeletal: Normal curvature of the spine. No gross muscle weakness. Extremities:  No lower extremity edema, ulcerations, tenderness, varicosities or erythema. Muscle size, tone and strength are normal.  No involuntary movements are noted. Skin:  Warm and dry. Good color, turgor and pigmentation. No lesions or scars.   No cyanosis or clubbing  Neurological/Psychiatric: The patient's general behavior, level of consciousness, thought content and emotional status is normal.        Medications:  Scheduled Medications:    hydrALAZINE  10 mg Oral 3 times per day    atorvastatin  80 mg Oral Nightly    heparin (porcine)  5,000 Units Subcutaneous 3 times per day    amLODIPine  10 mg Oral Daily    furosemide  40 mg Intravenous BID    cefTRIAXone (ROCEPHIN) IV  1 g Intravenous Q24H    lamoTRIgine  200 mg Oral BID    [Held by provider] levETIRAcetam  750 mg Oral BID    [Held by provider] levETIRAcetam  500 mg Oral BID    sodium chloride flush  10 mL Intravenous 2 times per day    insulin glargine  20 Units Subcutaneous BID    insulin lispro  0-12 Units Subcutaneous TID Date: 8/18/2020  No acute intracranial abnormality. Similar soft tissue hematoma in the left parietooccipital region. Findings were discussed with Nanda Valdez at 12:08 pm on 8/18/2020. Ct Head Wo Contrast    Result Date: 8/18/2020  No acute abnormality of the cervical spine. Multilevel degenerative changes in the cervical spine. No acute intracranial abnormality. , no acute intracranial hemorrhage or mass effect. Left occipital scalp hematoma. No acute displaced skull fracture. Ct Cervical Spine Wo Contrast    Result Date: 8/18/2020  No acute abnormality of the cervical spine. Multilevel degenerative changes in the cervical spine. No acute intracranial abnormality. , no acute intracranial hemorrhage or mass effect. Left occipital scalp hematoma. No acute displaced skull fracture. Ct Thoracic Spine Wo Contrast    Result Date: 8/18/2020  No acute fracture or listhesis in the thoracic spine. Multilevel degenerative changes in the thoracic spine with bridging marginal osteophytes which could relate to component of DISH. Mra Head Wo Contrast    Result Date: 8/18/2020  70% stenosis at the origin of the left internal carotid artery. No acute abnormality or flow-limiting stenosis of the major arteries of the head. Us Renal Complete    Addendum Date: 8/19/2020    ADDENDUM: Correction: \"Catheter present\" was improperly denoted on the technologist notes. Please note that in fact the urinary bladder is collapsed and no Julien catheter was present. Result Date: 8/19/2020  1. Large indeterminate anechoic cystic structure in the pelvis near the urinary bladder, the origin of which is unclear. Further evaluation with dedicated CT abdomen pelvis is recommended. 2. Nonvisualization of the ureteral jets are ureters. Urinary bladder is collapsed with Julien catheter present. 3. Fatty liver. Trace perihepatic fluid. 4. Unremarkable renal ultrasound. No hydronephrosis.      [address] Contrast    Result Date: 8/18/2020  Calcification involving the carotid bulbs and proximal internal carotid arteries bilaterally with approximately 70% stenosis in the proximal left internal carotid artery by NASCET criteria. No significant stenosis or occlusion within the intracranial vascularity. Mra Neck Wo Contrast    Result Date: 8/18/2020  70% stenosis at the origin of the left internal carotid artery. No acute abnormality or flow-limiting stenosis of the major arteries of the head. Mri Brain Wo Contrast    Result Date: 8/18/2020  No acute intracranial abnormality. ASSESSMENT & PLAN   Principal Problem:    Accidental fall  Active Problems:    Type 2 diabetes mellitus (HCC)    Falling episodes    Generalized weakness    RLS (restless legs syndrome)    Thrombocytopenia (HCC)    Confusion state    Anemia    Acute kidney injury superimposed on chronic kidney disease ([facility])    Aphasia    NIKKI (acute kidney injury) ([facility] [address]    Acute cystitis without hematuria    Cellulitis of lower extremity  Resolved Problems:    * No resolved hospital problems.  *    ASSESSMENT / PLAN:     Expressive aphasia monitor mental status and speech  Follow-up neurology and neuro endovascular recommendations  Continue aspirin and Lipitor  CT head and MRI did not show any acute infarct  CTA head and neck showed calcification involving carotid bulbs and proximal internal carotid artery bilaterally with approximately 70% stenosis of proximal left ICA  -Carotid Doppler showed 50 to 69% stenosis of left and right ICA    Fall likely mechanical  Orthostatics test positive  Echo -ejection fraction 60%   Elevated RV filling pressure    AK IN CKD stage III likely secondary to dehydration fluid bolus baseline creatinine 1.8-2   Ultrasound unremarkable no hydronephrosis  Appreciate nephrology recommendation    Pneumonia ruled out afebrile WBC within normal limit patient denies productive cough fever or perceived leg infections basilar opacification like atelectasis    UTI on Rocephin 8/19/2020    -follow-up urine cultures   -patient has symptoms urinalysis shows a small bacteria negative for leukocyte esterase and nitrate. Bilateral lower extremity venous status dermatitis  Compression stockings  DVT prophylaxis  Cellulitis ruled out    Congestive heart failure (chronic diastolic)-Lasix 40 IV twice daily   ejection fraction on 3/16/2020 60 -65%      Chronic hypernatremia- resolved- treated with fluid boluses  Serum sodium 143    History of CVA-continue aspirin and Lipitor    Type 2 diabetes mellitus on 20 units Lantus twice daily and sliding scale hypoglycemia protocol    History of seizure disorder on Lamictal 200 mg twice daily. Keppra on hold due to elevated Keppra level. Seizure precaution. Appreciated neurologic recommendation. Patient to established with epileptic neurologist in [place]. Pelvic lesion in ultrasound of the external urethral catheter    Morbid obesity: Patient will need outpatient sleep study          DVT ppx : Heparin 5000 units subcutaneously daily  GI ppx: Not indicated    PT/OT: On board  Discharge Planning / SW: Luke Severance, MD  Internal Medicine Resident, PGY-1  [address], [place]  8/22/2020, 9:49 AM

## 2020-08-22 NOTE — PROGRESS NOTES
[facility] Neurology   [address] [place] [address]    Progress Note            Date:   8/22/2020  Patient name:  Mirza Puckett  Date of admission:  8/18/2020  1:49 PM  MRN:   [id number]  Account:  [de-identified]  YOB: 1955  PCP:    Debra Villeda PA-C  Room:   [address] [place] [address]  Code Status:    Full Code    Chief Complaint:     Chief Complaint   Patient presents with    Aphasia     Transfer from [facility] hx: The Patient was seen and examined at bedside  AFVSS  No acute events overnight  Overall feeling better than day of admission. Brief History of Present Illness:     Patient is a 75-year-old female who presented as a transfer from [facility] for further evaluation and stroke work-up. Patient has a history of diabetes, hyperlipidemia, prior stroke in 2010 with some mild residual left-sided weakness, seizure disorder  Patient lives in an assisted living facility. She reports having a fall. She mentioned that she was lightheaded and dizzy at the time of the fall. Does endorse head trauma however no LOC. With regards to the fall, history is slightly inconsistent is in one place she denies dizziness and another time she states she did have dizziness questionable mechanical fall. He also reports history of chronic lower extremity weakness however at this time she feels her left leg is weaker. Patient also reports a history of anemia. At [facility] prior to transfer patient was being treated for pneumonia and UTI. She started having aphasia and decision was made to transfer to [facility]. Doyle[facility]. CT head at [facility] did reveal left occipital scalp hematoma. No acute intracranial abnormality  CT C-spine with multilevel degenerative changes  CT thoracic spine with no acute fracture or listhesis    At [facility]. [facility], CTA head and neck, MRI/MRA were obtained  Patient was noted to have proximal left ICA stenosis 70%.   Endovascular consultation was obtained. Have ordered Carotid dopplers in addition to present work up. Okay to resume aspirin and Lipitor. Past Medical History:     Past Medical History:   Diagnosis Date    Anemia     Cataract     GERD (gastroesophageal reflux disease)     Headache     Hyperlipidemia     Neuropathy     Osteoarthritis     Restless leg syndrome     Seizures (HCC) since age 10   Goyo Can Short-term memory loss     Stroke (cerebrum) ([facility] [address] [facility] 2010    Type 2 diabetes mellitus without complication ([facility] [address] 9640        Past Surgical History:     Past Surgical History:   Procedure Laterality Date    CARDIAC CATHETERIZATION      CATARACT REMOVAL      CHOLECYSTECTOMY      COLONOSCOPY N/A 5/13/2019    COLONOSCOPY DIAGNOSTIC, POLYPECTOMIES performed by Maye Martinez MD at [facility]      cataract    TONSILLECTOMY      UPPER GASTROINTESTINAL ENDOSCOPY N/A 5/12/2019    EGD ESOPHAGOGASTRODUODENOSCOPY performed by Maye Martinez MD at [address] [place] [address] N/A 3/18/2020    EGD WITH CAUTERIZATION OF [facility] (GAVE) USING ARGON performed by Fallon Carreon MD at [address] [place] [address] N/A 8/7/2020    EGD BIOPSY BRUSH BX AND STOMACH FULGURATION WITH ARGON BEAM performed by Maye Martinez MD at [facility] ENDO        Medications Prior to Admission:     Prior to Admission medications    Medication Sig Start Date End Date Taking?  Authorizing Provider   cephALEXin (KEFLEX) 500 MG capsule Take 1 capsule by mouth 3 times daily for 7 days 8/18/20 8/25/20 Yes Kyree Matos MD   bumetanide (BUMEX) 1 MG tablet Take 1 mg by mouth 3 times daily   Yes Historical Provider, MD   insulin glargine (BASAGLAR KWIKPEN) 100 UNIT/ML injection pen Inject 60 Units into the skin 2 times daily   Yes Historical Provider, MD   levETIRAcetam (KEPPRA) 750 MG tablet Take 750 mg by mouth 2 times daily Indications: with 500 mg to make 1250 mg dose   Yes Historical Provider, MD gabapentin (NEURONTIN) 100 MG capsule Take 100 mg by mouth 2 times daily. .   Yes Historical Provider, MD   levETIRAcetam (KEPPRA) 500 MG tablet Take 500 mg by mouth 2 times daily Indications: with 750 mg to make total dose 1250 mg    Yes Historical Provider, MD   insulin aspart (NOVOLOG) 100 UNIT/ML injection vial Inject into the skin Inject as per sliding scale before meals and at bedtime:    = 0 units; call physician if less than 70  151-200 = 2 units  201-250 = 4 units  251-300 = 6 units  301-350 = 8 units  351-400 = 10 units; call physician if greater than 400   Yes Historical Provider, MD   lisinopril-hydrochlorothiazide (PRINZIDE;ZESTORETIC) 10-12.5 MG per tablet Take 1 tablet by mouth daily   Yes Historical Provider, MD   omeprazole (PRILOSEC) 40 MG delayed release capsule Take 40 mg by mouth Daily    Yes Historical Provider, MD   simvastatin (ZOCOR) 20 MG tablet Take 20 mg by mouth nightly    Yes Historical Provider, MD   lamoTRIgine (LAMICTAL) 200 MG tablet Take 200 mg by mouth 2 times daily   Yes Historical Provider, MD   clotrimazole-betamethasone (LOTRISONE) 1-0.05 % cream Apply topically 2 times daily. 8/17/20   Moon Willett PA-C   nadolol (CORGARD) 20 MG tablet Take 1 tablet by mouth daily 8/10/20   Palomo Hutchison MD   ferrous sulfate (IRON 325) 325 (65 Fe) MG tablet Take 1 tablet by mouth every 12 hours 8/10/20   Palomo Hutchison MD   blood glucose monitor strips Test 4 times a day & as needed for symptoms of irregular blood glucose. Dispense sufficient amount for indicated testing frequency plus additional to accommodate PRN testing needs.  8/4/20   Moon Willett PA-C   Lancets MISC 1 each by Does not apply route 4 times daily 8/4/20   Moon Willett PA-C   glucose monitoring kit (FREESTYLE) monitoring kit 1 kit by Does not apply route daily 8/4/20   Moon Willett PA-C   Emollient (CERAVE) LOTN Apply topically 2 times daily Indications: bilateral lower legs    Historical suicidal ideation.  Patient is not anxious        Physical Exam:   BP (!) 155/75   Pulse 67   Temp 98.1 °F (36.7 °C) (Oral)   Resp 22   Ht 5' (1.524 m)   Wt 284 lb 6.3 oz (129 kg)   SpO2 96%   BMI 55.54 kg/m²   Temp (24hrs), Av.2 °F (36.8 °C), Min:97.8 °F (36.6 °C), Max:98.7 °F (37.1 °C)    Recent Labs     20  1200 20  1543 20  2107 20  0822   POCGLU 119* 142* 224* 110*       Intake/Output Summary (Last 24 hours) at 2020 0829  Last data filed at 2020 1534  Gross per 24 hour   Intake --   Output 500 ml   Net -500 ml         NEUROLOGIC EXAMINATION  GENERAL  Appears comfortable and in no distress   HEENT  NC/ AT   NECK  Supple and no bruits heard   MENTAL STATUS:  Alert, oriented, intact memory, no confusion, normal speech, normal language, no hallucination or delusion   CRANIAL NERVES: II     -      Visual fields intact to confrontation  III,IV,VI -  EOMs full, no afferent defect, no GRAY, no ptosis  V     -     Normal facial sensation  VII    -     Normal facial symmetry  VIII   -     Intact hearing  IX,X -     Symmetrical palate  XI    -     Symmetrical shoulder shrug  XII   -     Midline tongue, no atrophy    MOTOR FUNCTION: B/L UE 5/5, B/L LE 4/5  normal bulk, normal tone and no involuntary movements, no tremor   SENSORY FUNCTION:  Normal touch, normal pin, normal vibration, normal proprioception   CEREBELLAR FUNCTION:  Intact fine motor control over upper limbs   REFLEX FUNCTION:  Symmetric, no perverted reflex, no Babinski sign   STATION and GAIT  Not tested       Investigations:      Laboratory Testing:  Recent Results (from the past 24 hour(s))   Levetiracetam Level    Collection Time: 20 10:59 AM   Result Value Ref Range    Levetiracetam Lvl 53 ug/mL   Electrophoresis Protein, Serum without Reflex to Immunofixation    Collection Time: 20 10:59 AM   Result Value Ref Range    Total Protein 5.8 (L) 6.4 - 8.3 g/dL    Albumin (calculated) PENDING g/dL Albumin % PENDING %    Alpha-1-Globulin PENDING g/dL    Alpha 1 % PENDING %    Alpha-2-Globulin PENDING g/dL    Alpha 2 % PENDING %    Beta Globulin PENDING g/dL    Beta Percent PENDING %    Gamma Globulin PENDING g/dL    Gamma Globulin % PENDING %    Total Prot. Sum PENDING g/dL    Total Prot. Sum,% PENDING %    Protein Electrophoresis, Serum PENDING     Pathologist PENDING    POC Glucose Fingerstick    Collection Time: 08/21/20 12:00 PM   Result Value Ref Range    POC Glucose 119 (H) 65 - 105 mg/dL   POC Glucose Fingerstick    Collection Time: 08/21/20  3:43 PM   Result Value Ref Range    POC Glucose 142 (H) 65 - 105 mg/dL   Chloride, Random Urine    Collection Time: 08/21/20  7:52 PM   Result Value Ref Range    Chloride, Ur 64 mmol/L   Creatinine, Random Urine    Collection Time: 08/21/20  7:52 PM   Result Value Ref Range    Creatinine, Ur 59.7 28.0 - 217.0 mg/dL   Protein, urine, random    Collection Time: 08/21/20  7:52 PM   Result Value Ref Range    Total Protein, Urine 320 mg/dL   Sodium, urine, random    Collection Time: 08/21/20  7:52 PM   Result Value Ref Range    Sodium,Ur 81 mmol/L   Urinalysis with Microscopic    Collection Time: 08/21/20  7:52 PM   Result Value Ref Range    Color, UA YELLOW YELLOW    Turbidity UA CLEAR CLEAR    Glucose, Ur TRACE (A) NEGATIVE    Bilirubin Urine NEGATIVE NEGATIVE    Ketones, Urine NEGATIVE NEGATIVE    Specific Gravity, UA 1.014 1.005 - 1.030    Urine Hgb LARGE (A) NEGATIVE    pH, UA 5.0 5.0 - 8.0    Protein, UA 3+ (A) NEGATIVE    Urobilinogen, Urine Normal Normal    Nitrite, Urine NEGATIVE NEGATIVE    Leukocyte Esterase, Urine NEGATIVE NEGATIVE    -          WBC, UA 10 TO 20 0 - 5 /HPF    RBC, UA TOO NUMEROUS TO COUNT 0 - 4 /HPF    Casts UA  0 - 8 /LPF     2 TO 5 HYALINE Reference range defined for non-centrifuged specimen.     Crystals, UA NOT REPORTED None /HPF    Epithelial Cells UA 0 TO 2 0 - 5 /HPF    Renal Epithelial, UA NOT REPORTED 0 /HPF    Bacteria, UA NOT REPORTED None    Mucus, UA NOT REPORTED None    Trichomonas, UA NOT REPORTED None    Amorphous, UA NOT REPORTED None    Other Observations UA NOT REPORTED NOT REQ.     Yeast, UA NOT REPORTED None   POC Glucose Fingerstick    Collection Time: 08/21/20  9:07 PM   Result Value Ref Range    POC Glucose 224 (H) 65 - 105 mg/dL   Sedimentation Rate    Collection Time: 08/22/20 12:05 AM   Result Value Ref Range    Sed Rate 47 (H) 0 - 30 mm   C-Reactive Protein    Collection Time: 08/22/20 12:05 AM   Result Value Ref Range    CRP 3.6 0.0 - 5.0 mg/L   Lipid Panel    Collection Time: 08/22/20 12:05 AM   Result Value Ref Range    Cholesterol 119 <200 mg/dL    HDL 42 >40 mg/dL    LDL Cholesterol 39 0 - 130 mg/dL    Chol/HDL Ratio 2.8 <5    Triglycerides 190 (H) <150 mg/dL    VLDL NOT REPORTED (H) 1 - 30 mg/dL   Procalcitonin    Collection Time: 08/22/20 12:05 AM   Result Value Ref Range    Procalcitonin 0.09 (H) <0.09 ng/mL   Basic Metabolic Panel w/ Reflex to MG    Collection Time: 08/22/20  6:11 AM   Result Value Ref Range    Glucose 141 (H) 70 - 99 mg/dL    BUN 61 (H) 8 - 23 mg/dL    CREATININE 2.50 (H) 0.50 - 0.90 mg/dL    Bun/Cre Ratio NOT REPORTED 9 - 20    Calcium 8.8 8.6 - 10.4 mg/dL    Sodium 143 135 - 144 mmol/L    Potassium 4.5 3.7 - 5.3 mmol/L    Chloride 107 98 - 107 mmol/L    CO2 25 20 - 31 mmol/L    Anion Gap 11 9 - 17 mmol/L    GFR Non-African American 19 (L) >60 mL/min    GFR  23 (L) >60 mL/min    GFR Comment          GFR Staging NOT REPORTED    CBC auto differential    Collection Time: 08/22/20  6:11 AM   Result Value Ref Range    WBC 3.8 3.5 - 11.3 k/uL    RBC 2.95 (L) 3.95 - 5.11 m/uL    Hemoglobin 7.7 (L) 11.9 - 15.1 g/dL    Hematocrit 26.1 (L) 36.3 - 47.1 %    MCV 88.5 82.6 - 102.9 fL    MCH 26.1 25.2 - 33.5 pg    MCHC 29.5 28.4 - 34.8 g/dL    RDW 18.6 (H) 11.8 - 14.4 %    Platelets See Reflexed IPF Result 138 - 453 k/uL    MPV NOT REPORTED 8.1 - 13.5 fL    NRBC Automated 0.0 0.0 per 100 WBC Differential Type NOT REPORTED     WBC Morphology NOT REPORTED     RBC Morphology ANISOCYTOSIS PRESENT     Platelet Estimate NOT REPORTED     Seg Neutrophils 64 36 - 65 %    Lymphocytes 23 (L) 24 - 43 %    Monocytes 9 3 - 12 %    Eosinophils % 4 1 - 4 %    Basophils 1 0 - 2 %    Immature Granulocytes 1 (H) 0 %    Segs Absolute 2.41 1.50 - 8.10 k/uL    Absolute Lymph # 0.85 (L) 1.10 - 3.70 k/uL    Absolute Mono # 0.32 0.10 - 1.20 k/uL    Absolute Eos # 0.13 0.00 - 0.44 k/uL    Basophils Absolute <0.03 0.00 - 0.20 k/uL    Absolute Immature Granulocyte <0.03 0.00 - 0.30 k/uL   POC Glucose Fingerstick    Collection Time: 08/22/20  8:22 AM   Result Value Ref Range    POC Glucose 110 (H) 65 - 105 mg/dL     CT Head WO Contrast 8/18/2020  CT C-spine WO Contrast 8/18/2020  Impression    No acute abnormality of the cervical spine.  Multilevel degenerative changes    in the cervical spine.         No acute intracranial abnormality. , no acute intracranial hemorrhage or mass    effect.         Left occipital scalp hematoma.  No acute displaced skull fracture. CT Thoracic Spine WO Contrast 8/18/2020  Impression    No acute fracture or listhesis in the thoracic spine.         Multilevel degenerative changes in the thoracic spine with bridging marginal    osteophytes which could relate to component of DISH. CT Head WO Contrast 8/18/2020  Impression    No acute intracranial abnormality.         Similar soft tissue hematoma in the left parietooccipital region.         Findings were discussed with [facility] at 12:08 pm on 8/18/2020. CTA Head Neck W Contrast 8/18/2020  Impression    Calcification involving the carotid bulbs and proximal internal carotid    arteries bilaterally with approximately 70% stenosis in the proximal left    internal carotid artery by NASCET criteria.         No significant stenosis or occlusion within the intracranial vascularity.       MRI Brain WO Contrast 8/18/2020  Impression    No acute intracranial abnormality. MRA Head Neck WO Contrast 8/18/2020  Impression    70% stenosis at the origin of the left internal carotid artery.         No acute abnormality or flow-limiting stenosis of the major arteries of the    head. 2D Echo 8/19/2020  Summary  Technically difficult study. Patient unable to lay on left side due to  difficulty moving. Not all walls well visualized. Left ventricle is normal in size Global left ventricular systolic function  is normal Estimated ejection fraction is 60 % . Thickened mitral valve leaflets. Mild mitral regurgitation. Trivial tricuspid regurgitation. Mild pulmonary hypertension. Estimated right ventricular systolic pressure  is [id number]. IVC Increased diameter and impaired or no inspiratory variation indicating  elevated RA filling pressure (i.e. CVP) .      Signature  ----------------------------------------------------------------------------   Electronically signed by Jmaey Madrigal on 08/19/2020 09:38   AM  ----------------------------------------------------------------------------     ----------------------------------------------------------------------------   Electronically signed by Lenard Diaz(Interpreting physician) on 08/19/2020   11:18 AM    VL Dup Carotid Bilateral 8/19/2020  Summary          Simultaneous real time imaging utilizing B-Mode, color flow doppler and     spectral waveform analysis was performed on the bilateral extracerebral     vascular system.     The study demonstrates:          Right:     Internal carotid artery has a moderate, 50-69% stenosis based on     velocities.     The vertebral artery is patent with antegrade flow.          Left:     Internal carotid artery has a moderate, 50-69% stenosis based on     velocities.     The vertebral artery is patent with antegrade flow.          Signature          ----------------------------------------------------------------     Electronically signed by Susana Ruggiero

## 2020-08-23 NOTE — PROGRESS NOTES
weakness in her left leg is more now.  Patient also mentioned that she has bowel and bladder incontinence since her previous episode of stroke.  Patient was admitted to [facility] for management of pneumonia and UTI (with Rocephin). She mentioned that she was talking to her brother over phone at [facility] when she started noticing that she forgot what she was talking (word finding difficulty) about and her brother noticed slurring in her speech.  Patient was then transferred to [facility] for neurology consult and management of her aphasia.     According to patient. Vidal Farfan was started on 2 L home oxygen by her PCP due to her oxygen desat into high 80s.    On physical examination, patient has bruises on her back due to fall.  Left hand swollen erythematous.  Patient has hematoma on the back of her head.  No neurovascular deficit.  She has previous history of recurrent falls and concussion.     Past medical history: Type 2 diabetes mellitus, history of CVA, history of recurrent accidental falls, diabetic neuropathy and morbid obesity     Imaging: Ct head at [facility]: Negative for acute intracranial pathology.  Left occipital scalp hematoma present  CT cervical spine: Unremarkable CT thoracic spine: Degenerative changes. Chest x-ray: Basilar atelectasis  Repeat CT head: No acute intracranial abnormality. CTA head and neck: Left proximal ICA stenosis   MRI brain: No acute intracranial abnormality  MRA head and neck: 70% stenosis left internal carotid artery.     In the ED patient was evaluated by stroke team and neuro endovascular was consulted     OBJECTIVE     Vital Signs:  BP (!) 147/61   Pulse 72   Temp 97.7 °F (36.5 °C) (Oral)   Resp 17   Ht 5' (1.524 m)   Wt 284 lb 6.3 oz (129 kg)   SpO2 99%   BMI 55.54 kg/m²     Temp (24hrs), Av °F (36.7 °C), Min:97 °F (36.1 °C), Max:98.7 °F (37.1 °C)    No intake/output data recorded.     Physical Exam:  Constitutional: This is a well developed, well nourished, Greater than 36 - Morbid Obesity / Extreme Obesity / Grade III 75y.o. year old female who is alert, oriented, cooperative and in no apparent distress. Head:normocephalic and atraumatic. EENT:  PERRLA. No conjunctival injections. Neck: Supple without thyromegaly. No elevated JVP. Respiratory: Chest was symmetrical without dullness to percussion. Breath sounds bilaterally were clear to auscultation. There were no wheezes, rhonchi or rales. Cardiovascular:s1s2 Regular without murmur, clicks, gallops or rubs. Abdomen: Slightly rounded and soft without organomegaly. No rebound, rigidity or guarding was appreciated. Extremities: lower extremity edema. No ulcerations, tenderness, varicosities or erythema. Muscle size, tone and strength are normal.  No involuntary movements are noted. Skin:  Warm and dry. Good color, turgor and pigmentation. No lesions or scars.   No cyanosis or clubbing  Neurological/Psychiatric: The patient's general behavior, level of consciousness, thought content and emotional status is normal.        Medications:  Scheduled Medications:    hydrALAZINE  25 mg Oral 3 times per day    atorvastatin  80 mg Oral Nightly    heparin (porcine)  5,000 Units Subcutaneous 3 times per day    amLODIPine  10 mg Oral Daily    furosemide  40 mg Intravenous BID    cefTRIAXone (ROCEPHIN) IV  1 g Intravenous Q24H    lamoTRIgine  200 mg Oral BID    levETIRAcetam  500 mg Oral BID    sodium chloride flush  10 mL Intravenous 2 times per day    insulin glargine  20 Units Subcutaneous BID    insulin lispro  0-12 Units Subcutaneous TID WC    insulin lispro  0-6 Units Subcutaneous Nightly    aspirin  81 mg Oral Daily     Continuous Infusions:    dextrose       PRN Medicationssodium chloride flush, 10 mL, PRN  acetaminophen, 650 mg, Q6H PRN    Or  acetaminophen, 650 mg, Q6H PRN  polyethylene glycol, 17 g, Daily PRN  potassium chloride, 40 mEq, PRN    Or  potassium alternative oral replacement, 40 mEq, PRN    Or  potassium chloride, 10 mEq, PRN  glucose, 15 g, PRN  dextrose, 12.5 g, PRN  glucagon (rDNA), 1 mg, PRN  dextrose, 100 mL/hr, PRN        Diagnostic Labs:  CBC:   Recent Labs     08/21/20  0401 08/22/20  0611 08/23/20  0634   WBC 4.3 3.8 4.2   RBC 2.99* 2.95* 3.14*   HGB 7.6* 7.7* 8.1*   HCT 30.5* 26.1* 28.5*   .0 88.5 90.8   RDW 18.5* 18.6* 18.6*   PLT 67* See Reflexed IPF Result 89*     BMP:   Recent Labs     08/21/20  0401 08/22/20  0611 08/23/20  0634    143 145*   K 4.5 4.5 5.0    107 106   CO2 22 25 29   BUN 57* 61* 63*   CREATININE 2.61* 2.50* 2.52*     BNP: No results for input(s): BNP in the last 72 hours. PT/INR: No results for input(s): PROTIME, INR in the last 72 hours. APTT: No results for input(s): APTT in the last 72 hours. CARDIAC ENZYMES: No results for input(s): CKMB, CKMBINDEX, TROPONINI in the last 72 hours. Invalid input(s): CKTOTAL;3  FASTING LIPID PANEL:  Lab Results   Component Value Date    CHOL 119 08/22/2020    HDL 42 08/22/2020    TRIG 190 (H) 08/22/2020     LIVER PROFILE: No results for input(s): AST, ALT, ALB, BILIDIR, BILITOT, ALKPHOS in the last 72 hours. MICROBIOLOGY:   Lab Results   Component Value Date/Time    CULTURE NO SIGNIFICANT GROWTH 03/16/2020 04:30 PM       Imaging:    Ct Abdomen Pelvis Wo Contrast Additional Contrast? None    Result Date: 8/22/2020  1. The bladder is well distended. No pelvic mass identified. The uterus appears grossly unremarkable. The ovaries are not visualized. 2.  Morphologic findings suggestive of chronic liver disease. Mild splenomegaly. 3.  Small volume abdominopelvic ascites, small pleural effusions with associated relaxation atelectasis, and body wall edema. Xr Chest (2 Vw)    Result Date: 8/18/2020  Questionable basilar atelectasis versus pneumonia. Xr Hand Left (min 3 Views)    Result Date: 8/18/2020  No acute displaced fracture or malalignment in the left hand.  Degenerative osseous changes. Suggestion of osteopenia. Ct Head Wo Contrast    Result Date: 8/18/2020  No acute intracranial abnormality. Similar soft tissue hematoma in the left parietooccipital region. Findings were discussed with Nabeel Chowdhury at 12:08 pm on 8/18/2020. Ct Head Wo Contrast    Result Date: 8/18/2020  No acute abnormality of the cervical spine. Multilevel degenerative changes in the cervical spine. No acute intracranial abnormality. , no acute intracranial hemorrhage or mass effect. Left occipital scalp hematoma. No acute displaced skull fracture. Ct Cervical Spine Wo Contrast    Result Date: 8/18/2020  No acute abnormality of the cervical spine. Multilevel degenerative changes in the cervical spine. No acute intracranial abnormality. , no acute intracranial hemorrhage or mass effect. Left occipital scalp hematoma. No acute displaced skull fracture. Ct Thoracic Spine Wo Contrast    Result Date: 8/18/2020  No acute fracture or listhesis in the thoracic spine. Multilevel degenerative changes in the thoracic spine with bridging marginal osteophytes which could relate to component of DISH. Mra Head Wo Contrast    Result Date: 8/18/2020  70% stenosis at the origin of the left internal carotid artery. No acute abnormality or flow-limiting stenosis of the major arteries of the head. Us Renal Complete    Addendum Date: 8/19/2020    ADDENDUM: Correction: \"Catheter present\" was improperly denoted on the technologist notes. Please note that in fact the urinary bladder is collapsed and no Julien catheter was present. Result Date: 8/19/2020  1. Large indeterminate anechoic cystic structure in the pelvis near the urinary bladder, the origin of which is unclear. Further evaluation with dedicated CT abdomen pelvis is recommended. 2. Nonvisualization of the ureteral jets are ureters. Urinary bladder is collapsed with Julien catheter present. 3. Fatty liver. Trace perihepatic fluid.  4. Unremarkable renal ultrasound. No hydronephrosis. Us Pelvis Complete    Result Date: 8/22/2020  Very limited transabdominal pelvic ultrasound with patient refusing transvaginal imaging. The uterus is visualized and is unremarkable. However, the ovaries are not clearly seen with what is thought to be a left ovary demonstrating no CT correlate. Cta Head Neck W Contrast    Result Date: 8/18/2020  Calcification involving the carotid bulbs and proximal internal carotid arteries bilaterally with approximately 70% stenosis in the proximal left internal carotid artery by NASCET criteria. No significant stenosis or occlusion within the intracranial vascularity. Mra Neck Wo Contrast    Result Date: 8/18/2020  70% stenosis at the origin of the left internal carotid artery. No acute abnormality or flow-limiting stenosis of the major arteries of the head. Mri Brain Wo Contrast    Result Date: 8/18/2020  No acute intracranial abnormality. ASSESSMENT & PLAN   Principal Problem:    Accidental fall  Active Problems:    Type 2 diabetes mellitus (HCC)    Falling episodes    Generalized weakness    RLS (restless legs syndrome)    Thrombocytopenia (HCC)    Confusion state    Postmenopausal bleeding    Anemia    Atypical glandular cells of undetermined significance (SLICK) on cervical Pap smear (NOS)    Breast mass in female    Acute kidney injury superimposed on chronic kidney disease (HCC)    Aphasia    NIKKI (acute kidney injury) ([facility] [address]    Acute cystitis without hematuria    Cellulitis of lower extremity  Resolved Problems:    * No resolved hospital problems.  *    ASSESSMENT / PLAN:   Expressive aphasia  Continue aspirin and Lipitor  CT head and MRI did not show any acute infarct  With Doppler and CTA head and neck showed 50 to 69% of stenosis on bilateral internal carotid artery  Neurology follow-up    Bilateral lower extremity venous status dermatitis  Compression stocking  DVT prophylaxis  cellulitis ruled out    NIKKI and CKD stage III likely secondary to dehydration fluid bolus baseline creatinine 1.822  Ultrasound unremarkable for hydronephrosis  Appreciate nephrology recommendations for discharge today    Congestive heart failure( chronic diastolic)-Lasix 40 IV twice daily  Ejection fraction on 3/16/2020 65%    Chronic hypernatremia-resolved, treated with fluid boluses  Serum sodium 145    Type II DM on Lantus 20 units twice daily and sliding scale hypoglycemia protocol    History of seizure disorder Keppra on hold due to elevated Keppra level  Seizure precaution  Appreciate neurologic recommendation  Patient to established with epileptic neurologist and tolerated  Continue Lamictal 200 mg twice a day    UTI resolved with antibiotics     Morbid obesity-patient will need outpatient sleep study      DVT ppx : Enoxaparin  GI ppx: Not indicated    PT/OT: On board  Discharge Planning / SW: Ongoing     Jerry Mendieta MD  Internal Medicine Resident, PGY-1  [facility];  [facility]  8/23/2020, 11:34 AM

## 2020-08-23 NOTE — PROGRESS NOTES
OB/GYN Resident Interval Note      Patient refused transvaginal US. Transabdominal pelvic US limited. EXAMINATION:    PELVIC ULTRASOUND         8/22/2020         TECHNIQUE:    Transabdominal pelvic ultrasound was performed with color doppler flow    evaluation.         COMPARISON:    CT 08/22/2020.         HISTORY:    ORDERING SYSTEM PROVIDED HISTORY: vaginal bleeding, recent abnormal pap smear    TECHNOLOGIST PROVIDED HISTORY:    vaginal bleeding, recent abnormal pap smear         FINDINGS:    The patient apparently refused transvaginal imaging.  Study is limited by    patient's body habitus, bowel gas and incomplete distention of the urinary    bladder.              Measurements:         Uterus:  6.3 x 4.9 x 6.1 cm         Endometrial stripe:  Not clearly visualized.         Right Ovary:  Not visualized.         Left Ovary:  5.6 x 4.5 x 2.5 cm.              Ultrasound Findings:         Uterus: Uterus demonstrates normal myometrial echotexture.         Right Ovary: No significant right adnexal mass.         Left Ovary: Brena Sovereign is a questionable left ovary which would be enlarged. However, no definite CT correlate is identified.         Free Fluid: No evidence of free fluid. Very limited transabdominal pelvic ultrasound with patient refusing    transvaginal imaging.  The uterus is visualized and is unremarkable. However, the ovaries are not clearly seen with what is thought to be a left    ovary demonstrating no CT correlate. Vaginitis negative   Gc/C pending, will follow results from [address] [facility] will sign off at this point. Please re page on call resident if patient has worsening vaginal bleeding. Otherwise, patient is to follow up outpatient for continued work up for PMB with abnormal pap smear.        Debora Tijerina DO   OB/GYN Resident   [id number]  8/23/2020, 1:56 AM

## 2020-08-23 NOTE — PROGRESS NOTES
obtained. Have ordered Carotid dopplers in addition to present work up. Okay to resume aspirin and Lipitor. Past Medical History:     Past Medical History:   Diagnosis Date    Anemia     Cataract     GERD (gastroesophageal reflux disease)     Headache     Hyperlipidemia     Neuropathy     Osteoarthritis     Restless leg syndrome     Seizures (HCC) since age 10   [place] Short-term memory loss     Stroke (cerebrum) ([facility] 2010    Type 2 diabetes mellitus without complication ([facility] 7994        Past Surgical History:     Past Surgical History:   Procedure Laterality Date    CARDIAC CATHETERIZATION      CATARACT REMOVAL      CHOLECYSTECTOMY      COLONOSCOPY N/A 5/13/2019    COLONOSCOPY DIAGNOSTIC, POLYPECTOMIES performed by Adilson Owens MD at [facility] [place]      cataract    TONSILLECTOMY      UPPER GASTROINTESTINAL ENDOSCOPY N/A 5/12/2019    EGD ESOPHAGOGASTRODUODENOSCOPY performed by Adilson Owens MD at [facility] N/A 3/18/2020    EGD WITH CAUTERIZATION OF [facility] (GAVE) USING ARGON performed by Edwin Galicia MD at [facility] N/A 8/7/2020    EGD BIOPSY BRUSH BX AND STOMACH FULGURATION WITH ARGON BEAM performed by Adilson Owens MD at [facility] ENDO        Medications Prior to Admission:     Prior to Admission medications    Medication Sig Start Date End Date Taking?  Authorizing Provider   cephALEXin (KEFLEX) 500 MG capsule Take 1 capsule by mouth 3 times daily for 7 days 8/18/20 8/25/20 Yes Abbey Romero MD   bumetanide (BUMEX) 1 MG tablet Take 1 mg by mouth 3 times daily   Yes Historical Provider, MD   insulin glargine (BASAGLAR KWIKPEN) 100 UNIT/ML injection pen Inject 60 Units into the skin 2 times daily   Yes Historical Provider, MD   levETIRAcetam (KEPPRA) 750 MG tablet Take 750 mg by mouth 2 times daily Indications: with 500 mg to make 1250 mg dose   Yes Historical Provider, MD gabapentin (NEURONTIN) 100 MG capsule Take 100 mg by mouth 2 times daily. .   Yes Historical Provider, MD   levETIRAcetam (KEPPRA) 500 MG tablet Take 500 mg by mouth 2 times daily Indications: with 750 mg to make total dose 1250 mg    Yes Historical Provider, MD   insulin aspart (NOVOLOG) 100 UNIT/ML injection vial Inject into the skin Inject as per sliding scale before meals and at bedtime:    = 0 units; call physician if less than 70  151-200 = 2 units  201-250 = 4 units  251-300 = 6 units  301-350 = 8 units  351-400 = 10 units; call physician if greater than 400   Yes Historical Provider, MD   lisinopril-hydrochlorothiazide (PRINZIDE;ZESTORETIC) 10-12.5 MG per tablet Take 1 tablet by mouth daily   Yes Historical Provider, MD   omeprazole (PRILOSEC) 40 MG delayed release capsule Take 40 mg by mouth Daily    Yes Historical Provider, MD   simvastatin (ZOCOR) 20 MG tablet Take 20 mg by mouth nightly    Yes Historical Provider, MD   lamoTRIgine (LAMICTAL) 200 MG tablet Take 200 mg by mouth 2 times daily   Yes Historical Provider, MD   clotrimazole-betamethasone (LOTRISONE) 1-0.05 % cream Apply topically 2 times daily. 8/17/20   Madiha Hawk PA-C   nadolol (CORGARD) 20 MG tablet Take 1 tablet by mouth daily 8/10/20   Kelly Morris MD   ferrous sulfate (IRON 325) 325 (65 Fe) MG tablet Take 1 tablet by mouth every 12 hours 8/10/20   Kelly Morris MD   blood glucose monitor strips Test 4 times a day & as needed for symptoms of irregular blood glucose. Dispense sufficient amount for indicated testing frequency plus additional to accommodate PRN testing needs.  8/4/20   Madiha Hawk PA-C   Lancets MISC 1 each by Does not apply route 4 times daily 8/4/20   Madiha Hawk PA-C   glucose monitoring kit (FREESTYLE) monitoring kit 1 kit by Does not apply route daily 8/4/20   Madiha Hawk PA-C   Emollient (CERAVE) LOTN Apply topically 2 times daily Indications: bilateral lower legs    Historical Provider, MD   vitamin D (CHOLECALCIFEROL) 82149 UNIT CAPS Take 50,000 Units by mouth once a week Indications: Tuesdays     Historical Provider, MD   potassium chloride (KLOR-CON M) 10 MEQ extended release tablet Take 30 mEq by mouth daily     Historical Provider, MD   vitamin B-12 (CYANOCOBALAMIN) 500 MCG tablet Take 500 mcg by mouth daily    Historical Provider, MD   acetaminophen (TYLENOL) 325 MG tablet Take 650 mg by mouth 3 times daily as needed for Pain (mild)    Historical Provider, MD   Blood Glucose Monitoring Suppl FLAVIO Check blood sugars 3/day 11/30/17   Chin Hurd MD   PARoxetine (PAXIL) 20 MG tablet Take 20 mg by mouth every morning    Historical Provider, MD   loratadine (CLARITIN) 10 MG tablet Take 10 mg by mouth daily    Historical Provider, MD   pramipexole (MIRAPEX) 1 MG tablet Take 1 mg by mouth nightly    Historical Provider, MD   busPIRone (BUSPAR) 10 MG tablet Take 10 mg by mouth 3 times daily     Historical Provider, MD        Allergies:     Codeine    Social History:     Tobacco:    reports that she has never smoked. She has never used smokeless tobacco.  Alcohol:      reports no history of alcohol use. Drug Use:  reports no history of drug use.     Family History:     Family History   Problem Relation Age of Onset    Diabetes Brother        Review of Systems:     ROS:  Constitutional  Negative for fever and chills    HEENT  Negative for ear discharge, ear pain, nosebleed    Eyes  Negative for photophobia, pain and discharge    Respiratory  Negative for hemoptysis and sputum    Cardiovascular  Negative for orthopnea, claudication and PND    Gastrointestinal  Negative for abdominal pain, diarrhea, blood in stool    Musculoskeletal  Negative for joint pain, negative for myalgia    Neurology Negative for seizures, loss of consciousness   Skin  Negative for rash or itching    Endo/heme/allergies  Negative for polydipsia, environmental allergy    Psychiatric/behavioral  Negative for suicidal ideation. Patient is not anxious        Physical Exam:   BP (!) 147/61   Pulse 72   Temp 97.7 °F (36.5 °C) (Oral)   Resp 17   Ht 5' (1.524 m)   Wt 284 lb 6.3 oz (129 kg)   SpO2 99%   BMI 55.54 kg/m²   Temp (24hrs), Av °F (36.7 °C), Min:97 °F (36.1 °C), Max:98.7 °F (37.1 °C)    Recent Labs     20  1209 20  1616 20  2242 20  0803   POCGLU 174* 202* 189* 141*     No intake or output data in the 24 hours ending 20 0849      NEUROLOGIC EXAMINATION  GENERAL  Appears comfortable and in no distress   HEENT  NC/ AT   NECK  Supple and no bruits heard   MENTAL STATUS:  Alert, oriented, intact memory, no confusion, normal speech, normal language, no hallucination or delusion   CRANIAL NERVES: II     -      Visual fields intact to confrontation  III,IV,VI -  EOMs full, no afferent defect, no GRAY, no ptosis  V     -     Normal facial sensation  VII    -     Normal facial symmetry  VIII   -     Intact hearing  IX,X -     Symmetrical palate  XI    -     Symmetrical shoulder shrug  XII   -     Midline tongue, no atrophy    MOTOR FUNCTION: B/L UE 5/5, B/L LE 4/5  normal bulk, normal tone and no involuntary movements, no tremor   SENSORY FUNCTION:  Normal touch, normal pin, normal vibration, normal proprioception   CEREBELLAR FUNCTION:  Intact fine motor control over upper limbs   REFLEX FUNCTION:  Symmetric, no perverted reflex, no Babinski sign   STATION and GAIT  Not tested       Investigations:      Laboratory Testing:  Recent Results (from the past 24 hour(s))   POC Glucose Fingerstick    Collection Time: 20 12:09 PM   Result Value Ref Range    POC Glucose 174 (H) 65 - 105 mg/dL   VAGINITIS DNA PROBE    Collection Time: 20  2:47 PM    Specimen: Vaginal   Result Value Ref Range    Specimen Description . VAGINA     Special Requests NOT REPORTED     Direct Exam NEGATIVE for Gardnerella vaginalis     Direct Exam NEGATIVE for Candida sp.      Direct Exam NEGATIVE for mg/dL   Basic Metabolic Panel w/ Reflex to MG    Collection Time: 08/23/20  6:34 AM   Result Value Ref Range    Glucose 140 (H) 70 - 99 mg/dL    BUN 63 (H) 8 - 23 mg/dL    CREATININE 2.52 (H) 0.50 - 0.90 mg/dL    Bun/Cre Ratio NOT REPORTED 9 - 20    Calcium 8.6 8.6 - 10.4 mg/dL    Sodium 145 (H) 135 - 144 mmol/L    Potassium 5.0 3.7 - 5.3 mmol/L    Chloride 106 98 - 107 mmol/L    CO2 29 20 - 31 mmol/L    Anion Gap 10 9 - 17 mmol/L    GFR Non-African American 19 (L) >60 mL/min    GFR  23 (L) >60 mL/min    GFR Comment          GFR Staging NOT REPORTED    CBC auto differential    Collection Time: 08/23/20  6:34 AM   Result Value Ref Range    WBC 4.2 3.5 - 11.3 k/uL    RBC 3.14 (L) 3.95 - 5.11 m/uL    Hemoglobin 8.1 (L) 11.9 - 15.1 g/dL    Hematocrit 28.5 (L) 36.3 - 47.1 %    MCV 90.8 82.6 - 102.9 fL    MCH 25.8 25.2 - 33.5 pg    MCHC 28.4 28.4 - 34.8 g/dL    RDW 18.6 (H) 11.8 - 14.4 %    Platelets 89 (L) 693 - 453 k/uL    MPV 10.9 8.1 - 13.5 fL    NRBC Automated 0.0 0.0 per 100 WBC    Differential Type NOT REPORTED     Seg Neutrophils 68 (H) 36 - 65 %    Lymphocytes 19 (L) 24 - 43 %    Monocytes 8 3 - 12 %    Eosinophils % 3 1 - 4 %    Basophils 1 0 - 2 %    Immature Granulocytes 1 (H) 0 %    Segs Absolute 2.87 1.50 - 8.10 k/uL    Absolute Lymph # 0.80 (L) 1.10 - 3.70 k/uL    Absolute Mono # 0.35 0.10 - 1.20 k/uL    Absolute Eos # 0.14 0.00 - 0.44 k/uL    Basophils Absolute 0.03 0.00 - 0.20 k/uL    Absolute Immature Granulocyte <0.03 0.00 - 0.30 k/uL    WBC Morphology NOT REPORTED     RBC Morphology ANISOCYTOSIS PRESENT     Platelet Estimate NOT REPORTED    POC Glucose Fingerstick    Collection Time: 08/23/20  8:03 AM   Result Value Ref Range    POC Glucose 141 (H) 65 - 105 mg/dL     CT Head WO Contrast 8/18/2020  CT C-spine WO Contrast 8/18/2020  Impression    No acute abnormality of the cervical spine.  Multilevel degenerative changes    in the cervical spine.         No acute intracranial abnormality. , no acute intracranial hemorrhage or mass    effect.         Left occipital scalp hematoma.  No acute displaced skull fracture. CT Thoracic Spine WO Contrast 8/18/2020  Impression    No acute fracture or listhesis in the thoracic spine.         Multilevel degenerative changes in the thoracic spine with bridging marginal    osteophytes which could relate to component of DISH. CT Head WO Contrast 8/18/2020  Impression    No acute intracranial abnormality.         Similar soft tissue hematoma in the left parietooccipital region.         Findings were discussed with Elliott Liz at 12:08 pm on 8/18/2020. CTA Head Neck W Contrast 8/18/2020  Impression    Calcification involving the carotid bulbs and proximal internal carotid    arteries bilaterally with approximately 70% stenosis in the proximal left    internal carotid artery by NASCET criteria.         No significant stenosis or occlusion within the intracranial vascularity. MRI Brain WO Contrast 8/18/2020  Impression    No acute intracranial abnormality. MRA Head Neck WO Contrast 8/18/2020  Impression    70% stenosis at the origin of the left internal carotid artery.         No acute abnormality or flow-limiting stenosis of the major arteries of the    head. 2D Echo 8/19/2020  Summary  Technically difficult study. Patient unable to lay on left side due to  difficulty moving. Not all walls well visualized. Left ventricle is normal in size Global left ventricular systolic function  is normal Estimated ejection fraction is 60 % . Thickened mitral valve leaflets. Mild mitral regurgitation. Trivial tricuspid regurgitation. Mild pulmonary hypertension. Estimated right ventricular systolic pressure  is 31EGXP. IVC Increased diameter and impaired or no inspiratory variation indicating  elevated RA filling pressure (i.e. CVP) .      Signature  ---------------------------------------------------------------------------- Electronically signed by Julissa Cunha on 08/19/2020 09:38   AM  ----------------------------------------------------------------------------     ----------------------------------------------------------------------------   Electronically signed by Lenard Diaz(Interpreting physician) on 08/19/2020   11:18 AM    VL Dup Carotid Bilateral 8/19/2020  Summary          Simultaneous real time imaging utilizing B-Mode, color flow doppler and     spectral waveform analysis was performed on the bilateral extracerebral     vascular system.     The study demonstrates:          Right:     Internal carotid artery has a moderate, 50-69% stenosis based on     velocities.     The vertebral artery is patent with antegrade flow.          Left:     Internal carotid artery has a moderate, 50-69% stenosis based on     velocities.     The vertebral artery is patent with antegrade flow.          Signature          ----------------------------------------------------------------     Electronically signed by Lynette Carmona RVT(Sonographer)     on 08/19/2020 10:59 AM     ----------------------------------------------------------------          ----------------------------------------------------------------     Electronically signed by Dee Garcia(Interpreting    [address] [place] [address]) on 08/19/2020 08:49 PM        Assessment :      Primary Problem  Accidental fall    Active Hospital Problems    Diagnosis Date Noted    NIKKI (acute kidney injury) ([facility] [address] [N17.9]     Acute cystitis without hematuria [N30.00]     Cellulitis of lower extremity [C42.048]     Aphasia [R47.01] 08/18/2020    Acute kidney injury superimposed on chronic kidney disease ([facility] [facility] [N17.9, N18.9] 08/05/2020    Breast mass in female [N63.0] 08/05/2020    Atypical glandular cells of undetermined significance (SLICK) on cervical Pap smear (NOS) [R87.619] 05/15/2020    Anemia [D64.9] 03/16/2020    Postmenopausal bleeding [N95.0] 01/11/2020    Confusion state [F44.89] 12/01/2018    Falling episodes [R29.6] 12/14/2017    Accidental fall [W19. XXXA] 12/13/2017    Type 2 diabetes mellitus ([facility] [E11.9] 10/21/2017    Generalized weakness [R53.1] 03/15/2017    RLS (restless legs syndrome) [G25.81] 01/09/2017    Thrombocytopenia ([facility] [D69.6] 07/26/2016     75 y.o female with fall, mechanical vs syncopal  // Seizure disorder  // History of CVA in 2010  // Diabetic neuropathy  // Proximal L ICA 70% stenosis  // NIKKI on CKD 3  Plan:     A1c 7.1, Lipid panel Chol 124, LDL 49, HDL 42  Continue ASA 81 and Lipitor as per endovascular  Resume DM meds and monitor per primary team  Home AED dose of Keppra 1250mg BID, presently due to elevated level, will hold Keppra dosage and recheck Keppra levels. Since admission levels 99 -> 87 -> 76 -> 53, 41 (6-46 range)  Levels became within therapeutic range yesterday. Due to CrCl < 30, Keppra restarted as of PM dose last night, to be 500mg BID. Continue Lamictal 200mg BID  Discussed with patient regarding establishing with an epileptologist in [place]. She is amenable and will follow up. Advised to schedule in 4-6 weeks. Contact information added to discharge instructions. PT/OT    Neurology to sign off. Thank you for allowing us to participate in this patient's care. Follow-up further recommendations after discussing the case with attending  .      Thomas Bolden MD   Neurology PGY-2  8/23/2020  8:49 AM

## 2020-08-23 NOTE — DISCHARGE INSTR - COC
Continuity of Care Form    Patient Name: Sophia Nunez   :  1955  MRN:  [id number]    Admit date:  2020  Discharge date:  2020    Code Status Order: Full Code   Advance Directives:   [facility] Documentation     Date/Time Healthcare Directive Type of Healthcare Directive Copy in [address] Agent's Name Healthcare Agent's Phone Number    20 1484  Yes, patient has an advance directive for healthcare treatment -- --  -- -- --          Admitting Physician:  Neville Hernandez MD  PCP: Nanda Medina PA-C    Discharging Nurse: Silverio Pratt Unit/Room#: 6023/0431-69  Discharging Unit Phone Number: [phone]    Emergency Contact:   Extended Emergency Contact Information  Primary Emergency Contact: Pooja Beck [address] Phone: [phone]  Mobile Phone: [phone]  Relation: Other  Secondary Emergency Contact: Pericocharles Blanca  Mobile Phone: [phone]  Relation: Brother/Sister  Preferred language: English   needed?  No    Past Surgical History:  Past Surgical History:   Procedure Laterality Date    CARDIAC CATHETERIZATION      CATARACT REMOVAL      CHOLECYSTECTOMY      COLONOSCOPY N/A 2019    COLONOSCOPY DIAGNOSTIC, POLYPECTOMIES performed by Ivania Bustos MD at [facility]      cataract    TONSILLECTOMY      UPPER GASTROINTESTINAL ENDOSCOPY N/A 2019    EGD ESOPHAGOGASTRODUODENOSCOPY performed by Ivania Bustos MD at [address] [place] [address] N/A 3/18/2020    EGD WITH CAUTERIZATION OF New Replaced by [facility] (GAVE) USING ARGON performed by Mehdi Corea MD at [address] [place] [address] N/A 2020    EGD BIOPSY BRUSH BX AND STOMACH FULGURATION WITH ARGON BEAM performed by Ivania Bustos MD at [facility]       Immunization History:   Immunization History   Administered Date(s) Administered    Influenza Virus Vaccine 12/06/2016, 11/01/2019    Influenza, Quadv, IM, PF (6 mo and older Fluzone, Flulaval, Fluarix, and 3 yrs and older Afluria) 10/23/2017    Pneumococcal Polysaccharide ([id number]) 09/01/2015       Active Problems:  Patient Active Problem List   Diagnosis Code    Type 2 diabetes mellitus ([facility] [address] [address] E11.9    Breakthrough seizure ([facility] [address] G40.919    CVA, old, hemiparesis ([facility] [address] [facility] I69.359    Accidental fall W19. XXXA    Falling episodes R29.6    Cerebral concussion S06. 8L6I    Cervical spinal stenosis M48.02    Diabetic polyneuropathy associated with type 2 diabetes mellitus ([facility]) E11.42    History of cerebral infarction Z86.73    Seizure disorder ([facility]) G40.909    Depression with anxiety F41.8    Dizziness R42    Generalized weakness R53.1    GERD (gastroesophageal reflux disease) K21.9    H/O falling Z91.81    Hyperglycemia R73.9    Hyponatremia E87.1    Morbid obesity with BMI of 40.0-44.9, adult ([facility]) E66.01, Z68.41    Pure hypercholesterolemia E78.00    RLS (restless legs syndrome) G25.81    Thrombocytopenia ([facility]) D69.6    Altered mental status R41.82    Confusion state F44.89    Gait disturbance R26.9    GI bleed K92.2    Polyp of colon K63.5    Postmenopausal bleeding N95.0    Anemia D64.9    Elevated alkaline phosphatase level R74.8    GAVE (gastric antral vascular ectasia) K31.819    Esophageal varices determined by endoscopy ([facility]) I85.00    Atypical glandular cells of undetermined significance (SLICK) on cervical Pap smear (NOS) R87.619    Symptomatic anemia D64.9    Breast mass in female N63.0    Hematuria R31.9    Acute kidney injury superimposed on chronic kidney disease ([facility]) N17.9, N18.9    FRANCIS (nonalcoholic steatohepatitis) K75.81    Pneumonia J18.9    Head injury, acute, sequela bruising S09.90XS    Expressive aphasia new  R47.01    Aphasia R47.01    NIKKI (acute kidney injury) ([facility] [address] N17.9    Acute cystitis without hematuria N30.00    Cellulitis of lower extremity L03.119 of Last BM: 08/24/2020  No intake or output data in the 24 hours ending 08/23/20 1154  No intake/output data recorded. Safety Concerns: At Risk for Falls    Impairments/Disabilities:      Vision and Hearing    Nutrition Therapy:  Current Nutrition Therapy:   - Oral Diet:  Carb Control 4 carbs/meal (1800kcals/day)    Routes of Feeding: Oral  Liquids: No Restrictions  Daily Fluid Restriction: no  Last Modified Barium Swallow with Video (Video Swallowing Test): not done    Treatments at the Time of Hospital Discharge:   Respiratory Treatments: breathing treatment   Oxygen Therapy:  is on oxygen at 2 L/min per nasal cannula. Ventilator:    - No ventilator support    Rehab Therapies: Physical Therapy and Occupational Therapy  Weight Bearing Status/Restrictions: No weight bearing restirctions  Other Medical Equipment (for information only, NOT a DME order):  walker  Other Treatments: AC&HS blood sugars    Patient's personal belongings (please select all that are sent with patient):  {[facility] DME Belongings:[id number]}    RN SIGNATURE:  Electronically signed by [facility], RN on 8/25/20 at 3:18 PM EDT    CASE MANAGEMENT/SOCIAL WORK SECTION    Inpatient Status Date: ***    Readmission Risk Assessment Score:  Readmission Risk              Risk of Unplanned Readmission:        36           Discharging to Facility/ Agency   · Name: [facility]  · Address:  · Phone:  · Fax:    Dialysis Facility (if applicable)   · Name:  · Address:  · Dialysis Schedule:  · Phone:  · Fax:    / signature: Electronically signed by Kelvin Gomez RN on 8/25/20 at 5:59 PM EDT    PHYSICIAN SECTION    Prognosis: Fair    Condition at Discharge: Stable    Rehab Potential (if transferring to Rehab):  Fair    Recommended Labs or Other Treatments After Discharge: Please follow-up with your primary care physician 1 week after discharge-post hospital follow-up    follow-up with nephrology Dr. Shell Marvin in 1 month for NIKKI on CKD post hospital follow-up     follow-up with OB/GYN outpatient for vaginal bleeding     follow- up with neurology for history of seizure disorder and history of  CVA     follow-up with neuro endovascular for left ICA stenosis      take Bumex as prescribed     stop taking Prinzide    Lab for BMP in 1 week    Physician Certification: I certify the above information and transfer of Char Craven  is necessary for the continuing treatment of the diagnosis listed and that she requires Home Care for greater 30 days.      Update Admission H&P: No change in H&P    PHYSICIAN SIGNATURE:  Electronically signed by Tez Engle MD on 8/25/20 at 5:06 PM EDT

## 2020-08-23 NOTE — PROGRESS NOTES
diuretic use in the setting of some right sided cardiac issues with likely JACOBY/pulmonary hypertension approaching stage IV with a baseline creatinine of 1.8 to 2 mg/dL. 4. Portal Hypertension  5. DM II-on insulin  6. Morbid obesity  7. Large indeterminate anechoic cystic structure in the pelvis near the urinary bladder.  Origin not clear  8. Hypernatremia      Plan:   .1. Continue diuretics-Lasix 40 mg IV twice daily  2. Strict Input and Output, Daily weigh and document in the chart. 3. Daily BMP  4. Patient will need to have a sleep study for JACOBY as an outpatient      Nutrition   Renal Diet/TF    Thank you. Please call with any questions. Zoran Mcintosh. Maria Esther CHRISTOPHER CNP    [facility]    Attending Physician Statement  I have discussed the care of Teresa Bland, including pertinent history and exam findings with the [address] [place] [address]. I have reviewed the key elements of all parts of the encounter with the CNP. I have seen and examined the patient with the CNP. I agree with the assessment and plan and status of the problem list as documented.        Dunia Villarreal MD  Nephrology Attending Physician  [facility]

## 2020-08-24 NOTE — CARE COORDINATION
Spoke to pt. Sitting up in chair resting. I notified her that she will be accepted and asked if she was OK with returning with the Covid precautions at her current facility, [address] [facility] and she agreed to go. 1811 hrs. Called [employer] and cancelled walker because pt. Has roller walker at home.

## 2020-08-24 NOTE — PROGRESS NOTES
Occupational Therapy  Facility/Department: [facility] NEURO  Daily Treatment Note  NAME: Lilia Gustafson  : 1955  MRN: [id number]    Date of Service: 2020    Discharge Recommendations:  Patient would benefit from continued therapy after discharge     Assessment   Performance deficits / Impairments: Decreased functional mobility ; Decreased ADL status; Decreased endurance;Decreased high-level IADLs  Prognosis: Good  REQUIRES OT FOLLOW UP: Yes  Activity Tolerance  Activity Tolerance: Patient Tolerated treatment well;Patient limited by fatigue  Safety Devices  Safety Devices in place: Yes  Type of devices: Call light within reach;Nurse notified;Gait belt;Patient at risk for falls; All fall risk precautions in place; Left in chair  Restraints  Initially in place: No         Patient Diagnosis(es): The primary encounter diagnosis was NIKKI (acute kidney injury) ([facility] [address] Diagnoses of Acute cystitis without hematuria and Cellulitis of lower extremity, unspecified laterality were also pertinent to this visit. has a past medical history of Anemia, Cataract, GERD (gastroesophageal reflux disease), Headache, Hyperlipidemia, Neuropathy, Osteoarthritis, Restless leg syndrome, Seizures ([address] Short-term memory loss, Stroke (cerebrum) ([address] and Type 2 diabetes mellitus without complication ([address] has a past surgical history that includes Cholecystectomy; Tonsillectomy; Cataract removal; eye surgery; Cardiac catheterization; Upper gastrointestinal endoscopy (N/A, 2019); Colonoscopy (N/A, 2019); Upper gastrointestinal endoscopy (N/A, 3/18/2020); and Upper gastrointestinal endoscopy (N/A, 2020). Restrictions  Restrictions/Precautions  Restrictions/Precautions: General Precautions, Fall Risk  Required Braces or Orthoses?: No  Position Activity Restriction  Other position/activity restrictions:  Up with A, 8.0 Hgb  Subjective   General  Patient assessed for rehabilitation services?: Yes  Family / Caregiver activities  Short term goal 2: demo mod I for LE ADL activities with AD as needed  Short term goal 3: demo I in functional transfers/ mobility with use of RW, good safety awareness and maintain midline during functional activities  Short term goal 4: demo understanding and I use of ECWS, fall prevention during functional activities  Short term goal 5: demo increased activity tolerance of 40+ min with rest breaks PRN to assist with ADL/ functional activities  Patient Goals   Patient goals : to go home     Therapy Time   Individual Concurrent Group Co-treatment   Time In 0843         Time Out 0913         Minutes 30         Timed Code Treatment Minutes: [address] [facility], OTR/L

## 2020-08-24 NOTE — CARE COORDINATION
Contacted [address] because pt. Was willing to return except her brother, her power of  was contacted and told there are 3 new cases of COVID at the facility. She was told there is no one to go in or out of the facility at this time. Left message with HR. Called [phone]. Pt. requesting bag for her oxygen to be able to walk with her walker once at facility.

## 2020-08-24 NOTE — PROGRESS NOTES
Renal Progress Note    Patient :  Lashay Sandoval; 75 y.o. MRN# [id number]  Location:  [facility]/5604-89  Attending:  Matthew Gallagher MD  Admit Date:  8/18/2020   Hospital Day: 6      Subjective:     Oral intake good. Urine output fair although not being measured accurately. Creatinine down to 2.3. Hemodynamically stable blood pressure somewhat on the high side. Discharge planning in progress. Does have some upper extremity edema. Plan for venous Dopplers today. Hemoglobin stable, OB/GYN evaluation in progress for vaginal bleeding. Neurology input noted. CTA done this admission showed calcification involving carotid bulbs and proximal internal carotid arteries bilaterally. Does have a history of previous CVA. Contrast nephropathy resolving slowly. Creatinine peaked at 2.6 baseline 1.7-1.9.,   Has not seen nephrology before. Planning to see Dr. Laura Weinstein as an outpatient. Outpatient Medications:     Medications Prior to Admission: cephALEXin (KEFLEX) 500 MG capsule, Take 1 capsule by mouth 3 times daily for 7 days  bumetanide (BUMEX) 1 MG tablet, Take 1 mg by mouth 3 times daily  insulin glargine (BASAGLAR KWIKPEN) 100 UNIT/ML injection pen, Inject 60 Units into the skin 2 times daily  levETIRAcetam (KEPPRA) 750 MG tablet, Take 750 mg by mouth 2 times daily Indications: with 500 mg to make 1250 mg dose  gabapentin (NEURONTIN) 100 MG capsule, Take 100 mg by mouth 2 times daily. Bala Schmidt   levETIRAcetam (KEPPRA) 500 MG tablet, Take 500 mg by mouth 2 times daily Indications: with 750 mg to make total dose 1250 mg   insulin aspart (NOVOLOG) 100 UNIT/ML injection vial, Inject into the skin Inject as per sliding scale before meals and at bedtime:   = 0 units; call physician if less than 70 151-200 = 2 units 201-250 = 4 units 251-300 = 6 units 301-350 = 8 units 351-400 = 10 units; call physician if greater than 400  lisinopril-hydrochlorothiazide (PRINZIDE;ZESTORETIC) 10-12.5 MG per tablet, Take 1 tablet by mouth daily  omeprazole (PRILOSEC) 40 MG delayed release capsule, Take 40 mg by mouth Daily   simvastatin (ZOCOR) 20 MG tablet, Take 20 mg by mouth nightly   lamoTRIgine (LAMICTAL) 200 MG tablet, Take 200 mg by mouth 2 times daily  clotrimazole-betamethasone (LOTRISONE) 1-0.05 % cream, Apply topically 2 times daily. nadolol (CORGARD) 20 MG tablet, Take 1 tablet by mouth daily  ferrous sulfate (IRON 325) 325 (65 Fe) MG tablet, Take 1 tablet by mouth every 12 hours  blood glucose monitor strips, Test 4 times a day & as needed for symptoms of irregular blood glucose. Dispense sufficient amount for indicated testing frequency plus additional to accommodate PRN testing needs.   Lancets MISC, 1 each by Does not apply route 4 times daily  glucose monitoring kit (FREESTYLE) monitoring kit, 1 kit by Does not apply route daily  Emollient (CERAVE) LOTN, Apply topically 2 times daily Indications: bilateral lower legs  vitamin D (CHOLECALCIFEROL) 98998 UNIT CAPS, Take 50,000 Units by mouth once a week Indications: Tuesdays   potassium chloride (KLOR-CON M) 10 MEQ extended release tablet, Take 30 mEq by mouth daily   vitamin B-12 (CYANOCOBALAMIN) 500 MCG tablet, Take 500 mcg by mouth daily  acetaminophen (TYLENOL) 325 MG tablet, Take 650 mg by mouth 3 times daily as needed for Pain (mild)  Blood Glucose Monitoring Suppl FLAVIO, Check blood sugars 3/day  PARoxetine (PAXIL) 20 MG tablet, Take 20 mg by mouth every morning  loratadine (CLARITIN) 10 MG tablet, Take 10 mg by mouth daily  pramipexole (MIRAPEX) 1 MG tablet, Take 1 mg by mouth nightly  busPIRone (BUSPAR) 10 MG tablet, Take 10 mg by mouth 3 times daily     Current Medications:     Scheduled Meds:    bumetanide  2 mg Oral BID    hydrALAZINE  25 mg Oral 3 times per day    atorvastatin  80 mg Oral Nightly    heparin (porcine)  5,000 Units Subcutaneous 3 times per day    amLODIPine  10 mg Oral Daily    cefTRIAXone (ROCEPHIN) IV  1 g Intravenous Q24H    lamoTRIgine  200 mg Oral BID    levETIRAcetam  500 mg Oral BID    sodium chloride flush  10 mL Intravenous 2 times per day    insulin glargine  20 Units Subcutaneous BID    insulin lispro  0-12 Units Subcutaneous TID     insulin lispro  0-6 Units Subcutaneous Nightly    aspirin  81 mg Oral Daily     Continuous Infusions:    dextrose       PRN Meds:  sodium chloride flush, acetaminophen **OR** acetaminophen, polyethylene glycol, potassium chloride **OR** potassium alternative oral replacement **OR** potassium chloride, glucose, dextrose, glucagon (rDNA), dextrose    Input/Output:       I/O last 3 completed shifts:  In: -   Out: 650 [Urine:650]. No data found. Vital Signs:   Temperature:  Temp: 97.2 °F (36.2 °C)  TMax:   Temp (24hrs), Av.2 °F (36.2 °C), Min:97.2 °F (36.2 °C), Max:97.2 °F (36.2 °C)    Respirations:  Resp: 12  Pulse:   Pulse: 73  BP:    BP: (!) 177/71  BP Range: Systolic (75SEB), WKU:149 , Min:177 , NUP:339       Diastolic (05YVA), NJX:46, Min:71, Max:71      Physical Examination:     General:  AAO x 3, speaking in full sentences, no accessory muscle use. HEENT: Atraumatic, normocephalic, no throat congestion, moist mucosa. Eyes:   Pupils equal, round and reactive to light, EOMI. Neck:   No JVD, no thyromegaly, no lymphadenopathy. Chest:  Bilateral vesicular breath sounds, no rales or wheezes. Cardiac:  S1 S2 RR, no murmurs, gallops or rubs, JVP not raised. Abdomen: Distended, bowel sounds present  :   No suprapubic or flank tenderness. Neuro:  AAO x 3, No FND. SKIN:  No rashes, good skin turgor. Extremities:  ++ Chronic edema, palpable peripheral pulses, no calf tenderness.     Labs:       Recent Labs     20  0611 20  0634 20  0507   WBC 3.8 4.2 4.3   RBC 2.95* 3.14* 3.09*   HGB 7.7* 8.1* 8.0*   HCT 26.1* 28.5* 28.8*   MCV 88.5 90.8 93.2   MCH 26.1 25.8 25.9   MCHC 29.5 28.4 27.8*   RDW 18.6* 18.6* 18.8*   PLT See Reflexed IPF Result 89* 90*   MPV NOT REPORTED 10.9 11.2      BMP: Component Value Date    BRI 81 08/21/2020     Urine Potassium:  No results found for: KUR  Urine Chloride:    Lab Results   Component Value Date    CLUR 64 08/21/2020     Urine Osmolarity: No results found for: OSMOU  Urine Protein:   No components found for: TOTALPROTEIN, URINE   Urine Creatinine:     Lab Results   Component Value Date    LABCREA 119.0 08/22/2020     Urine Eosinophils:  No components found for: UEOS    Radiology:     CXR:     Assessment:     1. Acute Kidney Injury: From nephrotoxic ATN secondary to contrast exposure. Baseline 1.7-1.9 peaked up to 2.6 now down to 2.3 nonoliguric resolving  2. Chronic kidney disease stage IIIb-4 secondary to diabetic and ischemic nephrosclerosis baseline 1.7-1.9 has not seen nephrology in the outpatient before  3. Syncopal episode CT angiogram did not show any significant flow-limiting disease  4. Portal hypertension suspected FRANCIS  5. Type 2 diabetes with nephropathy  6. Vaginal bleeding work-up in progress  7. Mild hyponatremia    Plan:   1. Discontinue IV Lasix  2. Start Bumex 2 mg twice a day  3. Avoid ACE inhibitor is on discharge  4. BMP 1 week after discharge  5. Stable for discharge from nephrology standpoint  6. Outpatient follow-up with Dr. Aleida Solomon in 3 to 4 weeks  7. Nothing else to add will sign off please call for questions    Nutrition   Please ensure that patient is on a renal diet/TF. Avoid nephrotoxic drugs/contrast exposure. We will continue to follow along with you.

## 2020-08-24 NOTE — CARE COORDINATION
Called Maurisio at [phone]. States only family is limited, but the pt. Can return. Covid patients are quarantined and lock down. Will notify pt.

## 2020-08-24 NOTE — CARE COORDINATION
Attempted to contact Ce Dunlap. Was only allowed to leave messages. Called the SNF part of [address] Spoke to [place], she states she will contact someone and have them call me back.   Awaiting return call

## 2020-08-24 NOTE — PROGRESS NOTES
[facility]  Internal Medicine Teaching Residency Program  Inpatient Daily Progress Note  ______________________________________________________________________________    Patient: Marco Antonio Campoverde  YOB: 1955   WFN:[id number]    Acct: [de-identified]     Room: [facility]2436-89  Admit date: 8/18/2020  Today's date: 08/24/20  Number of days in the hospital: 6    SUBJECTIVE   Admitting Diagnosis: Accidental fall  CC: Complaints of unresolved vaginal bleeding no shortness of breath chest pain or any  focal neurological deficit . Aspasia resolved  Pt examined at bedside. Chart & results reviewed. Patient is  hemodynamically stable and saturating well on 2 L oxygen through nasal cannula which is same as her home oxygen level. BUN 66  Creatinine 2.35  Sodium 145  Glucose level 246  hemoGlobin 8    And is a stable to discharge from nephrology. For vaginal bleeding follow-up with GYN outpatient.     ROS:  Constitutional:  negative for chills, fevers, sweats  Respiratory:  negative for cough, dyspnea on exertion, hemoptysis, shortness of breath, wheezing  Cardiovascular:  negative for chest pain, chest pressure/discomfort, lower extremity edema, palpitations  Gastrointestinal:  negative for abdominal pain, constipation, diarrhea, nausea, vomiting  Neurological:  negative for dizziness, headache  BRIEF HISTORY     This is a 68-year-old female who was initially admitted to [facility] from assisted living facility after falling in her apartment. Maged Service mentioned that she felt lightheaded dizzy and fell. Vidal Farfan had episodes of palpitations and shortness of breath associated with blurring of vision headache.  She admits that she hit her head but denies LOC.  She mentioned that she has weakness in her bilateral lower extremity is chronic but the weakness in her left leg is more now.  Patient also mentioned that she has bowel and bladder incontinence since her previous episode of stroke.  Patient was admitted to [facility] for management of pneumonia and UTI (with Rocephin). She mentioned that she was talking to her brother over phone at [facility] when she started noticing that she forgot what she was talking (word finding difficulty) about and her brother noticed slurring in her speech.  Patient was then transferred to [facility] for neurology consult and management of her aphasia.     According to patient. Dacia Hardin was started on 2 L home oxygen by her PCP due to her oxygen desat into high 80s.    On physical examination, patient has bruises on her back due to fall.  Left hand swollen erythematous.  Patient has hematoma on the back of her head.  No neurovascular deficit.  She has previous history of recurrent falls and concussion.     Past medical history: Type 2 diabetes mellitus, history of CVA, history of recurrent accidental falls, diabetic neuropathy and morbid obesity     Imaging: Ct head at [facility]: Negative for acute intracranial pathology.  Left occipital scalp hematoma present  CT cervical spine: Unremarkable CT thoracic spine: Degenerative changes. Chest x-ray: Basilar atelectasis  Repeat CT head: No acute intracranial abnormality.   CTA head and neck: Left proximal ICA stenosis   MRI brain: No acute intracranial abnormality  MRA head and neck: 70% stenosis left internal carotid artery.     In the ED patient was evaluated by stroke team and neuro endovascular was consulted      OBJECTIVE     Vital Signs:  BP (!) 196/62   Pulse 80   Temp 97.8 °F (36.6 °C) (Oral)   Resp 19   Ht 5' (1.524 m)   Wt 284 lb 6.3 oz (129 kg)   SpO2 98%   BMI 55.54 kg/m²     Temp (24hrs), Av.8 °F (36.6 °C), Min:97.8 °F (36.6 °C), Max:97.8 °F (36.6 °C)    In: -   Out: 650 [Urine:650]    Physical Exam:  Constitutional: This is a well developed, well nourished, Greater than 40 - Morbid Obesity / Extreme Obesity / Grade III 75y.o. year old female who is alert, oriented, cooperative and in no apparent distress. Head:normocephalic and atraumatic. Respiratory: Chest was symmetrical without dullness to percussion. Breath sounds bilaterally were clear to auscultation. There were no wheezes, rhonchi or rales. There is no intercostal retraction or use of accessory muscles. No egophony noted. Cardiovascular: Regular without murmur, clicks, gallops or rubs. Abdomen: Slightly rounded and soft without organomegaly. No rebound, rigidity or guarding was appreciated. Lymphatic: No lymphadenopathy. Musculoskeletal: Normal curvature of the spine. No gross muscle weakness. Extremities:  No lower extremity edema, ulcerations, tenderness, varicosities or erythema. Muscle size, tone and strength are normal.  No involuntary movements are noted. Skin:  Warm and dry. Good color, turgor and pigmentation. No lesions or scars.   No cyanosis or clubbing  Neurological/Psychiatric: The patient's general behavior, level of consciousness, thought content and emotional status is normal.        Medications:  Scheduled Medications:    bumetanide  2 mg Oral BID    hydrALAZINE  25 mg Oral 3 times per day    atorvastatin  80 mg Oral Nightly    heparin (porcine)  5,000 Units Subcutaneous 3 times per day    amLODIPine  10 mg Oral Daily    cefTRIAXone (ROCEPHIN) IV  1 g Intravenous Q24H    lamoTRIgine  200 mg Oral BID    levETIRAcetam  500 mg Oral BID    sodium chloride flush  10 mL Intravenous 2 times per day    insulin glargine  20 Units Subcutaneous BID    insulin lispro  0-12 Units Subcutaneous TID WC    insulin lispro  0-6 Units Subcutaneous Nightly    aspirin  81 mg Oral Daily     Continuous Infusions:    dextrose       PRN Medicationssodium chloride flush, 10 mL, PRN  acetaminophen, 650 mg, Q6H PRN    Or  acetaminophen, 650 mg, Q6H PRN  polyethylene glycol, 17 g, Daily PRN  potassium chloride, 40 mEq, PRN    Or  potassium alternative oral replacement, 40 mEq, PRN    Or  potassium chloride, 10 mEq, PRN  glucose, 15 g, PRN  dextrose, 12.5 g, PRN  glucagon (rDNA), 1 mg, PRN  dextrose, 100 mL/hr, PRN        Diagnostic Labs:  CBC:   Recent Labs     08/22/20  0611 08/23/20  0634 08/24/20  0507   WBC 3.8 4.2 4.3   RBC 2.95* 3.14* 3.09*   HGB 7.7* 8.1* 8.0*   HCT 26.1* 28.5* 28.8*   MCV 88.5 90.8 93.2   RDW 18.6* 18.6* 18.8*   PLT See Reflexed IPF Result 89* 90*     BMP:   Recent Labs     08/22/20  0611 08/23/20  0634 08/24/20  0507    145* 145*   K 4.5 5.0 4.4    106 107   CO2 25 29 25   BUN 61* 63* 66*   CREATININE 2.50* 2.52* 2.35*      ASSESSMENT & PLAN   Principal Problem:    Accidental fall  Active Problems:    Type 2 diabetes mellitus (HCC)    Falling episodes    Generalized weakness    RLS (restless legs syndrome)    Thrombocytopenia (HCC)    Confusion state    Postmenopausal bleeding    Anemia    Atypical glandular cells of undetermined significance (SLICK) on cervical Pap smear (NOS)    Breast mass in female    Acute kidney injury superimposed on chronic kidney disease (HCC)    Aphasia    NIKKI (acute kidney injury) (HCC)    Acute cystitis without hematuria    Cellulitis of lower extremity  Resolved Problems:    * No resolved hospital problems. *    ASSESSMENT / PLAN:   Expressive aphasia secondary to TIA-aphasia resolved  He had an MRI did not show any intracranial acute infarct. Doppler and CTA head and neck showed 50 to 69% stenosis of bilateral internal carotid artery  Neurology follow-up  Vascular surgery [id number] need for acute intervention further bilateral internal carotid artery stenosis  Continue aspirin and Lipitor    NIKKI  secondary to contrast exposure-baseline 1.7-1.9.   Up to 2.6 now down to 2.3 nonoliguric-resolving  Start Bumex 2 mg twice a day  Avoid ACE inhibitor  MB 1 week after discharge  Outpatient follow-up of nephrology    Chronic kidney disease stage IIIb-4 due to diabetic and ischemic nephrosclerosis baseline 1.7-1.9  Follow-up with nephrology     seizure disorder   Keppra on hold due to elevated Keppra level  Seizure precaution  Appreciate neurology recommendation  Patient to established with epileptic neurologist  Continue Lamictal 200 mg twice a day    Congestive heart failure (chronic diastolic)-ejection fraction on 8/19/20200-60%  Avoid furosemide  Start Bumex 2 mg twice a day    Essential hypertension-196/62-  Continue amlodipine 10 mg po od  Hydralazine 25 mg oral TDS  Outpatient follow-up    Lateral lower extremity venous stasis dermatitis  Continue compression stocking    Hypernatremia-resolved treated with fluid boluses  Serum sodium 145    Type II DM on Lantus 20 units twice daily and sliding scale hypoglycemic protocol    UTI resolved with antibiotics    Morbid obesity patient will need outpatient sleep study        DVT ppx :   GI ppx:     PT/OT: On board  Discharge Planning / SW: JOSE Cancino MD  Internal Medicine Resident, PGY-1  [address], [place]  8/24/2020, 4:49 PM      Attending Physician Statement  I have discussed the case, including pertinent history and exam findings with the resident and the team.  I have seen and examined the patient and the key elements of the encounter have been performed by me. I agree with the assessment, plan and orders as documented by the resident.         Ariana Gallagher MD   Attending Physician, Internal Medicine Residency Program  8/24/2020, 5:32 PM

## 2020-08-24 NOTE — PROGRESS NOTES
Speech Language Pathology  Speech Language Pathology  [facility]    Cognitive Treatment Note    Date: 8/24/2020  Patients Name: Lindsay Romero  MRN: [id number]  Diagnosis:   Patient Active Problem List   Diagnosis Code    Type 2 diabetes mellitus ([facility] [address] E11.9    Breakthrough seizure ([facility] [address] G40.919    CVA, old, hemiparesis ([facility] [address] I69.359    Accidental fall W19. XXXA    Falling episodes R29.6    Cerebral concussion S06. 8Y4F    Cervical spinal stenosis M48.02    Diabetic polyneuropathy associated with type 2 diabetes mellitus ([facility]) E11.42    History of cerebral infarction Z86.73    Seizure disorder ([facility]) G40.909    Depression with anxiety F41.8    Dizziness R42    Generalized weakness R53.1    GERD (gastroesophageal reflux disease) K21.9    H/O falling Z91.81    Hyperglycemia R73.9    Hyponatremia E87.1    Morbid obesity with BMI of 40.0-44.9, adult ([facility]) E66.01, Z68.41    Pure hypercholesterolemia E78.00    RLS (restless legs syndrome) G25.81    Thrombocytopenia ([facility]) D69.6    Altered mental status R41.82    Confusion state F44.89    Gait disturbance R26.9    GI bleed K92.2    Polyp of colon K63.5    Postmenopausal bleeding N95.0    Anemia D64.9    Elevated alkaline phosphatase level R74.8    GAVE (gastric antral vascular ectasia) K31.819    Esophageal varices determined by endoscopy ([facility]) I85.00    Atypical glandular cells of undetermined significance (SLICK) on cervical Pap smear (NOS) R87.619    Symptomatic anemia D64.9    Breast mass in female N63.0    Hematuria R31.9    Acute kidney injury superimposed on chronic kidney disease (HCC) N17.9, N18.9    FRANCIS (nonalcoholic steatohepatitis) K75.81    Pneumonia J18.9    Head injury, acute, sequela bruising S09.90XS    Expressive aphasia new  R47.01    Aphasia R47.01    NIKKI (acute kidney injury) ([facility] [address] N17.9    Acute cystitis without hematuria N30.00    Cellulitis of lower extremity L03.119       Pain:

## 2020-08-24 NOTE — PROGRESS NOTES
Physician Progress Note      Gin Bhardwaj  CSN #:                  [id number]  :                       1955  ADMIT DATE:       2020 1:49 PM  [address] [place] DATE:  RESPONDING  PROVIDER #:        Keisha Connell MD          QUERY TEXT:    Dear Dr. Bessie Peoples and Residents -    Pt admitted with aphasia. Pt noted to have chronic diastolic chf. . If   possible, please document in progress notes and discharge summary if you are   evaluating and/or treating any of the following: The medical record reflects the following:  Risk Factors: chronic diastolic CHF, HTN  Clinical Indicators: bilateral lower extremity edema - 2+-3+, lungs -   diminished and clear, O2 rnge fro Room air to 2L/NC. ECHO- EF 60% with normal   systolic function, pro-bnp- 2349. Treatment: CXR, ECHO, IV- Lasix - 2 x daily, oxygen therapy, PO-Lipitor,   apresoline, ASA 81    If you are in need of further assistance, or have a question, you can contact   me at cell -[phone] - office- [phone]. I am usually on line from    6:30 - 3:00. Thank Saman Mccormick RN, CDI  Options provided:  -- Acute on Chronic Systolic CHF/HFrEF  -- Acute on Chronic Diastolic CHF/HFpEF  -- Acute on Chronic Systolic and Diastolic CHF  -- Chronic Systolic CHF/HFrEF  -- Chronic Diastolic CHF/HFpEF  -- Chronic Systolic and Diastolic CHF  -- Other - I will add my own diagnosis  -- Disagree - Not applicable / Not valid  -- Disagree - Clinically unable to determine / Unknown  -- Refer to Clinical Documentation Reviewer    PROVIDER RESPONSE TEXT:    This patient is in systolic CHF/HFrEF exacerbation.     Query created by: Merry Torres on 2020 3:03 PM      Electronically signed by:  Keisha Connell MD 2020 4:14 PM

## 2020-08-25 NOTE — PLAN OF CARE
Problem: Falls - Risk of:  Goal: Will remain free from falls  Description: Will remain free from falls  Outcome: Met This Shift     Problem: Falls - Risk of:  Goal: Absence of physical injury  Description: Absence of physical injury  Outcome: Met This Shift   Patient remained free from injury. Patient verbalized understanding of need for the safety precautions. Demonstrates proper use of assistive devices. Bed remains in the lowest position. Call light remains within reach. [facility] in use.

## 2020-08-25 NOTE — CARE COORDINATION
Transitional planning. Floor RN Laron Thomas will see if lower extremity dopplers need to be done prior to D/C and get back with me. 1230 Updated Lashell Varner at [facility] about possibly needing doppler studies on lower extremities before pt returning to [place] today. 1725 D/C order in. Called [facility] and she needs to be picked up between 6-6:30. [facility] can be here at 6:00pm for .    8360 Faxed AVS([facility]), facesheet and H&P to [facility] [phone]. Floor RN given report number.     Discharge [address] [facility]  Clinical Case Management Department  Written by: Yvon Childs RN    Patient Name:  Morning  Attending Provider: Poncho Cool MD  Admit Date: 2020  1:49 PM  MRN: [id number]  Account: [de-identified]                     : 1955  Discharge Date:       Disposition: [place] [address]    Yvon Childs RN

## 2020-08-25 NOTE — PROGRESS NOTES
Pt discharged with all of her belongings and paperwork. Report was given to transport and called to [address] [facility] [place]. Transport took pt off the unit on stretcher.

## 2020-08-25 NOTE — PROGRESS NOTES
Nutrition Assessment     Type and Reason for Visit: Initial    Nutrition Recommendations/Plan: Recommend continue current diet. Nutrition Assessment:  Chart reviewed due to length of stay. Pt is consuming more than 50% of food provided. Glu 191-258 Current diet is appropriate. h/o varices noted- not causing pt difficulty at this time. h/o CHF, CKD and DM noted. BUN/Cr 65/2.03 K 4.4. Pt presently at low nutrition risk.     Malnutrition Assessment:  Malnutrition Status:  No Malnutrition    Current Nutrition Therapies:    DIET CARB CONTROL;  Nutrition Interventions:   Food and/or Nutrient Delivery:  Continue Current Diet  Food/Nutrient Intake Outcomes:  Food and Nutrient Intake    Electronically signed by Josefa Dia RD, LD on 8/25/20 at 3:47 PM EDT    Contact: [phone]

## 2020-08-25 NOTE — PROGRESS NOTES
[facility]  Internal Medicine Teaching Residency Program  Inpatient Daily Progress Note  ______________________________________________________________________________    Patient: Sophia Nunez  YOB: 1955   ZQM:[id number]    Acct: [de-identified]     Room: [facility]  Admit date: 8/18/2020  Today's date: 08/25/20  Number of days in the hospital: 7    SUBJECTIVE   Admitting Diagnosis: Aphasia  CC: Pain and tenderness on lower extremity. Aphasia resolved. No complaints of shortness of breath chest pain or any progressive focal neurological deficits. Pt examined at bedside. Chart & results reviewed. Patient is hemodynamically stable and saturating well on 2 L oxygen through nasal cannula which is same as her home oxygen level  BUN 68  Creatinine 2.03  Nephrology on board-follow-up outpatient  Ob/Gyn-will follow-up outpatient    Bilateral lower extremity DVT to be ruled out by duplex before discharge.     ROS:  Constitutional:  negative for chills, fevers, sweats  Respiratory:  negative for cough, dyspnea on exertion, hemoptysis, shortness of breath, wheezing  Cardiovascular:  negative for chest pain, chest pressure/discomfort, lower extremity edema, palpitations  Gastrointestinal:  negative for abdominal pain, constipation, diarrhea, nausea, vomiting  Neurological:  negative for dizziness, headache  BRIEF HISTORY   This is a 53-year-old female who was initially admitted to [facility] from assisted living facility after falling in her apartment. Hussein Coleman mentioned that she felt lightheaded dizzy and fell. Kelley Barker had episodes of palpitations and shortness of breath associated with blurring of vision headache.  She admits that she hit her head but denies LOC.  She mentioned that she has weakness in her bilateral lower extremity is chronic but the weakness in her left leg is more now.  Patient also mentioned that she has bowel and bladder incontinence since her previous episode of stroke.  Patient was admitted to [facility] for management of pneumonia and UTI (with Rocephin). She mentioned that she was talking to her brother over phone at [facility] when she started noticing that she forgot what she was talking (word finding difficulty) about and her brother noticed slurring in her speech.  Patient was then transferred to [facility] for neurology consult and management of her aphasia.     According to patientRosas Alfonso was started on 2 L home oxygen by her PCP due to her oxygen desat into high 80s.    On physical examination, patient has bruises on her back due to fall.  Left hand swollen erythematous.  Patient has hematoma on the back of her head.  No neurovascular deficit.  She has previous history of recurrent falls and concussion.     Past medical history: Type 2 diabetes mellitus, history of CVA, history of recurrent accidental falls, diabetic neuropathy and morbid obesity     Imaging: Ct head at [facility]: Negative for acute intracranial pathology.  Left occipital scalp hematoma present  CT cervical spine: Unremarkable CT thoracic spine: Degenerative changes. Chest x-ray: Basilar atelectasis  Repeat CT head: No acute intracranial abnormality. CTA head and neck: Left proximal ICA stenosis   MRI brain: No acute intracranial abnormality  MRA head and neck: 70% stenosis left internal carotid artery.     In the ED patient was evaluated by stroke team and neuro endovascular was consulted       OBJECTIVE     Vital Signs:  BP (!) 177/54   Pulse 88   Temp 98.5 °F (36.9 °C) (Oral)   Resp 14   Ht 5' (1.524 m)   Wt 284 lb 6.3 oz (129 kg)   SpO2 96%   BMI 55.54 kg/m²     Temp (24hrs), Av.3 °F (36.8 °C), Min:97.8 °F (36.6 °C), Max:98.8 °F (37.1 °C)    No intake/output data recorded.     Physical Exam:  Constitutional: This is a well developed, well nourished, Greater than 40 - Morbid Obesity / Extreme Obesity / Grade III 75y.o. year old female who is alert, oriented, cooperative and in no apparent distress. Head:normocephalic and atraumatic. Respiratory: Symmetrical without dullness to percussion. Normal vesicular breath sound clear to auscultations were clear to auscultation. There were no wheezes, rhonchi or rales. Cardiovascular: Regular without murmur, clicks, gallops or rubs. Abdomen: Slightly rounded and soft without organomegaly. No rebound, rigidity or guarding was appreciated. Musculoskeletal: Normal curvature of the spine. No gross muscle weakness. Extremities:  No lower extremity edema, ulcerations, tenderness, varicosities or erythema. Muscle size, tone and strength are normal.  No involuntary movements are noted. Skin:  Warm and dry. Good color, turgor and pigmentation. No lesions or scars.   No cyanosis or clubbing  Neurological/Psychiatric: The patient's general behavior, level of consciousness, thought content and emotional status is normal.        Medications:  Scheduled Medications:    hydrALAZINE  50 mg Oral 3 times per day    bumetanide  2 mg Oral BID    atorvastatin  80 mg Oral Nightly    heparin (porcine)  5,000 Units Subcutaneous 3 times per day    amLODIPine  10 mg Oral Daily    lamoTRIgine  200 mg Oral BID    levETIRAcetam  500 mg Oral BID    sodium chloride flush  10 mL Intravenous 2 times per day    insulin glargine  20 Units Subcutaneous BID    insulin lispro  0-12 Units Subcutaneous TID WC    insulin lispro  0-6 Units Subcutaneous Nightly    aspirin  81 mg Oral Daily     Continuous Infusions:    dextrose       PRN Medicationslabetalol, 10 mg, Q4H PRN  sodium chloride flush, 10 mL, PRN  acetaminophen, 650 mg, Q6H PRN    Or  acetaminophen, 650 mg, Q6H PRN  polyethylene glycol, 17 g, Daily PRN  potassium chloride, 40 mEq, PRN    Or  potassium alternative oral replacement, 40 mEq, PRN    Or  potassium chloride, 10 mEq, PRN  glucose, 15 g, PRN  dextrose, 12.5 g, PRN  glucagon (rDNA), 1 mg, PRN  dextrose, 100 Mildred Gomez MD   Attending Physician, Internal Medicine Residency Program  8/25/2020, 5:11 PM

## 2020-08-25 NOTE — PROGRESS NOTES
Endovascular Neurosurgery Progress Note    SUBJECTIVE:   No reported events since last note. Pt this am complains about feeling cold, but otherwise denies any new focal neurological deficits. U/s neck has since been completed, which showed 50-69% stenosis, and therefore note a CREST2 candidate. Review of Systems:  CONSTITUTIONAL:  negative for fevers, chills, fatigue and malaise    EYES:  negative for double vision, blurred vision and photophobia     HEENT:  negative for tinnitus, epistaxis and sore throat    RESPIRATORY:  negative for cough, shortness of breath, wheezing    CARDIOVASCULAR:  negative for chest pain, palpitations, syncope, edema    GASTROINTESTINAL:  negative for nausea, vomiting    GENITOURINARY:  negative for incontinence    MUSCULOSKELETAL:  negative for neck or back pain    NEUROLOGICAL:  Negative for weakness and tingling  negative for headaches and dizziness    PSYCHIATRIC:  negative for anxiety      Review of systems otherwise negative. OBJECTIVE:     Vitals:    08/24/20 1608   BP: (!) 196/62   Pulse: 80   Resp: 19   Temp: 97.8 °F (36.6 °C)   SpO2: 98%        General:  Gen: morbidly obese habitus, NAD  HEENT: NCAT, mucosa moist  Cvs: RRR, S1 S2 normal  Resp: symmetric unlabored breathing. On NC  Abd: s/nd/nt  Ext: 2+ edema in legs b/l, red and warm in lower legs b/l  Skin: no lesions seen, warm and dry     Neuro:  Gen: awake and alert, oriented x3. Lang/speech: no aphasia or dysarthria. Follows commands. CN: PERRL, EOMI, VFF, V1-3 intact, face symmetric, hearing intact, shoulder shrug symmetric, tongue midline  Motor: r hemibody 5/5, L hemibody 4-/5. Limited by pain in LUE. Sense: decreased LT in legs b/l (chronic)  Coord: ftn intact b/l. DTR: deferred  Gait: deferred    LABS:   Reviewed.     RADIOLOGY:   Images were personally reviewed including:    U/s neck 8/19/2020   Summary          Simultaneous real time imaging utilizing B-Mode, color flow doppler and     spectral [address]  Electronically signed 8/24/2020 at 8:12 PM

## 2020-08-25 NOTE — PROGRESS NOTES
Occupational Therapy Not Seen Note    DATE: 2020  Name: Tony Zhang  : 1955  MRN: [id number]    Patient not available for Occupational Therapy due to:    Pt has UE dopplers ordered    Next Scheduled Treatment: 2020    Electronically signed by BARB Jacinto on 2020 at 2:41 PM

## 2020-08-27 NOTE — TELEPHONE ENCOUNTER
[facility] Transitions Initial Follow Up Call    Outreach made within 2 business days of discharge: Yes    Patient: Fatmata German Patient : 1955   MRN: [id number]  Reason for Admission: There are no discharge diagnoses documented for the most recent discharge. Discharge Date: 20       Spoke with: PAtient     Discharge department/facility: [facility] Interactive Patient Contact:  Was patient able to fill all prescriptions: Yes  Was patient instructed to bring all medications to the follow-up visit: Yes  Is patient taking all medications as directed in the discharge summary?  Yes  Does patient understand their discharge instructions: Yes  Does patient have questions or concerns that need addressed prior to 7-14 day follow up office visit: no    Scheduled appointment with PCP within 7-14 days    Follow Up  Future Appointments   Date Time Provider Camilla Salmon   2020  1:45 PM Cele Julio DPM [address]   2020  2:45 PM [facility]   2020 11:30 AM Nurys Rosales MD Neuro Endo [id number]   10/22/2020  1:30 PM Render Homans, MD Neuro [facility]   2020  3:00 PM Monse Gibson MD Neuro Endo [address]

## 2020-08-29 NOTE — ED NOTES
Bed: 13  Expected date:   Expected time:   Means of arrival:   Comments:  ADAM [id number] [facility] EDIS Rojo  08/29/20 1111

## 2020-08-29 NOTE — ED PROVIDER NOTES
[address]  ED  Emergency Department Encounter  EmergencyMedicine Resident     Pt Carlos Buck  MRN: [id number]  Biancagfcorinne 1955  Date of evaluation: 8/29/20  PCP:  Sarah Baez PA-C    CHIEF COMPLAINT       Chief Complaint   Patient presents with    Cardiac Arrest       HISTORY OF PRESENT ILLNESS  (Location/Symptom, Timing/Onset, Context/Setting, Quality, Duration, Modifying Factors, Severity.)      Watson Jorgensen is a 75 y.o. female who presents with cardiac arrest.  Patient was last seen well at 10:00 by nursing home staff at [facility]. EMS was called for patient being found down. CPR was initiated and continued for approximately 20 minutes was only given 2 rounds of epi was given. ROSC was obtained and patient was started on a dopamine drip due to bradycardia with hypotension. During the code patient was intubated with a 7.5 ET tube and had no difficulty with compliance. Upon arrival patient was found to have a heart rate in the low 100s and during management and switching over to the dopamine pump patient lost pulses. CPR was initiated. PAST MEDICAL / SURGICAL / SOCIAL / FAMILY HISTORY      has a past medical history of Anemia, Cataract, GERD (gastroesophageal reflux disease), Headache, Hyperlipidemia, Neuropathy, Osteoarthritis, Restless leg syndrome, Seizures ([address] Short-term memory loss, Stroke (cerebrum) ([address] and Type 2 diabetes mellitus without complication ([address] has a past surgical history that includes Cholecystectomy; Tonsillectomy; Cataract removal; eye surgery; Cardiac catheterization; Upper gastrointestinal endoscopy (N/A, 5/12/2019); Colonoscopy (N/A, 5/13/2019); Upper gastrointestinal endoscopy (N/A, 3/18/2020); and Upper gastrointestinal endoscopy (N/A, 8/7/2020).       Social History     Socioeconomic History    Marital status: Single     Spouse name: Not on file    Number of children: Not on file    Years of education: Not on file    Highest education level: Not on file   Occupational History    Not on file   Social Needs    Financial resource strain: Not on file    Food insecurity     Worry: Not on file     Inability: Not on file    Transportation needs     Medical: Not on file     Non-medical: Not on file   Tobacco Use    Smoking status: Never Smoker    Smokeless tobacco: Never Used   Substance and Sexual Activity    Alcohol use: No    Drug use: No    Sexual activity: Not on file   Lifestyle    Physical activity     Days per week: Not on file     Minutes per session: Not on file    Stress: Not on file   Relationships    Social connections     Talks on phone: Not on file     Gets together: Not on file     Attends Anabaptist service: Not on file     Active member of club or organization: Not on file     Attends meetings of clubs or organizations: Not on file     Relationship status: Not on file    Intimate partner violence     Fear of current or ex partner: Not on file     Emotionally abused: Not on file     Physically abused: Not on file     Forced sexual activity: Not on file   Other Topics Concern    Not on file   Social History Narrative    Not on file       Family History   Problem Relation Age of Onset    Diabetes Brother        Allergies:  Codeine    Home Medications:  Prior to Admission medications    Medication Sig Start Date End Date Taking?  Authorizing Provider   levETIRAcetam (KEPPRA) 500 MG tablet Take 1 tablet by mouth 2 times daily Indications: with 750 mg to make total dose 1250 mg 8/25/20   Qasim Salazar MD   hydrALAZINE (APRESOLINE) 50 MG tablet Take 1 tablet by mouth every 8 hours 8/25/20   Qasim Salazar MD   aspirin 81 MG chewable tablet Take 1 tablet by mouth daily 8/24/20   Qasim Salazar MD   atorvastatin (LIPITOR) 80 MG tablet Take 1 tablet by mouth nightly 8/24/20   Qasim Salazar MD   amLODIPine (NORVASC) 10 MG tablet Take 1 tablet by mouth daily 8/24/20   MD chava Bowden (BUMEX) 2 MG tablet Take 1 tablet by mouth 2 times daily 8/24/20   Brittni Rajput MD   clotrimazole-betamethasone (LOTRISONE) 1-0.05 % cream Apply topically 2 times daily. 8/17/20   Ryan Osman PA-C   nadolol (CORGARD) 20 MG tablet Take 1 tablet by mouth daily 8/10/20   Douglas Hilton MD   ferrous sulfate (IRON 325) 325 (65 Fe) MG tablet Take 1 tablet by mouth every 12 hours 8/10/20   Douglas Hilton MD   blood glucose monitor strips Test 4 times a day & as needed for symptoms of irregular blood glucose. Dispense sufficient amount for indicated testing frequency plus additional to accommodate PRN testing needs. 8/4/20   Ryan Osman PA-C   Lancets MISC 1 each by Does not apply route 4 times daily 8/4/20   Ryan Osman PA-C   glucose monitoring kit (FREESTYLE) monitoring kit 1 kit by Does not apply route daily 8/4/20   Ryan Osman PA-C   insulin glargine (BASAGLAR KWIKPEN) 100 UNIT/ML injection pen Inject 60 Units into the skin 2 times daily    Historical Provider, MD   Emollient (CERAVE) LOTN Apply topically 2 times daily Indications: bilateral lower legs    Historical Provider, MD   vitamin D (CHOLECALCIFEROL) 45521 UNIT CAPS Take 50,000 Units by mouth once a week Indications: Tuesdays     Historical Provider, MD   gabapentin (NEURONTIN) 100 MG capsule Take 100 mg by mouth 2 times daily. Shaheen Meeks     Historical Provider, MD   insulin aspart (NOVOLOG) 100 UNIT/ML injection vial Inject into the skin Inject as per sliding scale before meals and at bedtime:    = 0 units; call physician if less than 70  151-200 = 2 units  201-250 = 4 units  251-300 = 6 units  301-350 = 8 units  351-400 = 10 units; call physician if greater than 400    Historical Provider, MD   potassium chloride (KLOR-CON M) 10 MEQ extended release tablet Take 30 mEq by mouth daily     Historical Provider, MD   vitamin B-12 (CYANOCOBALAMIN) 500 MCG tablet Take 500 mcg by mouth daily    Historical Provider, MD   acetaminophen (TYLENOL) 325 MG tablet Take 650 mg by mouth 3 times daily as needed for Pain (mild)    Historical Provider, MD   Blood Glucose Monitoring Suppl FLAVIO Check blood sugars 3/day 11/30/17   Мария Al MD   PARoxetine (PAXIL) 20 MG tablet Take 20 mg by mouth every morning    Historical Provider, MD   omeprazole (PRILOSEC) 40 MG delayed release capsule Take 40 mg by mouth Daily     Historical Provider, MD   loratadine (CLARITIN) 10 MG tablet Take 10 mg by mouth daily    Historical Provider, MD   pramipexole (MIRAPEX) 1 MG tablet Take 1 mg by mouth nightly    Historical Provider, MD   lamoTRIgine (LAMICTAL) 200 MG tablet Take 200 mg by mouth 2 times daily    Historical Provider, MD   busPIRone (BUSPAR) 10 MG tablet Take 10 mg by mouth 3 times daily     Historical Provider, MD       REVIEW OF SYSTEMS    (2-9 systems for level 4, 10 or more for level 5)      Review of Systems   Unable to perform ROS: Patient unresponsive       PHYSICAL EXAM   (up to 7 for level 4, 8 or more for level 5)      INITIAL VITALS:   Heart rate in the low 725S and systolic appears 633 over palpation, respiratory rate of 14 with the vent. Physical Exam  Constitutional:       Appearance: She is obese. She is toxic-appearing. Comments: Unresponsive   HENT:      Head: Normocephalic and atraumatic. Right Ear: External ear normal.      Left Ear: External ear normal.      Nose: Nose normal.      Mouth/Throat:      Mouth: Mucous membranes are dry. Comments: Intubated  Eyes:      Comments: Fixed pupils corneal reflex   Neck:      Musculoskeletal: Normal range of motion and neck supple. No neck rigidity. Cardiovascular:      Rate and Rhythm: Regular rhythm. Tachycardia present. Heart sounds: Normal heart sounds. No murmur. No friction rub. No gallop. Comments: Thready pulses initially  Pulmonary:      Effort: Respiratory distress present. Breath sounds: Wheezing and rales (In all fields) present.    Abdominal: Tenderness: There is no abdominal tenderness. There is no guarding or rebound. Comments: Obese abdomen with wheezing in the lateral lower abdomen suggesting possible insulin injection sites   Musculoskeletal:         General: Swelling (Weeping bilateral lower extremities from the knees distally) present. No tenderness. Comments: Flaccid arms for range of motion   Skin:     Capillary Refill: Capillary refill takes less than 2 seconds. Comments: Cool skin and head weeping edema   Neurological:      Comments: GCS 3 intubated with no corneal reflexes and pupils fixed. Patient had no sedation and was not fighting the tube. DIFFERENTIAL  DIAGNOSIS     PLAN (LABS / IMAGING / EKG):  Orders Placed This Encounter   Procedures    Hemoglobin and hematocrit, blood    SODIUM (POC)    POTASSIUM (POC)    CHLORIDE (POC)    CALCIUM, IONIC (POC)    Inpatient consult to Primary Care Provider    Venous Blood Gas, POC    Creatinine W/GFR Point of Care    Lactic Acid, POC    POCT Glucose    Anion Gap (Calc) POC       MEDICATIONS ORDERED:  No orders of the defined types were placed in this encounter.       DDX: Fluid overload, acute renal failure, hyperkalemia, cardiac arrest, respiratory failure    DIAGNOSTIC RESULTS / EMERGENCY DEPARTMENT COURSE / MDM   LAB RESULTS:  Results for orders placed or performed during the hospital encounter of 08/29/20   Hemoglobin and hematocrit, blood   Result Value Ref Range    POC Hemoglobin 9.4 (L) 12.0 - 16.0 g/dL    POC Hematocrit 28 (L) 36 - 46 %   SODIUM (POC)   Result Value Ref Range    POC Sodium 139 138 - 146 mmol/L   POTASSIUM (POC)   Result Value Ref Range    POC Potassium 9.0 (HH) 3.5 - 4.5 mmol/L   CHLORIDE (POC)   Result Value Ref Range    POC Chloride 112 (H) 98 - 107 mmol/L   CALCIUM, IONIC (POC)   Result Value Ref Range    POC Ionized Calcium 1.91 (HH) 1.15 - 1.33 mmol/L   Venous Blood Gas, POC   Result Value Ref Range    pH, Larry 6.902 (LL) 7.320 - 7.430 pCO2, Larry 96.1 (HH) 41.0 - 51.0 mm Hg    pO2, Larry 50.3 (H) 30.0 - 50.0 mm Hg    HCO3, Venous 18.9 (L) 22.0 - 29.0 mmol/L    Total CO2, Venous 22 (L) 23.0 - 30.0 mmol/L    Negative Base Excess, Larry 14 (H) 0.0 - 2.0    Positive Base Excess, Larry NOT REPORTED 0.0 - 3.0    O2 Sat, Larry 56 (L) 60.0 - 85.0 %    O2 Device/Flow/% NOT REPORTED     David Test NOT REPORTED     Sample Site NOT REPORTED     Mode NOT REPORTED     FIO2 NOT REPORTED     Pt Temp NOT REPORTED     POC pH Temp NOT REPORTED     POC pCO2 Temp NOT REPORTED mm Hg    POC pO2 Temp NOT REPORTED mm Hg   Creatinine W/GFR Point of Care   Result Value Ref Range    POC Creatinine 3.05 (H) 0.51 - 1.19 mg/dL    GFR Comment 19 (L) >60 mL/min    GFR Non-African American 15 (L) >60 mL/min    GFR Comment         Lactic Acid, POC   Result Value Ref Range    POC Lactic Acid 8.08 (H) 0.56 - 1.39 mmol/L   POCT Glucose   Result Value Ref Range    POC Glucose 149 (H) 74 - 100 mg/dL   Anion Gap (Calc) POC   Result Value Ref Range    Anion Gap 8 7 - 16 mmol/L       IMPRESSION: 58-year-old female presents to the ER for cardiac arrest by EMS with ROSC. Patient looks acutely toxic and is nonresponsive. Patient is tachycardic into the 100s with blood pressure systolic 811F over palpation and breathing with the vent. Pupils fixed and dilated no gag reflex. Lung sounds have diffuse rales and wheezing noted on exam.  +4 pitting edema noted in bilateral lower extremities distal to the knees. Concern for above differential diagnosis from care labs were obtained and patient then proceeded to code within the first 10 minutes of arrival to the ER.     RADIOLOGY:  none    EKG  EKG Interpretation    Interpreted by emergency department physician    Rhythm: Bradycardia with wide-complex rhythm likely from reperfusion and hyper K  Rate: 20s  Axis: left  Ectopy: None  Conduction: Wide-complex QRS with no W[place] interval  ST Segments: Diffuse ST changes noted in all leads  T Waves: Peaked diffusely  Q Waves: none    Clinical Impression: Significant bradycardia with wide complex and peak T waves concerning for hyperkalemia. Ying Hoyt      All EKG's are interpreted by the Emergency Department Physician who either signs or Co-signs this chart in the absence of a cardiologist.    EMERGENCY DEPARTMENT COURSE:  Patient had a 15-minute ACLS code for cardiac arrest.  Patient was given 20 mcg of push dose epinephrine and still degenerated into a PEA arrest.  During this time patient was receiving dopamine drip, which did not prevent this arrest.  During this 15 minute code, patient received 2 A of bicarb, 3 g of calcium chloride, 1 of D50, 300 J defibrillation and 450 mg of amiodarone due to brief wide-complex tachycardia, and family has received a total of 3 mg of epinephrine. Time of death was called at [phone].  and PCP were called and updated. ED Course as of Aug 29 2155   Sat Aug 29, 2020   1240 Talk to corner who agrees that it did not sound like a 's case and will follow up with a test kit with PCP. [CS]   5 Talked Dr. Sallie Brownlee, PCP at the nursing home patient is their PCP. He was updated and he was a agreeable to signing the death certificate. Will update nurse.    [CS]      ED Course User Index  [CS] Ying Hoyt DO         PROCEDURES:  ACLS as noted above    CONSULTS:  IP CONSULT TO PRIMARY CARE PROVIDER    CRITICAL CARE:  Please see attending note    FINAL IMPRESSION      1. Acute respiratory failure with hypercapnia (HCC)    2. Cardiac arrest ([facility]    3. Hyperkalemia          DISPOSITION / PLAN     DISPOSITION  2020 11:58:24 AM      PATIENT REFERRED TO:  No follow-up provider specified.     DISCHARGE MEDICATIONS:  New Prescriptions    No medications on file       Ying Hoyt DO  Emergency Medicine Resident    (Please note that portions of thisnote were completed with a voice recognition program.  Efforts were made to edit the dictations

## 2020-08-29 NOTE — FLOWSHEET NOTE
[facility]   Patient Death Note  DEATH   Shift date: 2020     Shift day: Saturday  Shift #: 1                 Room # 13   Name: Madi Pang            Age: 75 y.o. Gender: female          [employer]: [address] [facility] [employer]: Unknown  Admit Date & Time: 2020 11:15 AM     Referral: ED   Actual date of death: 2020  TOD: 11:36AM       SITUATION AT DEATH:  Patient was admitted to ED 13 as full arrest. Patient  after several attempts to resuscitate her. IS THIS A 'S CASE? No    SPIRITUAL/EMOTIONAL INTERVENTION:   blessed patient's body and offered spiritual and emotional support to family and friends. Family Received Grief Packet? No     NAME AND PHONE NUMBER OF DOCTOR SIGNING DEATH CERTIFICATE:  Unknown   (phone)  Unknown     spoke with [address] [place] [address], who will contact the  home    Copy of COMPLETED Release of Body Form Received? Yes     HOME:  Name: [address]: Danyel [address]ian  Phone Number: [phone]    NEXT OF KIN:  Name: Mohsen Guerraece  Relationship: Nguyễn Gonzalez Address: [address]: [facility]  Zip code:    Phone Number: [phone]    ANY FOLLOW-UP NEEDED? Yes    IF SO, WHAT? To find out who is signing death certificate    Electronically signed by Fr. Dudley Keith, on 2020 at 2:17 PM.  [facility]  [phone]

## 2020-08-29 NOTE — ED NOTES
[facility] notified, spoke with Estrellita referral # [id number]     Thomas Khan, EDIS  08/29/20 2219

## 2020-08-29 NOTE — FLOWSHEET NOTE
Per EMS [place] at [phone], CPR started at 1007. Achieved ROSC at 1040. 2 doses of epi given, last was given at 1040 per EMS. Per EMS: /palpable, , CAP 60-90, SpO2 70-90%. Pacing was attempted and failed. Dopamine was given by EMS at . Arrival to ED at 1109.

## 2020-08-29 NOTE — ED PROVIDER NOTES
with wide complex rhythm strongly concerning for hyper K. Johanne Rao MD      Interpreted by me      CRITICAL CARE: There was a high probability of clinically significant/life threatening deterioration in this patient's condition which required my urgent intervention. Total critical care time was 41 minutes. This excludes any time for separately reportable procedures.      Isaias Joel MD, [facility]  Attending Emergency Physician         Johanne Rao MD  08/29/20 6070

## 2020-08-31 LAB
EKG ATRIAL RATE: 20 BPM
EKG Q-T INTERVAL: 648 MS
EKG QRS DURATION: 114 MS
EKG QTC CALCULATION (BAZETT): 373 MS
EKG R AXIS: -73 DEGREES
EKG T AXIS: 20 DEGREES
EKG VENTRICULAR RATE: 20 BPM

## 2020-08-31 PROCEDURE — 99999 EKG 12-LEAD: CPT | Performed by: INTERNAL MEDICINE

## 2020-09-07 NOTE — PROGRESS NOTES
and vomiting.     On examination. Weakness arm left hand than leg both lower limbs has tingling sensation. Normal sensory sensation on both upper and lower limb.     CT head and neck showed 70% stenosis on left internal carotid artery    and MRI head showed no acute intracranial abnormality  Blood pressure 1 33/48  Sodium 146 blood urea nitrogen 53 creatinine 1.74  Glucose 211  Hemoglobin 8.5      Procedures/ Significant Interventions:    None  Consults:     Consults:     Final Specialist Recommendations/Findings:   IP CONSULT TO STROKE TEAM  IP CONSULT TO INTERNAL MEDICINE  IP CONSULT TO NEUROLOGY  IP CONSULT TO CASE MANAGEMENT  IP CONSULT TO NEPHROLOGY  IP CONSULT TO OB GYN  IP CONSULT TO HOME CARE NEEDS      Any Hospital Acquired Infections: none    Discharge Functional Status:  stable    DISCHARGE PLAN     Disposition: SNF    Patient Instructions:   Discharge Medication List as of 8/25/2020  5:59 PM      START taking these medications    Details   aspirin 81 MG chewable tablet Take 1 tablet by mouth daily, Disp-30 tablet,R-3Normal      atorvastatin (LIPITOR) 80 MG tablet Take 1 tablet by mouth nightly, Disp-30 tablet,R-3Normal      amLODIPine (NORVASC) 10 MG tablet Take 1 tablet by mouth daily, Disp-30 tablet,R-3Normal         CONTINUE these medications which have CHANGED    Details   levETIRAcetam (KEPPRA) 500 MG tablet Take 1 tablet by mouth 2 times daily Indications: with 750 mg to make total dose 1250 mg, Disp-60 tablet,R-3Normal      hydrALAZINE (APRESOLINE) 50 MG tablet Take 1 tablet by mouth every 8 hours, Disp-90 tablet,R-3Normal      bumetanide (BUMEX) 2 MG tablet Take 1 tablet by mouth 2 times daily, Disp-30 tablet,R-3Normal         CONTINUE these medications which have NOT CHANGED    Details   clotrimazole-betamethasone (LOTRISONE) 1-0.05 % cream Apply topically 2 times daily. , Disp-15 g,R-0, Normal      nadolol (CORGARD) 20 MG tablet Take 1 tablet by mouth daily, Disp-30 tablet,R-3Normal ferrous sulfate (IRON 325) 325 (65 Fe) MG tablet Take 1 tablet by mouth every 12 hours, Disp-30 tablet,R-5Normal      blood glucose monitor strips Test 4 times a day & as needed for symptoms of irregular blood glucose. Dispense sufficient amount for indicated testing frequency plus additional to accommodate PRN testing needs. , Disp-100 strip,R-0, Normal      Lancets MISC 4 TIMES DAILY Starting Tue 8/4/2020, Disp-200 each,R-0, Normal      glucose monitoring kit (FREESTYLE) monitoring kit DAILY Starting Tue 8/4/2020, Disp-1 kit,R-0, Normal      insulin glargine (BASAGLAR KWIKPEN) 100 UNIT/ML injection pen Inject 60 Units into the skin 2 times dailyHistorical Med      Emollient (CERAVE) LOTN Apply topically 2 times daily Indications: bilateral lower legsHistorical Med      vitamin D (CHOLECALCIFEROL) 91393 UNIT CAPS Take 50,000 Units by mouth once a week Indications: Tuesdays Historical Med      gabapentin (NEURONTIN) 100 MG capsule Take 100 mg by mouth 2 times daily. Delwyn Scarce Historical Med      insulin aspart (NOVOLOG) 100 UNIT/ML injection vial Inject into the skin Inject as per sliding scale before meals and at bedtime:    = 0 units; call physician if less than 70  151-200 = 2 units  201-250 = 4 units  251-300 = 6 units  301-350 = 8 units  351-400 = 10 units; call physician if greater th an 400Historical Med      potassium chloride (KLOR-CON M) 10 MEQ extended release tablet Take 30 mEq by mouth daily Historical Med      vitamin B-12 (CYANOCOBALAMIN) 500 MCG tablet Take 500 mcg by mouth dailyHistorical Med      acetaminophen (TYLENOL) 325 MG tablet Take 650 mg by mouth 3 times daily as needed for Pain (mild)Historical Med      Blood Glucose Monitoring Suppl FLAVIO Disp-1 Device, R-0, [facility]      PARoxetine (PAXIL) 20 MG tablet Take 20 mg by mouth every morningHistorical Med      omeprazole (PRILOSEC) 40 MG delayed release capsule Take 40 mg by mouth Daily Historical Med      loratadine (CLARITIN) 10 MG tablet Take 10 mg by mouth dailyHistorical Med      pramipexole (MIRAPEX) 1 MG tablet Take 1 mg by mouth nightlyHistorical Med      lamoTRIgine (LAMICTAL) 200 MG tablet Take 200 mg by mouth 2 times dailyHistorical Med      busPIRone (BUSPAR) 10 MG tablet Take 10 mg by mouth 3 times daily Historical Med         STOP taking these medications       cephALEXin (KEFLEX) 500 MG capsule Comments:   Reason for Stopping:         lisinopril-hydrochlorothiazide (PRINZIDE;ZESTORETIC) 10-12.5 MG per tablet Comments:   Reason for Stopping:         simvastatin (ZOCOR) 20 MG tablet Comments:   Reason for Stopping:               Activity: activity as tolerated    Diet: renal diet    Follow-up:    [facility]  [phone]    Schedule an appointment as soon as possible for a visit in 1 week      Dinah Clifton MD  [place] [address]  [phone]    Schedule an appointment as soon as possible for a visit in 6 weeks  post hospital follow up seizures, establish care with epileptologist    [address], [facility] [address] [phone]    Schedule an appointment as soon as possible for a visit  hospital follow up for vaginal discharge    Jong Carrera MD  [facility] [place], [address] [place] [address], [facility] [address]  [phone]    Schedule an appointment as soon as possible for a visit in 1 week  ICA stenosis- post hospital follow-up    Carine Manzanares MD  [address] [place] [address], [address]  [facility] [address] [facility] [place] [address]  [phone]    Schedule an appointment as soon as possible for a visit in 3 months  ICA stenosis-post hospital follow-up    John Garcia MD  [address] [place] [address], [address]  [address]  [address] [address]    In 1 week  NIKKI on CKD-post hospital follow-up      Patient Instructions:    F/U out patient with PCP  F/U out patient with nephrology Dr. Morales in 1 month for NIKKI on CKD  F/U outpatient OB/Gyn outpatient for vaginal bleeding. F/U with neurology for history of seizure disorder and history of CVA  F/U with neuro endovascular for left ICA stenosis. Continue bumex. Stop taking Prinzide  Follow up labs:None  Follow up imaging: None  Note that over 30 minutes was spent in preparing discharge papers, discussing discharge with patient, medication review, etc.      Cassandra Yang MD, MD  Internal Medicine Resident, PGY-1  [address] [place], [place]  9/7/2020, 8:27 AM

## 2020-09-07 NOTE — DISCHARGE SUMMARY
[facility]      Department of Internal Medicine - Staff Internal Medicine Teaching Service     INPATIENT DISCHARGE SUMMARY        Patient Identification:  Gabe Crenshaw is a 75 y.o. female. :  1955  MRN: [id number]                                     Acct: [de-identified]   PCP: Mac Locke PA-C  Admit Date:  2020  Discharge date and time: 2020  6:34 PM   Attending Provider: No att. providers found                                   [facility] Problem Lists:  Principal Problem:    Aphasia  Active Problems:    Type 2 diabetes mellitus ([address]    Accidental fall    Falling episodes    Generalized weakness    RLS (restless legs syndrome)    Thrombocytopenia (HCC)    Confusion state    Postmenopausal bleeding    Anemia    Atypical glandular cells of undetermined significance (SLICK) on cervical Pap smear (NOS)    Breast mass in female    Acute kidney injury superimposed on chronic kidney disease (HCC)    NIKKI (acute kidney injury) ([address]    Acute cystitis without hematuria    Cellulitis of lower extremity  Resolved Problems:    * No resolved hospital problems.  [facility]      Brief Inpatient course:   Gabe Crenshaw is a 75 y.o. female who had history of type 2 DM, falling episode,CVA, diabetic polyneuropathy  was admitted for the management of Aphasia, presented to the emergency department with a chief complaint of aphasia  (Unable to complete a sentence) presents 1 day.  It started suddenly and is resolved now. Asha Caballero has history of fall today resulting hematoma and laceration to the occipital part of the head.  She has history of fecal and urinary incontinence after left-sided stroke 3 years back. she has history of palpitation to episode 1 day before fall. has history of being treated for anemia secondary to esophageal varices 1 week back.  She denies any abnormal movement of the body, loss of consciousness, headache, shortness of breath, chest painl, fevers, nausea and vomiting.     On examination. Weakness arm left hand than leg both lower limbs has tingling sensation. Normal sensory sensation on both upper and lower limb.     CT head and neck showed 70% stenosis on left internal carotid artery    and MRI head showed no acute intracranial abnormality  Blood pressure 1 33/48  Sodium 146 blood urea nitrogen 53 creatinine 1.74  Glucose 211  Hemoglobin 8.5        Procedures/ Significant Interventions:    None  Consults:      Consults:      Final Specialist Recommendations/Findings:   IP CONSULT TO STROKE TEAM  IP CONSULT TO INTERNAL MEDICINE  IP CONSULT TO NEUROLOGY  IP CONSULT TO CASE MANAGEMENT  IP CONSULT TO NEPHROLOGY  IP CONSULT TO OB GYN  IP CONSULT TO HOME CARE NEEDS        Any Hospital Acquired Infections: none     Discharge Functional Status:  stable     DISCHARGE PLAN      Disposition: SNF     Patient Instructions:         Discharge Medication List as of 8/25/2020  5:59 PM             START taking these medications     Details   aspirin 81 MG chewable tablet Take 1 tablet by mouth daily, Disp-30 tablet,R-3Normal       atorvastatin (LIPITOR) 80 MG tablet Take 1 tablet by mouth nightly, Disp-30 tablet,R-3Normal       amLODIPine (NORVASC) 10 MG tablet Take 1 tablet by mouth daily, Disp-30 tablet,R-3Normal                 CONTINUE these medications which have CHANGED     Details   levETIRAcetam (KEPPRA) 500 MG tablet Take 1 tablet by mouth 2 times daily Indications: with 750 mg to make total dose 1250 mg, Disp-60 tablet,R-3Normal       hydrALAZINE (APRESOLINE) 50 MG tablet Take 1 tablet by mouth every 8 hours, Disp-90 tablet,R-3Normal       bumetanide (BUMEX) 2 MG tablet Take 1 tablet by mouth 2 times daily, Disp-30 tablet,R-3Normal                 CONTINUE these medications which have NOT CHANGED     Details   clotrimazole-betamethasone (LOTRISONE) 1-0.05 % cream Apply topically 2 times daily. , Disp-15 g,R-0, Normal       nadolol (CORGARD) 20 MG tablet Take 1 tablet by mouth daily, Disp-30 tablet,R-3Normal       ferrous sulfate (IRON 325) 325 (65 Fe) MG tablet Take 1 tablet by mouth every 12 hours, Disp-30 tablet,R-5Normal       blood glucose monitor strips Test 4 times a day & as needed for symptoms of irregular blood glucose. Dispense sufficient amount for indicated testing frequency plus additional to accommodate PRN testing needs. , Disp-100 strip,R-0, Normal       Lancets MISC 4 TIMES DAILY Starting Tue 8/4/2020, Disp-200 each,R-0, Normal       glucose monitoring kit (FREESTYLE) monitoring kit DAILY Starting Tue 8/4/2020, Disp-1 kit,R-0, Normal       insulin glargine (BASAGLAR KWIKPEN) 100 UNIT/ML injection pen Inject 60 Units into the skin 2 times dailyHistorical Med       Emollient (CERAVE) LOTN Apply topically 2 times daily Indications: bilateral lower legsHistorical Med       vitamin D (CHOLECALCIFEROL) 96575 UNIT CAPS Take 50,000 Units by mouth once a week Indications: Tuesdays Historical Med       gabapentin (NEURONTIN) 100 MG capsule Take 100 mg by mouth 2 times daily. Roderick Mackay Historical Med       insulin aspart (NOVOLOG) 100 UNIT/ML injection vial Inject into the skin Inject as per sliding scale before meals and at bedtime:    = 0 units; call physician if less than 70  151-200 = 2 units  201-250 = 4 units  251-300 = 6 units  301-350 = 8 units  351-400 = 10 units; call physician if greater th an 400Historical Med       potassium chloride (KLOR-CON M) 10 MEQ extended release tablet Take 30 mEq by mouth daily Historical Med       vitamin B-12 (CYANOCOBALAMIN) 500 MCG tablet Take 500 mcg by mouth dailyHistorical Med       acetaminophen (TYLENOL) 325 MG tablet Take 650 mg by mouth 3 times daily as needed for Pain (mild)Historical Med       Blood Glucose Monitoring Suppl FLAVIO Disp-1 Device, R-0, Kin[employer] GamesCheck blood sugars 3/day       PARoxetine (PAXIL) 20 MG tablet Take 20 mg by mouth every morningHistorical Med       omeprazole (PRILOSEC) 40 MG delayed release capsule Take 40 mg by mouth Daily Historical Med       loratadine (CLARITIN) 10 MG tablet Take 10 mg by mouth dailyHistorical Med       pramipexole (MIRAPEX) 1 MG tablet Take 1 mg by mouth nightlyHistorical Med       lamoTRIgine (LAMICTAL) 200 MG tablet Take 200 mg by mouth 2 times dailyHistorical Med       busPIRone (BUSPAR) 10 MG tablet Take 10 mg by mouth 3 times daily Historical Med                STOP taking these medications         cephALEXin (KEFLEX) 500 MG capsule Comments:   Reason for Stopping:            lisinopril-hydrochlorothiazide (PRINZIDE;ZESTORETIC) 10-12.5 MG per tablet Comments:   Reason for Stopping:            simvastatin (ZOCOR) 20 MG tablet Comments:   Reason for Stopping:                    Activity: activity as tolerated     Diet: renal diet     Follow-up:    Mac Locke PA-C  [address]  [phone]     Schedule an appointment as soon as possible for a visit in 1 week        Adrien Ivy MD  [place] [address]  [phone]     Schedule an appointment as soon as possible for a visit in 6 weeks  post hospital follow up seizures, establish care with epileptologist     Silvestre Jeans, [address] [place] [address], [facility] [address] [phone]     Schedule an appointment as soon as possible for a visit  hospital follow up for vaginal discharge     Emily Kat MD  [facility], [address] [place] [address] 2, [address] [place] [address]  [phone]     Schedule an appointment as soon as possible for a visit in 1 week  ICA stenosis- post hospital follow-up     Krys Lemons MD  [address] [place] [address],  [address]  [facility] [address] [facility]  [phone]     Schedule an appointment as soon as possible for a visit in 3 months  ICA stenosis-post hospital follow-up     Darien Dover MD  [facility] [address]     In 1 week  [facility] CKD-post hospital follow-up       Patient Instructions:    F/U out patient with PCP  F/U out patient with nephrology Dr. Kenji Melvin in 1 month for NIKKI on CKD  F/U outpatient OB/Gyn outpatient for vaginal bleeding. F/U with neurology for history of seizure disorder and history of CVA  F/U with neuro endovascular for left ICA stenosis. Continue bumex. Stop taking Prinzide  Follow up labs:None  Follow up imaging: None  Note that over 30 minutes was spent in preparing discharge papers, discussing discharge with patient, medication review, etc.        Mariah Costa MD, MD  Internal Medicine Resident, PGY-1  [address] [place], [place]  9/7/2020, 8:27 AM

## 2024-02-18 NOTE — CONSULTS
Nephrology Consult Note    Reason for Consult: Elevated BUN and creatinine  Requesting Physician: Dr. Don Ruvalcaba  Chief Complaint: Admitted with syncope  History Obtained From: Patient and chart review    History of Present Illness: This is a 75 y.o. female who was admitted to hospital on fifth of this month with low hemoglobin. During that work-up patient underwent evaluation which shows liver cirrhosis most likely Gallardo, portal hypertension with varices. Patient also has at least 5 years history of diabetes. Patient also noticed increasing leg swelling from close to 3 years. Previous hospitalization patient also underwent bone marrow biopsy and results are pending. Patient was started on nadolol. Patient also has chronic kidney disease stage III and her creatinine during hospitalization was 1.8 to 1.9 mg/dL. Patient was discharged from hospital on 10 August.  At the time of discharge she was discharged to nursing home with 1 mg of Bumex a day, lisinopril with hydrochlorothiazide 10/12.5 with her other medications. According to patient she was in her usual state of health. On 818 she was brought in again to hospital with an episode of syncope. She underwent CT scan with contrast on 8/18/20. Which shows no stroke, but some narrowing of carotid arteries. There is no intervention planned for this visit. The reason nephrology was consulted during this visit because of creatinine of 2.1 and increasing leg swelling. Pattern of her creatinine is as follows:  Results for Av Velazquez (MRN [id number]) as of 8/20/2020 17:50   Ref. Range 8/10/2020 05:41 8/17/2020 23:18 8/18/2020 11:39 8/18/2020 14:00 8/19/2020 03:35 8/20/2020 09:06   Creatinine Latest Ref Range: 0.50 - 0.90 mg/dL 1.97 (H) 1.92 (H) 1.88 (H) 1.74 (H) 1.81 (H) 2.16 (H)   Renal ultrasound shows no hydronephrosis or obstructive uropathy.   Her right kidney was 10.4 x 6.1 x 5.6 cm and left kidney was 10.3 x 5.9 x 8.1 RT EQUIPMENT DEVICE RELATED - SKIN ASSESSMENT    Amari Score:  Amari Score: 12     RT Medical Equipment/Device:     ETT Tineo/Anchorfast    Skin Assessment:      Cheek:  Intact  Neck:  Intact  Lips:  Intact    Device Skin Pressure Protection:  Skin-to-device areas padded:  Anchorfast    Nurse Notification:  No    Blessing La, RRT   Or  potassium bicarb-citric acid (EFFER-K) effervescent tablet 40 mEq, PRN    Or  potassium chloride 10 mEq/100 mL IVPB (Peripheral Line), PRN  insulin glargine (LANTUS) injection vial 20 Units, BID  insulin lispro (HUMALOG) injection vial 0-12 Units, TID WC  insulin lispro (HUMALOG) injection vial 0-6 Units, Nightly  glucose (GLUTOSE) 40 % oral gel 15 g, PRN  dextrose 50 % IV solution, PRN  glucagon (rDNA) injection 1 mg, PRN  dextrose 5 % solution, PRN  aspirin chewable tablet 81 mg, Daily        Allergies:  Codeine    Social History:   Social History     Socioeconomic History    Marital status: Single     Spouse name: Not on file    Number of children: Not on file    Years of education: Not on file    Highest education level: Not on file   Occupational History    Not on file   Social Needs    Financial resource strain: Not on file    Food insecurity     Worry: Not on file     Inability: Not on file    Transportation needs     Medical: Not on file     Non-medical: Not on file   Tobacco Use    Smoking status: Never Smoker    Smokeless tobacco: Never Used   Substance and Sexual Activity    Alcohol use: No    Drug use: No    Sexual activity: Not on file   Lifestyle    Physical activity     Days per week: Not on file     Minutes per session: Not on file    Stress: Not on file   Relationships    Social connections     Talks on phone: Not on file     Gets together: Not on file     Attends Advent service: Not on file     Active member of club or organization: Not on file     Attends meetings of clubs or organizations: Not on file     Relationship status: Not on file    Intimate partner violence     Fear of current or ex partner: Not on file     Emotionally abused: Not on file     Physically abused: Not on file     Forced sexual activity: Not on file   Other Topics Concern    Not on file   Social History Narrative    Not on file       Family History:   Family History   Problem Relation Age of Onset  Diabetes Brother        Review of Systems:    Constitutional: No fever, weight gain present  HEENT:  No blurring of vision no recurrent sinusitis  Cardiac:  Chest pain nausea or vomiting. Chest:   No cough or wheezing  Abdomen:  Abdominal pain  Neuro:  No focal weakness, abnormal movements orseizure like activity. Skin:   No rashes, no itching. :   No hematuria, no pyuria, no dysuria, no flank pain.   Extremities:  Crease and leg swelling from last 3 years    Objective:  CURRENT TEMPERATURE:  Temp: 97.6 °F (36.4 °C)  MAXIMUM TEMPERATURE OVER 24HRS:  Temp (24hrs), Av.4 °F (36.3 °C), Min:97.1 °F (36.2 °C), Max:97.6 °F (36.4 °C)    CURRENT RESPIRATORY RATE:  Resp: 16  CURRENT PULSE:  Pulse: 64  CURRENT BLOOD PRESSURE:  BP: (!) 165/57  24HR BLOOD PRESSURE RANGE:  Systolic (34ELY), GALILEO:291 , Min:155 , MICHELLE:898   ; Diastolic (93EAT), PSN:03, Min:50, Max:57    24HR INTAKE/OUTPUT:      Intake/Output Summary (Last 24 hours) at 2020 1749  Last data filed at 2020  Gross per 24 hour   Intake 200 ml   Output 550 ml   Net -350 ml     Patient Vitals for the past 96 hrs (Last 3 readings):   Weight   20 1815 284 lb 6.3 oz (129 kg)     Physical Exam:  General appearance: Present female sitting comfortably alert and oriented x3  Skin: Warm to touch and no erythema  Eyes: Conjunctiva was normal  ENT: : No thrush  Neck: No carotid bruit  Pulmonary: Bilateral air entry and clear to auscultation  Cardiovascular: S1 and S2 audible no S3  Abdomen: soft nontender, bowel sounds present, no organomegaly, hard to assess ascites  Extremities: 3+ lower extremity edema    Labs:   CBC:  Recent Labs     20  1400 20  0335 20  0906   WBC 5.2 4.6 4.1   RBC 3.29* 2.97* 3.15*   HGB 8.5* 7.5* 8.1*   HCT 30.1* 27.7* 29.9*   MCV 91.5 93.3 94.9   MCH 25.8 25.3 25.7   MCHC 28.2* 27.1* 27.1*   RDW 19.2* 18.9* 18.9*   PLT 97* 82* 84*   MPV 10.5 10.5 11.2      BMP:   Recent Labs     20  1400  20  0335 08/19/20  1632 08/20/20  0024 08/20/20  0906   *   < > 147*   < > 144 147* 145*   K 4.4  --  4.3  --   --   --  4.7     --  108*  --   --   --  109*   CO2 27  --  28  --   --   --  23   BUN 53*  --  53*  --   --   --  55*   CREATININE 1.74*  --  1.81*  --   --   --  2.16*   GLUCOSE 211*  --  189*  --   --   --  140*   CALCIUM 9.1  --  8.7  --   --   --  9.0    < > = values in this interval not displayed. Assessment:  1. Chronic kidney disease stage III approaching stage IV with a baseline creatinine of 1.8 to 2 mg/dL. Etiology of renal insufficiency is not clear at this point  2. Hepatic cirrhosis/FRANCIS. 3.  Portal hypertension  4. Lower extremity edema and fluid retention  5. Body habitus suggestive of obstructive sleep apnea  6. Hypertension  7. Anemia of chronic disease     Plan:  1. Check serum immunofixation and free light chain ratio  2. Change Lasix to IV now  3. Spot urine for protein and creatinine for the estimation of proteinuria  4. Check serum albumin  5. Strict intake and output charting  We will follow with you    Thank you for the consultation. Please do not hesitate to call with questions.     Electronically signed by Ryan Murillo MD on 8/20/2020 at 5:49 PM

## 2024-12-21 NOTE — ED NOTES
Progress Note - Hospitalist   Name: Alberta Sanchez 78 y.o. female I MRN: [id number]  Unit/Bed#: -01 I Date of Admission: 12/18/2024   Date of Service: 12/21/2024 I Hospital Day: 2    Assessment & Plan  Gastroenteritis  Patient with GI complaints including nausea, decreased appetite, and diarrhea  Patient still with diarrhea however symptoms are improving  Stool studies negative including C. difficile  Continue IV fluids as needed  Surgical soft diet and advance as tolerated  CT abdomen/pelvis with no acute findings noted  Continue supportive care  No need for antibiotics at this time  Chronic renal insufficiency, stage III (moderate) ([facility])  Lab Results   Component Value Date    EGFR 53 12/21/2024    EGFR 56 12/20/2024    EGFR 60 12/19/2024    CREATININE 1.00 12/21/2024    CREATININE 0.96 12/20/2024    CREATININE 0.90 12/19/2024   Creatinine seems to be at baseline.  Will continue routine lab monitoring.  Avoid any nephrotoxic meds  Type 2 diabetes mellitus with stage 3 chronic kidney disease, with long-term current use of insulin ([facility])  Lab Results   Component Value Date    HGBA1C 6.4 (H) 12/18/2024       Recent Labs     12/20/24  1120 12/20/24  1609 12/20/24  2107 12/21/24  0730   POCGLU 196* 119 162* 95       Blood Sugar Average: Last 72 hrs:  (P) 142.5776579130027170  Will continue home long-acting insulin at reduced dose of 45 units nightly.  Insulin sliding scale.  Diabetic diet  Restless legs syndrome (RLS)  Continue Topamax  Right knee pain  Acute on chronic right knee pain, patient states that she typically has chronic pain however pain slightly worse than usual  Denies any recent trauma  Knee x-ray with effusion noted and degenerative changes.  No signs of infection on physical exam.  Patient had difficulty walking with PT  Lumbar CT with no acute findings noted  Possible inflammatory arthritis.  Will send ESR/CRP/GUERO panel.  Start prednisone 40 mg daily, monitor response  May benefit from  Report called-await for room to be cleaned     Kathia Restrepo RN  12/13/17 1207 outpatient rheumatology follow-up.    VTE Pharmacologic Prophylaxis: VTE Score: 5 Moderate Risk (Score 3-4) - Pharmacological DVT Prophylaxis Ordered: enoxaparin (Lovenox).    Mobility:   Basic Mobility Inpatient Raw Score: 7  JH-HLM Goal: 2: Bed activities/Dependent transfer  JH-HLM Achieved: 2: Bed activities/Dependent transfer  JH-HLM Goal achieved. Continue to encourage appropriate mobility.    Patient Centered Rounds: I performed bedside rounds with nursing staff today.   Discussions with Specialists or Other Care Team Provider: yes    Education and Discussions with Family / Patient: Updated  () via phone.    Current Length of Stay: 2 day(s)  Current Patient Status: Inpatient   Certification Statement: The patient will continue to require additional inpatient hospital stay due to ambulatory dysfunction  Discharge Plan: Anticipate discharge in 48-72 hrs to rehab facility.    Code Status: Level 3 - DNAR and DNI    Subjective   No overnight events noted    Objective :  Temp:  [98.3 °F (36.8 °C)-98.5 °F (36.9 °C)] 98.3 °F (36.8 °C)  HR:  [73-81] 73  BP: (112-159)/(53-68) 112/53  Resp:  [18] 18  SpO2:  [91 %-94 %] 93 %  O2 Device: None (Room air)    Body mass index is 38.56 kg/m².     Input and Output Summary (last 24 hours):     Intake/Output Summary (Last 24 hours) at 12/21/2024 1020  Last data filed at 12/21/2024 0858  Gross per 24 hour   Intake 1190 ml   Output 679 ml   Net 511 ml       Physical Exam  Constitutional:       General: She is not in acute distress.     Appearance: She is obese.   HENT:      Head: Normocephalic and atraumatic.      Nose: Nose normal.      Mouth/Throat:      Mouth: Mucous membranes are moist.   Eyes:      Extraocular Movements: Extraocular movements intact.      Conjunctiva/sclera: Conjunctivae normal.   Cardiovascular:      Rate and Rhythm: Normal rate and regular rhythm.   Pulmonary:      Effort: Pulmonary effort is normal. No respiratory distress.   Abdominal:       Palpations: Abdomen is soft.      Tenderness: There is no abdominal tenderness.   Musculoskeletal:         General: Normal range of motion.      Cervical back: Normal range of motion and neck supple.      Comments: Generalized weakness   Skin:     General: Skin is warm and dry.   Neurological:      General: No focal deficit present.      Mental Status: She is alert. Mental status is at baseline.      Cranial Nerves: No cranial nerve deficit.   Psychiatric:         Mood and Affect: Mood normal.         Behavior: Behavior normal.           Lines/Drains:        Lab Results: I have reviewed the following results:   Results from last 7 days   Lab Units 12/21/24  0502 12/20/24  0436   WBC Thousand/uL 10.08 8.54   HEMOGLOBIN g/dL 11.6 11.1*   HEMATOCRIT % 35.9 35.3   PLATELETS Thousands/uL 167 150   SEGS PCT %  --  74   LYMPHO PCT %  --  15   MONO PCT %  --  8   EOS PCT %  --  2     Results from last 7 days   Lab Units 12/21/24  0502 12/20/24  0436   SODIUM mmol/L 135 136   POTASSIUM mmol/L 3.6 3.6   CHLORIDE mmol/L 102 104   CO2 mmol/L 26 25   BUN mg/dL 17 16   CREATININE mg/dL 1.00 0.96   ANION GAP mmol/L 7 7   CALCIUM mg/dL 9.1 8.7   ALBUMIN g/dL  --  3.1*   TOTAL BILIRUBIN mg/dL  --  0.90   ALK PHOS U/L  --  147*   ALT U/L  --  33   AST U/L  --  33   GLUCOSE RANDOM mg/dL 95 110         Results from last 7 days   Lab Units 12/21/24  0730 12/20/24  2107 12/20/24  1609 12/20/24  1120 12/20/24  0702 12/19/24  2050 12/19/24  1619 12/19/24  1103 12/19/24  0715 12/18/24  2045 12/18/24  1641   POC GLUCOSE mg/dl 95 162* 119 196* 104 139 147* 168* 143* 151* 146*     Results from last 7 days   Lab Units 12/18/24  1647   HEMOGLOBIN A1C % 6.4*     Results from last 7 days   Lab Units 12/21/24  0502 12/18/24  1126   LACTIC ACID mmol/L  --  0.9   PROCALCITONIN ng/ml 0.46*  --        Recent Cultures (last 7 days):   Results from last 7 days   Lab Units 12/19/24  1601   C DIFF TOXIN B BY PCR  Negative       Imaging Results Review:  No pertinent imaging studies reviewed.  Other Study Results Review: No additional pertinent studies reviewed.    Last 24 Hours Medication List:     Current Facility-Administered Medications:     acetaminophen (TYLENOL) tablet 650 mg, Q6H PRN    albuterol (PROVENTIL HFA,VENTOLIN HFA) inhaler 2 puff, Q4H PRN    Diclofenac Sodium (VOLTAREN) 1 % topical gel 2 g, 4x Daily    enoxaparin (LOVENOX) subcutaneous injection 40 mg, Q12H BRADY    fenofibrate (TRICOR) tablet 145 mg, Daily    insulin glargine (LANTUS) subcutaneous injection 45 Units 0.45 mL, HS    insulin lispro (HumALOG/ADMELOG) 100 units/mL subcutaneous injection 1-5 Units, HS    insulin lispro (HumALOG/ADMELOG) 100 units/mL subcutaneous injection 1-6 Units, TID AC    lisinopril (ZESTRIL) tablet 10 mg, Daily    ondansetron (ZOFRAN) injection 4 mg, Q6H PRN    oxyCODONE (ROXICODONE) IR tablet 5 mg, Q6H PRN    oxyCODONE (ROXICODONE) split tablet 2.5 mg, Q6H PRN    pantoprazole (PROTONIX) EC tablet 40 mg, Daily    predniSONE tablet 40 mg, Daily    pregabalin (LYRICA) capsule 75 mg, TID    topiramate (TOPAMAX) tablet 25 mg, Daily    Administrative Statements   Today, Patient Was Seen By: Bala Lee MD      **Please Note: This note may have been constructed using a voice recognition system.**

## (undated) DEVICE — SNARE ENDOSCP AD SM L240CM LOOP W1.3CM SHTH DIA2.4MM POLYP

## (undated) DEVICE — DEFENDO AIR WATER SUCTION AND BIOPSY VALVE KIT FOR  OLYMPUS: Brand: DEFENDO AIR/WATER/SUCTION AND BIOPSY VALVE

## (undated) DEVICE — GLOVE ORANGE PI 7   MSG9070

## (undated) DEVICE — ENDO KIT W/SYRINGE: Brand: MEDLINE INDUSTRIES, INC.

## (undated) DEVICE — CONMED PEDIATRIC GASTROSCOPE SHEATHED CYTOLOGY BRUSH, RING HANDLE, 3 MM X 160 CM: Brand: CONMED

## (undated) DEVICE — FIAPC® PROBE W/ FILTER 2200 A OD 2.3MM/6.9FR; L 2.2M/7.2FT: Brand: ERBE

## (undated) DEVICE — Z INACTIVE USE 2525529 CONNECTOR TBNG FOR O2

## (undated) DEVICE — CANNULA NSL AD TBNG L7FT PVC STR NONFLARED PRNG O2 DEL W STD

## (undated) DEVICE — ERBE NESSY® OMEGA PLATE USA (85+23)CM² , WITH CABLE 3 M: Brand: ERBE

## (undated) DEVICE — CONMED DISPOSABLE MICROBIOLOGY BRUSH, Ø1 MM, 1.8 MM WORKING DIAMETER, 110 CM LENGTH: Brand: CONMED

## (undated) DEVICE — GOWN,POLY REINFORCED,LG: Brand: MEDLINE

## (undated) DEVICE — BITEBLOCK 54FR W/ DENT RIM BLOX

## (undated) DEVICE — MULTIPLE BAND LIGATOR: Brand: SPEEDBAND SUPERVIEW SUPER 7

## (undated) DEVICE — FIAPC® PROBE W/ FILTER 2200 C OD 2.3MM/6.9FR; L 2.2M/7.2FT: Brand: ERBE

## (undated) DEVICE — JELLY,LUBE,STERILE,FLIP TOP,TUBE,2-OZ: Brand: MEDLINE

## (undated) DEVICE — Z DUPLICATE USE 2527422 TUBING O2 STD 7 FT EXTN NO CRUSH VISUAL CNTRST LF

## (undated) DEVICE — ERBE NESSY®PLATE 170 SPLIT; 168CM²; CABLE 3M: Brand: ERBE

## (undated) DEVICE — Z DISCONTINUED USE 2424143 ADAPTER O2 SWVL CHRISTMAS TREE GRN